# Patient Record
Sex: MALE | Race: WHITE | NOT HISPANIC OR LATINO | Employment: OTHER | URBAN - METROPOLITAN AREA
[De-identification: names, ages, dates, MRNs, and addresses within clinical notes are randomized per-mention and may not be internally consistent; named-entity substitution may affect disease eponyms.]

---

## 2017-01-06 ENCOUNTER — OFFICE VISIT (OUTPATIENT)
Dept: FAMILY MEDICINE | Facility: CLINIC | Age: 82
End: 2017-01-06
Payer: MEDICARE

## 2017-01-06 VITALS
SYSTOLIC BLOOD PRESSURE: 138 MMHG | RESPIRATION RATE: 18 BRPM | HEIGHT: 66 IN | WEIGHT: 204.13 LBS | DIASTOLIC BLOOD PRESSURE: 86 MMHG | HEART RATE: 70 BPM | BODY MASS INDEX: 32.81 KG/M2

## 2017-01-06 DIAGNOSIS — K12.0 APHTHOUS STOMATITIS: Primary | ICD-10-CM

## 2017-01-06 PROCEDURE — 99213 OFFICE O/P EST LOW 20 MIN: CPT | Mod: S$PBB,,, | Performed by: FAMILY MEDICINE

## 2017-01-06 PROCEDURE — 99213 OFFICE O/P EST LOW 20 MIN: CPT | Mod: PBBFAC | Performed by: FAMILY MEDICINE

## 2017-01-06 PROCEDURE — 99999 PR PBB SHADOW E&M-EST. PATIENT-LVL III: CPT | Mod: PBBFAC,,, | Performed by: FAMILY MEDICINE

## 2017-01-06 NOTE — MR AVS SNAPSHOT
[facility]  [address]  [facility] [address]  Phone: [phone]  Fax: [phone]                  Erasmo Dunbar   2017 1:30 PM   Office Visit    Description:  Male : 1935   Provider:  Bernadette Garibay MD   Department:  [facility]           Reason for Visit     Mouth Lesions           Diagnoses this Visit        Comments    Aphthous stomatitis    -  Primary            To Do List           Future Appointments        Provider Department Dept Phone    5/15/2017 8:00 AM Hemal Varma MD [facility] [phone]      Goals (5 Years of Data)     None       These Medications        Disp Refills Start End    (Magic mouthwash) 1:1:1 Benadryl 12.5mg/5ml liq, aluminum & magnesium hydroxide-simehticone (Maalox), lidocaine viscous 2% 90 mL 0 2017     Swish and spit 5 mLs every 4 (four) hours as needed. for mouth sores - Swish & Spit    Pharmacy: [facility]/pharmacy #[id number] - [place], [place] - [address] Ph #: [phone]         [facility] On Call     [facility] On Call Nurse [facility] -  Assistance  Registered nurses in the [facility] provide clinical advisement, health education, appointment booking, and other advisory services.  Call for this free service at [phone].             Medications           Message regarding Medications     Verify the changes and/or additions to your medication regime listed below are the same as discussed with your clinician today.  If any of these changes or additions are incorrect, please notify your healthcare provider.        START taking these NEW medications        Refills    (Magic mouthwash) 1:1:1 Benadryl 12.5mg/5ml liq, aluminum & magnesium hydroxide-simehticone (Maalox), lidocaine viscous 2% 0    Sig: Swish and spit 5 mLs every 4 (four) hours as needed. for mouth sores    Class: Print    Route: Swish & Spit      STOP taking these medications     levoFLOXacin (LEVAQUIN) 500 MG tablet Take  "1 tablet by mouth once a day           Verify that the below list of medications is an accurate representation of the medications you are currently taking.  If none reported, the list may be blank. If incorrect, please contact your healthcare provider. Carry this list with you in case of emergency.           Current Medications     acarbose (PRECOSE) 50 MG Tab Take 1 tablet by mouth 3  times a day with meals    aspirin (ECOTRIN) 325 MG EC tablet Take 325 mg by mouth once.    celecoxib (CELEBREX) 200 MG capsule Take 1 capsule by mouth  once a day    CIALIS 20 mg Tab Take 1 tablet by mouth once a day    citalopram (CELEXA) 20 MG tablet Take 1 tablet by mouth once daily    diclofenac sodium (VOLTAREN) 1 % Gel Apply 2 g topically 4 (four) times daily as needed.    doxycycline (VIBRA-TABS) 100 MG tablet Take 1 tablet by mouth  every day    furosemide (LASIX) 40 MG tablet Take 1 tablet by mouth once daily.    gabapentin (NEURONTIN) 300 MG capsule Take 2 capsules by mouth  two times daily    insulin glargine (LANTUS SOLOSTAR) 100 unit/mL (3 mL) InPn pen Inject 35 Units into the skin every evening.    KAZANO 12.5-1,000 mg Tab Take 1 tablet by mouth two  times daily    metoprolol tartrate (LOPRESSOR) 50 MG tablet Take 50 mg by mouth 2 (two) times daily.    oxycodone-acetaminophen (PERCOCET) 7.5-325 mg per tablet Take 1 tablet by mouth daily as needed.    pen needle, diabetic (LITE TOUCH INSULIN PEN NEEDLES) 31 gauge x 3/16" Ndle 1 each by Misc.(Non-Drug; Combo Route) route once daily.    simvastatin (ZOCOR) 40 MG tablet Take 1 tablet by mouth  every evening    tamsulosin (FLOMAX) 0.4 mg Cp24 Take 1 capsule by mouth  once daily    TANZEUM 50 mg/0.5 mL PnIj Inject 0.5 mLs into the  skin once a week.    (Magic mouthwash) 1:1:1 Benadryl 12.5mg/5ml liq, aluminum & magnesium hydroxide-simehticone (Maalox), lidocaine viscous 2% Swish and spit 5 mLs every 4 (four) hours as needed. for mouth sores           Clinical Reference " "Information           Vital Signs - Last Recorded  Most recent update: 1/6/2017  1:17 PM by Payal Dunbar LPN    BP Pulse Resp Ht Wt BMI    138/86 (BP Location: Right arm, Patient Position: Sitting, BP Method: Manual) 70 18 5' 6" (1.676 m) 92.6 kg (204 lb 2.3 oz) 32.95 kg/m2      Blood Pressure          Most Recent Value    BP  138/86      Allergies as of 1/6/2017     Iodine      Immunizations Administered on Date of Encounter - 1/6/2017     None      "

## 2017-01-06 NOTE — PROGRESS NOTES
Subjective:       Patient ID: Erasmo Dunbar is a 81 y.o. male.    Chief Complaint: Mouth Lesions    HPI  81 year old male comes in with c/o blister to front lip, tongue and buccal mucosa. He says that he has been on levaquin and thought this was the cause of his blisters. They hurt when he eats    PMH, PSH, ALLERGIES, SH, FH reviewed in nurse's notes above  Medications reconciled in the nurse's notes      Review of Systems   Constitutional: Negative for fever.   HENT: Negative for congestion.         Mouth blisters   Skin:        Mouth ulcers       Objective:      Physical Exam   Constitutional: He appears well-developed. No distress.   HENT:   Ulceration to tip of tongue and inner lower lip   Neck: Normal range of motion.   Cardiovascular: Normal rate and regular rhythm.    No murmur heard.  Pulmonary/Chest: Effort normal and breath sounds normal. No respiratory distress.   Musculoskeletal:   Right knee in brace   Lymphadenopathy:     He has no cervical adenopathy.   Vitals reviewed.       Assessment/Plan:       Erasmo PEGUERO was seen today for mouth lesions.    Diagnoses and all orders for this visit:    Aphthous stomatitis    Other orders  -     (Magic mouthwash) 1:1:1 Benadryl 12.5mg/5ml liq, aluminum & magnesium hydroxide-simehticone (Maalox), lidocaine viscous 2%; Swish and spit 5 mLs every 4 (four) hours as needed. for mouth sores    RTC if condition acutely worsens or any other concerns, otherwise RTC as scheduled

## 2017-03-06 ENCOUNTER — OFFICE VISIT (OUTPATIENT)
Dept: FAMILY MEDICINE | Facility: CLINIC | Age: 82
End: 2017-03-06
Payer: MEDICARE

## 2017-03-06 VITALS
HEART RATE: 88 BPM | WEIGHT: 206.56 LBS | SYSTOLIC BLOOD PRESSURE: 136 MMHG | DIASTOLIC BLOOD PRESSURE: 78 MMHG | BODY MASS INDEX: 33.2 KG/M2 | HEIGHT: 66 IN

## 2017-03-06 DIAGNOSIS — D72.829 LEUKOCYTOSIS, UNSPECIFIED TYPE: ICD-10-CM

## 2017-03-06 DIAGNOSIS — Z01.818 PRE-OP EVALUATION: Primary | ICD-10-CM

## 2017-03-06 LAB
ALBUMIN SERPL BCP-MCNC: 3.8 G/DL
ALP SERPL-CCNC: 67 U/L
ALT SERPL W/O P-5'-P-CCNC: 17 U/L
ANION GAP SERPL CALC-SCNC: 11 MMOL/L
AST SERPL-CCNC: 15 U/L
BACTERIA SPEC CULT: NORMAL
BASOPHILS # BLD AUTO: 0.09 K/UL
BASOPHILS NFR BLD: 0.7 %
BILIRUB SERPL-MCNC: 0.3 MG/DL
BILIRUB SERPL-MCNC: NORMAL MG/DL
BLOOD URINE, POC: NORMAL
BUN SERPL-MCNC: 21 MG/DL
CALCIUM SERPL-MCNC: 9.8 MG/DL
CASTS: NORMAL
CHLORIDE SERPL-SCNC: 97 MMOL/L
CO2 SERPL-SCNC: 29 MMOL/L
COLOR, POC UA: NORMAL
CREAT SERPL-MCNC: 1 MG/DL
CRYSTALS: NORMAL
DIFFERENTIAL METHOD: ABNORMAL
EOSINOPHIL # BLD AUTO: 1.3 K/UL
EOSINOPHIL NFR BLD: 9.8 %
ERYTHROCYTE [DISTWIDTH] IN BLOOD BY AUTOMATED COUNT: 13.8 %
EST. GFR  (AFRICAN AMERICAN): >60 ML/MIN/1.73 M^2
EST. GFR  (NON AFRICAN AMERICAN): >60 ML/MIN/1.73 M^2
GLUCOSE SERPL-MCNC: 179 MG/DL
GLUCOSE UR QL STRIP: 50
HCT VFR BLD AUTO: 40.5 %
HGB BLD-MCNC: 12.9 G/DL
KETONES UR QL STRIP: NORMAL
LEUKOCYTE ESTERASE URINE, POC: NORMAL
LYMPHOCYTES # BLD AUTO: 3 K/UL
LYMPHOCYTES NFR BLD: 23.2 %
MCH RBC QN AUTO: 28.7 PG
MCHC RBC AUTO-ENTMCNC: 31.9 %
MCV RBC AUTO: 90 FL
MONOCYTES # BLD AUTO: 1.5 K/UL
MONOCYTES NFR BLD: 11.7 %
NEUTROPHILS # BLD AUTO: 7 K/UL
NEUTROPHILS NFR BLD: 54.6 %
NITRITE, POC UA: NORMAL
PH, POC UA: 5
PLATELET # BLD AUTO: 356 K/UL
PMV BLD AUTO: 10.1 FL
POTASSIUM SERPL-SCNC: 4.3 MMOL/L
PROT SERPL-MCNC: 7.1 G/DL
PROTEIN, POC: NORMAL
RBC # BLD AUTO: 4.5 M/UL
RBC CELLS COUNTED: NORMAL
SODIUM SERPL-SCNC: 137 MMOL/L
SPECIFIC GRAVITY, POC UA: 1.02
UROBILINOGEN, POC UA: NORMAL
WBC # BLD AUTO: 12.84 K/UL
WHITE BLOOD CELLS: NORMAL

## 2017-03-06 PROCEDURE — 80053 COMPREHEN METABOLIC PANEL: CPT

## 2017-03-06 PROCEDURE — 81001 URINALYSIS AUTO W/SCOPE: CPT | Mod: PBBFAC | Performed by: FAMILY MEDICINE

## 2017-03-06 PROCEDURE — 81000 URINALYSIS NONAUTO W/SCOPE: CPT | Mod: PBBFAC | Performed by: FAMILY MEDICINE

## 2017-03-06 PROCEDURE — 36415 COLL VENOUS BLD VENIPUNCTURE: CPT | Mod: PBBFAC

## 2017-03-06 PROCEDURE — 3075F SYST BP GE 130 - 139MM HG: CPT | Performed by: FAMILY MEDICINE

## 2017-03-06 PROCEDURE — 99213 OFFICE O/P EST LOW 20 MIN: CPT | Mod: S$PBB | Performed by: FAMILY MEDICINE

## 2017-03-06 PROCEDURE — 3078F DIAST BP <80 MM HG: CPT | Performed by: FAMILY MEDICINE

## 2017-03-06 PROCEDURE — 1157F ADVNC CARE PLAN IN RCRD: CPT | Performed by: FAMILY MEDICINE

## 2017-03-06 PROCEDURE — 1159F MED LIST DOCD IN RCRD: CPT | Performed by: FAMILY MEDICINE

## 2017-03-06 PROCEDURE — 1160F RVW MEDS BY RX/DR IN RCRD: CPT | Performed by: FAMILY MEDICINE

## 2017-03-06 PROCEDURE — 85025 COMPLETE CBC W/AUTO DIFF WBC: CPT

## 2017-03-06 PROCEDURE — 99999 PR PBB SHADOW E&M-EST. PATIENT-LVL II: CPT | Mod: PBBFAC,,, | Performed by: FAMILY MEDICINE

## 2017-03-06 PROCEDURE — 99212 OFFICE O/P EST SF 10 MIN: CPT | Mod: PBBFAC | Performed by: FAMILY MEDICINE

## 2017-03-06 NOTE — PROGRESS NOTES
Subjective:       Patient ID: Erasmo Dunbar is a 81 y.o. male.    Chief Complaint: Pre-op Exam    Pt is a 81 y.o. male who presents for evaluation and management of   Encounter Diagnosis   Name Primary?    Pre-op evaluation Yes   .for right knee replacement with Dr. Wall   Blood sugar with fairly good control. Last Hgb A1c 7.4     Doing well on current meds. Denies any side effects. Prevention is up to date.    Review of Systems   Constitutional: Negative for fever.   Respiratory: Negative for shortness of breath.    Cardiovascular: Negative for chest pain.   Gastrointestinal: Negative for anal bleeding and blood in stool.   Genitourinary: Negative for dysuria.   Musculoskeletal: Positive for arthralgias and joint swelling.   Neurological: Negative for dizziness and light-headedness.       Objective:      Physical Exam   Constitutional: He is oriented to person, place, and time. He appears well-developed and well-nourished.   HENT:   Head: Normocephalic and atraumatic.   Right Ear: External ear normal.   Left Ear: External ear normal.   Nose: Nose normal.   Mouth/Throat: Oropharynx is clear and moist.   Eyes: Conjunctivae and EOM are normal. Pupils are equal, round, and reactive to light. Right eye exhibits no discharge. Left eye exhibits no discharge. No scleral icterus.   Neck: Normal range of motion. Neck supple. No JVD present. No tracheal deviation present. No thyromegaly present.   Cardiovascular: Normal rate, regular rhythm, normal heart sounds and intact distal pulses.    No murmur heard.  Pulmonary/Chest: Effort normal and breath sounds normal. No respiratory distress. He has no wheezes. He has no rales. He exhibits no tenderness.   Abdominal: Soft. Bowel sounds are normal. He exhibits no distension and no mass. There is no tenderness. There is no rebound and no guarding.   Musculoskeletal: Normal range of motion. He exhibits no edema.   Lymphadenopathy:     He has no cervical adenopathy.    Neurological: He is alert and oriented to person, place, and time. He has normal reflexes. He displays normal reflexes. No cranial nerve deficit. He exhibits normal muscle tone. Coordination normal.   Skin: Skin is warm and dry.   Psychiatric: He has a normal mood and affect. His behavior is normal. Judgment and thought content normal.       Assessment:       1. Pre-op evaluation        Plan:   Erasmo PEGUERO was seen today for pre-op exam.    Diagnoses and all orders for this visit:    Pre-op evaluation  -     Comprehensive metabolic panel; Future  -     CBC auto differential; Future  -     POCT urinalysis, dipstick or tablet reag  -     POCT URINE SEDIMENT EXAM    has cardiology clearance. Here for medical clearance. Will get above and fill out paperwork korina robin     No Follow-up on file.

## 2017-03-07 ENCOUNTER — APPOINTMENT (OUTPATIENT)
Dept: RADIOLOGY | Facility: CLINIC | Age: 82
End: 2017-03-07
Attending: FAMILY MEDICINE
Payer: MEDICARE

## 2017-03-07 DIAGNOSIS — Z01.818 PRE-OP EVALUATION: ICD-10-CM

## 2017-03-07 PROCEDURE — 71010 XR CHEST 1 VIEW: CPT | Mod: 26,,, | Performed by: RADIOLOGY

## 2017-03-07 PROCEDURE — 71010 XR CHEST 1 VIEW: CPT | Mod: TC,PO

## 2017-03-08 DIAGNOSIS — E11.9 TYPE 2 DIABETES MELLITUS WITHOUT COMPLICATION: ICD-10-CM

## 2017-03-10 RX ORDER — ACARBOSE 50 MG/1
TABLET ORAL
Qty: 270 TABLET | Refills: 5 | Status: SHIPPED | OUTPATIENT
Start: 2017-03-10 | End: 2017-07-25

## 2017-03-21 ENCOUNTER — LAB VISIT (OUTPATIENT)
Dept: LAB | Facility: HOSPITAL | Age: 82
End: 2017-03-21
Attending: INTERNAL MEDICINE
Payer: MEDICARE

## 2017-03-21 DIAGNOSIS — D72.10 EOSINOPHILIA: Primary | ICD-10-CM

## 2017-03-21 LAB — TROPONIN I SERPL DL<=0.01 NG/ML-MCNC: <0.006 NG/ML

## 2017-03-21 PROCEDURE — 84484 ASSAY OF TROPONIN QUANT: CPT

## 2017-03-21 PROCEDURE — 86682 HELMINTH ANTIBODY: CPT

## 2017-03-21 PROCEDURE — 82607 VITAMIN B-12: CPT

## 2017-03-21 PROCEDURE — 36415 COLL VENOUS BLD VENIPUNCTURE: CPT

## 2017-03-22 LAB — VIT B12 SERPL-MCNC: 353 PG/ML

## 2017-03-24 LAB — STRONGYLOIDES ANTIBODY IGG: POSITIVE

## 2017-04-09 RX ORDER — TAMSULOSIN HYDROCHLORIDE 0.4 MG/1
CAPSULE ORAL
Qty: 90 CAPSULE | Refills: 1 | Status: SHIPPED | OUTPATIENT
Start: 2017-04-09 | End: 2017-08-02 | Stop reason: SDUPTHER

## 2017-04-09 RX ORDER — CELECOXIB 200 MG/1
CAPSULE ORAL
Qty: 90 CAPSULE | Refills: 1 | Status: ON HOLD | OUTPATIENT
Start: 2017-04-09 | End: 2017-07-26 | Stop reason: HOSPADM

## 2017-05-02 ENCOUNTER — PATIENT OUTREACH (OUTPATIENT)
Dept: ADMINISTRATIVE | Facility: HOSPITAL | Age: 82
End: 2017-05-02

## 2017-05-02 NOTE — LETTER
May 10, 2017    Erasmo Dunbar  [address] [facility] [address]             [facility]  [address]  [facility] [address]  Phone: [phone] Dear Mr. Dunbar:       [facility] is committed to your overall health.  To help you get the most out of each of your visits, we will review your information to make sure you are up to date on all of your recommended tests and/or procedures.  We have Dr. Varma listed as your primary care provider.     If Dr. Varma is no longer your primary care provider, please contact me so that we may update our records accordingly.       He has found that you may be due for a tetanus vaccine, a PSA lab test, an annual diabetic eye exam, an annual diabetic foot exam, and annual diabetic lab tests.     If you have had any of the above done at an outside facility, please let us know so I can update your record.  If you have a copy of these records, please provide a copy for us to scan into your chart.  If not, please provide that provider/facilities contact information so that we may obtain copies from that facility.     Otherwise, please schedule these appointments at your earliest convenience.  You are due for your annual diabetic follow up with Dr. Varma.       Sincerely,       Alis Avila LPN Clinical Care Coordinator   [facility]   [phone]

## 2017-05-15 ENCOUNTER — OFFICE VISIT (OUTPATIENT)
Dept: FAMILY MEDICINE | Facility: CLINIC | Age: 82
End: 2017-05-15
Payer: MEDICARE

## 2017-05-15 VITALS
HEART RATE: 88 BPM | WEIGHT: 206 LBS | DIASTOLIC BLOOD PRESSURE: 70 MMHG | SYSTOLIC BLOOD PRESSURE: 128 MMHG | BODY MASS INDEX: 33.25 KG/M2

## 2017-05-15 DIAGNOSIS — E78.5 HYPERLIPIDEMIA, UNSPECIFIED HYPERLIPIDEMIA TYPE: ICD-10-CM

## 2017-05-15 DIAGNOSIS — I10 ESSENTIAL HYPERTENSION: ICD-10-CM

## 2017-05-15 DIAGNOSIS — N40.0 BENIGN NON-NODULAR PROSTATIC HYPERPLASIA WITHOUT LOWER URINARY TRACT SYMPTOMS: ICD-10-CM

## 2017-05-15 DIAGNOSIS — E11.9 TYPE 2 DIABETES MELLITUS WITHOUT COMPLICATION, WITH LONG-TERM CURRENT USE OF INSULIN: ICD-10-CM

## 2017-05-15 DIAGNOSIS — Z79.4 TYPE 2 DIABETES MELLITUS WITH CHRONIC KIDNEY DISEASE, WITH LONG-TERM CURRENT USE OF INSULIN, UNSPECIFIED CKD STAGE: Primary | ICD-10-CM

## 2017-05-15 DIAGNOSIS — Z79.4 TYPE 2 DIABETES MELLITUS WITHOUT COMPLICATION, WITH LONG-TERM CURRENT USE OF INSULIN: ICD-10-CM

## 2017-05-15 DIAGNOSIS — E78.5 DYSLIPIDEMIA: ICD-10-CM

## 2017-05-15 DIAGNOSIS — E11.22 TYPE 2 DIABETES MELLITUS WITH CHRONIC KIDNEY DISEASE, WITH LONG-TERM CURRENT USE OF INSULIN, UNSPECIFIED CKD STAGE: Primary | ICD-10-CM

## 2017-05-15 LAB
ALBUMIN SERPL BCP-MCNC: 3.7 G/DL
ALP SERPL-CCNC: 56 U/L
ALT SERPL W/O P-5'-P-CCNC: 13 U/L
ANION GAP SERPL CALC-SCNC: 9 MMOL/L
AST SERPL-CCNC: 13 U/L
BILIRUB SERPL-MCNC: 0.3 MG/DL
BUN SERPL-MCNC: 17 MG/DL
CALCIUM SERPL-MCNC: 9.9 MG/DL
CHLORIDE SERPL-SCNC: 100 MMOL/L
CHOLEST/HDLC SERPL: 2.6 {RATIO}
CO2 SERPL-SCNC: 33 MMOL/L
CREAT SERPL-MCNC: 0.9 MG/DL
EST. GFR  (AFRICAN AMERICAN): >60 ML/MIN/1.73 M^2
EST. GFR  (NON AFRICAN AMERICAN): >60 ML/MIN/1.73 M^2
GLUCOSE SERPL-MCNC: 147 MG/DL
HDL/CHOLESTEROL RATIO: 38.9 %
HDLC SERPL-MCNC: 108 MG/DL
HDLC SERPL-MCNC: 42 MG/DL
LDLC SERPL CALC-MCNC: 41 MG/DL
NONHDLC SERPL-MCNC: 66 MG/DL
POTASSIUM SERPL-SCNC: 4.5 MMOL/L
PROT SERPL-MCNC: 6.7 G/DL
SODIUM SERPL-SCNC: 142 MMOL/L
TRIGL SERPL-MCNC: 125 MG/DL

## 2017-05-15 PROCEDURE — 99211 OFF/OP EST MAY X REQ PHY/QHP: CPT | Mod: PBBFAC | Performed by: FAMILY MEDICINE

## 2017-05-15 PROCEDURE — 99999 PR PBB SHADOW E&M-EST. PATIENT-LVL I: CPT | Mod: PBBFAC,,, | Performed by: FAMILY MEDICINE

## 2017-05-15 PROCEDURE — 1157F ADVNC CARE PLAN IN RCRD: CPT | Mod: 8P | Performed by: FAMILY MEDICINE

## 2017-05-15 PROCEDURE — 1160F RVW MEDS BY RX/DR IN RCRD: CPT | Performed by: FAMILY MEDICINE

## 2017-05-15 PROCEDURE — 3074F SYST BP LT 130 MM HG: CPT | Performed by: FAMILY MEDICINE

## 2017-05-15 PROCEDURE — 80053 COMPREHEN METABOLIC PANEL: CPT

## 2017-05-15 PROCEDURE — 99214 OFFICE O/P EST MOD 30 MIN: CPT | Mod: S$PBB | Performed by: FAMILY MEDICINE

## 2017-05-15 PROCEDURE — 80061 LIPID PANEL: CPT

## 2017-05-15 PROCEDURE — 1159F MED LIST DOCD IN RCRD: CPT | Performed by: FAMILY MEDICINE

## 2017-05-15 PROCEDURE — 3078F DIAST BP <80 MM HG: CPT | Performed by: FAMILY MEDICINE

## 2017-05-15 PROCEDURE — 83036 HEMOGLOBIN GLYCOSYLATED A1C: CPT

## 2017-05-15 RX ORDER — INSULIN GLARGINE 100 [IU]/ML
35 INJECTION, SOLUTION SUBCUTANEOUS NIGHTLY
Qty: 45 ML | Refills: 3 | Status: SHIPPED | OUTPATIENT
Start: 2017-05-15 | End: 2017-07-10 | Stop reason: SDUPTHER

## 2017-05-15 NOTE — MR AVS SNAPSHOT
[facility]  [address]  [facility] [address]  Phone: [phone]  Fax: [phone]                  Erasmo Dunbar   5/15/2017 8:00 AM   Office Visit    Description:  Male : 1935   Provider:  Hemal Varma MD   Department:  [facility]           Reason for Visit     Follow-up           Diagnoses this Visit        Comments    Type 2 diabetes mellitus with chronic kidney disease, with long-term current use of insulin, unspecified CKD stage    -  Primary     Essential hypertension         Hyperlipidemia, unspecified hyperlipidemia type         Benign non-nodular prostatic hyperplasia without lower urinary tract symptoms                To Do List           Future Appointments        Provider Department Dept Phone    2017 8:45 AM Zia Nugent MD [facility] [place]. - Neurology [phone]    2017 8:00 AM Hemal Varma MD [facility] [phone]      Goals (5 Years of Data)     None      Follow-Up and Disposition     Return in about 6 months (around 11/15/2017).    Follow-up and Disposition History       These Medications        Disp Refills Start End    insulin glargine (LANTUS SOLOSTAR) 100 unit/mL (3 mL) InPn pen 45 mL 3 5/15/2017 5/15/2018    Inject 35 Units into the skin every evening. - Subcutaneous    Pharmacy: [facility] - [address] #: [phone]         [facility]     [facility] -  Assistance  Unless otherwise directed by your provider, please contact [facility], our nurse care line that is available for  assistance.     Registered nurses in the [facility] provide: appointment scheduling, clinical advisement, health education, and other advisory services.  Call: [phone] (toll free)               Medications           Message regarding Medications     Verify the changes and/or additions to your medication regime  "listed below are the same as discussed with your clinician today.  If any of these changes or additions are incorrect, please notify your healthcare provider.        START taking these NEW medications        Refills    insulin glargine (LANTUS SOLOSTAR) 100 unit/mL (3 mL) InPn pen 3    Sig: Inject 35 Units into the skin every evening.    Class: Normal    Route: Subcutaneous           Verify that the below list of medications is an accurate representation of the medications you are currently taking.  If none reported, the list may be blank. If incorrect, please contact your healthcare provider. Carry this list with you in case of emergency.           Current Medications     (Magic mouthwash) 1:1:1 Benadryl 12.5mg/5ml liq, aluminum & magnesium hydroxide-simehticone (Maalox), lidocaine viscous 2% Swish and spit 5 mLs every 4 (four) hours as needed. for mouth sores    acarbose (PRECOSE) 50 MG Tab Take 1 tablet by mouth 3  times a day with meals    aspirin (ECOTRIN) 325 MG EC tablet Take 325 mg by mouth once.    celecoxib (CELEBREX) 200 MG capsule Take 1 capsule by mouth  once a day    CIALIS 20 mg Tab Take 1 tablet by mouth once a day    citalopram (CELEXA) 20 MG tablet Take 1 tablet by mouth once daily    diclofenac sodium (VOLTAREN) 1 % Gel Apply 2 g topically 4 (four) times daily as needed.    furosemide (LASIX) 40 MG tablet Take 1 tablet by mouth once daily.    gabapentin (NEURONTIN) 300 MG capsule Take 2 capsules by mouth  two times daily    insulin glargine (LANTUS SOLOSTAR) 100 unit/mL (3 mL) InPn pen Inject 35 Units into the skin every evening.    KAZANO 12.5-1,000 mg Tab Take 1 tablet by mouth two  times daily    metoprolol tartrate (LOPRESSOR) 50 MG tablet Take 50 mg by mouth 2 (two) times daily.    oxycodone-acetaminophen (PERCOCET) 7.5-325 mg per tablet Take 1 tablet by mouth daily as needed.    pen needle, diabetic (LITE TOUCH INSULIN PEN NEEDLES) 31 gauge x 3/16" Ndle 1 each by Misc.(Non-Drug; Combo Route) " route once daily.    simvastatin (ZOCOR) 40 MG tablet Take 1 tablet by mouth  every evening    tamsulosin (FLOMAX) 0.4 mg Cp24 Take 1 capsule by mouth  once daily    TANZEUM 50 mg/0.5 mL PnIj Inject 50mg once a week.    doxycycline (VIBRA-TABS) 100 MG tablet Take 1 tablet by mouth  every day    insulin glargine (LANTUS SOLOSTAR) 100 unit/mL (3 mL) InPn pen Inject 35 Units into the skin every evening.           Clinical Reference Information           Your Vitals Were     BP Pulse Weight BMI       128/70 (BP Location: Left arm, Patient Position: Sitting, BP Method: Manual) 88 93.4 kg (206 lb) 33.25 kg/m2       Blood Pressure          Most Recent Value    BP  128/70      Allergies as of 5/15/2017     Iodine      Immunizations Administered on Date of Encounter - 5/15/2017     None      Orders Placed During Today's Visit     Future Labs/Procedures Expected by Expires    Comprehensive metabolic panel  5/16/2017 5/15/2018    Hemoglobin A1c  5/16/2017 5/15/2018    Lipid panel  5/16/2017 5/15/2018      Language Assistance Services     ATTENTION: Language assistance services are available, free of charge. Please call [phone].      ATENCIÓN: Si habla español, tiene a sams disposición servicios gratuitos de asistencia lingüística. Llame al [phone].     DAHIANA Ý: N?u b?n nói Ti?ng Vi?t, có các d?ch v? h? tr? ngôn ng? mi?n phí dành cho b?n. G?i s? [phone].         [facility] complies with applicable Federal civil rights laws and does not discriminate on the basis of race, color, national origin, age, disability, or sex.

## 2017-05-16 LAB
ESTIMATED AVG GLUCOSE: 137 MG/DL
HBA1C MFR BLD HPLC: 6.4 %

## 2017-05-23 ENCOUNTER — TELEPHONE (OUTPATIENT)
Dept: FAMILY MEDICINE | Facility: CLINIC | Age: 82
End: 2017-05-23

## 2017-05-23 DIAGNOSIS — F32.A DEPRESSION: ICD-10-CM

## 2017-05-23 RX ORDER — CITALOPRAM 20 MG/1
TABLET, FILM COATED ORAL
Qty: 90 TABLET | Refills: 5 | Status: SHIPPED | OUTPATIENT
Start: 2017-05-23 | End: 2017-08-02 | Stop reason: SDUPTHER

## 2017-05-23 RX ORDER — DIAZEPAM 5 MG/1
TABLET ORAL
Qty: 180 TABLET | Refills: 1 | Status: SHIPPED | OUTPATIENT
Start: 2017-05-23 | End: 2017-07-10

## 2017-05-23 RX ORDER — GABAPENTIN 300 MG/1
CAPSULE ORAL
Qty: 360 CAPSULE | Refills: 5 | Status: SHIPPED | OUTPATIENT
Start: 2017-05-23 | End: 2017-08-02 | Stop reason: SDUPTHER

## 2017-05-24 NOTE — TELEPHONE ENCOUNTER
----- Message from Shabbir Hawley sent at 2017  9:35 AM CDT -----  Contact: Wife - Carol Dunbar  MRN: [id number]  : 1935  PCP: Hemal Varma  Home Phone      [phone]  Work Phone      Not on file.  [employer]          [phone]      MESSAGE: nausea X 3 days -- would like to come in for injection -- asks that the nurse return call    Call [phone]    PCP: Kojo

## 2017-05-26 RX ORDER — ONDANSETRON HYDROCHLORIDE 8 MG/1
8 TABLET, FILM COATED ORAL EVERY 8 HOURS PRN
Qty: 20 TABLET | Refills: 0 | Status: SHIPPED | OUTPATIENT
Start: 2017-05-26 | End: 2017-06-02

## 2017-05-26 NOTE — TELEPHONE ENCOUNTER
Spoke with Carol about Pt being nausea and by request she states that Rx be called in.I offered appt but she declined.Send Rx to [facility] in [place]

## 2017-05-26 NOTE — TELEPHONE ENCOUNTER
Patient's wife notified that Rx was sent in but it was sent to the wrong pharmacy. I called it in to the correct pharmacy and cancelled the other script.

## 2017-07-10 ENCOUNTER — OFFICE VISIT (OUTPATIENT)
Dept: NEUROLOGY | Facility: CLINIC | Age: 82
End: 2017-07-10
Payer: MEDICARE

## 2017-07-10 VITALS
HEART RATE: 62 BPM | RESPIRATION RATE: 14 BRPM | WEIGHT: 207.69 LBS | SYSTOLIC BLOOD PRESSURE: 142 MMHG | DIASTOLIC BLOOD PRESSURE: 68 MMHG | BODY MASS INDEX: 33.38 KG/M2 | HEIGHT: 66 IN

## 2017-07-10 DIAGNOSIS — G93.0 ARACHNOID CYST: ICD-10-CM

## 2017-07-10 DIAGNOSIS — E11.42 DIABETIC POLYNEUROPATHY ASSOCIATED WITH TYPE 2 DIABETES MELLITUS: Primary | ICD-10-CM

## 2017-07-10 PROCEDURE — 1125F AMNT PAIN NOTED PAIN PRSNT: CPT | Mod: S$GLB,,, | Performed by: PSYCHIATRY & NEUROLOGY

## 2017-07-10 PROCEDURE — 1159F MED LIST DOCD IN RCRD: CPT | Mod: S$GLB,,, | Performed by: PSYCHIATRY & NEUROLOGY

## 2017-07-10 PROCEDURE — 99999 PR PBB SHADOW E&M-EST. PATIENT-LVL IV: CPT | Mod: PBBFAC,,, | Performed by: PSYCHIATRY & NEUROLOGY

## 2017-07-10 PROCEDURE — 99213 OFFICE O/P EST LOW 20 MIN: CPT | Mod: S$GLB,,, | Performed by: PSYCHIATRY & NEUROLOGY

## 2017-07-10 RX ORDER — OXYCODONE AND ACETAMINOPHEN 10; 325 MG/1; MG/1
0.5 TABLET ORAL EVERY 4 HOURS PRN
COMMUNITY
End: 2017-07-25

## 2017-07-10 RX ORDER — ACETAMINOPHEN 500 MG
1 TABLET ORAL DAILY
COMMUNITY
End: 2017-07-25

## 2017-07-10 RX ORDER — TRAMADOL HYDROCHLORIDE 50 MG/1
50 TABLET ORAL EVERY 6 HOURS PRN
COMMUNITY
End: 2018-07-26

## 2017-07-10 RX ORDER — IBUPROFEN 200 MG
200 TABLET ORAL EVERY 6 HOURS PRN
Status: ON HOLD | COMMUNITY
End: 2017-07-26 | Stop reason: HOSPADM

## 2017-07-10 NOTE — PROGRESS NOTES
"HPI:    Erasmo Dunbar is a 81 y.o. 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 Stable    He states his balance is good. He has not fallen or had dizziness.  He continues to take gabapentin with good control of neuropathy symptoms  He is s/p right knee replacement 3 months ago.  He no longer requires seeing pain management- has improved pain back pain.    Review of Systems   Constitutional: Negative for fever.   HENT: Negative for nosebleeds.    Eyes: Negative for double vision.   Respiratory: Negative for hemoptysis.    Cardiovascular: Negative for leg swelling.   Gastrointestinal: Negative for blood in stool.   Genitourinary: Negative for hematuria.   Musculoskeletal: Negative for falls.   Skin: Negative for rash.   Neurological: Negative for dizziness.   Psychiatric/Behavioral: The patient does not have insomnia.      Exam:  Gen Appearance, well developed/nourished in no apparent distress  CV: 2+ distal pulses with no edema or swelling  Neuro:  MS: Awake, alert,  Sustains attention. Recent/remote memory intact, Language is full to spontaneous speech/repetition/naming/comprehension. Fund of Knowledge is full  CN: Optic discs are flat with normal vasculature, PERRL, Extraoccular movements and visual fields are full. Normal facial sensation and strength, Hearing symmetric, Tongue and Palate are midline and strong. Shoulder Shrug symmetric and strong.  Motor: Normal bulk, tone, no abnormal movements.5/5 strength bilateral upper/lower extremities with 1+ reflexes in the arms, absent starting at the knees  Sensory:  Romberg negative and intact to temp and vibration but decreased in the distal legs to vibration  Cerebellar: Finger-nose,Heal-shin, Rapid alternating movements intact  Gait: Normal stance, no ataxia- walking with cane today due to recent knee " "replacement      Imaging: CT head 12/9/14: Age advanced cerebral volume loss with mild chronic microvascular ischemic disease. There is no evidence for acute intracranial hemorrhage and no new parenchymal hypoattenuation is identified to suggest an acute infarction.  Age advanced cerebral volume loss with mild chronic microvascular ischemic disease. There is no evidence for acute intracranial hemorrhage and no new parenchymal hypoattenuation is identified to suggest an acute infarction.    Labs: 5/2017 A1C is less than 7    Assessment/Plan: Erasmo Dunbar is a 81 y.o. 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 Stable  Can't r/o subtle right hemiparesis.     I recommend:     1. 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 No further work up needed unless new symptoms. Can't have MRI due to Pacemaker.   2. Also,  pacemaker has been interrogated/ no orthostatic symptoms.  Physical therapy evaluation did help his balance([facility] balance [place]), and all symptoms are resolved   3. 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 Maintain good DM 2 control.  He uses gabapentin with good relief of neuropathy symptoms    RTC in 2 years.         "

## 2017-07-14 DIAGNOSIS — E11.9 TYPE 2 DIABETES MELLITUS WITHOUT COMPLICATION: ICD-10-CM

## 2017-07-24 DIAGNOSIS — E11.9 TYPE 2 DIABETES MELLITUS WITHOUT COMPLICATION: ICD-10-CM

## 2017-07-25 ENCOUNTER — HOSPITAL ENCOUNTER (EMERGENCY)
Facility: HOSPITAL | Age: 82
Discharge: ANOTHER HOSPITAL | End: 2017-07-25
Attending: EMERGENCY MEDICINE
Payer: MEDICARE

## 2017-07-25 ENCOUNTER — HOSPITAL ENCOUNTER (OUTPATIENT)
Facility: HOSPITAL | Age: 82
Discharge: HOME | End: 2017-07-26
Attending: EMERGENCY MEDICINE | Admitting: PSYCHIATRY & NEUROLOGY
Payer: MEDICARE

## 2017-07-25 VITALS
DIASTOLIC BLOOD PRESSURE: 72 MMHG | HEART RATE: 68 BPM | TEMPERATURE: 98 F | RESPIRATION RATE: 15 BRPM | OXYGEN SATURATION: 96 % | SYSTOLIC BLOOD PRESSURE: 179 MMHG

## 2017-07-25 DIAGNOSIS — G45.9 TRANSIENT CEREBRAL ISCHEMIA, UNSPECIFIED TYPE: Primary | ICD-10-CM

## 2017-07-25 DIAGNOSIS — G45.9 TRANSIENT ISCHEMIC ATTACK (TIA): Primary | ICD-10-CM

## 2017-07-25 LAB
ALBUMIN SERPL BCP-MCNC: 4.2 G/DL
ALP SERPL-CCNC: 79 U/L
ALT SERPL W/O P-5'-P-CCNC: 17 U/L
ANION GAP SERPL CALC-SCNC: 10 MMOL/L
APTT BLDCRRT: 25.3 SEC
AST SERPL-CCNC: 13 U/L
BASOPHILS NFR BLD: 0 %
BILIRUB SERPL-MCNC: 0.4 MG/DL
BILIRUB UR QL STRIP: NEGATIVE
BUN SERPL-MCNC: 15 MG/DL
CALCIUM SERPL-MCNC: 10.1 MG/DL
CHLORIDE SERPL-SCNC: 98 MMOL/L
CHOLEST/HDLC SERPL: 2.6 {RATIO}
CK MB SERPL-MCNC: 1.4 NG/ML
CK MB SERPL-RTO: 2.6 %
CK SERPL-CCNC: 54 U/L
CK SERPL-CCNC: 54 U/L
CLARITY UR REFRACT.AUTO: CLEAR
CO2 SERPL-SCNC: 31 MMOL/L
COLOR UR AUTO: YELLOW
CREAT SERPL-MCNC: 1.1 MG/DL
DIFFERENTIAL METHOD: ABNORMAL
EOSINOPHIL NFR BLD: 12 %
ERYTHROCYTE [DISTWIDTH] IN BLOOD BY AUTOMATED COUNT: 14.2 %
EST. GFR  (AFRICAN AMERICAN): >60 ML/MIN/1.73 M^2
EST. GFR  (NON AFRICAN AMERICAN): >60 ML/MIN/1.73 M^2
ESTIMATED AVG GLUCOSE: 137 MG/DL
GLUCOSE SERPL-MCNC: 131 MG/DL
GLUCOSE UR QL STRIP: NEGATIVE
HBA1C MFR BLD HPLC: 6.4 %
HCT VFR BLD AUTO: 42 %
HDL/CHOLESTEROL RATIO: 37.9 %
HDLC SERPL-MCNC: 116 MG/DL
HDLC SERPL-MCNC: 44 MG/DL
HGB BLD-MCNC: 13.1 G/DL
HGB UR QL STRIP: NEGATIVE
INR PPP: 1
KETONES UR QL STRIP: NEGATIVE
LDLC SERPL CALC-MCNC: 37.6 MG/DL
LEUKOCYTE ESTERASE UR QL STRIP: NEGATIVE
LYMPHOCYTES NFR BLD: 14 %
MCH RBC QN AUTO: 27.7 PG
MCHC RBC AUTO-ENTMCNC: 31.2 G/DL
MCV RBC AUTO: 89 FL
MONOCYTES NFR BLD: 2 %
NEUTROPHILS NFR BLD: 72 %
NITRITE UR QL STRIP: NEGATIVE
NONHDLC SERPL-MCNC: 72 MG/DL
PH UR STRIP: 6 [PH] (ref 5–8)
PLATELET # BLD AUTO: 376 K/UL
PMV BLD AUTO: 9.4 FL
POCT GLUCOSE: 136 MG/DL (ref 70–110)
POTASSIUM SERPL-SCNC: 4.4 MMOL/L
PROT SERPL-MCNC: 7.4 G/DL
PROT UR QL STRIP: NEGATIVE
PROTHROMBIN TIME: 10.5 SEC
RBC # BLD AUTO: 4.73 M/UL
SODIUM SERPL-SCNC: 139 MMOL/L
SP GR UR STRIP: >=1.03 (ref 1–1.03)
TRIGL SERPL-MCNC: 172 MG/DL
TROPONIN I SERPL DL<=0.01 NG/ML-MCNC: <0.006 NG/ML
TSH SERPL DL<=0.005 MIU/L-ACNC: 1.22 UIU/ML
URN SPEC COLLECT METH UR: ABNORMAL
UROBILINOGEN UR STRIP-ACNC: NEGATIVE EU/DL
WBC # BLD AUTO: 12.94 K/UL

## 2017-07-25 PROCEDURE — 85730 THROMBOPLASTIN TIME PARTIAL: CPT

## 2017-07-25 PROCEDURE — 92610 EVALUATE SWALLOWING FUNCTION: CPT

## 2017-07-25 PROCEDURE — 80061 LIPID PANEL: CPT

## 2017-07-25 PROCEDURE — 63600175 PHARM REV CODE 636 W HCPCS: Performed by: NURSE PRACTITIONER

## 2017-07-25 PROCEDURE — G8997 SWALLOW GOAL STATUS: HCPCS | Mod: CH

## 2017-07-25 PROCEDURE — 25000003 PHARM REV CODE 250: Performed by: PHYSICIAN ASSISTANT

## 2017-07-25 PROCEDURE — 81003 URINALYSIS AUTO W/O SCOPE: CPT

## 2017-07-25 PROCEDURE — 99285 EMERGENCY DEPT VISIT HI MDM: CPT

## 2017-07-25 PROCEDURE — G0378 HOSPITAL OBSERVATION PER HR: HCPCS

## 2017-07-25 PROCEDURE — G8996 SWALLOW CURRENT STATUS: HCPCS | Mod: CH

## 2017-07-25 PROCEDURE — 96372 THER/PROPH/DIAG INJ SC/IM: CPT

## 2017-07-25 PROCEDURE — 85610 PROTHROMBIN TIME: CPT

## 2017-07-25 PROCEDURE — 99220 PR INITIAL OBSERVATION CARE,LEVL III: CPT | Mod: GC,,, | Performed by: PSYCHIATRY & NEUROLOGY

## 2017-07-25 PROCEDURE — 93005 ELECTROCARDIOGRAM TRACING: CPT

## 2017-07-25 PROCEDURE — A4216 STERILE WATER/SALINE, 10 ML: HCPCS | Performed by: PHYSICIAN ASSISTANT

## 2017-07-25 PROCEDURE — 99285 EMERGENCY DEPT VISIT HI MDM: CPT | Mod: 25,27

## 2017-07-25 PROCEDURE — 84484 ASSAY OF TROPONIN QUANT: CPT

## 2017-07-25 PROCEDURE — 85027 COMPLETE CBC AUTOMATED: CPT

## 2017-07-25 PROCEDURE — 80053 COMPREHEN METABOLIC PANEL: CPT

## 2017-07-25 PROCEDURE — 93010 ELECTROCARDIOGRAM REPORT: CPT | Mod: ,,, | Performed by: INTERNAL MEDICINE

## 2017-07-25 PROCEDURE — 63600175 PHARM REV CODE 636 W HCPCS: Performed by: PHYSICIAN ASSISTANT

## 2017-07-25 PROCEDURE — 85007 BL SMEAR W/DIFF WBC COUNT: CPT

## 2017-07-25 PROCEDURE — 36415 COLL VENOUS BLD VENIPUNCTURE: CPT

## 2017-07-25 PROCEDURE — 84443 ASSAY THYROID STIM HORMONE: CPT

## 2017-07-25 PROCEDURE — 82553 CREATINE MB FRACTION: CPT

## 2017-07-25 PROCEDURE — 83036 HEMOGLOBIN GLYCOSYLATED A1C: CPT

## 2017-07-25 PROCEDURE — G8998 SWALLOW D/C STATUS: HCPCS | Mod: CH

## 2017-07-25 PROCEDURE — 99283 EMERGENCY DEPT VISIT LOW MDM: CPT | Mod: ,,, | Performed by: EMERGENCY MEDICINE

## 2017-07-25 PROCEDURE — 25500020 PHARM REV CODE 255: Performed by: EMERGENCY MEDICINE

## 2017-07-25 PROCEDURE — 96374 THER/PROPH/DIAG INJ IV PUSH: CPT

## 2017-07-25 PROCEDURE — 92523 SPEECH SOUND LANG COMPREHEN: CPT

## 2017-07-25 RX ORDER — GLUCAGON 1 MG
1 KIT INJECTION
Status: DISCONTINUED | OUTPATIENT
Start: 2017-07-25 | End: 2017-07-26 | Stop reason: HOSPADM

## 2017-07-25 RX ORDER — METOPROLOL TARTRATE 50 MG/1
50 TABLET ORAL 2 TIMES DAILY
Status: DISCONTINUED | OUTPATIENT
Start: 2017-07-25 | End: 2017-07-26 | Stop reason: HOSPADM

## 2017-07-25 RX ORDER — INSULIN ASPART 100 [IU]/ML
1-10 INJECTION, SOLUTION INTRAVENOUS; SUBCUTANEOUS
Status: DISCONTINUED | OUTPATIENT
Start: 2017-07-25 | End: 2017-07-26 | Stop reason: HOSPADM

## 2017-07-25 RX ORDER — ASPIRIN 325 MG
325 TABLET, DELAYED RELEASE (ENTERIC COATED) ORAL DAILY
Status: DISCONTINUED | OUTPATIENT
Start: 2017-07-25 | End: 2017-07-26 | Stop reason: HOSPADM

## 2017-07-25 RX ORDER — ATORVASTATIN CALCIUM 20 MG/1
40 TABLET, FILM COATED ORAL DAILY
Status: DISCONTINUED | OUTPATIENT
Start: 2017-07-25 | End: 2017-07-26

## 2017-07-25 RX ORDER — CHOLECALCIFEROL (VITAMIN D3) 25 MCG
2000 TABLET ORAL DAILY
Status: DISCONTINUED | OUTPATIENT
Start: 2017-07-25 | End: 2017-07-26 | Stop reason: HOSPADM

## 2017-07-25 RX ORDER — DOXYCYCLINE HYCLATE 100 MG
100 TABLET ORAL DAILY
Status: DISCONTINUED | OUTPATIENT
Start: 2017-07-25 | End: 2017-07-26 | Stop reason: HOSPADM

## 2017-07-25 RX ORDER — DIPHENHYDRAMINE HYDROCHLORIDE 50 MG/ML
25 INJECTION INTRAMUSCULAR; INTRAVENOUS EVERY 6 HOURS PRN
Status: DISCONTINUED | OUTPATIENT
Start: 2017-07-25 | End: 2017-07-26 | Stop reason: HOSPADM

## 2017-07-25 RX ORDER — GABAPENTIN 300 MG/1
600 CAPSULE ORAL 2 TIMES DAILY
Status: DISCONTINUED | OUTPATIENT
Start: 2017-07-25 | End: 2017-07-26 | Stop reason: HOSPADM

## 2017-07-25 RX ORDER — IBUPROFEN 200 MG
24 TABLET ORAL
Status: DISCONTINUED | OUTPATIENT
Start: 2017-07-25 | End: 2017-07-26 | Stop reason: HOSPADM

## 2017-07-25 RX ORDER — OXYCODONE AND ACETAMINOPHEN 5; 325 MG/1; MG/1
1 TABLET ORAL EVERY 4 HOURS PRN
Status: DISCONTINUED | OUTPATIENT
Start: 2017-07-25 | End: 2017-07-26 | Stop reason: HOSPADM

## 2017-07-25 RX ORDER — SODIUM CHLORIDE 0.9 % (FLUSH) 0.9 %
3 SYRINGE (ML) INJECTION EVERY 8 HOURS
Status: DISCONTINUED | OUTPATIENT
Start: 2017-07-25 | End: 2017-07-26 | Stop reason: HOSPADM

## 2017-07-25 RX ORDER — OXYCODONE AND ACETAMINOPHEN 10; 325 MG/1; MG/1
0.5 TABLET ORAL EVERY 4 HOURS PRN
Status: DISCONTINUED | OUTPATIENT
Start: 2017-07-25 | End: 2017-07-25

## 2017-07-25 RX ORDER — IBUPROFEN 200 MG
16 TABLET ORAL
Status: DISCONTINUED | OUTPATIENT
Start: 2017-07-25 | End: 2017-07-26 | Stop reason: HOSPADM

## 2017-07-25 RX ORDER — DIPHENHYDRAMINE HYDROCHLORIDE 50 MG/ML
INJECTION INTRAMUSCULAR; INTRAVENOUS
Status: DISPENSED
Start: 2017-07-25 | End: 2017-07-26

## 2017-07-25 RX ORDER — FUROSEMIDE 40 MG/1
40 TABLET ORAL DAILY
Status: DISCONTINUED | OUTPATIENT
Start: 2017-07-25 | End: 2017-07-26 | Stop reason: HOSPADM

## 2017-07-25 RX ORDER — CITALOPRAM 10 MG/1
20 TABLET ORAL DAILY
Status: DISCONTINUED | OUTPATIENT
Start: 2017-07-25 | End: 2017-07-26 | Stop reason: HOSPADM

## 2017-07-25 RX ORDER — TAMSULOSIN HYDROCHLORIDE 0.4 MG/1
1 CAPSULE ORAL DAILY
Status: DISCONTINUED | OUTPATIENT
Start: 2017-07-25 | End: 2017-07-26 | Stop reason: HOSPADM

## 2017-07-25 RX ORDER — ASPIRIN 325 MG
325 TABLET, DELAYED RELEASE (ENTERIC COATED) ORAL
Status: DISCONTINUED | OUTPATIENT
Start: 2017-07-25 | End: 2017-07-25

## 2017-07-25 RX ORDER — LABETALOL HYDROCHLORIDE 5 MG/ML
10 INJECTION, SOLUTION INTRAVENOUS
Status: DISCONTINUED | OUTPATIENT
Start: 2017-07-25 | End: 2017-07-26 | Stop reason: HOSPADM

## 2017-07-25 RX ORDER — HEPARIN SODIUM 5000 [USP'U]/ML
5000 INJECTION, SOLUTION INTRAVENOUS; SUBCUTANEOUS EVERY 8 HOURS
Status: DISCONTINUED | OUTPATIENT
Start: 2017-07-25 | End: 2017-07-26 | Stop reason: HOSPADM

## 2017-07-25 RX ORDER — CLOPIDOGREL BISULFATE 75 MG/1
300 TABLET ORAL ONCE
Status: COMPLETED | OUTPATIENT
Start: 2017-07-25 | End: 2017-07-25

## 2017-07-25 RX ADMIN — DIPHENHYDRAMINE HYDROCHLORIDE 25 MG: 50 INJECTION, SOLUTION INTRAMUSCULAR; INTRAVENOUS at 12:07

## 2017-07-25 RX ADMIN — IOHEXOL 100 ML: 350 INJECTION, SOLUTION INTRAVENOUS at 01:07

## 2017-07-25 RX ADMIN — HEPARIN SODIUM 5000 UNITS: 5000 INJECTION, SOLUTION INTRAVENOUS; SUBCUTANEOUS at 09:07

## 2017-07-25 RX ADMIN — METOPROLOL TARTRATE 50 MG: 50 TABLET, FILM COATED ORAL at 09:07

## 2017-07-25 RX ADMIN — INSULIN DETEMIR 14 UNITS: 100 INJECTION, SOLUTION SUBCUTANEOUS at 11:07

## 2017-07-25 RX ADMIN — ATORVASTATIN CALCIUM 40 MG: 20 TABLET, FILM COATED ORAL at 03:07

## 2017-07-25 RX ADMIN — SODIUM CHLORIDE, PRESERVATIVE FREE 3 ML: 5 INJECTION INTRAVENOUS at 09:07

## 2017-07-25 RX ADMIN — HEPARIN SODIUM 5000 UNITS: 5000 INJECTION, SOLUTION INTRAVENOUS; SUBCUTANEOUS at 02:07

## 2017-07-25 RX ADMIN — GABAPENTIN 600 MG: 300 CAPSULE ORAL at 09:07

## 2017-07-25 RX ADMIN — TAMSULOSIN HYDROCHLORIDE 0.4 MG: 0.4 CAPSULE ORAL at 03:07

## 2017-07-25 RX ADMIN — CLOPIDOGREL 300 MG: 75 TABLET, FILM COATED ORAL at 09:07

## 2017-07-25 NOTE — ASSESSMENT & PLAN NOTE
Erasmo Dunbar is a 81 y.o. male with two recent episodes of aphasia. Patient back to baseline, NIH 0. CTA with some intracranial bilateral ICA calcification, area of R ICA stenosis. Admitting to stroke service for TIA workup. Patient unable to have MRI due to pacemaker. Echo pending    Antiplatelets: DAPT  Statins: atorvastatin 40  SBP <140  DVT ppx: heparin sq  PT/OT and speech to evaluate and treat  Neuro checks q4h

## 2017-07-25 NOTE — ED TRIAGE NOTES
Patient reports had trouble speaking  this am around 7:30 am  Symptoms lasting for 15 minutes    Speaking appropriately at present

## 2017-07-25 NOTE — PT/OT/SLP EVAL
"Speech Language Pathology Evaluation  Discharge    Erasmo Dunbar   MRN: [id number]   Admitting Diagnosis: possible TIA, transient aphasia    Diet recommendations: Solid Diet Level: Regular  Liquid Diet Level: Thin Feed only when awake/alert, HOB to 90 degrees, Eliminate distractions and Avoid talking while eating    SLP Treatment Date: 07/25/17  Speech Start Time: 1335     Speech Stop Time: 1400     Speech Total (min): 25 min       TREATMENT BILLABLE MINUTES:  Eval 15  and Eval Swallow and Oral Function 10    Diagnosis: possible TIA, transient aphasia    Past Medical History:   Diagnosis Date    Arthritis     BPH (benign prostatic hyperplasia)     CHF (congestive heart failure)     Depression     Diabetes mellitus type II     Hyperlipidemia     Hypertension     RLS (restless legs syndrome)      Past Surgical History:   Procedure Laterality Date    BACK SURGERY      cervical fusion    CARDIAC PACEMAKER PLACEMENT      CARDIAC SURGERY Left     pacemaker placement    COLECTOMY N/A     CYSTOSCOPY N/A     Pt stated, "I have had six Cystoscopy."    EYE SURGERY Bilateral     cataracts    Ganglion Cyst Removed  Right     Neck Fusion Bilateral     PROSTATE SURGERY      ROTATOR CUFF REPAIR Right     SKIN BIOPSY      skin cancer    TOTAL KNEE ARTHROPLASTY  03/30/2017    right knee    TRANSURETHRAL RESECTION OF PROSTATE         Has the patient been evaluated by SLP for swallowing? : Yes  Keep patient NPO?: No   General Precautions: Standard, aspiration, fall          Social Hx: Patient lives with spouse, fairly indpt, still drives though does report progressive blurry vision which he attributes to diabetes.    Prior diet: reg/thin.    Subjective:  "Oh you would know if I sounded off.  It was bad."  "He's back to himself now"  Patient goals: to return home.  Pt seen upright on stretcher in ED    Pain/Comfort  Pain Rating 1: 0/10  Pain Rating Post-Intervention 1: 0/10    Objective:   Patient found with: " "telemetry    Oral Musculature Evaluation  Oral Musculature: WFL  Mucosal Quality: adequate  Mandibular Strength and Mobility: WFL  Oral Labial Strength and Mobility: WFL  Lingual Strength and Mobility: WFL  Volitional Cough: WFL  Volitional Swallow: WFL  Voice Prior to PO Intake: clear       Cognitive Status:  Behavioral Observations: alert and appropriate-  Memory and Orientation: Pt ox4 indptly, immediate recall WFL up to 5 digits/wods, 0/3 unassociated words recalled after delay.  Pt reports progressive short term memory issues over last several years.  Wife in agreement.  Pt with good functional recall of recent events.   Attention: WFL.  Problem Solving: Pt able to provide multiple solutions to ADL situations indptly.  100% accy with convergent categorization, compare/contrast and 4 word set sequencing though occasional repeats required.  15 items stated in concrete cat in 1 min which is WFL.   Pragmatics: WFL  Executive Function: mental flexibility    Language: no    Auditory Comprehension: WFL given repeats, pt with bilateral hearing aids \    Verbal Expression: fluent and appropriate, no evidence of anomia    Motor Speech: WFL    Voice: WFL    Reading: WFL for large print sentence    Writing: uses dominant hand, at baseline    Visual-Spatial: WFL, spacing was adequate for clock drawing task though "16" written instead of "6".  Pt recognized error with min cue.     Bedside Swallow Eval:  Consistencies Assessed: thin, puree, solid  Oral Phase: WFL  Pharyngeal Phase: no overt clinical  signs/symptoms of aspiration and no overt clinical signs/symptoms of pharyngeal dysphagia  Pt with timely initiation of swallow.  No change in vocal quality, throat clear or cough.     Additional Treatment:  Education provided on role of SLP and results of evaluation.  Pt and spouse report pt back to baseline, in agreement with d/c from SLP services.  Nursing alerted re: results and recs.     FIM:  Social Interaction: 7 Complete " [place]--The patient interacts appropriately with staff, other patients, and family members (e.g., controls temper, accepts criticism, is aware that words and actions have an impact on others), and does not require medication for   Problem Solvin Modified [place]--In most situations, the patient recognizes a present problem, and with only mild difficulty makes appropriate decisions, initiates and carries out a sequence of steps to solve complex problems, or requires more than a   Comprehension: 6 (Pt with bilateral hearing aids) Modified [place]--In most situations, the patient understands readily or with only mild dificulty complex or abstract directions and conversation.  The patient does not require prompting, though (s)he may require a hearing or visual aid, other x (Pt with bilateral hearing aids)   Expression: 7 Complete [place]--The patient expresses complex or abstract ideas clearly and fluently (not necessarily in English).   Memory: 5 Supervision-The patient requires prompting (e.g., cueing, repetition, reminders) only under stressful or unfamiliar conditions, but no more than 10% of the time.     Assessment:  Erasmo Dunbar is a 81 y.o. male with a SLP diagnosis of speech, language, cognition and swallow at baseline.  No further ST indicated.     Do you have any cultural, spiritual, Jehovah's witness conflicts, given your current situation?: none reported    Discharge recommendations: Discharge Facility/Level Of Care Needs: home     Goals:    SLP Goals     Not on file          Multidisciplinary Problems (Resolved)        Problem: SLP Goal    Goal Priority Disciplines Outcome   SLP Goal   (Resolved)     SLP Outcome(s) achieved                    Plan:   Patient to be seen     Plan of Care expires:    Plan of Care reviewed with: patient, spouse  SLP Follow-up?: No         SLP G-Codes  Functional Assessment Tool Used: noms  Score: 7  Functional Limitations: Swallowing  Swallow Current  Status ():   Swallow Goal Status ():   Swallow Discharge Status (): FRANCIA Velasquez, CCC-SLP  07/25/2017

## 2017-07-25 NOTE — PLAN OF CARE
Problem: SLP Goal  Goal: SLP Goal  Outcome: Outcome(s) achieved Date Met: 07/25/17  Evaluation completed.  Rec regular diet with thin liquids with standard aspiration precautions, okay for whole meds.  Pt at baseline for speech, language, cognition.  No further ST warranted. FRANCIA Mcadams, CCC/SLP  7/25/2017

## 2017-07-25 NOTE — CONSULTS
[facility]  Tele-Consultation Note    Consultation started: 7/25/2017 at 10:08 AM   Consulting Provider:  German  The patient location is [facility]  Spoke hospital nurse at bedside with patient assisting consultant.     Subjective:   Subjective   History of Present Illness:  Erasmo Dunbar is a 81 y.o. male who presents with     Woke up normal, went out to the [facility] at around 0715, tried to tell his wife that there was a blue lindsay, but unable to speak, lasted for about 15 minute. He was not able to get the words out. After return to baseline, at around 10 am while in ED he started having inability to get the words out again, words are jumbled and he is not making any sense. R LE weakness over the past 2 weeks which comes and goes. These symptoms have occurred before, only lasting for a minute, about a few weeks ago.. There are no identified triggers or modifying factors. There have been no recurrent events. There are no other associated symptoms.      Wake up Stroke?: no  Last known normal:   0715, 1000  Recent bleeding noted: no  Does the patient take any Blood Thinners? no           H&P was obtained from patient and spouse/SO.   Past Medical History: pacemaker, DM, HTN, CAD, prostate    Past Surgical History: knee replacement 4 months ago    Family History: none    Social History: former smoker    Medications: No current facility-administered medications for this encounter.     Current Outpatient Prescriptions:     acarbose (PRECOSE) 50 MG Tab, Take 1 tablet by mouth 3  times a day with meals, Disp: 270 tablet, Rfl: 5    aspirin (ECOTRIN) 325 MG EC tablet, Take 325 mg by mouth once., Disp: , Rfl:     celecoxib (CELEBREX) 200 MG capsule, Take 1 capsule by mouth  once a day, Disp: 90 capsule, Rfl: 1    citalopram (CELEXA) 20 MG tablet, Take 1 tablet by mouth once daily, Disp: 90 tablet, Rfl: 5    doxycycline (VIBRA-TABS) 100 MG tablet,  Take 1 tablet by mouth  every day, Disp: 90 tablet, Rfl: 1    furosemide (LASIX) 40 MG tablet, Take 1 tablet by mouth once daily., Disp: , Rfl:     gabapentin (NEURONTIN) 300 MG capsule, Take 2 capsules by mouth  two times daily, Disp: 360 capsule, Rfl: 5    insulin glargine (LANTUS SOLOSTAR) 100 unit/mL (3 mL) InPn pen, Inject 35 Units into the skin every evening. (Patient taking differently: Inject 40 Units into the skin every evening. ), Disp: 45 mL, Rfl: 0    KAZANO 12.5-1,000 mg Tab, Take 1 tablet by mouth two  times daily, Disp: 180 tablet, Rfl: 5    metoprolol tartrate (LOPRESSOR) 50 MG tablet, Take 50 mg by mouth 2 (two) times daily., Disp: , Rfl:     multivitamin capsule, Take 1 capsule by mouth once daily., Disp: , Rfl:     simvastatin (ZOCOR) 40 MG tablet, Take 1 tablet by mouth  every evening, Disp: 90 tablet, Rfl: 1    tamsulosin (FLOMAX) 0.4 mg Cp24, Take 1 capsule by mouth  once daily, Disp: 90 capsule, Rfl: 1    (Magic mouthwash) 1:1:1 Benadryl 12.5mg/5ml liq, aluminum & magnesium hydroxide-simehticone (Maalox), lidocaine viscous 2%, Swish and spit 5 mLs every 4 (four) hours as needed. for mouth sores, Disp: 90 mL, Rfl: 0    cholecalciferol, vitamin D3, (VITAMIN D3) 2,000 unit Cap, Take 1 capsule by mouth once daily., Disp: , Rfl:     CIALIS 20 mg Tab, Take 1 tablet by mouth once a day, Disp: 9 tablet, Rfl: 5    diclofenac sodium (VOLTAREN) 1 % Gel, Apply 2 g topically 4 (four) times daily as needed., Disp: , Rfl:     ibuprofen (ADVIL,MOTRIN) 200 MG tablet, Take 200 mg by mouth every 6 (six) hours as needed for Pain., Disp: , Rfl:     oxycodone-acetaminophen (PERCOCET)  mg per tablet, Take 0.5 tablets by mouth every 4 (four) hours as needed for Pain., Disp: , Rfl:     TANZEUM 50 mg/0.5 mL PnIj, Inject 50mg once a week., Disp: 4 each, Rfl: 5    tramadol (ULTRAM) 50 mg tablet, Take 50 mg by mouth every 6 (six) hours as needed for Pain., Disp: , Rfl:     Allergies: contrast  allergy    Review Of Systems:     Review of Systems   Constitutional: Negative for appetite change, chills and fever.   Eyes: Negative for discharge.   Respiratory: Negative for shortness of breath.    Cardiovascular: Negative for chest pain.   Gastrointestinal: Negative for abdominal pain and anal bleeding.   Genitourinary: Negative for dysuria and hematuria.   Musculoskeletal: Negative for joint swelling and myalgias.   Skin: Negative for color change and rash.   Neurological: Negative for tremors and seizures.   Psychiatric/Behavioral: Negative for hallucinations.       Objective:     Vitals:   Vitals:    17 1005   BP: (!) 168/69   Pulse: 65   Resp: 20   Temp:     BP: 168/69    Objective   Physical Exam:  Physical Exam   Constitutional: He is oriented to person, place, and time. No distress.   HENT:   Head: Atraumatic.   Right Ear: External ear normal.   Left Ear: External ear normal.   Eyes: Conjunctivae are normal. No scleral icterus.   Neck: Normal range of motion.   Cardiovascular: Normal rate and regular rhythm.    Pulmonary/Chest: Effort normal.   Musculoskeletal: Normal range of motion.   Neurological: He is alert and oriented to person, place, and time. No cranial nerve deficit.   Skin: Skin is warm and dry.   Psychiatric: He has a normal mood and affect.          Imaging Notes: Abnormal CT severe global atrophy , frontal and temporal. Impression performed at: 1015    NIH Stroke Scale:  Interval: baseline (upon arrival/admit)  Level of Consciousness: 0 - alert  LOC Questions: 0 - answers both correctly  LOC Commands: 0 - performs both correctly  Best Gaze: 0 - normal  Visual: 0 - no visual loss  Facial Palsy: 0 - normal  Motor Left Arm: 0 - no drift  Motor Right Arm: 0 - no drift  Motor Left Le - no drift  Motor Right Le - no drift  Limb Ataxia: 0 - absent  Sensory: 0 - normal  Best Language: 0 - no aphasia  Dysarthria: 0 - normal articulation  Extinction and Inattention: 0 - no neglect  NIH  "Stroke Scale Total: 0    Some difficulty with "The cat always hid under the chair when dogs were in the room". Has some intermittent evidence of R/L confusion. No other deficits noted.    Assessment - Diagnosis - Goals:     Plan     Diagnosis/Impression: acute cerebrovascular insufficiency w/ focal deficits / TIA, concerning for left carotid artery syndrome; patient is back to normal at this point, but has had two aphasic TIAs today - concern is for proximal large artery disease although distal branch stenoses with stereotyped TIA is also possible; will need CTA to further characterize vasculature    Alteplase Recommendation: Altaplase not recommended due to back to baseline, TIA    Recommendation: NPO until after pass dysphagia screen. Diagnostic studies: CTA Head to assess vasculature , CTA Neck/Arch to assess vasculature, HgbA1C to assess blood glucose levels, Lipid Profile to assess cholesterol levels, MRI head without contrast to assess brain parenchyma, Trans-thoracic cardiac echo to assess cardiac function/status  Consults: Rehab Consult; Occupational Therapy, Rehab Consult; Physical Therapy and Rehab Consult; Speech Therapy  Antithrombotics: Aspirin: 325 mg oral daily  Statins: Atorvastatin- 80 mg oral daily  B/P Parameters; SBP <-220, DBP <-120, MAP  - .    Disposition Recommendation:  transfer to [facility] by  ground  stat       Possible Interventional Revascularization Candidate? Possible proximal carotid artery syndrome, will need CTA to further determine eligibility for revascularization    Recommended the emergency room physician to have a brief discussion with the patient and/or family if available regarding the risks and benefits of treatment, and to briefly document the occurrence of that discussion in his clinical encounter note.     The attending portion of this evaluation, treatment, and documentation was performed per Kyler Arrington via audiovisual.      Billing code:  (time " dependent stroke, complex case, unstable patient, hemorrhages, any intervention, some mimics)    · This patient has a very critical neurological condition/illness, with very high morbidity and mortality.  · There is a very high probability for acute neurological change leading to clinical and possibly life-threatening deterioration requiring highest level of physician preparedness for urgent intervention.  · There is possibility that this condition will require treatment with high risk medications as quickly as possible.  · There is also a possibility that the patient may benefit from further, more advance complex therapies (e.g. endovascular therapy) that will require prompt diagnosis and care.  · Care was coordinated with other physicians involved in the patient's care.  · Radiologic studies and laboratory data were reviewed and interpreted, and plan of care was re-assessed based on the results.  · Diagnosis, treatment options and prognosis may have been discussed with the patient and/or family members or caregiver.  · Further advanced medical management and further evaluation is warranted for his care.        Consultation ended: 7/25/2017 at 1041    Kyler Arrington MD  Vascular Neurology

## 2017-07-25 NOTE — ED NOTES
Bed: 12  Expected date: 7/25/17  Expected time: 11:51 AM  Means of arrival:   Comments:  Transfer

## 2017-07-25 NOTE — ED TRIAGE NOTES
Pt presents from [place] for further evaluation of Aphasia symptoms that began this morning at approximately 0700 which lasted for 15 minutes according to pt's wife and again at 1000 while in the emergency department. Pt is not aphasic at this time.

## 2017-07-25 NOTE — H&P
"[facility]  Vascular Neurology  Comprehensive Stroke Center  History & Physical    Inpatient consult to Vascular (Stroke) Neurology  Consult performed by: REJI DUCKWORTH  Consult ordered by: MARY MENDOZA        Assessment/Plan:     Patient is a 81 y.o. year old male with:    Transient ischemic attack (TIA)    Erasmo Dunbar is a 81 y.o. male with two recent episodes of aphasia. Patient back to baseline, NIH 0. CTA with some intracranial bilateral ICA calcification, area of R ICA stenosis. Admitting to stroke service for TIA workup. Patient unable to have MRI due to pacemaker. Echo pending    Antiplatelets: DAPT  Statins: atorvastatin 40  SBP <140  DVT ppx: heparin sq  PT/OT and speech to evaluate and treat  Neuro checks q4h                                      Diabetes mellitus, type 2    Stroke risk factor   A1C pending  SSI        Hypertension    Stroke risk factor  SBP <140  Continue home meds  Labetalol PRN        Hyperlipidemia    Stroke risk factor  Lipid panel pending  On simvastatin 40 at home, switched to atorvastatin 40              Thrombolysis Candidate? No  1. Contraindications: symptoms resolved, TIA    Interventional Revascularization Candidate?  No; No significant neurological deficit    Research Candidate? No     Subjective:     History of Present Illness:  Erasmo Dunbar is a 81 y.o. male with PMHx of HTN, DM, and HLD who presented to [place] ED with aphasia. Patient stated that he was bird watching and then began having speech difficulties. He was able to understand his wife, but when his words "made no sense." This lasted <20 minutes. Patient admits to having a similar episode 1-2 weeks ago, but this lasted <5 minutes.    Patient was seen via telemedicine and transferred for concern of proximal large artery disease. Will proceed with CTA         Past Medical History:   Diagnosis Date    Arthritis     BPH (benign prostatic hyperplasia)     CHF (congestive heart " "failure)     Depression     Diabetes mellitus type II     Hyperlipidemia     Hypertension     RLS (restless legs syndrome)      Past Surgical History:   Procedure Laterality Date    BACK SURGERY      cervical fusion    CARDIAC PACEMAKER PLACEMENT      CARDIAC SURGERY Left     pacemaker placement    COLECTOMY N/A     CYSTOSCOPY N/A     Pt stated, "I have had six Cystoscopy."    EYE SURGERY Bilateral     cataracts    Ganglion Cyst Removed  Right     Neck Fusion Bilateral     PROSTATE SURGERY      ROTATOR CUFF REPAIR Right     SKIN BIOPSY      skin cancer    TOTAL KNEE ARTHROPLASTY  03/30/2017    right knee    TRANSURETHRAL RESECTION OF PROSTATE       Family History   Problem Relation Age of Onset    Cancer Mother      breast    Heart disease Father     Diabetes Sister     Diabetes Brother     Heart disease Brother     COPD Daughter     Seizures Daughter     Diabetes Daughter      Social History   Substance Use Topics    Smoking status: Former Smoker     Packs/day: 1.50     Years: 35.00     Types: Cigarettes     Quit date: 1/1/1981    Smokeless tobacco: Never Used      Comment: 35 yrs ago quit    Alcohol use 4.8 oz/week     1 Cans of beer, 7 Shots of liquor per week      Comment: highball every night     Review of patient's allergies indicates:   Allergen Reactions    Iodine Hives     Iv iodine only     Medications: I have reviewed the current medication administration record.      (Not in a hospital admission)    Review of Systems   Constitutional: Negative for chills and fever.   HENT: Negative for drooling and facial swelling.    Eyes: Negative for redness and visual disturbance.   Respiratory: Negative for cough and shortness of breath.    Cardiovascular: Negative for chest pain and palpitations.   Gastrointestinal: Negative for nausea and vomiting.   Musculoskeletal: Negative for joint swelling.   Skin: Negative for rash.   Neurological: Positive for speech difficulty (resolved). " Negative for facial asymmetry, weakness and headaches.   Psychiatric/Behavioral: Negative for agitation.     Objective:     Vital Signs (Most Recent):  Temp: 98.1 °F (36.7 °C) (07/25/17 1203)  Pulse: 65 (07/25/17 1203)  Resp: 18 (07/25/17 1203)  BP: (!) 164/76 (07/25/17 1203)  SpO2: 96 % (07/25/17 1203)    Vital Signs Range (Last 24H):  Temp:  [98 °F (36.7 °C)-98.3 °F (36.8 °C)]   Pulse:  [65-68]   Resp:  [15-20]   BP: (164-179)/(69-76)   SpO2:  [96 %]     Physical Exam   Constitutional: He is oriented to person, place, and time. He appears well-developed and well-nourished. No distress.   HENT:   Head: Normocephalic and atraumatic.   Eyes: EOM are normal. Pupils are equal, round, and reactive to light.   Neck: Normal range of motion.   Cardiovascular: Normal rate.    Pulmonary/Chest: Effort normal. No respiratory distress.   Abdominal: Soft. He exhibits no distension.   Musculoskeletal: Normal range of motion.   Neurological: He is alert and oriented to person, place, and time. GCS eye subscore is 4 - spontaneous. GCS verbal subscore is 5 - oriented. GCS motor subscore is 6 - obeys commands.   Skin: Skin is warm and dry.   Vitals reviewed.      Neurological Exam:   LOC: alert and follows requests  Language: No aphasia  Speech: No dysarthria  Orientation: Person, Place, Time  Memory: Recent memory intact, Remote memory intact, Age correct, Month correct  Visual Fields (CN II): Full  EOM (CN III, IV, VI): Full/intact  Pupils (CN III, IV, VI): PERRL  Facial Sensation (CN V): Symmetric  Facial Movement (CN VII): symmetric facial expression  Hearing (CN VIII): intact bilaterally  Motor*: Arm Left:  Normal (5/5), Leg Left:   Normal (5/5), Arm Right:   Normal (5/5), Leg Right:   Normal (5/5)  Cerebellar*: Normal limb, Normal gait  , Normal stance  Sensation: intact to light touch, temperature and vibration  Tone: Arm-Left: normal; Leg-Left: normal; Arm-Right: normal; Leg-Right: normal    Stroke Team Times:   Last Known  Normal Date and Time : 201710:00 (symptoms at 0715 have resolved)  Symptom Onset Date and Time:201707:15 (symptoms resolved)  Stroke Team Called Date and Time: 201712:10 (patient transferred to [facility] and arrived at this time)  Stroke Team Arrived Date and Time: 201712:16    NIH Stroke Scale:  Interval: baseline (upon arrival/admit)  Level of Consciousness: 0 - alert  LOC Questions: 0 - answers both correctly  LOC Commands: 0 - performs both correctly  Best Gaze: 0 - normal  Visual: 0 - no visual loss  Facial Palsy: 0 - normal  Motor Left Arm: 0 - no drift  Motor Right Arm: 0 - no drift  Motor Left Le - no drift  Motor Right Le - no drift  Limb Ataxia: 0 - absent  Sensory: 0 - normal  Best Language: 0 - no aphasia  Dysarthria: 0 - normal articulation  Extinction and Inattention: 0 - no neglect  NIH Stroke Scale Total: 0  Susanne Coma Scale:  Best Eye Response: 4 - spontaneous  Best Motor Response: 6 - obeys commands  Best Verbal Response: 5 - oriented  Edward Coma Scale Total: 15  Modified [place] Scale:   Timeline: Prior to symptoms onset  Modified Haris Score: 0 - no symptoms    ABCD2 Scale for TIA:   Age > or = 60: 1 - yes  B/P or = 140/9 at Initial Evaluation: 1 - yes  Clinical Features of TIA: 1 - speech disturbance without weakness  Duration of Symptoms: 1 - 10-59 minutes  Diabetes Mellitus in History: 1 - yes  ABCD2 Scale Total: 5      Laboratory:  CMP:   Recent Labs  Lab 17  0928   CALCIUM 10.1   ALBUMIN 4.2   PROT 7.4      K 4.4   CO2 31*   CL 98   BUN 15   CREATININE 1.1   ALKPHOS 79   ALT 17   AST 13   BILITOT 0.4     CBC:   Recent Labs  Lab 1728   WBC 12.94*   RBC 4.73   HGB 13.1*   HCT 42.0   *   MCV 89   MCH 27.7   MCHC 31.2*     Lipid Panel: No results for input(s): CHOL, LDLCALC, HDL, TRIG in the last 168 hours.  Coagulation:   Recent Labs  Lab 17  0928   INR 1.0   APTT 25.3     Platelet Aggregation Study: No results for input(s): PLTAGG,  PLTAGINTERP, PLTAGREGLACO, ADPPLTAGGREG in the last 168 hours.  Hgb A1C: No results for input(s): HGBA1C in the last 168 hours.  TSH: No results for input(s): TSH in the last 168 hours.    Diagnostic Results:  Brain Imaging:           Lizette Pizarro PA-C  [facility]  Department of Vascular Neurology   [facility]

## 2017-07-25 NOTE — HPI
"Erasmo Dunbar is a 81 y.o. male with PMHx of HTN, DM, and HLD who presented to [facility] ED with aphasia. Patient stated that he was bird watching and then began having speech difficulties. He was able to understand his wife, but when his words "made no sense." This lasted <20 minutes. Patient admits to having a similar episode 1-2 weeks ago, but this lasted <5 minutes.    Patient was seen via telemedicine and transferred for concern of proximal large artery disease. Will proceed with CTA  "

## 2017-07-25 NOTE — SUBJECTIVE & OBJECTIVE
"     Past Medical History:   Diagnosis Date    Arthritis     BPH (benign prostatic hyperplasia)     CHF (congestive heart failure)     Depression     Diabetes mellitus type II     Hyperlipidemia     Hypertension     RLS (restless legs syndrome)      Past Surgical History:   Procedure Laterality Date    BACK SURGERY      cervical fusion    CARDIAC PACEMAKER PLACEMENT      CARDIAC SURGERY Left     pacemaker placement    COLECTOMY N/A     CYSTOSCOPY N/A     Pt stated, "I have had six Cystoscopy."    EYE SURGERY Bilateral     cataracts    Ganglion Cyst Removed  Right     Neck Fusion Bilateral     PROSTATE SURGERY      ROTATOR CUFF REPAIR Right     SKIN BIOPSY      skin cancer    TOTAL KNEE ARTHROPLASTY  03/30/2017    right knee    TRANSURETHRAL RESECTION OF PROSTATE       Family History   Problem Relation Age of Onset    Cancer Mother      breast    Heart disease Father     Diabetes Sister     Diabetes Brother     Heart disease Brother     COPD Daughter     Seizures Daughter     Diabetes Daughter      Social History   Substance Use Topics    Smoking status: Former Smoker     Packs/day: 1.50     Years: 35.00     Types: Cigarettes     Quit date: 1/1/1981    Smokeless tobacco: Never Used      Comment: 35 yrs ago quit    Alcohol use 4.8 oz/week     1 Cans of beer, 7 Shots of liquor per week      Comment: highball every night     Review of patient's allergies indicates:   Allergen Reactions    Iodine Hives     Iv iodine only     Medications: I have reviewed the current medication administration record.      (Not in a hospital admission)    Review of Systems   Constitutional: Negative for chills and fever.   HENT: Negative for drooling and facial swelling.    Eyes: Negative for redness and visual disturbance.   Respiratory: Negative for cough and shortness of breath.    Cardiovascular: Negative for chest pain and palpitations.   Gastrointestinal: Negative for nausea and vomiting. "   Musculoskeletal: Negative for joint swelling.   Skin: Negative for rash.   Neurological: Positive for speech difficulty (resolved). Negative for facial asymmetry, weakness and headaches.   Psychiatric/Behavioral: Negative for agitation.     Objective:     Vital Signs (Most Recent):  Temp: 98.1 °F (36.7 °C) (07/25/17 1203)  Pulse: 65 (07/25/17 1203)  Resp: 18 (07/25/17 1203)  BP: (!) 164/76 (07/25/17 1203)  SpO2: 96 % (07/25/17 1203)    Vital Signs Range (Last 24H):  Temp:  [98 °F (36.7 °C)-98.3 °F (36.8 °C)]   Pulse:  [65-68]   Resp:  [15-20]   BP: (164-179)/(69-76)   SpO2:  [96 %]     Physical Exam   Constitutional: He is oriented to person, place, and time. He appears well-developed and well-nourished. No distress.   HENT:   Head: Normocephalic and atraumatic.   Eyes: EOM are normal. Pupils are equal, round, and reactive to light.   Neck: Normal range of motion.   Cardiovascular: Normal rate.    Pulmonary/Chest: Effort normal. No respiratory distress.   Abdominal: Soft. He exhibits no distension.   Musculoskeletal: Normal range of motion.   Neurological: He is alert and oriented to person, place, and time. GCS eye subscore is 4 - spontaneous. GCS verbal subscore is 5 - oriented. GCS motor subscore is 6 - obeys commands.   Skin: Skin is warm and dry.   Vitals reviewed.      Neurological Exam:   LOC: alert and follows requests  Language: No aphasia  Speech: No dysarthria  Orientation: Person, Place, Time  Memory: Recent memory intact, Remote memory intact, Age correct, Month correct  Visual Fields (CN II): Full  EOM (CN III, IV, VI): Full/intact  Pupils (CN III, IV, VI): PERRL  Facial Sensation (CN V): Symmetric  Facial Movement (CN VII): symmetric facial expression  Hearing (CN VIII): intact bilaterally  Motor*: Arm Left:  Normal (5/5), Leg Left:   Normal (5/5), Arm Right:   Normal (5/5), Leg Right:   Normal (5/5)  Cerebellar*: Normal limb, Normal gait  , Normal stance  Sensation: intact to light touch,  temperature and vibration  Tone: Arm-Left: normal; Leg-Left: normal; Arm-Right: normal; Leg-Right: normal    Stroke Team Times:   Last Known Normal Date and Time : 201710:00 (symptoms at 0715 have resolved)  Symptom Onset Date and Time:201707:15 (symptoms resolved)  Stroke Team Called Date and Time: 201712:10 ([place] [facility] [place])  Stroke Team Arrived Date and Time: 201712:16    NIH Stroke Scale:  Interval: baseline (upon arrival/admit)  Level of Consciousness: 0 - alert  LOC Questions: 0 - answers both correctly  LOC Commands: 0 - performs both correctly  Best Gaze: 0 - normal  Visual: 0 - no visual loss  Facial Palsy: 0 - normal  Motor Left Arm: 0 - no drift  Motor Right Arm: 0 - no drift  Motor Left Le - no drift  Motor Right Le - no drift  Limb Ataxia: 0 - absent  Sensory: 0 - normal  Best Language: 0 - no aphasia  Dysarthria: 0 - normal articulation  Extinction and Inattention: 0 - no neglect  NIH Stroke Scale Total: 0  Susanne Coma Scale:  Best Eye Response: 4 - spontaneous  Best Motor Response: 6 - obeys commands  Best Verbal Response: 5 - oriented  Susanne Coma Scale Total: 15  Modified [place] Scale:   Timeline: Prior to symptoms onset  Modified [place] Score: 0 - no symptoms    ABCD2 Scale for TIA:   Age > or = 60: 1 - yes  B/P or = 140/9 at Initial Evaluation: 1 - yes  Clinical Features of TIA: 1 - speech disturbance without weakness  Duration of Symptoms: 1 - 10-59 minutes  Diabetes Mellitus in History: 1 - yes  ABCD2 Scale Total: 5      Laboratory:  CMP:   Recent Labs  Lab 17   CALCIUM 10.1   ALBUMIN 4.2   PROT 7.4      K 4.4   CO2 31*   CL 98   BUN 15   CREATININE 1.1   ALKPHOS 79   ALT 17   AST 13   BILITOT 0.4     CBC:   Recent Labs  Lab 17   WBC 12.94*   RBC 4.73   HGB 13.1*   HCT 42.0   *   MCV 89   MCH 27.7   MCHC 31.2*     Lipid Panel: No results for input(s): CHOL, LDLCALC, HDL, TRIG in the last 168  hours.  Coagulation:   Recent Labs  Lab 07/25/17  0928   INR 1.0   APTT 25.3     Platelet Aggregation Study: No results for input(s): PLTAGG, PLTAGINTERP, PLTAGREGLACO, ADPPLTAGGREG in the last 168 hours.  Hgb A1C: No results for input(s): HGBA1C in the last 168 hours.  TSH: No results for input(s): TSH in the last 168 hours.    Diagnostic Results:  Brain Imaging:

## 2017-07-25 NOTE — ED PROVIDER NOTES
"Encounter Date: 7/25/2017    SCRIBE #1 NOTE: I, Ángela Escalante, am scribing for, and in the presence of, Dr. Sanchez.       History     Chief Complaint   Patient presents with    Transfer     TIA transfer from [facility]     Time seen by provider: 12:27 PM    This is a 81 y.o. male with a history of diabetes, hyperlipidemia, hypertension, and CHF who presents via transfer from [facility] with complaint of inability to articulate his words at approximately 6:30 AM this morning.  He indicates that he knew what he wanted to say but that he could not say the words.  He reports approximately 2-3 episodes today each lasting for approximately 15 minutes.  The patient indicates a mild HA, visual disturbances (blurred vision) at baseline, cough, congestion, and slurred speech but denies associated dizziness, fevers, urinary or bowel problems, unilateral weakness, or light-headedness.  The patient previous episodes but says that the episode today was worse.       The history is provided by the patient.     Review of patient's allergies indicates:   Allergen Reactions    Iodine Hives     Iv iodine only     Past Medical History:   Diagnosis Date    Arthritis     BPH (benign prostatic hyperplasia)     CHF (congestive heart failure)     Depression     Diabetes mellitus type II     Hyperlipidemia     Hypertension     RLS (restless legs syndrome)      Past Surgical History:   Procedure Laterality Date    BACK SURGERY      cervical fusion    CARDIAC PACEMAKER PLACEMENT      CARDIAC SURGERY Left     pacemaker placement    COLECTOMY N/A     CYSTOSCOPY N/A     Pt stated, "I have had six Cystoscopy."    EYE SURGERY Bilateral     cataracts    Ganglion Cyst Removed  Right     Neck Fusion Bilateral     PROSTATE SURGERY      ROTATOR CUFF REPAIR Right     SKIN BIOPSY      skin cancer    TOTAL KNEE ARTHROPLASTY  03/30/2017    right knee    TRANSURETHRAL RESECTION OF PROSTATE       Family History   Problem Relation Age " of Onset    Cancer Mother      breast    Heart disease Father     Diabetes Sister     Diabetes Brother     Heart disease Brother     COPD Daughter     Seizures Daughter     Diabetes Daughter      Social History   Substance Use Topics    Smoking status: Former Smoker     Packs/day: 1.50     Years: 35.00     Types: Cigarettes     Quit date: 1/1/1981    Smokeless tobacco: Never Used      Comment: 35 yrs ago quit    Alcohol use 4.8 oz/week     1 Cans of beer, 7 Shots of liquor per week      Comment: highball every night     Review of Systems   Constitutional: Negative for fever.   HENT: Positive for congestion.    Eyes: Positive for visual disturbance (Blurred vision).   Respiratory: Positive for cough.    Cardiovascular: Negative for chest pain.   Gastrointestinal: Negative for abdominal pain.   Genitourinary:        The patient denies urinary or bowel symptoms.   Musculoskeletal: Negative for back pain.   Skin: Negative for pallor.   Neurological: Positive for speech difficulty (Slurred speech) and headaches (Mild). Negative for dizziness and light-headedness.        Notes inability to articulate words.  Denies unilateral weakness.       Physical Exam     Initial Vitals [07/25/17 1203]   BP Pulse Resp Temp SpO2   (!) 164/76 65 18 98.1 °F (36.7 °C) 96 %      MAP       105.33         Physical Exam    Nursing note and vitals reviewed.  HENT:   Head: Normocephalic and atraumatic.   Eyes: EOM are normal. Pupils are equal, round, and reactive to light.   Cardiovascular: Normal rate, regular rhythm and normal heart sounds. Exam reveals no gallop and no friction rub.    No murmur heard.  Pulmonary/Chest: No respiratory distress. He has no wheezes. He has no rhonchi. He has no rales.   Abdominal: Soft. Bowel sounds are normal. He exhibits no distension. There is no tenderness. There is no rebound.   Musculoskeletal: He exhibits no tenderness.   Neurological: He is alert and oriented to person, place, and time. He  has normal strength. No cranial nerve deficit or sensory deficit.   No pronator drift. Strength 5/5 throughout. Sensation intact throughout.  Speech at baseline. Mental status normal.   Skin: Skin is warm and dry.         ED Course   Procedures  Labs Reviewed   LIPID PANEL - Abnormal; Notable for the following:        Result Value    Cholesterol 116 (*)     Triglycerides 172 (*)     LDL Cholesterol 37.6 (*)     All other components within normal limits    Narrative:     Fasting   HEMOGLOBIN A1C - Abnormal; Notable for the following:     Hemoglobin A1C 6.4 (*)     Estimated Avg Glucose 137 (*)     All other components within normal limits    Narrative:     Fasting   URINALYSIS - Abnormal; Notable for the following:     Specific Gravity, UA >=1.030 (*)     All other components within normal limits    Narrative:     If patient is unable to provide a clean catch specimen due to  impairments such as mobility or cognition, obtain straight  cath specimen.   TSH    Narrative:     Fasting             Medical Decision Making:   History:   Old Medical Records: I decided to obtain old medical records.  Old Records Summarized: other records.       <> Summary of Records: 81 y.o. male who presents via transfer from [facility].  He presented there with a 10 second episode of inability to articulate speech that began this morning.  They felt the patient experienced a TIA and transferred him her for evaluation by Vascular Neurology.  Initial Assessment:   81 y.o. male who presents via transfer after 3 episodes of dysphasia concerning for TIA symptoms.  Plan to discuss with Vascular Neurology.  Clinical Tests:   Radiological Study: Ordered and Reviewed  Other:   I have discussed this case with another health care provider.            Scribe Attestation:   Scribe #1: I performed the above scribed service and the documentation accurately describes the services I performed. I attest to the accuracy of the note.    Attending  Attestation:           Physician Attestation for Scribe:  Physician Attestation Statement for Scribe #1: I, Dr. Sanchez, reviewed documentation, as scribed by Ángela Escalante in my presence, and it is both accurate and complete.         Attending ED Notes:   12:32 PM  Vascular Neurology in the ED and has evaluated the patient.   They ordered CTA multiphase and plan to admit to their service          ED Course     Clinical Impression:   The encounter diagnosis was Transient ischemic attack (TIA).                           Luzmaria Lucero MD  07/30/17 0085

## 2017-07-25 NOTE — CONSULTS
PM&R consult received.    Reason for consult:  assess rehabilitation needs    Reviewed patient history and current admission.  Rehab team following.  Full consult to follow.    ALFREDA Martinez, FNP-C  Physical Medicine & Rehabilitation   07/25/2017  Spectralink: [id number]

## 2017-07-25 NOTE — ED NOTES
Patient to be transferred to [facility] ed   DR Arrington to accept patient

## 2017-07-25 NOTE — ED PROVIDER NOTES
"Encounter Date: 7/25/2017       History     Chief Complaint   Patient presents with    Aphasia     The history is provided by the patient.   Neurologic Problem   The primary symptoms include speech change. Primary symptoms do not include headaches, syncope, loss of consciousness, altered mental status, seizures, dizziness, visual change, paresthesias, focal weakness, loss of sensation, memory loss, fever, nausea or vomiting. The symptoms began today. The episode lasted 10 seconds. The symptoms are resolved.   Change in speech began 1 - 3 hours ago. The speech change is improving. Features of the speech change include inability to articulate.   Additional symptoms do not include neck stiffness, weakness, pain, lower back pain, leg pain, loss of balance, photophobia, aura, hallucinations, nystagmus, taste disturbance, hyperacusis, hearing loss, tinnitus, vertigo, anxiety, irritability or dysphoric mood.     Review of patient's allergies indicates:   Allergen Reactions    Iodine Hives     Iv iodine only     Past Medical History:   Diagnosis Date    Arthritis     BPH (benign prostatic hyperplasia)     CHF (congestive heart failure)     Diabetes mellitus type II     Hyperlipidemia     Hypertension     RLS (restless legs syndrome)      Past Surgical History:   Procedure Laterality Date    BACK SURGERY      cervical fusion    CARDIAC SURGERY Left     pacemaker placement    COLECTOMY N/A     CYSTOSCOPY N/A     Pt stated, "I have had six Cystoscopy."    EYE SURGERY Bilateral     cataracts    Ganglion Cyst Removed  Right     Neck Fusion Bilateral     PROSTATE SURGERY      ROTATOR CUFF REPAIR Right     SKIN BIOPSY      skin cancer    TOTAL KNEE ARTHROPLASTY  03/30/2017    right knee    TRANSURETHRAL RESECTION OF PROSTATE       Family History   Problem Relation Age of Onset    Cancer Mother      breast    Heart disease Father     Diabetes Sister     Diabetes Brother     Heart disease Brother     COPD " Daughter     Seizures Daughter     Diabetes Daughter      Social History   Substance Use Topics    Smoking status: Former Smoker     Packs/day: 1.50     Years: 35.00     Types: Cigarettes     Quit date: 1/1/1981    Smokeless tobacco: Never Used      Comment: 35 yrs ago quit    Alcohol use 4.8 oz/week     1 Cans of beer, 7 Shots of liquor per week      Comment: highball every night     Review of Systems   Constitutional: Negative for fever and irritability.   HENT: Negative for hearing loss and tinnitus.    Eyes: Negative for photophobia, pain, discharge and itching.   Respiratory: Negative for cough, shortness of breath, wheezing and stridor.    Cardiovascular: Negative for chest pain, palpitations, leg swelling and syncope.   Gastrointestinal: Negative for abdominal pain, nausea and vomiting.   Musculoskeletal: Negative for back pain, neck pain and neck stiffness.   Skin: Negative for color change, pallor, rash and wound.   Neurological: Positive for speech change and speech difficulty. Negative for dizziness, vertigo, focal weakness, seizures, loss of consciousness, syncope, facial asymmetry, weakness, light-headedness, numbness, headaches, paresthesias and loss of balance.   Psychiatric/Behavioral: Negative for dysphoric mood, hallucinations and memory loss.       Physical Exam     Initial Vitals [07/25/17 0903]   BP Pulse Resp Temp SpO2   -- -- -- 98 °F (36.7 °C) --      MAP       --         Physical Exam    Nursing note and vitals reviewed.  Constitutional: He appears well-developed and well-nourished. He is not diaphoretic. No distress.   HENT:   Head: Normocephalic and atraumatic.   Mouth/Throat: No oropharyngeal exudate.   Eyes: Conjunctivae and EOM are normal. Pupils are equal, round, and reactive to light.   Neck: Normal range of motion. Neck supple. No JVD present.   Pulmonary/Chest: Breath sounds normal. No stridor. No respiratory distress. He has no wheezes. He has no rhonchi. He has no rales. He  exhibits no tenderness.   Abdominal: Soft. Bowel sounds are normal. He exhibits no distension. There is no tenderness. There is no rebound and no guarding.   Musculoskeletal: Normal range of motion. He exhibits no edema or tenderness.   Neurological: He is alert and oriented to person, place, and time. No sensory deficit.   Skin: No rash noted.         ED Course   Procedures  Labs Reviewed   POCT GLUCOSE - Abnormal; Notable for the following:        Result Value    POCT Glucose 136 (*)     All other components within normal limits   COMPREHENSIVE METABOLIC PANEL   CBC W/ AUTO DIFFERENTIAL   TROPONIN I   CK   CK-MB   APTT   PROTIME-INR   POCT GLUCOSE MONITORING CONTINUOUS                               ED Course     Clinical Impression:   The encounter diagnosis was Transient cerebral ischemia, unspecified type.    Disposition:   Disposition: Transferred  Condition: Stable                        Davis Porter MD  07/25/17 104

## 2017-07-26 VITALS
OXYGEN SATURATION: 93 % | RESPIRATION RATE: 20 BRPM | SYSTOLIC BLOOD PRESSURE: 144 MMHG | WEIGHT: 200 LBS | BODY MASS INDEX: 32.14 KG/M2 | DIASTOLIC BLOOD PRESSURE: 83 MMHG | HEIGHT: 66 IN | TEMPERATURE: 98 F | HEART RATE: 63 BPM

## 2017-07-26 LAB
ALBUMIN SERPL BCP-MCNC: 3.5 G/DL
ALP SERPL-CCNC: 69 U/L
ALT SERPL W/O P-5'-P-CCNC: 16 U/L
ANION GAP SERPL CALC-SCNC: 7 MMOL/L
APTT BLDCRRT: 26.2 SEC
AST SERPL-CCNC: 12 U/L
BASOPHILS # BLD AUTO: 0.06 K/UL
BASOPHILS NFR BLD: 0.5 %
BILIRUB SERPL-MCNC: 0.3 MG/DL
BUN SERPL-MCNC: 15 MG/DL
CALCIUM SERPL-MCNC: 9.2 MG/DL
CHLORIDE SERPL-SCNC: 101 MMOL/L
CK MB SERPL-MCNC: 1.6 NG/ML
CK MB SERPL-RTO: 3.5 %
CK SERPL-CCNC: 46 U/L
CO2 SERPL-SCNC: 28 MMOL/L
CREAT SERPL-MCNC: 0.9 MG/DL
DIASTOLIC DYSFUNCTION: YES
DIFFERENTIAL METHOD: ABNORMAL
EOSINOPHIL # BLD AUTO: 1.9 K/UL
EOSINOPHIL NFR BLD: 16.3 %
ERYTHROCYTE [DISTWIDTH] IN BLOOD BY AUTOMATED COUNT: 14.1 %
EST. GFR  (AFRICAN AMERICAN): >60 ML/MIN/1.73 M^2
EST. GFR  (NON AFRICAN AMERICAN): >60 ML/MIN/1.73 M^2
ESTIMATED PA SYSTOLIC PRESSURE: 31.52
GLUCOSE SERPL-MCNC: 112 MG/DL
HCT VFR BLD AUTO: 37.8 %
HGB BLD-MCNC: 12.4 G/DL
INR PPP: 1
LYMPHOCYTES # BLD AUTO: 2.6 K/UL
LYMPHOCYTES NFR BLD: 22.3 %
MAGNESIUM SERPL-MCNC: 1.6 MG/DL
MCH RBC QN AUTO: 28.4 PG
MCHC RBC AUTO-ENTMCNC: 32.8 G/DL
MCV RBC AUTO: 87 FL
MITRAL VALVE REGURGITATION: ABNORMAL
MONOCYTES # BLD AUTO: 1.1 K/UL
MONOCYTES NFR BLD: 9.4 %
NEUTROPHILS # BLD AUTO: 5.8 K/UL
NEUTROPHILS NFR BLD: 51.1 %
PHOSPHATE SERPL-MCNC: 3.8 MG/DL
PLATELET # BLD AUTO: 311 K/UL
PMV BLD AUTO: 9.3 FL
POCT GLUCOSE: 130 MG/DL (ref 70–110)
POCT GLUCOSE: 169 MG/DL (ref 70–110)
POCT GLUCOSE: 186 MG/DL (ref 70–110)
POTASSIUM SERPL-SCNC: 4.1 MMOL/L
PROT SERPL-MCNC: 6.2 G/DL
PROTHROMBIN TIME: 10.9 SEC
RBC # BLD AUTO: 4.36 M/UL
RETIRED EF AND QEF - SEE NOTES: 60 (ref 55–65)
SODIUM SERPL-SCNC: 136 MMOL/L
TRICUSPID VALVE REGURGITATION: ABNORMAL
TROPONIN I SERPL DL<=0.01 NG/ML-MCNC: <0.006 NG/ML
WBC # BLD AUTO: 11.41 K/UL

## 2017-07-26 PROCEDURE — 82553 CREATINE MB FRACTION: CPT

## 2017-07-26 PROCEDURE — G8980 MOBILITY D/C STATUS: HCPCS | Mod: CJ

## 2017-07-26 PROCEDURE — G8988 SELF CARE GOAL STATUS: HCPCS | Mod: CH

## 2017-07-26 PROCEDURE — 36415 COLL VENOUS BLD VENIPUNCTURE: CPT

## 2017-07-26 PROCEDURE — 83735 ASSAY OF MAGNESIUM: CPT

## 2017-07-26 PROCEDURE — 84484 ASSAY OF TROPONIN QUANT: CPT

## 2017-07-26 PROCEDURE — 84100 ASSAY OF PHOSPHORUS: CPT

## 2017-07-26 PROCEDURE — 97161 PT EVAL LOW COMPLEX 20 MIN: CPT

## 2017-07-26 PROCEDURE — 85730 THROMBOPLASTIN TIME PARTIAL: CPT

## 2017-07-26 PROCEDURE — G0378 HOSPITAL OBSERVATION PER HR: HCPCS

## 2017-07-26 PROCEDURE — 97165 OT EVAL LOW COMPLEX 30 MIN: CPT

## 2017-07-26 PROCEDURE — G8978 MOBILITY CURRENT STATUS: HCPCS | Mod: CJ

## 2017-07-26 PROCEDURE — G8989 SELF CARE D/C STATUS: HCPCS | Mod: CH

## 2017-07-26 PROCEDURE — A4216 STERILE WATER/SALINE, 10 ML: HCPCS | Performed by: PHYSICIAN ASSISTANT

## 2017-07-26 PROCEDURE — 25000003 PHARM REV CODE 250: Performed by: STUDENT IN AN ORGANIZED HEALTH CARE EDUCATION/TRAINING PROGRAM

## 2017-07-26 PROCEDURE — 85025 COMPLETE CBC W/AUTO DIFF WBC: CPT

## 2017-07-26 PROCEDURE — 63600175 PHARM REV CODE 636 W HCPCS: Performed by: PHYSICIAN ASSISTANT

## 2017-07-26 PROCEDURE — 93306 TTE W/DOPPLER COMPLETE: CPT

## 2017-07-26 PROCEDURE — 97530 THERAPEUTIC ACTIVITIES: CPT

## 2017-07-26 PROCEDURE — G8987 SELF CARE CURRENT STATUS: HCPCS | Mod: CH

## 2017-07-26 PROCEDURE — 93306 TTE W/DOPPLER COMPLETE: CPT | Mod: 26,,, | Performed by: INTERNAL MEDICINE

## 2017-07-26 PROCEDURE — 80053 COMPREHEN METABOLIC PANEL: CPT

## 2017-07-26 PROCEDURE — 63600175 PHARM REV CODE 636 W HCPCS: Performed by: STUDENT IN AN ORGANIZED HEALTH CARE EDUCATION/TRAINING PROGRAM

## 2017-07-26 PROCEDURE — 85610 PROTHROMBIN TIME: CPT

## 2017-07-26 PROCEDURE — G8979 MOBILITY GOAL STATUS: HCPCS | Mod: CI

## 2017-07-26 PROCEDURE — 25000003 PHARM REV CODE 250: Performed by: PHYSICIAN ASSISTANT

## 2017-07-26 RX ORDER — LISINOPRIL 10 MG/1
10 TABLET ORAL DAILY
Status: DISCONTINUED | OUTPATIENT
Start: 2017-07-27 | End: 2017-07-26 | Stop reason: HOSPADM

## 2017-07-26 RX ORDER — CLOPIDOGREL BISULFATE 75 MG/1
75 TABLET ORAL DAILY
Qty: 30 TABLET | Refills: 2 | Status: SHIPPED | OUTPATIENT
Start: 2017-07-26 | End: 2017-08-02 | Stop reason: SDUPTHER

## 2017-07-26 RX ORDER — LISINOPRIL 10 MG/1
10 TABLET ORAL DAILY
Qty: 30 TABLET | Refills: 2 | Status: SHIPPED | OUTPATIENT
Start: 2017-07-27 | End: 2017-08-02 | Stop reason: SDUPTHER

## 2017-07-26 RX ORDER — LISINOPRIL 5 MG/1
5 TABLET ORAL DAILY
Status: DISCONTINUED | OUTPATIENT
Start: 2017-07-26 | End: 2017-07-26

## 2017-07-26 RX ORDER — SIMVASTATIN 40 MG/1
40 TABLET, FILM COATED ORAL NIGHTLY
Status: DISCONTINUED | OUTPATIENT
Start: 2017-07-26 | End: 2017-07-26 | Stop reason: HOSPADM

## 2017-07-26 RX ORDER — LISINOPRIL 5 MG/1
5 TABLET ORAL ONCE
Status: COMPLETED | OUTPATIENT
Start: 2017-07-26 | End: 2017-07-26

## 2017-07-26 RX ORDER — MAGNESIUM SULFATE HEPTAHYDRATE 40 MG/ML
2 INJECTION, SOLUTION INTRAVENOUS ONCE
Status: COMPLETED | OUTPATIENT
Start: 2017-07-26 | End: 2017-07-26

## 2017-07-26 RX ORDER — CLOPIDOGREL BISULFATE 75 MG/1
75 TABLET ORAL DAILY
Status: DISCONTINUED | OUTPATIENT
Start: 2017-07-26 | End: 2017-07-26 | Stop reason: HOSPADM

## 2017-07-26 RX ADMIN — DOXYCYCLINE HYCLATE 100 MG: 100 TABLET, COATED ORAL at 08:07

## 2017-07-26 RX ADMIN — LISINOPRIL 5 MG: 5 TABLET ORAL at 08:07

## 2017-07-26 RX ADMIN — CLOPIDOGREL 75 MG: 75 TABLET, FILM COATED ORAL at 08:07

## 2017-07-26 RX ADMIN — FUROSEMIDE 40 MG: 40 TABLET ORAL at 08:07

## 2017-07-26 RX ADMIN — GABAPENTIN 600 MG: 300 CAPSULE ORAL at 08:07

## 2017-07-26 RX ADMIN — VITAMIN D, TAB 1000IU (100/BT) 2000 UNITS: 25 TAB at 08:07

## 2017-07-26 RX ADMIN — TAMSULOSIN HYDROCHLORIDE 0.4 MG: 0.4 CAPSULE ORAL at 08:07

## 2017-07-26 RX ADMIN — SODIUM CHLORIDE, PRESERVATIVE FREE 3 ML: 5 INJECTION INTRAVENOUS at 05:07

## 2017-07-26 RX ADMIN — INSULIN ASPART 2 UNITS: 100 INJECTION, SOLUTION INTRAVENOUS; SUBCUTANEOUS at 12:07

## 2017-07-26 RX ADMIN — METOPROLOL TARTRATE 50 MG: 50 TABLET, FILM COATED ORAL at 08:07

## 2017-07-26 RX ADMIN — CITALOPRAM HYDROBROMIDE 20 MG: 10 TABLET ORAL at 08:07

## 2017-07-26 RX ADMIN — HEPARIN SODIUM 5000 UNITS: 5000 INJECTION, SOLUTION INTRAVENOUS; SUBCUTANEOUS at 03:07

## 2017-07-26 RX ADMIN — LISINOPRIL 5 MG: 5 TABLET ORAL at 03:07

## 2017-07-26 RX ADMIN — HEPARIN SODIUM 5000 UNITS: 5000 INJECTION, SOLUTION INTRAVENOUS; SUBCUTANEOUS at 05:07

## 2017-07-26 RX ADMIN — ASPIRIN 325 MG: 325 TABLET, DELAYED RELEASE ORAL at 08:07

## 2017-07-26 RX ADMIN — MAGNESIUM SULFATE IN WATER 2 G: 40 INJECTION, SOLUTION INTRAVENOUS at 08:07

## 2017-07-26 RX ADMIN — INSULIN ASPART 2 UNITS: 100 INJECTION, SOLUTION INTRAVENOUS; SUBCUTANEOUS at 08:07

## 2017-07-26 NOTE — PT/OT/SLP EVAL
"Occupational Therapy                                                Evaluation/Discharge Summary    Erasmo Dunbar   MRN: [id number]   Admitting Diagnosis: TIA  OT Date of Treatment: 07/26/17   OT Start Time: 0621  OT Stop Time: 0644  OT Total Time (min): 23 min    Billable Minutes:  Evaluation 15  Therapeutic Activity 8    Diagnosis: TIA    Past Medical History:   Diagnosis Date    Arthritis     BPH (benign prostatic hyperplasia)     CHF (congestive heart failure)     Depression     Diabetes mellitus type II     Hyperlipidemia     Hypertension     RLS (restless legs syndrome)       Past Surgical History:   Procedure Laterality Date    BACK SURGERY      cervical fusion    CARDIAC PACEMAKER PLACEMENT      CARDIAC SURGERY Left     pacemaker placement    COLECTOMY N/A     CYSTOSCOPY N/A     Pt stated, "I have had six Cystoscopy."    EYE SURGERY Bilateral     cataracts    Ganglion Cyst Removed  Right     Neck Fusion Bilateral     PROSTATE SURGERY      ROTATOR CUFF REPAIR Right     SKIN BIOPSY      skin cancer    TOTAL KNEE ARTHROPLASTY  03/30/2017    right knee    TRANSURETHRAL RESECTION OF PROSTATE         Referring physician: jignesh Hutton NP  Date referred to OT: 7/26  General Precautions: Standard, aspiration, fall  Orthopedic Precautions: N/A  Braces: N/A    Do you have any cultural, spiritual, Congregational conflicts, given your current situation?: Faith     Patient History:  Prior level of function:   Patient resides in [place] with wife in 2 story home with bedroom on the first floor.  3 steps to enter with bilateral rail.  PTA patient independent with ADLs including driving.  Retired:  Law enforcement / .  Hobbies:  carving wooden ducks, walking, caring for animals.  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     Subjective:  Communicated with nurse prior to session.  Patient:  "I couldn't say what I wanted to say.  Everything was " "jumbled up.  I am fine now."  Pain/Comfort  Pain Rating 1: 0/10  Pain Rating Post-Intervention 1: 0/10    Objective:  Patient found with: telemetry, peripheral IV  Wife present.    Cognitive Exam:  Oriented to: Person, Place, Time and Situation  Follows Commands/attention: Follows two-step commands  Communication: clear/fluent  Memory:  No Deficits noted  Safety awareness/insight to disability: intact  Coping skills/emotional control: Appropriate to situation    Visual/perceptual:  Intact    Physical Exam:  Postural examination/scapula alignment: Rounded shoulder  Skin integrity: Visible skin intact  Edema: None noted     Sensation:   Intact    Upper Extremity Range of Motion:  Right Upper Extremity: WNL  Left Upper Extremity: WNL    Upper Extremity Strength:  Right Upper Extremity: WNL  Left Upper Extremity: WNL    Fine motor coordination:   Intact    Gross motor coordination: WFL    Functional Mobility:  Bed Mobility:  Rolling/Turning to Left: Independent  Rolling/Turning Right: Independent  Scooting/Bridging: Independent  Supine to Sit: Independent  Sit to Supine: Independent    Transfers:  Sit <> Stand Assistance: Independent  Sit <> Stand Assistive Device: No Assistive Device  Bed <> Chair Technique: Stand Pivot  Bed <> Chair Transfer Assistance: Independent  Toilet Transfer Technique: Stand Pivot  Toilet Transfer Assistance: Independent    Activities of Daily Living:  Feeding Level of Assistance: Independent  UE Dressing Level of Assistance: Independent  LE Dressing Level of Assistance: Independent  Grooming Position: Standing  Grooming Level of Assistance: Independent  Toileting Where Assessed: Toilet  Toileting Level of Assistance: Independent      Additional Treatment:   Patient/ Family education provided for stroke warning signs, prevention guidelines and personal risk factors (HTN, high cholesterol, DM).  Patient/ Family verbalizing understanding via teach back method.   Patient education provided on role " of OT and need for no further OT upon discharge.  Patient verbalizing understanding via teach back method. Patient alert and oriented x 3; able to follow 4/4 one step commands.  Patient attentive and interactive throughout the session.  Patient able to identify 5/5 body parts.  Able to name 5/5 objects.  Able to sequence 7/7 days of the week and 12/12 months of the year.   Patient's functional status and disposition recommendation discussed with stroke team in daily rounds.  White board updated in patient's room.  OT asked if there were any other questions; patient/ family had no further questions.    Modified [place] Scale:  0/6.  0- No symptoms.    The Barthel Index of Activities of Daily Living Score:  Bowels:  2 = continent  Bladder:  2 = continent (for over 7 days).  Groomin = independent face/hair/teeth/shaving (implements provided)  Toilet Use:  2 = independent (on and off, dressing, wiping)  Feeding : 2 = independent (food provided within reach).  Transfer:3 = independent   Mobility: 3 = independent  Dressin = independent (including buttons, zips, laces, etc.)  Stairs: 2 = independent up and down  Bathin = independent (or in shower)  Score: 20/20 (modified scoring for Barthel index)   lower scores indicating increased disability    Postural Assessment Scale for Stroke: 36/36  1.  Sitting without support--3  2.  Standing with support--3  3.  Standing without support--3  4 and 5: Standing on nonparetic /paretic leg--6  6.  Supine to affected side lateral--3  7.  Supine to nonaffected side lateral--3  8.  Supine to sitting up on the edge of the table--3  9.  Sitting on the edge of the table to supine--3  10.  Sitting to standing up--3  11.  Standing up to sitting down--3  12.  Standing, picking up a pencil from the floor--3    AM-PAC 6 CLICK ADL  How much help from another person does this patient currently need?  1 = Unable, Total/Dependent Assistance  2 = A lot, Maximum/Moderate Assistance  3 = A  "little, Minimum/Contact Guard/Supervision  4 = None, Modified [place]/Independent    Putting on and taking off regular lower body clothing? : 4  Bathing (including washing, rinsing, drying)?: 4  Toileting, which includes using toilet, bedpan, or urinal? : 4  Putting on and taking off regular upper body clothing?: 4  Taking care of personal grooming such as brushing teeth?: 4  Eating meals?: 4  Total Score: 24    AM-PAC Raw Score CMS "G-Code Modifier Level of Impairment Assistance   6 % Total / Unable   7 - 9 CM 80 - 100% Maximal Assist   10-14 CL 60 - 80% Moderate Assist   15 - 19 CK 40 - 60% Moderate Assist   20 - 22 CJ 20 - 40% Minimal Assist   23 CI 1-20% SBA / CGA   24 CH 0% Independent/ Mod I       Patient left supine with all lines intact    Assessment:  Erasmo Dunbar is a 81 y.o. male with a medical diagnosis of TIA and presents without performance deficits of physical skills; cognitive skills or psychosocial skills.  Able to organize occupational performance in a timely and safe manner; demonstrating skills necessary to successfully and appropriately participate in everyday tasks and social situations.  No activity limitations noted. No further OT needed.     Pt evaluation falls under low complexity for evaluation coding due to performance deficits noted in 1-3 areas as stated above and 0 co-morbities affecting current functional status. Data obtained from problem focused assessments. No modifications or assistance was required for completion of evaluation. Only brief occupational profile and history review completed.    Rehab identified problem list/impairments: Rehab identified problem list/impairments:  (none)    Rehab potential is good.    Activity tolerance: Good    Discharge recommendations: Discharge Facility/Level Of Care Needs: home     Barriers to discharge: Barriers to Discharge: None    Equipment recommendations: none     GOALS:    Occupational Therapy Goals     Not on file       "    Multidisciplinary Problems (Resolved)        Problem: Occupational Therapy Goal    Goal Priority Disciplines Outcome Interventions   Occupational Therapy Goal   (Resolved)     OT, PT/OT Outcome(s) achieved    Description:  Goals not set 2* no further OT needed.  JENNIFER Valerio  7/26/2017                       PLAN:  Discharge from OT services on acute.  Plan of Care reviewed with: patient, spouse    OT G-codes  Functional Assessment Tool Used: FIM  Score: 7  Functional Limitation: Self care  Self Care Current Status ():   Self Care Goal Status ():   Self Care Discharge Status ():     JENNIFER Valerio  07/26/2017

## 2017-07-26 NOTE — DISCHARGE SUMMARY
"[facility]  Vascular Neurology  Comprehensive Stroke Center  Discharge Summary     Summary:     Admit Date: 2017 12:05 PM    Discharge Date and Time:  2017     Attending Physician: Dr. José Miguel Key     Discharge Provider: Franklin Goldberg MD    History of Present Illness: Erasmo Dunbar is a 81 y.o. male with PMHx of HTN, DM, and HLD who presented to [facility] ED with aphasia. Patient stated that he was bird watching and then began having speech difficulties. He was able to understand his wife, but when his words "made no sense." This lasted <20 minutes. Patient admits to having a similar episode 1-2 weeks ago, but this lasted <5 minutes.    Patient was seen via telemedicine and transferred for concern of proximal large artery disease. Will proceed with CTA    Hospital Course (synopsis of major diagnoses, care, treatment, and services provided during the course of the hospital stay):  - Admitted to vascular neuro for TIA workup.  Started plavix   - discharge to home on ASA, plavix, simvastatin.    NIH Stroke Scale:  Interval: 7 days or at discharge (whichever comes first)  Level of Consciousness: 0 - alert  LOC Questions: 0 - answers both correctly  LOC Commands: 0 - performs both correctly  Best Gaze: 0 - normal  Visual: 0 - no visual loss  Facial Palsy: 0 - normal  Motor Left Arm: 0 - no drift  Motor Right Arm: 0 - no drift  Motor Left Le - no drift  Motor Right Le - no drift  Limb Ataxia: 0 - absent  Sensory: 0 - normal  Best Language: 0 - no aphasia  Dysarthria: 0 - normal articulation  Extinction and Inattention: 0 - no neglect  NIH Stroke Scale Total: 0  [place]anne Coma Scale:  Best Eye Response: 4 - spontaneous  Best Motor Response: 6 - obeys commands  Best Verbal Response: 5 - oriented  Susanne Coma Scale Total: 15  Modified [place] Scale:   Timeline: At discharge  Modified [place] Score: 0 - no symptoms    ABCD2 Scale for TIA:   Age > or = 60: 1 - yes  B/P or = " 140/9 at Initial Evaluation: 1 - yes  Clinical Features of TIA: 1 - speech disturbance without weakness  Duration of Symptoms: 1 - 10-59 minutes  Diabetes Mellitus in History: 1 - yes  ABCD2 Scale Total: 5          Assessment/Plan:     Interventions: None    Complications: None    Research Candidate?:  No    Neurological deficit at discharge: None     Disposition: Home or Self Care    Final Active Diagnoses:    Diagnosis Date Noted POA    PRINCIPAL PROBLEM:  Transient ischemic attack (TIA) [G45.9] 07/25/2017 Yes    Diabetes mellitus, type 2 [E11.9]  Yes    Hyperlipidemia [E78.5]  Yes    Hypertension [I10]  Yes      Problems Resolved During this Admission:    Diagnosis Date Noted Date Resolved POA     * Transient ischemic attack (TIA)    Erasmo Dunbar is a 81 y.o. male with two recent episodes of aphasia. Patient back to baseline, NIH 0. CTA with some intracranial bilateral ICA calcification, area of R ICA stenosis. Admitting to stroke service for TIA workup.     , plavix Qdaily  Simvastatin 40 Qdaily  CTA head and neck w/o focal stenoses or occlusions  No MRI 2/2 pacemaker  TTE: EF 60%; + DD; LA mild enlargement; no wall motion abnl  LDL 37.6; A1c 6.4; TSH 1.217  PT/OT/ST -> Discharge to home  Pacemaker interrogation w/ EP as outpt    Discharge to home on  and plavix (new), simvastatin 40.  F/u with PCP in 1 week, cardiology in 1 week for pacemaker interrogation, vascular neuro in 4-6 weeks.        Diabetes mellitus, type 2    Stroke risk factor   -A1C 6.4  -continue home regimen        Hypertension    Stroke risk factor  -START lisinopril 10 mg Qdaily  -home lopressor 50 BID        Hyperlipidemia    Stroke risk factor  -LDL 37.6  -Continue home simvastatin 40              Recommendations:     Post-discharge complication risks: None    Stroke Education given to: patient and family    Follow-up in Stroke Clinic in 30 days    Discharge Plan:  Antithrombotics: Aspirin 325 mg, Clopidogrel  75mg  Statin: simvastatin 40  Aggresive risk factor modification:  Hypertension, Diabetes, High Cholesterol, Diet, Exercise and Obesity    Follow Up:  Follow-up Information     Summer Stauffer PA-C On 8/23/2017.    Specialty:  Neurology  Why:  @10:00  Contact information:  [address]  [facility] [address]  [phone]             Hemal Varma MD In 1 week.    Specialty:  Family Medicine  Why:  TIA f/u  Contact information:  [address] [place] [address]  [phone]             [facility] VASCULAR NEUROLOGY In 1 month.    Specialty:  Vascular Neurology  Why:  TIA f/u  Contact information:  [address]  [facility] [address]  [phone]               Patient Instructions:     Ambulatory Referral to Electrophysiology   Referral Priority: Routine Referral Type: Consultation   Referral Reason: Specialty Services Required    Requested Specialty: Electrophysiology    Number of Visits Requested: 1      Ambulatory Referral to Vascular Neurology   Referral Priority: Routine Referral Type: Consultation   Referral Reason: Specialty Services Required    Requested Specialty: Vascular Neurology    Number of Visits Requested: 1      Ambulatory Referral to Physical/Occupational Therapy   Referral Priority: Routine Referral Type: Physical Medicine   Referral Reason: Specialty Services Required    Number of Visits Requested: 1      Ambulatory Referral to Physical/Occupational Therapy   Referral Priority: Routine Referral Type: Physical Medicine   Referral Reason: Specialty Services Required    Number of Visits Requested: 1      Diet Cardiac   Order Comments: See Stroke Patient Education Guide Booklet for details.     Diet Diabetic 1800 Calories     Diet general     Diet Diabetic 2000 Calories     Call 911 for any of the following:   Order Comments: Call 911  right away if any of the following warning signs come on suddenly, even if the symptoms only last for a few minutes. With stroke, timing is  very important.   - Warning Signs of Stroke:  - Weakness: You may feel a sudden weakness, tingling or loss of feeling on one side of your face or body.  - Vision Problems: You may have sudden double vision or trouble seeing in one or both eyes.  - Speech Problems: You may have sudden trouble talking, slured speech, or problems understanding others.  - Headache: You may have sudden, severe headache.  - Movement Problems: You may experience dizziness, a feeling of spinning, a loss of balance, a feeling of falling or blackouts.     Activity as tolerated     Call MD for:  increased confusion or weakness     Call MD for:  persistent dizziness, light-headedness, or visual disturbances     Call MD for:  severe persistent headache     Call MD for:  persistent nausea and vomiting or diarrhea     Call MD for:  redness, tenderness, or signs of infection (pain, swelling, redness, odor or green/yellow discharge around incision site)     Activity as tolerated       Medications:  Reconciled Home Medications:   Discharge Medication List as of 7/26/2017  3:57 PM      START taking these medications    Details   clopidogrel (PLAVIX) 75 mg tablet Take 1 tablet (75 mg total) by mouth once daily., Starting Wed 7/26/2017, Until Thu 7/26/2018, Normal      lisinopril 10 MG tablet Take 1 tablet (10 mg total) by mouth once daily., Starting Thu 7/27/2017, Until Fri 7/27/2018, Normal         CONTINUE these medications which have NOT CHANGED    Details   aspirin (ECOTRIN) 325 MG EC tablet Take 325 mg by mouth once., Historical Med      CIALIS 20 mg Tab Take 1 tablet by mouth once a day, Normal      citalopram (CELEXA) 20 MG tablet Take 1 tablet by mouth once daily, Normal      diclofenac sodium (VOLTAREN) 1 % Gel Apply 2 g topically 4 (four) times daily as needed., Until Discontinued, Historical Med      doxycycline (VIBRA-TABS) 100 MG tablet Take 1 tablet by mouth  every day, Normal      furosemide (LASIX) 40 MG tablet Take 1 tablet by mouth  once daily., Starting 3/22/2016, Until Discontinued, Historical Med      gabapentin (NEURONTIN) 300 MG capsule Take 2 capsules by mouth  two times daily, Normal      insulin glargine (LANTUS SOLOSTAR) 100 unit/mL (3 mL) InPn pen Inject 35 Units into the skin every evening., Starting 9/12/2016, Until Tue 9/12/17, Normal      KAZANO 12.5-1,000 mg Tab Take 1 tablet by mouth two  times daily, Normal      metoprolol tartrate (LOPRESSOR) 50 MG tablet Take 50 mg by mouth 2 (two) times daily., Until Discontinued, Historical Med      simvastatin (ZOCOR) 40 MG tablet Take 1 tablet by mouth  every evening, Normal      tamsulosin (FLOMAX) 0.4 mg Cp24 Take 1 capsule by mouth  once daily, Normal      TANZEUM 50 mg/0.5 mL PnIj Inject 50mg once a week., Normal      tramadol (ULTRAM) 50 mg tablet Take 50 mg by mouth every 6 (six) hours as needed for Pain., Historical Med         STOP taking these medications       celecoxib (CELEBREX) 200 MG capsule Comments:   Reason for Stopping:         cholecalciferol, vitamin D3, (VITAMIN D3) 2,000 unit Cap Comments:   Reason for Stopping:         ibuprofen (ADVIL,MOTRIN) 200 MG tablet Comments:   Reason for Stopping:               Franklin Goldberg MD  Comprehensive Stroke Center  Department of Vascular Neurology   [facility]

## 2017-07-26 NOTE — PLAN OF CARE
Problem: Physical Therapy Goal  Goal: Physical Therapy Goal  Goals to be met by: 2017     Patient will increase functional independence with mobility by performin. Bed to chair transfer with Modified [place] using Rolling Walker  2. Gait  x 200 feet with Modified [place] using Rolling Walker and no LOB.   3. Ascend/descend 1 flight of stairs with right Handrail and SBA to access 2nd floor of home.   4. Pt will ascend/descend 3 steps with bilateral handrails and SBA to access home safely.       Outcome: Ongoing (interventions implemented as appropriate)  Initial eval completed. Results, POC and goals discussed with patient.

## 2017-07-26 NOTE — CONSULTS
PM&R consult follow up.  Please see original consult for detailed note.      Evaluated by therapy.  Patient is independent with mobility, transfers, functional ambulation, and ADLs.  Patient near prior level of function and limited goals for inpatient rehab.  Will sign off.  Please call with questions/concerns or re-consult if situation changes.          ALFREDA Mars, FNP-C  Physical Medicine & Rehabilitation   07/26/2017  Spectralink: [id number]

## 2017-07-26 NOTE — SUBJECTIVE & OBJECTIVE
NIH Stroke Scale:  Interval: 7 days or at discharge (whichever comes first)  Level of Consciousness: 0 - alert  LOC Questions: 0 - answers both correctly  LOC Commands: 0 - performs both correctly  Best Gaze: 0 - normal  Visual: 0 - no visual loss  Facial Palsy: 0 - normal  Motor Left Arm: 0 - no drift  Motor Right Arm: 0 - no drift  Motor Left Le - no drift  Motor Right Le - no drift  Limb Ataxia: 0 - absent  Sensory: 0 - normal  Best Language: 0 - no aphasia  Dysarthria: 0 - normal articulation  Extinction and Inattention: 0 - no neglect  NIH Stroke Scale Total: 0  [place] Coma Scale:  Best Eye Response: 4 - spontaneous  Best Motor Response: 6 - obeys commands  Best Verbal Response: 5 - oriented  [place] Coma Scale Total: 15  Modified [place] Scale:   Timeline: At discharge  Modified [place] Score: 0 - no symptoms    ABCD2 Scale for TIA:   Age > or = 60: 1 - yes  B/P or = 140/9 at Initial Evaluation: 1 - yes  Clinical Features of TIA: 1 - speech disturbance without weakness  Duration of Symptoms: 1 - 10-59 minutes  Diabetes Mellitus in History: 1 - yes  ABCD2 Scale Total: 5

## 2017-07-26 NOTE — PT/OT/SLP EVAL
"Physical Therapy  Evaluation and Treatment     Erasmo Dunbar   MRN: [id number]   Admitting Diagnosis: TIA    PT Received On: 07/26/17  PT Start Time: 1224     PT Stop Time: 1254    PT Total Time (min): 30 min       Billable Minutes:  Evaluation 20 and Therapeutic Activity 10    Diagnosis: TIA    Comorbidities and personal factors that affect the PT plan of care or the patient's ability to participation or progress with therapy:  1. Diabetic neuropathy  2. Cerebellar cyst  3. Hx of vertigo   4. R lumbar radiculopathy   5. Pt reports history of poor balance    Clinical Presentation: stable and/or uncomplicated    Level of Complexity:   Low Complexity  · No personal factors or comorbidities that impact the plan of care  · Examination addressing 1-2 body structures and functions, activity limitations, and/or participation restrictions  · Clinical presentation with stable or uncomplicated characteristics      Past Medical History:   Diagnosis Date    Arthritis     BPH (benign prostatic hyperplasia)     CHF (congestive heart failure)     Depression     Diabetes mellitus type II     Hyperlipidemia     Hypertension     RLS (restless legs syndrome)       Past Surgical History:   Procedure Laterality Date    BACK SURGERY      cervical fusion    CARDIAC PACEMAKER PLACEMENT      CARDIAC SURGERY Left     pacemaker placement    COLECTOMY N/A     CYSTOSCOPY N/A     Pt stated, "I have had six Cystoscopy."    EYE SURGERY Bilateral     cataracts    Ganglion Cyst Removed  Right     Neck Fusion Bilateral     PROSTATE SURGERY      ROTATOR CUFF REPAIR Right     SKIN BIOPSY      skin cancer    TOTAL KNEE ARTHROPLASTY  03/30/2017    right knee    TRANSURETHRAL RESECTION OF PROSTATE         Referring physician:   José Miguel Key MD     Date referred to PT: 7/25/2017    General Precautions: Standard, aspiration, fall    Patient History:  Living Environment Comment: Pt lives with his wife in [facility] in a 2 story home " "with 3 steps and bilateral rails to enter. He was modified independent prior to admit and used a cane since R TKA.  Pt recently finished HH PT.  Pt reports decreased balance prior to admit.   Equipment Currently Used at Home: cane, straight    Subjective:  Communicated with RN prior to session.  "I am always a little unsteady when I walk."  Chief Complaint: none stated  Patient goals: safely ambulate in the home    Pain/Comfort  Pain Rating 1: 0/10  Pain Rating Post-Intervention 1: 0/10      Objective:    Patient found supine in bed receiving nursing care.  He agreed to therapy.         EXAMINATION    Cognitive Function:  Oriented to: Person, Place, Time and Situation  Level of Alertness: awake and alert  Follows Commands/attention: Follows two-step commands, decreased hearing  Communication: clear/fluent  Safety awareness/insight to disability: impaired, patient dismissive of balance deficits    Musculoskeletal System  Lower Extremities:  Range of Motion:  Right Lower Extremity: WFL  Left Lower Extremity: WFL    Strength:  Right Lower Extremity: WFL  Left Lower Extremity: WFL    Integumentary System:  Skin integrity: Visible skin intact    Cardiopulmonary System:  Edema: None noted     Neuromuscular System:  Coordination:   · L finger to nose with slight dysmetria  · Square tracing intact with BLE (moderate assist required for balance during testing)  · R heel to shin slightly impaired  · R LE scissoring during gait    Balance:   Static Sit: NORMAL: No deviations seen in posture held statically;   Dynamic Sit: GOOD+: Maintains balance through MAXIMAL excursions of active trunk motion;   Static Stand: GOOD: Takes MODERATE challenges from all directions; wide DANIELLE  Dynamic stand: POOR: N/A; see gait    Posture and gross symmetry: Rounded shoulder, Head forward and abdominal obesity   Postural Control:   · Stability:requires wide DANIELLE for stability in standing   · Symmetry: good midline orientation in sitting and " "standing     FUNCTIONAL MOBILITY ASSESSMENT:  Bed Mobility:  Rolling/Turning R: independent   Rolling/Turning L: independent   Supine to sit: independent   Sit to supine: independent    Scooting EOB: independent      Transfers:  Sit to stand transfer: stand by assist (wide DANIELLE   Bed to chair transfer: min assist for balance     Gait:   Patient ambulated 150 feet with min assist for balance.  Pt demonstrated R LOB due to decreased RLE coordination with R scissoring.  Pt did not demonstrate any postural compensations to improve balance during gait. Despite wide DANIELLE in standing, patient ambulates with very narrow DANIELLE and instability.  Pt able to increase DANIELLE with improvement in balance, but required cueing from therapist.     Pt ascended/descnded 3 steps with 1 rail and CGA.    THERAPEUTIC ACTIVITIES AND EXERCISES:  Therapist educated patient on the following:  · Role of PT, POC  · S/s stroke using acronym "FAST"  · Decreased balance - use RW for all mobility  · Recommendations for OP PT to address balance and coordination deficits.     Dynamic balance activities:   · Static standing with feet together CGA with increased sway.   · Static standing with eyes closed: patient began to experience R LOB with no compensatory postural control and no attempt to regain balance.  Therapist intervened with min/mod assist to maintain patient safety.   · Tandem walking x10 feet: moderate assist for balance with decreased RLE coordination during foot placement.      AM-PAC 6 CLICK MOBILITY  How much help from another person does this patient currently need?   1 = Unable, Total/Dependent Assistance  2 = A lot, Maximum/Moderate Assistance  3 = A little, Minimum/Contact Guard/Supervision  4 = None, Modified [place]/Independent    Turning over in bed (including adjusting bedclothes, sheets and blankets)?: 4  Sitting down on and standing up from a chair with arms (e.g., wheelchair, bedside commode, etc.): 4  Moving from lying on back " to sitting on the side of the bed?: 4  Moving to and from a bed to a chair (including a wheelchair)?: 3  Need to walk in hospital room?: 3  Climbing 3-5 steps with a railing?: 3  Total Score: 21     AM-PAC Raw Score CMS G-Code Modifier Level of Impairment Assistance   6 % Total / Unable   7 - 9 CM 80 - 100% Maximal Assist   10 - 14 CL 60 - 80% Moderate Assist   15 - 19 CK 40 - 60% Moderate Assist   20 - 22 CJ 20 - 40% Minimal Assist   23 CI 1-20% SBA / CGA   24 CH 0% Independent/ Mod I     Patient left up in chair with all lines intact, call button in reach, Rn  notified and wife present.    Assessment:   Erasmo Dunbar is a 81 y.o. male with a medical diagnosis of TIA.  Patient was independent prior to admit and ambulated with a cane.  He exhibits decreased balance and RLE coordination, causing him to require min to moderate assist with gait activities without an assistive device.  During static standing patient demonstrated decreased postural reactions in response to LOB.  He is at increased risk for falls.  Pt needs to use RW with gait to improve balance and decrease fall risk.  Therapist communicated balance deficits and need for assistance with gait to RN.  RN to order RW for room.      Rehab identified problem list/impairments: Rehab identified problem list/impairments: gait instability, impaired balance, decreased coordination    Rehab potential is good.    Activity tolerance: Good    Discharge recommendations: Discharge Facility/Level Of Care Needs: outpatient PT     Barriers to discharge: Barriers to Discharge: None    Equipment recommendations: Equipment Needed After Discharge: none     GOALS:    Physical Therapy Goals        Problem: Physical Therapy Goal    Goal Priority Disciplines Outcome Goal Variances Interventions   Physical Therapy Goal     PT/OT, PT Ongoing (interventions implemented as appropriate)     Description:  Goals to be met by: 8/9/2017     Patient will increase functional  independence with mobility by performin. Bed to chair transfer with Modified [place] using Rolling Walker  2. Gait  x 200 feet with Modified [place] using Rolling Walker and no LOB.   3. Ascend/descend 1 flight of stairs with right Handrail and SBA to access 2nd floor of home.   4. Pt will ascend/descend 3 steps with bilateral handrails and SBA to access home safely.                         PLAN:    Patient to be seen 6 x/week to address the above listed problems via gait training, therapeutic activities, therapeutic exercises, neuromuscular re-education  Plan of Care expires: 17  Plan of Care reviewed with: patient, spouse          Marianne Ponce, PT  2017

## 2017-07-26 NOTE — PLAN OF CARE
Problem: Patient Care Overview  Goal: Plan of Care Review  Outcome: Ongoing (interventions implemented as appropriate)  AAOx4, VSS, No falls noted, fall precautions remain. Skin intact, Pain assessed, no pain noted. Call light within reach, bed wheels locked, bed in lowest position, side rails ^x2, safety maintained. NADN, Will continue monitor.

## 2017-07-26 NOTE — SUBJECTIVE & OBJECTIVE
Neurologic Chief Complaint: Aphasia    Subjective:     Interval History: Patient is seen for follow-up neurological assessment and treatment recommendations:     No acute events overnight, no further episodes of aphasia.    HPI, Past Medical, Family, and Social History remains the same as documented in the initial encounter.     Review of Systems   Constitutional: Negative for chills and fever.   HENT: Negative for hearing loss.    Eyes: Negative for visual disturbance.   Respiratory: Negative for shortness of breath.    Cardiovascular: Negative for chest pain.   Gastrointestinal: Negative for abdominal pain.   Neurological: Negative for facial asymmetry, speech difficulty, weakness, numbness and headaches.     Scheduled Meds:   aspirin  325 mg Oral Daily    citalopram  20 mg Oral Daily    clopidogrel  75 mg Oral Daily    doxycycline  100 mg Oral Daily    furosemide  40 mg Oral Daily    gabapentin  600 mg Oral BID    heparin (porcine)  5,000 Units Subcutaneous Q8H    insulin detemir  14 Units Subcutaneous QHS    [START ON 7/27/2017] lisinopril  10 mg Oral Daily    metoprolol tartrate  50 mg Oral BID    simvastatin  40 mg Oral QHS    sodium chloride 0.9%  3 mL Intravenous Q8H    tamsulosin  1 capsule Oral Daily    vitamin D  2,000 Units Oral Daily     Continuous Infusions:   sodium chloride 0.9%       PRN Meds:dextrose 50%, dextrose 50%, diphenhydrAMINE, glucagon (human recombinant), glucose, glucose, insulin aspart, labetalol, oxycodone-acetaminophen, sodium chloride 0.9%    Objective:     Vital Signs (Most Recent):  Temp: 97.8 °F (36.6 °C) (07/26/17 1611)  Pulse: 63 (07/26/17 1611)  Resp: 20 (07/26/17 1611)  BP: (!) 144/83 (07/26/17 1611)  SpO2: (!) 93 % (07/26/17 0800)  BP Location: Left arm    Vital Signs Range (Last 24H):  Temp:  [96.7 °F (35.9 °C)-98.3 °F (36.8 °C)]   Pulse:  [63-68]   Resp:  [18-20]   BP: (144-168)/(68-83)   SpO2:  [93 %-95 %]   BP Location: Left arm    Physical Exam    Constitutional: He is oriented to person, place, and time. He appears well-developed and well-nourished. No distress.   Cardiovascular: Normal rate, regular rhythm and normal heart sounds.  Exam reveals no friction rub.    No murmur heard.  Pacemaker in place   Pulmonary/Chest: Effort normal and breath sounds normal. No respiratory distress. He has no wheezes.   Abdominal: Soft. Bowel sounds are normal. He exhibits no distension. There is no tenderness.   Neurological: He is alert and oriented to person, place, and time. GCS eye subscore is 4 - spontaneous. GCS verbal subscore is 5 - oriented. GCS motor subscore is 6 - obeys commands.       Neurological Exam:   LOC: alert and follows requests  Language: No aphasia  Speech: No dysarthria  Orientation: Person, Place, Time  Visual Fields (CN II): Full  EOM (CN III, IV, VI): Full/intact  Pupils (CN III, IV, VI): PERRL  Facial Sensation (CN V): Symmetric  Facial Movement (CN VII): symmetric facial expression  Hearing (CN VIII): intact bilaterally  Tongue (CN XII): to midline  Motor*: Arm Left:  Normal (5/5), Leg Left:   Normal (5/5), Arm Right:   Normal (5/5), Leg Right:   Normal (5/5)  Cerebellar*: finger/nose and heel/shin nml b/l  Sensation: to light touch intact BUE and BLE    NIH Stroke Scale:    Level of Consciousness: 0 - alert  LOC Questions: 0 - answers both correctly  LOC Commands: 0 - performs both correctly  Best Gaze: 0 - normal  Visual: 0 - no visual loss  Facial Palsy: 0 - normal  Motor Left Arm: 0 - no drift  Motor Right Arm: 0 - no drift  Motor Left Le - no drift  Motor Right Le - no drift  Limb Ataxia: 0 - absent  Sensory: 0 - normal  Best Language: 0 - no aphasia  Dysarthria: 0 - normal articulation  Extinction and Inattention: 0 - no neglect  NIH Stroke Scale Total: 0  Susanne Coma Scale:  Best Eye Response: 4 - spontaneous  Best Motor Response: 6 - obeys commands  Best Verbal Response: 5 - oriented  Susanne Coma Scale Total: 15  Modified  [place] Scale:   Timeline:  Modified [place]is Score: 0 - no symptoms    ABCD2 Scale for TIA:   Age > or = 60: 1 - yes  B/P or = 140/9 at Initial Evaluation: 1 - yes  Clinical Features of TIA: 1 - speech disturbance without weakness  Duration of Symptoms: 1 - 10-59 minutes  Diabetes Mellitus in History: 1 - yes  ABCD2 Scale Total: 5      Laboratory:  Recent Results (from the past 24 hour(s))   POCT glucose    Collection Time: 07/25/17 11:51 PM   Result Value Ref Range    POCT Glucose 130 (H) 70 - 110 mg/dL   Comprehensive metabolic panel    Collection Time: 07/26/17  3:49 AM   Result Value Ref Range    Sodium 136 136 - 145 mmol/L    Potassium 4.1 3.5 - 5.1 mmol/L    Chloride 101 95 - 110 mmol/L    CO2 28 23 - 29 mmol/L    Glucose 112 (H) 70 - 110 mg/dL    BUN, Bld 15 8 - 23 mg/dL    Creatinine 0.9 0.5 - 1.4 mg/dL    Calcium 9.2 8.7 - 10.5 mg/dL    Total Protein 6.2 6.0 - 8.4 g/dL    Albumin 3.5 3.5 - 5.2 g/dL    Total Bilirubin 0.3 0.1 - 1.0 mg/dL    Alkaline Phosphatase 69 55 - 135 U/L    AST 12 10 - 40 U/L    ALT 16 10 - 44 U/L    Anion Gap 7 (L) 8 - 16 mmol/L    eGFR if African American >60.0 >60 mL/min/1.73 m^2    eGFR if non African American >60.0 >60 mL/min/1.73 m^2   Magnesium    Collection Time: 07/26/17  3:49 AM   Result Value Ref Range    Magnesium 1.6 1.6 - 2.6 mg/dL   Phosphorus    Collection Time: 07/26/17  3:49 AM   Result Value Ref Range    Phosphorus 3.8 2.7 - 4.5 mg/dL   CBC auto differential    Collection Time: 07/26/17  3:49 AM   Result Value Ref Range    WBC 11.41 3.90 - 12.70 K/uL    RBC 4.36 (L) 4.60 - 6.20 M/uL    Hemoglobin 12.4 (L) 14.0 - 18.0 g/dL    Hematocrit 37.8 (L) 40.0 - 54.0 %    MCV 87 82 - 98 fL    MCH 28.4 27.0 - 31.0 pg    MCHC 32.8 32.0 - 36.0 g/dL    RDW 14.1 11.5 - 14.5 %    Platelets 311 150 - 350 K/uL    MPV 9.3 9.2 - 12.9 fL    Gran # 5.8 1.8 - 7.7 K/uL    Lymph # 2.6 1.0 - 4.8 K/uL    Mono # 1.1 (H) 0.3 - 1.0 K/uL    Eos # 1.9 (H) 0.0 - 0.5 K/uL    Baso # 0.06 0.00 - 0.20 K/uL  "   Gran% 51.1 38.0 - 73.0 %    Lymph% 22.3 18.0 - 48.0 %    Mono% 9.4 4.0 - 15.0 %    Eosinophil% 16.3 (H) 0.0 - 8.0 %    Basophil% 0.5 0.0 - 1.9 %    Differential Method Automated    Troponin I    Collection Time: 07/26/17  3:49 AM   Result Value Ref Range    Troponin I <0.006 0.000 - 0.026 ng/mL   CK-MB    Collection Time: 07/26/17  3:49 AM   Result Value Ref Range    CPK 46 20 - 200 U/L    CPK MB 1.6 0.1 - 6.5 ng/mL    MB% 3.5 0.0 - 5.0 %   APTT    Collection Time: 07/26/17  3:49 AM   Result Value Ref Range    aPTT 26.2 21.0 - 32.0 sec   Protime-INR    Collection Time: 07/26/17  3:49 AM   Result Value Ref Range    Prothrombin Time 10.9 9.0 - 12.5 sec    INR 1.0 0.8 - 1.2   POCT glucose    Collection Time: 07/26/17  7:49 AM   Result Value Ref Range    POCT Glucose 186 (H) 70 - 110 mg/dL   POCT glucose    Collection Time: 07/26/17 12:04 PM   Result Value Ref Range    POCT Glucose 169 (H) 70 - 110 mg/dL   Echo doppler color flow    Collection Time: 07/26/17  1:30 PM   Result Value Ref Range    EF 60 55 - 65    Mitral Valve Regurgitation TRIVIAL     Diastolic Dysfunction Yes (A)     Est. PA Systolic Pressure 31.52     Tricuspid Valve Regurgitation TRIVIAL TO MILD          Diagnostic Results:  I have personally reviewed: CTA Head/Neck. Date: 7/25/17  Findings: No acute hemorrhage, mass effect.  No stenoses nor occlusions visualized.    7/26/17 TTE: Per report,  "1 - Normal left ventricular systolic function (EF 60-65%).     2 - Impaired LV relaxation, normal LAP (grade 1 diastolic dysfunction).     3 - Normal right ventricular systolic function .     4 - The estimated PA systolic pressure is 32 mmHg.     5 - Trivial mitral regurgitation.     6 - Trivial to mild tricuspid regurgitation.     7 - Mild left atrial enlargement.     8 - Concentric remodeling. "  "

## 2017-07-26 NOTE — PROGRESS NOTES
[facility]  Vascular Neurology  Comprehensive Stroke Center  Progress Note    Assessment/Plan:     7/25 - Admitted to vascular neuro for TIA workup.  Started plavix  7/26 - discharge to home on ASA, plavix, simvastatin.    * Transient ischemic attack (TIA)    Erasmo Dunbar is a 81 y.o. male with two recent episodes of aphasia. Patient back to baseline, NIH 0. CTA with some intracranial bilateral ICA calcification, area of R ICA stenosis. Admitting to stroke service for TIA workup.     , plavix Qdaily  Simvastatin 40 Qdaily  CTA head and neck w/o focal stenoses or occlusions  No MRI 2/2 pacemaker  TTE: EF 60%; + DD; LA mild enlargement; no wall motion abnl  LDL 37.6; A1c 6.4; TSH 1.217  PT/OT/ST -> Discharge to home  Pacemaker interrogation w/ EP as outpt    Discharge to home on  and plavix (new), simvastatin 40.  F/u with PCP in 1 week, cardiology in 1 week for pacemaker interrogation, vascular neuro in 4-6 weeks.        Diabetes mellitus, type 2    Stroke risk factor   -A1C 6.4  -SSI        Hypertension    Stroke risk factor  -START lisinopril 10 mg Qdaily  -home lopressor 50 BID        Hyperlipidemia    Stroke risk factor  -LDL 37.6  -Continue home simvastatin 40              Neurologic Chief Complaint: Aphasia    Subjective:     Interval History: Patient is seen for follow-up neurological assessment and treatment recommendations:     No acute events overnight, no further episodes of aphasia.    HPI, Past Medical, Family, and Social History remains the same as documented in the initial encounter.     Review of Systems   Constitutional: Negative for chills and fever.   HENT: Negative for hearing loss.    Eyes: Negative for visual disturbance.   Respiratory: Negative for shortness of breath.    Cardiovascular: Negative for chest pain.   Gastrointestinal: Negative for abdominal pain.   Neurological: Negative for facial asymmetry, speech difficulty, weakness, numbness and  headaches.     Scheduled Meds:   aspirin  325 mg Oral Daily    citalopram  20 mg Oral Daily    clopidogrel  75 mg Oral Daily    doxycycline  100 mg Oral Daily    furosemide  40 mg Oral Daily    gabapentin  600 mg Oral BID    heparin (porcine)  5,000 Units Subcutaneous Q8H    insulin detemir  14 Units Subcutaneous QHS    [START ON 7/27/2017] lisinopril  10 mg Oral Daily    metoprolol tartrate  50 mg Oral BID    simvastatin  40 mg Oral QHS    sodium chloride 0.9%  3 mL Intravenous Q8H    tamsulosin  1 capsule Oral Daily    vitamin D  2,000 Units Oral Daily     Continuous Infusions:   sodium chloride 0.9%       PRN Meds:dextrose 50%, dextrose 50%, diphenhydrAMINE, glucagon (human recombinant), glucose, glucose, insulin aspart, labetalol, oxycodone-acetaminophen, sodium chloride 0.9%    Objective:     Vital Signs (Most Recent):  Temp: 97.8 °F (36.6 °C) (07/26/17 1611)  Pulse: 63 (07/26/17 1611)  Resp: 20 (07/26/17 1611)  BP: (!) 144/83 (07/26/17 1611)  SpO2: (!) 93 % (07/26/17 0800)  BP Location: Left arm    Vital Signs Range (Last 24H):  Temp:  [96.7 °F (35.9 °C)-98.3 °F (36.8 °C)]   Pulse:  [63-68]   Resp:  [18-20]   BP: (144-168)/(68-83)   SpO2:  [93 %-95 %]   BP Location: Left arm    Physical Exam   Constitutional: He is oriented to person, place, and time. He appears well-developed and well-nourished. No distress.   Cardiovascular: Normal rate, regular rhythm and normal heart sounds.  Exam reveals no friction rub.    No murmur heard.  Pacemaker in place   Pulmonary/Chest: Effort normal and breath sounds normal. No respiratory distress. He has no wheezes.   Abdominal: Soft. Bowel sounds are normal. He exhibits no distension. There is no tenderness.   Neurological: He is alert and oriented to person, place, and time. GCS eye subscore is 4 - spontaneous. GCS verbal subscore is 5 - oriented. GCS motor subscore is 6 - obeys commands.       Neurological Exam:   LOC: alert and follows requests  Language: No  aphasia  Speech: No dysarthria  Orientation: Person, Place, Time  Visual Fields (CN II): Full  EOM (CN III, IV, VI): Full/intact  Pupils (CN III, IV, VI): PERRL  Facial Sensation (CN V): Symmetric  Facial Movement (CN VII): symmetric facial expression  Hearing (CN VIII): intact bilaterally  Tongue (CN XII): to midline  Motor*: Arm Left:  Normal (5/5), Leg Left:   Normal (5/5), Arm Right:   Normal (5/5), Leg Right:   Normal (5/5)  Cerebellar*: finger/nose and heel/shin nml b/l  Sensation: to light touch intact BUE and BLE    NIH Stroke Scale:    Level of Consciousness: 0 - alert  LOC Questions: 0 - answers both correctly  LOC Commands: 0 - performs both correctly  Best Gaze: 0 - normal  Visual: 0 - no visual loss  Facial Palsy: 0 - normal  Motor Left Arm: 0 - no drift  Motor Right Arm: 0 - no drift  Motor Left Le - no drift  Motor Right Le - no drift  Limb Ataxia: 0 - absent  Sensory: 0 - normal  Best Language: 0 - no aphasia  Dysarthria: 0 - normal articulation  Extinction and Inattention: 0 - no neglect  NIH Stroke Scale Total: 0  [place] Coma Scale:  Best Eye Response: 4 - spontaneous  Best Motor Response: 6 - obeys commands  Best Verbal Response: 5 - oriented  [place] Coma Scale Total: 15  Modified [place] Scale:   Timeline:  Modified Haris Score: 0 - no symptoms    ABCD2 Scale for TIA:   Age > or = 60: 1 - yes  B/P or = 140/9 at Initial Evaluation: 1 - yes  Clinical Features of TIA: 1 - speech disturbance without weakness  Duration of Symptoms: 1 - 10-59 minutes  Diabetes Mellitus in History: 1 - yes  ABCD2 Scale Total: 5      Laboratory:  Recent Results (from the past 24 hour(s))   POCT glucose    Collection Time: 17 11:51 PM   Result Value Ref Range    POCT Glucose 130 (H) 70 - 110 mg/dL   Comprehensive metabolic panel    Collection Time: 17  3:49 AM   Result Value Ref Range    Sodium 136 136 - 145 mmol/L    Potassium 4.1 3.5 - 5.1 mmol/L    Chloride 101 95 - 110 mmol/L    CO2 28 23 - 29  mmol/L    Glucose 112 (H) 70 - 110 mg/dL    BUN, Bld 15 8 - 23 mg/dL    Creatinine 0.9 0.5 - 1.4 mg/dL    Calcium 9.2 8.7 - 10.5 mg/dL    Total Protein 6.2 6.0 - 8.4 g/dL    Albumin 3.5 3.5 - 5.2 g/dL    Total Bilirubin 0.3 0.1 - 1.0 mg/dL    Alkaline Phosphatase 69 55 - 135 U/L    AST 12 10 - 40 U/L    ALT 16 10 - 44 U/L    Anion Gap 7 (L) 8 - 16 mmol/L    eGFR if African American >60.0 >60 mL/min/1.73 m^2    eGFR if non African American >60.0 >60 mL/min/1.73 m^2   Magnesium    Collection Time: 07/26/17  3:49 AM   Result Value Ref Range    Magnesium 1.6 1.6 - 2.6 mg/dL   Phosphorus    Collection Time: 07/26/17  3:49 AM   Result Value Ref Range    Phosphorus 3.8 2.7 - 4.5 mg/dL   CBC auto differential    Collection Time: 07/26/17  3:49 AM   Result Value Ref Range    WBC 11.41 3.90 - 12.70 K/uL    RBC 4.36 (L) 4.60 - 6.20 M/uL    Hemoglobin 12.4 (L) 14.0 - 18.0 g/dL    Hematocrit 37.8 (L) 40.0 - 54.0 %    MCV 87 82 - 98 fL    MCH 28.4 27.0 - 31.0 pg    MCHC 32.8 32.0 - 36.0 g/dL    RDW 14.1 11.5 - 14.5 %    Platelets 311 150 - 350 K/uL    MPV 9.3 9.2 - 12.9 fL    Gran # 5.8 1.8 - 7.7 K/uL    Lymph # 2.6 1.0 - 4.8 K/uL    Mono # 1.1 (H) 0.3 - 1.0 K/uL    Eos # 1.9 (H) 0.0 - 0.5 K/uL    Baso # 0.06 0.00 - 0.20 K/uL    Gran% 51.1 38.0 - 73.0 %    Lymph% 22.3 18.0 - 48.0 %    Mono% 9.4 4.0 - 15.0 %    Eosinophil% 16.3 (H) 0.0 - 8.0 %    Basophil% 0.5 0.0 - 1.9 %    Differential Method Automated    Troponin I    Collection Time: 07/26/17  3:49 AM   Result Value Ref Range    Troponin I <0.006 0.000 - 0.026 ng/mL   CK-MB    Collection Time: 07/26/17  3:49 AM   Result Value Ref Range    CPK 46 20 - 200 U/L    CPK MB 1.6 0.1 - 6.5 ng/mL    MB% 3.5 0.0 - 5.0 %   APTT    Collection Time: 07/26/17  3:49 AM   Result Value Ref Range    aPTT 26.2 21.0 - 32.0 sec   Protime-INR    Collection Time: 07/26/17  3:49 AM   Result Value Ref Range    Prothrombin Time 10.9 9.0 - 12.5 sec    INR 1.0 0.8 - 1.2   POCT glucose    Collection Time:  "07/26/17  7:49 AM   Result Value Ref Range    POCT Glucose 186 (H) 70 - 110 mg/dL   POCT glucose    Collection Time: 07/26/17 12:04 PM   Result Value Ref Range    POCT Glucose 169 (H) 70 - 110 mg/dL   Echo doppler color flow    Collection Time: 07/26/17  1:30 PM   Result Value Ref Range    EF 60 55 - 65    Mitral Valve Regurgitation TRIVIAL     Diastolic Dysfunction Yes (A)     Est. PA Systolic Pressure 31.52     Tricuspid Valve Regurgitation TRIVIAL TO MILD          Diagnostic Results:  I have personally reviewed: CTA Head/Neck. Date: 7/25/17  Findings: No acute hemorrhage, mass effect.  No stenoses nor occlusions visualized.    7/26/17 TTE: Per report,  "1 - Normal left ventricular systolic function (EF 60-65%).     2 - Impaired LV relaxation, normal LAP (grade 1 diastolic dysfunction).     3 - Normal right ventricular systolic function .     4 - The estimated PA systolic pressure is 32 mmHg.     5 - Trivial mitral regurgitation.     6 - Trivial to mild tricuspid regurgitation.     7 - Mild left atrial enlargement.     8 - Concentric remodeling. "      Franklin Goldberg MD  Comprehensive Stroke Center  Department of Vascular Neurology   [facility]      "

## 2017-07-26 NOTE — HOSPITAL COURSE
7/25 - Admitted to vascular neuro for TIA workup.  Started plavix  7/26 - discharge to home on ASA, plavix, simvastatin.

## 2017-07-26 NOTE — HPI
*** is a ***-year-old *** with PMHx of ***.  Patient presented to [facility] ED with an episode of aphasia lasting less than 20 minutes.  CTH revealed no acute pathology ***.  Transferred to [facility] on *** for further evaluation and management.  Upon admission, ***.     Functional History: Patient lives in *** with *** in a *** story home with*** to enter.  Prior to admission, ***.  DME: ***.

## 2017-07-26 NOTE — PLAN OF CARE
Problem: Occupational Therapy Goal  Goal: Occupational Therapy Goal  Goals not set 2* no further OT needed.  JENNIFER Cormier  7/26/2017     Outcome: Outcome(s) achieved Date Met: 07/26/17  OT evaluation completed.

## 2017-07-26 NOTE — CONSULTS
RD provided pt and wife at bedside with stroke and heart healthy + diabetic diet education providing handouts discussing healthy fats, high fiber, fruits and vegetables and low sodium while continuing consistent CHO intake at meals. Patient and wife verbalized understanding.

## 2017-07-26 NOTE — ASSESSMENT & PLAN NOTE
Erasmo Dunbar is a 81 y.o. male with two recent episodes of aphasia. Patient back to baseline, NIH 0. CTA with some intracranial bilateral ICA calcification, area of R ICA stenosis. Admitting to stroke service for TIA workup.     , plavix Qdaily  Simvastatin 40 Qdaily  CTA head and neck w/o focal stenoses or occlusions  No MRI 2/2 pacemaker  TTE: EF 60%; + DD; LA mild enlargement; no wall motion abnl  LDL 37.6; A1c 6.4; TSH 1.217  PT/OT/ST -> Discharge to home  Pacemaker interrogation w/ EP as outpt    Discharge to home on  and plavix (new), simvastatin 40.  F/u with PCP in 1 week, cardiology in 1 week for pacemaker interrogation, vascular neuro in 4-6 weeks.

## 2017-08-01 ENCOUNTER — TELEPHONE (OUTPATIENT)
Dept: NEUROSURGERY | Facility: HOSPITAL | Age: 82
End: 2017-08-01

## 2017-08-01 NOTE — TELEPHONE ENCOUNTER
"Attempted to contact patient via number on file.  No answer.  The following message was left for patient to return call "Hello.  This is a message for Erasmo Dunbar.  My name is Lorenzo and I am a nurse at [facility].  If you could give me call back at [phone] between the hours of 08:00 AM and 04:00 PM, Monday through Friday.  Thanks so much and have a great day."  Will try again later.   "

## 2017-08-02 ENCOUNTER — TELEPHONE (OUTPATIENT)
Dept: NEUROSURGERY | Facility: HOSPITAL | Age: 82
End: 2017-08-02

## 2017-08-02 ENCOUNTER — OFFICE VISIT (OUTPATIENT)
Dept: FAMILY MEDICINE | Facility: CLINIC | Age: 82
End: 2017-08-02
Payer: MEDICARE

## 2017-08-02 VITALS
RESPIRATION RATE: 18 BRPM | WEIGHT: 206.81 LBS | HEIGHT: 66 IN | SYSTOLIC BLOOD PRESSURE: 102 MMHG | HEART RATE: 68 BPM | DIASTOLIC BLOOD PRESSURE: 56 MMHG | BODY MASS INDEX: 33.24 KG/M2 | OXYGEN SATURATION: 95 %

## 2017-08-02 DIAGNOSIS — E11.9 TYPE 2 DIABETES MELLITUS WITHOUT COMPLICATION, WITH LONG-TERM CURRENT USE OF INSULIN: ICD-10-CM

## 2017-08-02 DIAGNOSIS — E11.22 TYPE 2 DIABETES MELLITUS WITH STAGE 3 CHRONIC KIDNEY DISEASE, WITH LONG-TERM CURRENT USE OF INSULIN: ICD-10-CM

## 2017-08-02 DIAGNOSIS — Z79.4 TYPE 2 DIABETES MELLITUS WITHOUT COMPLICATION, WITH LONG-TERM CURRENT USE OF INSULIN: ICD-10-CM

## 2017-08-02 DIAGNOSIS — Z79.4 TYPE 2 DIABETES MELLITUS WITH STAGE 3 CHRONIC KIDNEY DISEASE, WITH LONG-TERM CURRENT USE OF INSULIN: ICD-10-CM

## 2017-08-02 DIAGNOSIS — F32.A DEPRESSION, UNSPECIFIED DEPRESSION TYPE: ICD-10-CM

## 2017-08-02 DIAGNOSIS — N18.30 TYPE 2 DIABETES MELLITUS WITH STAGE 3 CHRONIC KIDNEY DISEASE, WITH LONG-TERM CURRENT USE OF INSULIN: ICD-10-CM

## 2017-08-02 DIAGNOSIS — I10 ESSENTIAL HYPERTENSION: ICD-10-CM

## 2017-08-02 DIAGNOSIS — Z09 HOSPITAL DISCHARGE FOLLOW-UP: Primary | ICD-10-CM

## 2017-08-02 DIAGNOSIS — E78.5 DYSLIPIDEMIA: ICD-10-CM

## 2017-08-02 DIAGNOSIS — G45.9 TRANSIENT CEREBRAL ISCHEMIA, UNSPECIFIED TYPE: ICD-10-CM

## 2017-08-02 PROCEDURE — 99999 PR PBB SHADOW E&M-EST. PATIENT-LVL III: CPT | Mod: PBBFAC,,, | Performed by: FAMILY MEDICINE

## 2017-08-02 PROCEDURE — 1159F MED LIST DOCD IN RCRD: CPT | Mod: S$GLB,,, | Performed by: FAMILY MEDICINE

## 2017-08-02 PROCEDURE — 99214 OFFICE O/P EST MOD 30 MIN: CPT | Mod: S$GLB,,, | Performed by: FAMILY MEDICINE

## 2017-08-02 PROCEDURE — 1125F AMNT PAIN NOTED PAIN PRSNT: CPT | Mod: S$GLB,,, | Performed by: FAMILY MEDICINE

## 2017-08-02 RX ORDER — DICLOFENAC SODIUM 10 MG/G
4 GEL TOPICAL 4 TIMES DAILY PRN
Qty: 500 G | Refills: 5 | Status: SHIPPED | OUTPATIENT
Start: 2017-08-02 | End: 2019-07-09

## 2017-08-02 RX ORDER — TAMSULOSIN HYDROCHLORIDE 0.4 MG/1
1 CAPSULE ORAL DAILY
Qty: 90 CAPSULE | Refills: 1 | Status: SHIPPED | OUTPATIENT
Start: 2017-08-02 | End: 2018-01-29 | Stop reason: SDUPTHER

## 2017-08-02 RX ORDER — CITALOPRAM 20 MG/1
20 TABLET, FILM COATED ORAL DAILY
Qty: 90 TABLET | Refills: 1 | Status: SHIPPED | OUTPATIENT
Start: 2017-08-02 | End: 2018-01-29 | Stop reason: SDUPTHER

## 2017-08-02 RX ORDER — INSULIN GLARGINE 100 [IU]/ML
42 INJECTION, SOLUTION SUBCUTANEOUS NIGHTLY
Qty: 45 ML | Refills: 3 | Status: SHIPPED | OUTPATIENT
Start: 2017-08-02 | End: 2018-01-29 | Stop reason: SDUPTHER

## 2017-08-02 RX ORDER — FUROSEMIDE 40 MG/1
40 TABLET ORAL DAILY
Qty: 90 TABLET | Refills: 1 | Status: SHIPPED | OUTPATIENT
Start: 2017-08-02 | End: 2018-01-29 | Stop reason: SDUPTHER

## 2017-08-02 RX ORDER — LISINOPRIL 10 MG/1
10 TABLET ORAL DAILY
Qty: 30 TABLET | Refills: 2 | Status: SHIPPED | OUTPATIENT
Start: 2017-08-02 | End: 2017-10-10 | Stop reason: SDUPTHER

## 2017-08-02 RX ORDER — GABAPENTIN 300 MG/1
CAPSULE ORAL
Qty: 360 CAPSULE | Refills: 3 | Status: SHIPPED | OUTPATIENT
Start: 2017-08-02 | End: 2018-01-29 | Stop reason: SDUPTHER

## 2017-08-02 RX ORDER — METOPROLOL TARTRATE 50 MG/1
50 TABLET ORAL 2 TIMES DAILY
Qty: 180 TABLET | Refills: 1 | Status: SHIPPED | OUTPATIENT
Start: 2017-08-02 | End: 2018-01-29 | Stop reason: SDUPTHER

## 2017-08-02 RX ORDER — ALOGLIPTIN AND METFORMIN HYDROCHLORIDE 12.5; 1 MG/1; MG/1
1 TABLET, FILM COATED ORAL 2 TIMES DAILY
Qty: 180 TABLET | Refills: 5 | Status: SHIPPED | OUTPATIENT
Start: 2017-08-02 | End: 2018-01-29 | Stop reason: SDUPTHER

## 2017-08-02 RX ORDER — SIMVASTATIN 40 MG/1
40 TABLET, FILM COATED ORAL NIGHTLY
Qty: 90 TABLET | Refills: 1 | Status: SHIPPED | OUTPATIENT
Start: 2017-08-02 | End: 2018-01-29 | Stop reason: SDUPTHER

## 2017-08-02 RX ORDER — CLOPIDOGREL BISULFATE 75 MG/1
75 TABLET ORAL DAILY
Qty: 30 TABLET | Refills: 2 | Status: SHIPPED | OUTPATIENT
Start: 2017-08-02 | End: 2017-09-12

## 2017-08-02 NOTE — PROGRESS NOTES
Subjective:       Patient ID: Erasmo Dunbar is a 82 y.o. male.    Chief Complaint: Follow-up (ER follow up CVA; 1 wk ago)    Pt is a 82 y.o. male who presents for evaluation and management of   Encounter Diagnoses   Name Primary?    Hospital discharge follow-up Yes    Transient cerebral ischemia, unspecified type     Essential hypertension     Dyslipidemia     Depression, unspecified depression type     Type 2 diabetes mellitus with stage 3 chronic kidney disease, with long-term current use of insulin     Type 2 diabetes mellitus without complication, with long-term current use of insulin    .admitted obs [facility] for aphasia, TIA  Sent home with new rx of plavix and lisinopril. All else stayed same  No residual or repeat sx's   Feels well   Neuro f/u pending     Doing well on current meds. Denies any side effects. Prevention is up to date.  Review of Systems   Respiratory: Negative for shortness of breath.    Cardiovascular: Negative for chest pain.   Musculoskeletal: Positive for arthralgias and gait problem.   Neurological: Negative for dizziness, tremors, syncope, facial asymmetry, speech difficulty, weakness, light-headedness and numbness.       Objective:      Physical Exam   Constitutional: He is oriented to person, place, and time. He appears well-developed and well-nourished.   HENT:   Head: Normocephalic and atraumatic.   Right Ear: External ear normal.   Left Ear: External ear normal.   Nose: Nose normal.   Mouth/Throat: Oropharynx is clear and moist.   Eyes: Conjunctivae and EOM are normal. Pupils are equal, round, and reactive to light. Right eye exhibits no discharge. Left eye exhibits no discharge. No scleral icterus.   Neck: Normal range of motion. Neck supple. No JVD present. No tracheal deviation present. No thyromegaly present.   Cardiovascular: Normal rate, regular rhythm, normal heart sounds and intact distal pulses.    No murmur heard.  Pulmonary/Chest: Effort normal and breath  sounds normal. No respiratory distress. He has no wheezes. He has no rales. He exhibits no tenderness.   Abdominal: Soft. Bowel sounds are normal. He exhibits no distension and no mass. There is no tenderness. There is no rebound and no guarding.   Musculoskeletal: Normal range of motion. He exhibits no edema.   Right knee with midline scar      Lymphadenopathy:     He has no cervical adenopathy.   Neurological: He is alert and oriented to person, place, and time. He has normal reflexes. He displays normal reflexes. No cranial nerve deficit. He exhibits normal muscle tone. Coordination normal.   Skin: Skin is warm and dry.   Psychiatric: He has a normal mood and affect. His behavior is normal. Judgment and thought content normal.       Assessment:       1. Hospital discharge follow-up    2. Transient cerebral ischemia, unspecified type    3. Essential hypertension    4. Dyslipidemia    5. Depression, unspecified depression type    6. Type 2 diabetes mellitus with stage 3 chronic kidney disease, with long-term current use of insulin    7. Type 2 diabetes mellitus without complication, with long-term current use of insulin        Plan:   Erasmo PEGUERO was seen today for follow-up.    Diagnoses and all orders for this visit:    Hospital discharge follow-up    Transient cerebral ischemia, unspecified type  -     clopidogrel (PLAVIX) 75 mg tablet; Take 1 tablet (75 mg total) by mouth once daily.  -     lisinopril 10 MG tablet; Take 1 tablet (10 mg total) by mouth once daily.  -     simvastatin (ZOCOR) 40 MG tablet; Take 1 tablet (40 mg total) by mouth every evening.    Essential hypertension  -     lisinopril 10 MG tablet; Take 1 tablet (10 mg total) by mouth once daily.  -     metoprolol tartrate (LOPRESSOR) 50 MG tablet; Take 1 tablet (50 mg total) by mouth 2 (two) times daily.    Dyslipidemia  -     simvastatin (ZOCOR) 40 MG tablet; Take 1 tablet (40 mg total) by mouth every evening.    Depression, unspecified depression  type  -     citalopram (CELEXA) 20 MG tablet; Take 1 tablet (20 mg total) by mouth once daily.    Type 2 diabetes mellitus with stage 3 chronic kidney disease, with long-term current use of insulin  -     alogliptin-metformin (KAZANO) 12.5-1,000 mg Tab; Take 1 tablet by mouth 2 (two) times daily.    Type 2 diabetes mellitus without complication, with long-term current use of insulin  -     albiglutide (TANZEUM) 50 mg/0.5 mL PnIj; Inject 50 mg into the skin once a week.    Other orders  -     diclofenac sodium (VOLTAREN) 1 % Gel; Apply 4 g topically 4 (four) times daily as needed.  -     furosemide (LASIX) 40 MG tablet; Take 1 tablet (40 mg total) by mouth once daily.  -     gabapentin (NEURONTIN) 300 MG capsule; Take 2 capsules by mouth  two times daily  -     insulin glargine (LANTUS SOLOSTAR) 100 unit/mL (3 mL) InPn pen; Inject 42 Units into the skin every evening.  -     tamsulosin (FLOMAX) 0.4 mg Cp24; Take 1 capsule (0.4 mg total) by mouth once daily.    All chronic morbidity indices are at goal, continue above  Sees cards Friday, I would imagine they will interrogate his pacer, pt told to remind them if not mentioned at appt   TTE negative   Neuro appt pending    RTC 6 months/prn       No Follow-up on file.

## 2017-08-02 NOTE — TELEPHONE ENCOUNTER
"Attempted to contact patient via number on file.  No answer.  The following message was left for patient to return call "Hello.  This is a message for Erasmo Dunbar.  My name is Lorenzo and I am a nurse at [facility].  If you could give me call back at [phone] between the hours of 08:00 AM and 04:00 PM, Monday through Friday.  Thanks so much and have a great day."  Will try again later.   "

## 2017-08-03 ENCOUNTER — TELEPHONE (OUTPATIENT)
Dept: NEUROSURGERY | Facility: HOSPITAL | Age: 82
End: 2017-08-03

## 2017-08-03 NOTE — TELEPHONE ENCOUNTER
Called number on file.  Spoke with patient's wife Carol Dunbar who is primary caregiver.  Risk factors specific to patient for stroke discussed with teach back implemented.  Patient's wife verbalized understanding of discharge instructions and medications.  Patient's wife was asked about discharge appointments and follow up care.  Follow appointment scheduled for 9/12/2017 at 0915. Warning signs discussed with teach back discussion method implemented.  Notified to seek immediate medical help via 911 if new or worsening stroke symptoms occur.  Patient and wife relayed no new questions or comments at this time.  Instructed to call [facility] with any further questions.

## 2017-08-10 NOTE — PT/OT/SLP DISCHARGE
Physical Therapy Discharge Summary    Erasmo Dunbar  MRN: [id number]   Transient ischemic attack (TIA)     Patient Discharged from acute Physical Therapy on 2017.  Please refer to prior PT noted date on 2017 for functional status.     Assessment:   Patient was discharge unexpectedly.  Information required to complete and accurate discharge summary is unknown.  Refer to therapy initial evaluation and last progress note for initial and most recent functional status and goal achievement.  Recommendations made may be found in medical record.  GOALS:    Physical Therapy Goals        Problem: Physical Therapy Goal    Goal Priority Disciplines Outcome Goal Variances Interventions   Physical Therapy Goal     PT/OT, PT Ongoing (interventions implemented as appropriate)     Description:  Goals to be met by: 2017     Patient will increase functional independence with mobility by performin. Bed to chair transfer with Modified [place] using Rolling Walker  2. Gait  x 200 feet with Modified [place] using Rolling Walker and no LOB.   3. Ascend/descend 1 flight of stairs with right Handrail and SBA to access 2nd floor of home.   4. Pt will ascend/descend 3 steps with bilateral handrails and SBA to access home safely.                       Reasons for Discontinuation of Therapy Services  Transfer to alternate level of care.      Plan:  Patient Discharged to: Outpatient Therapy Services.    Marianne Ponce, PT  8/10/2017  [phone] (pager)

## 2017-08-15 ENCOUNTER — TELEPHONE (OUTPATIENT)
Dept: FAMILY MEDICINE | Facility: CLINIC | Age: 82
End: 2017-08-15

## 2017-08-15 NOTE — TELEPHONE ENCOUNTER
----- Message from Irma Day sent at 8/15/2017 10:21 AM CDT -----  Contact: Carol/Wife  Erasmo Dunbar  MRN: [id number]  : 1935  PCP: Hemal Varma  Home Phone      [phone]  Work Phone      Not on file.  Mobile          [phone]    MESSAGE:   Calling to speak to nurse about RX albiglutide (TANZEUM) 50 mg/0.5 mL PnIj.  [employer] insurance is not covering this medication, and patient is requesting that we call [employer] at [phone] for Pre-authorization.      Pharmacy:  [facility]    Phone:  [phone]

## 2017-08-15 NOTE — TELEPHONE ENCOUNTER
Yes no longer making it. I am sending trulicity instead. If not covered I need to know which GLP 1 agonist is covered thanks!

## 2017-08-21 RX ORDER — PEN NEEDLE, DIABETIC 30 GX3/16"
1 NEEDLE, DISPOSABLE MISCELLANEOUS DAILY
Qty: 100 EACH | Refills: 5 | Status: ON HOLD | OUTPATIENT
Start: 2017-08-21 | End: 2018-10-18 | Stop reason: HOSPADM

## 2017-08-21 NOTE — TELEPHONE ENCOUNTER
Pt needs needles and trulicity called in to [facility] looks like the trulicity was sent to the wrong pharmacy

## 2017-08-21 NOTE — TELEPHONE ENCOUNTER
----- Message from Irma Day sent at 2017  2:32 PM CDT -----  Contact: Carol/Wife  Erasmo Dunbar  MRN: [id number]  : 1935  PCP: Hemal Varma  Home Phone      [phone]  Work Phone      Not on file.  [employer]          [phone]    MESSAGE:   Would like to speak to nurse about meds.  Please call    Phone:  [phone]

## 2017-09-12 ENCOUNTER — OFFICE VISIT (OUTPATIENT)
Dept: NEUROLOGY | Facility: CLINIC | Age: 82
End: 2017-09-12
Payer: MEDICARE

## 2017-09-12 VITALS
SYSTOLIC BLOOD PRESSURE: 120 MMHG | DIASTOLIC BLOOD PRESSURE: 70 MMHG | WEIGHT: 208.31 LBS | HEART RATE: 72 BPM | HEIGHT: 66 IN | RESPIRATION RATE: 18 BRPM | BODY MASS INDEX: 33.48 KG/M2

## 2017-09-12 DIAGNOSIS — E11.42 DIABETIC POLYNEUROPATHY ASSOCIATED WITH TYPE 2 DIABETES MELLITUS: ICD-10-CM

## 2017-09-12 DIAGNOSIS — I48.0 PAROXYSMAL ATRIAL FIBRILLATION: ICD-10-CM

## 2017-09-12 DIAGNOSIS — G45.2 MULTIPLE AND BILATERAL PRECEREBRAL ARTERY SYNDROMES: Primary | ICD-10-CM

## 2017-09-12 PROCEDURE — 99214 OFFICE O/P EST MOD 30 MIN: CPT | Mod: S$GLB,,, | Performed by: PSYCHIATRY & NEUROLOGY

## 2017-09-12 PROCEDURE — 1159F MED LIST DOCD IN RCRD: CPT | Mod: S$GLB,,, | Performed by: PSYCHIATRY & NEUROLOGY

## 2017-09-12 PROCEDURE — 99999 PR PBB SHADOW E&M-EST. PATIENT-LVL III: CPT | Mod: PBBFAC,,, | Performed by: PSYCHIATRY & NEUROLOGY

## 2017-09-12 PROCEDURE — 1126F AMNT PAIN NOTED NONE PRSNT: CPT | Mod: S$GLB,,, | Performed by: PSYCHIATRY & NEUROLOGY

## 2017-09-12 PROCEDURE — 3008F BODY MASS INDEX DOCD: CPT | Mod: S$GLB,,, | Performed by: PSYCHIATRY & NEUROLOGY

## 2017-09-12 PROCEDURE — 3078F DIAST BP <80 MM HG: CPT | Mod: S$GLB,,, | Performed by: PSYCHIATRY & NEUROLOGY

## 2017-09-12 PROCEDURE — 3074F SYST BP LT 130 MM HG: CPT | Mod: S$GLB,,, | Performed by: PSYCHIATRY & NEUROLOGY

## 2017-09-12 RX ORDER — ASPIRIN 81 MG/1
81 TABLET ORAL DAILY
COMMUNITY
End: 2019-01-24

## 2017-09-12 NOTE — PROGRESS NOTES
"HPI:  Erasmo Dunbar is a 82 y.o. 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 Stable  Can't r/o subtle right hemiparesis.   Patient had TIA with aphasia in 7/2017 - he had nonsensical speech which lasted 5 minutes times 2 episodes  He was evaluated by vascular neurology [place] [facility] [place]  He is fully back to self after attack- wife said he had some mild abnormal speech several days after the event.   He was removed from Celebrex at that hospital and was changed to Eloquis from Plavix by cardiology as he was found to have afib (pacemaker was interrogated).  He continues to use gabapentin for neuropathy   Review of Systems   Constitutional: Negative for fever.   HENT: Negative for nosebleeds.    Eyes: Negative for double vision.   Respiratory: Negative for hemoptysis.    Cardiovascular: Negative for leg swelling.   Gastrointestinal: Negative for blood in stool.   Genitourinary: Negative for hematuria.   Musculoskeletal: Negative for falls.   Skin: Negative for rash.   Neurological: Negative for dizziness.   Psychiatric/Behavioral: The patient does not have insomnia.      Exam:  Gen Appearance, well developed/nourished in no apparent distress  CV: 2+ distal pulses with no edema or swelling  Neuro:  MS: Awake, alert,  Sustains attention. Recent/remote memory intact, Language is full to spontaneous speech/naming/comprehension. Fund of Knowledge is full  CN: Optic discs are flat with normal vasculature, PERRL, Extraoccular movements and visual fields are full. Normal facial sensation and strength, Hearing symmetric, Tongue and Palate are midline and strong. Shoulder Shrug symmetric and strong.  Motor: Normal bulk, tone, no abnormal movements.5/5 strength bilateral upper/lower extremities with 1+ reflexes in the arms, absent starting at the knees  Sensory:  Romberg negative and intact to " "temp and vibration  Cerebellar: Finger-noseRapid alternating movements intact  Gait: Normal stance, no ataxia      Imaging: CT head 12/9/14: Age advanced cerebral volume loss with mild chronic microvascular ischemic disease. There is no evidence for acute intracranial hemorrhage and no new parenchymal hypoattenuation is identified to suggest an acute infarction.  Age advanced cerebral volume loss with mild chronic microvascular ischemic disease. There is no evidence for acute intracranial hemorrhage and no new parenchymal hypoattenuation is identified to suggest an acute infarction.      CTA head 2017: No acute abnormality and no significant vascular stenoses or occlusions.    Moderate generalized cerebral volume loss with findings of chronic microvascular ischemic disease    Echo 2017: normal ef    Labs: 7/2017 A1C is less than 7 and LDL less than 70    Assessment/Plan: Erasmo Dunbar is a 82 y.o. 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 Stable      I recommend:     1. 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 No further work up needed unless new symptoms. Can't have MRI due to Pacemaker.   2. CTA 2017 unremarkable for TIA (or completed CVA) of fluent aphasia in 7/2017 and now is on NOAC for afib/ stroke prevention found near the time of the event.Also A1C is at goal and LDL is at goal for CVA prevention (continue  Current meds for this) and continue excellent control of HTN  3. Physical therapy evaluation did help his balance prior ([facility] balance center), and all symptoms of imbalanced are resolved   4. 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 Maintain good DM 2 control.  He uses gabapentin with good relief of neuropathy symptoms    RTC in 4 months        "

## 2017-10-10 DIAGNOSIS — I10 ESSENTIAL HYPERTENSION: ICD-10-CM

## 2017-10-10 DIAGNOSIS — G45.9 TRANSIENT CEREBRAL ISCHEMIA, UNSPECIFIED TYPE: ICD-10-CM

## 2017-10-11 RX ORDER — LISINOPRIL 10 MG/1
TABLET ORAL
Qty: 90 TABLET | Refills: 2 | Status: SHIPPED | OUTPATIENT
Start: 2017-10-11 | End: 2018-01-29 | Stop reason: SDUPTHER

## 2017-11-09 ENCOUNTER — OFFICE VISIT (OUTPATIENT)
Dept: URGENT CARE | Facility: CLINIC | Age: 82
End: 2017-11-09
Payer: MEDICARE

## 2017-11-09 VITALS
DIASTOLIC BLOOD PRESSURE: 64 MMHG | HEART RATE: 72 BPM | BODY MASS INDEX: 31.18 KG/M2 | HEIGHT: 66 IN | WEIGHT: 194 LBS | OXYGEN SATURATION: 98 % | TEMPERATURE: 98 F | RESPIRATION RATE: 18 BRPM | SYSTOLIC BLOOD PRESSURE: 121 MMHG

## 2017-11-09 DIAGNOSIS — R05.9 COUGH: ICD-10-CM

## 2017-11-09 DIAGNOSIS — J02.9 ACUTE VIRAL PHARYNGITIS: Primary | ICD-10-CM

## 2017-11-09 LAB
CTP QC/QA: YES
S PYO RRNA THROAT QL PROBE: NEGATIVE

## 2017-11-09 PROCEDURE — 96372 THER/PROPH/DIAG INJ SC/IM: CPT | Mod: S$GLB,,, | Performed by: EMERGENCY MEDICINE

## 2017-11-09 PROCEDURE — 87880 STREP A ASSAY W/OPTIC: CPT | Mod: QW,S$GLB,, | Performed by: EMERGENCY MEDICINE

## 2017-11-09 PROCEDURE — 99214 OFFICE O/P EST MOD 30 MIN: CPT | Mod: 25,S$GLB,, | Performed by: EMERGENCY MEDICINE

## 2017-11-09 RX ORDER — CODEINE PHOSPHATE AND GUAIFENESIN 10; 100 MG/5ML; MG/5ML
5-10 SOLUTION ORAL 3 TIMES DAILY PRN
Qty: 240 ML | Refills: 0 | Status: SHIPPED | OUTPATIENT
Start: 2017-11-09 | End: 2017-11-14

## 2017-11-09 RX ORDER — APIXABAN 5 MG/1
TABLET, FILM COATED ORAL
COMMUNITY
Start: 2017-08-05 | End: 2018-07-26 | Stop reason: SDUPTHER

## 2017-11-09 RX ORDER — BETAMETHASONE SODIUM PHOSPHATE AND BETAMETHASONE ACETATE 3; 3 MG/ML; MG/ML
6 INJECTION, SUSPENSION INTRA-ARTICULAR; INTRALESIONAL; INTRAMUSCULAR; SOFT TISSUE
Status: COMPLETED | OUTPATIENT
Start: 2017-11-09 | End: 2017-11-09

## 2017-11-09 RX ADMIN — BETAMETHASONE SODIUM PHOSPHATE AND BETAMETHASONE ACETATE 6 MG: 3; 3 INJECTION, SUSPENSION INTRA-ARTICULAR; INTRALESIONAL; INTRAMUSCULAR; SOFT TISSUE at 11:11

## 2017-11-09 NOTE — PROGRESS NOTES
"Subjective:       Patient ID: Erasmo Dunbar is a 82 y.o. male.    Vitals:  height is 5' 6" (1.676 m) and weight is 88 kg (194 lb). His oral temperature is 97.8 °F (36.6 °C). His blood pressure is 121/64 and his pulse is 72. His respiration is 18 and oxygen saturation is 98%.     Chief Complaint: Sinus Problem    Sore throat x 2 d, coughing a lot yesterday and has chest soreness from coughing, taking chronic doxycycline and recent levaquin for bladder/prostate issues, OK with steroid Im, NOC.      Sinus Problem   This is a new problem. The current episode started in the past 7 days. The problem is unchanged. There has been no fever. Associated symptoms include congestion, coughing and sinus pressure. Pertinent negatives include no chills, ear pain, headaches, hoarse voice, shortness of breath or sore throat. Past treatments include nothing.     Review of Systems   Constitution: Negative for chills, fever and malaise/fatigue.   HENT: Positive for congestion and sinus pressure. Negative for ear pain, hoarse voice and sore throat.    Eyes: Negative for discharge and redness.   Cardiovascular: Negative for chest pain, dyspnea on exertion and leg swelling.   Respiratory: Positive for cough. Negative for shortness of breath, sputum production and wheezing.    Musculoskeletal: Negative for myalgias.   Gastrointestinal: Negative for abdominal pain and nausea.   Neurological: Negative for headaches.       Objective:      Physical Exam   Constitutional: He is oriented to person, place, and time. He appears well-developed and well-nourished.   HENT:   Head: Normocephalic and atraumatic.   Right Ear: Tympanic membrane, external ear and ear canal normal.   Left Ear: Tympanic membrane, external ear and ear canal normal.   Mouth/Throat: Uvula is midline and mucous membranes are normal. Posterior oropharyngeal erythema present.   Left hearing aid present, removed for exam.   Eyes: EOM are normal. Pupils are equal, round, and " reactive to light.   Neck: Normal range of motion. Neck supple.   Cardiovascular: Normal rate, regular rhythm and normal heart sounds.    Pulmonary/Chest:   Bibasilar coarse BS posterior   Musculoskeletal: Normal range of motion.   Lymphadenopathy:     He has no cervical adenopathy.   Neurological: He is alert and oriented to person, place, and time.   Skin: Skin is warm and dry.   Psychiatric: He has a normal mood and affect. His behavior is normal.       Assessment:       1. Acute viral pharyngitis    2. Cough        Plan:         Acute viral pharyngitis  -     betamethasone acetate-betamethasone sodium phosphate injection 6 mg; Inject 1 mL (6 mg total) into the muscle one time.  -     POCT rapid strep A    Cough  -     betamethasone acetate-betamethasone sodium phosphate injection 6 mg; Inject 1 mL (6 mg total) into the muscle one time.  -     X-Ray Chest PA And Lateral; Future; Expected date: 11/09/2017  -     guaifenesin-codeine 100-10 mg/5 ml (TUSSI-ORGANIDIN NR)  mg/5 mL syrup; Take 5-10 mLs by mouth 3 (three) times daily as needed for Cough.  Dispense: 240 mL; Refill: 0      Polo Monk MD  Go to the Emergency Department for any problems

## 2017-11-09 NOTE — PATIENT INSTRUCTIONS
Cough, Chronic, Uncertain Cause (Adult)    Everyone has had a cough as part of the common cold, flu, or bronchitis. This kind of cough occurs along with an achy feeling, low-grade fever, nasal and sinus congestion, and a scratchy or sore throat. This usually gets better in 2 to 3 weeks. A cough that lasts longer than 3 weeks may be due to other causes.  If your cough does not improve over the next 2 weeks, further testing may be needed. Follow up with your healthcare provider as advised. Cough suppressants may be recommended. Based on your exam today, the exact cause of your cough is not certain. Below are some common causes for persistent cough.  Smokers cough  Smokers cough doesnt go away. If you continue to smoke, it only gets worse. The cough is from irritation in the air passages. Talk to your healthcare provider about quitting. Medicines or nicotine-replacement products, like gum or the patch, may make quitting easier.  Postnasal drip  A cough that is worse at night may be due to postnasal drip. Excess mucus in the nose drains from the back of your nose to your throat. This triggers the cough reflex. Postnasal drip may be due to a sinus infection or allergy. Common allergens include dust, tobacco smoke (both inhaled and secondhand smoke), environmental pollutants, pollen, mold, pets, cleaning agents, room deodorizers, and chemical fumes. Over-the-counter antihistamines or decongestants may be helpful for allergies. A sinus infection may requires antibiotic treatment. See your healthcare provider if symptoms continue.  Medicines  Certain prescribed medicines can cause a chronic cough in some people:  · ACE inhibitors for high blood pressure. These include benazepril, captopril, enalapril, fosinopril, lisinopril, quinapril, ramipril, and others.  · Beta-blockers for high blood pressure and other conditions. These include propranolol, atenolol, metoprolol, nadolol, and others.  Let your healthcare provider  know if you are taking any of these.  Asthma  Cough may be the only sign of mild asthma. You may have tests to find out if asthma is causing your cough. You may also take asthma medicine on a trial basis.  Acid reflux (heartburn, GERD)  The esophagus is the tube that carries food from the mouth to the stomach. A valve at its lower end prevents stomach acids from flowing upward. If this valve does not work properly, acid from the stomach enters the esophagus. This may cause a burning pain in the upper abdomen or lower chest, belching, or cough. Symptoms are often worse when lying flat. Avoid eating or drinking before bedtime. Try using extra pillows to raise your upper body, or place 4-inch blocks under the head of your bed. You may try an over-the-counter antacid or an acid-blocking medicine such as famotidine, cimetidine, ranitidine, esomeprazole, lansoprazole, or omeprazole. Stronger medicines for this condition can be prescribed by your healthcare provider.  Follow-up care  Follow up with your healthcare provider, or as advised, if your cough does not improve. Further testing may be needed.  Note: If an X-ray was taken, a specialist will review it. You will be notified of any new findings that may affect your care.  When to seek medical advice  Call your healthcare provider right away if any of these occur:  · Mild wheezing or difficulty breathing  · Fever of 100.4ºF (38ºC) or higher, or as directed by your healthcare provider  · Unexpected weight loss  · Coughing up large amounts of colored sputum  · Night sweats (sheets and pajamas get soaking wet)  Call 911, or get immediate medical care  Contact emergency services right away if any of these occur:  · Coughing up blood  · Moderate to severe trouble breathing or wheezing  Date Last Reviewed: 9/13/2015  © 1789-0954 [employer]. [address] [place] [address], [place] [address]. All rights reserved. This information is not intended as a substitute for  professional medical care. Always follow your healthcare professional's instructions.        Viral Pharyngitis (Sore Throat)    You (or your child, if your child is the patient) have pharyngitis (sore throat). This infection is caused by a virus. It can cause throat pain that is worse when swallowing, aching all over, headache, and fever. The infection may be spread by coughing, kissing, or touching others after touching your mouth or nose. Antibiotic medications do not work against viruses, so they are not used for treating this condition.  Home care  · If your symptoms are severe, rest at home. Return to work or school when you feel well enough.   · Drink plenty of fluids to avoid dehydration.  · For children: Use acetaminophen for fever, fussiness or discomfort. In infants over six months of age, you may use ibuprofen instead of acetaminophen. (NOTE: If your child has chronic liver or kidney disease or ever had a stomach ulcer or GI bleeding, talk with your doctor before using these medicines.) (NOTE: Aspirin should never be used in anyone under 18 years of age who is ill with a fever. It may cause severe liver damage.)   · For adults: You may use acetaminophen or ibuprofen to control pain or fever, unless another medicine was prescribed for this. (NOTE: If you have chronic liver or kidney disease or ever had a stomach ulcer or GI bleeding, talk with your doctor before using these medicines.)  · Throat lozenges or numbing throat sprays can help reduce pain. Gargling with warm salt water will also help reduce throat pain. For this, dissolve 1/2 teaspoon of salt in 1 glass of warm water. To help soothe a sore throat, children can sip on juice or a popsicle. Children 5 years and older can also suck on a lollipop or hard candy.  · Avoid salty or spicy foods, which can be irritating to the throat.  Follow-up care  Follow up with your healthcare provider or our staff if you are not improving over the next week.  When  to seek medical advice  Call your healthcare provider right away if any of these occur:  · Fever as directed by your doctor.  For children, seek care if:  ¨ Your child is of any age and has repeated fevers above 104°F (40°C).  ¨ Your child is younger than 2 years of age and has a fever of 100.4°F (38°C) that continues for more than 1 day.  ¨ Your child is 2 years old or older and has a fever of 100.4°F (38°C) that continues for more than 3 days.  · New or worsening ear pain, sinus pain, or headache  · Painful lumps in the back of neck  · Stiff neck  · Lymph nodes are getting larger  · Inability to swallow liquids, excessive drooling, or inability to open mouth wide due to throat pain  · Signs of dehydration (very dark urine or no urine, sunken eyes, dizziness)  · Trouble breathing or noisy breathing  · Muffled voice  · New rash  · Child appears to be getting sicker  Date Last Reviewed: 4/13/2015  © 8589-6534 [employer]. [address] [place] [address]. All rights reserved. This information is not intended as a substitute for professional medical care. Always follow your healthcare professional's instructions.        Viral Upper Respiratory Illness (Adult)  You have a viral upper respiratory illness (URI), which is another term for the common cold. This illness is contagious during the first few days. It is spread through the air by coughing and sneezing. It may also be spread by direct contact (touching the sick person and then touching your own eyes, nose, or mouth). Frequent handwashing will decrease risk of spread. Most viral illnesses go away within 7 to 10 days with rest and simple home remedies. Sometimes the illness may last for several weeks. Antibiotics will not kill a virus, and they are generally not prescribed for this condition.    Home care  · If symptoms are severe, rest at home for the first 2 to 3 days. When you resume activity, don't let yourself get too tired.  · Avoid  being exposed to cigarette smoke (yours or others).  · You may use acetaminophen or ibuprofen to control pain and fever, unless another medicine was prescribed. (Note: If you have chronic liver or kidney disease, have ever had a stomach ulcer or gastrointestinal bleeding, or are taking blood-thinning medicines, talk with your healthcare provider before using these medicines.) Aspirin should never be given to anyone under 18 years of age who is ill with a viral infection or fever. It may cause severe liver or brain damage.  · Your appetite may be poor, so a light diet is fine. Avoid dehydration by drinking 6 to 8 glasses of fluids per day (water, soft drinks, juices, tea, or soup). Extra fluids will help loosen secretions in the nose and lungs.  · Over-the-counter cold medicines will not shorten the length of time youre sick, but they may be helpful for the following symptoms: cough, sore throat, and nasal and sinus congestion. (Note: Do not use decongestants if you have high blood pressure.)  Follow-up care  Follow up with your healthcare provider, or as advised.  When to seek medical advice  Call your healthcare provider right away if any of these occur:  · Cough with lots of colored sputum (mucus)  · Severe headache; face, neck, or ear pain  · Difficulty swallowing due to throat pain  · Fever of 100.4°F (38°C)  Call 911, or get immediate medical care  Call emergency services right away if any of these occur:  · Chest pain, shortness of breath, wheezing, or difficulty breathing  · Coughing up blood  · Inability to swallow due to throat pain  Date Last Reviewed: 9/13/2015 © 2000-2017 [employer]. [address] [place] [address], [place] [address]. All rights reserved. This information is not intended as a substitute for professional medical care. Always follow your healthcare professional's instructions.      Call your PCP today for follow up next available date.    Polo Monk MD  Go to the Emergency  Department for any problems

## 2017-11-12 ENCOUNTER — TELEPHONE (OUTPATIENT)
Dept: URGENT CARE | Facility: CLINIC | Age: 82
End: 2017-11-12

## 2017-12-29 ENCOUNTER — TELEPHONE (OUTPATIENT)
Dept: FAMILY MEDICINE | Facility: CLINIC | Age: 82
End: 2017-12-29

## 2017-12-29 NOTE — TELEPHONE ENCOUNTER
----- Message from Krys Cameron sent at 2017  8:12 AM CST -----  Contact: TERESA / WIFE   Erasmo Dunbar  MRN: [id number]  : 1935  PCP: Hemal Varma  Home Phone      [phone]  Work Phone      Not on file.  Mobile          [phone]      MESSAGE: NEEDS TO CHANGE HIS PROSTATE MEDICATION. CURRENT IS NOT WORKING ANYMORE. PLEASE CALL     PHONE: [phone]    PHARMACY: [facility] IN [place]

## 2018-01-01 RX ORDER — ALFUZOSIN HYDROCHLORIDE 10 MG/1
10 TABLET, EXTENDED RELEASE ORAL
Qty: 90 TABLET | Refills: 1 | Status: SHIPPED | OUTPATIENT
Start: 2018-01-01 | End: 2018-01-29

## 2018-01-02 RX ORDER — CIPROFLOXACIN 500 MG/1
500 TABLET ORAL 2 TIMES DAILY
Qty: 60 TABLET | Refills: 0 | Status: SHIPPED | OUTPATIENT
Start: 2018-01-02 | End: 2018-07-26

## 2018-01-02 NOTE — TELEPHONE ENCOUNTER
Spoke to pt and pt wife. Stated that he is taking levaquin, and this medication by itself is not helping for infection, requesting either Flagyl or cipro to be sent in for pt. Says that flomax however is working just fine.    Pharmacy: [facility] in [place].

## 2018-01-29 ENCOUNTER — OFFICE VISIT (OUTPATIENT)
Dept: NEUROLOGY | Facility: CLINIC | Age: 83
End: 2018-01-29
Payer: MEDICARE

## 2018-01-29 ENCOUNTER — OFFICE VISIT (OUTPATIENT)
Dept: FAMILY MEDICINE | Facility: CLINIC | Age: 83
End: 2018-01-29
Payer: MEDICARE

## 2018-01-29 VITALS
BODY MASS INDEX: 32.24 KG/M2 | HEIGHT: 66 IN | SYSTOLIC BLOOD PRESSURE: 110 MMHG | DIASTOLIC BLOOD PRESSURE: 66 MMHG | RESPIRATION RATE: 18 BRPM | WEIGHT: 200.63 LBS | HEART RATE: 78 BPM

## 2018-01-29 VITALS
SYSTOLIC BLOOD PRESSURE: 120 MMHG | HEIGHT: 66 IN | HEART RATE: 72 BPM | DIASTOLIC BLOOD PRESSURE: 70 MMHG | BODY MASS INDEX: 32.06 KG/M2 | WEIGHT: 199.5 LBS | RESPIRATION RATE: 16 BRPM

## 2018-01-29 DIAGNOSIS — E11.22 TYPE 2 DIABETES MELLITUS WITH STAGE 3 CHRONIC KIDNEY DISEASE, WITH LONG-TERM CURRENT USE OF INSULIN: ICD-10-CM

## 2018-01-29 DIAGNOSIS — G45.2 MULTIPLE AND BILATERAL PRECEREBRAL ARTERY SYNDROMES: ICD-10-CM

## 2018-01-29 DIAGNOSIS — Z86.73 HISTORY OF TIA (TRANSIENT ISCHEMIC ATTACK): ICD-10-CM

## 2018-01-29 DIAGNOSIS — E78.2 MIXED HYPERLIPIDEMIA: ICD-10-CM

## 2018-01-29 DIAGNOSIS — R35.0 BENIGN PROSTATIC HYPERPLASIA WITH URINARY FREQUENCY: ICD-10-CM

## 2018-01-29 DIAGNOSIS — I48.0 PAROXYSMAL ATRIAL FIBRILLATION: ICD-10-CM

## 2018-01-29 DIAGNOSIS — Z79.4 TYPE 2 DIABETES MELLITUS WITH STAGE 3 CHRONIC KIDNEY DISEASE, WITH LONG-TERM CURRENT USE OF INSULIN: ICD-10-CM

## 2018-01-29 DIAGNOSIS — Z95.0 PACEMAKER: ICD-10-CM

## 2018-01-29 DIAGNOSIS — G93.0 CEREBELLAR CYST: ICD-10-CM

## 2018-01-29 DIAGNOSIS — N18.30 TYPE 2 DIABETES MELLITUS WITH STAGE 3 CHRONIC KIDNEY DISEASE, WITH LONG-TERM CURRENT USE OF INSULIN: ICD-10-CM

## 2018-01-29 DIAGNOSIS — E11.22 TYPE 2 DIABETES MELLITUS WITH CHRONIC KIDNEY DISEASE, WITH LONG-TERM CURRENT USE OF INSULIN, UNSPECIFIED CKD STAGE: Primary | ICD-10-CM

## 2018-01-29 DIAGNOSIS — E11.42 DIABETIC POLYNEUROPATHY ASSOCIATED WITH TYPE 2 DIABETES MELLITUS: Primary | ICD-10-CM

## 2018-01-29 DIAGNOSIS — Z79.4 TYPE 2 DIABETES MELLITUS WITH CHRONIC KIDNEY DISEASE, WITH LONG-TERM CURRENT USE OF INSULIN, UNSPECIFIED CKD STAGE: Primary | ICD-10-CM

## 2018-01-29 DIAGNOSIS — N40.1 BENIGN PROSTATIC HYPERPLASIA WITH URINARY FREQUENCY: ICD-10-CM

## 2018-01-29 DIAGNOSIS — E78.5 DYSLIPIDEMIA: ICD-10-CM

## 2018-01-29 DIAGNOSIS — F32.A DEPRESSION, UNSPECIFIED DEPRESSION TYPE: ICD-10-CM

## 2018-01-29 DIAGNOSIS — G45.9 TRANSIENT CEREBRAL ISCHEMIA, UNSPECIFIED TYPE: ICD-10-CM

## 2018-01-29 DIAGNOSIS — I10 ESSENTIAL HYPERTENSION: ICD-10-CM

## 2018-01-29 LAB
ALBUMIN SERPL BCP-MCNC: 3.9 G/DL
ALP SERPL-CCNC: 66 U/L
ALT SERPL W/O P-5'-P-CCNC: 16 U/L
ANION GAP SERPL CALC-SCNC: 9 MMOL/L
AST SERPL-CCNC: 17 U/L
BILIRUB SERPL-MCNC: 0.3 MG/DL
BUN SERPL-MCNC: 15 MG/DL
CALCIUM SERPL-MCNC: 10.1 MG/DL
CHLORIDE SERPL-SCNC: 98 MMOL/L
CO2 SERPL-SCNC: 32 MMOL/L
CREAT SERPL-MCNC: 0.9 MG/DL
EST. GFR  (AFRICAN AMERICAN): >60 ML/MIN/1.73 M^2
EST. GFR  (NON AFRICAN AMERICAN): >60 ML/MIN/1.73 M^2
GLUCOSE SERPL-MCNC: 98 MG/DL
POTASSIUM SERPL-SCNC: 4.5 MMOL/L
PROT SERPL-MCNC: 6.9 G/DL
SODIUM SERPL-SCNC: 139 MMOL/L

## 2018-01-29 PROCEDURE — 99214 OFFICE O/P EST MOD 30 MIN: CPT | Mod: S$GLB,,, | Performed by: PSYCHIATRY & NEUROLOGY

## 2018-01-29 PROCEDURE — 83036 HEMOGLOBIN GLYCOSYLATED A1C: CPT

## 2018-01-29 PROCEDURE — 99214 OFFICE O/P EST MOD 30 MIN: CPT | Mod: S$GLB,,, | Performed by: FAMILY MEDICINE

## 2018-01-29 PROCEDURE — 80053 COMPREHEN METABOLIC PANEL: CPT

## 2018-01-29 PROCEDURE — 99999 PR PBB SHADOW E&M-EST. PATIENT-LVL III: CPT | Mod: PBBFAC,,, | Performed by: FAMILY MEDICINE

## 2018-01-29 PROCEDURE — 36415 COLL VENOUS BLD VENIPUNCTURE: CPT | Mod: S$GLB,,, | Performed by: FAMILY MEDICINE

## 2018-01-29 PROCEDURE — 99999 PR PBB SHADOW E&M-EST. PATIENT-LVL III: CPT | Mod: PBBFAC,,, | Performed by: PSYCHIATRY & NEUROLOGY

## 2018-01-29 RX ORDER — METOPROLOL TARTRATE 50 MG/1
50 TABLET ORAL 2 TIMES DAILY
Qty: 180 TABLET | Refills: 1 | Status: SHIPPED | OUTPATIENT
Start: 2018-01-29 | End: 2018-07-10 | Stop reason: SDUPTHER

## 2018-01-29 RX ORDER — LISINOPRIL 10 MG/1
10 TABLET ORAL DAILY
Qty: 90 TABLET | Refills: 2 | Status: SHIPPED | OUTPATIENT
Start: 2018-01-29 | End: 2018-07-26 | Stop reason: SDUPTHER

## 2018-01-29 RX ORDER — GABAPENTIN 300 MG/1
CAPSULE ORAL
Qty: 360 CAPSULE | Refills: 3 | Status: SHIPPED | OUTPATIENT
Start: 2018-01-29 | End: 2018-07-26 | Stop reason: SDUPTHER

## 2018-01-29 RX ORDER — ALOGLIPTIN AND METFORMIN HYDROCHLORIDE 12.5; 1 MG/1; MG/1
1 TABLET, FILM COATED ORAL 2 TIMES DAILY
Qty: 180 TABLET | Refills: 5 | Status: SHIPPED | OUTPATIENT
Start: 2018-01-29 | End: 2018-07-26

## 2018-01-29 RX ORDER — TAMSULOSIN HYDROCHLORIDE 0.4 MG/1
1 CAPSULE ORAL DAILY
Qty: 90 CAPSULE | Refills: 1 | Status: SHIPPED | OUTPATIENT
Start: 2018-01-29 | End: 2018-04-16 | Stop reason: SDUPTHER

## 2018-01-29 RX ORDER — CITALOPRAM 20 MG/1
20 TABLET, FILM COATED ORAL DAILY
Qty: 90 TABLET | Refills: 1 | Status: SHIPPED | OUTPATIENT
Start: 2018-01-29 | End: 2018-07-26 | Stop reason: SDUPTHER

## 2018-01-29 RX ORDER — INSULIN GLARGINE 100 [IU]/ML
42 INJECTION, SOLUTION SUBCUTANEOUS NIGHTLY
Qty: 45 ML | Refills: 3 | Status: SHIPPED | OUTPATIENT
Start: 2018-01-29 | End: 2018-07-26 | Stop reason: SDUPTHER

## 2018-01-29 RX ORDER — LEVOFLOXACIN 500 MG/1
500 TABLET, FILM COATED ORAL
COMMUNITY
End: 2018-07-26 | Stop reason: SDUPTHER

## 2018-01-29 RX ORDER — ASCORBIC ACID 500 MG
500 TABLET ORAL DAILY
COMMUNITY
End: 2019-03-04

## 2018-01-29 RX ORDER — FUROSEMIDE 40 MG/1
40 TABLET ORAL DAILY
Qty: 90 TABLET | Refills: 1 | Status: SHIPPED | OUTPATIENT
Start: 2018-01-29 | End: 2018-07-26 | Stop reason: SDUPTHER

## 2018-01-29 RX ORDER — DOXYCYCLINE HYCLATE 100 MG
100 TABLET ORAL DAILY
Qty: 90 TABLET | Refills: 1 | Status: SHIPPED | OUTPATIENT
Start: 2018-01-29 | End: 2018-05-13 | Stop reason: SDUPTHER

## 2018-01-29 RX ORDER — SIMVASTATIN 40 MG/1
40 TABLET, FILM COATED ORAL NIGHTLY
Qty: 90 TABLET | Refills: 1 | Status: SHIPPED | OUTPATIENT
Start: 2018-01-29 | End: 2018-04-16 | Stop reason: SDUPTHER

## 2018-01-29 NOTE — PROGRESS NOTES
Subjective:       Patient ID: Erasmo Dunbar is a 82 y.o. male.    Chief Complaint: Follow-up (6 mo)    Pt is a 82 y.o. male who presents for evaluation and management of   Encounter Diagnoses   Name Primary?    Type 2 diabetes mellitus with chronic kidney disease, with long-term current use of insulin, unspecified CKD stage Yes    Essential hypertension     Mixed hyperlipidemia     Multiple and bilateral precerebral artery syndromes     Benign prostatic hyperplasia with urinary frequency     Pacemaker    .Pt had a TIA in the interim.  He is on eliquis, this is new. He says Dr. Greene told him he has afib, no [facility] records in chart   No events since 7/17 admission for TIA   Glucose was 95 couple of days ago he says. Checks it a couple times a week   Last A1c and lipid at goal     Doing well on current meds. Denies any side effects. Prevention is up to date.  Review of Systems   Constitutional: Negative for fever.   Respiratory: Negative for shortness of breath.    Cardiovascular: Negative for chest pain.   Gastrointestinal: Negative for anal bleeding and blood in stool.   Genitourinary: Negative for dysuria.   Musculoskeletal: Positive for arthralgias and back pain.   Neurological: Positive for dizziness. Negative for light-headedness.        Episodes of dizziness/lightheadedness if he gets up too quickly        Objective:      Physical Exam   Constitutional: He is oriented to person, place, and time. He appears well-developed and well-nourished.   HENT:   Head: Normocephalic and atraumatic.   Right Ear: External ear normal.   Left Ear: External ear normal.   Nose: Nose normal.   Mouth/Throat: Oropharynx is clear and moist.   Eyes: Conjunctivae and EOM are normal. Pupils are equal, round, and reactive to light. Right eye exhibits no discharge. Left eye exhibits no discharge. No scleral icterus.   Neck: Normal range of motion. Neck supple. No JVD present. No tracheal deviation present. No thyromegaly present.    Cardiovascular: Normal rate, regular rhythm, normal heart sounds and intact distal pulses.    No murmur heard.  Pulmonary/Chest: Effort normal and breath sounds normal. No respiratory distress. He has no wheezes. He has no rales. He exhibits no tenderness.   Abdominal: Soft. Bowel sounds are normal. He exhibits no distension and no mass. There is no tenderness. There is no rebound and no guarding.   Musculoskeletal: Normal range of motion. He exhibits no edema.   Right knee with midline scar      Lymphadenopathy:     He has no cervical adenopathy.   Neurological: He is alert and oriented to person, place, and time. He has normal reflexes. He displays normal reflexes. No cranial nerve deficit. He exhibits normal muscle tone. Coordination normal.   Skin: Skin is warm and dry.   Psychiatric: He has a normal mood and affect. His behavior is normal. Judgment and thought content normal.       Assessment:       1. Type 2 diabetes mellitus with chronic kidney disease, with long-term current use of insulin, unspecified CKD stage    2. Essential hypertension    3. Mixed hyperlipidemia    4. Multiple and bilateral precerebral artery syndromes    5. Benign prostatic hyperplasia with urinary frequency    6. Pacemaker        Plan:   Erasmo PEGUERO was seen today for follow-up.    Diagnoses and all orders for this visit:    Type 2 diabetes mellitus with chronic kidney disease, with long-term current use of insulin, unspecified CKD stage    Essential hypertension  -     metoprolol tartrate (LOPRESSOR) 50 MG tablet; Take 1 tablet (50 mg total) by mouth 2 (two) times daily.  -     lisinopril 10 MG tablet; Take 1 tablet (10 mg total) by mouth once daily.    Mixed hyperlipidemia    Multiple and bilateral precerebral artery syndromes    Benign prostatic hyperplasia with urinary frequency  -     tamsulosin (FLOMAX) 0.4 mg Cp24; Take 1 capsule (0.4 mg total) by mouth once daily.    Pacemaker    Transient cerebral ischemia, unspecified type  -      simvastatin (ZOCOR) 40 MG tablet; Take 1 tablet (40 mg total) by mouth every evening.  -     lisinopril 10 MG tablet; Take 1 tablet (10 mg total) by mouth once daily.    Dyslipidemia  -     simvastatin (ZOCOR) 40 MG tablet; Take 1 tablet (40 mg total) by mouth every evening.  -     Comprehensive metabolic panel; Future    Depression, unspecified depression type  -     citalopram (CELEXA) 20 MG tablet; Take 1 tablet (20 mg total) by mouth once daily.  -     Comprehensive metabolic panel; Future    Type 2 diabetes mellitus with stage 3 chronic kidney disease, with long-term current use of insulin  -     insulin glargine (LANTUS SOLOSTAR) 100 unit/mL (3 mL) InPn pen; Inject 42 Units into the skin every evening.  -     dulaglutide (TRULICITY) 1.5 mg/0.5 mL PnIj; Inject 1.5 mg into the skin once a week.  -     alogliptin-metformin (KAZANO) 12.5-1,000 mg Tab; Take 1 tablet by mouth 2 (two) times daily.  -     Comprehensive metabolic panel; Future  -     Hemoglobin A1c; Future    Other orders  -     doxycycline (VIBRA-TABS) 100 MG tablet; Take 1 tablet (100 mg total) by mouth once daily.  -     furosemide (LASIX) 40 MG tablet; Take 1 tablet (40 mg total) by mouth once daily.  -     gabapentin (NEURONTIN) 300 MG capsule; Take 2 capsules by mouth  two times daily    obtain records from [facility]     No Follow-up on file.

## 2018-01-29 NOTE — PROGRESS NOTES
"HPI:  Erasmo Dunbar is a 82 y.o. male with dizziness reported as early am "imbalance/ falling to the right" which is a chronic complaint (now resolved)  Cerebellar region arachnoid cyst noted by Head CT by prior imaging is stable. Had TIA vs CVA with fluent aphasia episode in 7/2017.   The patient has had no further spells of speech changes, no weakness, memory is good.  Rare imbalance he states for many years is unchanged.   No burning in the feet.    Review of Systems   Constitutional: Negative for fever.   HENT: Negative for nosebleeds.    Eyes: Negative for double vision.   Respiratory: Negative for hemoptysis.    Cardiovascular: Negative for leg swelling.   Gastrointestinal: Negative for blood in stool.   Genitourinary: Negative for hematuria.   Musculoskeletal: Positive for falls.        Has fallen a few times over the past year with tripping.    Skin: Negative for rash.   Neurological: Negative for sensory change, speech change and focal weakness.   Psychiatric/Behavioral: The patient does not have insomnia.      Exam:  Gen Appearance, well developed/nourished in no apparent distress  CV: 2+ distal pulses with no edema or swelling  Neuro:  MS: Awake, alert,  Sustains attention. Recent/remote memory intact, Language is full to spontaneous speech/naming/comprehension. Fund of Knowledge is full  CN: Optic discs are flat with normal vasculature, PERRL, Extraoccular movements and visual fields are full. Normal facial sensation and strength, Hearing symmetric, Tongue and Palate are midline and strong. Shoulder Shrug symmetric and strong.  Motor: Normal bulk, tone, no abnormal movements.5/5 strength bilateral upper/lower extremities with 1+ reflexes in the arms, absent starting at the knees  Sensory:   and intact to temp and vibration but romberg mildly positove  Cerebellar: Finger-noseRapid alternating movements intact  Gait: Normal stance, no ataxia      Imaging: CT head 12/9/14: Age advanced cerebral volume " "loss with mild chronic microvascular ischemic disease. There is no evidence for acute intracranial hemorrhage and no new parenchymal hypoattenuation is identified to suggest an acute infarction.  Age advanced cerebral volume loss with mild chronic microvascular ischemic disease. There is no evidence for acute intracranial hemorrhage and no new parenchymal hypoattenuation is identified to suggest an acute infarction.      CTA head 2017: No acute abnormality and no significant vascular stenoses or occlusions.    Moderate generalized cerebral volume loss with findings of chronic microvascular ischemic disease    Echo 2017: normal ef    Labs: 7/2017 A1C is less than 7 and LDL less than 70    Assessment/Plan: Erasmo Dunbar is a 82 y.o. male with dizziness reported as early am "imbalance/ falling to the right" which is a chronic complaint (now resolved)  Cerebellar region arachnoid cyst noted by Head CT by prior imaging is stable. Had TIA vs CVA with fluent aphasia episode in 7/2017.   I recommend:     1. 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 . Can't have MRI due to Pacemaker.   2. CTA 2017 unremarkable for TIA (or completed CVA) of fluent aphasia in 7/2017 and now is on NOAC for afib/ stroke prevention found near the time of the event. Goal A1C less than 7 and goal LDL less than 7 for stroke prevention. Continue  Current meds for this,  and continue excellent control of HTN  3. Physical therapy evaluation did help his balance prior ([facility] center), and all symptoms of imbalance are improved. Fall precautions reviewed.   4. He has a personal history of DM neuropathy and  uses gabapentin with good relief of neuropathy symptoms. Neuropathy also likely contributing to gait challenges.     RTC in 6 months        "

## 2018-01-30 LAB
ESTIMATED AVG GLUCOSE: 111 MG/DL
HBA1C MFR BLD HPLC: 5.5 %

## 2018-02-09 NOTE — TELEPHONE ENCOUNTER
----- Message from Irma Day sent at 2018 10:20 AM CST -----  Contact: Carol/Wife  Erasmo Dunbar  MRN: [id number]  : 1935  PCP: Hemal Varma  Home Phone      [phone]  Work Phone      Not on file.  Mobile          [phone]    MESSAGE:   Would like to speak to nurse about RX alogliptin-metformin (KAZANO) 12.5-1,000 mg Tab.  Insurance does not cover this and it was cost $800/month.  They can not afford this.  Please call.    Pharmacy:  [facility]    Phone:  [phone]

## 2018-02-11 RX ORDER — METFORMIN HYDROCHLORIDE 1000 MG/1
1000 TABLET ORAL 2 TIMES DAILY WITH MEALS
Qty: 180 TABLET | Refills: 3 | Status: SHIPPED | OUTPATIENT
Start: 2018-02-11 | End: 2018-07-26 | Stop reason: SDUPTHER

## 2018-02-12 RX ORDER — GLIPIZIDE AND METFORMIN HCL 5; 500 MG/1; MG/1
1 TABLET, FILM COATED ORAL
Qty: 180 TABLET | Refills: 3 | Status: SHIPPED | OUTPATIENT
Start: 2018-02-12 | End: 2018-07-26

## 2018-02-12 RX ORDER — METFORMIN HYDROCHLORIDE 1000 MG/1
1000 TABLET ORAL 2 TIMES DAILY WITH MEALS
Qty: 180 TABLET | Refills: 3 | Status: CANCELLED | OUTPATIENT
Start: 2018-02-12 | End: 2019-02-12

## 2018-02-12 NOTE — TELEPHONE ENCOUNTER
Ok. Done. I sent in glip/met 5/500 BID  However he can't skip any meals with this med or his sugar will drop like a rock.   Call me with BS log after 1 week of taking med. Thanks

## 2018-02-12 NOTE — TELEPHONE ENCOUNTER
Wife states the copay is $40. On the formulary there is Glipizide/Metformin 5/500 mg that is covered  And would you consider this medication as an alternative to the Januvia and Metformin. Please advise.  If not the Pharmacy is [facility]

## 2018-02-25 ENCOUNTER — OFFICE VISIT (OUTPATIENT)
Dept: URGENT CARE | Facility: CLINIC | Age: 83
End: 2018-02-25
Payer: MEDICARE

## 2018-02-25 VITALS
DIASTOLIC BLOOD PRESSURE: 62 MMHG | HEART RATE: 81 BPM | TEMPERATURE: 97 F | OXYGEN SATURATION: 97 % | BODY MASS INDEX: 31.98 KG/M2 | RESPIRATION RATE: 18 BRPM | HEIGHT: 66 IN | WEIGHT: 199 LBS | SYSTOLIC BLOOD PRESSURE: 111 MMHG

## 2018-02-25 DIAGNOSIS — M79.641 RIGHT HAND PAIN: Primary | ICD-10-CM

## 2018-02-25 PROCEDURE — 99214 OFFICE O/P EST MOD 30 MIN: CPT | Mod: S$GLB,,, | Performed by: NURSE PRACTITIONER

## 2018-02-25 PROCEDURE — 1159F MED LIST DOCD IN RCRD: CPT | Mod: S$GLB,,, | Performed by: NURSE PRACTITIONER

## 2018-02-25 PROCEDURE — 3008F BODY MASS INDEX DOCD: CPT | Mod: S$GLB,,, | Performed by: NURSE PRACTITIONER

## 2018-02-25 NOTE — PROGRESS NOTES
"Subjective:       Patient ID: Erasmo Dunbar is a 82 y.o. male.    Vitals:  height is 5' 6" (1.676 m) and weight is 90.3 kg (199 lb). His temperature is 97.2 °F (36.2 °C). His blood pressure is 111/62 and his pulse is 81. His respiration is 18 and oxygen saturation is 97%.     Chief Complaint: Hand Pain    Hand Pain    The incident occurred 12 to 24 hours ago. The injury mechanism was a fall. The pain is present in the right hand. The quality of the pain is described as aching. The pain does not radiate. The pain is mild. Pertinent negatives include no chest pain or numbness. The symptoms are aggravated by movement and lifting. He has tried acetaminophen for the symptoms. The treatment provided mild relief.     Review of Systems   Constitution: Negative for weakness and malaise/fatigue.   HENT: Negative for nosebleeds.    Cardiovascular: Negative for chest pain and syncope.   Respiratory: Negative for shortness of breath.    Musculoskeletal: Positive for joint pain and joint swelling. Negative for back pain and neck pain.   Gastrointestinal: Negative for abdominal pain.   Genitourinary: Negative for hematuria.   Neurological: Negative for dizziness and numbness.       Objective:      Physical Exam   Constitutional: He is oriented to person, place, and time. He appears well-developed and well-nourished. He is cooperative.  Non-toxic appearance. He does not appear ill. No distress.   HENT:   Head: Normocephalic and atraumatic.   Right Ear: Hearing, tympanic membrane and ear canal normal.   Left Ear: Hearing, tympanic membrane and ear canal normal.   Nose: No mucosal edema, rhinorrhea or nasal deformity. No epistaxis. Right sinus exhibits no maxillary sinus tenderness and no frontal sinus tenderness. Left sinus exhibits no maxillary sinus tenderness and no frontal sinus tenderness.   Mouth/Throat: Uvula is midline and mucous membranes are normal. No trismus in the jaw. Normal dentition. No uvula swelling. No " posterior oropharyngeal erythema.   Eyes: Conjunctivae and lids are normal. Right eye exhibits no discharge. Left eye exhibits no discharge. No scleral icterus.   Sclera clear bilat   Neck: Trachea normal, normal range of motion, full passive range of motion without pain and phonation normal. Neck supple.   Cardiovascular: Normal rate, regular rhythm and normal pulses.    Pulmonary/Chest: Effort normal and breath sounds normal. No respiratory distress.   Abdominal: Soft. Normal appearance and bowel sounds are normal. He exhibits no distension, no pulsatile midline mass and no mass. There is no tenderness.   Musculoskeletal: He exhibits edema and tenderness. He exhibits no deformity.        Hands:  Neurological: He is alert and oriented to person, place, and time. He exhibits normal muscle tone. Coordination normal.   Skin: Skin is warm, dry and intact. He is not diaphoretic. No pallor.   Psychiatric: He has a normal mood and affect. His speech is normal and behavior is normal. Judgment and thought content normal. Cognition and memory are normal.   Nursing note and vitals reviewed.      Assessment:       1. Right hand pain        Plan:         Right hand pain  -     X-Ray Hand Complete Right; Future; Expected date: 02/25/2018

## 2018-02-26 NOTE — PATIENT INSTRUCTIONS
Hand Contusion  You have a contusion. This is also called a bruise. There is swelling and some bleeding under the skin, but no broken bones. This injury generally takes a few days to a few weeks to heal.  During that time, the bruise will typically change in color from reddish, to purple-blue, to greenish-yellow, then to yellow-brown.  Home care  · Elevate the hand to reduce pain and swelling. As much as possible, sit or lie down with the hand raised about the level of your heart. This is especially important during the first 48 hours.  · Ice the hand to help reduce pain and swelling. Wrap a cold source (ice pack or ice cubes in a plastic bag) in a thin towel. Apply to the bruised area for 20 minutes every 1 to 2 hours the first day. Continue this 3 to 4 times a day until the pain and swelling goes away.  · Unless another medicine was prescribed, you can take acetaminophen, ibuprofen, or naproxen to control pain. (If you have chronic liver or kidney disease or ever had a stomach ulcer or gastrointestinal bleeding, talk with your doctor before using these medicines.)  Follow up  Follow up with your healthcare provider or our staff as advised. Call if you are not improving within 1 to 2 weeks.  When to seek medical advice   Call your healthcare provider right away if you have any of the following:  · Increased pain or swelling  · Arm becomes cold, blue, numb or tingly  · Signs of infection: Warmth, drainage, or increased redness or pain around the bruise  · Inability to move the injured hand   · Frequent bruising for unknown reasons  Date Last Reviewed: 2/1/2017 © 2000-2017 [employer]. [address] [place] [address], [place] [address]. All rights reserved. This information is not intended as a substitute for professional medical care. Always follow your healthcare professional's instructions.

## 2018-02-28 ENCOUNTER — TELEPHONE (OUTPATIENT)
Dept: URGENT CARE | Facility: CLINIC | Age: 83
End: 2018-02-28

## 2018-04-16 DIAGNOSIS — E78.5 DYSLIPIDEMIA: ICD-10-CM

## 2018-04-16 DIAGNOSIS — N40.1 BENIGN PROSTATIC HYPERPLASIA WITH URINARY FREQUENCY: ICD-10-CM

## 2018-04-16 DIAGNOSIS — G45.9 TRANSIENT CEREBRAL ISCHEMIA, UNSPECIFIED TYPE: ICD-10-CM

## 2018-04-16 DIAGNOSIS — R35.0 BENIGN PROSTATIC HYPERPLASIA WITH URINARY FREQUENCY: ICD-10-CM

## 2018-04-16 RX ORDER — SIMVASTATIN 40 MG/1
40 TABLET, FILM COATED ORAL NIGHTLY
Qty: 90 TABLET | Refills: 1 | Status: SHIPPED | OUTPATIENT
Start: 2018-04-16 | End: 2018-05-13 | Stop reason: SDUPTHER

## 2018-04-16 RX ORDER — TAMSULOSIN HYDROCHLORIDE 0.4 MG/1
1 CAPSULE ORAL DAILY
Qty: 90 CAPSULE | Refills: 1 | Status: SHIPPED | OUTPATIENT
Start: 2018-04-16 | End: 2018-05-13 | Stop reason: SDUPTHER

## 2018-05-13 DIAGNOSIS — N40.1 BENIGN PROSTATIC HYPERPLASIA WITH URINARY FREQUENCY: ICD-10-CM

## 2018-05-13 DIAGNOSIS — R35.0 BENIGN PROSTATIC HYPERPLASIA WITH URINARY FREQUENCY: ICD-10-CM

## 2018-05-13 DIAGNOSIS — E78.5 DYSLIPIDEMIA: ICD-10-CM

## 2018-05-13 DIAGNOSIS — G45.9 TRANSIENT CEREBRAL ISCHEMIA, UNSPECIFIED TYPE: ICD-10-CM

## 2018-05-13 RX ORDER — TAMSULOSIN HYDROCHLORIDE 0.4 MG/1
1 CAPSULE ORAL DAILY
Qty: 90 CAPSULE | Refills: 1 | Status: SHIPPED | OUTPATIENT
Start: 2018-05-13 | End: 2018-07-26 | Stop reason: SDUPTHER

## 2018-05-13 RX ORDER — DOXYCYCLINE HYCLATE 100 MG
100 TABLET ORAL DAILY
Qty: 90 TABLET | Refills: 1 | Status: SHIPPED | OUTPATIENT
Start: 2018-05-13 | End: 2018-07-10 | Stop reason: SDUPTHER

## 2018-05-13 RX ORDER — SIMVASTATIN 40 MG/1
40 TABLET, FILM COATED ORAL NIGHTLY
Qty: 90 TABLET | Refills: 1 | Status: SHIPPED | OUTPATIENT
Start: 2018-05-13 | End: 2018-07-26 | Stop reason: SDUPTHER

## 2018-07-10 DIAGNOSIS — I10 ESSENTIAL HYPERTENSION: ICD-10-CM

## 2018-07-11 RX ORDER — METOPROLOL TARTRATE 50 MG/1
50 TABLET ORAL 2 TIMES DAILY
Qty: 180 TABLET | Refills: 1 | Status: SHIPPED | OUTPATIENT
Start: 2018-07-11 | End: 2018-07-26 | Stop reason: SDUPTHER

## 2018-07-11 RX ORDER — DOXYCYCLINE HYCLATE 100 MG
100 TABLET ORAL DAILY
Qty: 90 TABLET | Refills: 1 | Status: SHIPPED | OUTPATIENT
Start: 2018-07-11 | End: 2018-07-26

## 2018-07-26 ENCOUNTER — OFFICE VISIT (OUTPATIENT)
Dept: FAMILY MEDICINE | Facility: CLINIC | Age: 83
End: 2018-07-26
Payer: MEDICARE

## 2018-07-26 VITALS
HEART RATE: 82 BPM | WEIGHT: 203.88 LBS | DIASTOLIC BLOOD PRESSURE: 64 MMHG | BODY MASS INDEX: 32.77 KG/M2 | OXYGEN SATURATION: 97 % | RESPIRATION RATE: 18 BRPM | TEMPERATURE: 96 F | SYSTOLIC BLOOD PRESSURE: 116 MMHG | HEIGHT: 66 IN

## 2018-07-26 DIAGNOSIS — N18.30 TYPE 2 DIABETES MELLITUS WITH STAGE 3 CHRONIC KIDNEY DISEASE, WITH LONG-TERM CURRENT USE OF INSULIN: ICD-10-CM

## 2018-07-26 DIAGNOSIS — N40.1 BENIGN PROSTATIC HYPERPLASIA WITH URINARY FREQUENCY: ICD-10-CM

## 2018-07-26 DIAGNOSIS — E11.22 TYPE 2 DIABETES MELLITUS WITH STAGE 3 CHRONIC KIDNEY DISEASE, WITH LONG-TERM CURRENT USE OF INSULIN: ICD-10-CM

## 2018-07-26 DIAGNOSIS — Z12.5 ENCOUNTER FOR SCREENING FOR MALIGNANT NEOPLASM OF PROSTATE: ICD-10-CM

## 2018-07-26 DIAGNOSIS — I10 ESSENTIAL HYPERTENSION: ICD-10-CM

## 2018-07-26 DIAGNOSIS — E78.2 MIXED HYPERLIPIDEMIA: ICD-10-CM

## 2018-07-26 DIAGNOSIS — Z79.4 TYPE 2 DIABETES MELLITUS WITH STAGE 3 CHRONIC KIDNEY DISEASE, WITH LONG-TERM CURRENT USE OF INSULIN: ICD-10-CM

## 2018-07-26 DIAGNOSIS — R35.0 BENIGN PROSTATIC HYPERPLASIA WITH URINARY FREQUENCY: ICD-10-CM

## 2018-07-26 DIAGNOSIS — E11.22 TYPE 2 DIABETES MELLITUS WITH CHRONIC KIDNEY DISEASE, WITH LONG-TERM CURRENT USE OF INSULIN, UNSPECIFIED CKD STAGE: Primary | ICD-10-CM

## 2018-07-26 DIAGNOSIS — E55.9 VITAMIN D DEFICIENCY DISEASE: ICD-10-CM

## 2018-07-26 DIAGNOSIS — I48.0 PAROXYSMAL ATRIAL FIBRILLATION: ICD-10-CM

## 2018-07-26 DIAGNOSIS — Z79.4 TYPE 2 DIABETES MELLITUS WITH CHRONIC KIDNEY DISEASE, WITH LONG-TERM CURRENT USE OF INSULIN, UNSPECIFIED CKD STAGE: Primary | ICD-10-CM

## 2018-07-26 DIAGNOSIS — E78.5 DYSLIPIDEMIA: ICD-10-CM

## 2018-07-26 DIAGNOSIS — G45.9 TRANSIENT CEREBRAL ISCHEMIA, UNSPECIFIED TYPE: ICD-10-CM

## 2018-07-26 DIAGNOSIS — F32.A DEPRESSION, UNSPECIFIED DEPRESSION TYPE: ICD-10-CM

## 2018-07-26 PROCEDURE — 99214 OFFICE O/P EST MOD 30 MIN: CPT | Mod: S$GLB,,, | Performed by: FAMILY MEDICINE

## 2018-07-26 PROCEDURE — 3078F DIAST BP <80 MM HG: CPT | Mod: CPTII,S$GLB,, | Performed by: FAMILY MEDICINE

## 2018-07-26 PROCEDURE — 99999 PR PBB SHADOW E&M-EST. PATIENT-LVL III: CPT | Mod: PBBFAC,,, | Performed by: FAMILY MEDICINE

## 2018-07-26 PROCEDURE — 3074F SYST BP LT 130 MM HG: CPT | Mod: CPTII,S$GLB,, | Performed by: FAMILY MEDICINE

## 2018-07-26 RX ORDER — INSULIN GLARGINE 100 [IU]/ML
40 INJECTION, SOLUTION SUBCUTANEOUS NIGHTLY
Qty: 15 SYRINGE | Refills: 3 | Status: SHIPPED | OUTPATIENT
Start: 2018-07-26 | End: 2019-01-24 | Stop reason: SDUPTHER

## 2018-07-26 RX ORDER — FUROSEMIDE 40 MG/1
40 TABLET ORAL DAILY
Qty: 90 TABLET | Refills: 1 | Status: SHIPPED | OUTPATIENT
Start: 2018-07-26 | End: 2019-01-24 | Stop reason: SDUPTHER

## 2018-07-26 RX ORDER — METOPROLOL TARTRATE 50 MG/1
50 TABLET ORAL 2 TIMES DAILY
Qty: 180 TABLET | Refills: 1 | Status: SHIPPED | OUTPATIENT
Start: 2018-07-26 | End: 2019-01-24 | Stop reason: SDUPTHER

## 2018-07-26 RX ORDER — GABAPENTIN 300 MG/1
CAPSULE ORAL
Qty: 360 CAPSULE | Refills: 3 | Status: SHIPPED | OUTPATIENT
Start: 2018-07-26 | End: 2019-01-24

## 2018-07-26 RX ORDER — LISINOPRIL 10 MG/1
10 TABLET ORAL DAILY
Qty: 90 TABLET | Refills: 2 | Status: ON HOLD | OUTPATIENT
Start: 2018-07-26 | End: 2018-10-18 | Stop reason: SDUPTHER

## 2018-07-26 RX ORDER — APIXABAN 5 MG/1
5 TABLET, FILM COATED ORAL 2 TIMES DAILY
Qty: 180 TABLET | Refills: 1 | Status: SHIPPED | OUTPATIENT
Start: 2018-07-26 | End: 2019-01-24 | Stop reason: SDUPTHER

## 2018-07-26 RX ORDER — CITALOPRAM 20 MG/1
20 TABLET, FILM COATED ORAL DAILY
Qty: 90 TABLET | Refills: 1 | Status: SHIPPED | OUTPATIENT
Start: 2018-07-26 | End: 2019-01-24 | Stop reason: SDUPTHER

## 2018-07-26 RX ORDER — SIMVASTATIN 40 MG/1
40 TABLET, FILM COATED ORAL NIGHTLY
Qty: 90 TABLET | Refills: 1 | Status: ON HOLD | OUTPATIENT
Start: 2018-07-26 | End: 2018-10-18 | Stop reason: HOSPADM

## 2018-07-26 RX ORDER — LEVOFLOXACIN 500 MG/1
500 TABLET, FILM COATED ORAL
Qty: 90 TABLET | Refills: 0 | Status: ON HOLD | OUTPATIENT
Start: 2018-07-26 | End: 2018-10-18 | Stop reason: HOSPADM

## 2018-07-26 RX ORDER — METFORMIN HYDROCHLORIDE 1000 MG/1
1000 TABLET ORAL 2 TIMES DAILY WITH MEALS
Qty: 180 TABLET | Refills: 3 | Status: SHIPPED | OUTPATIENT
Start: 2018-07-26 | End: 2018-07-30

## 2018-07-26 RX ORDER — TAMSULOSIN HYDROCHLORIDE 0.4 MG/1
1 CAPSULE ORAL DAILY
Qty: 90 CAPSULE | Refills: 1 | Status: SHIPPED | OUTPATIENT
Start: 2018-07-26 | End: 2019-01-24 | Stop reason: SDUPTHER

## 2018-07-26 NOTE — PROGRESS NOTES
Subjective:       Patient ID: Erasmo Dunbar is a 82 y.o. male.    Chief Complaint: Follow-up (6 month)    Pt is a 82 y.o. male who presents for evaluation and management of   Encounter Diagnoses   Name Primary?    Type 2 diabetes mellitus with chronic kidney disease, with long-term current use of insulin, unspecified CKD stage Yes    Mixed hyperlipidemia     Essential hypertension     Benign prostatic hyperplasia with urinary frequency     Transient cerebral ischemia, unspecified type     Dyslipidemia     Type 2 diabetes mellitus with stage 3 chronic kidney disease, with long-term current use of insulin     Depression, unspecified depression type     Vitamin D deficiency disease     Paroxysmal atrial fibrillation     Encounter for screening for malignant neoplasm of prostate     .  Doing well on current meds. Denies any side effects. Prevention is up to date.    Review of Systems   Constitutional: Negative for fever.   Respiratory: Negative for shortness of breath.    Cardiovascular: Negative for chest pain.   Gastrointestinal: Negative for anal bleeding and blood in stool.   Genitourinary: Negative for dysuria.   Neurological: Negative for dizziness and light-headedness.        DPN       Objective:      Physical Exam   Constitutional: He is oriented to person, place, and time. He appears well-developed and well-nourished.   HENT:   Head: Normocephalic and atraumatic.   Right Ear: External ear normal.   Left Ear: External ear normal.   Nose: Nose normal.   Mouth/Throat: Oropharynx is clear and moist.   Eyes: Conjunctivae and EOM are normal. Pupils are equal, round, and reactive to light. Right eye exhibits no discharge. Left eye exhibits no discharge. No scleral icterus.   Neck: Normal range of motion. Neck supple. No JVD present. No tracheal deviation present. No thyromegaly present.   Cardiovascular: Normal rate, regular rhythm, normal heart sounds and intact distal pulses.    No murmur  heard.  Pulmonary/Chest: Effort normal and breath sounds normal. No respiratory distress. He has no wheezes. He has no rales. He exhibits no tenderness.   Abdominal: Soft. Bowel sounds are normal. He exhibits no distension and no mass. There is no tenderness. There is no rebound and no guarding.   Genitourinary:   Genitourinary Comments: Refuses prostate exam    Musculoskeletal: Normal range of motion.   Lymphadenopathy:     He has no cervical adenopathy.   Neurological: He is alert and oriented to person, place, and time. He has normal reflexes. He displays normal reflexes. No cranial nerve deficit. He exhibits normal muscle tone. Coordination normal.   Skin: Skin is warm and dry.   Psychiatric: He has a normal mood and affect. His behavior is normal. Judgment and thought content normal.       Assessment:       1. Type 2 diabetes mellitus with chronic kidney disease, with long-term current use of insulin, unspecified CKD stage    2. Mixed hyperlipidemia    3. Essential hypertension    4. Benign prostatic hyperplasia with urinary frequency    5. Transient cerebral ischemia, unspecified type    6. Dyslipidemia    7. Type 2 diabetes mellitus with stage 3 chronic kidney disease, with long-term current use of insulin    8. Depression, unspecified depression type    9. Vitamin D deficiency disease    10. Paroxysmal atrial fibrillation    11. Encounter for screening for malignant neoplasm of prostate         Plan:   Erasmo PEGUERO was seen today for follow-up.    Diagnoses and all orders for this visit:    Type 2 diabetes mellitus with chronic kidney disease, with long-term current use of insulin, unspecified CKD stage  -     Comprehensive metabolic panel; Future  -     Hemoglobin A1c; Future  -     Lipid panel; Future  -     metFORMIN (GLUCOPHAGE) 1000 MG tablet; Take 1 tablet (1,000 mg total) by mouth 2 (two) times daily with meals.    Mixed hyperlipidemia  -     TSH; Future    Essential hypertension  -     TSH; Future  -      CBC auto differential; Future  -     metoprolol tartrate (LOPRESSOR) 50 MG tablet; Take 1 tablet (50 mg total) by mouth 2 (two) times daily.  -     lisinopril 10 MG tablet; Take 1 tablet (10 mg total) by mouth once daily.    Benign prostatic hyperplasia with urinary frequency  -     PSA, Screening; Future  -     tamsulosin (FLOMAX) 0.4 mg Cap; Take 1 capsule (0.4 mg total) by mouth once daily.    Transient cerebral ischemia, unspecified type  -     simvastatin (ZOCOR) 40 MG tablet; Take 1 tablet (40 mg total) by mouth every evening.  -     lisinopril 10 MG tablet; Take 1 tablet (10 mg total) by mouth once daily.    Dyslipidemia  -     TSH; Future  -     simvastatin (ZOCOR) 40 MG tablet; Take 1 tablet (40 mg total) by mouth every evening.    Type 2 diabetes mellitus with stage 3 chronic kidney disease, with long-term current use of insulin  -     insulin glargine (LANTUS SOLOSTAR U-100 INSULIN) 100 unit/mL (3 mL) InPn pen; Inject 40 Units into the skin every evening.  -     dulaglutide (TRULICITY) 1.5 mg/0.5 mL PnIj; Inject 1.5 mg into the skin once a week.    Depression, unspecified depression type  -     citalopram (CELEXA) 20 MG tablet; Take 1 tablet (20 mg total) by mouth once daily.    Vitamin D deficiency disease  -     Vitamin D; Future    Paroxysmal atrial fibrillation  -     ELIQUIS 5 mg Tab; Take 1 tablet (5 mg total) by mouth 2 (two) times daily.    Encounter for screening for malignant neoplasm of prostate   -     PSA, Screening; Future    Other orders  -     SITagliptin (JANUVIA) 50 MG Tab; Take 2 tablets (100 mg total) by mouth once daily.  -     levoFLOXacin (LEVAQUIN) 500 MG tablet; Take 1 tablet (500 mg total) by mouth as needed.  -     gabapentin (NEURONTIN) 300 MG capsule; Take 2 capsules by mouth  two times daily  -     furosemide (LASIX) 40 MG tablet; Take 1 tablet (40 mg total) by mouth once daily.      Problem List Items Addressed This Visit     BPH (benign prostatic hyperplasia)    Relevant  Medications    tamsulosin (FLOMAX) 0.4 mg Cap    Other Relevant Orders    PSA, Screening    Diabetes mellitus, type 2 - Primary    Relevant Medications    metFORMIN (GLUCOPHAGE) 1000 MG tablet    insulin glargine (LANTUS SOLOSTAR U-100 INSULIN) 100 unit/mL (3 mL) InPn pen    dulaglutide (TRULICITY) 1.5 mg/0.5 mL PnIj    Other Relevant Orders    Comprehensive metabolic panel    Hemoglobin A1c    Lipid panel    Hyperlipidemia    Relevant Orders    TSH    Hypertension    Relevant Medications    metoprolol tartrate (LOPRESSOR) 50 MG tablet    lisinopril 10 MG tablet    Other Relevant Orders    TSH    CBC auto differential    Transient ischemic attack (TIA)    Relevant Medications    simvastatin (ZOCOR) 40 MG tablet    lisinopril 10 MG tablet      Other Visit Diagnoses     Dyslipidemia        Relevant Medications    simvastatin (ZOCOR) 40 MG tablet    Other Relevant Orders    TSH    Depression, unspecified depression type        Relevant Medications    citalopram (CELEXA) 20 MG tablet    Vitamin D deficiency disease        Relevant Orders    Vitamin D    Paroxysmal atrial fibrillation        Relevant Medications    ELIQUIS 5 mg Tab    Encounter for screening for malignant neoplasm of prostate         Relevant Orders    PSA, Screening        He refuses prostate exam     No Follow-up on file.

## 2018-07-29 ENCOUNTER — LAB VISIT (OUTPATIENT)
Dept: LAB | Facility: HOSPITAL | Age: 83
End: 2018-07-29
Attending: FAMILY MEDICINE
Payer: MEDICARE

## 2018-07-29 DIAGNOSIS — N40.1 BENIGN PROSTATIC HYPERPLASIA WITH URINARY FREQUENCY: ICD-10-CM

## 2018-07-29 DIAGNOSIS — Z79.4 TYPE 2 DIABETES MELLITUS WITH CHRONIC KIDNEY DISEASE, WITH LONG-TERM CURRENT USE OF INSULIN, UNSPECIFIED CKD STAGE: ICD-10-CM

## 2018-07-29 DIAGNOSIS — Z12.5 ENCOUNTER FOR SCREENING FOR MALIGNANT NEOPLASM OF PROSTATE: ICD-10-CM

## 2018-07-29 DIAGNOSIS — E11.22 TYPE 2 DIABETES MELLITUS WITH CHRONIC KIDNEY DISEASE, WITH LONG-TERM CURRENT USE OF INSULIN, UNSPECIFIED CKD STAGE: ICD-10-CM

## 2018-07-29 DIAGNOSIS — R35.0 BENIGN PROSTATIC HYPERPLASIA WITH URINARY FREQUENCY: ICD-10-CM

## 2018-07-29 DIAGNOSIS — I10 ESSENTIAL HYPERTENSION: ICD-10-CM

## 2018-07-29 DIAGNOSIS — E55.9 VITAMIN D DEFICIENCY DISEASE: ICD-10-CM

## 2018-07-29 DIAGNOSIS — E78.2 MIXED HYPERLIPIDEMIA: ICD-10-CM

## 2018-07-29 DIAGNOSIS — E11.9 TYPE 2 DIABETES MELLITUS WITHOUT COMPLICATION: ICD-10-CM

## 2018-07-29 DIAGNOSIS — E78.5 DYSLIPIDEMIA: ICD-10-CM

## 2018-07-29 LAB
25(OH)D3+25(OH)D2 SERPL-MCNC: 24 NG/ML
ALBUMIN SERPL BCP-MCNC: 3.6 G/DL
ALBUMIN/CREAT UR: 10 UG/MG
ALP SERPL-CCNC: 69 U/L
ALT SERPL W/O P-5'-P-CCNC: 17 U/L
ANION GAP SERPL CALC-SCNC: 8 MMOL/L
AST SERPL-CCNC: 15 U/L
BASOPHILS # BLD AUTO: 0.06 K/UL
BASOPHILS NFR BLD: 0.5 %
BILIRUB SERPL-MCNC: 0.6 MG/DL
BUN SERPL-MCNC: 23 MG/DL
CALCIUM SERPL-MCNC: 9.4 MG/DL
CHLORIDE SERPL-SCNC: 105 MMOL/L
CHOLEST SERPL-MCNC: 126 MG/DL
CHOLEST/HDLC SERPL: 3 {RATIO}
CO2 SERPL-SCNC: 27 MMOL/L
COMPLEXED PSA SERPL-MCNC: 9.6 NG/ML
CREAT SERPL-MCNC: 0.9 MG/DL
CREAT UR-MCNC: 100.4 MG/DL
DIFFERENTIAL METHOD: ABNORMAL
EOSINOPHIL # BLD AUTO: 1.7 K/UL
EOSINOPHIL NFR BLD: 13.1 %
ERYTHROCYTE [DISTWIDTH] IN BLOOD BY AUTOMATED COUNT: 14.4 %
EST. GFR  (AFRICAN AMERICAN): >60 ML/MIN/1.73 M^2
EST. GFR  (NON AFRICAN AMERICAN): >60 ML/MIN/1.73 M^2
ESTIMATED AVG GLUCOSE: 120 MG/DL
GLUCOSE SERPL-MCNC: 92 MG/DL
HBA1C MFR BLD HPLC: 5.8 %
HCT VFR BLD AUTO: 38.2 %
HDLC SERPL-MCNC: 42 MG/DL
HDLC SERPL: 33.3 %
HGB BLD-MCNC: 12.1 G/DL
LDLC SERPL CALC-MCNC: 67.2 MG/DL
LYMPHOCYTES # BLD AUTO: 3.5 K/UL
LYMPHOCYTES NFR BLD: 27.7 %
MCH RBC QN AUTO: 28.9 PG
MCHC RBC AUTO-ENTMCNC: 31.7 G/DL
MCV RBC AUTO: 91 FL
MICROALBUMIN UR DL<=1MG/L-MCNC: 10 UG/ML
MONOCYTES # BLD AUTO: 1.2 K/UL
MONOCYTES NFR BLD: 9.1 %
NEUTROPHILS # BLD AUTO: 6.3 K/UL
NEUTROPHILS NFR BLD: 49.6 %
NONHDLC SERPL-MCNC: 84 MG/DL
PLATELET # BLD AUTO: 330 K/UL
PMV BLD AUTO: 9.4 FL
POTASSIUM SERPL-SCNC: 4.8 MMOL/L
PROT SERPL-MCNC: 6.5 G/DL
RBC # BLD AUTO: 4.19 M/UL
SODIUM SERPL-SCNC: 140 MMOL/L
TRIGL SERPL-MCNC: 84 MG/DL
TSH SERPL DL<=0.005 MIU/L-ACNC: 0.97 UIU/ML
WBC # BLD AUTO: 12.65 K/UL

## 2018-07-29 PROCEDURE — 84153 ASSAY OF PSA TOTAL: CPT

## 2018-07-29 PROCEDURE — 82043 UR ALBUMIN QUANTITATIVE: CPT

## 2018-07-29 PROCEDURE — 83036 HEMOGLOBIN GLYCOSYLATED A1C: CPT

## 2018-07-29 PROCEDURE — 82306 VITAMIN D 25 HYDROXY: CPT

## 2018-07-29 PROCEDURE — 80053 COMPREHEN METABOLIC PANEL: CPT

## 2018-07-29 PROCEDURE — 84443 ASSAY THYROID STIM HORMONE: CPT

## 2018-07-29 PROCEDURE — 85025 COMPLETE CBC W/AUTO DIFF WBC: CPT

## 2018-07-29 PROCEDURE — 36415 COLL VENOUS BLD VENIPUNCTURE: CPT

## 2018-07-29 PROCEDURE — 80061 LIPID PANEL: CPT

## 2018-07-29 NOTE — PROGRESS NOTES
His PSA is elevated. I know he has chronic prostate infections, which can do this. But when was his last PSA? Did he have one done with his urologist recently??? I need something to compare this latest PSA with. Also he needs a prostate exam. I know he refused it, but with an elevated PSA, he has to have it checked  Otherwise, his Labs look good. No med changes. Continue same

## 2018-07-30 ENCOUNTER — TELEPHONE (OUTPATIENT)
Dept: FAMILY MEDICINE | Facility: CLINIC | Age: 83
End: 2018-07-30

## 2018-07-30 ENCOUNTER — OFFICE VISIT (OUTPATIENT)
Dept: NEUROLOGY | Facility: CLINIC | Age: 83
End: 2018-07-30
Payer: MEDICARE

## 2018-07-30 VITALS
HEIGHT: 66 IN | SYSTOLIC BLOOD PRESSURE: 106 MMHG | BODY MASS INDEX: 33.34 KG/M2 | DIASTOLIC BLOOD PRESSURE: 60 MMHG | WEIGHT: 207.44 LBS | HEART RATE: 64 BPM | RESPIRATION RATE: 16 BRPM

## 2018-07-30 DIAGNOSIS — Z86.73 HISTORY OF TIA (TRANSIENT ISCHEMIC ATTACK): ICD-10-CM

## 2018-07-30 DIAGNOSIS — I48.0 PAROXYSMAL ATRIAL FIBRILLATION: ICD-10-CM

## 2018-07-30 DIAGNOSIS — G93.0 CEREBELLAR CYST: ICD-10-CM

## 2018-07-30 DIAGNOSIS — E11.42 DIABETIC POLYNEUROPATHY ASSOCIATED WITH TYPE 2 DIABETES MELLITUS: Primary | ICD-10-CM

## 2018-07-30 PROCEDURE — 99214 OFFICE O/P EST MOD 30 MIN: CPT | Mod: S$GLB,,, | Performed by: PSYCHIATRY & NEUROLOGY

## 2018-07-30 PROCEDURE — 3074F SYST BP LT 130 MM HG: CPT | Mod: CPTII,S$GLB,, | Performed by: PSYCHIATRY & NEUROLOGY

## 2018-07-30 PROCEDURE — 3078F DIAST BP <80 MM HG: CPT | Mod: CPTII,S$GLB,, | Performed by: PSYCHIATRY & NEUROLOGY

## 2018-07-30 PROCEDURE — 99999 PR PBB SHADOW E&M-EST. PATIENT-LVL III: CPT | Mod: PBBFAC,,, | Performed by: PSYCHIATRY & NEUROLOGY

## 2018-07-30 RX ORDER — GLIPIZIDE AND METFORMIN HCL 5; 500 MG/1; MG/1
1 TABLET, FILM COATED ORAL
COMMUNITY
End: 2018-08-07

## 2018-07-30 NOTE — TELEPHONE ENCOUNTER
Spoke to patient, states he has seen a urologist several years ago. States the last PSA level was with Dr. Varma several years ago.     Noted last PSA 11.2 collected on 5/10/2014.    Patient states he will come to have prostate check but will come when he is ready. States he will call to schedule. He is concerned, states the last time he had checked he ended up in hospital.

## 2018-07-30 NOTE — TELEPHONE ENCOUNTER
----- Message from Hemal Varma MD sent at 7/29/2018  5:22 PM CDT -----  His PSA is elevated. I know he has chronic prostate infections, which can do this. But when was his last PSA? Did he have one done with his urologist recently??? I need something to compare this latest PSA with. Also he needs a prostate exam. I know he refused it, but with an elevated PSA, he has to have it checked  Otherwise, his Labs look good. No med changes. Continue same

## 2018-07-30 NOTE — PROGRESS NOTES
"HPI:   Erasmo Dunbar is a 83 y.o. male with dizziness reported as early am "imbalance/ falling to the right" which is a chronic complaint (now resolved)  Cerebellar region arachnoid cyst noted by Head CT by prior imaging is stable. Had TIA vs CVA with fluent aphasia episode in 7/2017.     In good health currently.  NO balance difficulty.  No speech problems.  No dizziness.  NO weakness or numbness on one side  He does have chronic numbness in the feet.  No pain in the feet    Review of Systems   Constitutional: Negative for fever.   HENT: Negative for nosebleeds.    Eyes: Negative for double vision.   Respiratory: Negative for hemoptysis.    Cardiovascular: Negative for leg swelling.   Gastrointestinal: Negative for blood in stool.   Genitourinary: Negative for hematuria.   Musculoskeletal: Negative for falls.   Skin: Negative for rash.   Neurological: Negative for sensory change, speech change and focal weakness.   Psychiatric/Behavioral: The patient does not have insomnia.      Exam:  Gen Appearance, well developed/nourished in no apparent distress  CV: 2+ distal pulses with no edema or swelling  Neuro:  MS: Awake, alert,  Sustains attention. Recent/remote memory intact, Language is full to spontaneous speech/naming/comprehension. Fund of Knowledge is full  CN: Optic discs are flat with normal vasculature, PERRL, Extraoccular movements and visual fields are full. Normal facial sensation and strength, Hearing symmetric, Tongue and Palate are midline and strong. Shoulder Shrug symmetric and strong.  Motor: Normal bulk, tone, no abnormal movements.5/5 strength bilateral upper/lower extremities with 1+ reflexes in the arms, absent starting at the knees  Sensory:   and intact to temp and vibration but romberg mildly positove  Cerebellar: Finger-noseRapid alternating movements intact  Gait: Normal stance, no ataxia      Imaging: CT head 12/9/14: Age advanced cerebral volume loss with mild chronic microvascular " "ischemic disease. There is no evidence for acute intracranial hemorrhage and no new parenchymal hypoattenuation is identified to suggest an acute infarction.  Age advanced cerebral volume loss with mild chronic microvascular ischemic disease. There is no evidence for acute intracranial hemorrhage and no new parenchymal hypoattenuation is identified to suggest an acute infarction.      CTA head 2017: No acute abnormality and no significant vascular stenoses or occlusions.    Moderate generalized cerebral volume loss with findings of chronic microvascular ischemic disease    Echo 2017: normal ef    Labs: 7/2018 LDL less than 70 and A1C less than 7    Assessment/Plan: Erasmo Dunbar is a 83 y.o. male with dizziness reported as early am "imbalance/ falling to the right" which is a chronic complaint (now resolved)  Cerebellar region arachnoid cyst noted by Head CT by prior imaging is stable. Had TIA vs CVA with fluent aphasia episode in 7/2017.   I recommend:     1. 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  Can't have MRI due to Pacemaker.   2. CTA 2017 unremarkable for TIA (or completed CVA) of fluent aphasia in 7/2017 and now is on NOAC for afib/ stroke prevention found near the time of the event. Goal A1C less than 7 and goal LDL less than 7 for stroke prevention (at goal). Continue  Current meds for this,  and continue excellent control of HTN  3. Physical therapy evaluation did help his balance prior (saw [facility] balance center), and all symptoms of imbalance are improved. Fall precautions reviewed.   4. He has a personal history of DM neuropathy and  uses gabapentin with good relief of neuropathy symptoms. Neuropathy likely  contributed to gait challenges prior. No pain with neuropathy (uses gabapentin per PCP)    RTC in 1 year        "

## 2018-08-02 ENCOUNTER — TELEPHONE (OUTPATIENT)
Dept: FAMILY MEDICINE | Facility: CLINIC | Age: 83
End: 2018-08-02

## 2018-08-02 NOTE — TELEPHONE ENCOUNTER
Received refill request from [facility] for Levaquin, will fax once signed per Dr. Varma. All other medications sent electronically at office visit.

## 2018-08-02 NOTE — TELEPHONE ENCOUNTER
----- Message from Shabbir Hawley sent at 2018 11:34 AM CDT -----  Contact: Wife - Carol Dunbar  MRN: [id number]  : 1935  PCP: Hemal Varma  Home Phone      [phone]  Work Phone      Not on file.  Mobile          [phone]      MESSAGE: got a call from  [facility] stating they will not ship his meds until the fax they sent to Dr Varma is received -- fax requesting refill on all meds -- please check status    Call [phone]    PCP: Kojo

## 2018-08-07 ENCOUNTER — OFFICE VISIT (OUTPATIENT)
Dept: FAMILY MEDICINE | Facility: CLINIC | Age: 83
End: 2018-08-07
Payer: MEDICARE

## 2018-08-07 VITALS
DIASTOLIC BLOOD PRESSURE: 70 MMHG | SYSTOLIC BLOOD PRESSURE: 152 MMHG | RESPIRATION RATE: 16 BRPM | WEIGHT: 209.19 LBS | HEART RATE: 72 BPM | OXYGEN SATURATION: 96 % | BODY MASS INDEX: 33.62 KG/M2 | HEIGHT: 66 IN

## 2018-08-07 DIAGNOSIS — N40.2 PROSTATE NODULE: ICD-10-CM

## 2018-08-07 DIAGNOSIS — R97.20 ELEVATED PSA: Primary | ICD-10-CM

## 2018-08-07 PROCEDURE — 99999 PR PBB SHADOW E&M-EST. PATIENT-LVL III: CPT | Mod: PBBFAC,,, | Performed by: FAMILY MEDICINE

## 2018-08-07 PROCEDURE — 3078F DIAST BP <80 MM HG: CPT | Mod: CPTII,S$GLB,, | Performed by: FAMILY MEDICINE

## 2018-08-07 PROCEDURE — 3077F SYST BP >= 140 MM HG: CPT | Mod: CPTII,S$GLB,, | Performed by: FAMILY MEDICINE

## 2018-08-07 PROCEDURE — 99213 OFFICE O/P EST LOW 20 MIN: CPT | Mod: S$GLB,,, | Performed by: FAMILY MEDICINE

## 2018-08-07 RX ORDER — METFORMIN HYDROCHLORIDE 1000 MG/1
1000 TABLET ORAL 2 TIMES DAILY WITH MEALS
COMMUNITY
End: 2019-01-24 | Stop reason: SDUPTHER

## 2018-08-07 RX ORDER — DOXYCYCLINE HYCLATE 100 MG
TABLET ORAL
Status: ON HOLD | COMMUNITY
Start: 2018-08-02 | End: 2018-10-18 | Stop reason: HOSPADM

## 2018-08-07 NOTE — PROGRESS NOTES
Subjective:       Patient ID: Erasmo Dunbar is a 83 y.o. male.    Chief Complaint: Follow-up (prostate exam )    Pt is a 83 y.o. male who presents for evaluation and management of   Encounter Diagnoses   Name Primary?    Elevated PSA Yes    Prostate nodule    .  Doing well on current meds. Denies any side effects. Prevention is up to date.    Review of Systems   Genitourinary: Negative for difficulty urinating and hematuria.       Objective:      Physical Exam   Constitutional: He is oriented to person, place, and time. He appears well-developed and well-nourished.   HENT:   Head: Normocephalic and atraumatic.   Right Ear: External ear normal.   Left Ear: External ear normal.   Nose: Nose normal.   Mouth/Throat: Oropharynx is clear and moist.   Eyes: Conjunctivae and EOM are normal. Pupils are equal, round, and reactive to light. Right eye exhibits no discharge. Left eye exhibits no discharge. No scleral icterus.   Neck: Normal range of motion. Neck supple. No JVD present. No tracheal deviation present. No thyromegaly present.   Cardiovascular: Normal rate, regular rhythm, normal heart sounds and intact distal pulses.    No murmur heard.  Pulmonary/Chest: Effort normal and breath sounds normal. No respiratory distress. He has no wheezes. He has no rales. He exhibits no tenderness.   Abdominal: Soft. Bowel sounds are normal. He exhibits no distension and no mass. There is no tenderness. There is no rebound and no guarding.   Genitourinary:   Genitourinary Comments: 10g prostate. Palpable nodule right lobe    Musculoskeletal: Normal range of motion.   Lymphadenopathy:     He has no cervical adenopathy.   Neurological: He is alert and oriented to person, place, and time. He has normal reflexes. He displays normal reflexes. No cranial nerve deficit. He exhibits normal muscle tone. Coordination normal.   Skin: Skin is warm and dry.   Psychiatric: He has a normal mood and affect. His behavior is normal. Judgment and  thought content normal.       Assessment:       1. Elevated PSA    2. Prostate nodule        Plan:   Erasmo PEGUERO was seen today for follow-up.    Diagnoses and all orders for this visit:    Elevated PSA  -     Ambulatory referral to Urology  -     Ambulatory referral to Urology    Prostate nodule  -     Ambulatory referral to Urology  -     Ambulatory referral to Urology      Problem List Items Addressed This Visit     None      Visit Diagnoses     Elevated PSA    -  Primary    Relevant Orders    Ambulatory referral to Urology    Ambulatory referral to Urology    Prostate nodule        Relevant Orders    Ambulatory referral to Urology    Ambulatory referral to Urology        No Follow-up on file.

## 2018-08-23 NOTE — TELEPHONE ENCOUNTER
----- Message from Clair Galloway sent at 2018 10:52 AM CDT -----  Contact: self  Erasmo Dunbar  MRN: [id number]  : 1935  PCP: Hemal Varma  Home Phone      [phone]  Work Phone      Not on file.  Mobile          [phone]      MESSAGE:   Pt requesting refill or new Rx.   Is this a refill or new RX:  Refill  RX name and strength: SITagliptin (JANUVIA) 100 MG Tab   Last office visit:   Is this a 30-day or 90-day RX:    Pharmacy name and location:  [facility]  Comments:      Phone:  [phone]

## 2018-10-12 ENCOUNTER — TELEPHONE (OUTPATIENT)
Dept: FAMILY MEDICINE | Facility: CLINIC | Age: 83
End: 2018-10-12

## 2018-10-12 ENCOUNTER — OFFICE VISIT (OUTPATIENT)
Dept: URGENT CARE | Facility: CLINIC | Age: 83
End: 2018-10-12
Payer: MEDICARE

## 2018-10-12 ENCOUNTER — HOSPITAL ENCOUNTER (INPATIENT)
Facility: HOSPITAL | Age: 83
LOS: 6 days | Discharge: HOME | DRG: 871 | End: 2018-10-18
Attending: SURGERY | Admitting: INTERNAL MEDICINE
Payer: MEDICARE

## 2018-10-12 VITALS
TEMPERATURE: 98 F | BODY MASS INDEX: 32.14 KG/M2 | DIASTOLIC BLOOD PRESSURE: 75 MMHG | HEART RATE: 87 BPM | WEIGHT: 200 LBS | RESPIRATION RATE: 18 BRPM | SYSTOLIC BLOOD PRESSURE: 161 MMHG | HEIGHT: 66 IN | OXYGEN SATURATION: 98 %

## 2018-10-12 DIAGNOSIS — R79.89 ELEVATED LFTS: ICD-10-CM

## 2018-10-12 DIAGNOSIS — N50.89 SWELLING OF LEFT TESTICLE: ICD-10-CM

## 2018-10-12 DIAGNOSIS — N45.2 ORCHITIS: ICD-10-CM

## 2018-10-12 DIAGNOSIS — R10.30 INGUINAL PAIN, UNSPECIFIED LATERALITY: ICD-10-CM

## 2018-10-12 DIAGNOSIS — I10 ESSENTIAL HYPERTENSION: ICD-10-CM

## 2018-10-12 DIAGNOSIS — A41.51 SEPSIS DUE TO ESCHERICHIA COLI: ICD-10-CM

## 2018-10-12 DIAGNOSIS — I48.91 A-FIB: ICD-10-CM

## 2018-10-12 DIAGNOSIS — I50.30 DIASTOLIC HEART FAILURE: ICD-10-CM

## 2018-10-12 DIAGNOSIS — M54.9 BACK PAIN, UNSPECIFIED BACK LOCATION, UNSPECIFIED BACK PAIN LATERALITY, UNSPECIFIED CHRONICITY: Primary | ICD-10-CM

## 2018-10-12 DIAGNOSIS — A41.9 SEPSIS: Primary | ICD-10-CM

## 2018-10-12 DIAGNOSIS — G45.9 TRANSIENT CEREBRAL ISCHEMIA, UNSPECIFIED TYPE: ICD-10-CM

## 2018-10-12 LAB
ALBUMIN SERPL BCP-MCNC: 3.6 G/DL
ALP SERPL-CCNC: 59 U/L
ALT SERPL W/O P-5'-P-CCNC: 12 U/L
ANION GAP SERPL CALC-SCNC: 10 MMOL/L
APTT BLDCRRT: 30.2 SEC
AST SERPL-CCNC: 16 U/L
BACTERIA #/AREA URNS HPF: ABNORMAL /HPF
BASOPHILS # BLD AUTO: 0.03 K/UL
BASOPHILS NFR BLD: 0.1 %
BILIRUB SERPL-MCNC: 0.6 MG/DL
BILIRUB UR QL STRIP: NEGATIVE
BILIRUB UR QL STRIP: NEGATIVE
BNP SERPL-MCNC: 202 PG/ML
BUN SERPL-MCNC: 10 MG/DL
CALCIUM SERPL-MCNC: 9.2 MG/DL
CHLORIDE SERPL-SCNC: 92 MMOL/L
CLARITY UR: CLEAR
CO2 SERPL-SCNC: 37 MMOL/L
COLOR UR: YELLOW
CREAT SERPL-MCNC: 0.9 MG/DL
DIFFERENTIAL METHOD: ABNORMAL
EOSINOPHIL # BLD AUTO: 0.1 K/UL
EOSINOPHIL NFR BLD: 0.2 %
ERYTHROCYTE [DISTWIDTH] IN BLOOD BY AUTOMATED COUNT: 14.1 %
EST. GFR  (AFRICAN AMERICAN): >60 ML/MIN/1.73 M^2
EST. GFR  (NON AFRICAN AMERICAN): >60 ML/MIN/1.73 M^2
GLUCOSE SERPL-MCNC: 111 MG/DL
GLUCOSE UR QL STRIP: NEGATIVE
GLUCOSE UR QL STRIP: NEGATIVE
HCT VFR BLD AUTO: 35.7 %
HGB BLD-MCNC: 11.5 G/DL
HGB UR QL STRIP: NEGATIVE
HYALINE CASTS #/AREA URNS LPF: 0 /LPF
INR PPP: 1.2
KETONES UR QL STRIP: ABNORMAL
KETONES UR QL STRIP: NEGATIVE
LACTATE SERPL-SCNC: 1.1 MMOL/L
LEUKOCYTE ESTERASE UR QL STRIP: ABNORMAL
LEUKOCYTE ESTERASE UR QL STRIP: NEGATIVE
LIPASE SERPL-CCNC: 16 U/L
LYMPHOCYTES # BLD AUTO: 2 K/UL
LYMPHOCYTES NFR BLD: 9.1 %
MAGNESIUM SERPL-MCNC: 1.2 MG/DL
MCH RBC QN AUTO: 29 PG
MCHC RBC AUTO-ENTMCNC: 32.2 G/DL
MCV RBC AUTO: 90 FL
MICROSCOPIC COMMENT: ABNORMAL
MONOCYTES # BLD AUTO: 2 K/UL
MONOCYTES NFR BLD: 9 %
NEUTROPHILS # BLD AUTO: 17.7 K/UL
NEUTROPHILS NFR BLD: 81.6 %
NITRITE UR QL STRIP: NEGATIVE
PH UR STRIP: >8 [PH] (ref 5–8)
PH, POC UA: 8.5
PHOSPHATE SERPL-MCNC: 1.7 MG/DL
PLATELET # BLD AUTO: 358 K/UL
PMV BLD AUTO: 9.2 FL
POC BLOOD, URINE: NEGATIVE
POC NITRATES, URINE: NEGATIVE
POTASSIUM SERPL-SCNC: 3.1 MMOL/L
PROT SERPL-MCNC: 6.6 G/DL
PROT UR QL STRIP: ABNORMAL
PROT UR QL STRIP: POSITIVE
PROTHROMBIN TIME: 12.5 SEC
RBC # BLD AUTO: 3.96 M/UL
RBC #/AREA URNS HPF: 2 /HPF (ref 0–4)
SODIUM SERPL-SCNC: 139 MMOL/L
SP GR UR STRIP: 1 (ref 1–1.03)
SP GR UR STRIP: 1.01 (ref 1–1.03)
TROPONIN I SERPL DL<=0.01 NG/ML-MCNC: <0.006 NG/ML
URN SPEC COLLECT METH UR: ABNORMAL
UROBILINOGEN UR STRIP-ACNC: NEGATIVE EU/DL
UROBILINOGEN UR STRIP-ACNC: NORMAL (ref 0.3–2.2)
WBC # BLD AUTO: 21.77 K/UL
WBC #/AREA URNS HPF: 25 /HPF (ref 0–5)
WBC CLUMPS URNS QL MICRO: ABNORMAL

## 2018-10-12 PROCEDURE — 83690 ASSAY OF LIPASE: CPT

## 2018-10-12 PROCEDURE — 84484 ASSAY OF TROPONIN QUANT: CPT

## 2018-10-12 PROCEDURE — 11000001 HC ACUTE MED/SURG PRIVATE ROOM

## 2018-10-12 PROCEDURE — 87088 URINE BACTERIA CULTURE: CPT

## 2018-10-12 PROCEDURE — 3077F SYST BP >= 140 MM HG: CPT | Mod: CPTII,S$GLB,, | Performed by: NURSE PRACTITIONER

## 2018-10-12 PROCEDURE — 84145 PROCALCITONIN (PCT): CPT

## 2018-10-12 PROCEDURE — 1101F PT FALLS ASSESS-DOCD LE1/YR: CPT | Mod: CPTII,S$GLB,, | Performed by: NURSE PRACTITIONER

## 2018-10-12 PROCEDURE — 87086 URINE CULTURE/COLONY COUNT: CPT

## 2018-10-12 PROCEDURE — 83735 ASSAY OF MAGNESIUM: CPT

## 2018-10-12 PROCEDURE — 96367 TX/PROPH/DG ADDL SEQ IV INF: CPT

## 2018-10-12 PROCEDURE — 93010 ELECTROCARDIOGRAM REPORT: CPT | Mod: ,,, | Performed by: INTERNAL MEDICINE

## 2018-10-12 PROCEDURE — 87186 SC STD MICRODIL/AGAR DIL: CPT

## 2018-10-12 PROCEDURE — 3078F DIAST BP <80 MM HG: CPT | Mod: CPTII,S$GLB,, | Performed by: NURSE PRACTITIONER

## 2018-10-12 PROCEDURE — 36415 COLL VENOUS BLD VENIPUNCTURE: CPT

## 2018-10-12 PROCEDURE — 85610 PROTHROMBIN TIME: CPT

## 2018-10-12 PROCEDURE — 93005 ELECTROCARDIOGRAM TRACING: CPT

## 2018-10-12 PROCEDURE — 99291 CRITICAL CARE FIRST HOUR: CPT | Mod: 25

## 2018-10-12 PROCEDURE — 63600175 PHARM REV CODE 636 W HCPCS: Performed by: SURGERY

## 2018-10-12 PROCEDURE — 85025 COMPLETE CBC W/AUTO DIFF WBC: CPT

## 2018-10-12 PROCEDURE — 99214 OFFICE O/P EST MOD 30 MIN: CPT | Mod: 25,S$GLB,, | Performed by: NURSE PRACTITIONER

## 2018-10-12 PROCEDURE — 81000 URINALYSIS NONAUTO W/SCOPE: CPT

## 2018-10-12 PROCEDURE — 85730 THROMBOPLASTIN TIME PARTIAL: CPT

## 2018-10-12 PROCEDURE — 84100 ASSAY OF PHOSPHORUS: CPT

## 2018-10-12 PROCEDURE — 87077 CULTURE AEROBIC IDENTIFY: CPT

## 2018-10-12 PROCEDURE — 25000003 PHARM REV CODE 250: Performed by: SURGERY

## 2018-10-12 PROCEDURE — 87040 BLOOD CULTURE FOR BACTERIA: CPT | Mod: 59

## 2018-10-12 PROCEDURE — 96365 THER/PROPH/DIAG IV INF INIT: CPT

## 2018-10-12 PROCEDURE — 81003 URINALYSIS AUTO W/O SCOPE: CPT | Mod: QW,S$GLB,, | Performed by: NURSE PRACTITIONER

## 2018-10-12 PROCEDURE — 80053 COMPREHEN METABOLIC PANEL: CPT

## 2018-10-12 PROCEDURE — 83605 ASSAY OF LACTIC ACID: CPT

## 2018-10-12 PROCEDURE — 94760 N-INVAS EAR/PLS OXIMETRY 1: CPT

## 2018-10-12 PROCEDURE — 83880 ASSAY OF NATRIURETIC PEPTIDE: CPT

## 2018-10-12 RX ORDER — ACETAMINOPHEN 500 MG
1000 TABLET ORAL EVERY 8 HOURS PRN
Status: DISCONTINUED | OUTPATIENT
Start: 2018-10-12 | End: 2018-10-18 | Stop reason: HOSPADM

## 2018-10-12 RX ORDER — PANTOPRAZOLE SODIUM 40 MG/1
40 TABLET, DELAYED RELEASE ORAL DAILY
Status: DISCONTINUED | OUTPATIENT
Start: 2018-10-13 | End: 2018-10-18 | Stop reason: HOSPADM

## 2018-10-12 RX ORDER — CIPROFLOXACIN 2 MG/ML
400 INJECTION, SOLUTION INTRAVENOUS
Status: DISCONTINUED | OUTPATIENT
Start: 2018-10-12 | End: 2018-10-12

## 2018-10-12 RX ORDER — IBUPROFEN 800 MG/1
800 TABLET ORAL
Status: ACTIVE | OUTPATIENT
Start: 2018-10-12 | End: 2018-10-13

## 2018-10-12 RX ORDER — ACETAMINOPHEN 500 MG
1000 TABLET ORAL
Status: COMPLETED | OUTPATIENT
Start: 2018-10-12 | End: 2018-10-12

## 2018-10-12 RX ORDER — BICALUTAMIDE 50 MG/1
50 TABLET, FILM COATED ORAL DAILY
COMMUNITY
Start: 2018-09-26 | End: 2020-01-20

## 2018-10-12 RX ORDER — SODIUM CHLORIDE 9 MG/ML
INJECTION, SOLUTION INTRAVENOUS CONTINUOUS
Status: DISCONTINUED | OUTPATIENT
Start: 2018-10-12 | End: 2018-10-13

## 2018-10-12 RX ORDER — LEVOFLOXACIN 5 MG/ML
500 INJECTION, SOLUTION INTRAVENOUS
Status: DISCONTINUED | OUTPATIENT
Start: 2018-10-12 | End: 2018-10-18 | Stop reason: HOSPADM

## 2018-10-12 RX ORDER — ONDANSETRON 2 MG/ML
4 INJECTION INTRAMUSCULAR; INTRAVENOUS EVERY 8 HOURS PRN
Status: DISCONTINUED | OUTPATIENT
Start: 2018-10-12 | End: 2018-10-18 | Stop reason: HOSPADM

## 2018-10-12 RX ORDER — HYDROCODONE BITARTRATE AND ACETAMINOPHEN 5; 325 MG/1; MG/1
1 TABLET ORAL EVERY 4 HOURS PRN
Status: DISCONTINUED | OUTPATIENT
Start: 2018-10-12 | End: 2018-10-18 | Stop reason: HOSPADM

## 2018-10-12 RX ORDER — MOMETASONE FUROATE 1 MG/ML
SOLUTION TOPICAL
COMMUNITY
Start: 2018-08-07 | End: 2019-10-17

## 2018-10-12 RX ADMIN — SODIUM CHLORIDE 500 ML: 0.9 INJECTION, SOLUTION INTRAVENOUS at 06:10

## 2018-10-12 RX ADMIN — SODIUM CHLORIDE: 0.9 INJECTION, SOLUTION INTRAVENOUS at 09:10

## 2018-10-12 RX ADMIN — LEVOFLOXACIN 500 MG: 5 INJECTION, SOLUTION INTRAVENOUS at 06:10

## 2018-10-12 RX ADMIN — CEFTRIAXONE 2 G: 2 INJECTION, SOLUTION INTRAVENOUS at 06:10

## 2018-10-12 RX ADMIN — SODIUM CHLORIDE 2000 ML: 0.9 INJECTION, SOLUTION INTRAVENOUS at 07:10

## 2018-10-12 RX ADMIN — ACETAMINOPHEN 1000 MG: 500 TABLET ORAL at 06:10

## 2018-10-12 NOTE — ED NOTES
The patient is awake, alert and cooperative with a calm affect, patient is aware of environment, airway is open and patent, respirations are spontaneous, normal effort and rate noted, skin warm and dry, moves all extremities well, appearance: no apparent distress noted, bed placed in low position, side rails up x 2, call light is within reach of patient or family, explanation of care provided to family and pt.  plan of care: family to bedside, observe and reassure, position of comfort, patient offers no complaints at this time, awaiting results, will continue to monitor.

## 2018-10-12 NOTE — TELEPHONE ENCOUNTER
Spoke with patient stated he was on his way to Urgent Care. Suggested if pain should increase to please go to ER, also instructed patient that to call office in AM for appt. If needed

## 2018-10-12 NOTE — ED PROVIDER NOTES
[facility]                                                 Chief Complaint  83 y.o. male with Groin Swelling    History of Present Illness  Erasmo Dunbar presents to the emergency room with left testicle pain today  Patient states he has had left testicle pain for last 2-3 days, went to urgent care  Urgent care sent the patient to the emergency room with left testicle pain today  Patient on exam has a hard left testicle, no scrotal cellulitis identified on exam  Patient has a temperature 101.7° Fahrenheit [place] [address] now  Ultrasound of the testicle shows probable orchitis, consider sepsis in differential    The history is provided by the patient   device was not used during this ER visit    Past Medical History   -- Arthritis    -- BPH (benign prostatic hyperplasia)    -- CHF (congestive heart failure)    -- Depression    -- Diabetes mellitus type II    -- Hyperlipidemia    -- Hypertension    -- Prostate cancer    -- RLS (restless legs syndrome)      Past Surgical History   -- ARTHROSCOPY-KNEE     -- BACK SURGERY     -- CARDIAC PACEMAKER PLACEMENT     -- CARDIAC SURGERY     -- CHONDROPLASTY-KNEE     -- COLECTOMY     -- CYSTOSCOPY     -- JACK-TRANSFORAMINAL     -- EYE SURGERY     -- Ganglion Cyst Removed      -- INJECTION-STEROID-EPIDURAL-TRANSFORAMINAL     -- MENISCECTOMY     -- Neck Fusion     -- PROSTATE SURGERY     -- ROTATOR CUFF REPAIR     -- SKIN BIOPSY     -- TOTAL KNEE ARTHROPLASTY     -- TRANSURETHRAL RESECTION OF PROSTATE        Allergies: Iodine    Review of Systems and Physical Exam      Review of Systems  -- Constitution - no fever, denies fatigue, no weakness, no chills  -- Eyes - no tearing or redness, no visual disturbance  -- Ear, Nose - no tinnitus or earache, no nasal congestion or discharge  -- Mouth,Throat - no sore throat, no toothache, normal voice, normal swallowing  -- Respiratory - denies cough and congestion, no shortness of breath,  no VIDAL  -- Cardiovascular - denies chest pain, no palpitations, denies claudication  -- Gastrointestinal - denies abdominal pain, nausea, vomiting, or diarrhea  -- Genitourinary - left testicle pain without flank pain or dysuria/hematuria  -- Musculoskeletal - denies back pain, negative for myalgias and arthralgias   -- Neurological - no headache, denies weakness or seizure; no LOC  -- Skin - denies pallor, rash, or changes in skin. no hives or welts noted  -- Psychiatric - Denies SI or HI, no psychosis or fractured thought noted     Vital Signs  His oral temperature is 99.2 °F (37.3 °C).   His blood pressure is 162/74 and his pulse is 84.   His respiration is 20 and oxygen saturation is 98%.     Physical Exam  -- Nursing note and vitals reviewed  -- Constitutional: Appears well-developed and well-nourished  -- Head: Atraumatic. Normocephalic. No obvious abnormality  -- Eyes: Pupils are equal and reactive to light. Normal conjunctiva and lids  -- Cardiac: Normal rate, regular rhythm and normal heart sounds  -- Pulmonary: Normal respiratory effort, breath sounds clear to auscultation  -- Abdominal: Soft, no tenderness. Normal bowel sounds. Normal liver edge  -- Genitourinary:  Tenderness and swelling to the left testicle, no mass  -- Musculoskeletal: Normal range of motion, no effusions. Joints stable   -- Neurological: No focal deficits. Showed good interaction with staff  -- Vascular: Posterior tibial, dorsalis pedis and radial pulses 2+ bilaterally      Sepsis Criteria  -- Temperature > 100.9° or < 96.8° F: Yes  -- HR > 90: No  -- RR > 20: No  -- WBC > 12,000 or <4,000: Yes  -- 2 above criteria and infection source: Yes  -- Severe sepsis: sepsis with end-organ dysfunction & lactic acid >2: No     Emergency Room Course      Lab Results     K 3.1 (L)   CL 92 (L)   CO2 37 (H)   BUN 10   CREATININE 0.9    (H)   ALKPHOS 59   AST 16   ALT 12   BILITOT 0.6   ALBUMIN 3.6   PROT 6.6   WBC 21.77 (H)   HGB 11.5  (L)   HCT 35.7 (L)    (H)   LACTATE 1.1     Urinalysis  -- Urinalysis performed during this ER visit showed no signs of infection      US Scrotum And Testicles   Left epididymo-orchitis with a moderate associated hydrocele.     Additional Work up  -- Blood cultures have also been drawn, results are pending    Medications Given  cefTRIAXone (ROCEPHIN) 2 g in dextrose 5 % 50 mL IVPB (2 g Intravenous New Bag 10/12/18 8084)   ciprofloxacin (CIPRO)400mg/200ml D5W IVPB 400 mg (not administered)   sodium chloride 0.9% bolus 500 mL (500 mLs Intravenous New Bag 10/12/18 1803)     Critical Care ED Physician Time (minutes):  -- Performed by: Lewis Amos M.D.  -- Date/Time: 7:18 PM 10/12/2018   -- Direct Patient Care (Face Time): 10  -- Additional History from Records or Taking Additional History: 10  -- Ordering, Reviewing, and Interpreting Diagnostic Studies: 10  -- Total Time in Documentation: 10  -- Consultation with Other Physicians: 10  -- Consultation with Family Related to Condition: 10  -- Total Critical Care Time: 60     Diagnosis  -- Sepsis  -- Swelling of left testicle   -- Orchitis were also pertinent to this visit.    Disposition and Plan  -- Disposition: admit  -- Condition: stable    This note is dictated on Dragon Natural Speaking word recognition program.  There are word recognition mistakes that are occasionally missed on review.         Lewis Amos MD  10/12/18 7065

## 2018-10-12 NOTE — ED NOTES
Received verbal report from SARITA Pace.  Pt in ED room ED 05/ED 05 lying in stretcher, HOB 30 degrees. Stretcher is in low, locked position, side rails up x2.  The patient is awake, alert and cooperative with a calm affect, patient is aware of environment. Airway is open and patent, respirations are spontaneous, normal respiratory effort and rate noted, skin warm and dry, full ROM in all extremities, appearance: NAD noted, resting comfortably.Call bell within reach of pt, pt instructed on use, pt verbalizes understanding of call bell use. Hourly rounding explained and white board updated.  Plan of care: family to bedside, observe and reassure, position of comfort, respirations even and unlabored, patient offers no complaints at this time, awaiting additional orders, will continue to monitor  Medications as per MAR.

## 2018-10-12 NOTE — PATIENT INSTRUCTIONS
Based on your exam today I fell you need further evaluation immediately.  You the patient are advised to be seen in the Emergency Room for care.    Please go to the Emergency Room that we discussed immediately.

## 2018-10-12 NOTE — TELEPHONE ENCOUNTER
----- Message from Shabbir Hawley sent at 10/12/2018 11:00 AM CDT -----  Contact: Wife - Carol Dunbar  MRN: [id number]  : 1935  PCP: Hemal Varma  Home Phone      [phone]  Work Phone      Not on file.  [employer]          [phone]      MESSAGE: pain across back - pain in one testicle -- requesting appt today with Dr Noonan    Call [phone]    PCP: Kojo

## 2018-10-12 NOTE — PROGRESS NOTES
"Subjective:       Patient ID: Erasmo Dunbar is a 83 y.o. male.    Vitals:  height is 5' 6" (1.676 m) and weight is 90.7 kg (200 lb). His oral temperature is 98.4 °F (36.9 °C). His blood pressure is 161/75 (abnormal) and his pulse is 87. His respiration is 18 and oxygen saturation is 98%.     Chief Complaint: Groin Pain    Patient was diagnosed with prostate cancer earlier this month. He reports he woke up with his scrotum swollen on the left side. Patient says his left testicle is hard and swollen. He presents with back pain, weakness and significant groin pain.Patient also says he has had diarrhea four to five times this morning associated with nausea and vomiting.      Groin Pain   The patient's primary symptoms include testicular pain. This is a new problem. The current episode started today. The problem occurs constantly. The problem has been gradually worsening. The pain is severe. Associated symptoms include chills, nausea and vomiting. Pertinent negatives include no dysuria, fever, rash or urgency. The testicular pain affects the left testicle. There is swelling in the left testicle. The color of the testicles is normal. Exacerbated by: walking  He has tried a cold pack (tylenol) for the symptoms. The treatment provided mild relief. His sexual activity is non-contributory to the current illness. No, his partner does not have an STD. (Prostate cancer)     Review of Systems   Constitution: Positive for chills, decreased appetite and weakness. Negative for fever.   Eyes: Negative for discharge.   Skin: Negative for flushing and rash.   Musculoskeletal: Positive for back pain.   Gastrointestinal: Positive for nausea and vomiting.   Genitourinary: Positive for testicular pain. Negative for dysuria, genital sores, hematuria and urgency.       Objective:      Physical Exam   Constitutional: He is oriented to person, place, and time. He appears well-developed and well-nourished.   HENT:   Head: Normocephalic and " atraumatic.   Right Ear: External ear normal.   Left Ear: External ear normal.   Nose: Nose normal.   Mouth/Throat: Mucous membranes are normal.   Eyes: Conjunctivae and lids are normal.   Neck: Trachea normal and full passive range of motion without pain. Neck supple.   Cardiovascular: Normal rate, regular rhythm and normal heart sounds.   Pulmonary/Chest: Effort normal and breath sounds normal. No respiratory distress.   Abdominal: Soft. Normal appearance and bowel sounds are normal. He exhibits no distension, no abdominal bruit, no pulsatile midline mass and no mass. There is no hepatosplenomegaly, splenomegaly or hepatomegaly. There is tenderness in the periumbilical area. There is no rigidity, no rebound, no guarding, no CVA tenderness, no tenderness at McBurney's point and negative Hemphill's sign.   Genitourinary: Penis normal. Right testis shows no mass, no swelling and no tenderness. Right testis is descended. Cremasteric reflex is not absent on the right side. Left testis shows swelling and tenderness (exquisite TTP, see diagram.  ). Left testis shows no mass. Left testis is descended. Cremasteric reflex is absent on the left side. Circumcised.         Musculoskeletal: Normal range of motion. He exhibits no edema.   Neurological: He is alert and oriented to person, place, and time. He has normal strength.   Skin: Skin is warm, dry and intact. He is not diaphoretic. No pallor.   Psychiatric: He has a normal mood and affect. His speech is normal and behavior is normal. Judgment and thought content normal. Cognition and memory are normal.   Nursing note and vitals reviewed.      Assessment:       1. Back pain, unspecified back location, unspecified back pain laterality, unspecified chronicity    2. Inguinal pain, unspecified laterality        Plan:       Patient specifically asked for [facility] as the intake hospital.   Taken by his wife to the ER.     Back pain, unspecified back location, unspecified back pain  laterality, unspecified chronicity    Inguinal pain, unspecified laterality  -     POCT Urinalysis, Dipstick, Automated, W/O Scope  -     Refer to Emergency Dept.

## 2018-10-13 PROBLEM — A41.9 SEPSIS: Status: ACTIVE | Noted: 2018-10-13

## 2018-10-13 PROBLEM — C61 PROSTATE CANCER: Status: ACTIVE | Noted: 2018-10-13

## 2018-10-13 PROBLEM — A41.51 SEPSIS DUE TO ESCHERICHIA COLI: Status: ACTIVE | Noted: 2018-10-13

## 2018-10-13 PROBLEM — I50.32 CHRONIC DIASTOLIC CHF (CONGESTIVE HEART FAILURE): Status: ACTIVE | Noted: 2018-10-13

## 2018-10-13 PROBLEM — I48.0 PAROXYSMAL ATRIAL FIBRILLATION: Status: ACTIVE | Noted: 2018-10-13

## 2018-10-13 LAB
ALBUMIN SERPL BCP-MCNC: 3 G/DL
ALP SERPL-CCNC: 49 U/L
ALT SERPL W/O P-5'-P-CCNC: 10 U/L
ANION GAP SERPL CALC-SCNC: 8 MMOL/L
AST SERPL-CCNC: 15 U/L
BASOPHILS # BLD AUTO: 0.03 K/UL
BASOPHILS NFR BLD: 0.1 %
BILIRUB SERPL-MCNC: 0.5 MG/DL
BUN SERPL-MCNC: 8 MG/DL
CALCIUM SERPL-MCNC: 8.2 MG/DL
CHLORIDE SERPL-SCNC: 97 MMOL/L
CO2 SERPL-SCNC: 31 MMOL/L
CREAT SERPL-MCNC: 0.8 MG/DL
DIFFERENTIAL METHOD: ABNORMAL
EOSINOPHIL # BLD AUTO: 0 K/UL
EOSINOPHIL NFR BLD: 0 %
ERYTHROCYTE [DISTWIDTH] IN BLOOD BY AUTOMATED COUNT: 14.5 %
EST. GFR  (AFRICAN AMERICAN): >60 ML/MIN/1.73 M^2
EST. GFR  (NON AFRICAN AMERICAN): >60 ML/MIN/1.73 M^2
GLUCOSE SERPL-MCNC: 166 MG/DL
HCT VFR BLD AUTO: 31.6 %
HGB BLD-MCNC: 10.1 G/DL
LACTATE SERPL-SCNC: 0.8 MMOL/L
LYMPHOCYTES # BLD AUTO: 1.9 K/UL
LYMPHOCYTES NFR BLD: 8.1 %
MCH RBC QN AUTO: 29 PG
MCHC RBC AUTO-ENTMCNC: 32 G/DL
MCV RBC AUTO: 91 FL
MONOCYTES # BLD AUTO: 2.1 K/UL
MONOCYTES NFR BLD: 9 %
NEUTROPHILS # BLD AUTO: 19.2 K/UL
NEUTROPHILS NFR BLD: 83.1 %
PLATELET # BLD AUTO: 305 K/UL
PMV BLD AUTO: 9.8 FL
POCT GLUCOSE: 137 MG/DL (ref 70–110)
POCT GLUCOSE: 159 MG/DL (ref 70–110)
POCT GLUCOSE: 163 MG/DL (ref 70–110)
POCT GLUCOSE: 170 MG/DL (ref 70–110)
POTASSIUM SERPL-SCNC: 3 MMOL/L
PROCALCITONIN SERPL IA-MCNC: 0.04 NG/ML
PROT SERPL-MCNC: 5.7 G/DL
RBC # BLD AUTO: 3.48 M/UL
SODIUM SERPL-SCNC: 136 MMOL/L
WBC # BLD AUTO: 23.16 K/UL

## 2018-10-13 PROCEDURE — 99223 1ST HOSP IP/OBS HIGH 75: CPT | Mod: AI,,, | Performed by: INTERNAL MEDICINE

## 2018-10-13 PROCEDURE — 80053 COMPREHEN METABOLIC PANEL: CPT

## 2018-10-13 PROCEDURE — 25000003 PHARM REV CODE 250: Performed by: INTERNAL MEDICINE

## 2018-10-13 PROCEDURE — 25000003 PHARM REV CODE 250: Performed by: SURGERY

## 2018-10-13 PROCEDURE — 93010 ELECTROCARDIOGRAM REPORT: CPT | Mod: ,,, | Performed by: INTERNAL MEDICINE

## 2018-10-13 PROCEDURE — 36415 COLL VENOUS BLD VENIPUNCTURE: CPT

## 2018-10-13 PROCEDURE — 11000001 HC ACUTE MED/SURG PRIVATE ROOM

## 2018-10-13 PROCEDURE — 93005 ELECTROCARDIOGRAM TRACING: CPT

## 2018-10-13 PROCEDURE — 63600175 PHARM REV CODE 636 W HCPCS: Performed by: INTERNAL MEDICINE

## 2018-10-13 PROCEDURE — 94761 N-INVAS EAR/PLS OXIMETRY MLT: CPT

## 2018-10-13 PROCEDURE — 63600175 PHARM REV CODE 636 W HCPCS: Performed by: SURGERY

## 2018-10-13 PROCEDURE — 85025 COMPLETE CBC W/AUTO DIFF WBC: CPT

## 2018-10-13 RX ORDER — SIMVASTATIN 40 MG/1
40 TABLET, FILM COATED ORAL NIGHTLY
Status: DISCONTINUED | OUTPATIENT
Start: 2018-10-13 | End: 2018-10-18 | Stop reason: HOSPADM

## 2018-10-13 RX ORDER — IBUPROFEN 200 MG
24 TABLET ORAL
Status: DISCONTINUED | OUTPATIENT
Start: 2018-10-13 | End: 2018-10-18 | Stop reason: HOSPADM

## 2018-10-13 RX ORDER — LISINOPRIL 10 MG/1
10 TABLET ORAL DAILY
Status: DISCONTINUED | OUTPATIENT
Start: 2018-10-13 | End: 2018-10-16

## 2018-10-13 RX ORDER — ASPIRIN 81 MG/1
81 TABLET ORAL DAILY
Status: DISCONTINUED | OUTPATIENT
Start: 2018-10-13 | End: 2018-10-16

## 2018-10-13 RX ORDER — IBUPROFEN 200 MG
16 TABLET ORAL
Status: DISCONTINUED | OUTPATIENT
Start: 2018-10-13 | End: 2018-10-18 | Stop reason: HOSPADM

## 2018-10-13 RX ORDER — METOPROLOL TARTRATE 50 MG/1
50 TABLET ORAL 2 TIMES DAILY
Status: DISCONTINUED | OUTPATIENT
Start: 2018-10-13 | End: 2018-10-18 | Stop reason: HOSPADM

## 2018-10-13 RX ORDER — TAMSULOSIN HYDROCHLORIDE 0.4 MG/1
0.4 CAPSULE ORAL DAILY
Status: DISCONTINUED | OUTPATIENT
Start: 2018-10-13 | End: 2018-10-18 | Stop reason: HOSPADM

## 2018-10-13 RX ORDER — GLUCAGON 1 MG
1 KIT INJECTION
Status: DISCONTINUED | OUTPATIENT
Start: 2018-10-13 | End: 2018-10-18 | Stop reason: HOSPADM

## 2018-10-13 RX ORDER — BICALUTAMIDE 50 MG/1
50 TABLET, FILM COATED ORAL DAILY
Status: DISCONTINUED | OUTPATIENT
Start: 2018-10-13 | End: 2018-10-18 | Stop reason: HOSPADM

## 2018-10-13 RX ORDER — INSULIN ASPART 100 [IU]/ML
1-10 INJECTION, SOLUTION INTRAVENOUS; SUBCUTANEOUS
Status: DISCONTINUED | OUTPATIENT
Start: 2018-10-13 | End: 2018-10-18 | Stop reason: HOSPADM

## 2018-10-13 RX ORDER — GABAPENTIN 300 MG/1
600 CAPSULE ORAL 2 TIMES DAILY
Status: DISCONTINUED | OUTPATIENT
Start: 2018-10-13 | End: 2018-10-18 | Stop reason: HOSPADM

## 2018-10-13 RX ADMIN — APIXABAN 5 MG: 5 TABLET, FILM COATED ORAL at 09:10

## 2018-10-13 RX ADMIN — LISINOPRIL 10 MG: 10 TABLET ORAL at 09:10

## 2018-10-13 RX ADMIN — PANTOPRAZOLE SODIUM 40 MG: 40 TABLET, DELAYED RELEASE ORAL at 09:10

## 2018-10-13 RX ADMIN — METOPROLOL TARTRATE 50 MG: 50 TABLET ORAL at 09:10

## 2018-10-13 RX ADMIN — TAMSULOSIN HYDROCHLORIDE 0.4 MG: 0.4 CAPSULE ORAL at 09:10

## 2018-10-13 RX ADMIN — ASPIRIN 81 MG: 81 TABLET, COATED ORAL at 09:10

## 2018-10-13 RX ADMIN — BICALUTAMIDE 50 MG: 50 TABLET, FILM COATED ORAL at 09:10

## 2018-10-13 RX ADMIN — SIMVASTATIN 40 MG: 40 TABLET, FILM COATED ORAL at 09:10

## 2018-10-13 RX ADMIN — GABAPENTIN 600 MG: 300 CAPSULE ORAL at 09:10

## 2018-10-13 RX ADMIN — LEVOFLOXACIN 500 MG: 5 INJECTION, SOLUTION INTRAVENOUS at 05:10

## 2018-10-13 RX ADMIN — ACETAMINOPHEN 1000 MG: 500 TABLET ORAL at 07:10

## 2018-10-13 RX ADMIN — INSULIN DETEMIR 40 UNITS: 100 INJECTION, SOLUTION SUBCUTANEOUS at 09:10

## 2018-10-13 NOTE — HPI
Patient presented to ER with left testicular swelling. Sx started 1 day ago. He is having pain and swelling. No fevers. Mild dysuria but reports h/o chronic UTI/prostatis for which he typically take cipro and/or levaquin. No recent antibx. US showed epididymo-orchitis with moderate hydrocele. Admitted for IV Levaquin. Labs showed WBC 21K. Normal lactate. Blood and urine cx sent and pending.

## 2018-10-13 NOTE — ASSESSMENT & PLAN NOTE
Presumed E. Coli. Pseudomonas sometimes a considering in older men as well  Covering with levaquin 500mg daily  Day 1/10    WBC 21K and fever > 101 F on arrival to ER thus meets sepsis criteria. Source orchitis  Monitor fever curve  Monitor WBC  IVF--will stop 10/13 due to h/o CHF. Continue to hold lasix for now

## 2018-10-13 NOTE — NURSING
Pt admitted to room 302 via W/C.Report rec'd from Veronica Vaughn.  Pt ambulate to Bed without difficulty.  Tele monitor on Pt. As ordered. PIV to Rt AC with NS bolus x 2 liters infusing via pump. Pt denies pain at this time. Observe LT scrotum/testicle swelling, and slightly tender to touch. No redness noted or skin breakdown present. Pt oriented to room. Bed low locked. Call bell in reach. POC discussed and reviewed with PT. Time allowed for questions. Will continue to monitor.

## 2018-10-13 NOTE — ASSESSMENT & PLAN NOTE
WBC 21 K; + fever  Admit for IV levaquin  Elevation and ice PRN for swelling  Monitor fever curve  Day 1/10 treatment, Once WBC trending down and fevers resolve could transition to PO but remains with significant leukocytosis so continue IV for now. Jim pending blood cx

## 2018-10-13 NOTE — PLAN OF CARE
Problem: Patient Care Overview  Goal: Plan of Care Review  Outcome: Ongoing (interventions implemented as appropriate)  Patient resting with no complaints of pain. Swelling in left side of groin. VS stable. A&Ox4, Paced on monitor. . No acute changes noted. Plan of care reviewed with patient and agreed upon.

## 2018-10-13 NOTE — PLAN OF CARE
Problem: Patient Care Overview  Goal: Plan of Care Review  Outcome: Ongoing (interventions implemented as appropriate)  Vitals remained stable, afebrile. complained of pain to testicles that was relieved with elevation and ice. abx given. Ambulates well in room. Voiding well. Complained of some burning when voiding. Discussed plan of care with pt and wife, stated understanding

## 2018-10-13 NOTE — SUBJECTIVE & OBJECTIVE
"Past Medical History:   Diagnosis Date    Arthritis     BPH (benign prostatic hyperplasia)     CHF (congestive heart failure)     Depression     Diabetes mellitus type II     Hyperlipidemia     Hypertension     Prostate cancer 10/01/2018    RLS (restless legs syndrome)        Past Surgical History:   Procedure Laterality Date    ARTHROSCOPY-KNEE Right 6/6/2016    [facility]    BACK SURGERY      cervical fusion    CARDIAC PACEMAKER PLACEMENT      CARDIAC SURGERY Left     pacemaker placement    CHONDROPLASTY-KNEE Right 6/6/2016    [facility]    COLECTOMY N/A     CYSTOSCOPY N/A     Pt stated, "I have had six Cystoscopy."    JACK-TRANSFORAMINAL Right 5/20/2016    [facility]    EYE SURGERY Bilateral     cataracts    Ganglion Cyst Removed  Right     INJECTION-STEROID-EPIDURAL-TRANSFORAMINAL Right 10/14/2016    [facility]    MENISCECTOMY Right 6/6/2016    [facility]    Neck Fusion Bilateral     PROSTATE SURGERY      ROTATOR CUFF REPAIR Right     SKIN BIOPSY      skin cancer    TOTAL KNEE ARTHROPLASTY  03/30/2017    right knee    TRANSURETHRAL RESECTION OF PROSTATE         Review of patient's allergies indicates:   Allergen Reactions    Iodine Hives     Iv iodine only       No current facility-administered medications on file prior to encounter.      Current Outpatient Medications on File Prior to Encounter   Medication Sig    ascorbic acid, vitamin C, (VITAMIN C) 500 MG tablet Take 500 mg by mouth once daily.    aspirin (ECOTRIN) 81 MG EC tablet Take 81 mg by mouth once daily.    bicalutamide (CASODEX) 50 MG Tab     CIALIS 20 mg Tab Take 1 tablet by mouth once a day    citalopram (CELEXA) 20 MG tablet Take 1 tablet (20 mg total) by mouth once daily.    diclofenac sodium (VOLTAREN) 1 % Gel Apply 4 g topically 4 " "(four) times daily as needed.    doxycycline (VIBRA-TABS) 100 MG tablet     dulaglutide (TRULICITY) 1.5 mg/0.5 mL PnIj Inject 1.5 mg into the skin once a week.    ELIQUIS 5 mg Tab Take 1 tablet (5 mg total) by mouth 2 (two) times daily.    furosemide (LASIX) 40 MG tablet Take 1 tablet (40 mg total) by mouth once daily.    gabapentin (NEURONTIN) 300 MG capsule Take 2 capsules by mouth  two times daily    insulin glargine (LANTUS SOLOSTAR U-100 INSULIN) 100 unit/mL (3 mL) InPn pen Inject 40 Units into the skin every evening.    levoFLOXacin (LEVAQUIN) 500 MG tablet Take 1 tablet (500 mg total) by mouth as needed.    lisinopril 10 MG tablet Take 1 tablet (10 mg total) by mouth once daily.    metFORMIN (GLUCOPHAGE) 1000 MG tablet Take 1,000 mg by mouth 2 (two) times daily with meals.    metoprolol tartrate (LOPRESSOR) 50 MG tablet Take 1 tablet (50 mg total) by mouth 2 (two) times daily.    mometasone (ELOCON) 0.1 % lotion     pen needle, diabetic (LITE TOUCH INSULIN PEN NEEDLES) 31 gauge x 3/16" Ndle 1 each by Misc.(Non-Drug; Combo Route) route once daily.    simvastatin (ZOCOR) 40 MG tablet Take 1 tablet (40 mg total) by mouth every evening.    SITagliptin (JANUVIA) 100 MG Tab Take 1 tablet (100 mg total) by mouth once daily.    tamsulosin (FLOMAX) 0.4 mg Cap Take 1 capsule (0.4 mg total) by mouth once daily.     Family History     Problem Relation (Age of Onset)    COPD Daughter    Cancer Mother    Diabetes Sister, Brother, Daughter    Heart disease Father, Brother    Seizures Daughter        Tobacco Use    Smoking status: Former Smoker     Packs/day: 1.50     Years: 35.00     Pack years: 52.50     Types: Cigarettes     Last attempt to quit: 1981     Years since quittin.8    Smokeless tobacco: Never Used    Tobacco comment: 35 yrs ago quit   Substance and Sexual Activity    Alcohol use: Yes     Alcohol/week: 4.8 oz     Types: 1 Cans of beer, 7 Shots of liquor per week     Comment: highball " every night    Drug use: No    Sexual activity: Not on file     Review of Systems   Constitutional: Negative for activity change, fatigue, fever and unexpected weight change.   HENT: Negative for congestion, ear pain, hearing loss, rhinorrhea and sore throat.    Eyes: Negative for redness and visual disturbance.   Respiratory: Negative for cough, shortness of breath and wheezing.    Cardiovascular: Negative for chest pain, palpitations and leg swelling.   Gastrointestinal: Negative for abdominal pain, constipation, diarrhea, nausea and vomiting.   Genitourinary: Positive for scrotal swelling and testicular pain. Negative for decreased urine volume, difficulty urinating, discharge, dysuria, frequency, hematuria, penile pain and urgency.   Musculoskeletal: Negative for back pain, joint swelling and neck pain.   Skin: Negative for color change, rash and wound.   Neurological: Negative for dizziness, tremors, weakness, light-headedness and headaches.     Objective:     Vital Signs (Most Recent):  Temp: 96.9 °F (36.1 °C) (10/13/18 0754)  Pulse: 83 (10/13/18 1000)  Resp: 16 (10/13/18 0754)  BP: (!) 155/67 (10/13/18 0754)  SpO2: 97 % (10/13/18 0756) Vital Signs (24h Range):  Temp:  [96.9 °F (36.1 °C)-101.7 °F (38.7 °C)] 96.9 °F (36.1 °C)  Pulse:  [64-89] 83  Resp:  [16-20] 16  SpO2:  [93 %-98 %] 97 %  BP: (121-162)/(58-75) 155/67     Weight: 91.9 kg (202 lb 9.6 oz)  Body mass index is 32.7 kg/m².    Physical Exam   Constitutional: He is oriented to person, place, and time. He appears well-developed and well-nourished. No distress.   HENT:   Head: Normocephalic and atraumatic.   Right Ear: External ear normal.   Left Ear: External ear normal.   Eyes: Conjunctivae and EOM are normal. Pupils are equal, round, and reactive to light.   Neck: Neck supple. No tracheal deviation present.   Cardiovascular: Normal rate and regular rhythm. Exam reveals no friction rub.   Pulmonary/Chest: Effort normal and breath sounds normal. No  respiratory distress. He has no wheezes. He has no rales.   Abdominal: Soft. Bowel sounds are normal. He exhibits no distension. There is no tenderness. There is no rebound and no guarding.   Genitourinary:   Genitourinary Comments: Left testicle swelling and tenderness throughout   Neurological: He is alert and oriented to person, place, and time. No cranial nerve deficit.   Skin: Skin is warm and dry.   Psychiatric: He has a normal mood and affect. His behavior is normal.   Nursing note and vitals reviewed.        CRANIAL NERVES     CN III, IV, VI   Pupils are equal, round, and reactive to light.  Extraocular motions are normal.        Significant Labs:   Blood Culture:   Recent Labs   Lab  10/12/18   1756   LABBLOO  No Growth to date  No Growth to date     CBC:   Recent Labs   Lab  10/12/18   1701  10/13/18   0507   WBC  21.77*  23.16*   HGB  11.5*  10.1*   HCT  35.7*  31.6*   PLT  358*  305     CMP:   Recent Labs   Lab  10/12/18   1701  10/13/18   0507   NA  139  136   K  3.1*  3.0*   CL  92*  97   CO2  37*  31*   GLU  111*  166*   BUN  10  8   CREATININE  0.9  0.8   CALCIUM  9.2  8.2*   PROT  6.6  5.7*   ALBUMIN  3.6  3.0*   BILITOT  0.6  0.5   ALKPHOS  59  49*   AST  16  15   ALT  12  10   ANIONGAP  10  8   EGFRNONAA  >60  >60     Lactic Acid:   Recent Labs   Lab  10/12/18   1756  10/12/18   2355   LACTATE  1.1  0.8     Urine Culture: No results for input(s): LABURIN in the last 48 hours.  Urine Studies:   Recent Labs   Lab  10/12/18   1720   COLORU  Yellow   APPEARANCEUA  Clear   PHUR  >8.0   SPECGRAV  1.015   PROTEINUA  1+*   GLUCUA  Negative   KETONESU  Trace*   BILIRUBINUA  Negative   OCCULTUA  Negative   NITRITE  Negative   UROBILINOGEN  Negative   LEUKOCYTESUR  Trace*   RBCUA  2   WBCUA  25*   BACTERIA  Occasional   HYALINECASTS  0     All pertinent labs within the past 24 hours have been reviewed.    Significant Imaging: I have reviewed all pertinent imaging results/findings within the past 24 hours.

## 2018-10-14 LAB
ALBUMIN SERPL BCP-MCNC: 2.8 G/DL
ALP SERPL-CCNC: 54 U/L
ALT SERPL W/O P-5'-P-CCNC: 11 U/L
ANION GAP SERPL CALC-SCNC: 9 MMOL/L
AST SERPL-CCNC: 16 U/L
BASOPHILS # BLD AUTO: 0.04 K/UL
BASOPHILS NFR BLD: 0.2 %
BILIRUB SERPL-MCNC: 0.6 MG/DL
BUN SERPL-MCNC: 9 MG/DL
CALCIUM SERPL-MCNC: 8.3 MG/DL
CHLORIDE SERPL-SCNC: 100 MMOL/L
CO2 SERPL-SCNC: 29 MMOL/L
CREAT SERPL-MCNC: 0.8 MG/DL
DIFFERENTIAL METHOD: ABNORMAL
EOSINOPHIL # BLD AUTO: 0.1 K/UL
EOSINOPHIL NFR BLD: 0.7 %
ERYTHROCYTE [DISTWIDTH] IN BLOOD BY AUTOMATED COUNT: 14.5 %
EST. GFR  (AFRICAN AMERICAN): >60 ML/MIN/1.73 M^2
EST. GFR  (NON AFRICAN AMERICAN): >60 ML/MIN/1.73 M^2
GLUCOSE SERPL-MCNC: 152 MG/DL
HCT VFR BLD AUTO: 30.8 %
HGB BLD-MCNC: 9.8 G/DL
LYMPHOCYTES # BLD AUTO: 1.6 K/UL
LYMPHOCYTES NFR BLD: 8.6 %
MCH RBC QN AUTO: 29.1 PG
MCHC RBC AUTO-ENTMCNC: 31.8 G/DL
MCV RBC AUTO: 91 FL
MONOCYTES # BLD AUTO: 1.3 K/UL
MONOCYTES NFR BLD: 7.3 %
NEUTROPHILS # BLD AUTO: 15.1 K/UL
NEUTROPHILS NFR BLD: 83.2 %
PLATELET # BLD AUTO: 288 K/UL
PMV BLD AUTO: 9.6 FL
POCT GLUCOSE: 134 MG/DL (ref 70–110)
POCT GLUCOSE: 191 MG/DL (ref 70–110)
POCT GLUCOSE: 220 MG/DL (ref 70–110)
POTASSIUM SERPL-SCNC: 3.2 MMOL/L
PROT SERPL-MCNC: 5.6 G/DL
RBC # BLD AUTO: 3.37 M/UL
SODIUM SERPL-SCNC: 138 MMOL/L
WBC # BLD AUTO: 18.11 K/UL

## 2018-10-14 PROCEDURE — 27000221 HC OXYGEN, UP TO 24 HOURS

## 2018-10-14 PROCEDURE — 80053 COMPREHEN METABOLIC PANEL: CPT

## 2018-10-14 PROCEDURE — 63600175 PHARM REV CODE 636 W HCPCS: Performed by: SURGERY

## 2018-10-14 PROCEDURE — 25000003 PHARM REV CODE 250: Performed by: SURGERY

## 2018-10-14 PROCEDURE — 25000003 PHARM REV CODE 250: Performed by: INTERNAL MEDICINE

## 2018-10-14 PROCEDURE — 94761 N-INVAS EAR/PLS OXIMETRY MLT: CPT

## 2018-10-14 PROCEDURE — 36415 COLL VENOUS BLD VENIPUNCTURE: CPT

## 2018-10-14 PROCEDURE — 82962 GLUCOSE BLOOD TEST: CPT

## 2018-10-14 PROCEDURE — 99232 SBSQ HOSP IP/OBS MODERATE 35: CPT | Mod: ,,, | Performed by: INTERNAL MEDICINE

## 2018-10-14 PROCEDURE — 85025 COMPLETE CBC W/AUTO DIFF WBC: CPT

## 2018-10-14 PROCEDURE — 11000001 HC ACUTE MED/SURG PRIVATE ROOM

## 2018-10-14 RX ORDER — POTASSIUM CHLORIDE 20 MEQ/1
40 TABLET, EXTENDED RELEASE ORAL ONCE
Status: COMPLETED | OUTPATIENT
Start: 2018-10-14 | End: 2018-10-14

## 2018-10-14 RX ORDER — FUROSEMIDE 40 MG/1
40 TABLET ORAL DAILY
Status: DISCONTINUED | OUTPATIENT
Start: 2018-10-14 | End: 2018-10-17

## 2018-10-14 RX ADMIN — INSULIN DETEMIR 40 UNITS: 100 INJECTION, SOLUTION SUBCUTANEOUS at 09:10

## 2018-10-14 RX ADMIN — POTASSIUM CHLORIDE 40 MEQ: 1500 TABLET, EXTENDED RELEASE ORAL at 09:10

## 2018-10-14 RX ADMIN — BICALUTAMIDE 50 MG: 50 TABLET, FILM COATED ORAL at 09:10

## 2018-10-14 RX ADMIN — APIXABAN 5 MG: 5 TABLET, FILM COATED ORAL at 09:10

## 2018-10-14 RX ADMIN — SIMVASTATIN 40 MG: 40 TABLET, FILM COATED ORAL at 09:10

## 2018-10-14 RX ADMIN — GABAPENTIN 600 MG: 300 CAPSULE ORAL at 09:10

## 2018-10-14 RX ADMIN — ACETAMINOPHEN 1000 MG: 500 TABLET ORAL at 03:10

## 2018-10-14 RX ADMIN — LEVOFLOXACIN 500 MG: 5 INJECTION, SOLUTION INTRAVENOUS at 06:10

## 2018-10-14 RX ADMIN — PANTOPRAZOLE SODIUM 40 MG: 40 TABLET, DELAYED RELEASE ORAL at 09:10

## 2018-10-14 RX ADMIN — METOPROLOL TARTRATE 50 MG: 50 TABLET ORAL at 09:10

## 2018-10-14 RX ADMIN — TAMSULOSIN HYDROCHLORIDE 0.4 MG: 0.4 CAPSULE ORAL at 09:10

## 2018-10-14 RX ADMIN — ACETAMINOPHEN 1000 MG: 500 TABLET ORAL at 11:10

## 2018-10-14 RX ADMIN — FUROSEMIDE 40 MG: 40 TABLET ORAL at 11:10

## 2018-10-14 RX ADMIN — ASPIRIN 81 MG: 81 TABLET, COATED ORAL at 09:10

## 2018-10-14 RX ADMIN — LISINOPRIL 10 MG: 10 TABLET ORAL at 09:10

## 2018-10-14 NOTE — SUBJECTIVE & OBJECTIVE
"Past Medical History:   Diagnosis Date    Arthritis     BPH (benign prostatic hyperplasia)     CHF (congestive heart failure)     Depression     Diabetes mellitus type II     Hyperlipidemia     Hypertension     Prostate cancer 10/01/2018    RLS (restless legs syndrome)        Past Surgical History:   Procedure Laterality Date    ARTHROSCOPY-KNEE Right 6/6/2016    [facility] Jr[facility]    BACK SURGERY      cervical fusion    CARDIAC PACEMAKER PLACEMENT      CARDIAC SURGERY Left     pacemaker placement    CHONDROPLASTY-KNEE Right 6/6/2016    [facility]formed by [facility]ie [facility] Jr[facility] at [facility]    COLECTOMY N/A     CYSTOSCOPY N/A     Pt stated, "I have had six Cystoscopy."    JACK-TRANSFORAMINAL Right 5/20/2016    [facility]formed by Jc Jain MD at [facility]    EYE SURGERY Bilateral     cataracts    Ganglion Cyst Removed  Right     INJECTION-STEROID-EPIDURAL-TRANSFORAMINAL Right 10/14/2016    [facility]    MENISCECTOMY Right 6/6/2016    [facility] by [facility]ie [facility] Jr[facility] at [facility]    Neck Fusion Bilateral     PROSTATE SURGERY      ROTATOR CUFF REPAIR Right     SKIN BIOPSY      skin cancer    TOTAL KNEE ARTHROPLASTY  03/30/2017    right knee    TRANSURETHRAL RESECTION OF PROSTATE         Review of patient's allergies indicates:   Allergen Reactions    Iodine Hives     Iv iodine only       No current facility-administered medications on file prior to encounter.      Current Outpatient Medications on File Prior to Encounter   Medication Sig    ascorbic acid, vitamin C, (VITAMIN C) 500 MG tablet Take 500 mg by mouth once daily.    aspirin (ECOTRIN) 81 MG EC tablet Take 81 mg by mouth once daily.    bicalutamide (CASODEX) 50 MG Tab     CIALIS 20 mg Tab Take 1 tablet by mouth once a day    citalopram (CELEXA) 20 MG tablet Take 1 tablet (20 mg total) by mouth once daily.    diclofenac sodium (VOLTAREN) 1 % Gel Apply 4 g topically 4 " "(four) times daily as needed.    doxycycline (VIBRA-TABS) 100 MG tablet     dulaglutide (TRULICITY) 1.5 mg/0.5 mL PnIj Inject 1.5 mg into the skin once a week.    ELIQUIS 5 mg Tab Take 1 tablet (5 mg total) by mouth 2 (two) times daily.    furosemide (LASIX) 40 MG tablet Take 1 tablet (40 mg total) by mouth once daily.    gabapentin (NEURONTIN) 300 MG capsule Take 2 capsules by mouth  two times daily    insulin glargine (LANTUS SOLOSTAR U-100 INSULIN) 100 unit/mL (3 mL) InPn pen Inject 40 Units into the skin every evening.    levoFLOXacin (LEVAQUIN) 500 MG tablet Take 1 tablet (500 mg total) by mouth as needed.    lisinopril 10 MG tablet Take 1 tablet (10 mg total) by mouth once daily.    metFORMIN (GLUCOPHAGE) 1000 MG tablet Take 1,000 mg by mouth 2 (two) times daily with meals.    metoprolol tartrate (LOPRESSOR) 50 MG tablet Take 1 tablet (50 mg total) by mouth 2 (two) times daily.    mometasone (ELOCON) 0.1 % lotion     pen needle, diabetic (LITE TOUCH INSULIN PEN NEEDLES) 31 gauge x 3/16" Ndle 1 each by Misc.(Non-Drug; Combo Route) route once daily.    simvastatin (ZOCOR) 40 MG tablet Take 1 tablet (40 mg total) by mouth every evening.    SITagliptin (JANUVIA) 100 MG Tab Take 1 tablet (100 mg total) by mouth once daily.    tamsulosin (FLOMAX) 0.4 mg Cap Take 1 capsule (0.4 mg total) by mouth once daily.     Family History     Problem Relation (Age of Onset)    COPD Daughter    Cancer Mother    Diabetes Sister, Brother, Daughter    Heart disease Father, Brother    Seizures Daughter        Tobacco Use    Smoking status: Former Smoker     Packs/day: 1.50     Years: 35.00     Pack years: 52.50     Types: Cigarettes     Last attempt to quit: 1981     Years since quittin.8    Smokeless tobacco: Never Used    Tobacco comment: 35 yrs ago quit   Substance and Sexual Activity    Alcohol use: Yes     Alcohol/week: 4.8 oz     Types: 1 Cans of beer, 7 Shots of liquor per week     Comment: highball " every night    Drug use: No    Sexual activity: Not on file     Review of Systems   Constitutional: Negative for activity change, fatigue, fever and unexpected weight change.   HENT: Negative for congestion, ear pain, hearing loss, rhinorrhea and sore throat.    Eyes: Negative for redness and visual disturbance.   Respiratory: Negative for cough, shortness of breath and wheezing.    Cardiovascular: Negative for chest pain, palpitations and leg swelling.   Gastrointestinal: Negative for abdominal pain, constipation, diarrhea, nausea and vomiting.   Genitourinary: Positive for scrotal swelling and testicular pain. Negative for decreased urine volume, difficulty urinating, discharge, dysuria, frequency, hematuria, penile pain and urgency.   Musculoskeletal: Negative for back pain, joint swelling and neck pain.   Skin: Negative for color change, rash and wound.   Neurological: Negative for dizziness, tremors, weakness, light-headedness and headaches.     Objective:     Vital Signs (Most Recent):  Temp: 99.1 °F (37.3 °C) (10/14/18 0712)  Pulse: 63 (10/14/18 0712)  Resp: 20 (10/14/18 0712)  BP: 138/63 (10/14/18 0712)  SpO2: 96 % (10/14/18 0712) Vital Signs (24h Range):  Temp:  [96.9 °F (36.1 °C)-99.1 °F (37.3 °C)] 99.1 °F (37.3 °C)  Pulse:  [60-83] 63  Resp:  [16-20] 20  SpO2:  [91 %-98 %] 96 %  BP: (119-155)/(60-67) 138/63     Weight: 91.9 kg (202 lb 9.6 oz)  Body mass index is 32.7 kg/m².    Physical Exam   Constitutional: He is oriented to person, place, and time. He appears well-developed and well-nourished. No distress.   HENT:   Head: Normocephalic and atraumatic.   Right Ear: External ear normal.   Left Ear: External ear normal.   Eyes: Conjunctivae and EOM are normal. Pupils are equal, round, and reactive to light.   Neck: Neck supple. No tracheal deviation present.   Cardiovascular: Normal rate and regular rhythm. Exam reveals no friction rub.   Pulmonary/Chest: Effort normal and breath sounds normal. No  respiratory distress. He has no wheezes. He has no rales.   Abdominal: Soft. Bowel sounds are normal. He exhibits no distension. There is no tenderness.   Genitourinary:   Genitourinary Comments: Left testicle swelling and tenderness throughout   Neurological: He is alert and oriented to person, place, and time. No cranial nerve deficit.   Skin: Skin is warm and dry.   Psychiatric: He has a normal mood and affect. His behavior is normal.   Nursing note and vitals reviewed.        CRANIAL NERVES     CN III, IV, VI   Pupils are equal, round, and reactive to light.  Extraocular motions are normal.        Significant Labs:   Blood Culture:   Recent Labs   Lab  10/12/18   1756   LABBLOO  No Growth to date  No Growth to date  No Growth to date  No Growth to date     CBC:   Recent Labs   Lab  10/12/18   1701  10/13/18   0507  10/14/18   0545   WBC  21.77*  23.16*  18.11*   HGB  11.5*  10.1*  9.8*   HCT  35.7*  31.6*  30.8*   PLT  358*  305  288     CMP:   Recent Labs   Lab  10/12/18   1701  10/13/18   0507  10/14/18   0545   NA  139  136  138   K  3.1*  3.0*  3.2*   CL  92*  97  100   CO2  37*  31*  29   GLU  111*  166*  152*   BUN  10  8  9   CREATININE  0.9  0.8  0.8   CALCIUM  9.2  8.2*  8.3*   PROT  6.6  5.7*  5.6*   ALBUMIN  3.6  3.0*  2.8*   BILITOT  0.6  0.5  0.6   ALKPHOS  59  49*  54*   AST  16  15  16   ALT  12  10  11   ANIONGAP  10  8  9   EGFRNONAA  >60  >60  >60     Lactic Acid:   Recent Labs   Lab  10/12/18   1756  10/12/18   2355   LACTATE  1.1  0.8     Urine Culture:   Recent Labs   Lab  10/12/18   1720   LABURIN  ENTEROCOCCUS SPECIES  10,000 - 49,999 cfu/ml  Identification and susceptibility pending       Urine Studies:   Recent Labs   Lab  10/12/18   1720   COLORU  Yellow   APPEARANCEUA  Clear   PHUR  >8.0   SPECGRAV  1.015   PROTEINUA  1+*   GLUCUA  Negative   KETONESU  Trace*   BILIRUBINUA  Negative   OCCULTUA  Negative   NITRITE  Negative   UROBILINOGEN  Negative   LEUKOCYTESUR  Trace*   RBCUA  2    WBCUA  25*   BACTERIA  Occasional   HYALINECASTS  0     All pertinent labs within the past 24 hours have been reviewed.    Significant Imaging: I have reviewed all pertinent imaging results/findings within the past 24 hours.

## 2018-10-14 NOTE — PLAN OF CARE
Problem: Patient Care Overview  Goal: Plan of Care Review  Outcome: Ongoing (interventions implemented as appropriate)  Patient resting in bed with some complaints of pain in right ribs and scrotum. Relief stated with PRN tylenol. Swelling in groin. Ice packs and elevation utilized. VS stable. A&Ox4 Paced on tele. No acute changes noted, Plan of care reviewed with patient and wife.

## 2018-10-14 NOTE — ASSESSMENT & PLAN NOTE
Presumed E. Coli. Pseudomonas sometimes a considering in older men as well  Covering with levaquin 500mg daily  Day 12/10    WBC 21K and fever > 101 F on arrival to ER thus meets sepsis criteria. Source orchitis  Monitor fever curve  Monitor WBC  IVF--will stop 10/13 due to h/o CHF. Continue to hold lasix for now

## 2018-10-14 NOTE — PROGRESS NOTES
Pt reported some SOB. Listen to lungs, crackles are present in base of left lung. Dr. Lopez notified. Resuming home diuretics meds. O2 sats WNL

## 2018-10-14 NOTE — HOSPITAL COURSE
10/14: feeling better but still with some pain and swelling of left testicle. No fevers. WBC down to 18K from 21K on admit. Remains on IV levaquin    10/15 day 4 levaquin (s/p one dose rocephin on admission). 99.1 t max. WBC improving [id number] admit >>[id number] today. Pain meds ordered PRN, pt has not needed. Blood cultures remain NGTD x 2, urine with enterococcus sensitive to macrobid.     Does report SOB this am. Winded trying to talk this am. On O2 2L NC pox 93-95%; 87% on RA. CXR  With small pleural effusion yesterday. PO Lasix restarted yesterday but remains SOB.     10/16 day 5 levaquin (s/p 1 dose rocephin on admission).he did c/o SOB yesterday. Lasix was started po Sunday. He was given an extra dose of IV lasix yesterday for small pleural effusion noted on CXR and fluids given on admission. We also tried tyo wean O2 but his sats were 89% on RA. Now on 3L NC satting 93%. BUN/creat remain stable.  Pt spiked a 103.1 temp yesterday blood cultures redrawn.. WBC continue to come down. LFT starting to rise.     He does report that today he feels much better than yesterday. No longer SOB. Not hurting in groin unless moving. Redness better as well as swelling.     10/17/18 day 6 levaquin (s/p 1 dose rocephin on admission). 100.9 t max which is better than 103 on Monday. WBC continues to decrease as well. Levaquin is for orchitis and macrobid started Monday for enterococcus sensitive UTI. Blood cultures from admit and Monday NGTD. Today he is not feeling well again. Yesterday felt better. CXR yesterday shows vascular congestion, Echo with 55% EF and no diastolic dysfunction but elevated PA pressures and  on admit 876 yesterday 943 today. Did have lasix held on admission and given minimal IV hydration. Lasix po restarted per home routine. IV lasix given on Monday.     10/18 day 7 levaquin and day 4 macrobid. Afebrile. He is doing well. No complaints of SOB this am. Off of O2 this am POX 93%. Ambulating well. WBC  continues to decline. Lasix 40mg IV given yesterday and >369.

## 2018-10-14 NOTE — PLAN OF CARE
Problem: Patient Care Overview  Goal: Plan of Care Review  Outcome: Ongoing (interventions implemented as appropriate)  Vitals remained stable, afebrile. Complained of pain in ribs at times. Two episodes on SOB. Crackles to bases this AM, gave lasix. Crackles now gone. Put on 1L NC for comfort for pt. Dr. Lopze aware. Ambulates well. Appetite WNL. discussed plan of care with pt, stated understanding.

## 2018-10-14 NOTE — ASSESSMENT & PLAN NOTE
WBC 21 K; + fever  Admit for IV levaquin  Elevation and ice PRN for swelling  Monitor fever curve  Day 2/10 treatment, Once WBC trending down and fevers resolve could transition to PO but remains with significant leukocytosis so continue IV for now. Jim pending blood cx

## 2018-10-14 NOTE — PROGRESS NOTES
"[facility] Medicine  Progress Note    Patient Name: Erasmo Dunbar  MRN: [id number]  Patient Class: IP- Inpatient   Admission Date: 10/12/2018  Length of Stay: 2 days  Attending Physician: Katarina Lopez MD  Primary Care Provider: Hemal Varma MD        Subjective:     Principal Problem:Orchitis    HPI:  Patient presented to ER with left testicular swelling. Sx started 1 day ago. He is having pain and swelling. No fevers. Mild dysuria but reports h/o chronic UTI/prostatis for which he typically take cipro and/or levaquin. No recent antibx. US showed epididymo-orchitis with moderate hydrocele. Admitted for IV Levaquin. Labs showed WBC 21K. Normal lactate. Blood and urine cx sent and pending.     Hospital Course:  10/14: feeling better but still with some pain and swelling of left testicle. No fevers. WBC down to 18K from 21K on admit. Remains on IV levaquin    Past Medical History:   Diagnosis Date    Arthritis     BPH (benign prostatic hyperplasia)     CHF (congestive heart failure)     Depression     Diabetes mellitus type II     Hyperlipidemia     Hypertension     Prostate cancer 10/01/2018    RLS (restless legs syndrome)        Past Surgical History:   Procedure Laterality Date    ARTHROSCOPY-KNEE Right 6/6/2016    Performed by Markie Chance Jr., [facility] at [facility] OR    BACK SURGERY      cervical fusion    CARDIAC PACEMAKER PLACEMENT      CARDIAC SURGERY Left     pacemaker placement    CHONDROPLASTY-KNEE Right 6/6/2016    Performed by Markie Chance Jr., MD at [facility] OR    COLECTOMY N/A     CYSTOSCOPY N/A     Pt stated, "I have had six Cystoscopy."    JACK-TRANSFORAMINAL Right 5/20/2016    Performed by Jc Jain MD at [facility] OR    EYE SURGERY Bilateral     cataracts    Ganglion Cyst Removed  Right     INJECTION-STEROID-EPIDURAL-TRANSFORAMINAL Right 10/14/2016    Performed by Jc Jain MD at [facility]    MENISCECTOMY Right 6/6/2016    Performed by " "Markie Chance Jr., [facility] at [facility] OR    Neck Fusion Bilateral     PROSTATE SURGERY      ROTATOR CUFF REPAIR Right     SKIN BIOPSY      skin cancer    TOTAL KNEE ARTHROPLASTY  03/30/2017    right knee    TRANSURETHRAL RESECTION OF PROSTATE         Review of patient's allergies indicates:   Allergen Reactions    Iodine Hives     Iv iodine only       No current facility-administered medications on file prior to encounter.      Current Outpatient Medications on File Prior to Encounter   Medication Sig    ascorbic acid, vitamin C, (VITAMIN C) 500 MG tablet Take 500 mg by mouth once daily.    aspirin (ECOTRIN) 81 MG EC tablet Take 81 mg by mouth once daily.    bicalutamide (CASODEX) 50 MG Tab     CIALIS 20 mg Tab Take 1 tablet by mouth once a day    citalopram (CELEXA) 20 MG tablet Take 1 tablet (20 mg total) by mouth once daily.    diclofenac sodium (VOLTAREN) 1 % Gel Apply 4 g topically 4 (four) times daily as needed.    doxycycline (VIBRA-TABS) 100 MG tablet     dulaglutide (TRULICITY) 1.5 mg/0.5 mL PnIj Inject 1.5 mg into the skin once a week.    ELIQUIS 5 mg Tab Take 1 tablet (5 mg total) by mouth 2 (two) times daily.    furosemide (LASIX) 40 MG tablet Take 1 tablet (40 mg total) by mouth once daily.    gabapentin (NEURONTIN) 300 MG capsule Take 2 capsules by mouth  two times daily    insulin glargine (LANTUS SOLOSTAR U-100 INSULIN) 100 unit/mL (3 mL) InPn pen Inject 40 Units into the skin every evening.    levoFLOXacin (LEVAQUIN) 500 MG tablet Take 1 tablet (500 mg total) by mouth as needed.    lisinopril 10 MG tablet Take 1 tablet (10 mg total) by mouth once daily.    metFORMIN (GLUCOPHAGE) 1000 MG tablet Take 1,000 mg by mouth 2 (two) times daily with meals.    metoprolol tartrate (LOPRESSOR) 50 MG tablet Take 1 tablet (50 mg total) by mouth 2 (two) times daily.    mometasone (ELOCON) 0.1 % lotion     pen needle, diabetic (LITE TOUCH INSULIN PEN NEEDLES) 31 gauge x 3/16" Ndle 1 each by " Misc.(Non-Drug; Combo Route) route once daily.    simvastatin (ZOCOR) 40 MG tablet Take 1 tablet (40 mg total) by mouth every evening.    SITagliptin (JANUVIA) 100 MG Tab Take 1 tablet (100 mg total) by mouth once daily.    tamsulosin (FLOMAX) 0.4 mg Cap Take 1 capsule (0.4 mg total) by mouth once daily.     Family History     Problem Relation (Age of Onset)    COPD Daughter    Cancer Mother    Diabetes Sister, Brother, Daughter    Heart disease Father, Brother    Seizures Daughter        Tobacco Use    Smoking status: Former Smoker     Packs/day: 1.50     Years: 35.00     Pack years: 52.50     Types: Cigarettes     Last attempt to quit: 1981     Years since quittin.8    Smokeless tobacco: Never Used    Tobacco comment: 35 yrs ago quit   Substance and Sexual Activity    Alcohol use: Yes     Alcohol/week: 4.8 oz     Types: 1 Cans of beer, 7 Shots of liquor per week     Comment: highball every night    Drug use: No    Sexual activity: Not on file     Review of Systems   Constitutional: Negative for activity change, fatigue, fever and unexpected weight change.   HENT: Negative for congestion, ear pain, hearing loss, rhinorrhea and sore throat.    Eyes: Negative for redness and visual disturbance.   Respiratory: Negative for cough, shortness of breath and wheezing.    Cardiovascular: Negative for chest pain, palpitations and leg swelling.   Gastrointestinal: Negative for abdominal pain, constipation, diarrhea, nausea and vomiting.   Genitourinary: Positive for scrotal swelling and testicular pain. Negative for decreased urine volume, difficulty urinating, discharge, dysuria, frequency, hematuria, penile pain and urgency.   Musculoskeletal: Negative for back pain, joint swelling and neck pain.   Skin: Negative for color change, rash and wound.   Neurological: Negative for dizziness, tremors, weakness, light-headedness and headaches.     Objective:     Vital Signs (Most Recent):  Temp: 99.1 °F (37.3 °C)  (10/14/18 0712)  Pulse: 63 (10/14/18 0712)  Resp: 20 (10/14/18 0712)  BP: 138/63 (10/14/18 0712)  SpO2: 96 % (10/14/18 0712) Vital Signs (24h Range):  Temp:  [96.9 °F (36.1 °C)-99.1 °F (37.3 °C)] 99.1 °F (37.3 °C)  Pulse:  [60-83] 63  Resp:  [16-20] 20  SpO2:  [91 %-98 %] 96 %  BP: (119-155)/(60-67) 138/63     Weight: 91.9 kg (202 lb 9.6 oz)  Body mass index is 32.7 kg/m².    Physical Exam   Constitutional: He is oriented to person, place, and time. He appears well-developed and well-nourished. No distress.   HENT:   Head: Normocephalic and atraumatic.   Right Ear: External ear normal.   Left Ear: External ear normal.   Eyes: Conjunctivae and EOM are normal. Pupils are equal, round, and reactive to light.   Neck: Neck supple. No tracheal deviation present.   Cardiovascular: Normal rate and regular rhythm. Exam reveals no friction rub.   Pulmonary/Chest: Effort normal and breath sounds normal. No respiratory distress. He has no wheezes. He has no rales.   Abdominal: Soft. Bowel sounds are normal. He exhibits no distension. There is no tenderness.   Genitourinary:   Genitourinary Comments: Left testicle swelling and tenderness throughout   Neurological: He is alert and oriented to person, place, and time. No cranial nerve deficit.   Skin: Skin is warm and dry.   Psychiatric: He has a normal mood and affect. His behavior is normal.   Nursing note and vitals reviewed.        CRANIAL NERVES     CN III, IV, VI   Pupils are equal, round, and reactive to light.  Extraocular motions are normal.        Significant Labs:   Blood Culture:   Recent Labs   Lab  10/12/18   1756   LABBLOO  No Growth to date  No Growth to date  No Growth to date  No Growth to date     CBC:   Recent Labs   Lab  10/12/18   1701  10/13/18   0507  10/14/18   0545   WBC  21.77*  23.16*  18.11*   HGB  11.5*  10.1*  9.8*   HCT  35.7*  31.6*  30.8*   PLT  358*  305  288     CMP:   Recent Labs   Lab  10/12/18   1701  10/13/18   0507  10/14/18   0545   NA   139  136  138   K  3.1*  3.0*  3.2*   CL  92*  97  100   CO2  37*  31*  29   GLU  111*  166*  152*   BUN  10  8  9   CREATININE  0.9  0.8  0.8   CALCIUM  9.2  8.2*  8.3*   PROT  6.6  5.7*  5.6*   ALBUMIN  3.6  3.0*  2.8*   BILITOT  0.6  0.5  0.6   ALKPHOS  59  49*  54*   AST  16  15  16   ALT  12  10  11   ANIONGAP  10  8  9   EGFRNONAA  >60  >60  >60     Lactic Acid:   Recent Labs   Lab  10/12/18   1756  10/12/18   2355   LACTATE  1.1  0.8     Urine Culture:   Recent Labs   Lab  10/12/18   1720   LABURIN  ENTEROCOCCUS SPECIES  10,000 - 49,999 cfu/ml  Identification and susceptibility pending       Urine Studies:   Recent Labs   Lab  10/12/18   1720   COLORU  Yellow   APPEARANCEUA  Clear   PHUR  >8.0   SPECGRAV  1.015   PROTEINUA  1+*   GLUCUA  Negative   KETONESU  Trace*   BILIRUBINUA  Negative   OCCULTUA  Negative   NITRITE  Negative   UROBILINOGEN  Negative   LEUKOCYTESUR  Trace*   RBCUA  2   WBCUA  25*   BACTERIA  Occasional   HYALINECASTS  0     All pertinent labs within the past 24 hours have been reviewed.    Significant Imaging: I have reviewed all pertinent imaging results/findings within the past 24 hours.    Assessment/Plan:      * Orchitis    WBC 21 K; + fever  Admit for IV levaquin  Elevation and ice PRN for swelling  Monitor fever curve  Day 2/10 treatment, Once WBC trending down and fevers resolve could transition to PO but remains with significant leukocytosis so continue IV for now. Jim pending blood cx          Paroxysmal atrial fibrillation    Cont BB  Cont eliquis  telemetry          Chronic diastolic CHF (congestive heart failure)    Stop IVF 10/13  Cont to hold lasix for now  No signs of acute exacerbation          Prostate cancer    Cont casodex  Diagnosed about 3 weeks ago by outside urology (Dr. Coon)          Sepsis due to Escherichia coli    Presumed E. Coli. Pseudomonas sometimes a considering in older men as well  Covering with levaquin 500mg daily  Day 12/10    WBC 21K and fever >  101 F on arrival to ER thus meets sepsis criteria. Source orchitis  Monitor fever curve  Monitor WBC  IVF--will stop 10/13 due to h/o CHF. Continue to hold lasix for now          Diabetes mellitus, type 2    Last A1C good control  Hold PO meds while inpatient  Cont SSI and levemir 40 units          BPH (benign prostatic hyperplasia)    Cont home flomax          Hypertension    Cont home lisinopril, metoprolol          Hyperlipidemia    Cont home statin            VTE Risk Mitigation (From admission, onward)        Ordered     apixaban tablet 5 mg  2 times daily      10/13/18 0656              Katarina Lopez MD  Department of Hospital Medicine   [facility]

## 2018-10-15 PROBLEM — I50.33 ACUTE ON CHRONIC DIASTOLIC CHF (CONGESTIVE HEART FAILURE): Status: ACTIVE | Noted: 2018-10-13

## 2018-10-15 LAB
ALBUMIN SERPL BCP-MCNC: 2.9 G/DL
ALP SERPL-CCNC: 73 U/L
ALT SERPL W/O P-5'-P-CCNC: 30 U/L
ANION GAP SERPL CALC-SCNC: 11 MMOL/L
AST SERPL-CCNC: 32 U/L
BACTERIA UR CULT: NORMAL
BASOPHILS # BLD AUTO: 0.04 K/UL
BASOPHILS NFR BLD: 0.2 %
BILIRUB SERPL-MCNC: 0.7 MG/DL
BILIRUB UR QL STRIP: NEGATIVE
BUN SERPL-MCNC: 11 MG/DL
CALCIUM SERPL-MCNC: 8.9 MG/DL
CHLORIDE SERPL-SCNC: 101 MMOL/L
CLARITY UR: CLEAR
CO2 SERPL-SCNC: 28 MMOL/L
COLOR UR: YELLOW
CREAT SERPL-MCNC: 0.9 MG/DL
DIFFERENTIAL METHOD: ABNORMAL
EOSINOPHIL # BLD AUTO: 0.1 K/UL
EOSINOPHIL NFR BLD: 0.6 %
ERYTHROCYTE [DISTWIDTH] IN BLOOD BY AUTOMATED COUNT: 14.5 %
EST. GFR  (AFRICAN AMERICAN): >60 ML/MIN/1.73 M^2
EST. GFR  (NON AFRICAN AMERICAN): >60 ML/MIN/1.73 M^2
GLUCOSE SERPL-MCNC: 116 MG/DL
GLUCOSE UR QL STRIP: NEGATIVE
HCT VFR BLD AUTO: 31.3 %
HGB BLD-MCNC: 9.9 G/DL
HGB UR QL STRIP: ABNORMAL
INFLUENZA A, MOLECULAR: NEGATIVE
INFLUENZA B, MOLECULAR: NEGATIVE
KETONES UR QL STRIP: NEGATIVE
LEUKOCYTE ESTERASE UR QL STRIP: NEGATIVE
LYMPHOCYTES # BLD AUTO: 2.6 K/UL
LYMPHOCYTES NFR BLD: 15.2 %
MCH RBC QN AUTO: 28.7 PG
MCHC RBC AUTO-ENTMCNC: 31.6 G/DL
MCV RBC AUTO: 91 FL
MONOCYTES # BLD AUTO: 1.6 K/UL
MONOCYTES NFR BLD: 9.5 %
NEUTROPHILS # BLD AUTO: 12.7 K/UL
NEUTROPHILS NFR BLD: 74.5 %
NITRITE UR QL STRIP: NEGATIVE
PH UR STRIP: 6 [PH] (ref 5–8)
PLATELET # BLD AUTO: 335 K/UL
PMV BLD AUTO: 10 FL
POCT GLUCOSE: 121 MG/DL (ref 70–110)
POCT GLUCOSE: 141 MG/DL (ref 70–110)
POCT GLUCOSE: 209 MG/DL (ref 70–110)
POCT GLUCOSE: 281 MG/DL (ref 70–110)
POTASSIUM SERPL-SCNC: 3.8 MMOL/L
PROT SERPL-MCNC: 6.1 G/DL
PROT UR QL STRIP: NEGATIVE
RBC # BLD AUTO: 3.45 M/UL
SODIUM SERPL-SCNC: 140 MMOL/L
SP GR UR STRIP: 1.01 (ref 1–1.03)
SPECIMEN SOURCE: NORMAL
URN SPEC COLLECT METH UR: ABNORMAL
UROBILINOGEN UR STRIP-ACNC: NEGATIVE EU/DL
WBC # BLD AUTO: 17.01 K/UL

## 2018-10-15 PROCEDURE — 87040 BLOOD CULTURE FOR BACTERIA: CPT

## 2018-10-15 PROCEDURE — 82962 GLUCOSE BLOOD TEST: CPT

## 2018-10-15 PROCEDURE — 85025 COMPLETE CBC W/AUTO DIFF WBC: CPT

## 2018-10-15 PROCEDURE — 25000003 PHARM REV CODE 250: Performed by: NURSE PRACTITIONER

## 2018-10-15 PROCEDURE — 63600175 PHARM REV CODE 636 W HCPCS: Performed by: NURSE PRACTITIONER

## 2018-10-15 PROCEDURE — 87502 INFLUENZA DNA AMP PROBE: CPT

## 2018-10-15 PROCEDURE — 80053 COMPREHEN METABOLIC PANEL: CPT

## 2018-10-15 PROCEDURE — 11000001 HC ACUTE MED/SURG PRIVATE ROOM

## 2018-10-15 PROCEDURE — 25000003 PHARM REV CODE 250: Performed by: INTERNAL MEDICINE

## 2018-10-15 PROCEDURE — 63600175 PHARM REV CODE 636 W HCPCS: Performed by: INTERNAL MEDICINE

## 2018-10-15 PROCEDURE — 94761 N-INVAS EAR/PLS OXIMETRY MLT: CPT

## 2018-10-15 PROCEDURE — 99233 SBSQ HOSP IP/OBS HIGH 50: CPT | Mod: ,,, | Performed by: INTERNAL MEDICINE

## 2018-10-15 PROCEDURE — 27000221 HC OXYGEN, UP TO 24 HOURS

## 2018-10-15 PROCEDURE — 25000003 PHARM REV CODE 250: Performed by: SURGERY

## 2018-10-15 PROCEDURE — 36415 COLL VENOUS BLD VENIPUNCTURE: CPT

## 2018-10-15 PROCEDURE — 63600175 PHARM REV CODE 636 W HCPCS: Performed by: SURGERY

## 2018-10-15 PROCEDURE — 81003 URINALYSIS AUTO W/O SCOPE: CPT

## 2018-10-15 RX ORDER — DIAZEPAM 2 MG/1
2 TABLET ORAL EVERY 8 HOURS PRN
Status: DISCONTINUED | OUTPATIENT
Start: 2018-10-15 | End: 2018-10-18 | Stop reason: HOSPADM

## 2018-10-15 RX ORDER — FUROSEMIDE 10 MG/ML
40 INJECTION INTRAMUSCULAR; INTRAVENOUS ONCE
Status: COMPLETED | OUTPATIENT
Start: 2018-10-15 | End: 2018-10-15

## 2018-10-15 RX ORDER — NITROFURANTOIN 25; 75 MG/1; MG/1
100 CAPSULE ORAL EVERY 12 HOURS
Status: DISCONTINUED | OUTPATIENT
Start: 2018-10-15 | End: 2018-10-18 | Stop reason: HOSPADM

## 2018-10-15 RX ORDER — CITALOPRAM 10 MG/1
20 TABLET ORAL DAILY
Status: DISCONTINUED | OUTPATIENT
Start: 2018-10-15 | End: 2018-10-18 | Stop reason: HOSPADM

## 2018-10-15 RX ADMIN — INSULIN DETEMIR 40 UNITS: 100 INJECTION, SOLUTION SUBCUTANEOUS at 09:10

## 2018-10-15 RX ADMIN — LISINOPRIL 10 MG: 10 TABLET ORAL at 09:10

## 2018-10-15 RX ADMIN — INSULIN ASPART 2 UNITS: 100 INJECTION, SOLUTION INTRAVENOUS; SUBCUTANEOUS at 09:10

## 2018-10-15 RX ADMIN — APIXABAN 5 MG: 5 TABLET, FILM COATED ORAL at 09:10

## 2018-10-15 RX ADMIN — VANCOMYCIN HYDROCHLORIDE 1500 MG: 500 INJECTION, POWDER, LYOPHILIZED, FOR SOLUTION INTRAVENOUS at 01:10

## 2018-10-15 RX ADMIN — ACETAMINOPHEN 1000 MG: 500 TABLET ORAL at 02:10

## 2018-10-15 RX ADMIN — ASPIRIN 81 MG: 81 TABLET, COATED ORAL at 09:10

## 2018-10-15 RX ADMIN — FUROSEMIDE 40 MG: 40 TABLET ORAL at 09:10

## 2018-10-15 RX ADMIN — LEVOFLOXACIN 500 MG: 5 INJECTION, SOLUTION INTRAVENOUS at 05:10

## 2018-10-15 RX ADMIN — GABAPENTIN 600 MG: 300 CAPSULE ORAL at 09:10

## 2018-10-15 RX ADMIN — ACETAMINOPHEN 1000 MG: 500 TABLET ORAL at 12:10

## 2018-10-15 RX ADMIN — METOPROLOL TARTRATE 50 MG: 50 TABLET ORAL at 09:10

## 2018-10-15 RX ADMIN — BICALUTAMIDE 50 MG: 50 TABLET, FILM COATED ORAL at 09:10

## 2018-10-15 RX ADMIN — FUROSEMIDE 40 MG: 10 INJECTION, SOLUTION INTRAMUSCULAR; INTRAVENOUS at 10:10

## 2018-10-15 RX ADMIN — NITROFURANTOIN MONOHYDRATE AND NITROFURANTOIN MACROCRYSTALLINE 100 MG: 75; 25 CAPSULE ORAL at 09:10

## 2018-10-15 RX ADMIN — PANTOPRAZOLE SODIUM 40 MG: 40 TABLET, DELAYED RELEASE ORAL at 09:10

## 2018-10-15 RX ADMIN — SIMVASTATIN 40 MG: 40 TABLET, FILM COATED ORAL at 09:10

## 2018-10-15 RX ADMIN — CITALOPRAM HYDROBROMIDE 20 MG: 10 TABLET ORAL at 09:10

## 2018-10-15 RX ADMIN — TAMSULOSIN HYDROCHLORIDE 0.4 MG: 0.4 CAPSULE ORAL at 09:10

## 2018-10-15 NOTE — PLAN OF CARE
Problem: Patient Care Overview  Goal: Plan of Care Review  Outcome: Ongoing (interventions implemented as appropriate)  Patient resting in bed with some complaints of pain in right ribs and scrotum. Relief stated with PRN tylenol. Swelling in groin and abdomin. Started back on . Ice packs and elevation utilized.  Some complaints of SOB VS stable. A&Ox4 BS okay Paced on tele. No acute changes noted, Plan of care reviewed with patient and wife.

## 2018-10-15 NOTE — ASSESSMENT & PLAN NOTE
Last A1C good control  Hold PO meds while inpatient  Cont SSI and levemir 40 units  -191- no correction sliding scale insulin needed

## 2018-10-15 NOTE — PROGRESS NOTES
Patient just had an episode where he got short of breath. Not a good time for pharmacy rounding per nurse Liana.

## 2018-10-15 NOTE — NURSING
Notified Dr. Lopez of patient's status: temperature 102.5, B/P 179/75, elevated resp and difficulty breathing. Discussed previous studies done on BC and sensitivity. Orders for repeat UA,, Urine Culture, swab for flu. Order for Vancomycin 1500gm  X1.

## 2018-10-15 NOTE — PROGRESS NOTES
[facility] Medicine  Progress Note    Patient Name: Erasmo Dunbar  MRN: [id number]  Patient Class: IP- Inpatient   Admission Date: 10/12/2018  Length of Stay: 3 days  Attending Physician: Katarina Lopez MD  Primary Care Provider: Hemal Varma MD        Subjective:     Principal Problem:Sepsis due to Escherichia coli    HPI:  Patient presented to ER with left testicular swelling. Sx started 1 day ago. He is having pain and swelling. No fevers. Mild dysuria but reports h/o chronic UTI/prostatis for which he typically take cipro and/or levaquin. No recent antibx. US showed epididymo-orchitis with moderate hydrocele. Admitted for IV Levaquin. Labs showed WBC 21K. Normal lactate. Blood and urine cx sent and pending.     Hospital Course:  10/14: feeling better but still with some pain and swelling of left testicle. No fevers. WBC down to 18K from 21K on admit. Remains on IV levaquin    10/15 day 4 levaquin (s/p one dose rocephin on admission). 99.1 t max. WBC improving [id number] admit >>08001 today. Pain meds ordered PRN, pt has not needed. Blood cultures remain NGTD x 2, urine with enterococcus sensitive to macrobid.     Does report SOB this am. Winded trying to talk this am. On O2 2L NC pox 93-95%; 87% on RA. CXR  With small pleural effusion yesterday. PO Lasix restarted yesterday but remains SOB.     Interval note: improving, c/o SOB this am. On 2L NC pox 93-95%- cxr with small pleural effusion  Review of Systems   Constitutional: Negative for activity change, fatigue, fever and unexpected weight change.   HENT: Negative for congestion, ear pain, hearing loss, rhinorrhea and sore throat.    Eyes: Negative for redness and visual disturbance.   Respiratory: Positive for shortness of breath. Negative for cough and wheezing.    Cardiovascular: Negative for chest pain, palpitations and leg swelling.   Gastrointestinal: Negative for abdominal pain, constipation, diarrhea, nausea and vomiting.    Genitourinary: Positive for scrotal swelling and testicular pain. Negative for decreased urine volume, difficulty urinating, discharge, dysuria, frequency, hematuria, penile pain and urgency.   Musculoskeletal: Negative for back pain, joint swelling and neck pain.   Skin: Negative for color change, rash and wound.   Neurological: Negative for dizziness, tremors, weakness, light-headedness and headaches.     Objective:     Vital Signs (Most Recent):  Temp: 98 °F (36.7 °C) (10/15/18 0733)  Pulse: 82 (10/15/18 0733)  Resp: 17 (10/15/18 0733)  BP: (!) 158/74 (10/15/18 0733)  SpO2: (!) 93 % (10/15/18 0733) Vital Signs (24h Range):  Temp:  [96.5 °F (35.8 °C)-98.9 °F (37.2 °C)] 98 °F (36.7 °C)  Pulse:  [60-82] 82  Resp:  [16-18] 17  SpO2:  [93 %-95 %] 93 %  BP: (125-158)/(59-74) 158/74     Weight: 91.9 kg (202 lb 9.6 oz)  Body mass index is 32.7 kg/m².    Physical Exam   Constitutional: He is oriented to person, place, and time. He appears well-developed and well-nourished. No distress.   HENT:   Head: Normocephalic and atraumatic.   Right Ear: External ear normal.   Left Ear: External ear normal.   Eyes: Conjunctivae and EOM are normal. Pupils are equal, round, and reactive to light.   Neck: Neck supple. No tracheal deviation present.   Cardiovascular: Normal rate and regular rhythm. Exam reveals no friction rub.   Pulmonary/Chest: Effort normal and breath sounds normal. No respiratory distress. He has no wheezes. He has no rales.   Abdominal: Soft. Bowel sounds are normal. He exhibits no distension. There is no tenderness.   Genitourinary:   Genitourinary Comments: Left testicle swelling and tenderness throughout, improving   Musculoskeletal: He exhibits no edema.   Neurological: He is alert and oriented to person, place, and time. No cranial nerve deficit.   Skin: Skin is warm and dry.   Psychiatric: He has a normal mood and affect. His behavior is normal.   Nursing note and vitals reviewed.        CRANIAL NERVES      CN III, IV, VI   Pupils are equal, round, and reactive to light.  Extraocular motions are normal.        Significant Labs:   Blood Culture:   Lab Results   Component Value Date    WBC 17.01 (H) 10/15/2018    HGB 9.9 (L) 10/15/2018    HCT 31.3 (L) 10/15/2018    MCV 91 10/15/2018     10/15/2018     BMP  Lab Results   Component Value Date     10/15/2018    K 3.8 10/15/2018     10/15/2018    CO2 28 10/15/2018    BUN 11 10/15/2018    CREATININE 0.9 10/15/2018    CALCIUM 8.9 10/15/2018    ANIONGAP 11 10/15/2018    ESTGFRAFRICA >60 10/15/2018    EGFRNONAA >60 10/15/2018     Lab Results   Component Value Date    ALT 30 10/15/2018    AST 32 10/15/2018    ALKPHOS 73 10/15/2018    BILITOT 0.7 10/15/2018       Blood cultures x 2 NGTD   Urine Culture, Routine ENTEROCOCCUS FAECALIS   10,000 - 49,999 cfu/ml       Resulting Agency [facility]   Susceptibility      Enterococcus faecalis     CULTURE, URINE     Ampicillin <=2  Sensitive     Nitrofurantoin <=32  Sensitive     Tetracycline >8  Resistant     Vancomycin 2  Sensitive         Lactic acid 1.1>>0.8    Mag 1.2    Significant Imaging    US scrotum Left epididymo-orchitis with a moderate associated hydrocele.    CXR small pleural effusion     Assessment/Plan:      * Sepsis due to Enterococcus     Presumed E. Coli. Pseudomonas sometimes a consideration in older men as well  Covering with levaquin 500mg daily  Day 12/10    WBC 21K and fever > 101 F on arrival to ER thus meets sepsis criteria. Source orchitis  Monitor fever curve  Monitor WBC  IVF--will stop 10/13 due to h/o CHF. Continue to hold lasix for now.    10/15 urine culture with enterococcus- add macrobid and continue levaquin for orchitis as this is best coverage. VSS/afebrile. Lasix restarted after getting IVF on admit          Paroxysmal atrial fibrillation    Cont BB  Cont eliquis  telemetry          Acute on chronic diastolic CHF (congestive heart failure)    Stop IVF 10/13  Cont to hold lasix  for now  No signs of acute exacerbation    10/14 lasix restarted  Cont BB and ACE    10/15--now more acute on chronic s/p IVF on admit for sepsis  C/o SOB today, 87% on RA and CXR with small effusion. Restarted PO Lasix yesterday but will do IV Lasix today to try and improve SOB and hypoxia. On eliquis so low suspicion for PE. No signs of PNA/bronchitis and levaquin should cover anyway. ?anxiety- restart citalopram, he has not been getting here and PRN valium as he was prescribed at home          Prostate cancer    Cont casodex  Diagnosed about 3 weeks ago by outside urology (Dr. Coon)          Orchitis    WBC 21 K; + fever  Admit for IV levaquin  Elevation and ice PRN for swelling  Monitor fever curve  Day 2/10 treatment, Once WBC trending down and fevers resolve could transition to PO but remains with significant leukocytosis so continue IV for now. Jim pending blood cx    10/12 day 4 iv levaquin. Afebrile and leukocytosis improving, from [id number]          Diabetes mellitus, type 2    Last A1C good control  Hold PO meds while inpatient  Cont SSI and levemir 40 units  -191- no correction sliding scale insulin needed          BPH (benign prostatic hyperplasia)    Cont home flomax          Hypertension    Cont home lisinopril & metoprolol          Hyperlipidemia    Cont home statin and asa            VTE Risk Mitigation (From admission, onward)        Ordered     apixaban tablet 5 mg  2 times daily      10/13/18 0678              Katarina Lopez MD  Department of Hospital Medicine   [facility]

## 2018-10-15 NOTE — ASSESSMENT & PLAN NOTE
Presumed E. Coli. Pseudomonas sometimes a consideration in older men as well  Covering with levaquin 500mg daily  Day 12/10    WBC 21K and fever > 101 F on arrival to ER thus meets sepsis criteria. Source orchitis  Monitor fever curve  Monitor WBC  IVF--will stop 10/13 due to h/o CHF. Continue to hold lasix for now.    10/15 urine culture with enterococcus- add macrobid and continue levaquin for orchitis as this is best coverage. VSS/afebrile. Lasix restarted after getting IVF on admit

## 2018-10-15 NOTE — NURSING
(16 F) dolan inserted per MD order.    Hibiclens used for cath insertion due to allergy to iodine. (sterile field maintained). Liana at bedside to assist.    1305, removed as order was for urine sample purpose only per SARITA Gaines. See orders.    Tolerated procedure and d/c well, 10cc of saline removed from balloon port, cath tip intact on D/C, educated patient to call for first void and or any problems.

## 2018-10-15 NOTE — PLAN OF CARE
10/15/18 1127   Discharge Assessment   Assessment Type Discharge Planning Assessment   Confirmed/corrected address and phone number on facesheet? Yes   Assessment information obtained from? Patient   Expected Length of Stay (days) 3   Communicated expected length of stay with patient/caregiver yes   Prior to hospitilization cognitive status: Alert/Oriented   Prior to hospitalization functional status: Independent   Current cognitive status: Alert/Oriented   Current Functional Status: Independent   Lives With spouse   Able to Return to Prior Arrangements yes   Is patient able to care for self after discharge? Yes   Who are your caregiver(s) and their phone number(s)? Carol Dunbar - spouse - [phone]   Patient's perception of discharge disposition home or selfcare   Readmission Within The Last 30 Days no previous admission in last 30 days   Patient currently being followed by outpatient case management? No   Patient currently receives any other outside agency services? No   Equipment Currently Used at Home cane, straight   Do you have any problems affording any of your prescribed medications? No   Is the patient taking medications as prescribed? yes   Does the patient have transportation home? Yes   Transportation Available car;family or friend will provide   Does the patient receive services at the [facility]? No   Discharge Plan A Home   Discharge Plan B Home   Patient/Family In Agreement With Plan yes       Pt is a 83 year old male admitted for orchitis. Pt lives with his spouse and she was in the room. No Social Work needs noted at this time. Will continue to follow and offer support as needed.    Jerald Thornton LMSW

## 2018-10-15 NOTE — NURSING
Notified SARITA Frank house supervisor of patient's status and MD orders. Requested  for assistance.

## 2018-10-15 NOTE — PROGRESS NOTES
Home Oxygen Evaluation    Date Performed: 10/15/2018    1) Patient's O2 Sat on room air, while at rest: 87%        If O2 sats on room air at rest are 88% or below, patient qualifies. No additional testing needed. Document N/A in steps 2 and 3. If 89% or above, complete steps 2.      2) Patient's O2 Sat on room air while exercising: N/A        If O2 sats on room air while exercising remain 89% or above patient does not qualify, no further testing needed Document N/A in step 3. If O2 sats on room air while exercising are 88% or below, continue to step 3.      3) Patient's O2 Sat while exercising on O2: N/A at N/A LPM         (Must show improvement from #2 for patients to qualify)    If O2 sats improve on oxygen, patient qualifies for portable oxygen. If not, the patient does not qualify.

## 2018-10-15 NOTE — PLAN OF CARE
10/15/18 1150   Discharge Reassessment   Assessment Type Discharge Planning Reassessment         Spoke with patient on rounds today. He is SOB today, and wearing oxygen. He does not have oxygen at home. Will monitor this and try to wean off if possible. Possible discharge this afternoon or tomorrow depending on oxygen. Patient and wife states agreement with POC.CM will continue to follow and assist as needed.

## 2018-10-15 NOTE — ASSESSMENT & PLAN NOTE
WBC 21 K; + fever  Admit for IV levaquin  Elevation and ice PRN for swelling  Monitor fever curve  Day 2/10 treatment, Once WBC trending down and fevers resolve could transition to PO but remains with significant leukocytosis so continue IV for now. Jim pending blood cx    10/12 day 4 iv levaquin. Afebrile and leukocytosis improving, from [id number]

## 2018-10-15 NOTE — ASSESSMENT & PLAN NOTE
Stop IVF 10/13  Cont to hold lasix for now  No signs of acute exacerbation    10/14 lasix restarted  Cont BB and ACE    10/15--now more acute on chronic s/p IVF on admit for sepsis  C/o SOB today, 87% on RA and CXR with small effusion. Restarted PO Lasix yesterday but will do IV Lasix today to try and improve SOB and hypoxia. On eliquis so low suspicion for PE. No signs of PNA/bronchitis and levaquin should cover anyway. ?anxiety- restart citalopram, he has not been getting here and PRN valium as he was prescribed at home

## 2018-10-15 NOTE — PLAN OF CARE
Problem: Patient Care Overview  Goal: Plan of Care Review  Outcome: Ongoing (interventions implemented as appropriate)  Patient feeling much better. Episode today of difficulty breathing & shortness of breath. Oxygen @ 3 L per NC; sat 93%. Lasix given. Repeat BC, UA/UC, flu swab (negative) and IV vancomycin one time order. Wife at bedside. No distress noted. Will continue to monitor status. Agrees with plan of care.

## 2018-10-16 LAB
ALBUMIN SERPL BCP-MCNC: 2.7 G/DL
ALP SERPL-CCNC: 110 U/L
ALT SERPL W/O P-5'-P-CCNC: 49 U/L
ANION GAP SERPL CALC-SCNC: 10 MMOL/L
AST SERPL-CCNC: 43 U/L
BASOPHILS # BLD AUTO: 0.04 K/UL
BASOPHILS NFR BLD: 0.3 %
BILIRUB SERPL-MCNC: 1 MG/DL
BNP SERPL-MCNC: 876 PG/ML
BUN SERPL-MCNC: 11 MG/DL
CALCIUM SERPL-MCNC: 9 MG/DL
CHLORIDE SERPL-SCNC: 95 MMOL/L
CO2 SERPL-SCNC: 32 MMOL/L
CREAT SERPL-MCNC: 0.9 MG/DL
DIASTOLIC DYSFUNCTION: NO
DIFFERENTIAL METHOD: ABNORMAL
EOSINOPHIL # BLD AUTO: 0.1 K/UL
EOSINOPHIL NFR BLD: 0.3 %
ERYTHROCYTE [DISTWIDTH] IN BLOOD BY AUTOMATED COUNT: 14.3 %
EST. GFR  (AFRICAN AMERICAN): >60 ML/MIN/1.73 M^2
EST. GFR  (NON AFRICAN AMERICAN): >60 ML/MIN/1.73 M^2
ESTIMATED PA SYSTOLIC PRESSURE: 41.73
GLUCOSE SERPL-MCNC: 140 MG/DL
HCT VFR BLD AUTO: 30.7 %
HGB BLD-MCNC: 9.8 G/DL
LYMPHOCYTES # BLD AUTO: 1.7 K/UL
LYMPHOCYTES NFR BLD: 10.6 %
MCH RBC QN AUTO: 28.7 PG
MCHC RBC AUTO-ENTMCNC: 31.9 G/DL
MCV RBC AUTO: 90 FL
MITRAL VALVE REGURGITATION: ABNORMAL
MONOCYTES # BLD AUTO: 1.8 K/UL
MONOCYTES NFR BLD: 11.4 %
NEUTROPHILS # BLD AUTO: 12.1 K/UL
NEUTROPHILS NFR BLD: 77.4 %
PLATELET # BLD AUTO: 362 K/UL
PMV BLD AUTO: 9.8 FL
POCT GLUCOSE: 137 MG/DL (ref 70–110)
POCT GLUCOSE: 155 MG/DL (ref 70–110)
POCT GLUCOSE: 194 MG/DL (ref 70–110)
POCT GLUCOSE: 201 MG/DL (ref 70–110)
POTASSIUM SERPL-SCNC: 4.1 MMOL/L
PROT SERPL-MCNC: 6.1 G/DL
RBC # BLD AUTO: 3.41 M/UL
RETIRED EF AND QEF - SEE NOTES: 55 (ref 55–65)
SODIUM SERPL-SCNC: 137 MMOL/L
TRICUSPID VALVE REGURGITATION: ABNORMAL
WBC # BLD AUTO: 15.62 K/UL

## 2018-10-16 PROCEDURE — 25000003 PHARM REV CODE 250: Performed by: INTERNAL MEDICINE

## 2018-10-16 PROCEDURE — 25000003 PHARM REV CODE 250: Performed by: SURGERY

## 2018-10-16 PROCEDURE — 27000221 HC OXYGEN, UP TO 24 HOURS

## 2018-10-16 PROCEDURE — 36415 COLL VENOUS BLD VENIPUNCTURE: CPT

## 2018-10-16 PROCEDURE — 83880 ASSAY OF NATRIURETIC PEPTIDE: CPT

## 2018-10-16 PROCEDURE — 63600175 PHARM REV CODE 636 W HCPCS: Performed by: SURGERY

## 2018-10-16 PROCEDURE — 11000001 HC ACUTE MED/SURG PRIVATE ROOM

## 2018-10-16 PROCEDURE — 85025 COMPLETE CBC W/AUTO DIFF WBC: CPT

## 2018-10-16 PROCEDURE — 94761 N-INVAS EAR/PLS OXIMETRY MLT: CPT

## 2018-10-16 PROCEDURE — 80053 COMPREHEN METABOLIC PANEL: CPT

## 2018-10-16 PROCEDURE — 93306 TTE W/DOPPLER COMPLETE: CPT

## 2018-10-16 PROCEDURE — 99233 SBSQ HOSP IP/OBS HIGH 50: CPT | Mod: ,,, | Performed by: INTERNAL MEDICINE

## 2018-10-16 PROCEDURE — 25000003 PHARM REV CODE 250: Performed by: NURSE PRACTITIONER

## 2018-10-16 RX ORDER — ASPIRIN 81 MG/1
81 TABLET ORAL DAILY
Status: DISCONTINUED | OUTPATIENT
Start: 2018-10-16 | End: 2018-10-18 | Stop reason: HOSPADM

## 2018-10-16 RX ORDER — LISINOPRIL 20 MG/1
20 TABLET ORAL DAILY
Status: DISCONTINUED | OUTPATIENT
Start: 2018-10-16 | End: 2018-10-18 | Stop reason: HOSPADM

## 2018-10-16 RX ADMIN — GABAPENTIN 600 MG: 300 CAPSULE ORAL at 09:10

## 2018-10-16 RX ADMIN — LEVOFLOXACIN 500 MG: 5 INJECTION, SOLUTION INTRAVENOUS at 05:10

## 2018-10-16 RX ADMIN — ASPIRIN 81 MG: 81 TABLET, COATED ORAL at 11:10

## 2018-10-16 RX ADMIN — FUROSEMIDE 40 MG: 40 TABLET ORAL at 08:10

## 2018-10-16 RX ADMIN — NITROFURANTOIN MONOHYDRATE AND NITROFURANTOIN MACROCRYSTALLINE 100 MG: 75; 25 CAPSULE ORAL at 09:10

## 2018-10-16 RX ADMIN — NITROFURANTOIN MONOHYDRATE AND NITROFURANTOIN MACROCRYSTALLINE 100 MG: 75; 25 CAPSULE ORAL at 08:10

## 2018-10-16 RX ADMIN — INSULIN ASPART 1 UNITS: 100 INJECTION, SOLUTION INTRAVENOUS; SUBCUTANEOUS at 09:10

## 2018-10-16 RX ADMIN — INSULIN ASPART 4 UNITS: 100 INJECTION, SOLUTION INTRAVENOUS; SUBCUTANEOUS at 11:10

## 2018-10-16 RX ADMIN — CITALOPRAM HYDROBROMIDE 20 MG: 10 TABLET ORAL at 08:10

## 2018-10-16 RX ADMIN — ASPIRIN 81 MG: 81 TABLET, COATED ORAL at 08:10

## 2018-10-16 RX ADMIN — BICALUTAMIDE 50 MG: 50 TABLET, FILM COATED ORAL at 08:10

## 2018-10-16 RX ADMIN — INSULIN DETEMIR 40 UNITS: 100 INJECTION, SOLUTION SUBCUTANEOUS at 09:10

## 2018-10-16 RX ADMIN — LISINOPRIL 20 MG: 20 TABLET ORAL at 08:10

## 2018-10-16 RX ADMIN — SIMVASTATIN 40 MG: 40 TABLET, FILM COATED ORAL at 09:10

## 2018-10-16 RX ADMIN — METOPROLOL TARTRATE 50 MG: 50 TABLET ORAL at 08:10

## 2018-10-16 RX ADMIN — APIXABAN 5 MG: 5 TABLET, FILM COATED ORAL at 08:10

## 2018-10-16 RX ADMIN — APIXABAN 5 MG: 5 TABLET, FILM COATED ORAL at 09:10

## 2018-10-16 RX ADMIN — METOPROLOL TARTRATE 50 MG: 50 TABLET ORAL at 09:10

## 2018-10-16 RX ADMIN — TAMSULOSIN HYDROCHLORIDE 0.4 MG: 0.4 CAPSULE ORAL at 08:10

## 2018-10-16 RX ADMIN — HYDROCODONE BITARTRATE AND ACETAMINOPHEN 1 TABLET: 5; 325 TABLET ORAL at 01:10

## 2018-10-16 RX ADMIN — GABAPENTIN 600 MG: 300 CAPSULE ORAL at 08:10

## 2018-10-16 RX ADMIN — PANTOPRAZOLE SODIUM 40 MG: 40 TABLET, DELAYED RELEASE ORAL at 08:10

## 2018-10-16 RX ADMIN — INSULIN ASPART 2 UNITS: 100 INJECTION, SOLUTION INTRAVENOUS; SUBCUTANEOUS at 05:10

## 2018-10-16 NOTE — PROGRESS NOTES
[facility] Hospital Medicine  Progress Note    Patient Name: Erasmo Dunbar  MRN: [id number]  Patient Class: IP- Inpatient   Admission Date: 10/12/2018  Length of Stay: 4 days  Attending Physician: Katarina Lopez MD  Primary Care Provider: Hemal Varma MD        Subjective:     Principal Problem:Sepsis due to Escherichia coli    HPI:  Patient presented to ER with left testicular swelling. Sx started 1 day ago. He is having pain and swelling. No fevers. Mild dysuria but reports h/o chronic UTI/prostatis for which he typically take cipro and/or levaquin. No recent antibx. US showed epididymo-orchitis with moderate hydrocele. Admitted for IV Levaquin. Labs showed WBC 21K. Normal lactate. Blood and urine cx sent and pending.     Hospital Course:  10/14: feeling better but still with some pain and swelling of left testicle. No fevers. WBC down to 18K from 21K on admit. Remains on IV levaquin    10/15 day 4 levaquin (s/p one dose rocephin on admission). 99.1 t max. WBC improving [id number] admit >>[id number] today. Pain meds ordered PRN, pt has not needed. Blood cultures remain NGTD x 2, urine with enterococcus sensitive to macrobid.     Does report SOB this am. Winded trying to talk this am. On O2 2L NC pox 93-95%; 87% on RA. CXR  With small pleural effusion yesterday. PO Lasix restarted yesterday but remains SOB.     10/16 day 5 levaquin (s/p 1 dose rocephin on admission).he did c/o SOB yesterday. Lasix was started po Sunday. He was given an extra dose of IV lasix yesterday for small pleural effusion noted on CXR and fluids given on admission. We also tried tyo wean O2 but his sats were 89% on RA. Now on 3L NC satting 93%. BUN/creat remain stable.  Pt spiked a 103.1 temp yesterday blood cultures redrawn.. WBC continue to come down. LFT starting to rise.     He does report that today he feels much better than yesterday. No longer SOB. Not hurting in groin unless moving. Redness better as well as swelling.      Interval note: spiked a high temp yesterday 103.1, blood cultures redrawn  Review of Systems   Constitutional: Negative for activity change, fatigue, fever and unexpected weight change.   HENT: Negative for congestion, ear pain, hearing loss, rhinorrhea and sore throat.    Eyes: Negative for redness and visual disturbance.   Respiratory: Positive for shortness of breath. Negative for cough and wheezing.    Cardiovascular: Negative for chest pain, palpitations and leg swelling.   Gastrointestinal: Negative for abdominal pain, constipation, diarrhea, nausea and vomiting.   Genitourinary: Positive for scrotal swelling and testicular pain. Negative for decreased urine volume, difficulty urinating, discharge, dysuria, frequency, hematuria, penile pain and urgency.   Musculoskeletal: Negative for back pain, joint swelling and neck pain.   Skin: Negative for color change, rash and wound.   Neurological: Negative for dizziness, tremors, weakness, light-headedness and headaches.     Objective:     Vital Signs (Most Recent):  Temp: 99.4 °F (37.4 °C) (10/16/18 0418)  Pulse: 67 (10/16/18 0600)  Resp: (!) 23 (10/16/18 0418)  BP: (!) 162/74 (10/16/18 0418)  SpO2: (!) 93 % (10/16/18 0418) Vital Signs (24h Range):  Temp:  [98 °F (36.7 °C)-103.1 °F (39.5 °C)] 99.4 °F (37.4 °C)  Pulse:  [61-92] 67  Resp:  [17-24] 23  SpO2:  [89 %-98 %] 93 %  BP: (157-179)/(67-76) 162/74     Weight: 91.9 kg (202 lb 9.6 oz)  Body mass index is 32.7 kg/m².    Physical Exam   Constitutional: He is oriented to person, place, and time. He appears well-developed and well-nourished. No distress.   HENT:   Head: Normocephalic and atraumatic.   Right Ear: External ear normal.   Left Ear: External ear normal.   Eyes: Conjunctivae and EOM are normal. Pupils are equal, round, and reactive to light.   Neck: Neck supple. No tracheal deviation present.   Cardiovascular: Normal rate and regular rhythm. Exam reveals no friction rub.   Pulmonary/Chest: Effort  normal and breath sounds normal. No respiratory distress. He has no wheezes. He has no rales.   Abdominal: Soft. Bowel sounds are normal. He exhibits no distension. There is no tenderness.   Genitourinary:   Genitourinary Comments: Left testicle swelling and tenderness throughout, improving   Musculoskeletal: He exhibits no edema.   Neurological: He is alert and oriented to person, place, and time. No cranial nerve deficit.   Skin: Skin is warm and dry.   Psychiatric: He has a normal mood and affect. His behavior is normal.   Nursing note and vitals reviewed.        CRANIAL NERVES     CN III, IV, VI   Pupils are equal, round, and reactive to light.  Extraocular motions are normal.        Significant Labs:   Blood Culture:   Lab Results   Component Value Date    WBC 15.62 (H) 10/16/2018    HGB 9.8 (L) 10/16/2018    HCT 30.7 (L) 10/16/2018    MCV 90 10/16/2018     (H) 10/16/2018     BMP  Lab Results   Component Value Date     10/16/2018    K 4.1 10/16/2018    CL 95 10/16/2018    CO2 32 (H) 10/16/2018    BUN 11 10/16/2018    CREATININE 0.9 10/16/2018    CALCIUM 9.0 10/16/2018    ANIONGAP 10 10/16/2018    ESTGFRAFRICA >60 10/16/2018    EGFRNONAA >60 10/16/2018     Lab Results   Component Value Date    ALT 49 (H) 10/16/2018    AST 43 (H) 10/16/2018    ALKPHOS 110 10/16/2018    BILITOT 1.0 10/16/2018       Blood cultures x 2 NGTD from admit, repeated 10/15  Urine Culture, Routine ENTEROCOCCUS FAECALIS   10,000 - 49,999 cfu/ml       Resulting Agency [facility]   Susceptibility      Enterococcus faecalis     CULTURE, URINE     Ampicillin <=2  Sensitive     Nitrofurantoin <=32  Sensitive     Tetracycline >8  Resistant     Vancomycin 2  Sensitive         Lactic acid 1.1>>0.8    Mag 1.2    Significant Imaging    US scrotum Left epididymo-orchitis with a moderate associated hydrocele.    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  The heart is normal in size.  Calcified atheromatous disease affects the aorta.   There is pulmonary vascular congestion with mild interstitial edema.  No consolidation.    Assessment/Plan:      * Sepsis due to Enterococcus     Presumed E. Coli. Pseudomonas sometimes a consideration in older men as well  Covering with levaquin 500mg daily  Day 12/10    WBC 21K and fever > 101 F on arrival to ER thus meets sepsis criteria. Source orchitis  Monitor fever curve  Monitor WBC  IVF--will stop 10/13 due to h/o CHF. Continue to hold lasix for now.    10/15 urine culture with enterococcus- add macrobid and continue levaquin for orchitis as this is best coverage. VSS/afebrile. Lasix restarted after getting IVF on admit    10/16 macrobid for UTI, cont levaquin for orchitis, f/u repeat blood cultures  One time diose vanc given yesterday with huge temp spike. He is feeling better and macrobid covering the UTI. Will consider restarting vanc/ampicillin if fever continues. He has not had any more fever and feel much better today.         Paroxysmal atrial fibrillation    Cont BB  Cont eliquis  telemetry          Acute on chronic diastolic CHF (congestive heart failure)    Stop IVF 10/13  Cont to hold lasix for now  No signs of acute exacerbation    10/14 lasix restarted  Cont BB and ACE    10/15--now more acute on chronic s/p IVF on admit for sepsis  C/o SOB today, 87% on RA and CXR with small effusion. Restarted PO Lasix yesterday but will do IV Lasix today to try and improve SOB and hypoxia. On eliquis so low suspicion for PE. No signs of PNA/bronchitis and levaquin should cover anyway. ?anxiety- restart citalopram, he has not been getting here and PRN valium as he was prescribed at home    10/16 repeat echo today. Lasix 40mg IV given yesterday. Cont 40mg po daily. Check BNP today        Prostate cancer    Cont casodex  Diagnosed about 3 weeks ago by outside urology (Dr. Coon)          Orchitis    WBC 21 K; + fever  Admit for IV levaquin  Elevation and ice PRN for swelling  Monitor fever curve  Day 2/10  treatment, Once WBC trending down and fevers resolve could transition to PO but remains with significant leukocytosis so continue IV for now. Jim pending blood cx    10/15 day 4 iv levaquin. Afebrile and leukocytosis improving, from [id number]    10/16 day 5 levaquin. Now with fever, repeated blood cultures.priginal blood cultures negative.WBC 33659        Diabetes mellitus, type 2    Last A1C good control  Hold PO meds while inpatient  Cont SSI and levemir 40 units  -281- no correction sliding scale insulin needed          BPH (benign prostatic hyperplasia)    Cont home flomax          Hypertension    Cont home lisinopril & metoprolol- increase as bp elevated  lisinopril today, consider adding amlodipine in am if still high          Hyperlipidemia    Cont home statin and asa            VTE Risk Mitigation (From admission, onward)        Ordered     apixaban tablet 5 mg  2 times daily      10/13/18 0656              Devon Peres MD  Department of Hospital Medicine   [facility]

## 2018-10-16 NOTE — ASSESSMENT & PLAN NOTE
Last A1C good control  Hold PO meds while inpatient  Cont SSI and levemir 40 units  -281- no correction sliding scale insulin needed

## 2018-10-16 NOTE — NURSING
Bedside report received from Liana.  VS stable.  Family at bedside.  Call bell in reach.  Will continue to monitor.  No complaints of pain.

## 2018-10-16 NOTE — PLAN OF CARE
10/12/18 1930   Medicare Message   Important Message from Medicare regarding Discharge Appeal Rights Given to patient/caregiver;Explained to patient/caregiver;Signed/date by patient/caregiver   Date IMM was signed 10/12/18   Time IMM was signed 1930

## 2018-10-16 NOTE — ASSESSMENT & PLAN NOTE
WBC 21 K; + fever  Admit for IV levaquin  Elevation and ice PRN for swelling  Monitor fever curve  Day 2/10 treatment, Once WBC trending down and fevers resolve could transition to PO but remains with significant leukocytosis so continue IV for now. Jim pending blood cx    10/15 day 4 iv levaquin. Afebrile and leukocytosis improving, from [id number]>07656    10/16 day 5 levaquin. Now with fever, repeated blood cultures.priginal blood cultures negative.WBC 11455

## 2018-10-16 NOTE — PLAN OF CARE
Problem: Patient Care Overview  Goal: Plan of Care Review  Outcome: Ongoing (interventions implemented as appropriate)  Pt agrees with plan of care. VS stable.  No complaints of pain .  Pt remains on 3l nasal cannula with pulse ox 94%.  Call bell in reach.  Will continue to monitor.  No complaints of shortness of breath noted.

## 2018-10-16 NOTE — ASSESSMENT & PLAN NOTE
Stop IVF 10/13  Cont to hold lasix for now  No signs of acute exacerbation    10/14 lasix restarted  Cont BB and ACE    10/15--now more acute on chronic s/p IVF on admit for sepsis  C/o SOB today, 87% on RA and CXR with small effusion. Restarted PO Lasix yesterday but will do IV Lasix today to try and improve SOB and hypoxia. On eliquis so low suspicion for PE. No signs of PNA/bronchitis and levaquin should cover anyway. ?anxiety- restart citalopram, he has not been getting here and PRN valium as he was prescribed at home    10/16 repeat echo today. Lasix 40mg IV given yesterday. Cont 40mg po daily. Check BNP today

## 2018-10-16 NOTE — ASSESSMENT & PLAN NOTE
Cont home lisinopril & metoprolol- increase as bp elevated  lisinopril today, consider adding amlodipine in am if still high

## 2018-10-16 NOTE — SUBJECTIVE & OBJECTIVE
Interval note: spiked a high temp yesterday 103.1, blood cultures redrawn  Review of Systems   Constitutional: Negative for activity change, fatigue, fever and unexpected weight change.   HENT: Negative for congestion, ear pain, hearing loss, rhinorrhea and sore throat.    Eyes: Negative for redness and visual disturbance.   Respiratory: Positive for shortness of breath. Negative for cough and wheezing.    Cardiovascular: Negative for chest pain, palpitations and leg swelling.   Gastrointestinal: Negative for abdominal pain, constipation, diarrhea, nausea and vomiting.   Genitourinary: Positive for scrotal swelling and testicular pain. Negative for decreased urine volume, difficulty urinating, discharge, dysuria, frequency, hematuria, penile pain and urgency.   Musculoskeletal: Negative for back pain, joint swelling and neck pain.   Skin: Negative for color change, rash and wound.   Neurological: Negative for dizziness, tremors, weakness, light-headedness and headaches.     Objective:     Vital Signs (Most Recent):  Temp: 99.4 °F (37.4 °C) (10/16/18 0418)  Pulse: 67 (10/16/18 0600)  Resp: (!) 23 (10/16/18 0418)  BP: (!) 162/74 (10/16/18 0418)  SpO2: (!) 93 % (10/16/18 0418) Vital Signs (24h Range):  Temp:  [98 °F (36.7 °C)-103.1 °F (39.5 °C)] 99.4 °F (37.4 °C)  Pulse:  [61-92] 67  Resp:  [17-24] 23  SpO2:  [89 %-98 %] 93 %  BP: (157-179)/(67-76) 162/74     Weight: 91.9 kg (202 lb 9.6 oz)  Body mass index is 32.7 kg/m².    Physical Exam   Constitutional: He is oriented to person, place, and time. He appears well-developed and well-nourished. No distress.   HENT:   Head: Normocephalic and atraumatic.   Right Ear: External ear normal.   Left Ear: External ear normal.   Eyes: Conjunctivae and EOM are normal. Pupils are equal, round, and reactive to light.   Neck: Neck supple. No tracheal deviation present.   Cardiovascular: Normal rate and regular rhythm. Exam reveals no friction rub.   Pulmonary/Chest: Effort normal  and breath sounds normal. No respiratory distress. He has no wheezes. He has no rales.   Abdominal: Soft. Bowel sounds are normal. He exhibits no distension. There is no tenderness.   Genitourinary:   Genitourinary Comments: Left testicle swelling and tenderness throughout, improving   Musculoskeletal: He exhibits no edema.   Neurological: He is alert and oriented to person, place, and time. No cranial nerve deficit.   Skin: Skin is warm and dry.   Psychiatric: He has a normal mood and affect. His behavior is normal.   Nursing note and vitals reviewed.        CRANIAL NERVES     CN III, IV, VI   Pupils are equal, round, and reactive to light.  Extraocular motions are normal.        Significant Labs:   Blood Culture:   Lab Results   Component Value Date    WBC 15.62 (H) 10/16/2018    HGB 9.8 (L) 10/16/2018    HCT 30.7 (L) 10/16/2018    MCV 90 10/16/2018     (H) 10/16/2018     BMP  Lab Results   Component Value Date     10/16/2018    K 4.1 10/16/2018    CL 95 10/16/2018    CO2 32 (H) 10/16/2018    BUN 11 10/16/2018    CREATININE 0.9 10/16/2018    CALCIUM 9.0 10/16/2018    ANIONGAP 10 10/16/2018    ESTGFRAFRICA >60 10/16/2018    EGFRNONAA >60 10/16/2018     Lab Results   Component Value Date    ALT 49 (H) 10/16/2018    AST 43 (H) 10/16/2018    ALKPHOS 110 10/16/2018    BILITOT 1.0 10/16/2018       Blood cultures x 2 NGTD from admit, repeated 10/15  Urine Culture, Routine ENTEROCOCCUS FAECALIS   10,000 - 49,999 cfu/ml       Resulting Agency [facility]   Susceptibility      Enterococcus faecalis     CULTURE, URINE     Ampicillin <=2  Sensitive     Nitrofurantoin <=32  Sensitive     Tetracycline >8  Resistant     Vancomycin 2  Sensitive         Lactic acid 1.1>>0.8    Mag 1.2    Significant Imaging    US scrotum Left epididymo-orchitis with a moderate associated hydrocele.    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  The heart is normal in size.  Calcified atheromatous disease affects the aorta.  There is  pulmonary vascular congestion with mild interstitial edema.  No consolidation.

## 2018-10-16 NOTE — PLAN OF CARE
Problem: Patient Care Overview  Goal: Plan of Care Review  Outcome: Ongoing (interventions implemented as appropriate)  Patient stable. T-max today 100.9. Patient states that he is feeling much better today. Receiving IV antibiotics. 3L per nasal cannula. SpO2 92-95%. Remains free from falls. Plan of care reviewed with patient & family. They voiced understanding. Will continue to monitor.

## 2018-10-16 NOTE — PHYSICIAN QUERY
PT Name: Erasmo Dunbar  MR #: [id number]     Physician Query Form - Documentation Clarification      CDS: Kimberli Raphael RN     Contact information:  [email or web address]    Phone: [phone]    This form is a permanent document in the medical record.     Query Date: October 16, 2018    By submitting this query, we are merely seeking further clarification of documentation. Please utilize your independent clinical judgment when addressing the question(s) below.    The Medical record reflects the following:    Supporting Clinical Findings Location in Medical Record      K+= 3.1 --> 3.0 --> 3.2     potassium chloride SA CR tablet 40 mEq   Freq: Once Route: Oral      Lab 10/12/18 --> 10/13/18 -> 10/14/18     MAR: 10/14/18 0911                                                                                Doctor, Please specify diagnosis or diagnoses associated with above clinical findings.    Provider Use Only          [ X ] Hypokalemia      [  ] Other ___________                                                                                                               [  ] Clinically undetermined

## 2018-10-17 LAB
ALBUMIN SERPL BCP-MCNC: 2.7 G/DL
ALP SERPL-CCNC: 129 U/L
ALT SERPL W/O P-5'-P-CCNC: 51 U/L
ANION GAP SERPL CALC-SCNC: 11 MMOL/L
AST SERPL-CCNC: 34 U/L
BASOPHILS # BLD AUTO: 0.05 K/UL
BASOPHILS NFR BLD: 0.3 %
BILIRUB SERPL-MCNC: 0.8 MG/DL
BNP SERPL-MCNC: 943 PG/ML
BUN SERPL-MCNC: 13 MG/DL
CALCIUM SERPL-MCNC: 9.2 MG/DL
CHLORIDE SERPL-SCNC: 95 MMOL/L
CO2 SERPL-SCNC: 31 MMOL/L
CREAT SERPL-MCNC: 0.9 MG/DL
DIFFERENTIAL METHOD: ABNORMAL
EOSINOPHIL # BLD AUTO: 1 K/UL
EOSINOPHIL NFR BLD: 6.5 %
ERYTHROCYTE [DISTWIDTH] IN BLOOD BY AUTOMATED COUNT: 14.4 %
EST. GFR  (AFRICAN AMERICAN): >60 ML/MIN/1.73 M^2
EST. GFR  (NON AFRICAN AMERICAN): >60 ML/MIN/1.73 M^2
GLUCOSE SERPL-MCNC: 116 MG/DL
HCT VFR BLD AUTO: 31.5 %
HGB BLD-MCNC: 10.1 G/DL
LYMPHOCYTES # BLD AUTO: 1.2 K/UL
LYMPHOCYTES NFR BLD: 8.2 %
MCH RBC QN AUTO: 28.9 PG
MCHC RBC AUTO-ENTMCNC: 32.1 G/DL
MCV RBC AUTO: 90 FL
MONOCYTES # BLD AUTO: 1.7 K/UL
MONOCYTES NFR BLD: 11.6 %
NEUTROPHILS # BLD AUTO: 10.7 K/UL
NEUTROPHILS NFR BLD: 73.4 %
PLATELET # BLD AUTO: 401 K/UL
PMV BLD AUTO: 9.5 FL
POCT GLUCOSE: 123 MG/DL (ref 70–110)
POCT GLUCOSE: 187 MG/DL (ref 70–110)
POCT GLUCOSE: 222 MG/DL (ref 70–110)
POTASSIUM SERPL-SCNC: 3.8 MMOL/L
PROT SERPL-MCNC: 6.2 G/DL
RBC # BLD AUTO: 3.49 M/UL
SODIUM SERPL-SCNC: 137 MMOL/L
WBC # BLD AUTO: 14.57 K/UL

## 2018-10-17 PROCEDURE — 27000221 HC OXYGEN, UP TO 24 HOURS

## 2018-10-17 PROCEDURE — 63600175 PHARM REV CODE 636 W HCPCS: Performed by: SURGERY

## 2018-10-17 PROCEDURE — 63600175 PHARM REV CODE 636 W HCPCS: Performed by: NURSE PRACTITIONER

## 2018-10-17 PROCEDURE — 99232 SBSQ HOSP IP/OBS MODERATE 35: CPT | Mod: ,,, | Performed by: FAMILY MEDICINE

## 2018-10-17 PROCEDURE — 25000003 PHARM REV CODE 250: Performed by: INTERNAL MEDICINE

## 2018-10-17 PROCEDURE — 80053 COMPREHEN METABOLIC PANEL: CPT

## 2018-10-17 PROCEDURE — 85025 COMPLETE CBC W/AUTO DIFF WBC: CPT

## 2018-10-17 PROCEDURE — 25000003 PHARM REV CODE 250: Performed by: SURGERY

## 2018-10-17 PROCEDURE — 83880 ASSAY OF NATRIURETIC PEPTIDE: CPT

## 2018-10-17 PROCEDURE — 11000001 HC ACUTE MED/SURG PRIVATE ROOM

## 2018-10-17 PROCEDURE — 94761 N-INVAS EAR/PLS OXIMETRY MLT: CPT

## 2018-10-17 PROCEDURE — 36415 COLL VENOUS BLD VENIPUNCTURE: CPT

## 2018-10-17 PROCEDURE — 25000003 PHARM REV CODE 250: Performed by: NURSE PRACTITIONER

## 2018-10-17 RX ORDER — FUROSEMIDE 10 MG/ML
40 INJECTION INTRAMUSCULAR; INTRAVENOUS ONCE
Status: COMPLETED | OUTPATIENT
Start: 2018-10-17 | End: 2018-10-17

## 2018-10-17 RX ORDER — FUROSEMIDE 40 MG/1
40 TABLET ORAL DAILY
Status: DISCONTINUED | OUTPATIENT
Start: 2018-10-18 | End: 2018-10-18 | Stop reason: HOSPADM

## 2018-10-17 RX ADMIN — INSULIN DETEMIR 40 UNITS: 100 INJECTION, SOLUTION SUBCUTANEOUS at 08:10

## 2018-10-17 RX ADMIN — METOPROLOL TARTRATE 50 MG: 50 TABLET ORAL at 11:10

## 2018-10-17 RX ADMIN — TAMSULOSIN HYDROCHLORIDE 0.4 MG: 0.4 CAPSULE ORAL at 11:10

## 2018-10-17 RX ADMIN — CITALOPRAM HYDROBROMIDE 20 MG: 10 TABLET ORAL at 11:10

## 2018-10-17 RX ADMIN — NITROFURANTOIN MONOHYDRATE AND NITROFURANTOIN MACROCRYSTALLINE 100 MG: 75; 25 CAPSULE ORAL at 08:10

## 2018-10-17 RX ADMIN — FUROSEMIDE 40 MG: 10 INJECTION, SOLUTION INTRAMUSCULAR; INTRAVENOUS at 07:10

## 2018-10-17 RX ADMIN — APIXABAN 5 MG: 5 TABLET, FILM COATED ORAL at 11:10

## 2018-10-17 RX ADMIN — GABAPENTIN 600 MG: 300 CAPSULE ORAL at 11:10

## 2018-10-17 RX ADMIN — ASPIRIN 81 MG: 81 TABLET, COATED ORAL at 11:10

## 2018-10-17 RX ADMIN — BICALUTAMIDE 50 MG: 50 TABLET, FILM COATED ORAL at 11:10

## 2018-10-17 RX ADMIN — LEVOFLOXACIN 500 MG: 5 INJECTION, SOLUTION INTRAVENOUS at 05:10

## 2018-10-17 RX ADMIN — HYDROCODONE BITARTRATE AND ACETAMINOPHEN 1 TABLET: 5; 325 TABLET ORAL at 01:10

## 2018-10-17 RX ADMIN — INSULIN ASPART 1 UNITS: 100 INJECTION, SOLUTION INTRAVENOUS; SUBCUTANEOUS at 08:10

## 2018-10-17 RX ADMIN — LISINOPRIL 20 MG: 20 TABLET ORAL at 11:10

## 2018-10-17 RX ADMIN — APIXABAN 5 MG: 5 TABLET, FILM COATED ORAL at 08:10

## 2018-10-17 RX ADMIN — GABAPENTIN 600 MG: 300 CAPSULE ORAL at 08:10

## 2018-10-17 RX ADMIN — PANTOPRAZOLE SODIUM 40 MG: 40 TABLET, DELAYED RELEASE ORAL at 11:10

## 2018-10-17 RX ADMIN — NITROFURANTOIN MONOHYDRATE AND NITROFURANTOIN MACROCRYSTALLINE 100 MG: 75; 25 CAPSULE ORAL at 11:10

## 2018-10-17 RX ADMIN — ACETAMINOPHEN 1000 MG: 500 TABLET ORAL at 07:10

## 2018-10-17 RX ADMIN — METOPROLOL TARTRATE 50 MG: 50 TABLET ORAL at 08:10

## 2018-10-17 RX ADMIN — SIMVASTATIN 40 MG: 40 TABLET, FILM COATED ORAL at 08:10

## 2018-10-17 NOTE — PROGRESS NOTES
Spoke with Mr. Zimmerman's family. Answered questions about his abx and his blood pressure medications. Family had concerns about changes in dose. Discussed antiemetics and fall precautions. Family verbalized understanding.

## 2018-10-17 NOTE — ASSESSMENT & PLAN NOTE
Cont home lisinopril & metoprolol- increase as bp elevated  lisinopril today, consider adding amlodipine in am if still high  Still 140//81 in evenings- start low dose amlodipine at night

## 2018-10-17 NOTE — PROGRESS NOTES
[facility] Medicine  Progress Note    Patient Name: Erasmo Dunbar  MRN: [id number]  Patient Class: IP- Inpatient   Admission Date: 10/12/2018  Length of Stay: 5 days  Attending Physician: Katarina Lopez MD  Primary Care Provider: Hemal Varma MD        Subjective:     Principal Problem:Sepsis due to Escherichia coli    HPI:  Patient presented to ER with left testicular swelling. Sx started 1 day ago. He is having pain and swelling. No fevers. Mild dysuria but reports h/o chronic UTI/prostatis for which he typically take cipro and/or levaquin. No recent antibx. US showed epididymo-orchitis with moderate hydrocele. Admitted for IV Levaquin. Labs showed WBC 21K. Normal lactate. Blood and urine cx sent and pending.     Hospital Course:  10/14: feeling better but still with some pain and swelling of left testicle. No fevers. WBC down to 18K from 21K on admit. Remains on IV levaquin    10/15 day 4 levaquin (s/p one dose rocephin on admission). 99.1 t max. WBC improving 50918 admit >>03005 today. Pain meds ordered PRN, pt has not needed. Blood cultures remain NGTD x 2, urine with enterococcus sensitive to macrobid.     Does report SOB this am. Winded trying to talk this am. On O2 2L NC pox 93-95%; 87% on RA. CXR  With small pleural effusion yesterday. PO Lasix restarted yesterday but remains SOB.     10/16 day 5 levaquin (s/p 1 dose rocephin on admission).he did c/o SOB yesterday. Lasix was started po Sunday. He was given an extra dose of IV lasix yesterday for small pleural effusion noted on CXR and fluids given on admission. We also tried tyo wean O2 but his sats were 89% on RA. Now on 3L NC satting 93%. BUN/creat remain stable.  Pt spiked a 103.1 temp yesterday blood cultures redrawn.. WBC continue to come down. LFT starting to rise.     He does report that today he feels much better than yesterday. No longer SOB. Not hurting in groin unless moving. Redness better as well as swelling.      10/17/18 day 6 levaquin (s/p 1 dose rocephin on admission). 100.9 t max which is better than 103 on Monday. WBC continues to decrease as well. Levaquin is for orchitis and macrobid started Monday for enterococcus sensitive UTI. Blood cultures from admit and Monday NGTD. Today he is not feeling well again. Yesterday felt better. CXR yesterday shows vascular congestion, Echo with 55% EF and no diastolic dysfunction but elevated PA pressures and  on admit 876 yesterday 943 today. Did have lasix held on admission and given minimal IV hydration. Lasix po restarted per home routine. IV lasix given on Monday.     Interval note: spiked a high temp monday 103.1, blood cultures redrawn and negative as original admit cultures remain. Continues on macrobid and levaquin. t max yesterday 100.9.   Review of Systems   Constitutional: Negative for activity change, fatigue, fever and unexpected weight change.   HENT: Negative for congestion, ear pain, hearing loss, rhinorrhea and sore throat.    Eyes: Negative for redness and visual disturbance.   Respiratory: Positive for shortness of breath. Negative for cough and wheezing.    Cardiovascular: Negative for chest pain, palpitations and leg swelling.   Gastrointestinal: Negative for abdominal pain, constipation, diarrhea, nausea and vomiting.   Genitourinary: Negative for decreased urine volume, difficulty urinating, discharge, dysuria, frequency, hematuria, penile pain, scrotal swelling, testicular pain and urgency.   Musculoskeletal: Negative for back pain, joint swelling and neck pain.   Skin: Negative for color change, rash and wound.   Neurological: Negative for dizziness, tremors, weakness, light-headedness and headaches.     Objective:     Vital Signs (Most Recent):  Temp: 98.9 °F (37.2 °C) (10/17/18 0709)  Pulse: 65 (10/17/18 0709)  Resp: 20 (10/17/18 0709)  BP: (!) 140/65 (10/17/18 0709)  SpO2: 96 % (10/17/18 0709) Vital Signs (24h Range):  Temp:  [97.3 °F (36.3  °C)-100.9 °F (38.3 °C)] 98.9 °F (37.2 °C)  Pulse:  [62-86] 65  Resp:  [18-20] 20  SpO2:  [92 %-97 %] 96 %  BP: (130-178)/() 140/65     Weight: 91.9 kg (202 lb 9.6 oz)  Body mass index is 32.7 kg/m².    Physical Exam   Constitutional: He is oriented to person, place, and time. He appears well-developed and well-nourished. No distress.   HENT:   Head: Normocephalic and atraumatic.   Right Ear: External ear normal.   Left Ear: External ear normal.   Eyes: Conjunctivae and EOM are normal. Pupils are equal, round, and reactive to light.   Neck: Neck supple. No tracheal deviation present.   Cardiovascular: Normal rate and regular rhythm. Exam reveals no friction rub.   Pulmonary/Chest: Effort normal and breath sounds normal. No respiratory distress. He has no wheezes. He has no rales.   Crackles bases   Abdominal: Soft. Bowel sounds are normal. He exhibits no distension. There is no tenderness.   Genitourinary:   Genitourinary Comments: Left testicle swelling and tenderness throughout, improving   Musculoskeletal: He exhibits no edema.   Neurological: He is alert and oriented to person, place, and time. No cranial nerve deficit.   Skin: Skin is warm and dry.   Psychiatric: He has a normal mood and affect. His behavior is normal.   Nursing note and vitals reviewed.        CRANIAL NERVES     CN III, IV, VI   Pupils are equal, round, and reactive to light.  Extraocular motions are normal.        Significant Labs:   Blood Culture:   Lab Results   Component Value Date    WBC 14.57 (H) 10/17/2018    HGB 10.1 (L) 10/17/2018    HCT 31.5 (L) 10/17/2018    MCV 90 10/17/2018     (H) 10/17/2018     BMP  Lab Results   Component Value Date     10/17/2018    K 3.8 10/17/2018    CL 95 10/17/2018    CO2 31 (H) 10/17/2018    BUN 13 10/17/2018    CREATININE 0.9 10/17/2018    CALCIUM 9.2 10/17/2018    ANIONGAP 11 10/17/2018    ESTGFRAFRICA >60 10/17/2018    EGFRNONAA >60 10/17/2018     Lab Results   Component Value Date     ALT 51 (H) 10/17/2018    AST 34 10/17/2018    ALKPHOS 129 10/17/2018    BILITOT 0.8 10/17/2018     BNP  Recent Labs   Lab  10/17/18   0421   BNP  943*   202 on admit    Blood cultures x 2 NGTD from admit, repeated 10/15 NGTD  Urine Culture, Routine ENTEROCOCCUS FAECALIS   10,000 - 49,999 cfu/ml       Resulting Agency [facility]   Susceptibility      Enterococcus faecalis     CULTURE, URINE     Ampicillin <=2  Sensitive     Nitrofurantoin <=32  Sensitive     Tetracycline >8  Resistant     Vancomycin 2  Sensitive         Lactic acid 1.1>>0.8    Mag 1.2    Significant Imaging    US scrotum Left epididymo-orchitis with a moderate associated hydrocele.    CXR 10/16  The heart is enlarged.  Calcified atheromatous disease affects the aorta.  Left-sided pacer device is in place.  There is pulmonary vascular congestion with interstitial edema.  Small bilateral pleural effusions are present.  The skeletal structures are intact.    CXR 10/14  Left-sided pacemaker device is in place.  The heart is normal in size.  Calcified atheromatous disease affects the aorta.  There is pulmonary vascular congestion with mild interstitial edema.  No consolidation.    10/16 Echo   EF 55 - 65 55  60    Mitral Valve Regurgitation  TRIVIAL TO MILD  TRIVIAL    Diastolic Dysfunction  No  Yes Abnormal     Est. PA Systolic Pressure  41.73 Abnormal   31.52    Tricuspid Valve Regurgitation  TRIVIAL TO MILD  TRIVIAL TO MILD         Assessment/Plan:      * Sepsis due to Enterococcus     Presumed E. Coli. Pseudomonas sometimes a consideration in older men as well  Covering with levaquin 500mg daily  Day 12/10    WBC 21K and fever > 101 F on arrival to ER thus meets sepsis criteria. Source orchitis  Monitor fever curve  Monitor WBC  IVF--will stop 10/13 due to h/o CHF. Continue to hold lasix for now.    10/15 urine culture with enterococcus- add macrobid and continue levaquin for orchitis as this is best coverage. VSS/afebrile. Lasix restarted after  getting IVF on admit    10/16 macrobid for UTI, cont levaquin for orchitis, f/u repeat blood cultures  One time diose vanc given yesterday with huge temp spike. He is feeling better and macrobid covering the UTI. Will consider restarting vanc/ampicillin if fever continues. He has not had any more fever and feel much better today.     10/17 macrobid day 3 for UTI and levaquin day 6 for orchitis. All blood cultures NGTD        Paroxysmal atrial fibrillation    Cont BB  Cont eliquis  telemetry          Acute on chronic diastolic CHF (congestive heart failure)    Stop IVF 10/13  Cont to hold lasix for now  No signs of acute exacerbation    10/14 lasix restarted  Cont BB and ACE    10/15--now more acute on chronic s/p IVF on admit for sepsis  C/o SOB today, 87% on RA and CXR with small effusion. Restarted PO Lasix yesterday but will do IV Lasix today to try and improve SOB and hypoxia. On eliquis so low suspicion for PE. No signs of PNA/bronchitis and levaquin should cover anyway. ?anxiety- restart citalopram, he has not been getting here and PRN valium as he was prescribed at home    10/16 repeat echo today. Lasix 40mg IV given yesterday. Cont 40mg po daily. Check BNP today    10/17 recent echo shows no heart failure with EF 55%, but BNP elevated and CXR with vascular congestion. PT also has crackles and is abd breathing this am. Will give another dose IV lasix  today and hold po lasix        Prostate cancer    Cont casodex  Diagnosed about 3 weeks ago by outside urology (Dr. Coon)          Orchitis    WBC 21 K; + fever  Admit for IV levaquin  Elevation and ice PRN for swelling  Monitor fever curve  Day 2/10 treatment, Once WBC trending down and fevers resolve could transition to PO but remains with significant leukocytosis so continue IV for now. Jim pending blood cx    10/15 day 4 iv levaquin. Afebrile and leukocytosis improving, from [id number]    10/16 day 5 levaquin. Now with fever, repeated blood  cultures.priginal blood cultures negative.WBC 97295    10/17 day 6 levaquin. Now with 100.9 t max over last 24hr. Repeated blood cultures from Monday 103 spike without growth. Original blood cultures no growth as well. WBC coming down        Diabetes mellitus, type 2    Last A1C good control  Hold PO meds while inpatient  Cont SSI and levemir 40 units  -201- 7units of correction sliding scale insulin needed          BPH (benign prostatic hyperplasia)    Cont home flomax          Hypertension    Cont home lisinopril & metoprolol- increase as bp elevated  lisinopril today, consider adding amlodipine in am if still high  Still 140//81 in evenings- start low dose amlodipine at night          Hyperlipidemia    Cont home statin and asa            VTE Risk Mitigation (From admission, onward)        Ordered     apixaban tablet 5 mg  2 times daily      10/13/18 0656              Hemal Varma MD  Department of Hospital Medicine   [facility]

## 2018-10-17 NOTE — ASSESSMENT & PLAN NOTE
Stop IVF 10/13  Cont to hold lasix for now  No signs of acute exacerbation    10/14 lasix restarted  Cont BB and ACE    10/15--now more acute on chronic s/p IVF on admit for sepsis  C/o SOB today, 87% on RA and CXR with small effusion. Restarted PO Lasix yesterday but will do IV Lasix today to try and improve SOB and hypoxia. On eliquis so low suspicion for PE. No signs of PNA/bronchitis and levaquin should cover anyway. ?anxiety- restart citalopram, he has not been getting here and PRN valium as he was prescribed at home    10/16 repeat echo today. Lasix 40mg IV given yesterday. Cont 40mg po daily. Check BNP today    10/17 recent echo shows no heart failure with EF 55%, but BNP elevated and CXR with vascular congestion. PT also has crackles and is abd breathing this am. Will give another dose IV lasix  today and hold po lasix

## 2018-10-17 NOTE — ASSESSMENT & PLAN NOTE
Last A1C good control  Hold PO meds while inpatient  Cont SSI and levemir 40 units  -201- 7units of correction sliding scale insulin needed

## 2018-10-17 NOTE — PLAN OF CARE
Problem: Patient Care Overview  Goal: Plan of Care Review  Outcome: Ongoing (interventions implemented as appropriate)  Pt and family agrees with plan of care.  VS stable.  Oxygen at 3l.  sats 92-94%.  No complaints of pain noted.  Call bell in reach.  Will continue to monitor.

## 2018-10-17 NOTE — SUBJECTIVE & OBJECTIVE
Interval note: spiked a high temp monday 103.1, blood cultures redrawn and negative as original admit cultures remain. Continues on macrobid and levaquin. t max yesterday 100.9.   Review of Systems   Constitutional: Negative for activity change, fatigue, fever and unexpected weight change.   HENT: Negative for congestion, ear pain, hearing loss, rhinorrhea and sore throat.    Eyes: Negative for redness and visual disturbance.   Respiratory: Positive for shortness of breath. Negative for cough and wheezing.    Cardiovascular: Negative for chest pain, palpitations and leg swelling.   Gastrointestinal: Negative for abdominal pain, constipation, diarrhea, nausea and vomiting.   Genitourinary: Negative for decreased urine volume, difficulty urinating, discharge, dysuria, frequency, hematuria, penile pain, scrotal swelling, testicular pain and urgency.   Musculoskeletal: Negative for back pain, joint swelling and neck pain.   Skin: Negative for color change, rash and wound.   Neurological: Negative for dizziness, tremors, weakness, light-headedness and headaches.     Objective:     Vital Signs (Most Recent):  Temp: 98.9 °F (37.2 °C) (10/17/18 0709)  Pulse: 65 (10/17/18 0709)  Resp: 20 (10/17/18 0709)  BP: (!) 140/65 (10/17/18 0709)  SpO2: 96 % (10/17/18 0709) Vital Signs (24h Range):  Temp:  [97.3 °F (36.3 °C)-100.9 °F (38.3 °C)] 98.9 °F (37.2 °C)  Pulse:  [62-86] 65  Resp:  [18-20] 20  SpO2:  [92 %-97 %] 96 %  BP: (130-178)/() 140/65     Weight: 91.9 kg (202 lb 9.6 oz)  Body mass index is 32.7 kg/m².    Physical Exam   Constitutional: He is oriented to person, place, and time. He appears well-developed and well-nourished. No distress.   HENT:   Head: Normocephalic and atraumatic.   Right Ear: External ear normal.   Left Ear: External ear normal.   Eyes: Conjunctivae and EOM are normal. Pupils are equal, round, and reactive to light.   Neck: Neck supple. No tracheal deviation present.   Cardiovascular: Normal rate  and regular rhythm. Exam reveals no friction rub.   Pulmonary/Chest: Effort normal and breath sounds normal. No respiratory distress. He has no wheezes. He has no rales.   Crackles bases   Abdominal: Soft. Bowel sounds are normal. He exhibits no distension. There is no tenderness.   Genitourinary:   Genitourinary Comments: Left testicle swelling and tenderness throughout, improving   Musculoskeletal: He exhibits no edema.   Neurological: He is alert and oriented to person, place, and time. No cranial nerve deficit.   Skin: Skin is warm and dry.   Psychiatric: He has a normal mood and affect. His behavior is normal.   Nursing note and vitals reviewed.        CRANIAL NERVES     CN III, IV, VI   Pupils are equal, round, and reactive to light.  Extraocular motions are normal.        Significant Labs:   Blood Culture:   Lab Results   Component Value Date    WBC 14.57 (H) 10/17/2018    HGB 10.1 (L) 10/17/2018    HCT 31.5 (L) 10/17/2018    MCV 90 10/17/2018     (H) 10/17/2018     BMP  Lab Results   Component Value Date     10/17/2018    K 3.8 10/17/2018    CL 95 10/17/2018    CO2 31 (H) 10/17/2018    BUN 13 10/17/2018    CREATININE 0.9 10/17/2018    CALCIUM 9.2 10/17/2018    ANIONGAP 11 10/17/2018    ESTGFRAFRICA >60 10/17/2018    EGFRNONAA >60 10/17/2018     Lab Results   Component Value Date    ALT 51 (H) 10/17/2018    AST 34 10/17/2018    ALKPHOS 129 10/17/2018    BILITOT 0.8 10/17/2018     BNP  Recent Labs   Lab  10/17/18   0421   BNP  943*   202 on admit    Blood cultures x 2 NGTD from admit, repeated 10/15 NGTD  Urine Culture, Routine ENTEROCOCCUS FAECALIS   10,000 - 49,999 cfu/ml       Resulting Agency [facility]   Susceptibility      Enterococcus faecalis     CULTURE, URINE     Ampicillin <=2  Sensitive     Nitrofurantoin <=32  Sensitive     Tetracycline >8  Resistant     Vancomycin 2  Sensitive         Lactic acid 1.1>>0.8    Mag 1.2    Significant Imaging    US scrotum Left epididymo-orchitis with a  moderate associated hydrocele.    CXR 10/16  The heart is enlarged.  Calcified atheromatous disease affects the aorta.  Left-sided pacer device is in place.  There is pulmonary vascular congestion with interstitial edema.  Small bilateral pleural effusions are present.  The skeletal structures are intact.    CXR 10/14  Left-sided pacemaker device is in place.  The heart is normal in size.  Calcified atheromatous disease affects the aorta.  There is pulmonary vascular congestion with mild interstitial edema.  No consolidation.    10/16 Echo   EF 55 - 65 55  60    Mitral Valve Regurgitation  TRIVIAL TO MILD  TRIVIAL    Diastolic Dysfunction  No  Yes Abnormal     Est. PA Systolic Pressure  41.73 Abnormal   31.52    Tricuspid Valve Regurgitation  TRIVIAL TO MILD  TRIVIAL TO MILD

## 2018-10-17 NOTE — ASSESSMENT & PLAN NOTE
WBC 21 K; + fever  Admit for IV levaquin  Elevation and ice PRN for swelling  Monitor fever curve  Day 2/10 treatment, Once WBC trending down and fevers resolve could transition to PO but remains with significant leukocytosis so continue IV for now. Jim pending blood cx    10/15 day 4 iv levaquin. Afebrile and leukocytosis improving, from [id number]>92022    10/16 day 5 levaquin. Now with fever, repeated blood cultures.priginal blood cultures negative.WBC 68170    10/17 day 6 levaquin. Now with 100.9 t max over last 24hr. Repeated blood cultures from Monday 103 spike without growth. Original blood cultures no growth as well. WBC coming down

## 2018-10-17 NOTE — PLAN OF CARE
Problem: Patient Care Overview  Goal: Plan of Care Review  Outcome: Ongoing (interventions implemented as appropriate)  Vitals remained stable, afebrile. Complained of back pain that was relieved with postioning and PO meds. Ambulating in room some but getting VIDAL at times. Crackles to bases. One dose of IV lasix given this AM. accurrate intact and output. Discussed plan of care with pt, stated understanding.

## 2018-10-17 NOTE — ASSESSMENT & PLAN NOTE
Presumed E. Coli. Pseudomonas sometimes a consideration in older men as well  Covering with levaquin 500mg daily  Day 12/10    WBC 21K and fever > 101 F on arrival to ER thus meets sepsis criteria. Source orchitis  Monitor fever curve  Monitor WBC  IVF--will stop 10/13 due to h/o CHF. Continue to hold lasix for now.    10/15 urine culture with enterococcus- add macrobid and continue levaquin for orchitis as this is best coverage. VSS/afebrile. Lasix restarted after getting IVF on admit    10/16 macrobid for UTI, cont levaquin for orchitis, f/u repeat blood cultures  One time diose vanc given yesterday with huge temp spike. He is feeling better and macrobid covering the UTI. Will consider restarting vanc/ampicillin if fever continues. He has not had any more fever and feel much better today.     10/17 macrobid day 3 for UTI and levaquin day 6 for orchitis. All blood cultures NGTD

## 2018-10-17 NOTE — PLAN OF CARE
10/17/18 1310   Discharge Reassessment   Assessment Type Discharge Planning Reassessment         Seen patient on rounds today. He is still on oxygen. Nurse will attempt to wean. He states he is feeling better today, but MD would like to diurese a little more before discharge. Possible discharge tomorrow. Patient states understanding and agreement. CM will continue to follow and assist as needed.

## 2018-10-17 NOTE — NURSING
Bedside report received from Shanna.  VS stable.  No complaints of pain noted.  Family at bedside.  Call bell in reach.  Will continue to monitor.

## 2018-10-18 VITALS
HEIGHT: 66 IN | SYSTOLIC BLOOD PRESSURE: 156 MMHG | RESPIRATION RATE: 18 BRPM | WEIGHT: 200.63 LBS | OXYGEN SATURATION: 93 % | DIASTOLIC BLOOD PRESSURE: 72 MMHG | HEART RATE: 67 BPM | BODY MASS INDEX: 32.24 KG/M2 | TEMPERATURE: 99 F

## 2018-10-18 PROBLEM — E87.6 HYPOKALEMIA DUE TO LOSS OF POTASSIUM: Status: ACTIVE | Noted: 2018-10-18

## 2018-10-18 PROBLEM — J02.9 SORE THROAT: Status: RESOLVED | Noted: 2017-11-09 | Resolved: 2018-10-18

## 2018-10-18 PROBLEM — R05.9 COUGH: Status: RESOLVED | Noted: 2017-11-09 | Resolved: 2018-10-18

## 2018-10-18 PROBLEM — J02.9 ACUTE VIRAL PHARYNGITIS: Status: RESOLVED | Noted: 2017-11-09 | Resolved: 2018-10-18

## 2018-10-18 LAB
ALBUMIN SERPL BCP-MCNC: 2.5 G/DL
ALP SERPL-CCNC: 170 U/L
ALT SERPL W/O P-5'-P-CCNC: 95 U/L
ANION GAP SERPL CALC-SCNC: 9 MMOL/L
AST SERPL-CCNC: 91 U/L
BACTERIA BLD CULT: NORMAL
BACTERIA BLD CULT: NORMAL
BASOPHILS # BLD AUTO: 0.03 K/UL
BASOPHILS NFR BLD: 0.2 %
BILIRUB SERPL-MCNC: 0.6 MG/DL
BNP SERPL-MCNC: 369 PG/ML
BUN SERPL-MCNC: 13 MG/DL
CALCIUM SERPL-MCNC: 8.6 MG/DL
CHLORIDE SERPL-SCNC: 93 MMOL/L
CO2 SERPL-SCNC: 32 MMOL/L
CREAT SERPL-MCNC: 0.9 MG/DL
DIFFERENTIAL METHOD: ABNORMAL
EOSINOPHIL # BLD AUTO: 1.8 K/UL
EOSINOPHIL NFR BLD: 12.9 %
ERYTHROCYTE [DISTWIDTH] IN BLOOD BY AUTOMATED COUNT: 14.1 %
EST. GFR  (AFRICAN AMERICAN): >60 ML/MIN/1.73 M^2
EST. GFR  (NON AFRICAN AMERICAN): >60 ML/MIN/1.73 M^2
GLUCOSE SERPL-MCNC: 116 MG/DL
HCT VFR BLD AUTO: 31.1 %
HGB BLD-MCNC: 9.9 G/DL
LYMPHOCYTES # BLD AUTO: 0.9 K/UL
LYMPHOCYTES NFR BLD: 6.5 %
MCH RBC QN AUTO: 28.7 PG
MCHC RBC AUTO-ENTMCNC: 31.8 G/DL
MCV RBC AUTO: 90 FL
MONOCYTES # BLD AUTO: 1.5 K/UL
MONOCYTES NFR BLD: 10.7 %
NEUTROPHILS # BLD AUTO: 9.8 K/UL
NEUTROPHILS NFR BLD: 69.7 %
PLATELET # BLD AUTO: 427 K/UL
PMV BLD AUTO: 9.7 FL
POCT GLUCOSE: 124 MG/DL (ref 70–110)
POCT GLUCOSE: 159 MG/DL (ref 70–110)
POCT GLUCOSE: 213 MG/DL (ref 70–110)
POTASSIUM SERPL-SCNC: 3.3 MMOL/L
PROT SERPL-MCNC: 5.8 G/DL
RBC # BLD AUTO: 3.45 M/UL
SODIUM SERPL-SCNC: 134 MMOL/L
WBC # BLD AUTO: 14.07 K/UL

## 2018-10-18 PROCEDURE — 99238 HOSP IP/OBS DSCHRG MGMT 30/<: CPT | Mod: ,,, | Performed by: FAMILY MEDICINE

## 2018-10-18 PROCEDURE — 94760 N-INVAS EAR/PLS OXIMETRY 1: CPT

## 2018-10-18 PROCEDURE — 25000003 PHARM REV CODE 250: Performed by: INTERNAL MEDICINE

## 2018-10-18 PROCEDURE — 27000221 HC OXYGEN, UP TO 24 HOURS

## 2018-10-18 PROCEDURE — 25000003 PHARM REV CODE 250: Performed by: NURSE PRACTITIONER

## 2018-10-18 PROCEDURE — 85025 COMPLETE CBC W/AUTO DIFF WBC: CPT

## 2018-10-18 PROCEDURE — 36415 COLL VENOUS BLD VENIPUNCTURE: CPT

## 2018-10-18 PROCEDURE — 80053 COMPREHEN METABOLIC PANEL: CPT

## 2018-10-18 PROCEDURE — 82962 GLUCOSE BLOOD TEST: CPT

## 2018-10-18 PROCEDURE — 25000003 PHARM REV CODE 250: Performed by: SURGERY

## 2018-10-18 PROCEDURE — 83880 ASSAY OF NATRIURETIC PEPTIDE: CPT

## 2018-10-18 RX ORDER — FUROSEMIDE 10 MG/ML
40 INJECTION INTRAMUSCULAR; INTRAVENOUS ONCE
Status: DISCONTINUED | OUTPATIENT
Start: 2018-10-18 | End: 2018-10-18 | Stop reason: HOSPADM

## 2018-10-18 RX ORDER — POTASSIUM CHLORIDE 20 MEQ/1
40 TABLET, EXTENDED RELEASE ORAL ONCE
Status: DISCONTINUED | OUTPATIENT
Start: 2018-10-18 | End: 2018-10-18 | Stop reason: HOSPADM

## 2018-10-18 RX ORDER — LISINOPRIL 20 MG/1
20 TABLET ORAL DAILY
Qty: 30 TABLET | Refills: 0 | Status: SHIPPED | OUTPATIENT
Start: 2018-10-18 | End: 2018-11-14

## 2018-10-18 RX ORDER — NITROFURANTOIN 25; 75 MG/1; MG/1
100 CAPSULE ORAL EVERY 12 HOURS
Qty: 6 CAPSULE | Refills: 0 | Status: SHIPPED | OUTPATIENT
Start: 2018-10-18 | End: 2018-10-23

## 2018-10-18 RX ORDER — POTASSIUM CHLORIDE 20 MEQ/1
40 TABLET, EXTENDED RELEASE ORAL ONCE
Status: COMPLETED | OUTPATIENT
Start: 2018-10-18 | End: 2018-10-18

## 2018-10-18 RX ADMIN — APIXABAN 5 MG: 5 TABLET, FILM COATED ORAL at 08:10

## 2018-10-18 RX ADMIN — PANTOPRAZOLE SODIUM 40 MG: 40 TABLET, DELAYED RELEASE ORAL at 08:10

## 2018-10-18 RX ADMIN — NITROFURANTOIN MONOHYDRATE AND NITROFURANTOIN MACROCRYSTALLINE 100 MG: 75; 25 CAPSULE ORAL at 08:10

## 2018-10-18 RX ADMIN — BICALUTAMIDE 50 MG: 50 TABLET, FILM COATED ORAL at 08:10

## 2018-10-18 RX ADMIN — METOPROLOL TARTRATE 50 MG: 50 TABLET ORAL at 08:10

## 2018-10-18 RX ADMIN — LISINOPRIL 20 MG: 20 TABLET ORAL at 08:10

## 2018-10-18 RX ADMIN — CITALOPRAM HYDROBROMIDE 20 MG: 10 TABLET ORAL at 08:10

## 2018-10-18 RX ADMIN — GABAPENTIN 600 MG: 300 CAPSULE ORAL at 08:10

## 2018-10-18 RX ADMIN — ASPIRIN 81 MG: 81 TABLET, COATED ORAL at 08:10

## 2018-10-18 RX ADMIN — TAMSULOSIN HYDROCHLORIDE 0.4 MG: 0.4 CAPSULE ORAL at 08:10

## 2018-10-18 RX ADMIN — POTASSIUM CHLORIDE 40 MEQ: 1500 TABLET, EXTENDED RELEASE ORAL at 10:10

## 2018-10-18 RX ADMIN — ACETAMINOPHEN 1000 MG: 500 TABLET ORAL at 10:10

## 2018-10-18 RX ADMIN — FUROSEMIDE 40 MG: 40 TABLET ORAL at 08:10

## 2018-10-18 NOTE — DISCHARGE SUMMARY
[facility] Medicine  Discharge Summary      Patient Name: Erasmo Dunbar  MRN: [id number]  Admission Date: 10/12/2018  Hospital Length of Stay: 6 days  Discharge Date and Time:  10/18/2018 12:21 PM  Attending Physician: Katarina Lopez MD   Discharging Provider: Karissa Pizarro NP  Primary Care Provider: Hemal Varma MD      HPI:   Patient presented to ER with left testicular swelling. Sx started 1 day ago. He is having pain and swelling. No fevers. Mild dysuria but reports h/o chronic UTI/prostatis for which he typically take cipro and/or levaquin. No recent antibx. US showed epididymo-orchitis with moderate hydrocele. Admitted for IV Levaquin. Labs showed WBC 21K. Normal lactate. Blood and urine cx sent and pending.     * No surgery found *      Hospital Course:   10/14: feeling better but still with some pain and swelling of left testicle. No fevers. WBC down to 18K from 21K on admit. Remains on IV levaquin    10/15 day 4 levaquin (s/p one dose rocephin on admission). 99.1 t max. WBC improving [id number] admit >>[id number] today. Pain meds ordered PRN, pt has not needed. Blood cultures remain NGTD x 2, urine with enterococcus sensitive to macrobid.     Does report SOB this am. Winded trying to talk this am. On O2 2L NC pox 93-95%; 87% on RA. CXR  With small pleural effusion yesterday. PO Lasix restarted yesterday but remains SOB.     10/16 day 5 levaquin (s/p 1 dose rocephin on admission).he did c/o SOB yesterday. Lasix was started po Sunday. He was given an extra dose of IV lasix yesterday for small pleural effusion noted on CXR and fluids given on admission. We also tried tyo wean O2 but his sats were 89% on RA. Now on 3L NC satting 93%. BUN/creat remain stable.  Pt spiked a 103.1 temp yesterday blood cultures redrawn.. WBC continue to come down. LFT starting to rise.     He does report that today he feels much better than yesterday. No longer SOB. Not hurting in groin unless moving.  Redness better as well as swelling.     10/17/18 day 6 levaquin (s/p 1 dose rocephin on admission). 100.9 t max which is better than 103 on Monday. WBC continues to decrease as well. Levaquin is for orchitis and macrobid started Monday for enterococcus sensitive UTI. Blood cultures from admit and Monday NGTD. Today he is not feeling well again. Yesterday felt better. CXR yesterday shows vascular congestion, Echo with 55% EF and no diastolic dysfunction but elevated PA pressures and  on admit 876 yesterday 943 today. Did have lasix held on admission and given minimal IV hydration. Lasix po restarted per home routine. IV lasix given on Monday.     10/18 day 7 levaquin and day 4 macrobid. Afebrile. He is doing well. No complaints of SOB this am. Off of O2 this am POX 93%. Ambulating well. WBC continues to decline. Lasix 40mg IV given yesterday and >369.      Consults:     * Sepsis due to Enterococcus     Presumed E. Coli. Pseudomonas sometimes a consideration in older men as well  Covering with levaquin 500mg daily  Day 12/10    WBC 21K and fever > 101 F on arrival to ER thus meets sepsis criteria. Source orchitis  Monitor fever curve  Monitor WBC  IVF--will stop 10/13 due to h/o CHF. Continue to hold lasix for now.    10/15 urine culture with enterococcus- add macrobid and continue levaquin for orchitis as this is best coverage. VSS/afebrile. Lasix restarted after getting IVF on admit    10/16 macrobid for UTI, cont levaquin for orchitis, f/u repeat blood cultures  One time diose vanc given yesterday with huge temp spike. He is feeling better and macrobid covering the UTI. Will consider restarting vanc/ampicillin if fever continues. He has not had any more fever and feel much better today.     10/17 macrobid day 3 for UTI and levaquin day 6 for orchitis. All blood cultures NGTD    10/18 macrobid day 4/7 for UTI, levaquin day 7 for orchitis all blood cultures NGTD      Hypokalemia due to loss of potassium     Replaced.  Recheck BMP at f/u       Paroxysmal atrial fibrillation    Cont BB  Cont eliquis  telemetry       Acute on chronic diastolic CHF (congestive heart failure)    Stop IVF 10/13  Cont to hold lasix for now  No signs of acute exacerbation    10/14 lasix restarted  Cont BB and ACE    10/15--now more acute on chronic s/p IVF on admit for sepsis  C/o SOB today, 87% on RA and CXR with small effusion. Restarted PO Lasix yesterday but will do IV Lasix today to try and improve SOB and hypoxia. On eliquis so low suspicion for PE. No signs of PNA/bronchitis and levaquin should cover anyway. ?anxiety- restart citalopram, he has not been getting here and PRN valium as he was prescribed at home    10/16 repeat echo today. Lasix 40mg IV given yesterday. Cont 40mg po daily. Check BNP today    10/17 recent echo shows no heart failure with EF 55%, but BNP elevated and CXR with vascular congestion. PT also has crackles and is abd breathing this am. Will give another dose IV lasix  today and hold po lasix    10/18 ecent echo shows no heart failure with EF 55%, but BNP elevated and CXR with vascular congestion.  Today breathing better, no need for O2. Home on po lasix after single IV dose today     Prostate cancer    Cont casodex  Diagnosed about 3 weeks ago by outside urology (Dr. Coon)       Orchitis    WBC 21 K; + fever  Admit for IV levaquin  Elevation and ice PRN for swelling  Monitor fever curve  Day 2/10 treatment, Once WBC trending down and fevers resolve could transition to PO but remains with significant leukocytosis so continue IV for now. Jim pending blood cx    10/15 day 4 iv levaquin. Afebrile and leukocytosis improving, from [id number]>33506    10/16 day 5 levaquin. Now with fever, repeated blood cultures.priginal blood cultures negative.WBC 47120    10/17 day 6 levaquin. Now with 100.9 t max over last 24hr. Repeated blood cultures from Monday 103 spike without growth. Original blood cultures no growth as well.  WBC coming down    10/18 Day 7 levaquin now afebrile. Blood cultures as well as repeat blood cultures all negative     Diabetes mellitus, type 2    Last A1C good control  Hold PO meds while inpatient  Cont SSI and levemir 40 units  -201- 1units of correction sliding scale insulin needed  Resume home meds     BPH (benign prostatic hyperplasia)    Cont home flomax       Hypertension    Cont home lisinopril & metoprolol- increase as bp elevated  lisinopril today, consider adding amlodipine in am if still high  Still 140//81 in evenings- start low dose amlodipine at night  /70- consider adding norvasc at f/u     Hyperlipidemia    Cont home statin and asa  Lab Results   Component Value Date    ALT 95 (H) 10/18/2018    AST 91 (H) 10/18/2018    ALKPHOS 170 (H) 10/18/2018    BILITOT 0.6 10/18/2018     Hold statin till f/u with PCP secondary to elevated LFTS (thought to be because of viral etiology with single temp spike and cold/URI like picture subsequently)         Final Active Diagnoses:    Diagnosis Date Noted POA    PRINCIPAL PROBLEM:  Sepsis due to Enterococcus  [A41.51] 10/13/2018 Yes    Hypokalemia due to loss of potassium [E87.6] 10/18/2018 No    Prostate cancer [C61] 10/13/2018 Yes    Acute on chronic diastolic CHF (congestive heart failure) [I50.33] 10/13/2018 Yes    Paroxysmal atrial fibrillation [I48.0] 10/13/2018 Yes    Orchitis [N45.2] 10/12/2018 Yes    Diabetes mellitus, type 2 [E11.9]  Yes    Hyperlipidemia [E78.5]  Yes    Hypertension [I10]  Yes    BPH (benign prostatic hyperplasia) [N40.0]  Yes      Problems Resolved During this Admission:       Discharged Condition: good    Disposition: Home or Self Care    Follow Up:  Follow-up Information     Hemal Varma MD In 1 week.    Specialty:  Family Medicine  Why:  Outpatient Services  Contact information:  [address]  [facility] [id number] [phone]                 Patient  Instructions:      Comprehensive metabolic panel   Standing Status: Future Standing Exp. Date: 10/18/19       Significant Diagnostic Studies:     Blood Culture:         Lab Results   Component Value Date     WBC 14.07 (H) 10/18/2018     HGB 9.9 (L) 10/18/2018     HCT 31.1 (L) 10/18/2018     MCV 90 10/18/2018      (H) 10/18/2018      BMP        Lab Results   Component Value Date      (L) 10/18/2018     K 3.3 (L) 10/18/2018     CL 93 (L) 10/18/2018     CO2 32 (H) 10/18/2018     BUN 13 10/18/2018     CREATININE 0.9 10/18/2018     CALCIUM 8.6 (L) 10/18/2018     ANIONGAP 9 10/18/2018     ESTGFRAFRICA >60 10/18/2018     EGFRNONAA >60 10/18/2018            Lab Results   Component Value Date     ALT 95 (H) 10/18/2018     AST 91 (H) 10/18/2018     ALKPHOS 170 (H) 10/18/2018     BILITOT 0.6 10/18/2018      BNP      Recent Labs   Lab 10/18/18  0523   *   202 on admit     Blood cultures x 2 NGTD from admit, repeated 10/15 NGTD  Urine Culture, Routine ENTEROCOCCUS FAECALIS   10,000 - 49,999 cfu/ml       Resulting Agency [facility]   Susceptibility               Enterococcus faecalis       CULTURE, URINE       Ampicillin <=2  Sensitive       Nitrofurantoin <=32  Sensitive       Tetracycline >8  Resistant       Vancomycin 2  Sensitive           Lactic acid 1.1>>0.8    Mag 1.2     Significant Imaging     US scrotum Left epididymo-orchitis with a moderate associated hydrocele.     CXR 10/16  The heart is enlarged.  Calcified atheromatous disease affects the aorta.  Left-sided pacer device is in place.  There is pulmonary vascular congestion with interstitial edema.  Small bilateral pleural effusions are present.  The skeletal structures are intact.     CXR 10/14  Left-sided pacemaker device is in place.  The heart is normal in size.  Calcified atheromatous disease affects the aorta.  There is pulmonary vascular congestion with mild interstitial edema.  No consolidation.     10/16 Echo   EF 55 - 65 55  60     Mitral Valve Regurgitation   TRIVIAL TO MILD  TRIVIAL    Diastolic Dysfunction   No  Yes Abnormal     Est. PA Systolic Pressure   41.73 Abnormal   31.52    Tricuspid Valve Regurgitation   TRIVIAL TO MILD  TRIVIAL TO MILD              Pending Diagnostic Studies:     None         Medications:  Reconciled Home Medications:      Medication List      START taking these medications    nitrofurantoin (macrocrystal-monohydrate) 100 MG capsule  Commonly known as:  MACROBID  Take 1 capsule (100 mg total) by mouth every 12 (twelve) hours.        CHANGE how you take these medications    lisinopril 20 MG tablet  Commonly known as:  PRINIVIL,ZESTRIL  Take 1 tablet (20 mg total) by mouth once daily.  What changed:    · medication strength  · how much to take        CONTINUE taking these medications    aspirin 81 MG EC tablet  Commonly known as:  ECOTRIN  Take 81 mg by mouth once daily.     bicalutamide 50 MG Tab  Commonly known as:  CASODEX     CIALIS 20 MG Tab  Generic drug:  tadalafil  Take 1 tablet by mouth once a day     citalopram 20 MG tablet  Commonly known as:  CELEXA  Take 1 tablet (20 mg total) by mouth once daily.     diclofenac sodium 1 % Gel  Commonly known as:  VOLTAREN  Apply 4 g topically 4 (four) times daily as needed.     dulaglutide 1.5 mg/0.5 mL Pnij  Commonly known as:  TRULICITY  Inject 1.5 mg into the skin once a week.     ELIQUIS 5 mg Tab  Generic drug:  apixaban  Take 1 tablet (5 mg total) by mouth 2 (two) times daily.     furosemide 40 MG tablet  Commonly known as:  LASIX  Take 1 tablet (40 mg total) by mouth once daily.     gabapentin 300 MG capsule  Commonly known as:  NEURONTIN  Take 2 capsules by mouth  two times daily     insulin glargine 100 unit/mL (3 mL) Inpn pen  Commonly known as:  LANTUS SOLOSTAR U-100 INSULIN  Inject 40 Units into the skin every evening.     metFORMIN 1000 MG tablet  Commonly known as:  GLUCOPHAGE  Take 1,000 mg by mouth 2 (two) times daily with meals.     metoprolol  "tartrate 50 MG tablet  Commonly known as:  LOPRESSOR  Take 1 tablet (50 mg total) by mouth 2 (two) times daily.     mometasone 0.1 % lotion  Commonly known as:  ELOCON     SITagliptin 100 MG Tab  Commonly known as:  JANUVIA  Take 1 tablet (100 mg total) by mouth once daily.     tamsulosin 0.4 mg Cap  Commonly known as:  FLOMAX  Take 1 capsule (0.4 mg total) by mouth once daily.     VITAMIN C 500 MG tablet  Generic drug:  ascorbic acid (vitamin C)  Take 500 mg by mouth once daily.        STOP taking these medications    doxycycline 100 MG tablet  Commonly known as:  VIBRA-TABS     levoFLOXacin 500 MG tablet  Commonly known as:  LEVAQUIN     pen needle, diabetic 31 gauge x 3/16" Ndle  Commonly known as:  LITE TOUCH INSULIN PEN NEEDLES     simvastatin 40 MG tablet  Commonly known as:  ZOCOR            Indwelling Lines/Drains at time of discharge:   Lines/Drains/Airways          None          Time spent on the discharge of patient: 20 minutes  Patient was seen and examined on the date of discharge and determined to be suitable for discharge.         Karissa Pizarro NP  Department of Hospital Medicine  [facility]  "

## 2018-10-18 NOTE — NURSING
Handoff received from RocheMarta. Pt resting comfortably, lying in bed, resp even and unlabored. O2 @3L. Tele in place. Instructed to call for needs or assist ambulating. Will cont to monitor.

## 2018-10-18 NOTE — PLAN OF CARE
10/18/18 1150   Final Note   Assessment Type Final Discharge Note   Discharge Disposition Home   What phone number can be called within the next 1-3 days to see how you are doing after discharge? [phone]   Hospital Follow Up  Appt(s) scheduled? Yes   Discharge plans and expectations educations in teach back method with documentation complete? Yes   Right Care Referral Info   Post Acute Recommendation No Care     Patient will discharge home with his wife and has no concerns for post acute care.

## 2018-10-18 NOTE — PROGRESS NOTES
Vitals stable, afebrile. Complaining of some back pain that is relieved with PO tylenol. Pt able to ambulate without VIDAL. on room air holding sats >90%. Still has crackles to bases but takes lasix and is diuresing well. Gave 80meq of potassium PO total and an extra dose of lasix before d/c. Discussed discharge instructions with pt and wife, stated understanding. IV removed and site wrapped. F/u appointment made with Dr. Varma

## 2018-10-18 NOTE — ASSESSMENT & PLAN NOTE
WBC 21 K; + fever  Admit for IV levaquin  Elevation and ice PRN for swelling  Monitor fever curve  Day 2/10 treatment, Once WBC trending down and fevers resolve could transition to PO but remains with significant leukocytosis so continue IV for now. Jim pending blood cx    10/15 day 4 iv levaquin. Afebrile and leukocytosis improving, from 97456>16341    10/16 day 5 levaquin. Now with fever, repeated blood cultures.priginal blood cultures negative.WBC 00011    10/17 day 6 levaquin. Now with 100.9 t max over last 24hr. Repeated blood cultures from Monday 103 spike without growth. Original blood cultures no growth as well. WBC coming down    10/18 Day 7 levaquin now afebrile. Blood cultures as well as repeat blood cultures all negative

## 2018-10-18 NOTE — ASSESSMENT & PLAN NOTE
WBC 21 K; + fever  Admit for IV levaquin  Elevation and ice PRN for swelling  Monitor fever curve  Day 2/10 treatment, Once WBC trending down and fevers resolve could transition to PO but remains with significant leukocytosis so continue IV for now. Jim pending blood cx    10/15 day 4 iv levaquin. Afebrile and leukocytosis improving, from 63398>09747    10/16 day 5 levaquin. Now with fever, repeated blood cultures.priginal blood cultures negative.WBC 75659    10/17 day 6 levaquin. Now with 100.9 t max over last 24hr. Repeated blood cultures from Monday 103 spike without growth. Original blood cultures no growth as well. WBC coming down    10/18 Day 7 levaquin now afebrile. Blood cultures as well as repeat blood cultures all negative   Upper/Partial

## 2018-10-18 NOTE — ASSESSMENT & PLAN NOTE
Stop IVF 10/13  Cont to hold lasix for now  No signs of acute exacerbation    10/14 lasix restarted  Cont BB and ACE    10/15--now more acute on chronic s/p IVF on admit for sepsis  C/o SOB today, 87% on RA and CXR with small effusion. Restarted PO Lasix yesterday but will do IV Lasix today to try and improve SOB and hypoxia. On eliquis so low suspicion for PE. No signs of PNA/bronchitis and levaquin should cover anyway. ?anxiety- restart citalopram, he has not been getting here and PRN valium as he was prescribed at home    10/16 repeat echo today. Lasix 40mg IV given yesterday. Cont 40mg po daily. Check BNP today    10/17 recent echo shows no heart failure with EF 55%, but BNP elevated and CXR with vascular congestion. PT also has crackles and is abd breathing this am. Will give another dose IV lasix  today and hold po lasix    10/18 ecent echo shows no heart failure with EF 55%, but BNP elevated and CXR with vascular congestion.  Today breathing better, no need for O2. Home on po lasix after single IV dose today

## 2018-10-18 NOTE — PLAN OF CARE
Problem: Patient Care Overview  Goal: Plan of Care Review  Outcome: Ongoing (interventions implemented as appropriate)  Reviewed care plan with Patient and wife. Verbalized understanding of teachings. Tolerating O2 well, resting comfortably will cont to monitor. Will cont to monitor.

## 2018-10-18 NOTE — ASSESSMENT & PLAN NOTE
Cont home lisinopril & metoprolol- increase as bp elevated  lisinopril today, consider adding amlodipine in am if still high  Still 140//81 in evenings- start low dose amlodipine at night  /70- consider adding norvasc at f/u

## 2018-10-18 NOTE — ASSESSMENT & PLAN NOTE
Cont home statin and asa  Lab Results   Component Value Date    ALT 95 (H) 10/18/2018    AST 91 (H) 10/18/2018    ALKPHOS 170 (H) 10/18/2018    BILITOT 0.6 10/18/2018     Hold statin till f/u with PCP secondary to elevated LFTS (thought to be because of viral etiology with single temp spike and cold/URI like picture subsequently)

## 2018-10-18 NOTE — ASSESSMENT & PLAN NOTE
Last A1C good control  Hold PO meds while inpatient  Cont SSI and levemir 40 units  -201- 1units of correction sliding scale insulin needed  Resume home meds

## 2018-10-18 NOTE — PROGRESS NOTES
[facility] Medicine  Progress Note    Patient Name: Erasmo Dunbar  MRN: [id number]  Patient Class: IP- Inpatient   Admission Date: 10/12/2018  Length of Stay: 6 days  Attending Physician: Katairna Lopez MD  Primary Care Provider: Hemal Varma MD        Subjective:     Principal Problem:Sepsis due to Escherichia coli    HPI:  Patient presented to ER with left testicular swelling. Sx started 1 day ago. He is having pain and swelling. No fevers. Mild dysuria but reports h/o chronic UTI/prostatis for which he typically take cipro and/or levaquin. No recent antibx. US showed epididymo-orchitis with moderate hydrocele. Admitted for IV Levaquin. Labs showed WBC 21K. Normal lactate. Blood and urine cx sent and pending.     Hospital Course:  10/14: feeling better but still with some pain and swelling of left testicle. No fevers. WBC down to 18K from 21K on admit. Remains on IV levaquin    10/15 day 4 levaquin (s/p one dose rocephin on admission). 99.1 t max. WBC improving [id number] admit >>[id number] today. Pain meds ordered PRN, pt has not needed. Blood cultures remain NGTD x 2, urine with enterococcus sensitive to macrobid.     Does report SOB this am. Winded trying to talk this am. On O2 2L NC pox 93-95%; 87% on RA. CXR  With small pleural effusion yesterday. PO Lasix restarted yesterday but remains SOB.     10/16 day 5 levaquin (s/p 1 dose rocephin on admission).he did c/o SOB yesterday. Lasix was started po Sunday. He was given an extra dose of IV lasix yesterday for small pleural effusion noted on CXR and fluids given on admission. We also tried tyo wean O2 but his sats were 89% on RA. Now on 3L NC satting 93%. BUN/creat remain stable.  Pt spiked a 103.1 temp yesterday blood cultures redrawn.. WBC continue to come down. LFT starting to rise.     He does report that today he feels much better than yesterday. No longer SOB. Not hurting in groin unless moving. Redness better as well as swelling.      10/17/18 day 6 levaquin (s/p 1 dose rocephin on admission). 100.9 t max which is better than 103 on Monday. WBC continues to decrease as well. Levaquin is for orchitis and macrobid started Monday for enterococcus sensitive UTI. Blood cultures from admit and Monday NGTD. Today he is not feeling well again. Yesterday felt better. CXR yesterday shows vascular congestion, Echo with 55% EF and no diastolic dysfunction but elevated PA pressures and  on admit 876 yesterday 943 today. Did have lasix held on admission and given minimal IV hydration. Lasix po restarted per home routine. IV lasix given on Monday.     10/18 day 7 levaquin and day 4 macrobid. Afebrile. He is doing well. No complaints of SOB this am. Off of O2 this am POX 93%. Ambulating well. WBC continues to decline. Lasix 40mg IV given yesterday and >369.     Interval note: doing well, afebrile, no SOB, maintaining sats without O2. 93% RA  Review of Systems   Constitutional: Negative for chills and fever.   HENT: Negative for congestion, ear pain, postnasal drip, rhinorrhea, sore throat and trouble swallowing.    Eyes: Negative for redness and itching.   Respiratory: Positive for shortness of breath (with too much activity). Negative for cough and wheezing.    Cardiovascular: Negative for chest pain and palpitations.   Gastrointestinal: Negative for abdominal pain, diarrhea, nausea and vomiting.   Genitourinary: Negative for dysuria and frequency.   Skin: Negative for rash.   Neurological: Negative for weakness and headaches.     Objective:     Vital Signs (Most Recent):  Temp: 97.6 °F (36.4 °C) (10/18/18 0724)  Pulse: 65 (10/18/18 0800)  Resp: 20 (10/18/18 0724)  BP: (!) 161/70 (10/18/18 0724)  SpO2: 97 % (10/18/18 0724) Vital Signs (24h Range):  Temp:  [96.8 °F (36 °C)-98.2 °F (36.8 °C)] 97.6 °F (36.4 °C)  Pulse:  [64-85] 65  Resp:  [20] 20  SpO2:  [93 %-100 %] 97 %  BP: (127-161)/(64-79) 161/70     Weight: 91 kg (200 lb 9.9 oz)  Body mass  index is 32.38 kg/m².    Physical Exam   Constitutional: He is oriented to person, place, and time. He appears well-developed and well-nourished. No distress.   HENT:   Head: Normocephalic and atraumatic.   Right Ear: External ear normal.   Left Ear: External ear normal.   Eyes: Conjunctivae and EOM are normal. Pupils are equal, round, and reactive to light.   Neck: Neck supple. No tracheal deviation present.   Cardiovascular: Normal rate and regular rhythm. Exam reveals no friction rub.   Pulmonary/Chest: Effort normal. No respiratory distress. He has no wheezes. He has rales.   Fine crackles bilateral bases   Abdominal: Soft. Bowel sounds are normal. He exhibits no distension. There is no tenderness.   Genitourinary:   Genitourinary Comments: Left testicle swelling and tenderness throughout, improving   Musculoskeletal: He exhibits no edema.   Neurological: He is alert and oriented to person, place, and time. No cranial nerve deficit.   Skin: Skin is warm and dry.   Psychiatric: He has a normal mood and affect. His behavior is normal.   Nursing note and vitals reviewed.        CRANIAL NERVES     CN III, IV, VI   Pupils are equal, round, and reactive to light.  Extraocular motions are normal.        Significant Labs:   Blood Culture:   Lab Results   Component Value Date    WBC 14.07 (H) 10/18/2018    HGB 9.9 (L) 10/18/2018    HCT 31.1 (L) 10/18/2018    MCV 90 10/18/2018     (H) 10/18/2018     BMP  Lab Results   Component Value Date     (L) 10/18/2018    K 3.3 (L) 10/18/2018    CL 93 (L) 10/18/2018    CO2 32 (H) 10/18/2018    BUN 13 10/18/2018    CREATININE 0.9 10/18/2018    CALCIUM 8.6 (L) 10/18/2018    ANIONGAP 9 10/18/2018    ESTGFRAFRICA >60 10/18/2018    EGFRNONAA >60 10/18/2018     Lab Results   Component Value Date    ALT 95 (H) 10/18/2018    AST 91 (H) 10/18/2018    ALKPHOS 170 (H) 10/18/2018    BILITOT 0.6 10/18/2018     BNP  Recent Labs   Lab 10/18/18  0523   *   202 on admit    Blood  cultures x 2 NGTD from admit, repeated 10/15 NGTD  Urine Culture, Routine ENTEROCOCCUS FAECALIS   10,000 - 49,999 cfu/ml       Resulting Agency [facility]   Susceptibility      Enterococcus faecalis     CULTURE, URINE     Ampicillin <=2  Sensitive     Nitrofurantoin <=32  Sensitive     Tetracycline >8  Resistant     Vancomycin 2  Sensitive         Lactic acid 1.1>>0.8    Mag 1.2    Significant Imaging    US scrotum Left epididymo-orchitis with a moderate associated hydrocele.    CXR 10/16  The heart is enlarged.  Calcified atheromatous disease affects the aorta.  Left-sided pacer device is in place.  There is pulmonary vascular congestion with interstitial edema.  Small bilateral pleural effusions are present.  The skeletal structures are intact.    CXR 10/14  Left-sided pacemaker device is in place.  The heart is normal in size.  Calcified atheromatous disease affects the aorta.  There is pulmonary vascular congestion with mild interstitial edema.  No consolidation.    10/16 Echo   EF 55 - 65 55  60    Mitral Valve Regurgitation  TRIVIAL TO MILD  TRIVIAL    Diastolic Dysfunction  No  Yes Abnormal     Est. PA Systolic Pressure  41.73 Abnormal   31.52    Tricuspid Valve Regurgitation  TRIVIAL TO MILD  TRIVIAL TO MILD         Assessment/Plan:      * Sepsis due to Enterococcus     Presumed E. Coli. Pseudomonas sometimes a consideration in older men as well  Covering with levaquin 500mg daily  Day 12/10    WBC 21K and fever > 101 F on arrival to ER thus meets sepsis criteria. Source orchitis  Monitor fever curve  Monitor WBC  IVF--will stop 10/13 due to h/o CHF. Continue to hold lasix for now.    10/15 urine culture with enterococcus- add macrobid and continue levaquin for orchitis as this is best coverage. VSS/afebrile. Lasix restarted after getting IVF on admit    10/16 macrobid for UTI, cont levaquin for orchitis, f/u repeat blood cultures  One time diose vanc given yesterday with huge temp spike. He is feeling  better and macrobid covering the UTI. Will consider restarting vanc/ampicillin if fever continues. He has not had any more fever and feel much better today.     10/17 macrobid day 3 for UTI and levaquin day 6 for orchitis. All blood cultures NGTD    10/18 macrobid day 4/7 for UTI, levaquin day 7 for orchitis all blood cultures NGTD      Hypokalemia due to loss of potassium    Replaced.  Recheck BMP at f/u       Paroxysmal atrial fibrillation    Cont BB  Cont eliquis  telemetry       Acute on chronic diastolic CHF (congestive heart failure)    Stop IVF 10/13  Cont to hold lasix for now  No signs of acute exacerbation    10/14 lasix restarted  Cont BB and ACE    10/15--now more acute on chronic s/p IVF on admit for sepsis  C/o SOB today, 87% on RA and CXR with small effusion. Restarted PO Lasix yesterday but will do IV Lasix today to try and improve SOB and hypoxia. On eliquis so low suspicion for PE. No signs of PNA/bronchitis and levaquin should cover anyway. ?anxiety- restart citalopram, he has not been getting here and PRN valium as he was prescribed at home    10/16 repeat echo today. Lasix 40mg IV given yesterday. Cont 40mg po daily. Check BNP today    10/17 recent echo shows no heart failure with EF 55%, but BNP elevated and CXR with vascular congestion. PT also has crackles and is abd breathing this am. Will give another dose IV lasix  today and hold po lasix    10/18 ecent echo shows no heart failure with EF 55%, but BNP elevated and CXR with vascular congestion.  Today breathing better, no need for O2. Home on po lasix after single IV dose today     Prostate cancer    Cont casodex  Diagnosed about 3 weeks ago by outside urology (Dr. Coon)       Orchitis    WBC 21 K; + fever  Admit for IV levaquin  Elevation and ice PRN for swelling  Monitor fever curve  Day 2/10 treatment, Once WBC trending down and fevers resolve could transition to PO but remains with significant leukocytosis so continue IV for now.  Jim pending blood cx    10/15 day 4 iv levaquin. Afebrile and leukocytosis improving, from 60703>07104    10/16 day 5 levaquin. Now with fever, repeated blood cultures.priginal blood cultures negative.WBC 35276    10/17 day 6 levaquin. Now with 100.9 t max over last 24hr. Repeated blood cultures from Monday 103 spike without growth. Original blood cultures no growth as well. WBC coming down    10/18 Day 7 levaquin now afebrile. Blood cultures as well as repeat blood cultures all negative     Diabetes mellitus, type 2    Last A1C good control  Hold PO meds while inpatient  Cont SSI and levemir 40 units  -201- 1units of correction sliding scale insulin needed       BPH (benign prostatic hyperplasia)    Cont home flomax       Hypertension    Cont home lisinopril & metoprolol- increase as bp elevated  lisinopril today, consider adding amlodipine in am if still high  Still 140//81 in evenings- start low dose amlodipine at night  /70- home on increase norvasc 2.5>5       Hyperlipidemia    Cont home statin and asa  Lab Results   Component Value Date    ALT 95 (H) 10/18/2018    AST 91 (H) 10/18/2018    ALKPHOS 170 (H) 10/18/2018    BILITOT 0.6 10/18/2018     Hold statin till f/u with PCP secondary to elevated LFTS (thought to be because of viral etiology with single temp spike and cold/URI like picture subsequently)         VTE Risk Mitigation (From admission, onward)        Ordered     apixaban tablet 5 mg  2 times daily      10/13/18 0656              Bernadette Garibay MD  Department of Hospital Medicine   [facility]

## 2018-10-18 NOTE — ASSESSMENT & PLAN NOTE
Cont home lisinopril & metoprolol- increase as bp elevated  lisinopril today, consider adding amlodipine in am if still high  Still 140//81 in evenings- start low dose amlodipine at night  /70- home on increase norvasc 2.5>5

## 2018-10-18 NOTE — SUBJECTIVE & OBJECTIVE
Interval note: doing well, afebrile, no SOB, maintaining sats without O2. 93% RA  Review of Systems   Constitutional: Negative for chills and fever.   HENT: Negative for congestion, ear pain, postnasal drip, rhinorrhea, sore throat and trouble swallowing.    Eyes: Negative for redness and itching.   Respiratory: Positive for shortness of breath (with too much activity). Negative for cough and wheezing.    Cardiovascular: Negative for chest pain and palpitations.   Gastrointestinal: Negative for abdominal pain, diarrhea, nausea and vomiting.   Genitourinary: Negative for dysuria and frequency.   Skin: Negative for rash.   Neurological: Negative for weakness and headaches.     Objective:     Vital Signs (Most Recent):  Temp: 97.6 °F (36.4 °C) (10/18/18 0724)  Pulse: 65 (10/18/18 0800)  Resp: 20 (10/18/18 0724)  BP: (!) 161/70 (10/18/18 0724)  SpO2: 97 % (10/18/18 0724) Vital Signs (24h Range):  Temp:  [96.8 °F (36 °C)-98.2 °F (36.8 °C)] 97.6 °F (36.4 °C)  Pulse:  [64-85] 65  Resp:  [20] 20  SpO2:  [93 %-100 %] 97 %  BP: (127-161)/(64-79) 161/70     Weight: 91 kg (200 lb 9.9 oz)  Body mass index is 32.38 kg/m².    Physical Exam   Constitutional: He is oriented to person, place, and time. He appears well-developed and well-nourished. No distress.   HENT:   Head: Normocephalic and atraumatic.   Right Ear: External ear normal.   Left Ear: External ear normal.   Eyes: Conjunctivae and EOM are normal. Pupils are equal, round, and reactive to light.   Neck: Neck supple. No tracheal deviation present.   Cardiovascular: Normal rate and regular rhythm. Exam reveals no friction rub.   Pulmonary/Chest: Effort normal. No respiratory distress. He has no wheezes. He has rales.   Fine crackles bilateral bases   Abdominal: Soft. Bowel sounds are normal. He exhibits no distension. There is no tenderness.   Genitourinary:   Genitourinary Comments: Left testicle swelling and tenderness throughout, improving   Musculoskeletal: He exhibits  no edema.   Neurological: He is alert and oriented to person, place, and time. No cranial nerve deficit.   Skin: Skin is warm and dry.   Psychiatric: He has a normal mood and affect. His behavior is normal.   Nursing note and vitals reviewed.        CRANIAL NERVES     CN III, IV, VI   Pupils are equal, round, and reactive to light.  Extraocular motions are normal.        Significant Labs:   Blood Culture:   Lab Results   Component Value Date    WBC 14.07 (H) 10/18/2018    HGB 9.9 (L) 10/18/2018    HCT 31.1 (L) 10/18/2018    MCV 90 10/18/2018     (H) 10/18/2018     BMP  Lab Results   Component Value Date     (L) 10/18/2018    K 3.3 (L) 10/18/2018    CL 93 (L) 10/18/2018    CO2 32 (H) 10/18/2018    BUN 13 10/18/2018    CREATININE 0.9 10/18/2018    CALCIUM 8.6 (L) 10/18/2018    ANIONGAP 9 10/18/2018    ESTGFRAFRICA >60 10/18/2018    EGFRNONAA >60 10/18/2018     Lab Results   Component Value Date    ALT 95 (H) 10/18/2018    AST 91 (H) 10/18/2018    ALKPHOS 170 (H) 10/18/2018    BILITOT 0.6 10/18/2018     BNP  Recent Labs   Lab 10/18/18  0523   *   202 on admit    Blood cultures x 2 NGTD from admit, repeated 10/15 NGTD  Urine Culture, Routine ENTEROCOCCUS FAECALIS   10,000 - 49,999 cfu/ml       Resulting Agency [facility]   Susceptibility      Enterococcus faecalis     CULTURE, URINE     Ampicillin <=2  Sensitive     Nitrofurantoin <=32  Sensitive     Tetracycline >8  Resistant     Vancomycin 2  Sensitive         Lactic acid 1.1>>0.8    Mag 1.2    Significant Imaging    US scrotum Left epididymo-orchitis with a moderate associated hydrocele.    CXR 10/16  The heart is enlarged.  Calcified atheromatous disease affects the aorta.  Left-sided pacer device is in place.  There is pulmonary vascular congestion with interstitial edema.  Small bilateral pleural effusions are present.  The skeletal structures are intact.    CXR 10/14  Left-sided pacemaker device is in place.  The heart is normal in size.   Calcified atheromatous disease affects the aorta.  There is pulmonary vascular congestion with mild interstitial edema.  No consolidation.    10/16 Echo   EF 55 - 65 55  60    Mitral Valve Regurgitation  TRIVIAL TO MILD  TRIVIAL    Diastolic Dysfunction  No  Yes Abnormal     Est. PA Systolic Pressure  41.73 Abnormal   31.52    Tricuspid Valve Regurgitation  TRIVIAL TO MILD  TRIVIAL TO MILD

## 2018-10-18 NOTE — ASSESSMENT & PLAN NOTE
Presumed E. Coli. Pseudomonas sometimes a consideration in older men as well  Covering with levaquin 500mg daily  Day 12/10    WBC 21K and fever > 101 F on arrival to ER thus meets sepsis criteria. Source orchitis  Monitor fever curve  Monitor WBC  IVF--will stop 10/13 due to h/o CHF. Continue to hold lasix for now.    10/15 urine culture with enterococcus- add macrobid and continue levaquin for orchitis as this is best coverage. VSS/afebrile. Lasix restarted after getting IVF on admit    10/16 macrobid for UTI, cont levaquin for orchitis, f/u repeat blood cultures  One time diose vanc given yesterday with huge temp spike. He is feeling better and macrobid covering the UTI. Will consider restarting vanc/ampicillin if fever continues. He has not had any more fever and feel much better today.     10/17 macrobid day 3 for UTI and levaquin day 6 for orchitis. All blood cultures NGTD    10/18 macrobid day 4/7 for UTI, levaquin day 7 for orchitis all blood cultures NGTD

## 2018-10-18 NOTE — DISCHARGE INSTRUCTIONS
Orchitis  Orchitis is an infection in one or both testicles. It can be viral or bacterial. Orchitis is caused by one of the following:  · Epididymitis. The epididymis is the duct that carries semen out of the testicle to the urethra (tract that passes urine). If the epididymis becomes infected, bacteria can spread to the testicle. A common cause of epididymitis and orchitis is an STD (sexually transmitted disease), such as gonorrhea or chlamydia. Other infections can also cause epididymitis, especially in men over age 40.  · Prostate infection. The prostate gland surrounds a portion of the urethra. An infection in the prostate gland can spread to the testicle.  · Mumps. This is the most common viral illness that can cause orchitis. One-third of males over age 10 with mumps will get mumps orchitis. One-half of orchitis infections lead to shrinking (atrophy) of the involved testicle. Infertility is a rare complication. It occurs only if both testicles are affected.  Orchitis causes pain or heaviness in one or both testicles. This pain may spread to the lower abdomen. The testicle is tender and swollen. The skin of the scrotum may become red or purplish. You may notice blood in your semen. Other symptoms include high fever, nausea, and vomiting. You may have pain with urination or sex.  Treatment of bacterial orchitis is antibiotics for 10 days. Treatment for viral (mumps) orchitis is aimed at symptom relief only. This is because antibiotics don't work for viruses. It usually takes 1 to 3 weeks for mumps orchitis to go away.  Home care  These guidelines will help you care for yourself at home:  · If you were given antibiotics, take them all. It is important to finish them, even if you are feeling better.  · You may use over-the-counter medicines to control pain, unless you were given another medicine. If you have chronic liver or kidney disease, or have ever had a stomach ulcer or GI bleeding, talk with your  healthcare provider before using these medicines.  · Elevate your scrotum. Use snug-fitting briefs or an athletic supporter.  · To help with pain and swelling, place an ice pack (ice cubes in a plastic zip lock bag, wrapped in a thin towel) over the scrotum for no more than 20 minutes every 3 to 6 hours during the first 24 to 48 hours. Then, continue to use ice packs as needed for pain and swelling.  Follow-up care  Follow up with your healthcare provider after you finish all antibiotics, or as advised. If a culture was taken, you may call as directed for the results. Use condoms or do not have sex until you get the results. If you have an STD, continue this protection until both you and your sexual partner finish treatment.  Call 911  Call 911 if you have any of these:  · Weakness, dizziness, or fainting  When to seek medical advice  Call your healthcare provider right away if any of these occur:  · Continued nausea or vomiting  · Difficulty passing urine  · Fever of 100.4ºF (38ºC) or higher, or as directed by your healthcare provider  · Increasing pain and swelling in one or both testicles  Date Last Reviewed: 10/1/2016  © 1254-4881 [employer]. [address] [place] [address], [place] [address]. All rights reserved. This information is not intended as a substitute for professional medical care. Always follow your healthcare professional's instructions.

## 2018-10-18 NOTE — PLAN OF CARE
10/18/18 1147   Medicare Message   Important Message from Medicare regarding Discharge Appeal Rights Given to patient/caregiver;Signed/date by patient/caregiver;Explained to patient/caregiver   Date IMM was signed 10/18/18   Time IMM was signed 1000     IMM signed for discharge.

## 2018-10-18 NOTE — ASSESSMENT & PLAN NOTE
Last A1C good control  Hold PO meds while inpatient  Cont SSI and levemir 40 units  -201- 1units of correction sliding scale insulin needed

## 2018-10-21 LAB
BACTERIA BLD CULT: NORMAL
BACTERIA BLD CULT: NORMAL

## 2018-10-22 ENCOUNTER — TELEPHONE (OUTPATIENT)
Dept: FAMILY MEDICINE | Facility: CLINIC | Age: 83
End: 2018-10-22

## 2018-10-22 ENCOUNTER — PATIENT OUTREACH (OUTPATIENT)
Dept: ADMINISTRATIVE | Facility: CLINIC | Age: 83
End: 2018-10-22

## 2018-10-22 NOTE — PATIENT INSTRUCTIONS
Bacteremia, Suspected (Adult)  Your fever today is probably due to a viral illness. However, sometimes fever can be an early sign of a more serious bacterial infection. Bacteremia is a bacterial infection that has spread to the bloodstream. This is serious because it can spread to other organs, including the kidneys, brain, and lungs.  Your healthcare provider will perform tests (cultures) to check for bacteremia. Until the test results are known you should watch for the signs listed below.  Causes  Bacteremia usually starts with a typical infection, but it then spreads to the blood. Almost any type of infection can cause bacteremia, including a urinary tract infection, skin infection, gastrointestinal problem, surgical complication, or pneumonia.  Symptoms  At first symptoms may seem like any typical infection or illness, but then they worsen. Symptoms of bacteremia can include:  · Fever and chills  · Loss of appetite  · Nausea or vomiting  · Trouble breathing or fast breathing  · Fast heart rate  · Feeling lightheaded or faint  · Skin rashes or blotches  Home care  Follow these guidelines when caring for yourself at home.  · Rest at home for the first 2 to 3 days. When resuming activity, don't let yourself become overly tired.  · You can take acetaminophen or ibuprofen for pain, unless you were given a different pain medicine to use. (Note: If you have chronic liver or kidney disease or have ever had a stomach ulcer or gastrointestinal bleeding, talk with your healthcare provider before using these medicines. Also talk to your provider if you are taking medicine to prevent blood clots.) Aspirin should never be given to anyone younger than 18 years of age who is ill with a viral infection or fever. It may cause severe liver or brain damage.  · If you were given antibiotics, take them until they are used up, or your healthcare provider tells you to stop. It is important to finish the antibiotics even though you feel  better. This is to make sure the infection has cleared.  · Your appetite may be poor, so a light diet is fine. Avoid dehydration by drinking 6 to 8 glasses of fluid per day (such as water, soft drinks, sports drinks, juices, tea, or soup).  Follow-up care  Follow up with your healthcare provider, or as advised.  · If a culture was done, you will be notified if your treatment needs to be changed. You can call as directed for the results.  · If X-rays, a CT, or an ultrasound were done, a specialist will review them. You will be notified of any findings that may affect your care.  Call 911  Contact emergency services right away if any of these occur:  · Trouble breathing or swallowing, or wheezing  · Chest pain  · Confusion or sudden change in behavior  · Extreme drowsiness or trouble awakening  · Fainting or loss of consciousness  · Rapid heart rate  · Low blood pressure  · Vomiting blood, or large amounts of blood in stool  · Seizure  When to seek medical advice  Call your healthcare provider right away if any of these occur:  · Cough with lots of colored sputum (mucus), or blood in your sputum  · Severe headache  · Severe face, neck, throat, or ear pain  · Pain in the right lower abdomen  · Weakness, dizziness, repeated vomiting, or diarrhea  · Joint pain or a new rash  · Burning when urinating  · Fever of 100.4°F (38°C) or higher  Date Last Reviewed: 7/30/2015  © 2122-5280 [employer]. [address] [place] [address], [place] [address]. All rights reserved. This information is not intended as a substitute for professional medical care. Always follow your healthcare professional's instructions.

## 2018-10-22 NOTE — TELEPHONE ENCOUNTER
----- Message from Viky Caceres RN sent at 10/22/2018  5:12 PM CDT -----  Patient's wife reports patient remains SOB at night with orthopnea. Unable to sleep. Please contact and advise.    Respectfully,  Viky Caceres,ELOISEN RN,,CCRN  [facility]  Post-Discharge  [phone]

## 2018-10-22 NOTE — PROGRESS NOTES
[facility] Script completed with patient's wife Wife reports that unable to sleep and SOB at night having orthopnea. Message sent to Dr Varma

## 2018-10-23 ENCOUNTER — APPOINTMENT (OUTPATIENT)
Dept: RADIOLOGY | Facility: CLINIC | Age: 83
End: 2018-10-23
Attending: FAMILY MEDICINE
Payer: MEDICARE

## 2018-10-23 ENCOUNTER — OFFICE VISIT (OUTPATIENT)
Dept: FAMILY MEDICINE | Facility: CLINIC | Age: 83
End: 2018-10-23
Payer: MEDICARE

## 2018-10-23 VITALS
RESPIRATION RATE: 20 BRPM | SYSTOLIC BLOOD PRESSURE: 110 MMHG | BODY MASS INDEX: 31.4 KG/M2 | DIASTOLIC BLOOD PRESSURE: 50 MMHG | OXYGEN SATURATION: 95 % | HEART RATE: 68 BPM | HEIGHT: 66 IN | WEIGHT: 195.38 LBS

## 2018-10-23 DIAGNOSIS — R60.9 EDEMA, UNSPECIFIED TYPE: ICD-10-CM

## 2018-10-23 DIAGNOSIS — Z09 HOSPITAL DISCHARGE FOLLOW-UP: Primary | ICD-10-CM

## 2018-10-23 DIAGNOSIS — R06.01 ORTHOPNEA: ICD-10-CM

## 2018-10-23 DIAGNOSIS — R06.02 SOB (SHORTNESS OF BREATH): ICD-10-CM

## 2018-10-23 LAB — BNP SERPL-MCNC: 122 PG/ML

## 2018-10-23 PROCEDURE — 99999 PR PBB SHADOW E&M-EST. PATIENT-LVL III: CPT | Mod: PBBFAC,,, | Performed by: FAMILY MEDICINE

## 2018-10-23 PROCEDURE — 99214 OFFICE O/P EST MOD 30 MIN: CPT | Mod: S$PBB,,, | Performed by: FAMILY MEDICINE

## 2018-10-23 PROCEDURE — 71046 X-RAY EXAM CHEST 2 VIEWS: CPT | Mod: TC,PO

## 2018-10-23 PROCEDURE — 71046 X-RAY EXAM CHEST 2 VIEWS: CPT | Mod: 26,,, | Performed by: RADIOLOGY

## 2018-10-23 PROCEDURE — 3078F DIAST BP <80 MM HG: CPT | Mod: CPTII,,, | Performed by: FAMILY MEDICINE

## 2018-10-23 PROCEDURE — 83880 ASSAY OF NATRIURETIC PEPTIDE: CPT

## 2018-10-23 PROCEDURE — 99213 OFFICE O/P EST LOW 20 MIN: CPT | Mod: PBBFAC,25 | Performed by: FAMILY MEDICINE

## 2018-10-23 PROCEDURE — 1101F PT FALLS ASSESS-DOCD LE1/YR: CPT | Mod: CPTII,,, | Performed by: FAMILY MEDICINE

## 2018-10-23 PROCEDURE — 3074F SYST BP LT 130 MM HG: CPT | Mod: CPTII,,, | Performed by: FAMILY MEDICINE

## 2018-10-23 PROCEDURE — 36415 COLL VENOUS BLD VENIPUNCTURE: CPT | Mod: PBBFAC

## 2018-10-23 RX ORDER — ALBUTEROL SULFATE 90 UG/1
2 AEROSOL, METERED RESPIRATORY (INHALATION) EVERY 6 HOURS PRN
Qty: 1 EACH | Refills: 0 | Status: SHIPPED | OUTPATIENT
Start: 2018-10-23 | End: 2019-07-09

## 2018-10-23 NOTE — PROGRESS NOTES
Subjective:       Patient ID: Erasmo Dunbar is a 83 y.o. male.    Chief Complaint: Hospital Follow Up and Shortness of Breath    Pt is a 83 y.o. male who presents for evaluation and management of   Encounter Diagnoses   Name Primary?    Hospital discharge follow-up Yes    Orthopnea     Edema, unspecified type     SOB (shortness of breath)    .  Doing well on current meds. Denies any side effects. Prevention is up to date.  Review of Systems   Respiratory: Positive for shortness of breath.    Cardiovascular: Positive for leg swelling. Negative for chest pain.        Orthopnea        Objective:      Physical Exam   Constitutional: He is oriented to person, place, and time. He appears well-developed and well-nourished.   HENT:   Head: Normocephalic and atraumatic.   Right Ear: External ear normal.   Left Ear: External ear normal.   Nose: Nose normal.   Mouth/Throat: Oropharynx is clear and moist.   Eyes: Conjunctivae and EOM are normal. Pupils are equal, round, and reactive to light. Right eye exhibits no discharge. Left eye exhibits no discharge. No scleral icterus.   Neck: Normal range of motion. Neck supple. No JVD present. No tracheal deviation present. No thyromegaly present.   Cardiovascular: Normal rate, regular rhythm, normal heart sounds and intact distal pulses.   No murmur heard.  Pulmonary/Chest: Effort normal and breath sounds normal. No respiratory distress. He has no wheezes. He has no rales. He exhibits no tenderness.   Abdominal: Soft. Bowel sounds are normal. He exhibits no distension and no mass. There is no tenderness. There is no rebound and no guarding.   Musculoskeletal: Normal range of motion. He exhibits edema.   Trace edema    Lymphadenopathy:     He has no cervical adenopathy.   Neurological: He is alert and oriented to person, place, and time. He has normal reflexes. He displays normal reflexes. No cranial nerve deficit. He exhibits normal muscle tone. Coordination normal.   Skin:  Skin is warm and dry.   Psychiatric: He has a normal mood and affect. His behavior is normal. Judgment and thought content normal.       Assessment:       1. Hospital discharge follow-up    2. Orthopnea    3. Edema, unspecified type    4. SOB (shortness of breath)        Plan:   Erasmo PEGUERO was seen today for hospital follow up and shortness of breath.    Diagnoses and all orders for this visit:    Hospital discharge follow-up    Orthopnea  -     X-Ray Chest PA And Lateral; Future  -     Brain natriuretic peptide; Future  -     Brain natriuretic peptide  -     albuterol (PROVENTIL/VENTOLIN HFA) 90 mcg/actuation inhaler; Inhale 2 puffs into the lungs every 6 (six) hours as needed for Wheezing.  -     Complete PFT with bronchodilator; Future    Edema, unspecified type  -     Brain natriuretic peptide; Future  -     Brain natriuretic peptide    SOB (shortness of breath)  -     albuterol (PROVENTIL/VENTOLIN HFA) 90 mcg/actuation inhaler; Inhale 2 puffs into the lungs every 6 (six) hours as needed for Wheezing.  -     Complete PFT with bronchodilator; Future      Problem List Items Addressed This Visit     None      Visit Diagnoses     Hospital discharge follow-up    -  Primary    Orthopnea        Relevant Medications    albuterol (PROVENTIL/VENTOLIN HFA) 90 mcg/actuation inhaler    Other Relevant Orders    X-Ray Chest PA And Lateral    Brain natriuretic peptide    Complete PFT with bronchodilator    Edema, unspecified type        Relevant Orders    Brain natriuretic peptide    SOB (shortness of breath)        Relevant Medications    albuterol (PROVENTIL/VENTOLIN HFA) 90 mcg/actuation inhaler    Other Relevant Orders    Complete PFT with bronchodilator        Will decide f/u after PFT   Continue lasix daily     No Follow-up on file.

## 2018-10-24 NOTE — PROGRESS NOTES
Labs look good. No evidence of significant heart failure and no evidence of volume overload.  We'll see what his lung function tests show

## 2018-10-30 ENCOUNTER — PATIENT MESSAGE (OUTPATIENT)
Dept: FAMILY MEDICINE | Facility: CLINIC | Age: 83
End: 2018-10-30

## 2018-10-30 RX ORDER — PEN NEEDLE, DIABETIC 30 GX3/16"
1 NEEDLE, DISPOSABLE MISCELLANEOUS 3 TIMES DAILY
Qty: 100 EACH | Refills: 11 | Status: SHIPPED | OUTPATIENT
Start: 2018-10-30 | End: 2020-12-21 | Stop reason: SDUPTHER

## 2018-11-02 ENCOUNTER — HOSPITAL ENCOUNTER (OUTPATIENT)
Dept: PULMONOLOGY | Facility: HOSPITAL | Age: 83
Discharge: HOME | End: 2018-11-02
Attending: FAMILY MEDICINE
Payer: MEDICARE

## 2018-11-02 DIAGNOSIS — R06.02 SOB (SHORTNESS OF BREATH): ICD-10-CM

## 2018-11-02 DIAGNOSIS — R06.01 ORTHOPNEA: ICD-10-CM

## 2018-11-02 PROCEDURE — 99900031 HC PATIENT EDUCATION (STAT)

## 2018-11-02 PROCEDURE — 94727 GAS DIL/WSHOT DETER LNG VOL: CPT

## 2018-11-02 PROCEDURE — 94729 DIFFUSING CAPACITY: CPT

## 2018-11-02 PROCEDURE — 94060 EVALUATION OF WHEEZING: CPT

## 2018-11-07 ENCOUNTER — PATIENT MESSAGE (OUTPATIENT)
Dept: FAMILY MEDICINE | Facility: CLINIC | Age: 83
End: 2018-11-07

## 2018-11-07 RX ORDER — LEVOFLOXACIN 500 MG/1
500 TABLET, FILM COATED ORAL DAILY
Qty: 30 TABLET | Refills: 1 | Status: SHIPPED | OUTPATIENT
Start: 2018-11-07 | End: 2019-03-04

## 2018-11-08 RX ORDER — BUDESONIDE AND FORMOTEROL FUMARATE DIHYDRATE 160; 4.5 UG/1; UG/1
2 AEROSOL RESPIRATORY (INHALATION) 2 TIMES DAILY
Qty: 3 INHALER | Refills: 5 | Status: SHIPPED | OUTPATIENT
Start: 2018-11-08 | End: 2019-01-24

## 2018-11-14 DIAGNOSIS — I10 ESSENTIAL HYPERTENSION: ICD-10-CM

## 2018-11-14 DIAGNOSIS — G45.9 TRANSIENT CEREBRAL ISCHEMIA, UNSPECIFIED TYPE: ICD-10-CM

## 2018-11-14 RX ORDER — LISINOPRIL 20 MG/1
20 TABLET ORAL DAILY
Qty: 30 TABLET | Refills: 0 | Status: SHIPPED | OUTPATIENT
Start: 2018-11-14 | End: 2019-01-24 | Stop reason: SDUPTHER

## 2019-01-17 ENCOUNTER — TELEPHONE (OUTPATIENT)
Dept: FAMILY MEDICINE | Facility: CLINIC | Age: 84
End: 2019-01-17

## 2019-01-17 NOTE — TELEPHONE ENCOUNTER
Spoke with patient's wife and she states that he is having some dizzy spells and is visibly shaking.  I asked if his blood sugar was normal and she states that it is and his BP is normal too.  I recommended for her to take him to the ER and she states that it is not happening all the time.  States that she will wait until his appt next week but if it gets worse then she will bring him.

## 2019-01-17 NOTE — TELEPHONE ENCOUNTER
----- Message from Shabbir Hawley sent at 2019  9:21 AM CST -----  Contact: Wife - Carol Dunbar  MRN: [id number]  : 1935  PCP: Hemal Varma  Home Phone      [phone]  Work Phone      Not on file.  [place]          [phone]      MESSAGE: dizziness - shaky - trouble standing - has happened several times, started Tues -- requesting to speak with Dr Varma' nurse    Call Carol @ [phone]    PCP: Saran

## 2019-01-24 ENCOUNTER — OFFICE VISIT (OUTPATIENT)
Dept: FAMILY MEDICINE | Facility: CLINIC | Age: 84
End: 2019-01-24
Payer: MEDICARE

## 2019-01-24 VITALS
HEART RATE: 66 BPM | RESPIRATION RATE: 16 BRPM | HEIGHT: 66 IN | DIASTOLIC BLOOD PRESSURE: 60 MMHG | SYSTOLIC BLOOD PRESSURE: 138 MMHG | BODY MASS INDEX: 30.76 KG/M2 | WEIGHT: 191.38 LBS

## 2019-01-24 DIAGNOSIS — Z79.4 TYPE 2 DIABETES MELLITUS WITH STAGE 3 CHRONIC KIDNEY DISEASE, WITH LONG-TERM CURRENT USE OF INSULIN: ICD-10-CM

## 2019-01-24 DIAGNOSIS — F32.A DEPRESSION, UNSPECIFIED DEPRESSION TYPE: ICD-10-CM

## 2019-01-24 DIAGNOSIS — R35.0 BENIGN PROSTATIC HYPERPLASIA WITH URINARY FREQUENCY: ICD-10-CM

## 2019-01-24 DIAGNOSIS — I48.0 PAROXYSMAL ATRIAL FIBRILLATION: ICD-10-CM

## 2019-01-24 DIAGNOSIS — E11.42 DIABETIC POLYNEUROPATHY ASSOCIATED WITH TYPE 2 DIABETES MELLITUS: ICD-10-CM

## 2019-01-24 DIAGNOSIS — N40.1 BENIGN PROSTATIC HYPERPLASIA WITH URINARY FREQUENCY: ICD-10-CM

## 2019-01-24 DIAGNOSIS — E78.2 MIXED HYPERLIPIDEMIA: Primary | ICD-10-CM

## 2019-01-24 DIAGNOSIS — N18.30 TYPE 2 DIABETES MELLITUS WITH STAGE 3 CHRONIC KIDNEY DISEASE, WITH LONG-TERM CURRENT USE OF INSULIN: ICD-10-CM

## 2019-01-24 DIAGNOSIS — E11.22 TYPE 2 DIABETES MELLITUS WITH STAGE 3 CHRONIC KIDNEY DISEASE, WITH LONG-TERM CURRENT USE OF INSULIN: ICD-10-CM

## 2019-01-24 DIAGNOSIS — G45.9 TRANSIENT CEREBRAL ISCHEMIA, UNSPECIFIED TYPE: ICD-10-CM

## 2019-01-24 DIAGNOSIS — I10 ESSENTIAL HYPERTENSION: ICD-10-CM

## 2019-01-24 LAB
ALBUMIN SERPL BCP-MCNC: 3.9 G/DL
ALP SERPL-CCNC: 56 U/L
ALT SERPL W/O P-5'-P-CCNC: 9 U/L
ANION GAP SERPL CALC-SCNC: 10 MMOL/L
AST SERPL-CCNC: 13 U/L
BILIRUB SERPL-MCNC: 0.4 MG/DL
BUN SERPL-MCNC: 13 MG/DL
CALCIUM SERPL-MCNC: 9.8 MG/DL
CHLORIDE SERPL-SCNC: 95 MMOL/L
CO2 SERPL-SCNC: 31 MMOL/L
CREAT SERPL-MCNC: 0.9 MG/DL
EST. GFR  (AFRICAN AMERICAN): >60 ML/MIN/1.73 M^2
EST. GFR  (NON AFRICAN AMERICAN): >60 ML/MIN/1.73 M^2
ESTIMATED AVG GLUCOSE: 123 MG/DL
GLUCOSE SERPL-MCNC: 72 MG/DL
HBA1C MFR BLD HPLC: 5.9 %
POTASSIUM SERPL-SCNC: 4 MMOL/L
PROT SERPL-MCNC: 6.9 G/DL
SODIUM SERPL-SCNC: 136 MMOL/L

## 2019-01-24 PROCEDURE — 3078F DIAST BP <80 MM HG: CPT | Mod: CPTII,S$GLB,, | Performed by: FAMILY MEDICINE

## 2019-01-24 PROCEDURE — 80053 COMPREHEN METABOLIC PANEL: CPT

## 2019-01-24 PROCEDURE — 83036 HEMOGLOBIN GLYCOSYLATED A1C: CPT

## 2019-01-24 PROCEDURE — 3075F SYST BP GE 130 - 139MM HG: CPT | Mod: CPTII,S$GLB,, | Performed by: FAMILY MEDICINE

## 2019-01-24 PROCEDURE — 99999 PR PBB SHADOW E&M-EST. PATIENT-LVL III: CPT | Mod: PBBFAC,,, | Performed by: FAMILY MEDICINE

## 2019-01-24 PROCEDURE — 1101F PR PT FALLS ASSESS DOC 0-1 FALLS W/OUT INJ PAST YR: ICD-10-PCS | Mod: CPTII,S$GLB,, | Performed by: FAMILY MEDICINE

## 2019-01-24 PROCEDURE — 99214 OFFICE O/P EST MOD 30 MIN: CPT | Mod: S$GLB,,, | Performed by: FAMILY MEDICINE

## 2019-01-24 PROCEDURE — 3078F PR MOST RECENT DIASTOLIC BLOOD PRESSURE < 80 MM HG: ICD-10-PCS | Mod: CPTII,S$GLB,, | Performed by: FAMILY MEDICINE

## 2019-01-24 PROCEDURE — 99214 PR OFFICE/OUTPT VISIT, EST, LEVL IV, 30-39 MIN: ICD-10-PCS | Mod: S$GLB,,, | Performed by: FAMILY MEDICINE

## 2019-01-24 PROCEDURE — 1101F PT FALLS ASSESS-DOCD LE1/YR: CPT | Mod: CPTII,S$GLB,, | Performed by: FAMILY MEDICINE

## 2019-01-24 PROCEDURE — 99999 PR PBB SHADOW E&M-EST. PATIENT-LVL III: ICD-10-PCS | Mod: PBBFAC,,, | Performed by: FAMILY MEDICINE

## 2019-01-24 PROCEDURE — 36415 COLL VENOUS BLD VENIPUNCTURE: CPT | Mod: S$GLB,,, | Performed by: FAMILY MEDICINE

## 2019-01-24 PROCEDURE — 36415 PR COLLECTION VENOUS BLOOD,VENIPUNCTURE: ICD-10-PCS | Mod: S$GLB,,, | Performed by: FAMILY MEDICINE

## 2019-01-24 PROCEDURE — 3075F PR MOST RECENT SYSTOLIC BLOOD PRESS GE 130-139MM HG: ICD-10-PCS | Mod: CPTII,S$GLB,, | Performed by: FAMILY MEDICINE

## 2019-01-24 RX ORDER — APIXABAN 5 MG/1
5 TABLET, FILM COATED ORAL 2 TIMES DAILY
Qty: 180 TABLET | Refills: 1 | Status: SHIPPED | OUTPATIENT
Start: 2019-01-24 | End: 2019-12-28

## 2019-01-24 RX ORDER — DOXYCYCLINE HYCLATE 100 MG
TABLET ORAL
COMMUNITY
Start: 2018-11-01 | End: 2019-03-04

## 2019-01-24 RX ORDER — PRAVASTATIN SODIUM 40 MG/1
TABLET ORAL
COMMUNITY
Start: 2018-12-19 | End: 2019-01-24 | Stop reason: SDUPTHER

## 2019-01-24 RX ORDER — FUROSEMIDE 40 MG/1
40 TABLET ORAL DAILY
Qty: 90 TABLET | Refills: 1 | Status: SHIPPED | OUTPATIENT
Start: 2019-01-24 | End: 2019-07-19 | Stop reason: SDUPTHER

## 2019-01-24 RX ORDER — LEVOFLOXACIN 500 MG/1
500 TABLET, FILM COATED ORAL DAILY
Qty: 30 TABLET | Refills: 1 | Status: CANCELLED | OUTPATIENT
Start: 2019-01-24

## 2019-01-24 RX ORDER — METOPROLOL TARTRATE 50 MG/1
50 TABLET ORAL 2 TIMES DAILY
Qty: 180 TABLET | Refills: 1 | Status: SHIPPED | OUTPATIENT
Start: 2019-01-24 | End: 2020-03-26 | Stop reason: SDUPTHER

## 2019-01-24 RX ORDER — METFORMIN HYDROCHLORIDE 1000 MG/1
1000 TABLET ORAL 2 TIMES DAILY WITH MEALS
Qty: 180 TABLET | Refills: 1 | Status: SHIPPED | OUTPATIENT
Start: 2019-01-24 | End: 2019-07-19 | Stop reason: SDUPTHER

## 2019-01-24 RX ORDER — LEVOFLOXACIN 500 MG/1
500 TABLET, FILM COATED ORAL DAILY
Qty: 30 TABLET | Refills: 0 | Status: SHIPPED | OUTPATIENT
Start: 2019-01-24 | End: 2019-02-03

## 2019-01-24 RX ORDER — INSULIN GLARGINE 100 [IU]/ML
40 INJECTION, SOLUTION SUBCUTANEOUS NIGHTLY
Qty: 15 SYRINGE | Refills: 3 | Status: SHIPPED | OUTPATIENT
Start: 2019-01-24 | End: 2020-03-16

## 2019-01-24 RX ORDER — CITALOPRAM 10 MG/1
20 TABLET ORAL DAILY
Qty: 90 TABLET | Refills: 1 | Status: SHIPPED | OUTPATIENT
Start: 2019-01-24 | End: 2019-03-04 | Stop reason: SDUPTHER

## 2019-01-24 RX ORDER — TAMSULOSIN HYDROCHLORIDE 0.4 MG/1
1 CAPSULE ORAL DAILY
Qty: 90 CAPSULE | Refills: 1 | Status: SHIPPED | OUTPATIENT
Start: 2019-01-24 | End: 2020-03-16

## 2019-01-24 RX ORDER — PRAVASTATIN SODIUM 40 MG/1
40 TABLET ORAL DAILY
Qty: 90 TABLET | Refills: 1 | Status: SHIPPED | OUTPATIENT
Start: 2019-01-24 | End: 2019-07-09

## 2019-01-24 RX ORDER — DOXYCYCLINE HYCLATE 100 MG
TABLET ORAL
Status: CANCELLED | OUTPATIENT
Start: 2019-01-24

## 2019-01-24 RX ORDER — MECLIZINE HYDROCHLORIDE 25 MG/1
TABLET ORAL
COMMUNITY
Start: 2018-12-19 | End: 2019-01-24

## 2019-01-24 RX ORDER — LISINOPRIL 20 MG/1
20 TABLET ORAL DAILY
Qty: 90 TABLET | Refills: 1 | Status: SHIPPED | OUTPATIENT
Start: 2019-01-24 | End: 2019-07-19 | Stop reason: SDUPTHER

## 2019-01-24 NOTE — PROGRESS NOTES
Subjective:       Patient ID: Erasmo Dunbar is a 83 y.o. male.    Chief Complaint: Follow-up ( 6month )    Pt is a 83 y.o. male who presents for evaluation and management of   Encounter Diagnoses   Name Primary?    Depression, unspecified depression type     Type 2 diabetes mellitus with stage 3 chronic kidney disease, with long-term current use of insulin     Paroxysmal atrial fibrillation     Transient cerebral ischemia, unspecified type     Essential hypertension     Benign prostatic hyperplasia with urinary frequency     Mixed hyperlipidemia Yes    Diabetic polyneuropathy associated with type 2 diabetes mellitus    .  Doing well on current meds. Denies any side effects. Prevention is up to date.    Review of Systems   Constitutional: Negative for fever.   Respiratory: Negative for shortness of breath.    Cardiovascular: Negative for chest pain.   Gastrointestinal: Negative for anal bleeding and blood in stool.   Genitourinary: Negative for dysuria.   Neurological: Positive for tremors and light-headedness. Negative for dizziness.       Objective:      Physical Exam   Constitutional: He is oriented to person, place, and time. He appears well-developed and well-nourished.   HENT:   Head: Normocephalic and atraumatic.   Right Ear: External ear normal.   Left Ear: External ear normal.   Nose: Nose normal.   Mouth/Throat: Oropharynx is clear and moist.   Eyes: Conjunctivae and EOM are normal. Pupils are equal, round, and reactive to light. Right eye exhibits no discharge. Left eye exhibits no discharge. No scleral icterus.   Neck: Normal range of motion. Neck supple. No JVD present. No tracheal deviation present. No thyromegaly present.   Cardiovascular: Normal rate, regular rhythm, normal heart sounds and intact distal pulses.   No murmur heard.  Pulmonary/Chest: Effort normal and breath sounds normal. No respiratory distress. He has no wheezes. He has no rales. He exhibits no tenderness.   Abdominal:  Soft. Bowel sounds are normal. He exhibits no distension and no mass. There is no tenderness. There is no rebound and no guarding.   Musculoskeletal: Normal range of motion.   Protective Sensation (w/ 10 gram monofilament):  Right: Absent  Left: Absent    Visual Inspection:  Normal -  Bilateral    Pedal Pulses:   Right: Present  Left: Present    Posterior tibialis:   Right:Present  Left: Present         Lymphadenopathy:     He has no cervical adenopathy.   Neurological: He is alert and oriented to person, place, and time. He has normal reflexes. He displays normal reflexes. No cranial nerve deficit. He exhibits normal muscle tone. Coordination normal.   Skin: Skin is warm and dry.   Psychiatric: He has a normal mood and affect. His behavior is normal. Judgment and thought content normal.       Assessment:       1. Mixed hyperlipidemia    2. Depression, unspecified depression type    3. Type 2 diabetes mellitus with stage 3 chronic kidney disease, with long-term current use of insulin    4. Paroxysmal atrial fibrillation    5. Transient cerebral ischemia, unspecified type    6. Essential hypertension    7. Benign prostatic hyperplasia with urinary frequency    8. Diabetic polyneuropathy associated with type 2 diabetes mellitus        Plan:   Erasmo PEGUERO was seen today for follow-up.    Diagnoses and all orders for this visit:    Mixed hyperlipidemia  -     pravastatin (PRAVACHOL) 40 MG tablet; Take 1 tablet (40 mg total) by mouth once daily.    Depression, unspecified depression type  -     citalopram (CELEXA) 10 MG tablet; Take 2 tablets (20 mg total) by mouth once daily.    Type 2 diabetes mellitus with stage 3 chronic kidney disease, with long-term current use of insulin  -     dulaglutide (TRULICITY) 1.5 mg/0.5 mL PnIj; Inject 1.5 mg into the skin once a week.  -     insulin (LANTUS SOLOSTAR U-100 INSULIN) glargine 100 units/mL (3mL) SubQ pen; Inject 40 Units into the skin every evening.  -     metFORMIN (GLUCOPHAGE)  1000 MG tablet; Take 1 tablet (1,000 mg total) by mouth 2 (two) times daily with meals.  -     SITagliptin (JANUVIA) 100 MG Tab; Take 1 tablet (100 mg total) by mouth once daily.  -     Comprehensive metabolic panel; Future  -     Hemoglobin A1c; Future    Paroxysmal atrial fibrillation  -     ELIQUIS 5 mg Tab; Take 1 tablet (5 mg total) by mouth 2 (two) times daily.    Transient cerebral ischemia, unspecified type  -     lisinopril (PRINIVIL,ZESTRIL) 20 MG tablet; Take 1 tablet (20 mg total) by mouth once daily.    Essential hypertension  -     lisinopril (PRINIVIL,ZESTRIL) 20 MG tablet; Take 1 tablet (20 mg total) by mouth once daily.  -     metoprolol tartrate (LOPRESSOR) 50 MG tablet; Take 1 tablet (50 mg total) by mouth 2 (two) times daily.    Benign prostatic hyperplasia with urinary frequency  -     tamsulosin (FLOMAX) 0.4 mg Cap; Take 1 capsule (0.4 mg total) by mouth once daily.    Diabetic polyneuropathy associated with type 2 diabetes mellitus    Other orders  -     Cancel: levoFLOXacin (LEVAQUIN) 500 MG tablet; Take 1 tablet (500 mg total) by mouth once daily.  -     Cancel: doxycycline (VIBRA-TABS) 100 MG tablet  -     furosemide (LASIX) 40 MG tablet; Take 1 tablet (40 mg total) by mouth once daily.  -     levoFLOXacin (LEVAQUIN) 500 MG tablet; Take 1 tablet (500 mg total) by mouth once daily. for 10 days      Problem List Items Addressed This Visit     BPH (benign prostatic hyperplasia)    Diabetes mellitus, type 2    Hyperlipidemia - Primary    Hypertension    Neuropathy, diabetic    Paroxysmal atrial fibrillation      Other Visit Diagnoses     Depression, unspecified depression type        Transient cerebral ischemia, unspecified type            No Follow-up on file.

## 2019-01-25 ENCOUNTER — PATIENT MESSAGE (OUTPATIENT)
Dept: FAMILY MEDICINE | Facility: CLINIC | Age: 84
End: 2019-01-25

## 2019-01-30 ENCOUNTER — TELEPHONE (OUTPATIENT)
Dept: FAMILY MEDICINE | Facility: CLINIC | Age: 84
End: 2019-01-30

## 2019-01-30 NOTE — TELEPHONE ENCOUNTER
PA for Citalopram  submitted to insurance company via [employer] web site. Key: [id number]  Awaiting insurance company response/ decision.

## 2019-01-30 NOTE — TELEPHONE ENCOUNTER
PA for Januvia  submitted to insurance company via [employer] web site. Key: [id number]  Awaiting insurance company response/ decision.

## 2019-01-30 NOTE — TELEPHONE ENCOUNTER
Received determination of approval for Januvia 100 mg thru 01/30/2020 .Pharmacy notified and will notify patient.      Received determination of approval for Citalopram 10 mg thru 01/30/2020 .Pharmacy notified and will notify patient.

## 2019-02-26 ENCOUNTER — TELEPHONE (OUTPATIENT)
Dept: FAMILY MEDICINE | Facility: CLINIC | Age: 84
End: 2019-02-26

## 2019-02-26 NOTE — TELEPHONE ENCOUNTER
----- Message from Shabbir Hawley sent at 2019 12:35 PM CST -----  Contact: Wife - Carol Dunbar  MRN: [id number]  : 1935  PCP: Hemal Varma  Home Phone      [phone]  Work Phone      Not on file.  Mobile          [phone]      MESSAGE: stomach issues -- patient does not want to come for appt --- requesting to speak with nurse    Call [phone]    PCP: Kojo

## 2019-03-04 ENCOUNTER — OFFICE VISIT (OUTPATIENT)
Dept: FAMILY MEDICINE | Facility: CLINIC | Age: 84
End: 2019-03-04
Payer: MEDICARE

## 2019-03-04 VITALS
HEIGHT: 66 IN | BODY MASS INDEX: 31.04 KG/M2 | DIASTOLIC BLOOD PRESSURE: 50 MMHG | RESPIRATION RATE: 18 BRPM | SYSTOLIC BLOOD PRESSURE: 110 MMHG | WEIGHT: 193.13 LBS | HEART RATE: 70 BPM

## 2019-03-04 DIAGNOSIS — R13.10 DYSPHAGIA, UNSPECIFIED TYPE: Primary | ICD-10-CM

## 2019-03-04 DIAGNOSIS — F32.A DEPRESSION, UNSPECIFIED DEPRESSION TYPE: ICD-10-CM

## 2019-03-04 PROCEDURE — 3074F PR MOST RECENT SYSTOLIC BLOOD PRESSURE < 130 MM HG: ICD-10-PCS | Mod: CPTII,S$GLB,, | Performed by: FAMILY MEDICINE

## 2019-03-04 PROCEDURE — 99999 PR PBB SHADOW E&M-EST. PATIENT-LVL III: CPT | Mod: PBBFAC,,, | Performed by: FAMILY MEDICINE

## 2019-03-04 PROCEDURE — 99999 PR PBB SHADOW E&M-EST. PATIENT-LVL III: ICD-10-PCS | Mod: PBBFAC,,, | Performed by: FAMILY MEDICINE

## 2019-03-04 PROCEDURE — 3078F DIAST BP <80 MM HG: CPT | Mod: CPTII,S$GLB,, | Performed by: FAMILY MEDICINE

## 2019-03-04 PROCEDURE — 3074F SYST BP LT 130 MM HG: CPT | Mod: CPTII,S$GLB,, | Performed by: FAMILY MEDICINE

## 2019-03-04 PROCEDURE — 3078F PR MOST RECENT DIASTOLIC BLOOD PRESSURE < 80 MM HG: ICD-10-PCS | Mod: CPTII,S$GLB,, | Performed by: FAMILY MEDICINE

## 2019-03-04 PROCEDURE — 99213 OFFICE O/P EST LOW 20 MIN: CPT | Mod: S$GLB,,, | Performed by: FAMILY MEDICINE

## 2019-03-04 PROCEDURE — 99213 PR OFFICE/OUTPT VISIT, EST, LEVL III, 20-29 MIN: ICD-10-PCS | Mod: S$GLB,,, | Performed by: FAMILY MEDICINE

## 2019-03-04 PROCEDURE — 1101F PT FALLS ASSESS-DOCD LE1/YR: CPT | Mod: CPTII,S$GLB,, | Performed by: FAMILY MEDICINE

## 2019-03-04 PROCEDURE — 1101F PR PT FALLS ASSESS DOC 0-1 FALLS W/OUT INJ PAST YR: ICD-10-PCS | Mod: CPTII,S$GLB,, | Performed by: FAMILY MEDICINE

## 2019-03-04 RX ORDER — CITALOPRAM 20 MG/1
20 TABLET, FILM COATED ORAL DAILY
Qty: 90 TABLET | Refills: 1 | Status: SHIPPED | OUTPATIENT
Start: 2019-03-04 | End: 2020-01-02

## 2019-03-04 NOTE — PROGRESS NOTES
Subjective:       Patient ID: Erasmo Dunbar is a 83 y.o. male.    Chief Complaint: stomach issues and Depression    Pt is a 83 y.o. male who presents for evaluation and management of   Encounter Diagnoses   Name Primary?    Depression, unspecified depression type     Dysphagia, unspecified type Yes   .Celexa 10mg not working. He increased to 20mg daily   C/o dysphagia of solids. He has to drink extra water to get food down sometimes. Occasionally, he has had to vomit to resolve the issue   Denies aspiration      Doing well on current meds. Denies any side effects. Prevention is up to date.  Review of Systems   Gastrointestinal:        Per hpi    Psychiatric/Behavioral: Positive for dysphoric mood. Negative for suicidal ideas.       Objective:      Physical Exam   Constitutional: He is oriented to person, place, and time. He appears well-developed and well-nourished.   HENT:   Head: Normocephalic and atraumatic.   Right Ear: External ear normal.   Left Ear: External ear normal.   Nose: Nose normal.   Mouth/Throat: Oropharynx is clear and moist.   Eyes: Conjunctivae and EOM are normal. Pupils are equal, round, and reactive to light. Right eye exhibits no discharge. Left eye exhibits no discharge. No scleral icterus.   Neck: Normal range of motion. Neck supple. No JVD present. No tracheal deviation present. No thyromegaly present.   Cardiovascular: Normal rate, regular rhythm, normal heart sounds and intact distal pulses.   No murmur heard.  Pulmonary/Chest: Effort normal and breath sounds normal. No respiratory distress. He has no wheezes. He has no rales. He exhibits no tenderness.   Abdominal: Soft. Bowel sounds are normal. He exhibits no distension and no mass. There is no tenderness. There is no rebound and no guarding.   Musculoskeletal: Normal range of motion.   Lymphadenopathy:     He has no cervical adenopathy.   Neurological: He is alert and oriented to person, place, and time. He has normal reflexes. He  displays normal reflexes. No cranial nerve deficit. He exhibits normal muscle tone. Coordination normal.   Skin: Skin is warm and dry.   Psychiatric: He has a normal mood and affect. His behavior is normal. Judgment and thought content normal.       Assessment:       1. Dysphagia, unspecified type    2. Depression, unspecified depression type        Plan:   Erasmo PEGUERO was seen today for stomach issues and depression.    Diagnoses and all orders for this visit:    Dysphagia, unspecified type  -     Ambulatory referral to Gastroenterology    Depression, unspecified depression type  -     citalopram (CELEXA) 20 MG tablet; Take 1 tablet (20 mg total) by mouth once daily.      Problem List Items Addressed This Visit     None      Visit Diagnoses     Dysphagia, unspecified type    -  Primary    Relevant Orders    Ambulatory referral to Gastroenterology    Depression, unspecified depression type        Relevant Medications    citalopram (CELEXA) 20 MG tablet        Increasing celexa back to 20mg     Refer to GI for EGD and dilation       No Follow-up on file.

## 2019-03-08 ENCOUNTER — PATIENT MESSAGE (OUTPATIENT)
Dept: FAMILY MEDICINE | Facility: CLINIC | Age: 84
End: 2019-03-08

## 2019-03-08 DIAGNOSIS — M54.50 LOW BACK PAIN, NON-SPECIFIC: ICD-10-CM

## 2019-03-08 NOTE — TELEPHONE ENCOUNTER
Pt requesting neurosurgery referral to Dr. Renee Hill for back pain and trouble walking.    Pt would need a CT before they will schedule appt. Pt cannot have an MRI due to pacemaker

## 2019-03-13 ENCOUNTER — TELEPHONE (OUTPATIENT)
Dept: FAMILY MEDICINE | Facility: CLINIC | Age: 84
End: 2019-03-13

## 2019-03-13 NOTE — TELEPHONE ENCOUNTER
CT scheduled for 3/19/2019 at 9:00am at [facility] . Patients spouse notified. Will send referral and all clinical information to Dr. Hill once results of CT scan reviewed.

## 2019-03-13 NOTE — TELEPHONE ENCOUNTER
"----- Message from Marianne Fitch sent at 3/13/2019  9:24 AM CDT -----  Contact: Wife- jody Dunbar  MRN: [id number]  : 1935  PCP: Hemal Varma  Home Phone      [phone]  Work Phone      Not on file.  Mobile          [phone]      MESSAGE:   Wife is calling for the status of message that was sent last week through [employer].    " Dr. Varma,   Erasmo is having more and more problems walking with his back pain, and he would like to consult with Dr. Hill in [place].   He would need a Cat scan before he (Dr. Hill) will see him and referral from you as well, after you study the scan result.   Would you be willing to take care of this for him?"      Phone:  [phone] or [phone]  "

## 2019-03-19 ENCOUNTER — HOSPITAL ENCOUNTER (OUTPATIENT)
Dept: RADIOLOGY | Facility: HOSPITAL | Age: 84
Discharge: HOME | End: 2019-03-19
Attending: FAMILY MEDICINE
Payer: MEDICARE

## 2019-03-19 DIAGNOSIS — M54.50 LOW BACK PAIN, NON-SPECIFIC: ICD-10-CM

## 2019-03-19 PROCEDURE — 72131 CT LUMBAR SPINE W/O DYE: CPT | Mod: 26,,, | Performed by: RADIOLOGY

## 2019-03-19 PROCEDURE — 72131 CT LUMBAR SPINE W/O DYE: CPT | Mod: TC

## 2019-03-19 PROCEDURE — 72131 CT LUMBAR SPINE WITHOUT CONTRAST: ICD-10-PCS | Mod: 26,,, | Performed by: RADIOLOGY

## 2019-03-21 NOTE — PROGRESS NOTES
Multiple degenerative changes. Im not sure there is much Dr. Hill will be able to do, but keep appt.

## 2019-03-22 ENCOUNTER — TELEPHONE (OUTPATIENT)
Dept: FAMILY MEDICINE | Facility: CLINIC | Age: 84
End: 2019-03-22

## 2019-03-22 NOTE — TELEPHONE ENCOUNTER
----- Message from Bernadette Smith sent at 3/22/2019  9:30 AM CDT -----  Contact: Carol - wife  Erasmo Dunbar  MRN: [id number]  : 1935  PCP: Hemal Varma  Home Phone      [phone]  Work Phone      Not on file.  Mobile          [phone]      MESSAGE:     Pt would like to speak to the nurse about having his info sent to  in [place] to have a back surgery done.     [phone]

## 2019-03-25 ENCOUNTER — TELEPHONE (OUTPATIENT)
Dept: FAMILY MEDICINE | Facility: CLINIC | Age: 84
End: 2019-03-25

## 2019-03-25 NOTE — TELEPHONE ENCOUNTER
----- Message from Clair Galloway sent at 3/25/2019  8:50 AM CDT -----  Contact: Dr Sergio Alarcon's Office  Erasmo Dunbar  MRN: [id number]  : 1935  PCP: Hemal Varma  Home Phone      [phone]  Work Phone      Not on file.  Mobile          [phone]      MESSAGE:   Received CT results. She needs the last office visit note sent. If patient has a pacemaker, please make sure that is noted. Please fax.    Fax:  [phone]

## 2019-04-05 NOTE — TELEPHONE ENCOUNTER
Spoke to Agnes with Dr. Hill's office, informed that no clinical notes regarding issue noted. Will send visit from 1/2018 where it states patient has pacemaker. That is the reason patient had CT scan and not MRI.     What Type Of Note Output Would You Prefer (Optional)?: Standard Output How Severe Is Your Skin Lesion?: moderate Has Your Skin Lesion Been Treated?: not been treated Is This A New Presentation, Or A Follow-Up?: Skin Lesions

## 2019-05-29 ENCOUNTER — PATIENT MESSAGE (OUTPATIENT)
Dept: FAMILY MEDICINE | Facility: CLINIC | Age: 84
End: 2019-05-29

## 2019-05-29 ENCOUNTER — TELEPHONE (OUTPATIENT)
Dept: FAMILY MEDICINE | Facility: CLINIC | Age: 84
End: 2019-05-29

## 2019-05-29 DIAGNOSIS — Z79.4 TYPE 2 DIABETES MELLITUS WITH STAGE 3 CHRONIC KIDNEY DISEASE, WITH LONG-TERM CURRENT USE OF INSULIN: ICD-10-CM

## 2019-05-29 DIAGNOSIS — N18.30 TYPE 2 DIABETES MELLITUS WITH STAGE 3 CHRONIC KIDNEY DISEASE, WITH LONG-TERM CURRENT USE OF INSULIN: ICD-10-CM

## 2019-05-29 DIAGNOSIS — E11.22 TYPE 2 DIABETES MELLITUS WITH STAGE 3 CHRONIC KIDNEY DISEASE, WITH LONG-TERM CURRENT USE OF INSULIN: ICD-10-CM

## 2019-05-29 RX ORDER — SITAGLIPTIN 100 MG/1
TABLET, FILM COATED ORAL
Qty: 90 TABLET | Refills: 5 | Status: SHIPPED | OUTPATIENT
Start: 2019-05-29 | End: 2020-01-02

## 2019-05-29 RX ORDER — BUSPIRONE HYDROCHLORIDE 7.5 MG/1
7.5 TABLET ORAL 3 TIMES DAILY PRN
Qty: 90 TABLET | Refills: 2 | Status: SHIPPED | OUTPATIENT
Start: 2019-05-29 | End: 2019-09-15

## 2019-05-29 NOTE — TELEPHONE ENCOUNTER
Ok I sent buspar for anxiety instead b/c valium is not a good choice for his age group---lots of sedation and falls. dont need that. gotta keep him safe

## 2019-05-29 NOTE — TELEPHONE ENCOUNTER
LOV: 3/4/2019    Pt is requesting a refill of Diazepam 5mg, which was last prescribed from us in 2017. It is not listed in his current medication list. Please advise.

## 2019-05-30 ENCOUNTER — TELEPHONE (OUTPATIENT)
Dept: FAMILY MEDICINE | Facility: CLINIC | Age: 84
End: 2019-05-30

## 2019-05-30 NOTE — TELEPHONE ENCOUNTER
----- Message from Zakia Dunbar sent at 2019 10:53 AM CDT -----  Contact: jody/ wife  Erasmo Dunbar  MRN: [id number]  : 1935  PCP: Hemal Varma  Home Phone      [phone]  Work Phone      Not on file.  Mobile          [phone]      MESSAGE:   Patient is returning a phone call.  Who left a message for the patient:  Katarina Goins  Does patient know what this is regarding:  prescription  Comments:   Pt's wife states pt is hard of hearing so she would like to talk to nurse when she calls back. Please advise, thanks.  Phone:  [phone]

## 2019-06-19 ENCOUNTER — PATIENT MESSAGE (OUTPATIENT)
Dept: FAMILY MEDICINE | Facility: CLINIC | Age: 84
End: 2019-06-19

## 2019-06-20 RX ORDER — LEVOFLOXACIN 500 MG/1
500 TABLET, FILM COATED ORAL DAILY
Qty: 15 TABLET | Refills: 0 | Status: SHIPPED | OUTPATIENT
Start: 2019-06-20 | End: 2019-07-05

## 2019-06-21 LAB
LEFT EYE DM RETINOPATHY: NEGATIVE
RIGHT EYE DM RETINOPATHY: NEGATIVE

## 2019-07-09 ENCOUNTER — OFFICE VISIT (OUTPATIENT)
Dept: NEUROLOGY | Facility: CLINIC | Age: 84
End: 2019-07-09
Payer: MEDICARE

## 2019-07-09 VITALS
RESPIRATION RATE: 16 BRPM | BODY MASS INDEX: 31.85 KG/M2 | SYSTOLIC BLOOD PRESSURE: 122 MMHG | DIASTOLIC BLOOD PRESSURE: 66 MMHG | HEART RATE: 72 BPM | WEIGHT: 198.19 LBS | HEIGHT: 66 IN

## 2019-07-09 DIAGNOSIS — Z86.73 HISTORY OF TIA (TRANSIENT ISCHEMIC ATTACK): ICD-10-CM

## 2019-07-09 DIAGNOSIS — E11.42 DIABETIC POLYNEUROPATHY ASSOCIATED WITH TYPE 2 DIABETES MELLITUS: ICD-10-CM

## 2019-07-09 DIAGNOSIS — G93.0 CEREBELLAR CYST: Primary | ICD-10-CM

## 2019-07-09 DIAGNOSIS — I48.0 PAROXYSMAL ATRIAL FIBRILLATION: ICD-10-CM

## 2019-07-09 DIAGNOSIS — M54.50 LUMBAR PAIN: ICD-10-CM

## 2019-07-09 PROCEDURE — 1101F PT FALLS ASSESS-DOCD LE1/YR: CPT | Mod: CPTII,S$GLB,, | Performed by: PSYCHIATRY & NEUROLOGY

## 2019-07-09 PROCEDURE — 99999 PR PBB SHADOW E&M-EST. PATIENT-LVL III: ICD-10-PCS | Mod: PBBFAC,,, | Performed by: PSYCHIATRY & NEUROLOGY

## 2019-07-09 PROCEDURE — 3074F SYST BP LT 130 MM HG: CPT | Mod: CPTII,S$GLB,, | Performed by: PSYCHIATRY & NEUROLOGY

## 2019-07-09 PROCEDURE — 99214 OFFICE O/P EST MOD 30 MIN: CPT | Mod: S$GLB,,, | Performed by: PSYCHIATRY & NEUROLOGY

## 2019-07-09 PROCEDURE — 3078F DIAST BP <80 MM HG: CPT | Mod: CPTII,S$GLB,, | Performed by: PSYCHIATRY & NEUROLOGY

## 2019-07-09 PROCEDURE — 3078F PR MOST RECENT DIASTOLIC BLOOD PRESSURE < 80 MM HG: ICD-10-PCS | Mod: CPTII,S$GLB,, | Performed by: PSYCHIATRY & NEUROLOGY

## 2019-07-09 PROCEDURE — 3074F PR MOST RECENT SYSTOLIC BLOOD PRESSURE < 130 MM HG: ICD-10-PCS | Mod: CPTII,S$GLB,, | Performed by: PSYCHIATRY & NEUROLOGY

## 2019-07-09 PROCEDURE — 99999 PR PBB SHADOW E&M-EST. PATIENT-LVL III: CPT | Mod: PBBFAC,,, | Performed by: PSYCHIATRY & NEUROLOGY

## 2019-07-09 PROCEDURE — 99214 PR OFFICE/OUTPT VISIT, EST, LEVL IV, 30-39 MIN: ICD-10-PCS | Mod: S$GLB,,, | Performed by: PSYCHIATRY & NEUROLOGY

## 2019-07-09 PROCEDURE — 1101F PR PT FALLS ASSESS DOC 0-1 FALLS W/OUT INJ PAST YR: ICD-10-PCS | Mod: CPTII,S$GLB,, | Performed by: PSYCHIATRY & NEUROLOGY

## 2019-07-09 NOTE — PROGRESS NOTES
"HPI:   Erasmo Dunbar is a 83 y.o. male with dizziness reported as early am "imbalance/ falling to the right" which is a chronic complaint (now resolved)  Cerebellar region arachnoid cyst noted by Head CT by prior imaging is stable. Had TIA vs CVA with fluent aphasia episode in 7/2017.     Patient is here for his yearly follow up.  Had sepsis admit in 2018.  Recovered        Patient saw Dr Hill about Lumbar pain this year (neurosurgery)- now seeing pain management. He could not tolerate some muscle relaxers given prior and now resolved off of this (had sedation and slurred speech). Injections per pain management is helpful  No aphasia  Dizziness is resolved.  No headaches  Balance is good  Numbness in the feet is same.  No longer needing gabapentin    Review of Systems   Constitutional: Negative for fever.   HENT: Negative for nosebleeds.    Eyes: Negative for double vision.   Respiratory: Negative for hemoptysis.    Cardiovascular: Negative for leg swelling.   Gastrointestinal: Negative for blood in stool.   Genitourinary: Negative for hematuria.   Musculoskeletal: Positive for back pain.   Skin: Negative for rash.   Neurological: Negative for sensory change, speech change and focal weakness.   Psychiatric/Behavioral: The patient does not have insomnia.      Exam:  Gen Appearance, well developed/nourished in no apparent distress  CV: 2+ distal pulses with no edema or swelling  Neuro:  MS: Awake, alert,  Sustains attention. Recent/remote memory intact, Language is full to spontaneous speech/comprehension. Fund of Knowledge is full  CN: Optic discs are flat with normal vasculature, PERRL, Extraoccular movements and visual fields are full. Normal facial sensation and strength, Hearing symmetric, Tongue and Palate are midline and strong. Shoulder Shrug symmetric and strong.  Motor: Normal bulk, tone, no abnormal movements.5/5 strength bilateral upper/lower extremities with 1+ reflexes in the arms, absent starting " "at the knees  Sensory:   and intact to temp and vibration but romberg mildly positive  Cerebellar: Finger-noseRapid alternating movements intact  Gait: Normal stance, no ataxia      Imaging: CT head 12/9/14: Age advanced cerebral volume loss with mild chronic microvascular ischemic disease. There is no evidence for acute intracranial hemorrhage and no new parenchymal hypoattenuation is identified to suggest an acute infarction.  Age advanced cerebral volume loss with mild chronic microvascular ischemic disease. There is no evidence for acute intracranial hemorrhage and no new parenchymal hypoattenuation is identified to suggest an acute infarction.      CTA head 2017: No acute abnormality and no significant vascular stenoses or occlusions.    Moderate generalized cerebral volume loss with findings of chronic microvascular ischemic disease    Echo 2017: normal ef    Labs: 7/2018 LDL less than 70 and 2019 A1C less than 5.9    Assessment/Plan: Erasmo Dunbar is a 83 y.o. male with dizziness reported as early am "imbalance/ falling to the right" which is a chronic complaint (now resolved)  Cerebellar region arachnoid cyst noted by Head CT by prior imaging is stable. Had TIA vs CVA with fluent aphasia episode in 7/2017.   I recommend:     1. 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   2. CTA 2017 unremarkable for TIA (or completed CVA) of fluent aphasia in 7/2017 and he is nowon NOAC for afib/ stroke prevention found near the time of the event. Goal A1C less than 7 and goal LDL less than 7 for stroke prevention. Continue  Current meds for this,  and continue excellent control of HTN  3. Physical therapy evaluation did help his balance prior ([facility]), and all symptoms of imbalance are improved. Fall precautions reviewed.   4. He has a personal history of DM neuropathy and  uses gabapentin with good relief of neuropathy symptoms. Neuropathy likely  " contributed to gait challenges prior. No pain with neuropathy (no longer needing gabapentin)  -now seeing pain management for chronic lower back pain  RTC in 1 year

## 2019-07-12 ENCOUNTER — PATIENT MESSAGE (OUTPATIENT)
Dept: FAMILY MEDICINE | Facility: CLINIC | Age: 84
End: 2019-07-12

## 2019-07-15 ENCOUNTER — OFFICE VISIT (OUTPATIENT)
Dept: FAMILY MEDICINE | Facility: CLINIC | Age: 84
End: 2019-07-15
Payer: MEDICARE

## 2019-07-15 ENCOUNTER — LAB VISIT (OUTPATIENT)
Dept: LAB | Facility: HOSPITAL | Age: 84
End: 2019-07-15
Attending: FAMILY MEDICINE
Payer: MEDICARE

## 2019-07-15 VITALS
HEIGHT: 66 IN | SYSTOLIC BLOOD PRESSURE: 120 MMHG | RESPIRATION RATE: 18 BRPM | TEMPERATURE: 99 F | DIASTOLIC BLOOD PRESSURE: 52 MMHG | WEIGHT: 199 LBS | HEART RATE: 72 BPM | BODY MASS INDEX: 31.98 KG/M2

## 2019-07-15 DIAGNOSIS — E78.2 MIXED HYPERLIPIDEMIA: ICD-10-CM

## 2019-07-15 DIAGNOSIS — R13.19 ESOPHAGEAL DYSPHAGIA: ICD-10-CM

## 2019-07-15 DIAGNOSIS — I10 ESSENTIAL HYPERTENSION: ICD-10-CM

## 2019-07-15 DIAGNOSIS — E11.22 TYPE 2 DIABETES MELLITUS WITH CHRONIC KIDNEY DISEASE, WITH LONG-TERM CURRENT USE OF INSULIN, UNSPECIFIED CKD STAGE: ICD-10-CM

## 2019-07-15 DIAGNOSIS — I48.0 PAROXYSMAL ATRIAL FIBRILLATION: ICD-10-CM

## 2019-07-15 DIAGNOSIS — R19.7 DIARRHEA, UNSPECIFIED TYPE: ICD-10-CM

## 2019-07-15 DIAGNOSIS — R11.2 NAUSEA AND VOMITING, INTRACTABILITY OF VOMITING NOT SPECIFIED, UNSPECIFIED VOMITING TYPE: Primary | ICD-10-CM

## 2019-07-15 DIAGNOSIS — C61 PROSTATE CANCER: ICD-10-CM

## 2019-07-15 DIAGNOSIS — Z79.4 TYPE 2 DIABETES MELLITUS WITH CHRONIC KIDNEY DISEASE, WITH LONG-TERM CURRENT USE OF INSULIN, UNSPECIFIED CKD STAGE: ICD-10-CM

## 2019-07-15 LAB
ALBUMIN SERPL BCP-MCNC: 3.6 G/DL (ref 3.5–5.2)
ALP SERPL-CCNC: 73 U/L (ref 55–135)
ALT SERPL W/O P-5'-P-CCNC: 11 U/L (ref 10–44)
ANION GAP SERPL CALC-SCNC: 11 MMOL/L (ref 8–16)
AST SERPL-CCNC: 11 U/L (ref 10–40)
BASOPHILS # BLD AUTO: 0.06 K/UL (ref 0–0.2)
BASOPHILS NFR BLD: 0.5 % (ref 0–1.9)
BILIRUB SERPL-MCNC: 0.2 MG/DL (ref 0.1–1)
BUN SERPL-MCNC: 20 MG/DL (ref 8–23)
CALCIUM SERPL-MCNC: 9.9 MG/DL (ref 8.7–10.5)
CHLORIDE SERPL-SCNC: 100 MMOL/L (ref 95–110)
CHOLEST SERPL-MCNC: 213 MG/DL (ref 120–199)
CHOLEST/HDLC SERPL: 5.2 {RATIO} (ref 2–5)
CO2 SERPL-SCNC: 29 MMOL/L (ref 23–29)
CREAT SERPL-MCNC: 1.1 MG/DL (ref 0.5–1.4)
DIFFERENTIAL METHOD: ABNORMAL
EOSINOPHIL # BLD AUTO: 1 K/UL (ref 0–0.5)
EOSINOPHIL NFR BLD: 7.8 % (ref 0–8)
ERYTHROCYTE [DISTWIDTH] IN BLOOD BY AUTOMATED COUNT: 13.7 % (ref 11.5–14.5)
EST. GFR  (AFRICAN AMERICAN): >60 ML/MIN/1.73 M^2
EST. GFR  (NON AFRICAN AMERICAN): >60 ML/MIN/1.73 M^2
GLUCOSE SERPL-MCNC: 106 MG/DL (ref 70–110)
HCT VFR BLD AUTO: 32.9 % (ref 40–54)
HDLC SERPL-MCNC: 41 MG/DL (ref 40–75)
HDLC SERPL: 19.2 % (ref 20–50)
HGB BLD-MCNC: 10.5 G/DL (ref 14–18)
IMM GRANULOCYTES # BLD AUTO: 0.06 K/UL (ref 0–0.04)
IMM GRANULOCYTES NFR BLD AUTO: 0.5 % (ref 0–0.5)
LDLC SERPL CALC-MCNC: 130 MG/DL (ref 63–159)
LYMPHOCYTES # BLD AUTO: 3.2 K/UL (ref 1–4.8)
LYMPHOCYTES NFR BLD: 25.3 % (ref 18–48)
MCH RBC QN AUTO: 28.2 PG (ref 27–31)
MCHC RBC AUTO-ENTMCNC: 31.9 G/DL (ref 32–36)
MCV RBC AUTO: 88 FL (ref 82–98)
MONOCYTES # BLD AUTO: 1.2 K/UL (ref 0.3–1)
MONOCYTES NFR BLD: 9.8 % (ref 4–15)
NEUTROPHILS # BLD AUTO: 7 K/UL (ref 1.8–7.7)
NEUTROPHILS NFR BLD: 56.1 % (ref 38–73)
NONHDLC SERPL-MCNC: 172 MG/DL
NRBC BLD-RTO: 0 /100 WBC
PLATELET # BLD AUTO: 401 K/UL (ref 150–350)
PMV BLD AUTO: 8.6 FL (ref 9.2–12.9)
POTASSIUM SERPL-SCNC: 4.6 MMOL/L (ref 3.5–5.1)
PROT SERPL-MCNC: 6.9 G/DL (ref 6–8.4)
RBC # BLD AUTO: 3.72 M/UL (ref 4.6–6.2)
SODIUM SERPL-SCNC: 140 MMOL/L (ref 136–145)
TRIGL SERPL-MCNC: 210 MG/DL (ref 30–150)
TSH SERPL DL<=0.005 MIU/L-ACNC: 1.34 UIU/ML (ref 0.4–4)
WBC # BLD AUTO: 12.51 K/UL (ref 3.9–12.7)

## 2019-07-15 PROCEDURE — 3074F SYST BP LT 130 MM HG: CPT | Mod: CPTII,S$GLB,, | Performed by: FAMILY MEDICINE

## 2019-07-15 PROCEDURE — 80061 LIPID PANEL: CPT

## 2019-07-15 PROCEDURE — 99214 PR OFFICE/OUTPT VISIT, EST, LEVL IV, 30-39 MIN: ICD-10-PCS | Mod: S$GLB,,, | Performed by: FAMILY MEDICINE

## 2019-07-15 PROCEDURE — 80053 COMPREHEN METABOLIC PANEL: CPT

## 2019-07-15 PROCEDURE — 3074F PR MOST RECENT SYSTOLIC BLOOD PRESSURE < 130 MM HG: ICD-10-PCS | Mod: CPTII,S$GLB,, | Performed by: FAMILY MEDICINE

## 2019-07-15 PROCEDURE — 1101F PR PT FALLS ASSESS DOC 0-1 FALLS W/OUT INJ PAST YR: ICD-10-PCS | Mod: CPTII,S$GLB,, | Performed by: FAMILY MEDICINE

## 2019-07-15 PROCEDURE — 1101F PT FALLS ASSESS-DOCD LE1/YR: CPT | Mod: CPTII,S$GLB,, | Performed by: FAMILY MEDICINE

## 2019-07-15 PROCEDURE — 85025 COMPLETE CBC W/AUTO DIFF WBC: CPT

## 2019-07-15 PROCEDURE — 3078F DIAST BP <80 MM HG: CPT | Mod: CPTII,S$GLB,, | Performed by: FAMILY MEDICINE

## 2019-07-15 PROCEDURE — 83036 HEMOGLOBIN GLYCOSYLATED A1C: CPT

## 2019-07-15 PROCEDURE — 84443 ASSAY THYROID STIM HORMONE: CPT

## 2019-07-15 PROCEDURE — 99214 OFFICE O/P EST MOD 30 MIN: CPT | Mod: S$GLB,,, | Performed by: FAMILY MEDICINE

## 2019-07-15 PROCEDURE — 3078F PR MOST RECENT DIASTOLIC BLOOD PRESSURE < 80 MM HG: ICD-10-PCS | Mod: CPTII,S$GLB,, | Performed by: FAMILY MEDICINE

## 2019-07-15 PROCEDURE — 99999 PR PBB SHADOW E&M-EST. PATIENT-LVL III: CPT | Mod: PBBFAC,,, | Performed by: FAMILY MEDICINE

## 2019-07-15 PROCEDURE — 36415 COLL VENOUS BLD VENIPUNCTURE: CPT

## 2019-07-15 PROCEDURE — 99999 PR PBB SHADOW E&M-EST. PATIENT-LVL III: ICD-10-PCS | Mod: PBBFAC,,, | Performed by: FAMILY MEDICINE

## 2019-07-15 RX ORDER — DIPHENOXYLATE HYDROCHLORIDE AND ATROPINE SULFATE 2.5; .025 MG/1; MG/1
1 TABLET ORAL 4 TIMES DAILY PRN
Qty: 15 TABLET | Refills: 0 | Status: SHIPPED | OUTPATIENT
Start: 2019-07-15 | End: 2019-07-25

## 2019-07-15 RX ORDER — ONDANSETRON 8 MG/1
8 TABLET, ORALLY DISINTEGRATING ORAL ONCE
Qty: 15 TABLET | Refills: 0 | Status: SHIPPED | OUTPATIENT
Start: 2019-07-15 | End: 2019-07-15

## 2019-07-15 NOTE — PROGRESS NOTES
Subjective:       Patient ID: Erasmo Dunbar is a 83 y.o. male.    Chief Complaint: Nausea and Diarrhea    Pt is a 83 y.o. male who presents for evaluation and management of   Encounter Diagnoses   Name Primary?    Nausea and vomiting, intractability of vomiting not specified, unspecified vomiting type Yes    Esophageal dysphagia     Diarrhea, unspecified type    .GE sx's for couple of weeks   Was on abx for a prostate infection recently   No blood  No fever   Improving. Has been on probiotics last few days     Doing well on current meds. Denies any side effects. Prevention is up to date.  Review of Systems   Constitutional: Negative for activity change and unexpected weight change.   HENT: Negative for hearing loss, rhinorrhea and trouble swallowing.    Eyes: Negative for discharge and visual disturbance.   Respiratory: Negative for chest tightness and wheezing.    Cardiovascular: Negative for chest pain and palpitations.   Gastrointestinal: Positive for diarrhea and vomiting. Negative for blood in stool and constipation.   Endocrine: Negative for polydipsia and polyuria.   Genitourinary: Negative for difficulty urinating, hematuria and urgency.   Musculoskeletal: Negative for arthralgias, joint swelling and neck pain.   Neurological: Negative for weakness and headaches.   Psychiatric/Behavioral: Negative for confusion and dysphoric mood.       Objective:      Physical Exam   Constitutional: He is oriented to person, place, and time. He appears well-developed and well-nourished.   HENT:   Head: Normocephalic and atraumatic.   Right Ear: External ear normal.   Left Ear: External ear normal.   Nose: Nose normal.   Mouth/Throat: Oropharynx is clear and moist.   Eyes: Pupils are equal, round, and reactive to light. Conjunctivae and EOM are normal. Right eye exhibits no discharge. Left eye exhibits no discharge. No scleral icterus.   Neck: Normal range of motion. Neck supple. No JVD present. No tracheal deviation  present. No thyromegaly present.   Cardiovascular: Normal rate, regular rhythm, normal heart sounds and intact distal pulses.   No murmur heard.  Pulmonary/Chest: Effort normal and breath sounds normal. No respiratory distress. He has no wheezes. He has no rales. He exhibits no tenderness.   Abdominal: Soft. Bowel sounds are normal. He exhibits no distension and no mass. There is no tenderness. There is no rebound and no guarding.   Musculoskeletal: Normal range of motion.   Lymphadenopathy:     He has no cervical adenopathy.   Neurological: He is alert and oriented to person, place, and time. He has normal reflexes. He displays normal reflexes. No cranial nerve deficit. He exhibits normal muscle tone. Coordination normal.   Skin: Skin is warm and dry.   Psychiatric: He has a normal mood and affect. His behavior is normal. Judgment and thought content normal.        Assessment:       1. Nausea and vomiting, intractability of vomiting not specified, unspecified vomiting type    2. Esophageal dysphagia    3. Diarrhea, unspecified type    4. Prostate cancer    5. Essential hypertension    6. Mixed hyperlipidemia    7. Type 2 diabetes mellitus with chronic kidney disease, with long-term current use of insulin, unspecified CKD stage    8. Paroxysmal atrial fibrillation        Plan:   Erasmo PEGUERO was seen today for nausea and diarrhea.    Diagnoses and all orders for this visit:    Nausea and vomiting, intractability of vomiting not specified, unspecified vomiting type  -     ondansetron (ZOFRAN-ODT) 8 MG TbDL; Take 1 tablet (8 mg total) by mouth once. for 1 dose  -     diphenoxylate-atropine 2.5-0.025 mg (LOMOTIL) 2.5-0.025 mg per tablet; Take 1 tablet by mouth 4 (four) times daily as needed for Diarrhea.    Esophageal dysphagia  -     Ambulatory referral to Gastroenterology    Diarrhea, unspecified type    Prostate cancer    Essential hypertension  -     Comprehensive metabolic panel; Future  -     Lipid panel; Future  -      TSH; Future    Mixed hyperlipidemia  -     Comprehensive metabolic panel; Future  -     Lipid panel; Future  -     TSH; Future    Type 2 diabetes mellitus with chronic kidney disease, with long-term current use of insulin, unspecified CKD stage  -     Comprehensive metabolic panel; Future  -     Lipid panel; Future  -     TSH; Future  -     Hemoglobin A1c; Future    Paroxysmal atrial fibrillation  -     CBC auto differential; Future      Problem List Items Addressed This Visit     None      Visit Diagnoses     Nausea and vomiting, intractability of vomiting not specified, unspecified vomiting type    -  Primary    Esophageal dysphagia        Diarrhea, unspecified type              The 'BRAT' diet is suggested, then progress to diet as tolerated as symptoms vik. Call if bloody stools, persistent diarrhea, vomiting, fever or abdominal pain.       Fax labs to Dr. Greene       No follow-ups on file.

## 2019-07-16 LAB
ESTIMATED AVG GLUCOSE: 128 MG/DL (ref 68–131)
HBA1C MFR BLD HPLC: 6.1 % (ref 4–5.6)

## 2019-07-16 NOTE — PROGRESS NOTES
Noted to have elevated cholesterol  He will get back on pravastatin(Ifeanyi may have stopped due to muscle pain) and call me with report in couple of weeks. If no pain will refill

## 2019-07-17 ENCOUNTER — TELEPHONE (OUTPATIENT)
Dept: ADMINISTRATIVE | Facility: HOSPITAL | Age: 84
End: 2019-07-17

## 2019-07-19 DIAGNOSIS — N18.30 TYPE 2 DIABETES MELLITUS WITH STAGE 3 CHRONIC KIDNEY DISEASE, WITH LONG-TERM CURRENT USE OF INSULIN: ICD-10-CM

## 2019-07-19 DIAGNOSIS — Z79.4 TYPE 2 DIABETES MELLITUS WITH STAGE 3 CHRONIC KIDNEY DISEASE, WITH LONG-TERM CURRENT USE OF INSULIN: ICD-10-CM

## 2019-07-19 DIAGNOSIS — I10 ESSENTIAL HYPERTENSION: ICD-10-CM

## 2019-07-19 DIAGNOSIS — G45.9 TRANSIENT CEREBRAL ISCHEMIA, UNSPECIFIED TYPE: ICD-10-CM

## 2019-07-19 DIAGNOSIS — E11.22 TYPE 2 DIABETES MELLITUS WITH STAGE 3 CHRONIC KIDNEY DISEASE, WITH LONG-TERM CURRENT USE OF INSULIN: ICD-10-CM

## 2019-07-21 RX ORDER — METFORMIN HYDROCHLORIDE 1000 MG/1
1000 TABLET ORAL 2 TIMES DAILY WITH MEALS
Qty: 180 TABLET | Refills: 1 | Status: SHIPPED | OUTPATIENT
Start: 2019-07-21 | End: 2020-01-02

## 2019-07-21 RX ORDER — FUROSEMIDE 40 MG/1
TABLET ORAL
Qty: 90 TABLET | Refills: 1 | Status: SHIPPED | OUTPATIENT
Start: 2019-07-21 | End: 2020-01-02

## 2019-07-21 RX ORDER — LISINOPRIL 20 MG/1
20 TABLET ORAL DAILY
Qty: 90 TABLET | Refills: 1 | Status: SHIPPED | OUTPATIENT
Start: 2019-07-21 | End: 2020-01-10

## 2019-07-24 ENCOUNTER — TELEPHONE (OUTPATIENT)
Dept: FAMILY MEDICINE | Facility: CLINIC | Age: 84
End: 2019-07-24

## 2019-07-24 ENCOUNTER — OFFICE VISIT (OUTPATIENT)
Dept: FAMILY MEDICINE | Facility: CLINIC | Age: 84
End: 2019-07-24
Payer: MEDICARE

## 2019-07-24 VITALS
DIASTOLIC BLOOD PRESSURE: 64 MMHG | HEIGHT: 66 IN | HEART RATE: 80 BPM | RESPIRATION RATE: 18 BRPM | WEIGHT: 196 LBS | BODY MASS INDEX: 31.5 KG/M2 | SYSTOLIC BLOOD PRESSURE: 118 MMHG

## 2019-07-24 DIAGNOSIS — E11.22 TYPE 2 DIABETES MELLITUS WITH CHRONIC KIDNEY DISEASE, WITH LONG-TERM CURRENT USE OF INSULIN, UNSPECIFIED CKD STAGE: ICD-10-CM

## 2019-07-24 DIAGNOSIS — Z79.4 TYPE 2 DIABETES MELLITUS WITH CHRONIC KIDNEY DISEASE, WITH LONG-TERM CURRENT USE OF INSULIN, UNSPECIFIED CKD STAGE: ICD-10-CM

## 2019-07-24 DIAGNOSIS — R11.0 NAUSEA IN ADULT: Primary | ICD-10-CM

## 2019-07-24 DIAGNOSIS — R69 TAKING MULTIPLE MEDICATIONS FOR CHRONIC DISEASE: ICD-10-CM

## 2019-07-24 PROCEDURE — 99999 PR PBB SHADOW E&M-EST. PATIENT-LVL III: ICD-10-PCS | Mod: PBBFAC,,, | Performed by: FAMILY MEDICINE

## 2019-07-24 PROCEDURE — 3078F PR MOST RECENT DIASTOLIC BLOOD PRESSURE < 80 MM HG: ICD-10-PCS | Mod: CPTII,S$GLB,, | Performed by: FAMILY MEDICINE

## 2019-07-24 PROCEDURE — 1101F PT FALLS ASSESS-DOCD LE1/YR: CPT | Mod: CPTII,S$GLB,, | Performed by: FAMILY MEDICINE

## 2019-07-24 PROCEDURE — 1101F PR PT FALLS ASSESS DOC 0-1 FALLS W/OUT INJ PAST YR: ICD-10-PCS | Mod: CPTII,S$GLB,, | Performed by: FAMILY MEDICINE

## 2019-07-24 PROCEDURE — 99214 OFFICE O/P EST MOD 30 MIN: CPT | Mod: S$GLB,,, | Performed by: FAMILY MEDICINE

## 2019-07-24 PROCEDURE — 3074F SYST BP LT 130 MM HG: CPT | Mod: CPTII,S$GLB,, | Performed by: FAMILY MEDICINE

## 2019-07-24 PROCEDURE — 99214 PR OFFICE/OUTPT VISIT, EST, LEVL IV, 30-39 MIN: ICD-10-PCS | Mod: S$GLB,,, | Performed by: FAMILY MEDICINE

## 2019-07-24 PROCEDURE — 3078F DIAST BP <80 MM HG: CPT | Mod: CPTII,S$GLB,, | Performed by: FAMILY MEDICINE

## 2019-07-24 PROCEDURE — 99999 PR PBB SHADOW E&M-EST. PATIENT-LVL III: CPT | Mod: PBBFAC,,, | Performed by: FAMILY MEDICINE

## 2019-07-24 PROCEDURE — 3074F PR MOST RECENT SYSTOLIC BLOOD PRESSURE < 130 MM HG: ICD-10-PCS | Mod: CPTII,S$GLB,, | Performed by: FAMILY MEDICINE

## 2019-07-24 RX ORDER — ONDANSETRON 8 MG/1
8 TABLET, ORALLY DISINTEGRATING ORAL ONCE
Qty: 30 TABLET | Refills: 0 | Status: SHIPPED | OUTPATIENT
Start: 2019-07-24 | End: 2019-07-24

## 2019-07-24 RX ORDER — ONDANSETRON 8 MG/1
8 TABLET, ORALLY DISINTEGRATING ORAL ONCE
Refills: 0 | COMMUNITY
Start: 2019-07-15 | End: 2019-07-24 | Stop reason: SDUPTHER

## 2019-07-24 NOTE — PROGRESS NOTES
Subjective:       Patient ID: Erasmo Dunbar is a 83 y.o. male.    Chief Complaint: Follow-up (Check up (nauseated) not getting better)    Pt is a 83 y.o. male who presents for evaluation and management of   Encounter Diagnoses   Name Primary?    Nausea in adult Yes    Type 2 diabetes mellitus with chronic kidney disease, with long-term current use of insulin, unspecified CKD stage     Taking multiple medications for chronic disease    .  Doing well on current meds. Denies any side effects. Prevention is up to date.  Review of Systems   Constitutional: Negative for fever.   Gastrointestinal: Positive for nausea. Negative for abdominal pain, blood in stool and vomiting.       Objective:      Physical Exam   Constitutional: He is oriented to person, place, and time. He appears well-developed and well-nourished.   HENT:   Head: Normocephalic and atraumatic.   Right Ear: External ear normal.   Left Ear: External ear normal.   Nose: Nose normal.   Mouth/Throat: Oropharynx is clear and moist.   Eyes: Pupils are equal, round, and reactive to light. Conjunctivae and EOM are normal. Right eye exhibits no discharge. Left eye exhibits no discharge. No scleral icterus.   Neck: Normal range of motion. Neck supple. No JVD present. No tracheal deviation present. No thyromegaly present.   Cardiovascular: Normal rate, regular rhythm, normal heart sounds and intact distal pulses.   No murmur heard.  Pulmonary/Chest: Effort normal and breath sounds normal. No respiratory distress. He has no wheezes. He has no rales. He exhibits no tenderness.   Abdominal: Soft. Bowel sounds are normal. He exhibits no distension and no mass. There is no tenderness. There is no rebound and no guarding.   Some vague RUQ/RLQ TTP with deep palpation  Neg Hemphill   Musculoskeletal: Normal range of motion.   Lymphadenopathy:     He has no cervical adenopathy.   Neurological: He is alert and oriented to person, place, and time. He has normal reflexes. He  displays normal reflexes. No cranial nerve deficit. He exhibits normal muscle tone. Coordination normal.   Skin: Skin is warm and dry.   Psychiatric: He has a normal mood and affect. His behavior is normal. Judgment and thought content normal.       Assessment:       1. Nausea in adult    2. Type 2 diabetes mellitus with chronic kidney disease, with long-term current use of insulin, unspecified CKD stage    3. Taking multiple medications for chronic disease        Plan:   Erasmo PEGUERO was seen today for follow-up.    Diagnoses and all orders for this visit:    Nausea in adult    Type 2 diabetes mellitus with chronic kidney disease, with long-term current use of insulin, unspecified CKD stage    Taking multiple medications for chronic disease      Problem List Items Addressed This Visit     Diabetes mellitus, type 2      Other Visit Diagnoses     Nausea in adult    -  Primary    Taking multiple medications for chronic disease            Nausea for 3 weeks now. DDx is long for him: medication side effects (trulicity, celexa, or buspar), gastritis, GB disease, gastroparesis   Will first try a lower dose of trulicity---samples of .75mg given. Call me in 2 weeks with a report. If no improvement will move on down the DDX list.     RTC if condition acutely worsens, fever or any other concerns, otherwise RTC as scheduled    No follow-ups on file.

## 2019-07-24 NOTE — TELEPHONE ENCOUNTER
----- Message from Jessica Arias sent at 2019 11:17 AM CDT -----  Contact: wife-jody Dunbar  MRN: [id number]  : 1935  PCP: Hemal Varma  Home Phone      [phone]  Work Phone      Not on file.  Mobile          [phone]      MESSAGE:   Patient would like to get a call from nurse, came in last week and it still not feeling better at all. Nauseous and diarrhea.       [phone]

## 2019-08-05 ENCOUNTER — TELEPHONE (OUTPATIENT)
Dept: FAMILY MEDICINE | Facility: CLINIC | Age: 84
End: 2019-08-05

## 2019-08-05 NOTE — TELEPHONE ENCOUNTER
Left message for patient to call to verify that he did want Ondansetron (Zofran) sent to [facility] pharmacy prior to returning [facility] pharmacy call.

## 2019-08-05 NOTE — TELEPHONE ENCOUNTER
----- Message from Jessica Arias sent at 2019  9:39 AM CDT -----  Contact: sean jones  Erasmo Dunbar  MRN: [id number]  : 1935  PCP: Hemal Varma  Home Phone      [phone]  Work Phone      Not on file.  Mobile          [phone]      MESSAGE:   Payal has some questions about direction for the medication ondansetron (ZOFRAN) 8 MG tablet.    [phone]  [facility]

## 2019-08-05 NOTE — TELEPHONE ENCOUNTER
Spoke to patient, states he had Zofran filled locally and is not in need of having it filled through [facility] mail order pharmacy at this time.     Notified Marilynn with [facility] to cancel Ondansetron (Zofran) prescription.

## 2019-08-12 ENCOUNTER — PATIENT MESSAGE (OUTPATIENT)
Dept: FAMILY MEDICINE | Facility: CLINIC | Age: 84
End: 2019-08-12

## 2019-08-12 DIAGNOSIS — E11.22 TYPE 2 DIABETES MELLITUS WITH STAGE 3 CHRONIC KIDNEY DISEASE, WITH LONG-TERM CURRENT USE OF INSULIN: ICD-10-CM

## 2019-08-12 DIAGNOSIS — Z79.4 TYPE 2 DIABETES MELLITUS WITH STAGE 3 CHRONIC KIDNEY DISEASE, WITH LONG-TERM CURRENT USE OF INSULIN: ICD-10-CM

## 2019-08-12 DIAGNOSIS — N18.30 TYPE 2 DIABETES MELLITUS WITH STAGE 3 CHRONIC KIDNEY DISEASE, WITH LONG-TERM CURRENT USE OF INSULIN: ICD-10-CM

## 2019-08-12 NOTE — TELEPHONE ENCOUNTER
Dr. Varma,   If you want me to continue on the Trulicity dose 0.75mg, I need you to order it from [facility] please.   Thanks,   Erasmo

## 2019-08-20 ENCOUNTER — PATIENT MESSAGE (OUTPATIENT)
Dept: FAMILY MEDICINE | Facility: CLINIC | Age: 84
End: 2019-08-20

## 2019-09-01 NOTE — SUBJECTIVE & OBJECTIVE
Interval note: improving, c/o SOB this am. On 2L NC pox 93-95%- cxr with small pleural effusion  Review of Systems   Constitutional: Negative for activity change, fatigue, fever and unexpected weight change.   HENT: Negative for congestion, ear pain, hearing loss, rhinorrhea and sore throat.    Eyes: Negative for redness and visual disturbance.   Respiratory: Positive for shortness of breath. Negative for cough and wheezing.    Cardiovascular: Negative for chest pain, palpitations and leg swelling.   Gastrointestinal: Negative for abdominal pain, constipation, diarrhea, nausea and vomiting.   Genitourinary: Positive for scrotal swelling and testicular pain. Negative for decreased urine volume, difficulty urinating, discharge, dysuria, frequency, hematuria, penile pain and urgency.   Musculoskeletal: Negative for back pain, joint swelling and neck pain.   Skin: Negative for color change, rash and wound.   Neurological: Negative for dizziness, tremors, weakness, light-headedness and headaches.     Objective:     Vital Signs (Most Recent):  Temp: 98 °F (36.7 °C) (10/15/18 0733)  Pulse: 82 (10/15/18 0733)  Resp: 17 (10/15/18 0733)  BP: (!) 158/74 (10/15/18 0733)  SpO2: (!) 93 % (10/15/18 0733) Vital Signs (24h Range):  Temp:  [96.5 °F (35.8 °C)-98.9 °F (37.2 °C)] 98 °F (36.7 °C)  Pulse:  [60-82] 82  Resp:  [16-18] 17  SpO2:  [93 %-95 %] 93 %  BP: (125-158)/(59-74) 158/74     Weight: 91.9 kg (202 lb 9.6 oz)  Body mass index is 32.7 kg/m².    Physical Exam   Constitutional: He is oriented to person, place, and time. He appears well-developed and well-nourished. No distress.   HENT:   Head: Normocephalic and atraumatic.   Right Ear: External ear normal.   Left Ear: External ear normal.   Eyes: Conjunctivae and EOM are normal. Pupils are equal, round, and reactive to light.   Neck: Neck supple. No tracheal deviation present.   Cardiovascular: Normal rate and regular rhythm. Exam reveals no friction rub.   Pulmonary/Chest:  Effort normal and breath sounds normal. No respiratory distress. He has no wheezes. He has no rales.   Abdominal: Soft. Bowel sounds are normal. He exhibits no distension. There is no tenderness.   Genitourinary:   Genitourinary Comments: Left testicle swelling and tenderness throughout, improving   Musculoskeletal: He exhibits no edema.   Neurological: He is alert and oriented to person, place, and time. No cranial nerve deficit.   Skin: Skin is warm and dry.   Psychiatric: He has a normal mood and affect. His behavior is normal.   Nursing note and vitals reviewed.        CRANIAL NERVES     CN III, IV, VI   Pupils are equal, round, and reactive to light.  Extraocular motions are normal.        Significant Labs:   Blood Culture:   Lab Results   Component Value Date    WBC 17.01 (H) 10/15/2018    HGB 9.9 (L) 10/15/2018    HCT 31.3 (L) 10/15/2018    MCV 91 10/15/2018     10/15/2018     BMP  Lab Results   Component Value Date     10/15/2018    K 3.8 10/15/2018     10/15/2018    CO2 28 10/15/2018    BUN 11 10/15/2018    CREATININE 0.9 10/15/2018    CALCIUM 8.9 10/15/2018    ANIONGAP 11 10/15/2018    ESTGFRAFRICA >60 10/15/2018    EGFRNONAA >60 10/15/2018     Lab Results   Component Value Date    ALT 30 10/15/2018    AST 32 10/15/2018    ALKPHOS 73 10/15/2018    BILITOT 0.7 10/15/2018       Blood cultures x 2 NGTD   Urine Culture, Routine ENTEROCOCCUS FAECALIS   10,000 - 49,999 cfu/ml       Resulting Agency [facility]   Susceptibility      Enterococcus faecalis     CULTURE, URINE     Ampicillin <=2  Sensitive     Nitrofurantoin <=32  Sensitive     Tetracycline >8  Resistant     Vancomycin 2  Sensitive         Lactic acid 1.1>>0.8    Mag 1.2    Significant Imaging    US scrotum Left epididymo-orchitis with a moderate associated hydrocele.    CXR small pleural effusion    assisted to bathroom

## 2019-09-13 ENCOUNTER — PATIENT MESSAGE (OUTPATIENT)
Dept: FAMILY MEDICINE | Facility: CLINIC | Age: 84
End: 2019-09-13

## 2019-09-15 RX ORDER — BUSPIRONE HYDROCHLORIDE 10 MG/1
10 TABLET ORAL 3 TIMES DAILY PRN
Qty: 270 TABLET | Refills: 1 | Status: SHIPPED | OUTPATIENT
Start: 2019-09-15 | End: 2019-11-07

## 2019-09-18 ENCOUNTER — PATIENT MESSAGE (OUTPATIENT)
Dept: FAMILY MEDICINE | Facility: CLINIC | Age: 84
End: 2019-09-18

## 2019-09-19 NOTE — TELEPHONE ENCOUNTER
Patient needing Accu-Chek Felicia Plus Meter, Accu-Chek Plus test strips, and Accu-Chek Softclix lancets sent to [facility] mail order pharmacy.     Patient states he test BID-TID.

## 2019-09-22 RX ORDER — INSULIN PUMP SYRINGE, 3 ML
EACH MISCELLANEOUS
Qty: 1 EACH | Refills: 0 | Status: SHIPPED | OUTPATIENT
Start: 2019-09-22 | End: 2024-03-07 | Stop reason: SDUPTHER

## 2019-09-22 RX ORDER — LANCETS
EACH MISCELLANEOUS
Qty: 600 EACH | Refills: 3 | Status: SHIPPED | OUTPATIENT
Start: 2019-09-22 | End: 2024-03-07 | Stop reason: SDUPTHER

## 2019-10-17 PROBLEM — N32.89 BLADDER MASS: Status: ACTIVE | Noted: 2019-10-17

## 2019-10-17 PROBLEM — R31.29 MICROSCOPIC HEMATURIA: Status: ACTIVE | Noted: 2019-10-17

## 2019-10-18 ENCOUNTER — TELEPHONE (OUTPATIENT)
Dept: FAMILY MEDICINE | Facility: CLINIC | Age: 84
End: 2019-10-18

## 2019-10-18 NOTE — TELEPHONE ENCOUNTER
----- Message from Jessica Arias sent at 10/18/2019 10:11 AM CDT -----  Contact: wife-jody Dunbar  MRN: [id number]  : 1935  PCP: Hemal Varma  Home Phone      [phone]  Work Phone      Not on file.  [employer]          [phone]      MESSAGE:   Patient wife is calling to get a call from nurse to discuss getting patient some pain medication.       [phone]

## 2019-10-31 ENCOUNTER — HOSPITAL ENCOUNTER (EMERGENCY)
Facility: HOSPITAL | Age: 84
Discharge: HOME | End: 2019-10-31
Attending: SURGERY
Payer: MEDICARE

## 2019-10-31 ENCOUNTER — PATIENT MESSAGE (OUTPATIENT)
Dept: FAMILY MEDICINE | Facility: CLINIC | Age: 84
End: 2019-10-31

## 2019-10-31 VITALS
RESPIRATION RATE: 21 BRPM | OXYGEN SATURATION: 96 % | DIASTOLIC BLOOD PRESSURE: 72 MMHG | HEART RATE: 77 BPM | HEIGHT: 66 IN | SYSTOLIC BLOOD PRESSURE: 170 MMHG | BODY MASS INDEX: 31.18 KG/M2 | TEMPERATURE: 98 F | WEIGHT: 194 LBS

## 2019-10-31 DIAGNOSIS — R11.2 NAUSEA AND VOMITING, INTRACTABILITY OF VOMITING NOT SPECIFIED, UNSPECIFIED VOMITING TYPE: Primary | ICD-10-CM

## 2019-10-31 DIAGNOSIS — R11.10 EMESIS: ICD-10-CM

## 2019-10-31 LAB
ALBUMIN SERPL BCP-MCNC: NORMAL G/DL (ref 3.5–5.2)
ALP SERPL-CCNC: NORMAL U/L (ref 55–135)
ALT SERPL W/O P-5'-P-CCNC: NORMAL U/L (ref 10–44)
ANION GAP SERPL CALC-SCNC: NORMAL MMOL/L (ref 8–16)
AST SERPL-CCNC: NORMAL U/L (ref 10–40)
BASOPHILS # BLD AUTO: 0.06 K/UL (ref 0–0.2)
BASOPHILS NFR BLD: 0.4 % (ref 0–1.9)
BILIRUB SERPL-MCNC: NORMAL MG/DL (ref 0.1–1)
BILIRUB UR QL STRIP: NEGATIVE
BNP SERPL-MCNC: NORMAL PG/ML
BUN SERPL-MCNC: NORMAL MG/DL (ref 8–23)
CALCIUM SERPL-MCNC: NORMAL MG/DL (ref 8.7–10.5)
CHLORIDE SERPL-SCNC: NORMAL MMOL/L (ref 95–110)
CK MB SERPL-MCNC: 2.6 NG/ML (ref 0.1–6.5)
CK MB SERPL-MCNC: NORMAL NG/ML (ref 0.1–6.5)
CK MB SERPL-RTO: 4 % (ref 0–5)
CK MB SERPL-RTO: NORMAL % (ref 0–5)
CK SERPL-CCNC: 65 U/L (ref 20–200)
CK SERPL-CCNC: 65 U/L (ref 20–200)
CK SERPL-CCNC: NORMAL U/L (ref 20–200)
CK SERPL-CCNC: NORMAL U/L (ref 20–200)
CLARITY UR: CLEAR
CO2 SERPL-SCNC: NORMAL MMOL/L (ref 23–29)
COLOR UR: YELLOW
CREAT SERPL-MCNC: NORMAL MG/DL (ref 0.5–1.4)
DIFFERENTIAL METHOD: ABNORMAL
EOSINOPHIL # BLD AUTO: 0.5 K/UL (ref 0–0.5)
EOSINOPHIL NFR BLD: 3.7 % (ref 0–8)
ERYTHROCYTE [DISTWIDTH] IN BLOOD BY AUTOMATED COUNT: 13.6 % (ref 11.5–14.5)
EST. GFR  (AFRICAN AMERICAN): NORMAL ML/MIN/1.73 M^2
EST. GFR  (NON AFRICAN AMERICAN): NORMAL ML/MIN/1.73 M^2
GLUCOSE SERPL-MCNC: NORMAL MG/DL (ref 70–110)
GLUCOSE UR QL STRIP: NEGATIVE
HCT VFR BLD AUTO: 34 % (ref 40–54)
HGB BLD-MCNC: 11 G/DL (ref 14–18)
HGB UR QL STRIP: ABNORMAL
IMM GRANULOCYTES # BLD AUTO: 0.09 K/UL (ref 0–0.04)
IMM GRANULOCYTES NFR BLD AUTO: 0.7 % (ref 0–0.5)
KETONES UR QL STRIP: NEGATIVE
LEUKOCYTE ESTERASE UR QL STRIP: NEGATIVE
LIPASE SERPL-CCNC: NORMAL U/L
LYMPHOCYTES # BLD AUTO: 2.2 K/UL (ref 1–4.8)
LYMPHOCYTES NFR BLD: 16.3 % (ref 18–48)
MCH RBC QN AUTO: 29.2 PG (ref 27–31)
MCHC RBC AUTO-ENTMCNC: 32.4 G/DL (ref 32–36)
MCV RBC AUTO: 90 FL (ref 82–98)
MONOCYTES # BLD AUTO: 1.1 K/UL (ref 0.3–1)
MONOCYTES NFR BLD: 7.6 % (ref 4–15)
NEUTROPHILS # BLD AUTO: 9.8 K/UL (ref 1.8–7.7)
NEUTROPHILS NFR BLD: 71.3 % (ref 38–73)
NITRITE UR QL STRIP: NEGATIVE
NRBC BLD-RTO: 0 /100 WBC
PH UR STRIP: 8 [PH] (ref 5–8)
PLATELET # BLD AUTO: 373 K/UL (ref 150–350)
PMV BLD AUTO: 9.2 FL (ref 9.2–12.9)
POTASSIUM SERPL-SCNC: NORMAL MMOL/L (ref 3.5–5.1)
PROT SERPL-MCNC: NORMAL G/DL (ref 6–8.4)
PROT UR QL STRIP: NEGATIVE
RBC # BLD AUTO: 3.77 M/UL (ref 4.6–6.2)
SODIUM SERPL-SCNC: NORMAL MMOL/L (ref 136–145)
SP GR UR STRIP: 1.01 (ref 1–1.03)
TROPONIN I SERPL DL<=0.01 NG/ML-MCNC: <0.006 NG/ML (ref 0–0.03)
TROPONIN I SERPL DL<=0.01 NG/ML-MCNC: NORMAL NG/ML (ref 0–0.03)
URN SPEC COLLECT METH UR: ABNORMAL
UROBILINOGEN UR STRIP-ACNC: NEGATIVE EU/DL
WBC # BLD AUTO: 13.75 K/UL (ref 3.9–12.7)

## 2019-10-31 PROCEDURE — 81003 URINALYSIS AUTO W/O SCOPE: CPT

## 2019-10-31 PROCEDURE — 93005 ELECTROCARDIOGRAM TRACING: CPT

## 2019-10-31 PROCEDURE — 63600175 PHARM REV CODE 636 W HCPCS: Performed by: SURGERY

## 2019-10-31 PROCEDURE — 85025 COMPLETE CBC W/AUTO DIFF WBC: CPT

## 2019-10-31 PROCEDURE — 93010 EKG 12-LEAD: ICD-10-PCS | Mod: ,,, | Performed by: INTERNAL MEDICINE

## 2019-10-31 PROCEDURE — 93010 ELECTROCARDIOGRAM REPORT: CPT | Mod: ,,, | Performed by: INTERNAL MEDICINE

## 2019-10-31 PROCEDURE — 83880 ASSAY OF NATRIURETIC PEPTIDE: CPT

## 2019-10-31 PROCEDURE — 82550 ASSAY OF CK (CPK): CPT

## 2019-10-31 PROCEDURE — 82553 CREATINE MB FRACTION: CPT

## 2019-10-31 PROCEDURE — 84484 ASSAY OF TROPONIN QUANT: CPT | Mod: 91

## 2019-10-31 PROCEDURE — 99285 EMERGENCY DEPT VISIT HI MDM: CPT | Mod: 25

## 2019-10-31 PROCEDURE — 80053 COMPREHEN METABOLIC PANEL: CPT

## 2019-10-31 PROCEDURE — 96361 HYDRATE IV INFUSION ADD-ON: CPT

## 2019-10-31 PROCEDURE — 36415 COLL VENOUS BLD VENIPUNCTURE: CPT

## 2019-10-31 PROCEDURE — 96374 THER/PROPH/DIAG INJ IV PUSH: CPT

## 2019-10-31 PROCEDURE — 83690 ASSAY OF LIPASE: CPT

## 2019-10-31 RX ORDER — SODIUM CHLORIDE 9 MG/ML
1000 INJECTION, SOLUTION INTRAVENOUS
Status: COMPLETED | OUTPATIENT
Start: 2019-10-31 | End: 2019-10-31

## 2019-10-31 RX ORDER — DICYCLOMINE HYDROCHLORIDE 20 MG/1
20 TABLET ORAL 4 TIMES DAILY PRN
Qty: 15 TABLET | Refills: 0 | Status: SHIPPED | OUTPATIENT
Start: 2019-10-31 | End: 2019-11-18

## 2019-10-31 RX ORDER — ONDANSETRON 2 MG/ML
4 INJECTION INTRAMUSCULAR; INTRAVENOUS
Status: COMPLETED | OUTPATIENT
Start: 2019-10-31 | End: 2019-10-31

## 2019-10-31 RX ORDER — ONDANSETRON 4 MG/1
4 TABLET, ORALLY DISINTEGRATING ORAL EVERY 8 HOURS PRN
Qty: 20 TABLET | Refills: 0 | Status: SHIPPED | OUTPATIENT
Start: 2019-10-31 | End: 2020-07-22

## 2019-10-31 RX ORDER — PANTOPRAZOLE SODIUM 40 MG/1
40 TABLET, DELAYED RELEASE ORAL DAILY
Qty: 30 TABLET | Refills: 0 | Status: SHIPPED | OUTPATIENT
Start: 2019-10-31 | End: 2020-01-24 | Stop reason: SDUPTHER

## 2019-10-31 RX ADMIN — SODIUM CHLORIDE 500 ML: 0.9 INJECTION, SOLUTION INTRAVENOUS at 09:10

## 2019-10-31 RX ADMIN — SODIUM CHLORIDE 1000 ML: 0.9 INJECTION, SOLUTION INTRAVENOUS at 12:10

## 2019-10-31 RX ADMIN — ONDANSETRON 4 MG: 2 INJECTION INTRAMUSCULAR; INTRAVENOUS at 09:10

## 2019-10-31 NOTE — ED PROVIDER NOTES
[facility]                                                 Chief Complaint  84 y.o. male with Weakness and Vomiting ( )    History of Present Illness  Erasmo Dunbar presents to the emergency room with nausea vomiting today  Patient with nausea vomiting, no fever, no weight loss, denies any diarrhea  Patient has a soft abdominal exam with a chronic umbilical hernia noted now  Patient with normal bowel sounds in all 4 quadrants with no signs of peritonitis  Patient states that he ate last night without issue, normal bowel movement today    The history is provided by the patient   device was not used during this ER visit    Past Medical History   -- Arthritis     -- BPH (benign prostatic hyperplasia)     -- CHF (congestive heart failure)     -- Depression     -- Diabetes mellitus type II     -- Hyperlipidemia     -- Hypertension     -- Prostate cancer     -- RLS (restless legs syndrome)        Past Surgical History   -- ARTHROSCOPY-KNEE       -- BACK SURGERY       -- CARDIAC PACEMAKER PLACEMENT       -- CARDIAC SURGERY       -- CHONDROPLASTY-KNEE       -- COLECTOMY       -- CYSTOSCOPY       -- JACK-TRANSFORAMINAL       -- EYE SURGERY       -- Ganglion Cyst Removed        -- INJECTION-STEROID-EPIDURAL-TRANSFORAMINAL       -- MENISCECTOMY       -- Neck Fusion       -- PROSTATE SURGERY       -- ROTATOR CUFF REPAIR       -- SKIN BIOPSY       -- TOTAL KNEE ARTHROPLASTY       -- TRANSURETHRAL RESECTION OF PROSTATE          Allergies: Iodine    I have reviewed all of this patient's past medical, surgical, family, and social   histories as well as active allergies and medications documented in the  electronic medical record    Review of Systems and Physical Exam      Review of Systems  -- Constitution - no fever, denies fatigue, no weakness, no chills  -- Eyes - no tearing or redness, no visual disturbance  -- Ear, Nose - no tinnitus or earache, no nasal congestion or discharge  --  Mouth,Throat - no sore throat, no toothache, normal voice, normal swallowing  -- Respiratory - denies cough and congestion, no shortness of breath, no VIDAL  -- Cardiovascular - denies chest pain, no palpitations, denies claudication  -- Gastrointestinal - abdominal cramps with nausea, vomiting, no diarrhea   -- Genitourinary - no dysuria, denies flank pain, no hematuria, no STD risk  -- Musculoskeletal - denies back pain, negative for trauma or injury  -- Neurological - no headache, denies weakness or seizure; no LOC  -- Skin - denies pallor, rash, or changes in skin. no hives or welts noted  -- Psychiatric - Denies SI or HI, no psychosis or fractured thought noted     Vital Signs  His tympanic temperature is 97.9 °F (36.6 °C).   His blood pressure is 183/88 and his pulse is 84.   His respiration is 16 and oxygen saturation is 95%.     Physical Exam  -- Nursing note and vitals reviewed  -- Constitutional: Appears well-developed and well-nourished  -- Head: Atraumatic. Normocephalic. No obvious abnormality  -- Eyes: Pupils are equal and reactive to light. Normal conjunctiva and lids  -- Cardiac: Normal rate, regular rhythm and normal heart sounds  -- Pulmonary: Normal respiratory effort, breath sounds clear to auscultation  -- Abdominal: Soft, no tenderness. Normal bowel sounds. Normal liver edge  -- Musculoskeletal: Normal range of motion, no effusions. Joints stable   -- Neurological: No focal deficits. Showed good interaction with staff  -- Vascular: Posterior tibial, dorsalis pedis and radial pulses 2+ bilaterally      Emergency Room Course        Lab Results  WBC 13.75 (H)   HGB 11.0 (L)   HCT 34.0 (L)    (H)   CPK 65   CPK 65   CPKMB 2.6   TROPONINI <0.006     Urinalysis  -- Urinalysis performed during this ER visit showed no signs of infection      EKG  -- The EKG findings today were without concerning findings from baseline     CT Abdomen and Pelvis  1. Small periumbilical hernia with evidence of small  knuckle of bowel projecting towards the right of midline demonstrate no evidence of significant proximal obstruction.   2. Nonaneurysmal aorta demonstrate evidence of fairly prominent atheromatous calcification with continued calcific densities outlining the bilateral common iliac vessels.     Medications Given  barium sulfate (READI-CAT) suspension 900 mL (has no administration in time range)   sodium chloride 0.9% bolus 500 mL (0 mLs Intravenous Stopped 10/31/19 1205)   ondansetron injection 4 mg (4 mg Intravenous Given 10/31/19 0942)   0.9%  NaCl infusion (1,000 mLs Intravenous New Bag 10/31/19 1253)     ED Physician Management  -- Diagnosis management comments: 84 y.o. male with nausea vomiting today  -- patient with nausea vomiting today, patient with normal bowel movement  -- patient has a nonacute abdomen on evaluation today, no peritonitis noted  -- CT scan showed no acute finding, denies fever or weight loss or diarrhea  -- patient will placed on Bentyl and antiemetics as well as Protonix on discharge  -- return to the ER with any concerning signs or symptoms after discharge    Diagnosis  -- Nausea and vomiting, intractability of vomiting not specified, unspecified vomiting type.   -- A diagnosis of Emesis was also pertinent to this visit.    Disposition and Plan  -- Disposition: home  -- Condition: stable  -- Follow-up: Patient to follow up with Hemal Varma MD in 1-2 days.  -- I advised the patient that we have found no life threatening condition today  -- At this time, I believe the patient is clinically stable for discharge.   -- The patient acknowledges that close follow up with a MD is required   -- Patient agrees to comply with all instruction and direction given in the ER    This note is dictated on M*Modal word recognition program.  There are word recognition mistakes that are occasionally missed on review.         Lewis Amos MD  10/31/19 6724

## 2019-11-01 ENCOUNTER — OFFICE VISIT (OUTPATIENT)
Dept: FAMILY MEDICINE | Facility: CLINIC | Age: 84
End: 2019-11-01
Payer: MEDICARE

## 2019-11-01 ENCOUNTER — APPOINTMENT (OUTPATIENT)
Dept: RADIOLOGY | Facility: CLINIC | Age: 84
End: 2019-11-01
Attending: FAMILY MEDICINE
Payer: MEDICARE

## 2019-11-01 ENCOUNTER — TELEPHONE (OUTPATIENT)
Dept: FAMILY MEDICINE | Facility: CLINIC | Age: 84
End: 2019-11-01

## 2019-11-01 VITALS
DIASTOLIC BLOOD PRESSURE: 62 MMHG | BODY MASS INDEX: 33.03 KG/M2 | WEIGHT: 205.5 LBS | SYSTOLIC BLOOD PRESSURE: 134 MMHG | HEART RATE: 68 BPM | HEIGHT: 66 IN | RESPIRATION RATE: 16 BRPM

## 2019-11-01 DIAGNOSIS — G47.00 INSOMNIA, UNSPECIFIED TYPE: ICD-10-CM

## 2019-11-01 DIAGNOSIS — R11.0 NAUSEA: ICD-10-CM

## 2019-11-01 DIAGNOSIS — R07.1 CHEST PAIN ON BREATHING: Primary | ICD-10-CM

## 2019-11-01 DIAGNOSIS — R07.1 CHEST PAIN ON BREATHING: ICD-10-CM

## 2019-11-01 DIAGNOSIS — I50.9 CONGESTIVE HEART FAILURE, UNSPECIFIED HF CHRONICITY, UNSPECIFIED HEART FAILURE TYPE: ICD-10-CM

## 2019-11-01 PROCEDURE — 96372 THER/PROPH/DIAG INJ SC/IM: CPT | Mod: S$GLB,,, | Performed by: FAMILY MEDICINE

## 2019-11-01 PROCEDURE — 3075F PR MOST RECENT SYSTOLIC BLOOD PRESS GE 130-139MM HG: ICD-10-PCS | Mod: CPTII,S$GLB,, | Performed by: FAMILY MEDICINE

## 2019-11-01 PROCEDURE — 99214 PR OFFICE/OUTPT VISIT, EST, LEVL IV, 30-39 MIN: ICD-10-PCS | Mod: 25,S$GLB,, | Performed by: FAMILY MEDICINE

## 2019-11-01 PROCEDURE — 71046 X-RAY EXAM CHEST 2 VIEWS: CPT | Mod: TC,PO

## 2019-11-01 PROCEDURE — 3078F DIAST BP <80 MM HG: CPT | Mod: CPTII,S$GLB,, | Performed by: FAMILY MEDICINE

## 2019-11-01 PROCEDURE — 99214 OFFICE O/P EST MOD 30 MIN: CPT | Mod: 25,S$GLB,, | Performed by: FAMILY MEDICINE

## 2019-11-01 PROCEDURE — 99999 PR PBB SHADOW E&M-EST. PATIENT-LVL IV: CPT | Mod: PBBFAC,,, | Performed by: FAMILY MEDICINE

## 2019-11-01 PROCEDURE — 1101F PR PT FALLS ASSESS DOC 0-1 FALLS W/OUT INJ PAST YR: ICD-10-PCS | Mod: CPTII,S$GLB,, | Performed by: FAMILY MEDICINE

## 2019-11-01 PROCEDURE — 99999 PR PBB SHADOW E&M-EST. PATIENT-LVL IV: ICD-10-PCS | Mod: PBBFAC,,, | Performed by: FAMILY MEDICINE

## 2019-11-01 PROCEDURE — 1101F PT FALLS ASSESS-DOCD LE1/YR: CPT | Mod: CPTII,S$GLB,, | Performed by: FAMILY MEDICINE

## 2019-11-01 PROCEDURE — 3075F SYST BP GE 130 - 139MM HG: CPT | Mod: CPTII,S$GLB,, | Performed by: FAMILY MEDICINE

## 2019-11-01 PROCEDURE — 96372 PR INJECTION,THERAP/PROPH/DIAG2ST, IM OR SUBCUT: ICD-10-PCS | Mod: S$GLB,,, | Performed by: FAMILY MEDICINE

## 2019-11-01 PROCEDURE — 3078F PR MOST RECENT DIASTOLIC BLOOD PRESSURE < 80 MM HG: ICD-10-PCS | Mod: CPTII,S$GLB,, | Performed by: FAMILY MEDICINE

## 2019-11-01 RX ORDER — ONDANSETRON 2 MG/ML
4 INJECTION INTRAMUSCULAR; INTRAVENOUS
Status: COMPLETED | OUTPATIENT
Start: 2019-11-01 | End: 2019-11-01

## 2019-11-01 RX ORDER — TRAZODONE HYDROCHLORIDE 50 MG/1
50 TABLET ORAL NIGHTLY
Qty: 30 TABLET | Refills: 2 | Status: SHIPPED | OUTPATIENT
Start: 2019-11-01 | End: 2019-11-07 | Stop reason: SDUPTHER

## 2019-11-01 RX ORDER — FUROSEMIDE 10 MG/ML
20 INJECTION INTRAMUSCULAR; INTRAVENOUS
Status: COMPLETED | OUTPATIENT
Start: 2019-11-01 | End: 2019-11-01

## 2019-11-01 RX ORDER — TRAZODONE HYDROCHLORIDE 50 MG/1
50 TABLET ORAL NIGHTLY
Qty: 30 TABLET | Refills: 2 | Status: SHIPPED | OUTPATIENT
Start: 2019-11-01 | End: 2019-11-01 | Stop reason: SDUPTHER

## 2019-11-01 RX ORDER — FUROSEMIDE 10 MG/ML
20 INJECTION INTRAMUSCULAR; INTRAVENOUS
Status: DISCONTINUED | OUTPATIENT
Start: 2019-11-01 | End: 2019-11-01

## 2019-11-01 RX ORDER — FUROSEMIDE 10 MG/ML
20 INJECTION INTRAMUSCULAR; INTRAVENOUS ONCE
Qty: 2 ML | Refills: 0 | Status: SHIPPED | OUTPATIENT
Start: 2019-11-01 | End: 2019-11-01

## 2019-11-01 RX ADMIN — ONDANSETRON 4 MG: 2 INJECTION INTRAMUSCULAR; INTRAVENOUS at 05:11

## 2019-11-01 RX ADMIN — FUROSEMIDE 20 MG: 10 INJECTION INTRAMUSCULAR; INTRAVENOUS at 05:11

## 2019-11-01 NOTE — TELEPHONE ENCOUNTER
----- Message from Jessica Arias sent at 2019  9:44 AM CDT -----  Contact: wife-jody Dunbar  MRN: [id number]  : 1935  PCP: Hemal Glover  Home Phone      [phone]  Work Phone      Not on file.  Mobile          [phone]      MESSAGE:   Pt requests a sooner appointment than the  can schedule.  Does patient feel like they need to be seen today:  Yse  What is the nature of the appointment:  Er f/u  What visit type:  est  Did you check other providers/department schedules for availability:   Only denise glover  Comments:     Phone:  [phone]

## 2019-11-01 NOTE — PROGRESS NOTES
Subjective:       Patient ID: Erasmo Dunbar is a 84 y.o. male.    Chief Complaint: Follow-up (ER follow up)    Pt is a 84 y.o. male who presents for evaluation and management of   Encounter Diagnoses   Name Primary?    Chest pain on breathing Yes    Nausea     Congestive heart failure, unspecified HF chronicity, unspecified heart failure type     Insomnia, unspecified type      Work up in ED yesterday was negative   Improved with zofran     Doing well on current meds. Denies any side effects. Prevention is up to date.    Review of Systems   Constitutional: Positive for diaphoresis. Negative for chills and fever.   Respiratory: Positive for shortness of breath. Negative for cough.         SOB worsening over the last week      Cardiovascular: Negative for chest pain.   Gastrointestinal: Positive for nausea. Negative for vomiting.        Vomiting resolved after ED visit yesterday    Genitourinary: Negative for dysuria.   Musculoskeletal: Positive for back pain.        Upper thoracic back pain worse with breathing          Objective:      Physical Exam   Constitutional: He is oriented to person, place, and time. He appears well-developed and well-nourished.   HENT:   Head: Normocephalic and atraumatic.   Right Ear: External ear normal.   Left Ear: External ear normal.   Nose: Nose normal.   Mouth/Throat: Oropharynx is clear and moist.   Eyes: Pupils are equal, round, and reactive to light. Conjunctivae and EOM are normal. Right eye exhibits no discharge. Left eye exhibits no discharge. No scleral icterus.   Neck: Normal range of motion. Neck supple. No JVD present. No tracheal deviation present. No thyromegaly present.   Cardiovascular: Normal rate, regular rhythm, normal heart sounds and intact distal pulses.   No murmur heard.  Pulmonary/Chest: Effort normal and breath sounds normal. No respiratory distress. He has no wheezes. He has no rales. He exhibits no tenderness.   Abdominal: Soft. Bowel sounds are  normal. He exhibits no distension and no mass. There is no tenderness. There is no rebound and no guarding.   Musculoskeletal: Normal range of motion.   Lymphadenopathy:     He has no cervical adenopathy.   Neurological: He is alert and oriented to person, place, and time. He has normal reflexes. He displays normal reflexes. No cranial nerve deficit. He exhibits normal muscle tone. Coordination normal.   Skin: Skin is warm and dry.   Psychiatric: He has a normal mood and affect. His behavior is normal. Judgment and thought content normal.       Assessment:       1. Chest pain on breathing    2. Nausea    3. Congestive heart failure, unspecified HF chronicity, unspecified heart failure type    4. Insomnia, unspecified type        Plan:   Erasmo PEGUERO was seen today for follow-up.    Diagnoses and all orders for this visit:    Chest pain on breathing  -     X-Ray Chest PA And Lateral; Future    Nausea  -     ondansetron injection 4 mg    Congestive heart failure, unspecified HF chronicity, unspecified heart failure type  -     furosemide injection 20 mg    Insomnia, unspecified type  -     Discontinue: traZODone (DESYREL) 50 MG tablet; Take 1 tablet (50 mg total) by mouth every evening. for 10 days  -     traZODone (DESYREL) 50 MG tablet; Take 1 tablet (50 mg total) by mouth every evening. for 10 days      Problem List Items Addressed This Visit     None      Visit Diagnoses     Chest pain on breathing    -  Primary    Relevant Orders    X-Ray Chest PA And Lateral (Completed)    Nausea        Relevant Medications    ondansetron injection 4 mg (Start on 11/1/2019  5:15 PM)    Congestive heart failure, unspecified HF chronicity, unspecified heart failure type        Relevant Medications    furosemide injection 20 mg    Insomnia, unspecified type        Relevant Medications    traZODone (DESYREL) 50 MG tablet        He sees cardiology Wednesday. Told him to mention his SOB as this may be an anginal equivalent.    I disagree  with radiological interpretation of pneumonia. PT has no clinical signs of pneumonia. Will not treat     Started on PPI yesterday. Continue     Adding trazodone for sleep. Stop buspar     RTC 1 week     RTC if condition acutely worsens or any other concerns, otherwise RTC as scheduled    No follow-ups on file.

## 2019-11-07 ENCOUNTER — OFFICE VISIT (OUTPATIENT)
Dept: FAMILY MEDICINE | Facility: CLINIC | Age: 84
End: 2019-11-07
Payer: MEDICARE

## 2019-11-07 ENCOUNTER — HOSPITAL ENCOUNTER (OUTPATIENT)
Dept: RADIOLOGY | Facility: HOSPITAL | Age: 84
Discharge: HOME | End: 2019-11-07
Attending: FAMILY MEDICINE
Payer: MEDICARE

## 2019-11-07 VITALS
HEIGHT: 66 IN | DIASTOLIC BLOOD PRESSURE: 58 MMHG | WEIGHT: 205.94 LBS | BODY MASS INDEX: 33.1 KG/M2 | HEART RATE: 78 BPM | SYSTOLIC BLOOD PRESSURE: 124 MMHG | RESPIRATION RATE: 16 BRPM

## 2019-11-07 DIAGNOSIS — R10.13 DYSPEPSIA: ICD-10-CM

## 2019-11-07 DIAGNOSIS — G47.00 INSOMNIA, UNSPECIFIED TYPE: ICD-10-CM

## 2019-11-07 DIAGNOSIS — M25.551 PAIN OF BOTH HIP JOINTS: Primary | ICD-10-CM
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
## 2019-11-07 DIAGNOSIS — M25.552 PAIN OF BOTH HIP JOINTS: ICD-10-CM

## 2019-11-07 DIAGNOSIS — M25.552 PAIN OF BOTH HIP JOINTS: Primary | ICD-10-CM

## 2019-11-07 DIAGNOSIS — M25.551 PAIN OF BOTH HIP JOINTS: ICD-10-CM

## 2019-11-07 PROCEDURE — 3074F SYST BP LT 130 MM HG: CPT | Mod: CPTII,S$GLB,, | Performed by: FAMILY MEDICINE

## 2019-11-07 PROCEDURE — 3078F DIAST BP <80 MM HG: CPT | Mod: CPTII,S$GLB,, | Performed by: FAMILY MEDICINE

## 2019-11-07 PROCEDURE — 72192 CT PELVIS W/O DYE: CPT | Mod: 26,,, | Performed by: RADIOLOGY

## 2019-11-07 PROCEDURE — 72192 CT PELVIS WITHOUT CONTRAST: ICD-10-PCS | Mod: 26,,, | Performed by: RADIOLOGY

## 2019-11-07 PROCEDURE — 1101F PR PT FALLS ASSESS DOC 0-1 FALLS W/OUT INJ PAST YR: ICD-10-PCS | Mod: CPTII,S$GLB,, | Performed by: FAMILY MEDICINE

## 2019-11-07 PROCEDURE — 3074F PR MOST RECENT SYSTOLIC BLOOD PRESSURE < 130 MM HG: ICD-10-PCS | Mod: CPTII,S$GLB,, | Performed by: FAMILY MEDICINE

## 2019-11-07 PROCEDURE — 99999 PR PBB SHADOW E&M-EST. PATIENT-LVL III: ICD-10-PCS | Mod: PBBFAC,,, | Performed by: FAMILY MEDICINE

## 2019-11-07 PROCEDURE — 72192 CT PELVIS W/O DYE: CPT | Mod: TC

## 2019-11-07 PROCEDURE — 99214 PR OFFICE/OUTPT VISIT, EST, LEVL IV, 30-39 MIN: ICD-10-PCS | Mod: S$GLB,,, | Performed by: FAMILY MEDICINE

## 2019-11-07 PROCEDURE — 1101F PT FALLS ASSESS-DOCD LE1/YR: CPT | Mod: CPTII,S$GLB,, | Performed by: FAMILY MEDICINE

## 2019-11-07 PROCEDURE — 99214 OFFICE O/P EST MOD 30 MIN: CPT | Mod: S$GLB,,, | Performed by: FAMILY MEDICINE

## 2019-11-07 PROCEDURE — 3078F PR MOST RECENT DIASTOLIC BLOOD PRESSURE < 80 MM HG: ICD-10-PCS | Mod: CPTII,S$GLB,, | Performed by: FAMILY MEDICINE

## 2019-11-07 PROCEDURE — 99999 PR PBB SHADOW E&M-EST. PATIENT-LVL III: CPT | Mod: PBBFAC,,, | Performed by: FAMILY MEDICINE

## 2019-11-07 RX ORDER — TRAZODONE HYDROCHLORIDE 50 MG/1
50 TABLET ORAL NIGHTLY
Qty: 90 TABLET | Refills: 1 | Status: SHIPPED | OUTPATIENT
Start: 2019-11-07 | End: 2020-05-31

## 2019-11-07 NOTE — PROGRESS NOTES
Subjective:       Patient ID: Erasmo Dunbar is a 84 y.o. male.    Chief Complaint: Follow-up ( 1week )    Pt is a 84 y.o. male who presents for evaluation and management of   Encounter Diagnoses   Name Primary?    Pain of both hip joints Yes    Fall, subsequent encounter     Insomnia, unspecified type     Dyspepsia      Was told he may have a fracture on plain films with chiropractor.   Sleeping better with the trazodone   Prostate is ok per Dr. Coon who he just saw.   Nausea and Vomting resolved     Doing well on current meds. Denies any side effects. Prevention is up to date.    Review of Systems   Respiratory: Positive for shortness of breath.    Cardiovascular:        Has nuclear perfusion and ECHO scheduled with Dr. Suggs    Musculoskeletal: Positive for gait problem.        Trouble walking due to hip pain. 100 feet max   He says he has fallen several times recently. May have injured his pelvis, hip   Psychiatric/Behavioral: Negative for sleep disturbance.       Objective:      Physical Exam   Constitutional: He is oriented to person, place, and time. He appears well-developed and well-nourished.   HENT:   Head: Normocephalic and atraumatic.   Right Ear: External ear normal.   Left Ear: External ear normal.   Nose: Nose normal.   Mouth/Throat: Oropharynx is clear and moist.   Eyes: Pupils are equal, round, and reactive to light. Conjunctivae and EOM are normal. Right eye exhibits no discharge. Left eye exhibits no discharge. No scleral icterus.   Neck: Normal range of motion. Neck supple. No JVD present. No tracheal deviation present. No thyromegaly present.   Cardiovascular: Normal rate, regular rhythm, normal heart sounds and intact distal pulses.   No murmur heard.  Pulmonary/Chest: Effort normal and breath sounds normal. No respiratory distress. He has no wheezes. He has no rales. He exhibits no tenderness.   Abdominal: Soft. Bowel sounds are normal. He exhibits no distension and no mass. There  is no tenderness. There is no rebound and no guarding.   Musculoskeletal: Normal range of motion. He exhibits tenderness.   TTP over right SI    Lymphadenopathy:     He has no cervical adenopathy.   Neurological: He is alert and oriented to person, place, and time. He has normal reflexes. He displays normal reflexes. No cranial nerve deficit. He exhibits normal muscle tone. Coordination normal.   Skin: Skin is warm and dry.   Psychiatric: He has a normal mood and affect. His behavior is normal. Judgment and thought content normal.       Assessment:       1. Pain of both hip joints    2. Fall, subsequent encounter    3. Insomnia, unspecified type    4. Dyspepsia        Plan:   Erasmo PEGUERO was seen today for follow-up.    Diagnoses and all orders for this visit:    Pain of both hip joints  -     Cancel: CT Pelvis Without Contrast; Future  -     CT Pelvis Without Contrast; Future  -     CT Pelvis Without Contrast; Future    Fall, subsequent encounter  -     Cancel: CT Pelvis Without Contrast; Future  -     CT Pelvis Without Contrast; Future  -     CT Pelvis Without Contrast; Future    Insomnia, unspecified type  -     traZODone (DESYREL) 50 MG tablet; Take 1 tablet (50 mg total) by mouth every evening. for 10 days    Dyspepsia      Problem List Items Addressed This Visit     None      Visit Diagnoses     Pain of both hip joints    -  Primary    Relevant Orders    CT Pelvis Without Contrast    CT Pelvis Without Contrast    Fall, subsequent encounter        Relevant Orders    CT Pelvis Without Contrast    CT Pelvis Without Contrast    Insomnia, unspecified type        Relevant Medications    traZODone (DESYREL) 50 MG tablet    Dyspepsia            Nausea improved. continue PPI     No follow-ups on file.

## 2019-11-18 ENCOUNTER — TELEPHONE (OUTPATIENT)
Dept: FAMILY MEDICINE | Facility: CLINIC | Age: 84
End: 2019-11-18

## 2019-11-18 ENCOUNTER — OFFICE VISIT (OUTPATIENT)
Dept: FAMILY MEDICINE | Facility: CLINIC | Age: 84
End: 2019-11-18
Payer: MEDICARE

## 2019-11-18 ENCOUNTER — LAB VISIT (OUTPATIENT)
Dept: LAB | Facility: HOSPITAL | Age: 84
End: 2019-11-18
Attending: FAMILY MEDICINE
Payer: MEDICARE

## 2019-11-18 VITALS — WEIGHT: 203.25 LBS | HEIGHT: 66 IN | RESPIRATION RATE: 16 BRPM | BODY MASS INDEX: 32.66 KG/M2

## 2019-11-18 DIAGNOSIS — E11.9 TYPE 2 DIABETES MELLITUS WITHOUT COMPLICATION, WITH LONG-TERM CURRENT USE OF INSULIN: ICD-10-CM

## 2019-11-18 DIAGNOSIS — R11.2 INTRACTABLE NAUSEA AND VOMITING: ICD-10-CM

## 2019-11-18 DIAGNOSIS — Z79.4 TYPE 2 DIABETES MELLITUS WITHOUT COMPLICATION, WITH LONG-TERM CURRENT USE OF INSULIN: ICD-10-CM

## 2019-11-18 DIAGNOSIS — R11.2 INTRACTABLE NAUSEA AND VOMITING: Primary | ICD-10-CM

## 2019-11-18 DIAGNOSIS — G47.00 INSOMNIA, UNSPECIFIED TYPE: ICD-10-CM

## 2019-11-18 LAB
ALBUMIN SERPL BCP-MCNC: 3.6 G/DL (ref 3.5–5.2)
ALP SERPL-CCNC: 63 U/L (ref 55–135)
ALT SERPL W/O P-5'-P-CCNC: 10 U/L (ref 10–44)
ANION GAP SERPL CALC-SCNC: 7 MMOL/L (ref 8–16)
AST SERPL-CCNC: 14 U/L (ref 10–40)
BASOPHILS # BLD AUTO: 0.07 K/UL (ref 0–0.2)
BASOPHILS NFR BLD: 0.5 % (ref 0–1.9)
BILIRUB SERPL-MCNC: 0.3 MG/DL (ref 0.1–1)
BUN SERPL-MCNC: 13 MG/DL (ref 8–23)
CALCIUM SERPL-MCNC: 9.3 MG/DL (ref 8.7–10.5)
CHLORIDE SERPL-SCNC: 93 MMOL/L (ref 95–110)
CO2 SERPL-SCNC: 36 MMOL/L (ref 23–29)
CREAT SERPL-MCNC: 1 MG/DL (ref 0.5–1.4)
DIFFERENTIAL METHOD: ABNORMAL
EOSINOPHIL # BLD AUTO: 0.7 K/UL (ref 0–0.5)
EOSINOPHIL NFR BLD: 4.9 % (ref 0–8)
ERYTHROCYTE [DISTWIDTH] IN BLOOD BY AUTOMATED COUNT: 12.9 % (ref 11.5–14.5)
EST. GFR  (AFRICAN AMERICAN): >60 ML/MIN/1.73 M^2
EST. GFR  (NON AFRICAN AMERICAN): >60 ML/MIN/1.73 M^2
GLUCOSE SERPL-MCNC: 137 MG/DL (ref 70–110)
HCT VFR BLD AUTO: 34.1 % (ref 40–54)
HGB BLD-MCNC: 10.8 G/DL (ref 14–18)
IMM GRANULOCYTES # BLD AUTO: 0.08 K/UL (ref 0–0.04)
IMM GRANULOCYTES NFR BLD AUTO: 0.5 % (ref 0–0.5)
LYMPHOCYTES # BLD AUTO: 2.7 K/UL (ref 1–4.8)
LYMPHOCYTES NFR BLD: 18.5 % (ref 18–48)
MAGNESIUM SERPL-MCNC: 1.7 MG/DL (ref 1.6–2.6)
MCH RBC QN AUTO: 29.1 PG (ref 27–31)
MCHC RBC AUTO-ENTMCNC: 31.7 G/DL (ref 32–36)
MCV RBC AUTO: 92 FL (ref 82–98)
MONOCYTES # BLD AUTO: 1.1 K/UL (ref 0.3–1)
MONOCYTES NFR BLD: 7.2 % (ref 4–15)
NEUTROPHILS # BLD AUTO: 10 K/UL (ref 1.8–7.7)
NEUTROPHILS NFR BLD: 68.4 % (ref 38–73)
NRBC BLD-RTO: 0 /100 WBC
PLATELET # BLD AUTO: 387 K/UL (ref 150–350)
PMV BLD AUTO: 8.8 FL (ref 9.2–12.9)
POTASSIUM SERPL-SCNC: 3.7 MMOL/L (ref 3.5–5.1)
PROT SERPL-MCNC: 6.6 G/DL (ref 6–8.4)
RBC # BLD AUTO: 3.71 M/UL (ref 4.6–6.2)
SODIUM SERPL-SCNC: 136 MMOL/L (ref 136–145)
WBC # BLD AUTO: 14.56 K/UL (ref 3.9–12.7)

## 2019-11-18 PROCEDURE — 3074F SYST BP LT 130 MM HG: CPT | Mod: CPTII,S$GLB,, | Performed by: FAMILY MEDICINE

## 2019-11-18 PROCEDURE — 1101F PR PT FALLS ASSESS DOC 0-1 FALLS W/OUT INJ PAST YR: ICD-10-PCS | Mod: CPTII,S$GLB,, | Performed by: FAMILY MEDICINE

## 2019-11-18 PROCEDURE — 85025 COMPLETE CBC W/AUTO DIFF WBC: CPT

## 2019-11-18 PROCEDURE — 83735 ASSAY OF MAGNESIUM: CPT

## 2019-11-18 PROCEDURE — 99999 PR PBB SHADOW E&M-EST. PATIENT-LVL III: ICD-10-PCS | Mod: PBBFAC,,, | Performed by: FAMILY MEDICINE

## 2019-11-18 PROCEDURE — 3078F PR MOST RECENT DIASTOLIC BLOOD PRESSURE < 80 MM HG: ICD-10-PCS | Mod: CPTII,S$GLB,, | Performed by: FAMILY MEDICINE

## 2019-11-18 PROCEDURE — 3078F DIAST BP <80 MM HG: CPT | Mod: CPTII,S$GLB,, | Performed by: FAMILY MEDICINE

## 2019-11-18 PROCEDURE — 99214 OFFICE O/P EST MOD 30 MIN: CPT | Mod: 25,S$GLB,, | Performed by: FAMILY MEDICINE

## 2019-11-18 PROCEDURE — 81000 URINALYSIS NONAUTO W/SCOPE: CPT | Mod: S$GLB,,, | Performed by: FAMILY MEDICINE

## 2019-11-18 PROCEDURE — 3074F PR MOST RECENT SYSTOLIC BLOOD PRESSURE < 130 MM HG: ICD-10-PCS | Mod: CPTII,S$GLB,, | Performed by: FAMILY MEDICINE

## 2019-11-18 PROCEDURE — 80053 COMPREHEN METABOLIC PANEL: CPT

## 2019-11-18 PROCEDURE — 99214 PR OFFICE/OUTPT VISIT, EST, LEVL IV, 30-39 MIN: ICD-10-PCS | Mod: 25,S$GLB,, | Performed by: FAMILY MEDICINE

## 2019-11-18 PROCEDURE — 1101F PT FALLS ASSESS-DOCD LE1/YR: CPT | Mod: CPTII,S$GLB,, | Performed by: FAMILY MEDICINE

## 2019-11-18 PROCEDURE — 81000 POCT URINE SEDIMENT EXAM: ICD-10-PCS | Mod: S$GLB,,, | Performed by: FAMILY MEDICINE

## 2019-11-18 PROCEDURE — 99999 PR PBB SHADOW E&M-EST. PATIENT-LVL III: CPT | Mod: PBBFAC,,, | Performed by: FAMILY MEDICINE

## 2019-11-18 PROCEDURE — 36415 COLL VENOUS BLD VENIPUNCTURE: CPT

## 2019-11-18 RX ORDER — PIOGLITAZONEHYDROCHLORIDE 15 MG/1
15 TABLET ORAL DAILY
Qty: 30 TABLET | Refills: 5 | Status: SHIPPED | OUTPATIENT
Start: 2019-11-18 | End: 2020-01-24 | Stop reason: SDUPTHER

## 2019-11-18 RX ORDER — PROMETHAZINE HYDROCHLORIDE 12.5 MG/1
12.5 TABLET ORAL EVERY 6 HOURS PRN
Qty: 30 TABLET | Refills: 1 | Status: SHIPPED | OUTPATIENT
Start: 2019-11-18 | End: 2019-12-13

## 2019-11-18 RX ORDER — ONDANSETRON 8 MG/1
8 TABLET, ORALLY DISINTEGRATING ORAL EVERY 8 HOURS PRN
Qty: 30 TABLET | Refills: 1 | Status: SHIPPED | OUTPATIENT
Start: 2019-11-18 | End: 2020-01-20 | Stop reason: SDUPTHER

## 2019-11-18 NOTE — TELEPHONE ENCOUNTER
Spoke to Saul with [facility] pharmacy, verified that Trazodone 50 mg to take one tablet every evening as needed. To disregard the 10 days on prescription.

## 2019-11-18 NOTE — TELEPHONE ENCOUNTER
----- Message from Jessica Arias sent at 2019  8:44 AM CST -----  Contact: giuliaTucker Dunbar  MRN: [id number]  : 1935  PCP: Hemal Varma  Home Phone      [phone]  Work Phone      Not on file.  Mobile          [phone]      MESSAGE:   Giulia is calling to get clarification for the medication traZODone (DESYREL) 50 MG tablet.It said only tale for 10 days but 90 tablets got sent in.     [phone]  Ref: [id number]

## 2019-11-18 NOTE — PROGRESS NOTES
Subjective:       Patient ID: Erasmo Dunbar is a 84 y.o. male.    Chief Complaint: Nausea (x 1 week)    Pt is a 84 y.o. male who presents for evaluation and management of   Encounter Diagnoses   Name Primary?    Intractable nausea and vomiting Yes    Type 2 diabetes mellitus without complication, with long-term current use of insulin    .  Doing well on current meds. Denies any side effects. Prevention is up to date.    Review of Systems   Constitutional: Negative for fever.   Cardiovascular: Negative for chest pain.   Gastrointestinal: Positive for nausea and vomiting. Negative for abdominal pain and diarrhea.   Genitourinary: Positive for dysuria.       Objective:      Physical Exam   Constitutional: He is oriented to person, place, and time. He appears well-developed and well-nourished.   HENT:   Head: Normocephalic and atraumatic.   Right Ear: External ear normal.   Left Ear: External ear normal.   Nose: Nose normal.   Mouth/Throat: Oropharynx is clear and moist.   Eyes: Pupils are equal, round, and reactive to light. Conjunctivae and EOM are normal. Right eye exhibits no discharge. Left eye exhibits no discharge. No scleral icterus.   Neck: Normal range of motion. Neck supple. No JVD present. No tracheal deviation present. No thyromegaly present.   Cardiovascular: Normal rate, regular rhythm, normal heart sounds and intact distal pulses.   No murmur heard.  Pulmonary/Chest: Effort normal and breath sounds normal. No respiratory distress. He has no wheezes. He has no rales. He exhibits no tenderness.   Abdominal: Soft. Bowel sounds are normal. He exhibits no distension and no mass. There is no tenderness. There is no rebound and no guarding.   Small(2cm) reducible umbilical hernia. Soft. Nontender.    Musculoskeletal: Normal range of motion.   Lymphadenopathy:     He has no cervical adenopathy.   Neurological: He is alert and oriented to person, place, and time. He has normal reflexes. He displays normal  reflexes. No cranial nerve deficit. He exhibits normal muscle tone. Coordination normal.   Skin: Skin is warm and dry.   Psychiatric: He has a normal mood and affect. His behavior is normal. Judgment and thought content normal.       Assessment:       1. Intractable nausea and vomiting    2. Type 2 diabetes mellitus without complication, with long-term current use of insulin        Plan:   Erasmo PEGUERO was seen today for nausea.    Diagnoses and all orders for this visit:    Intractable nausea and vomiting  -     Cancel: CBC auto differential; Future  -     Cancel: Comprehensive metabolic panel; Future  -     Cancel: Magnesium; Future  -     X-Ray Abdomen Flat And Erect; Future  -     CBC auto differential; Future  -     Comprehensive metabolic panel; Future  -     Magnesium; Future  -     POCT urinalysis, dipstick or tablet reag  -     POCT URINE SEDIMENT EXAM  -     promethazine (PHENERGAN) 12.5 MG Tab; Take 1 tablet (12.5 mg total) by mouth every 6 (six) hours as needed (nausea and vomiting).  -     ondansetron (ZOFRAN-ODT) 8 MG TbDL; Take 1 tablet (8 mg total) by mouth every 8 (eight) hours as needed.  -     Ambulatory referral to Gastroenterology    Type 2 diabetes mellitus without complication, with long-term current use of insulin  -     pioglitazone (ACTOS) 15 MG tablet; Take 1 tablet (15 mg total) by mouth once daily.      Problem List Items Addressed This Visit     Diabetes mellitus, type 2    Relevant Medications    pioglitazone (ACTOS) 15 MG tablet      Other Visit Diagnoses     Intractable nausea and vomiting    -  Primary    Relevant Medications    promethazine (PHENERGAN) 12.5 MG Tab    ondansetron (ZOFRAN-ODT) 8 MG TbDL    Other Relevant Orders    X-Ray Abdomen Flat And Erect (Completed)    CBC auto differential (Completed)    Comprehensive metabolic panel (Completed)    Magnesium (Completed)    POCT urinalysis, dipstick or tablet reag    POCT URINE SEDIMENT EXAM    Ambulatory referral to Gastroenterology         Decrease trulicity to 0.75mg(nausea can be a side effect, although his nausea started well after being on this medication for several months).   Add actos for expected rise in glucose due to reduction in trulicity dose  Refer to GI for work up of his persistent nausea.   Rotate zofran and promethazine prn   He does not meet any criteria for admission today based on normal labs and imaging     RTC if condition acutely worsens or any other concerns, otherwise RTC as scheduled    No follow-ups on file.

## 2019-11-19 LAB
BACTERIA SPEC CULT: NORMAL
BILIRUB SERPL-MCNC: NORMAL MG/DL
BLOOD URINE, POC: NORMAL
CASTS: NORMAL
COLOR, POC UA: NORMAL
CRYSTALS: NORMAL
GLUCOSE UR QL STRIP: NORMAL
KETONES UR QL STRIP: NORMAL
LEUKOCYTE ESTERASE URINE, POC: NORMAL
NITRITE, POC UA: NORMAL
PH, POC UA: 9
PROTEIN, POC: NORMAL
RBC CELLS COUNTED: NORMAL
SPECIFIC GRAVITY, POC UA: 1
UROBILINOGEN, POC UA: NORMAL
WHITE BLOOD CELLS: NORMAL

## 2019-12-13 ENCOUNTER — PATIENT MESSAGE (OUTPATIENT)
Dept: FAMILY MEDICINE | Facility: CLINIC | Age: 84
End: 2019-12-13

## 2019-12-13 DIAGNOSIS — R11.2 INTRACTABLE NAUSEA AND VOMITING: ICD-10-CM

## 2019-12-13 RX ORDER — PROMETHAZINE HYDROCHLORIDE 25 MG/1
25 TABLET ORAL EVERY 6 HOURS PRN
Qty: 60 TABLET | Refills: 5 | Status: SHIPPED | OUTPATIENT
Start: 2019-12-13 | End: 2020-07-22

## 2019-12-13 NOTE — TELEPHONE ENCOUNTER
Dr. BECK please increase the  dose of Promethazine from 12.5 to 25. I have been nauseated and vomiting still.   This dose seems to help but not stop it.     Please increase the dose of OndansetronODT 8mg as well as I am alternating as you instructed.     I don't  know how much more I can handle this

## 2019-12-26 DIAGNOSIS — I48.0 PAROXYSMAL ATRIAL FIBRILLATION: ICD-10-CM

## 2019-12-28 RX ORDER — APIXABAN 5 MG/1
5 TABLET, FILM COATED ORAL 2 TIMES DAILY
Qty: 180 TABLET | Refills: 1 | Status: SHIPPED | OUTPATIENT
Start: 2019-12-28 | End: 2020-01-24 | Stop reason: SDUPTHER

## 2020-01-02 DIAGNOSIS — Z79.4 TYPE 2 DIABETES MELLITUS WITH STAGE 3 CHRONIC KIDNEY DISEASE, WITH LONG-TERM CURRENT USE OF INSULIN: ICD-10-CM

## 2020-01-02 DIAGNOSIS — E11.22 TYPE 2 DIABETES MELLITUS WITH STAGE 3 CHRONIC KIDNEY DISEASE, WITH LONG-TERM CURRENT USE OF INSULIN: ICD-10-CM

## 2020-01-02 DIAGNOSIS — N18.30 TYPE 2 DIABETES MELLITUS WITH STAGE 3 CHRONIC KIDNEY DISEASE, WITH LONG-TERM CURRENT USE OF INSULIN: ICD-10-CM

## 2020-01-02 DIAGNOSIS — F32.A DEPRESSION, UNSPECIFIED DEPRESSION TYPE: ICD-10-CM

## 2020-01-02 RX ORDER — CITALOPRAM 20 MG/1
TABLET, FILM COATED ORAL
Qty: 90 TABLET | Refills: 1 | Status: SHIPPED | OUTPATIENT
Start: 2020-01-02 | End: 2020-09-01 | Stop reason: SDUPTHER

## 2020-01-02 RX ORDER — FUROSEMIDE 40 MG/1
TABLET ORAL
Qty: 90 TABLET | Refills: 1 | Status: SHIPPED | OUTPATIENT
Start: 2020-01-02 | End: 2020-03-26 | Stop reason: SDUPTHER

## 2020-01-02 RX ORDER — SITAGLIPTIN 100 MG/1
TABLET, FILM COATED ORAL
Qty: 90 TABLET | Refills: 5 | Status: SHIPPED | OUTPATIENT
Start: 2020-01-02 | End: 2020-12-09 | Stop reason: SDUPTHER

## 2020-01-02 RX ORDER — METFORMIN HYDROCHLORIDE 1000 MG/1
1000 TABLET ORAL 2 TIMES DAILY WITH MEALS
Qty: 180 TABLET | Refills: 1 | Status: SHIPPED | OUTPATIENT
Start: 2020-01-02 | End: 2020-09-01 | Stop reason: SDUPTHER

## 2020-01-10 ENCOUNTER — OFFICE VISIT (OUTPATIENT)
Dept: FAMILY MEDICINE | Facility: CLINIC | Age: 85
End: 2020-01-10
Payer: MEDICARE

## 2020-01-10 VITALS
SYSTOLIC BLOOD PRESSURE: 130 MMHG | HEIGHT: 66 IN | BODY MASS INDEX: 32.89 KG/M2 | HEART RATE: 88 BPM | WEIGHT: 204.63 LBS | RESPIRATION RATE: 18 BRPM | DIASTOLIC BLOOD PRESSURE: 60 MMHG | TEMPERATURE: 99 F

## 2020-01-10 DIAGNOSIS — Z79.4 TYPE 2 DIABETES MELLITUS WITHOUT COMPLICATION, WITH LONG-TERM CURRENT USE OF INSULIN: Primary | ICD-10-CM

## 2020-01-10 DIAGNOSIS — E11.9 TYPE 2 DIABETES MELLITUS WITHOUT COMPLICATION, WITH LONG-TERM CURRENT USE OF INSULIN: Primary | ICD-10-CM

## 2020-01-10 DIAGNOSIS — R11.2 INTRACTABLE NAUSEA AND VOMITING: ICD-10-CM

## 2020-01-10 DIAGNOSIS — R11.0 NAUSEA: ICD-10-CM

## 2020-01-10 PROCEDURE — 1126F PR PAIN SEVERITY QUANTIFIED, NO PAIN PRESENT: ICD-10-PCS | Mod: S$GLB,,, | Performed by: FAMILY MEDICINE

## 2020-01-10 PROCEDURE — 1159F MED LIST DOCD IN RCRD: CPT | Mod: S$GLB,,, | Performed by: FAMILY MEDICINE

## 2020-01-10 PROCEDURE — 3075F PR MOST RECENT SYSTOLIC BLOOD PRESS GE 130-139MM HG: ICD-10-PCS | Mod: CPTII,S$GLB,, | Performed by: FAMILY MEDICINE

## 2020-01-10 PROCEDURE — 99213 PR OFFICE/OUTPT VISIT, EST, LEVL III, 20-29 MIN: ICD-10-PCS | Mod: S$GLB,,, | Performed by: FAMILY MEDICINE

## 2020-01-10 PROCEDURE — 1126F AMNT PAIN NOTED NONE PRSNT: CPT | Mod: S$GLB,,, | Performed by: FAMILY MEDICINE

## 2020-01-10 PROCEDURE — 3078F PR MOST RECENT DIASTOLIC BLOOD PRESSURE < 80 MM HG: ICD-10-PCS | Mod: CPTII,S$GLB,, | Performed by: FAMILY MEDICINE

## 2020-01-10 PROCEDURE — 1101F PR PT FALLS ASSESS DOC 0-1 FALLS W/OUT INJ PAST YR: ICD-10-PCS | Mod: CPTII,S$GLB,, | Performed by: FAMILY MEDICINE

## 2020-01-10 PROCEDURE — 1159F PR MEDICATION LIST DOCUMENTED IN MEDICAL RECORD: ICD-10-PCS | Mod: S$GLB,,, | Performed by: FAMILY MEDICINE

## 2020-01-10 PROCEDURE — 99999 PR PBB SHADOW E&M-EST. PATIENT-LVL IV: CPT | Mod: PBBFAC,,, | Performed by: FAMILY MEDICINE

## 2020-01-10 PROCEDURE — 1101F PT FALLS ASSESS-DOCD LE1/YR: CPT | Mod: CPTII,S$GLB,, | Performed by: FAMILY MEDICINE

## 2020-01-10 PROCEDURE — 3075F SYST BP GE 130 - 139MM HG: CPT | Mod: CPTII,S$GLB,, | Performed by: FAMILY MEDICINE

## 2020-01-10 PROCEDURE — 99213 OFFICE O/P EST LOW 20 MIN: CPT | Mod: S$GLB,,, | Performed by: FAMILY MEDICINE

## 2020-01-10 PROCEDURE — 99999 PR PBB SHADOW E&M-EST. PATIENT-LVL IV: ICD-10-PCS | Mod: PBBFAC,,, | Performed by: FAMILY MEDICINE

## 2020-01-10 PROCEDURE — 3078F DIAST BP <80 MM HG: CPT | Mod: CPTII,S$GLB,, | Performed by: FAMILY MEDICINE

## 2020-01-10 RX ORDER — PROMETHAZINE HYDROCHLORIDE 25 MG/1
25 SUPPOSITORY RECTAL EVERY 6 HOURS PRN
Qty: 10 SUPPOSITORY | Refills: 1 | Status: SHIPPED | OUTPATIENT
Start: 2020-01-10 | End: 2020-07-22

## 2020-01-10 RX ORDER — LISINOPRIL 10 MG/1
TABLET ORAL
COMMUNITY
Start: 2019-11-20 | End: 2020-09-01 | Stop reason: SDUPTHER

## 2020-01-10 RX ORDER — SUCRALFATE 1 G/1
1 TABLET ORAL
Qty: 120 TABLET | Refills: 5 | Status: SHIPPED | OUTPATIENT
Start: 2020-01-10 | End: 2020-06-01 | Stop reason: SDUPTHER

## 2020-01-10 NOTE — PROGRESS NOTES
Subjective:       Patient ID: Erasmo Dunbar is a 84 y.o. male.    Chief Complaint: Emesis    Pt is a 84 y.o. male who presents for check up for GERD and N&V(bouts4-5). Doing well on current meds. Denies any side effects. Prevention is up to date.    Review of Systems   Constitutional: Negative for appetite change.   HENT: Negative for congestion, ear pain, sneezing and sore throat.    Eyes: Negative for redness and visual disturbance.   Respiratory: Negative for cough, chest tightness and stridor.    Cardiovascular: Negative for chest pain.   Gastrointestinal: Negative for abdominal pain, blood in stool, diarrhea, nausea and vomiting.   Genitourinary: Negative for difficulty urinating, dysuria and hematuria.   Musculoskeletal: Negative for arthralgias, back pain, joint swelling, myalgias and neck pain.   Skin: Negative for rash.   Neurological: Negative for dizziness.   Psychiatric/Behavioral: Negative for agitation. The patient is not nervous/anxious.        Objective:      Physical Exam   Constitutional: He is oriented to person, place, and time. He appears well-developed and well-nourished.   HENT:   Head: Normocephalic.   Good moist mucous membranes   Eyes: Pupils are equal, round, and reactive to light.   Neck: Normal range of motion. Neck supple. No thyromegaly present.   Cardiovascular: Normal rate and regular rhythm. Exam reveals no friction rub.   No murmur heard.  Pulmonary/Chest: Effort normal. No respiratory distress. He has no wheezes.   Abdominal: There is no tenderness. There is no rebound and no guarding.   Musculoskeletal: Normal range of motion. He exhibits no edema or tenderness.   Lymphadenopathy:     He has no cervical adenopathy.   Neurological: He is alert and oriented to person, place, and time. He has normal reflexes. No cranial nerve deficit.   Skin: Skin is warm and dry.   Psychiatric: He has a normal mood and affect. Judgment and thought content normal.       Assessment:       1. Type  2 diabetes mellitus without complication, with long-term current use of insulin    2. Intractable nausea and vomiting    3. Nausea        Plan:   D'C Trulicity Rxs sent in

## 2020-01-20 ENCOUNTER — OFFICE VISIT (OUTPATIENT)
Dept: FAMILY MEDICINE | Facility: CLINIC | Age: 85
End: 2020-01-20
Payer: MEDICARE

## 2020-01-20 VITALS
DIASTOLIC BLOOD PRESSURE: 70 MMHG | SYSTOLIC BLOOD PRESSURE: 118 MMHG | HEIGHT: 66 IN | HEART RATE: 60 BPM | WEIGHT: 204.56 LBS | RESPIRATION RATE: 16 BRPM | BODY MASS INDEX: 32.88 KG/M2

## 2020-01-20 DIAGNOSIS — R11.2 INTRACTABLE NAUSEA AND VOMITING: ICD-10-CM

## 2020-01-20 DIAGNOSIS — I10 ESSENTIAL HYPERTENSION: ICD-10-CM

## 2020-01-20 DIAGNOSIS — E78.2 MIXED HYPERLIPIDEMIA: ICD-10-CM

## 2020-01-20 DIAGNOSIS — I48.0 PAROXYSMAL ATRIAL FIBRILLATION: ICD-10-CM

## 2020-01-20 DIAGNOSIS — E11.69 TYPE 2 DIABETES MELLITUS WITH OTHER SPECIFIED COMPLICATION, WITHOUT LONG-TERM CURRENT USE OF INSULIN: Primary | ICD-10-CM

## 2020-01-20 DIAGNOSIS — F51.04 PSYCHOPHYSIOLOGICAL INSOMNIA: ICD-10-CM

## 2020-01-20 LAB
ALBUMIN SERPL BCP-MCNC: 3.9 G/DL (ref 3.5–5.2)
ALP SERPL-CCNC: 77 U/L (ref 55–135)
ALT SERPL W/O P-5'-P-CCNC: 16 U/L (ref 10–44)
ANION GAP SERPL CALC-SCNC: 10 MMOL/L (ref 8–16)
AST SERPL-CCNC: 15 U/L (ref 10–40)
BILIRUB SERPL-MCNC: 0.3 MG/DL (ref 0.1–1)
BUN SERPL-MCNC: 11 MG/DL (ref 8–23)
CALCIUM SERPL-MCNC: 9.5 MG/DL (ref 8.7–10.5)
CHLORIDE SERPL-SCNC: 98 MMOL/L (ref 95–110)
CO2 SERPL-SCNC: 28 MMOL/L (ref 23–29)
CREAT SERPL-MCNC: 1.2 MG/DL (ref 0.5–1.4)
EST. GFR  (AFRICAN AMERICAN): >60 ML/MIN/1.73 M^2
EST. GFR  (NON AFRICAN AMERICAN): 55 ML/MIN/1.73 M^2
GLUCOSE SERPL-MCNC: 126 MG/DL (ref 70–110)
POTASSIUM SERPL-SCNC: 4.7 MMOL/L (ref 3.5–5.1)
PROT SERPL-MCNC: 6.8 G/DL (ref 6–8.4)
SODIUM SERPL-SCNC: 136 MMOL/L (ref 136–145)

## 2020-01-20 PROCEDURE — 3078F PR MOST RECENT DIASTOLIC BLOOD PRESSURE < 80 MM HG: ICD-10-PCS | Mod: CPTII,S$GLB,, | Performed by: FAMILY MEDICINE

## 2020-01-20 PROCEDURE — 80053 COMPREHEN METABOLIC PANEL: CPT

## 2020-01-20 PROCEDURE — 99214 PR OFFICE/OUTPT VISIT, EST, LEVL IV, 30-39 MIN: ICD-10-PCS | Mod: S$GLB,,, | Performed by: FAMILY MEDICINE

## 2020-01-20 PROCEDURE — 3074F SYST BP LT 130 MM HG: CPT | Mod: CPTII,S$GLB,, | Performed by: FAMILY MEDICINE

## 2020-01-20 PROCEDURE — 1159F PR MEDICATION LIST DOCUMENTED IN MEDICAL RECORD: ICD-10-PCS | Mod: S$GLB,,, | Performed by: FAMILY MEDICINE

## 2020-01-20 PROCEDURE — 83036 HEMOGLOBIN GLYCOSYLATED A1C: CPT

## 2020-01-20 PROCEDURE — 1126F PR PAIN SEVERITY QUANTIFIED, NO PAIN PRESENT: ICD-10-PCS | Mod: S$GLB,,, | Performed by: FAMILY MEDICINE

## 2020-01-20 PROCEDURE — 1126F AMNT PAIN NOTED NONE PRSNT: CPT | Mod: S$GLB,,, | Performed by: FAMILY MEDICINE

## 2020-01-20 PROCEDURE — 99999 PR PBB SHADOW E&M-EST. PATIENT-LVL IV: ICD-10-PCS | Mod: PBBFAC,,, | Performed by: FAMILY MEDICINE

## 2020-01-20 PROCEDURE — 3074F PR MOST RECENT SYSTOLIC BLOOD PRESSURE < 130 MM HG: ICD-10-PCS | Mod: CPTII,S$GLB,, | Performed by: FAMILY MEDICINE

## 2020-01-20 PROCEDURE — 1159F MED LIST DOCD IN RCRD: CPT | Mod: S$GLB,,, | Performed by: FAMILY MEDICINE

## 2020-01-20 PROCEDURE — 3078F DIAST BP <80 MM HG: CPT | Mod: CPTII,S$GLB,, | Performed by: FAMILY MEDICINE

## 2020-01-20 PROCEDURE — 99999 PR PBB SHADOW E&M-EST. PATIENT-LVL IV: CPT | Mod: PBBFAC,,, | Performed by: FAMILY MEDICINE

## 2020-01-20 PROCEDURE — 36415 COLL VENOUS BLD VENIPUNCTURE: CPT | Mod: S$GLB,,, | Performed by: FAMILY MEDICINE

## 2020-01-20 PROCEDURE — 1101F PT FALLS ASSESS-DOCD LE1/YR: CPT | Mod: CPTII,S$GLB,, | Performed by: FAMILY MEDICINE

## 2020-01-20 PROCEDURE — 99214 OFFICE O/P EST MOD 30 MIN: CPT | Mod: S$GLB,,, | Performed by: FAMILY MEDICINE

## 2020-01-20 PROCEDURE — 1101F PR PT FALLS ASSESS DOC 0-1 FALLS W/OUT INJ PAST YR: ICD-10-PCS | Mod: CPTII,S$GLB,, | Performed by: FAMILY MEDICINE

## 2020-01-20 PROCEDURE — 36415 PR COLLECTION VENOUS BLOOD,VENIPUNCTURE: ICD-10-PCS | Mod: S$GLB,,, | Performed by: FAMILY MEDICINE

## 2020-01-20 RX ORDER — ONDANSETRON 8 MG/1
8 TABLET, ORALLY DISINTEGRATING ORAL EVERY 8 HOURS PRN
Qty: 90 TABLET | Refills: 1 | Status: SHIPPED | OUTPATIENT
Start: 2020-01-20 | End: 2020-07-22

## 2020-01-20 RX ORDER — GLIMEPIRIDE 2 MG/1
2 TABLET ORAL
Qty: 30 TABLET | Refills: 5 | Status: SHIPPED | OUTPATIENT
Start: 2020-01-20 | End: 2020-06-01 | Stop reason: SDUPTHER

## 2020-01-20 NOTE — PROGRESS NOTES
Subjective:       Patient ID: Erasmo Dunbar is a 84 y.o. male.    Chief Complaint: Follow-up ( 6month )    Pt is a 84 y.o. male who presents for evaluation and management of   Encounter Diagnoses   Name Primary?    Type 2 diabetes mellitus with other specified complication, without long-term current use of insulin Yes    Mixed hyperlipidemia     Essential hypertension     Intractable nausea and vomiting     Paroxysmal atrial fibrillation     Psychophysiological insomnia    .  Doing well on current meds. Denies any side effects. Prevention is up to date.    Review of Systems   Constitutional: Negative for fever.   Respiratory: Negative for shortness of breath.    Cardiovascular: Negative for chest pain.   Gastrointestinal: Positive for nausea and vomiting. Negative for abdominal pain and diarrhea.        Vomiting improved recently        Objective:      Physical Exam   Constitutional: He is oriented to person, place, and time. He appears well-developed and well-nourished.   HENT:   Head: Normocephalic and atraumatic.   Right Ear: External ear normal.   Left Ear: External ear normal.   Nose: Nose normal.   Mouth/Throat: Oropharynx is clear and moist.   Eyes: Pupils are equal, round, and reactive to light. Conjunctivae and EOM are normal. Right eye exhibits no discharge. Left eye exhibits no discharge. No scleral icterus.   Neck: Normal range of motion. Neck supple. No JVD present. No tracheal deviation present. No thyromegaly present.   Cardiovascular: Normal rate, regular rhythm, normal heart sounds and intact distal pulses.   No murmur heard.  Pulmonary/Chest: Effort normal and breath sounds normal. No respiratory distress. He has no wheezes. He has no rales. He exhibits no tenderness.   Abdominal: Soft. Bowel sounds are normal. He exhibits no distension and no mass. There is no tenderness. There is no rebound and no guarding.   Musculoskeletal: Normal range of motion.   Lymphadenopathy:     He has no  cervical adenopathy.   Neurological: He is alert and oriented to person, place, and time. He has normal reflexes. He displays normal reflexes. No cranial nerve deficit. He exhibits normal muscle tone. Coordination normal.   Skin: Skin is warm and dry.   Psychiatric: He has a normal mood and affect. His behavior is normal. Judgment and thought content normal.       Assessment:       1. Type 2 diabetes mellitus with other specified complication, without long-term current use of insulin    2. Mixed hyperlipidemia    3. Essential hypertension    4. Intractable nausea and vomiting    5. Paroxysmal atrial fibrillation        Plan:   Erasmo PEGUERO was seen today for follow-up.    Diagnoses and all orders for this visit:    Type 2 diabetes mellitus with other specified complication, without long-term current use of insulin  -     Comprehensive metabolic panel; Future  -     Hemoglobin A1c; Future  -     glimepiride (AMARYL) 2 MG tablet; Take 1 tablet (2 mg total) by mouth before breakfast.    Mixed hyperlipidemia    Essential hypertension    Intractable nausea and vomiting  -     ondansetron (ZOFRAN-ODT) 8 MG TbDL; Take 1 tablet (8 mg total) by mouth every 8 (eight) hours as needed.    Paroxysmal atrial fibrillation    Psychophysiological insomnia      Problem List Items Addressed This Visit     Hyperlipidemia    Hypertension    Intractable nausea and vomiting    Overview     Stopped Trulicity 1/12/20          Paroxysmal atrial fibrillation    Type 2 diabetes mellitus, without long-term current use of insulin - Primary        Increase trazodone to 2 tablets qhs. If this works he will call and I can send in a 100mg rx. If not, will try something else     Will take some time for trulicity to wash out of his system before we can see if nausea and vomiting resolves for good. REsuming ALATORRE for now.     Continue same meds otherwise, see orders           No follow-ups on file.

## 2020-01-20 NOTE — LETTER
January 20, 2020      Elliott Coon MD  [address] [place] [id number]           [address] [address]  Phone: [phone]  Fax: [phone]          Patient: Erasmo Dunbar   MR Number: [id number]   YOB: 1935   Date of Visit: 1/20/2020       Dear Dr. Elliott Coon:    Thank you for referring Erasmo Dunbar to me for evaluation. Attached you will find relevant portions of my assessment and plan of care.    If you have questions, please do not hesitate to call me. I look forward to following Erasmo Dunbar along with you.    Sincerely,    Hemal Varma MD    Enclosure  CC:  No Recipients    If you would like to receive this communication electronically, please contact [email or web address] or [phone] to request more information on [employer] access.    For providers and/or their staff who would like to refer a patient to [facility], please contact us through our one-stop-shop provider referral line, [facility], at [phone].    If you feel you have received this communication in error or would no longer like to receive these types of communications, please e-mail [email or web address]

## 2020-01-21 ENCOUNTER — PATIENT MESSAGE (OUTPATIENT)
Dept: FAMILY MEDICINE | Facility: CLINIC | Age: 85
End: 2020-01-21

## 2020-01-21 LAB
ESTIMATED AVG GLUCOSE: 134 MG/DL (ref 68–131)
HBA1C MFR BLD HPLC: 6.3 % (ref 4–5.6)

## 2020-01-22 ENCOUNTER — PATIENT MESSAGE (OUTPATIENT)
Dept: FAMILY MEDICINE | Facility: CLINIC | Age: 85
End: 2020-01-22

## 2020-01-24 DIAGNOSIS — E11.9 TYPE 2 DIABETES MELLITUS WITHOUT COMPLICATION, WITH LONG-TERM CURRENT USE OF INSULIN: ICD-10-CM

## 2020-01-24 DIAGNOSIS — Z79.4 TYPE 2 DIABETES MELLITUS WITHOUT COMPLICATION, WITH LONG-TERM CURRENT USE OF INSULIN: ICD-10-CM

## 2020-01-24 DIAGNOSIS — I48.0 PAROXYSMAL ATRIAL FIBRILLATION: ICD-10-CM

## 2020-01-25 RX ORDER — PANTOPRAZOLE SODIUM 40 MG/1
40 TABLET, DELAYED RELEASE ORAL DAILY
Qty: 90 TABLET | Refills: 0 | Status: SHIPPED | OUTPATIENT
Start: 2020-01-25 | End: 2020-07-22

## 2020-01-25 RX ORDER — PIOGLITAZONEHYDROCHLORIDE 15 MG/1
15 TABLET ORAL DAILY
Qty: 90 TABLET | Refills: 1 | Status: SHIPPED | OUTPATIENT
Start: 2020-01-25 | End: 2020-09-01 | Stop reason: SDUPTHER

## 2020-01-25 RX ORDER — APIXABAN 5 MG/1
5 TABLET, FILM COATED ORAL 2 TIMES DAILY
Qty: 180 TABLET | Refills: 1 | Status: SHIPPED | OUTPATIENT
Start: 2020-01-25 | End: 2021-01-04 | Stop reason: SDUPTHER

## 2020-01-27 ENCOUNTER — PATIENT MESSAGE (OUTPATIENT)
Dept: FAMILY MEDICINE | Facility: CLINIC | Age: 85
End: 2020-01-27

## 2020-02-19 ENCOUNTER — PATIENT MESSAGE (OUTPATIENT)
Dept: FAMILY MEDICINE | Facility: CLINIC | Age: 85
End: 2020-02-19

## 2020-02-20 RX ORDER — CIPROFLOXACIN 250 MG/1
250 TABLET, FILM COATED ORAL 2 TIMES DAILY
Qty: 60 TABLET | Refills: 0 | Status: SHIPPED | OUTPATIENT
Start: 2020-02-20 | End: 2020-07-22 | Stop reason: ALTCHOICE

## 2020-02-25 ENCOUNTER — PATIENT MESSAGE (OUTPATIENT)
Dept: FAMILY MEDICINE | Facility: CLINIC | Age: 85
End: 2020-02-25

## 2020-03-16 DIAGNOSIS — R35.0 BENIGN PROSTATIC HYPERPLASIA WITH URINARY FREQUENCY: ICD-10-CM

## 2020-03-16 DIAGNOSIS — N40.1 BENIGN PROSTATIC HYPERPLASIA WITH URINARY FREQUENCY: ICD-10-CM

## 2020-03-16 DIAGNOSIS — E11.22 TYPE 2 DIABETES MELLITUS WITH STAGE 3 CHRONIC KIDNEY DISEASE, WITH LONG-TERM CURRENT USE OF INSULIN: ICD-10-CM

## 2020-03-16 DIAGNOSIS — Z79.4 TYPE 2 DIABETES MELLITUS WITH STAGE 3 CHRONIC KIDNEY DISEASE, WITH LONG-TERM CURRENT USE OF INSULIN: ICD-10-CM

## 2020-03-16 DIAGNOSIS — N18.30 TYPE 2 DIABETES MELLITUS WITH STAGE 3 CHRONIC KIDNEY DISEASE, WITH LONG-TERM CURRENT USE OF INSULIN: ICD-10-CM

## 2020-03-16 RX ORDER — TAMSULOSIN HYDROCHLORIDE 0.4 MG/1
1 CAPSULE ORAL DAILY
Qty: 90 CAPSULE | Refills: 1 | Status: SHIPPED | OUTPATIENT
Start: 2020-03-16 | End: 2020-08-20

## 2020-03-16 RX ORDER — INSULIN GLARGINE 100 [IU]/ML
42 INJECTION, SOLUTION SUBCUTANEOUS NIGHTLY
Qty: 12.6 ML | Refills: 5 | Status: SHIPPED | OUTPATIENT
Start: 2020-03-16 | End: 2020-08-20

## 2020-03-26 ENCOUNTER — PATIENT MESSAGE (OUTPATIENT)
Dept: FAMILY MEDICINE | Facility: CLINIC | Age: 85
End: 2020-03-26

## 2020-03-26 DIAGNOSIS — I10 ESSENTIAL HYPERTENSION: ICD-10-CM

## 2020-03-26 RX ORDER — METOPROLOL TARTRATE 50 MG/1
50 TABLET ORAL 2 TIMES DAILY
Qty: 180 TABLET | Refills: 1 | Status: SHIPPED | OUTPATIENT
Start: 2020-03-26 | End: 2020-09-01 | Stop reason: SDUPTHER

## 2020-03-26 RX ORDER — FUROSEMIDE 40 MG/1
40 TABLET ORAL DAILY
Qty: 90 TABLET | Refills: 1 | Status: SHIPPED | OUTPATIENT
Start: 2020-03-26 | End: 2020-06-01

## 2020-04-08 ENCOUNTER — PATIENT MESSAGE (OUTPATIENT)
Dept: FAMILY MEDICINE | Facility: CLINIC | Age: 85
End: 2020-04-08

## 2020-04-08 RX ORDER — LEVOFLOXACIN 500 MG/1
500 TABLET, FILM COATED ORAL DAILY
Qty: 30 TABLET | Refills: 0 | Status: SHIPPED | OUTPATIENT
Start: 2020-04-08 | End: 2020-04-18

## 2020-05-13 ENCOUNTER — TELEPHONE (OUTPATIENT)
Dept: FAMILY MEDICINE | Facility: CLINIC | Age: 85
End: 2020-05-13

## 2020-05-13 ENCOUNTER — OFFICE VISIT (OUTPATIENT)
Dept: FAMILY MEDICINE | Facility: CLINIC | Age: 85
End: 2020-05-13
Payer: MEDICARE

## 2020-05-13 ENCOUNTER — APPOINTMENT (OUTPATIENT)
Dept: RADIOLOGY | Facility: CLINIC | Age: 85
End: 2020-05-13
Attending: FAMILY MEDICINE
Payer: MEDICARE

## 2020-05-13 VITALS
RESPIRATION RATE: 16 BRPM | HEIGHT: 66 IN | HEART RATE: 96 BPM | WEIGHT: 211.38 LBS | SYSTOLIC BLOOD PRESSURE: 122 MMHG | TEMPERATURE: 99 F | BODY MASS INDEX: 33.97 KG/M2 | DIASTOLIC BLOOD PRESSURE: 50 MMHG
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
## 2020-05-13 DIAGNOSIS — R07.9 CHEST PAIN, UNSPECIFIED TYPE: Primary | ICD-10-CM

## 2020-05-13 DIAGNOSIS — R07.9 CHEST PAIN, UNSPECIFIED TYPE: ICD-10-CM

## 2020-05-13 DIAGNOSIS — S20.211A CONTUSION OF RIB ON RIGHT SIDE, INITIAL ENCOUNTER: ICD-10-CM

## 2020-05-13 PROCEDURE — 99999 PR PBB SHADOW E&M-EST. PATIENT-LVL III: CPT | Mod: PBBFAC,,, | Performed by: FAMILY MEDICINE

## 2020-05-13 PROCEDURE — 99999 PR PBB SHADOW E&M-EST. PATIENT-LVL III: ICD-10-PCS | Mod: PBBFAC,,, | Performed by: FAMILY MEDICINE

## 2020-05-13 PROCEDURE — 71046 X-RAY EXAM CHEST 2 VIEWS: CPT | Mod: TC,PO

## 2020-05-13 PROCEDURE — 3078F PR MOST RECENT DIASTOLIC BLOOD PRESSURE < 80 MM HG: ICD-10-PCS | Mod: CPTII,S$GLB,, | Performed by: FAMILY MEDICINE

## 2020-05-13 PROCEDURE — 1159F PR MEDICATION LIST DOCUMENTED IN MEDICAL RECORD: ICD-10-PCS | Mod: S$GLB,,, | Performed by: FAMILY MEDICINE

## 2020-05-13 PROCEDURE — 3074F SYST BP LT 130 MM HG: CPT | Mod: CPTII,S$GLB,, | Performed by: FAMILY MEDICINE

## 2020-05-13 PROCEDURE — 1125F PR PAIN SEVERITY QUANTIFIED, PAIN PRESENT: ICD-10-PCS | Mod: S$GLB,,, | Performed by: FAMILY MEDICINE

## 2020-05-13 PROCEDURE — 1159F MED LIST DOCD IN RCRD: CPT | Mod: S$GLB,,, | Performed by: FAMILY MEDICINE

## 2020-05-13 PROCEDURE — 3078F DIAST BP <80 MM HG: CPT | Mod: CPTII,S$GLB,, | Performed by: FAMILY MEDICINE

## 2020-05-13 PROCEDURE — 99214 PR OFFICE/OUTPT VISIT, EST, LEVL IV, 30-39 MIN: ICD-10-PCS | Mod: S$GLB,,, | Performed by: FAMILY MEDICINE

## 2020-05-13 PROCEDURE — 1125F AMNT PAIN NOTED PAIN PRSNT: CPT | Mod: S$GLB,,, | Performed by: FAMILY MEDICINE

## 2020-05-13 PROCEDURE — 1101F PT FALLS ASSESS-DOCD LE1/YR: CPT | Mod: CPTII,S$GLB,, | Performed by: FAMILY MEDICINE

## 2020-05-13 PROCEDURE — 1101F PR PT FALLS ASSESS DOC 0-1 FALLS W/OUT INJ PAST YR: ICD-10-PCS | Mod: CPTII,S$GLB,, | Performed by: FAMILY MEDICINE

## 2020-05-13 PROCEDURE — 99214 OFFICE O/P EST MOD 30 MIN: CPT | Mod: S$GLB,,, | Performed by: FAMILY MEDICINE

## 2020-05-13 PROCEDURE — 3074F PR MOST RECENT SYSTOLIC BLOOD PRESSURE < 130 MM HG: ICD-10-PCS | Mod: CPTII,S$GLB,, | Performed by: FAMILY MEDICINE

## 2020-05-13 RX ORDER — HYDROCODONE BITARTRATE AND ACETAMINOPHEN 5; 325 MG/1; MG/1
1 TABLET ORAL EVERY 8 HOURS PRN
Qty: 20 TABLET | Refills: 0 | Status: SHIPPED | OUTPATIENT
Start: 2020-05-13 | End: 2020-05-13 | Stop reason: SDUPTHER

## 2020-05-13 RX ORDER — HYDROCODONE BITARTRATE AND ACETAMINOPHEN 5; 325 MG/1; MG/1
1 TABLET ORAL EVERY 8 HOURS PRN
Qty: 20 TABLET | Refills: 0 | Status: SHIPPED | OUTPATIENT
Start: 2020-05-13 | End: 2020-05-26 | Stop reason: SDUPTHER

## 2020-05-13 NOTE — PROGRESS NOTES
Subjective:       Patient ID: Erasmo Dunbar is a 84 y.o. male.    Chief Complaint: Fall and Rt side pain    Pt is a 84 y.o. male who presents for evaluation and management of   Encounter Diagnoses   Name Primary?    Chest pain, unspecified type Yes    Fall, initial encounter     Contusion of rib on right side, initial encounter    .trip at home   Fell and landed on right side   1 week ago. Concerned b/c still hurts with deep breath     Doing well on current meds. Denies any side effects. Prevention is up to date.  Review of Systems   Respiratory: Negative for shortness of breath.    Cardiovascular: Positive for chest pain.   Musculoskeletal: Positive for back pain.       Objective:      Physical Exam   Constitutional: He is oriented to person, place, and time. He appears well-developed and well-nourished.   HENT:   Head: Normocephalic and atraumatic.   Right Ear: External ear normal.   Left Ear: External ear normal.   Nose: Nose normal.   Mouth/Throat: Oropharynx is clear and moist.   Eyes: Pupils are equal, round, and reactive to light. Conjunctivae and EOM are normal. Right eye exhibits no discharge. Left eye exhibits no discharge. No scleral icterus.   Neck: Normal range of motion. Neck supple. No JVD present. No tracheal deviation present. No thyromegaly present.   Cardiovascular: Normal rate, regular rhythm, normal heart sounds and intact distal pulses.   No murmur heard.  Pulmonary/Chest: Effort normal and breath sounds normal. No respiratory distress. He has no wheezes. He has no rales. He exhibits tenderness.   Abdominal: Soft. Bowel sounds are normal. He exhibits no distension and no mass. There is no tenderness. There is no rebound and no guarding.   Musculoskeletal: Normal range of motion.   Lymphadenopathy:     He has no cervical adenopathy.   Neurological: He is alert and oriented to person, place, and time. He has normal reflexes. He displays normal reflexes. No cranial nerve deficit. He exhibits  normal muscle tone. Coordination normal.   Skin: Skin is warm and dry.   Psychiatric: He has a normal mood and affect. His behavior is normal. Judgment and thought content normal.       Assessment:       1. Chest pain, unspecified type    2. Fall, initial encounter    3. Contusion of rib on right side, initial encounter        Plan:   Erasmo PEGUERO was seen today for fall and rt side pain.    Diagnoses and all orders for this visit:    Chest pain, unspecified type  -     X-Ray Chest PA And Lateral; Future    Fall, initial encounter  -     X-Ray Chest PA And Lateral; Future  -     X-Ray Ribs 2 View Right; Future  -     X-Ray Lumbar Spine Ap And Lateral; Future    Contusion of rib on right side, initial encounter  -     Discontinue: HYDROcodone-acetaminophen (NORCO) 5-325 mg per tablet; Take 1 tablet by mouth every 8 (eight) hours as needed for Pain.  -     HYDROcodone-acetaminophen (NORCO) 5-325 mg per tablet; Take 1 tablet by mouth every 8 (eight) hours as needed for Pain.      Problem List Items Addressed This Visit     None      Visit Diagnoses     Chest pain, unspecified type    -  Primary    Relevant Orders    X-Ray Chest PA And Lateral (Completed)    Fall, initial encounter        Relevant Orders    X-Ray Chest PA And Lateral (Completed)    X-Ray Ribs 2 View Right (Completed)    X-Ray Lumbar Spine Ap And Lateral (Completed)    Contusion of rib on right side, initial encounter        Relevant Medications    HYDROcodone-acetaminophen (NORCO) 5-325 mg per tablet        No follow-ups on file.

## 2020-05-13 NOTE — TELEPHONE ENCOUNTER
----- Message from Jessica Arias sent at 2020 12:21 PM CDT -----  Contact: wife- jody Dunbar  MRN: [id number]  : 1935  PCP: Hemal Varma  Home Phone      [phone]  Work Phone      Not on file.  Mobile          [phone]      MESSAGE:   Patient would like to get medical advice.  Symptoms (please be specific):  Fell a couple days ago and starting to have some rib pain, wants x-rays  How long has patient had these symptoms:  Fell 3 days ago  Comments:     Phone: [phone]

## 2020-05-26 ENCOUNTER — PATIENT MESSAGE (OUTPATIENT)
Dept: FAMILY MEDICINE | Facility: CLINIC | Age: 85
End: 2020-05-26

## 2020-05-26 DIAGNOSIS — S20.211A CONTUSION OF RIB ON RIGHT SIDE, INITIAL ENCOUNTER: ICD-10-CM

## 2020-05-26 RX ORDER — HYDROCODONE BITARTRATE AND ACETAMINOPHEN 5; 325 MG/1; MG/1
1 TABLET ORAL EVERY 8 HOURS PRN
Qty: 30 TABLET | Refills: 0 | Status: SHIPPED | OUTPATIENT
Start: 2020-05-26 | End: 2020-07-20 | Stop reason: SDUPTHER

## 2020-05-29 DIAGNOSIS — E11.69 TYPE 2 DIABETES MELLITUS WITH OTHER SPECIFIED COMPLICATION, WITHOUT LONG-TERM CURRENT USE OF INSULIN: ICD-10-CM

## 2020-05-29 DIAGNOSIS — G47.00 INSOMNIA, UNSPECIFIED TYPE: ICD-10-CM

## 2020-05-31 RX ORDER — TRAZODONE HYDROCHLORIDE 50 MG/1
TABLET ORAL
Qty: 90 TABLET | Refills: 1 | Status: SHIPPED | OUTPATIENT
Start: 2020-05-31 | End: 2020-09-01 | Stop reason: SDUPTHER

## 2020-06-01 ENCOUNTER — TELEPHONE (OUTPATIENT)
Dept: FAMILY MEDICINE | Facility: CLINIC | Age: 85
End: 2020-06-01

## 2020-06-01 DIAGNOSIS — R60.9 EDEMA, UNSPECIFIED TYPE: Primary | ICD-10-CM

## 2020-06-01 RX ORDER — BUMETANIDE 1 MG/1
1 TABLET ORAL DAILY
Qty: 30 TABLET | Refills: 5 | Status: SHIPPED | OUTPATIENT
Start: 2020-06-01 | End: 2021-01-04 | Stop reason: SDUPTHER

## 2020-06-01 RX ORDER — GLIMEPIRIDE 2 MG/1
2 TABLET ORAL
Qty: 30 TABLET | Refills: 5 | Status: SHIPPED | OUTPATIENT
Start: 2020-06-01 | End: 2020-06-09 | Stop reason: SDUPTHER

## 2020-06-01 RX ORDER — SUCRALFATE 1 G/1
1 TABLET ORAL
Qty: 120 TABLET | Refills: 5 | Status: SHIPPED | OUTPATIENT
Start: 2020-06-01 | End: 2020-06-09 | Stop reason: SDUPTHER

## 2020-06-01 NOTE — TELEPHONE ENCOUNTER
Spoke with wife/Carol--pt having trouble with legs swelling. On Lasix daily, she states that it may no longer be working. She wants to know what you suggest--to change med or increase Lasix. You have no open appts this week. Please advise, thanks.

## 2020-06-01 NOTE — TELEPHONE ENCOUNTER
----- Message from Marianne Fitch sent at 2020  8:01 AM CDT -----  Contact: FAX  Erasmo Dunbar  MRN: [id number]  : 1935  PCP: Hemal Varma  Home Phone      [phone]  Work Phone      Not on file.  Mobile          [phone]      MESSAGE:   Pt requesting refill or new Rx.   Is this a refill or new RX:  refill  RX name and strength:  sucralfate (CARAFATE) 1 gram tablet  glimepiride (AMARYL) 2 MG tablet  Last office visit: 2020  Is this a 30-day or 90-day RX:  90-Day  Pharmacy name and location:  [facility]  Comments:

## 2020-06-01 NOTE — TELEPHONE ENCOUNTER
----- Message from Jessica Arias sent at 2020  9:48 AM CDT -----  Contact: wife- jody Dunbar  MRN: [id number]  : 1935  PCP: Hemal Varma  Home Phone      [phone]  Work Phone      Not on file.  Mobile          [phone]      MESSAGE:   Patient would like to get medical advice.  Symptoms (please be specific):  Swelling in legs  How long has patient had these symptoms:  About a week  Pharm[facility] [place]  Comments:     Phone: [phone]

## 2020-06-09 DIAGNOSIS — E11.69 TYPE 2 DIABETES MELLITUS WITH OTHER SPECIFIED COMPLICATION, WITHOUT LONG-TERM CURRENT USE OF INSULIN: ICD-10-CM

## 2020-06-09 RX ORDER — GLIMEPIRIDE 2 MG/1
2 TABLET ORAL
Qty: 30 TABLET | Refills: 5 | Status: SHIPPED | OUTPATIENT
Start: 2020-06-09 | End: 2020-07-22

## 2020-06-09 RX ORDER — SUCRALFATE 1 G/1
1 TABLET ORAL
Qty: 120 TABLET | Refills: 5 | Status: SHIPPED | OUTPATIENT
Start: 2020-06-09 | End: 2022-03-28

## 2020-06-09 NOTE — TELEPHONE ENCOUNTER
----- Message from Zakia Dunbar sent at 2020  8:01 AM CDT -----  Contact: fax  Erasmo Dunbar  MRN: [id number]  : 1935  PCP: Hemal Varma  Home Phone      [phone]  Work Phone      Not on file.  Mobile          [phone]      MESSAGE:   Pt requesting refill or new Rx.   Is this a refill or new RX:  new  RX name and strength: sucralfate (CARAFATE) 1 gram tablet  glimepiride (AMARYL) 2 MG tablet  Last office visit: 2020  Is this a 30-day or 90-day RX:  n/a  Pharmacy name and location:  [facility]  Comments:      Phone:  [phone]

## 2020-06-11 ENCOUNTER — CLINICAL SUPPORT (OUTPATIENT)
Dept: FAMILY MEDICINE | Facility: CLINIC | Age: 85
End: 2020-06-11
Payer: MEDICARE

## 2020-06-11 DIAGNOSIS — R60.9 EDEMA, UNSPECIFIED TYPE: ICD-10-CM

## 2020-06-11 LAB
ANION GAP SERPL CALC-SCNC: 11 MMOL/L (ref 8–16)
BUN SERPL-MCNC: 16 MG/DL (ref 8–23)
CALCIUM SERPL-MCNC: 9.7 MG/DL (ref 8.7–10.5)
CHLORIDE SERPL-SCNC: 99 MMOL/L (ref 95–110)
CO2 SERPL-SCNC: 27 MMOL/L (ref 23–29)
CREAT SERPL-MCNC: 1.2 MG/DL (ref 0.5–1.4)
EST. GFR  (AFRICAN AMERICAN): >60 ML/MIN/1.73 M^2
EST. GFR  (NON AFRICAN AMERICAN): 55 ML/MIN/1.73 M^2
GLUCOSE SERPL-MCNC: 111 MG/DL (ref 70–110)
POTASSIUM SERPL-SCNC: 4.7 MMOL/L (ref 3.5–5.1)
SODIUM SERPL-SCNC: 137 MMOL/L (ref 136–145)

## 2020-06-11 PROCEDURE — 80048 BASIC METABOLIC PNL TOTAL CA: CPT

## 2020-06-11 PROCEDURE — 36415 PR COLLECTION VENOUS BLOOD,VENIPUNCTURE: ICD-10-PCS | Mod: S$GLB,,, | Performed by: FAMILY MEDICINE

## 2020-06-11 PROCEDURE — 36415 COLL VENOUS BLD VENIPUNCTURE: CPT | Mod: S$GLB,,, | Performed by: FAMILY MEDICINE

## 2020-07-21 ENCOUNTER — TELEPHONE (OUTPATIENT)
Dept: FAMILY MEDICINE | Facility: CLINIC | Age: 85
End: 2020-07-21

## 2020-07-21 LAB — COMPLEXED PSA SERPL-MCNC: 0.06 NG/ML

## 2020-07-21 NOTE — TELEPHONE ENCOUNTER
----- Message from Tessie Rios LPN sent at 2020  8:15 AM CDT -----  Contact: [facility] [place]    ----- Message -----  From: Jessica Arias  Sent: 2020   8:10 AM CDT  To: [facility] Staff    Erasmo Dunbar  MRN: [id number]  : 1935  PCP: Hemal Varma  Home Phone      [phone]  Work Phone      Not on file.  Mobile          [phone]      MESSAGE:   Pharmacy is calling to clarify an RX.  RX name:   HYDROcodone-acetaminophen (NORCO) 5-325 mg per tablet  What do they need to clarify:  if patient is suppose to still be taking this medication  Pharmacy name and location:  [facility] [address] [place] [address]  Comments:       Phone:  [phone]

## 2020-07-22 ENCOUNTER — OFFICE VISIT (OUTPATIENT)
Dept: FAMILY MEDICINE | Facility: CLINIC | Age: 85
End: 2020-07-22
Payer: MEDICARE

## 2020-07-22 VITALS
RESPIRATION RATE: 18 BRPM | BODY MASS INDEX: 32.64 KG/M2 | DIASTOLIC BLOOD PRESSURE: 64 MMHG | HEART RATE: 68 BPM | TEMPERATURE: 97 F | WEIGHT: 203.06 LBS | SYSTOLIC BLOOD PRESSURE: 112 MMHG | HEIGHT: 66 IN

## 2020-07-22 DIAGNOSIS — E55.9 VITAMIN D DEFICIENCY DISEASE: ICD-10-CM

## 2020-07-22 DIAGNOSIS — C61 PROSTATE CANCER: ICD-10-CM

## 2020-07-22 DIAGNOSIS — E78.2 MIXED HYPERLIPIDEMIA: Primary | ICD-10-CM

## 2020-07-22 DIAGNOSIS — I10 ESSENTIAL HYPERTENSION: ICD-10-CM

## 2020-07-22 DIAGNOSIS — E11.22 TYPE 2 DIABETES MELLITUS WITH CHRONIC KIDNEY DISEASE, WITHOUT LONG-TERM CURRENT USE OF INSULIN, UNSPECIFIED CKD STAGE: ICD-10-CM

## 2020-07-22 PROCEDURE — 3074F SYST BP LT 130 MM HG: CPT | Mod: CPTII,S$GLB,, | Performed by: FAMILY MEDICINE

## 2020-07-22 PROCEDURE — 99214 OFFICE O/P EST MOD 30 MIN: CPT | Mod: S$GLB,,, | Performed by: FAMILY MEDICINE

## 2020-07-22 PROCEDURE — 1126F AMNT PAIN NOTED NONE PRSNT: CPT | Mod: S$GLB,,, | Performed by: FAMILY MEDICINE

## 2020-07-22 PROCEDURE — 1101F PT FALLS ASSESS-DOCD LE1/YR: CPT | Mod: CPTII,S$GLB,, | Performed by: FAMILY MEDICINE

## 2020-07-22 PROCEDURE — 99999 PR PBB SHADOW E&M-EST. PATIENT-LVL V: ICD-10-PCS | Mod: PBBFAC,,, | Performed by: FAMILY MEDICINE

## 2020-07-22 PROCEDURE — 3078F PR MOST RECENT DIASTOLIC BLOOD PRESSURE < 80 MM HG: ICD-10-PCS | Mod: CPTII,S$GLB,, | Performed by: FAMILY MEDICINE

## 2020-07-22 PROCEDURE — 1126F PR PAIN SEVERITY QUANTIFIED, NO PAIN PRESENT: ICD-10-PCS | Mod: S$GLB,,, | Performed by: FAMILY MEDICINE

## 2020-07-22 PROCEDURE — 1101F PR PT FALLS ASSESS DOC 0-1 FALLS W/OUT INJ PAST YR: ICD-10-PCS | Mod: CPTII,S$GLB,, | Performed by: FAMILY MEDICINE

## 2020-07-22 PROCEDURE — 99999 PR PBB SHADOW E&M-EST. PATIENT-LVL V: CPT | Mod: PBBFAC,,, | Performed by: FAMILY MEDICINE

## 2020-07-22 PROCEDURE — 3078F DIAST BP <80 MM HG: CPT | Mod: CPTII,S$GLB,, | Performed by: FAMILY MEDICINE

## 2020-07-22 PROCEDURE — 99214 PR OFFICE/OUTPT VISIT, EST, LEVL IV, 30-39 MIN: ICD-10-PCS | Mod: S$GLB,,, | Performed by: FAMILY MEDICINE

## 2020-07-22 PROCEDURE — 1159F MED LIST DOCD IN RCRD: CPT | Mod: S$GLB,,, | Performed by: FAMILY MEDICINE

## 2020-07-22 PROCEDURE — 1159F PR MEDICATION LIST DOCUMENTED IN MEDICAL RECORD: ICD-10-PCS | Mod: S$GLB,,, | Performed by: FAMILY MEDICINE

## 2020-07-22 PROCEDURE — 3074F PR MOST RECENT SYSTOLIC BLOOD PRESSURE < 130 MM HG: ICD-10-PCS | Mod: CPTII,S$GLB,, | Performed by: FAMILY MEDICINE

## 2020-07-22 RX ORDER — ROSUVASTATIN CALCIUM 40 MG/1
40 TABLET, COATED ORAL
COMMUNITY
Start: 2020-06-18 | End: 2021-02-02 | Stop reason: SDUPTHER

## 2020-07-22 NOTE — PROGRESS NOTES
Subjective:       Patient ID: Erasmo Dunbar is a 84 y.o. male.    Chief Complaint: Follow-up (6m)    Pt is a 84 y.o. male who presents for evaluation and management of   Encounter Diagnoses   Name Primary?    Prostate cancer     Mixed hyperlipidemia Yes    Essential hypertension     Type 2 diabetes mellitus with chronic kidney disease, without long-term current use of insulin, unspecified CKD stage     Vitamin D deficiency disease    .  Doing well on current meds. Denies any side effects. Prevention is up to date.  Review of Systems   Constitutional: Negative for activity change and unexpected weight change.   HENT: Negative for hearing loss, rhinorrhea and trouble swallowing.    Eyes: Negative for discharge and visual disturbance.   Respiratory: Negative for chest tightness and wheezing.    Cardiovascular: Negative for chest pain and palpitations.   Gastrointestinal: Negative for blood in stool, constipation, diarrhea and vomiting.   Endocrine: Negative for polydipsia and polyuria.   Genitourinary: Negative for difficulty urinating, hematuria and urgency.   Musculoskeletal: Negative for arthralgias, joint swelling and neck pain.   Neurological: Negative for weakness and headaches.   Psychiatric/Behavioral: Negative for confusion and dysphoric mood.       Objective:      Physical Exam  Constitutional:       Appearance: He is well-developed.   HENT:      Head: Normocephalic and atraumatic.      Right Ear: External ear normal.      Left Ear: External ear normal.      Nose: Nose normal.   Eyes:      General: No scleral icterus.        Right eye: No discharge.         Left eye: No discharge.      Conjunctiva/sclera: Conjunctivae normal.      Pupils: Pupils are equal, round, and reactive to light.   Neck:      Musculoskeletal: Normal range of motion and neck supple.      Thyroid: No thyromegaly.      Vascular: No JVD.      Trachea: No tracheal deviation.   Cardiovascular:      Rate and Rhythm: Normal rate and  regular rhythm.      Heart sounds: Normal heart sounds. No murmur.   Pulmonary:      Effort: Pulmonary effort is normal. No respiratory distress.      Breath sounds: Normal breath sounds. No wheezing or rales.   Chest:      Chest wall: No tenderness.   Abdominal:      General: Bowel sounds are normal. There is no distension.      Palpations: Abdomen is soft. There is no mass.      Tenderness: There is no abdominal tenderness. There is no guarding or rebound.   Musculoskeletal: Normal range of motion.      Comments: Protective Sensation (w/ 10 gram monofilament):  Right: Absent  Left: Absent    Visual Inspection:  Onychomycosis -  Bilateral    Pedal Pulses:   Right: Diminshed  Left: Diminshed    Posterior tibialis:   Right:Diminshed  Left: Diminshed     Lymphadenopathy:      Cervical: No cervical adenopathy.   Skin:     General: Skin is warm and dry.   Neurological:      Mental Status: He is alert and oriented to person, place, and time.      Cranial Nerves: No cranial nerve deficit.      Motor: No abnormal muscle tone.      Coordination: Coordination normal.      Deep Tendon Reflexes: Reflexes are normal and symmetric. Reflexes normal.   Psychiatric:         Behavior: Behavior normal.         Thought Content: Thought content normal.         Judgment: Judgment normal.         Assessment:       1. Mixed hyperlipidemia    2. Prostate cancer    3. Essential hypertension    4. Type 2 diabetes mellitus with chronic kidney disease, without long-term current use of insulin, unspecified CKD stage    5. Vitamin D deficiency disease        Plan:   Erasmo PEGUERO was seen today for follow-up.    Diagnoses and all orders for this visit:    Mixed hyperlipidemia    Prostate cancer    Essential hypertension  -     CBC auto differential; Future  -     Comprehensive metabolic panel; Future  -     TSH; Future    Type 2 diabetes mellitus with chronic kidney disease, without long-term current use of insulin, unspecified CKD stage  -      Comprehensive metabolic panel; Future  -     Hemoglobin A1C; Future  -     Lipid Panel; Future    Vitamin D deficiency disease  -     Vitamin D; Future      Problem List Items Addressed This Visit     Hyperlipidemia - Primary    Hypertension    Relevant Orders    CBC auto differential    Comprehensive metabolic panel    TSH    Prostate cancer    Overview     Prostate excised  Seeing doctor yoshi   PSA is zero 7/ 2020           Type 2 diabetes mellitus, without long-term current use of insulin    Relevant Orders    Comprehensive metabolic panel    Hemoglobin A1C    Lipid Panel      Other Visit Diagnoses     Vitamin D deficiency disease        Relevant Orders    Vitamin D        RTC 6 months       No follow-ups on file.

## 2020-08-03 ENCOUNTER — TELEPHONE (OUTPATIENT)
Dept: FAMILY MEDICINE | Facility: CLINIC | Age: 85
End: 2020-08-03

## 2020-08-03 NOTE — TELEPHONE ENCOUNTER
[facility] called to questing the hydrocodone RX sent in on 7/20/2020.    States that pt picked up a 5mg hydrocodone #60 RX on the 30th sent in by Dr. Harry Varma.    Pharmacy would like to know if you want to cancel the hydrocodone RX you sent in.     Callback: [phone]  Spoke with: Syd

## 2020-08-06 ENCOUNTER — PATIENT OUTREACH (OUTPATIENT)
Dept: ADMINISTRATIVE | Facility: HOSPITAL | Age: 85
End: 2020-08-06

## 2020-08-06 NOTE — PROGRESS NOTES
Non-compliant report chart audits. Chart review completed for  test overdue (mammograms, Colonoscopies, pap smears, DM labs, and/or EYE EXAMs)  06/18/2020    .       updated with external PSA report.

## 2020-09-01 ENCOUNTER — PATIENT MESSAGE (OUTPATIENT)
Dept: FAMILY MEDICINE | Facility: CLINIC | Age: 85
End: 2020-09-01

## 2020-09-01 DIAGNOSIS — N18.30 TYPE 2 DIABETES MELLITUS WITH STAGE 3 CHRONIC KIDNEY DISEASE, WITH LONG-TERM CURRENT USE OF INSULIN: ICD-10-CM

## 2020-09-01 DIAGNOSIS — Z79.4 TYPE 2 DIABETES MELLITUS WITH STAGE 3 CHRONIC KIDNEY DISEASE, WITH LONG-TERM CURRENT USE OF INSULIN: ICD-10-CM

## 2020-09-01 DIAGNOSIS — Z79.4 TYPE 2 DIABETES MELLITUS WITHOUT COMPLICATION, WITH LONG-TERM CURRENT USE OF INSULIN: ICD-10-CM

## 2020-09-01 DIAGNOSIS — F32.A DEPRESSION, UNSPECIFIED DEPRESSION TYPE: ICD-10-CM

## 2020-09-01 DIAGNOSIS — E11.9 TYPE 2 DIABETES MELLITUS WITHOUT COMPLICATION, WITH LONG-TERM CURRENT USE OF INSULIN: ICD-10-CM

## 2020-09-01 DIAGNOSIS — G47.00 INSOMNIA, UNSPECIFIED TYPE: ICD-10-CM

## 2020-09-01 DIAGNOSIS — E11.22 TYPE 2 DIABETES MELLITUS WITH STAGE 3 CHRONIC KIDNEY DISEASE, WITH LONG-TERM CURRENT USE OF INSULIN: ICD-10-CM

## 2020-09-01 DIAGNOSIS — I10 ESSENTIAL HYPERTENSION: ICD-10-CM

## 2020-09-01 RX ORDER — LISINOPRIL 10 MG/1
10 TABLET ORAL DAILY
Qty: 90 TABLET | Refills: 1 | Status: SHIPPED | OUTPATIENT
Start: 2020-09-01 | End: 2021-01-28

## 2020-09-01 RX ORDER — CITALOPRAM 20 MG/1
20 TABLET, FILM COATED ORAL DAILY
Qty: 90 TABLET | Refills: 1 | Status: SHIPPED | OUTPATIENT
Start: 2020-09-01 | End: 2021-02-02

## 2020-09-01 RX ORDER — METOPROLOL TARTRATE 50 MG/1
50 TABLET ORAL 2 TIMES DAILY
Qty: 180 TABLET | Refills: 1 | Status: SHIPPED | OUTPATIENT
Start: 2020-09-01 | End: 2020-11-20 | Stop reason: DRUGHIGH

## 2020-09-01 RX ORDER — METFORMIN HYDROCHLORIDE 1000 MG/1
1000 TABLET ORAL 2 TIMES DAILY WITH MEALS
Qty: 180 TABLET | Refills: 1 | Status: SHIPPED | OUTPATIENT
Start: 2020-09-01 | End: 2021-01-28

## 2020-09-01 RX ORDER — PIOGLITAZONEHYDROCHLORIDE 15 MG/1
15 TABLET ORAL DAILY
Qty: 90 TABLET | Refills: 1 | Status: SHIPPED | OUTPATIENT
Start: 2020-09-01 | End: 2021-02-02 | Stop reason: SDUPTHER

## 2020-09-01 RX ORDER — TRAZODONE HYDROCHLORIDE 50 MG/1
50 TABLET ORAL NIGHTLY
Qty: 90 TABLET | Refills: 1 | Status: SHIPPED | OUTPATIENT
Start: 2020-09-01 | End: 2020-10-29

## 2020-09-01 NOTE — TELEPHONE ENCOUNTER
LOV: 7/22/2020    Patient requesting refills for:  citalopram 20mg  metformin 1000mg  pioglitazone 15mg  trazodone 50mg  metoprolol tart 25mg  lisinopril 10mg      Pharmacy: [facility] the [facility]

## 2020-09-04 ENCOUNTER — TELEPHONE (OUTPATIENT)
Dept: NEUROLOGY | Facility: CLINIC | Age: 85
End: 2020-09-04

## 2020-09-04 ENCOUNTER — PATIENT MESSAGE (OUTPATIENT)
Dept: FAMILY MEDICINE | Facility: CLINIC | Age: 85
End: 2020-09-04

## 2020-09-04 DIAGNOSIS — G89.29 CHRONIC BACK PAIN, UNSPECIFIED BACK LOCATION, UNSPECIFIED BACK PAIN LATERALITY: Primary | ICD-10-CM

## 2020-09-04 DIAGNOSIS — M54.9 CHRONIC BACK PAIN, UNSPECIFIED BACK LOCATION, UNSPECIFIED BACK PAIN LATERALITY: Primary | ICD-10-CM

## 2020-09-04 NOTE — TELEPHONE ENCOUNTER
----- Message from Hayley Conn sent at 2020 10:50 AM CDT -----  Regarding: Carol-Wife  Erasmo Dunbar  MRN: [id number]  : 1935  PCP: Hemal Varma  Home Phone      [phone]  Work Phone      Not on file.  Mobile          [phone]      MESSAGE:   Pt would like to get a referral to pain management for back pain. Please return call @ [phone]. Thanks.

## 2020-09-04 NOTE — TELEPHONE ENCOUNTER
Advised patient he needs appointment with Neurology. Appointment was scheduled.     Advised patient he should contact PCP in the mean time for referral for pain.      Understanding was voiced.

## 2020-09-08 ENCOUNTER — TELEPHONE (OUTPATIENT)
Dept: PAIN MEDICINE | Facility: CLINIC | Age: 85
End: 2020-09-08

## 2020-09-08 ENCOUNTER — TELEPHONE (OUTPATIENT)
Dept: NEUROLOGY | Facility: CLINIC | Age: 85
End: 2020-09-08

## 2020-09-10 ENCOUNTER — PATIENT OUTREACH (OUTPATIENT)
Dept: ADMINISTRATIVE | Facility: OTHER | Age: 85
End: 2020-09-10

## 2020-09-10 NOTE — PROGRESS NOTES
Health Maintenance Due   Topic Date Due    TETANUS VACCINE  07/28/1953    Shingles Vaccine (2 of 3) 02/12/2015    Eye Exam  06/21/2020    Lipid Panel  07/15/2020     Updates were requested from care everywhere.  Chart was reviewed for overdue Proactive [facility] Encounters (TAMMI) topics (CRS, Breast Cancer Screening, Eye exam)  Health Maintenance has been updated.  LINKS immunization registry triggered.  Immunizations were reconciled.

## 2020-10-09 NOTE — H&P (VIEW-ONLY)
[facility]  New Patient H&P    Referring Provider: Hemal Varma Md  [address]  [facility] [place] [address]    Chief Complaint:   Chief Complaint   Patient presents with    Back Pain       History of Present Illness: Erasmo Dunbar is a 85 y.o. male referred by Dr. Hemal Varma for chronic LBP.      Onset: years of back pain, but worsened in the past few months  Location: right sided lower back  Radiation: across lower back on the left side, and sometimes into the posterior thighs, but not below the knees.   Timing: Intermittent, only when he stands and walks and completely improves with sitting down  Quality: Throbbing, Deep and Sharp  Exacerbating Factors: standing and walking  Alleviating Factors: sitting, heat, ice, medications and rest  Associated Symptoms: He has numbness in his feet and legs from neuropathy. denies night fever/night sweats, urinary incontinence, bowel incontinence, significant weight loss and significant motor weakness     Severity: Currently: 8/10   Typical Range: 5-10/10     Exacerbation: 10/10     Hydrocodone 5/325 mg brings pain from a 10/10 to a 9/10 for maybe 3-4 hours. He denies any side effects.      He also notes bilateral knee pain. He has a history of right knee replacement in 2017. Knee pain worse with standing and walking. Braces help. Medications help, but not completely. He does feel swelling in the left knee at times. Knees will go out on him at times.   Pain Disability Index  Family/Home Responsibilities:: 9  Recreation:: 9  Social Activity:: 9  Occupation:: 0  Sexual Behavior:: 0  Self Care:: 9  Life-Support Activities:: 0  Pain Disability Index (PDI): 36    Previous Interventions:  - Dr. Varma, Dr. Jain both provided injections in the past, with limited benefit.     Previous Therapies:  PT/OT: yes a few years ago  Relevant Surgery: yes    - Right knee replacement in 2017   - Cervical fusion 30 years  "ago  Previous Medications:   - NSAIDS: Tylenol. Avoiding other nsaids due to blood thinners.  - Muscle Relaxants:    - TCAs:   - SNRIs:   - Topicals: Many different topicals.   - Anticonvulsants:    - Opioids: Hydrocodone    Current Pain Medications:  1. Norco 5-325 mg BID prn (last Rx-ed by Harry Varma, filled on 9/2/20)      Blood Thinners: Eliquis    Full Medication List:    Current Outpatient Medications:     blood sugar diagnostic (BLOOD GLUCOSE TEST) Strp, Use one Accu-Chek Felicia Plus Test Strip per glucometer to test blood glucose three times a day. Dx: E11.22, Disp: 600 strip, Rfl: 3    blood-glucose meter kit, Accu-Chek Felicia Plus Meter to test blood glucose three times a day. Dx: E11.22, Disp: 1 each, Rfl: 0    bumetanide (BUMEX) 1 MG tablet, Take 1 tablet (1 mg total) by mouth once daily., Disp: 30 tablet, Rfl: 5    citalopram (CELEXA) 20 MG tablet, Take 1 tablet (20 mg total) by mouth once daily., Disp: 90 tablet, Rfl: 1    ELIQUIS 5 mg Tab, Take 1 tablet (5 mg total) by mouth 2 (two) times daily., Disp: 180 tablet, Rfl: 1    JANUVIA 100 mg Tab, TAKE 1 TABLET (100 MG TOTAL) BY MOUTH ONCE DAILY., Disp: 90 tablet, Rfl: 5    lancets Misc, Use one Accu-Chek Softclix Lancet per lancing device to test blood glucose three times a day. Dx: E11.22, Disp: 600 each, Rfl: 3    LANTUS SOLOSTAR U-100 INSULIN glargine 100 units/mL (3mL) SubQ pen, INJECT 42 UNITS SUBCUTANEOUSLY EVERY EVENING, Disp: 45 mL, Rfl: 5    lisinopriL 10 MG tablet, Take 1 tablet (10 mg total) by mouth once daily. (Patient taking differently: Take 10 mg by mouth 2 (two) times daily. ), Disp: 90 tablet, Rfl: 1    metFORMIN (GLUCOPHAGE) 1000 MG tablet, Take 1 tablet (1,000 mg total) by mouth 2 (two) times daily with meals., Disp: 180 tablet, Rfl: 1    metoprolol succinate (TOPROL-XL) 25 MG 24 hr tablet, Take 25 mg by mouth 2 (two) times daily., Disp: , Rfl:     pen needle, diabetic 31 gauge x 3/16" Ndle, 1 Device by Misc.(Non-Drug; " Combo Route) route 3 (three) times daily., Disp: 100 each, Rfl: 11    pioglitazone (ACTOS) 15 MG tablet, Take 1 tablet (15 mg total) by mouth once daily., Disp: 90 tablet, Rfl: 1    rosuvastatin (CRESTOR) 40 MG Tab, 40 mg. , Disp: , Rfl:     sucralfate (CARAFATE) 1 gram tablet, Take 1 tablet (1 g total) by mouth 4 (four) times daily before meals and nightly. (Patient taking differently: Take 1 g by mouth once daily. ), Disp: 120 tablet, Rfl: 5    tamsulosin (FLOMAX) 0.4 mg Cap, TAKE 1 CAPSULE EVERY DAY, Disp: 90 capsule, Rfl: 1    traZODone (DESYREL) 50 MG tablet, Take 1 tablet (50 mg total) by mouth every evening., Disp: 90 tablet, Rfl: 1    UNABLE TO FIND, 1,500 mg. medication name: CBD Oil , Disp: , Rfl:     UNABLE TO FIND, 5 mg. medication name: Hemp Caps , Disp: , Rfl:     UNABLE TO FIND, 22.5 mg. Elegard every 3 month injection, Disp: , Rfl:     HYDROcodone-acetaminophen (NORCO) 7.5-325 mg per tablet, Take 1 tablet by mouth every 12 (twelve) hours as needed for Pain., Disp: 60 tablet, Rfl: 0    metoprolol tartrate (LOPRESSOR) 50 MG tablet, Take 1 tablet (50 mg total) by mouth 2 (two) times daily., Disp: 180 tablet, Rfl: 1     Review of Systems:  Review of Systems   Constitutional: Negative for fever and weight loss.   HENT: Negative for ear pain and tinnitus.    Eyes: Negative for pain and redness.   Respiratory: Negative for cough and shortness of breath.    Cardiovascular: Negative for chest pain and palpitations.   Gastrointestinal: Positive for diarrhea. Negative for constipation and heartburn.   Genitourinary: Negative.         Denies urinary incontinence. Denies urine retention.    Musculoskeletal: Positive for back pain. Negative for neck pain.   Skin: Negative for itching and rash.   Neurological: Positive for tingling. Negative for focal weakness and seizures.   Endo/Heme/Allergies: Negative for environmental allergies. Bruises/bleeds easily.   Psychiatric/Behavioral: Negative for  "depression. The patient has insomnia. The patient is not nervous/anxious.        Allergies:  Iodinated contrast media and Iodine     Medical History:   has a past medical history of Arthritis, Atrial fibrillation, Bladder mass, BPH (benign prostatic hyperplasia), CHF (congestive heart failure), Depression, Diabetes mellitus type II, Hyperlipidemia, Hypertension, Microscopic hematuria, Prostate cancer (10/01/2018), RLS (restless legs syndrome), Stroke, and Wears glasses.    Surgical History:   has a past surgical history that includes Transurethral resection of prostate; Prostate surgery; Rotator cuff repair (Right); Ganglion Cyst Removed  (Right); Neck Fusion (Bilateral); Colectomy (N/A); Cystoscopy (N/A); Back surgery; Cardiac surgery (Left); Skin biopsy; Total knee arthroplasty (03/30/2017); Cardiac pacemaker placement; back injections; Cystoscopy w/ retrogrades (Bilateral, 10/21/2019); Digital rectal examination under anesthesia (N/A, 10/21/2019); and Cataract extraction w/  intraocular lens implant (Bilateral).    Family History:  family history includes COPD in his daughter; Cancer in his mother; Diabetes in his brother, daughter, and sister; Heart disease in his brother and father; Seizures in his daughter.    Social History:   reports that he quit smoking about 39 years ago. His smoking use included cigarettes. He has a 52.50 pack-year smoking history. He has never used smokeless tobacco. He reports current alcohol use of about 8.0 standard drinks of alcohol per week. He reports that he does not use drugs.    Physical Exam:  /82 (BP Location: Right arm, Patient Position: Sitting, BP Method: Medium (Manual))   Pulse 61   Temp 97.3 °F (36.3 °C)   Resp 16   Ht 5' 6" (1.676 m)   Wt 97.5 kg (214 lb 15.2 oz)   BMI 34.69 kg/m²   GEN: No acute distress. Calm, comfortable  HENT: Normocephalic, atraumatic, moist mucous membranes  EYE: Anicteric sclera, non-injected.   CV: Non-diaphoretic. Regular Rate. " Radial Pulses 2+.  RESP: Breathing comfortably. Chest expansion symmetric.  EXT: No clubbing, cyanosis.   SKIN: Warm, & dry to palpation. No visible rashes or lesions of exposed skin.   PSYCH: Pleasant mood and appropriate affect. Recent and remote memory intact.   GAIT: Mod Independent with walker, antalgic ambulation  Lumbar Spine Exam:       Inspection: No erythema, bruising.       Palpation: (-) TTP of lumbar paraspinals bilaterally. (+) TTP along right iliac crest.       ROM: Slightly Limited in flexion, extension, lateral bending.       (+) Facet loading on right      (-) Straight Leg Raise bilaterally      (-) KRYSTAL bilaterally  Hip Exam:      Inspection: No gross deformity or apparent leg length discrepancy      Palpation:  No TTP to right greater trochanteric bursa.       ROM:  No limitation or pain in internal rotation, external rotation b/l  Knee Exam:     Inspection: Anterior healed longitudinal incision on the right.  No swelling, erythema, ecchymoses, or gross deformity.     Palpation: (+) TTP at medial joint line, lateral joint line bilaterally.     ROM: No Limitation in extension or flexion b/l.     Ligamentous Laxity: None apparent on the right  Neurologic Exam:     Alert. Speech is fluent and appropriate.     Strength:  5/5 throughout bilateral lower extremities     Sensation: Abnormal to LT up to knees bilaterally, otherwise grossly intact to light touch in bilateral lower extremities     Reflexes: Absent in b/l patella, achilles     Tone: No abnormality appreciated in bilateral lower extremities     No Clonus     Downgoing toes on plantar stimulation         Imaging:  - X-ray Lumbar Spine 5/13/20:  Moderate facet arthropathy throughout the lower lumbar spine greatest at L5/S1.  Diffuse osteopenia.  Mild spondylosis L3/4. Overall alignment is well maintained.  No evidence of spondylolysis or spondylolisthesis. Disk and vertebral body heights are normal.  Severe atherosclerotic disease.  Mild  constipation.    Labs:  BMP  Lab Results   Component Value Date     06/11/2020    K 4.7 06/11/2020    CL 99 06/11/2020    CO2 27 06/11/2020    BUN 16 06/11/2020    CREATININE 1.2 06/11/2020    CALCIUM 9.7 06/11/2020    ANIONGAP 11 06/11/2020    ESTGFRAFRICA >60 06/11/2020    EGFRNONAA 55 (A) 06/11/2020     Lab Results   Component Value Date    ALT 16 01/20/2020    AST 15 01/20/2020    ALKPHOS 77 01/20/2020    BILITOT 0.3 01/20/2020     Lab Results   Component Value Date     (H) 11/18/2019       Assessment:  Erasmo Dunbar is a 85 y.o. male with the following diagnoses based on history, exam, and imaging:    Problem List Items Addressed This Visit     None      Visit Diagnoses     Lumbar spondylosis    -  Primary    Relevant Medications    HYDROcodone-acetaminophen (NORCO) 7.5-325 mg per tablet    Other Relevant Orders    Drug screen panel, emergency    Opiates Conf, Urine Random    Neuralgia        Relevant Medications    HYDROcodone-acetaminophen (NORCO) 7.5-325 mg per tablet    Other Relevant Orders    Drug screen panel, emergency    Opiates Conf, Urine Random    DDD (degenerative disc disease), lumbar        Relevant Medications    HYDROcodone-acetaminophen (NORCO) 7.5-325 mg per tablet    Other Relevant Orders    Drug screen panel, emergency    Opiates Conf, Urine Random    Spinal stenosis of lumbar region without neurogenic claudication        Relevant Medications    HYDROcodone-acetaminophen (NORCO) 7.5-325 mg per tablet    Other Relevant Orders    Drug screen panel, emergency    Opiates Conf, Urine Random    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   - Chronic right knee pain after total knee replacement  - Chronic opioid use: I do think this is appropriate considering his age and limited medication options due to comorbidities.   - Comorbidities: Chronic anticoagulation on eliquis for paroxysmal A-fib, pacemaker    Treatment Plan:   - PT/OT/HEP:  Has tried in the past. Discussed benefits of exercise for pain.   - Procedures:  Performed right cluneal nerve block today in clinic.  - Schedule right genicular nerve block  - Consider bilateral L4/5, L5/S1 MBBs in the future  - Medications: Agree with hydrocodone-APAP for pain considering his age and limited options. I will increase to 7.5-325 mg BID prn for back pain.  He endorses inadequate benefit (< 30%) with lower dose. No signs of side effects or aberrancy.   - Counseled patient regarding the risks and benefits of chronic opioid therapy for chronic painful conditions and patient expresses understanding.  I discussed with patient that he is able to limit the amount filled of the prescription if he decides to.  -  reviewed  - Opioid contract signed today 10/12/2020    - Imaging: Reviewed. Will obtain outside records to see what Imaging he already has.  - Labs: Reviewed.  Medications are appropriately dosed for current hepatorenal function.  - UDS today  - Request records from Dr. Varma    Follow Up: RTC after right genicular nerve block    Maria Guadalupe Ortiz M.D.  Interventional Pain Medicine / Physical Medicine & Rehabilitation    Disclaimer: This note was partly generated using dictation software which may occasionally result in transcription errors.

## 2020-10-09 NOTE — PROGRESS NOTES
[facility]  New Patient H&P    Referring Provider: Hemal Varma Md  [address]  [facility] [place] [address]    Chief Complaint:   Chief Complaint   Patient presents with    Back Pain       History of Present Illness: Erasmo Dunbar is a 85 y.o. male referred by Dr. Hemal Varma for chronic LBP.      Onset: years of back pain, but worsened in the past few months  Location: right sided lower back  Radiation: across lower back on the left side, and sometimes into the posterior thighs, but not below the knees.   Timing: Intermittent, only when he stands and walks and completely improves with sitting down  Quality: Throbbing, Deep and Sharp  Exacerbating Factors: standing and walking  Alleviating Factors: sitting, heat, ice, medications and rest  Associated Symptoms: He has numbness in his feet and legs from neuropathy. denies night fever/night sweats, urinary incontinence, bowel incontinence, significant weight loss and significant motor weakness     Severity: Currently: 8/10   Typical Range: 5-10/10     Exacerbation: 10/10     Hydrocodone 5/325 mg brings pain from a 10/10 to a 9/10 for maybe 3-4 hours. He denies any side effects.      He also notes bilateral knee pain. He has a history of right knee replacement in 2017. Knee pain worse with standing and walking. Braces help. Medications help, but not completely. He does feel swelling in the left knee at times. Knees will go out on him at times.   Pain Disability Index  Family/Home Responsibilities:: 9  Recreation:: 9  Social Activity:: 9  Occupation:: 0  Sexual Behavior:: 0  Self Care:: 9  Life-Support Activities:: 0  Pain Disability Index (PDI): 36    Previous Interventions:  - Dr. Varma, Dr. Jain both provided injections in the past, with limited benefit.     Previous Therapies:  PT/OT: yes a few years ago  Relevant Surgery: yes    - Right knee replacement in 2017   - Cervical fusion 30 years  "ago  Previous Medications:   - NSAIDS: Tylenol. Avoiding other nsaids due to blood thinners.  - Muscle Relaxants:    - TCAs:   - SNRIs:   - Topicals: Many different topicals.   - Anticonvulsants:    - Opioids: Hydrocodone    Current Pain Medications:  1. Norco 5-325 mg BID prn (last Rx-ed by Harry Varma, filled on 9/2/20)      Blood Thinners: Eliquis    Full Medication List:    Current Outpatient Medications:     blood sugar diagnostic (BLOOD GLUCOSE TEST) Strp, Use one Accu-Chek Felicia Plus Test Strip per glucometer to test blood glucose three times a day. Dx: E11.22, Disp: 600 strip, Rfl: 3    blood-glucose meter kit, Accu-Chek Felicia Plus Meter to test blood glucose three times a day. Dx: E11.22, Disp: 1 each, Rfl: 0    bumetanide (BUMEX) 1 MG tablet, Take 1 tablet (1 mg total) by mouth once daily., Disp: 30 tablet, Rfl: 5    citalopram (CELEXA) 20 MG tablet, Take 1 tablet (20 mg total) by mouth once daily., Disp: 90 tablet, Rfl: 1    ELIQUIS 5 mg Tab, Take 1 tablet (5 mg total) by mouth 2 (two) times daily., Disp: 180 tablet, Rfl: 1    JANUVIA 100 mg Tab, TAKE 1 TABLET (100 MG TOTAL) BY MOUTH ONCE DAILY., Disp: 90 tablet, Rfl: 5    lancets Misc, Use one Accu-Chek Softclix Lancet per lancing device to test blood glucose three times a day. Dx: E11.22, Disp: 600 each, Rfl: 3    LANTUS SOLOSTAR U-100 INSULIN glargine 100 units/mL (3mL) SubQ pen, INJECT 42 UNITS SUBCUTANEOUSLY EVERY EVENING, Disp: 45 mL, Rfl: 5    lisinopriL 10 MG tablet, Take 1 tablet (10 mg total) by mouth once daily. (Patient taking differently: Take 10 mg by mouth 2 (two) times daily. ), Disp: 90 tablet, Rfl: 1    metFORMIN (GLUCOPHAGE) 1000 MG tablet, Take 1 tablet (1,000 mg total) by mouth 2 (two) times daily with meals., Disp: 180 tablet, Rfl: 1    metoprolol succinate (TOPROL-XL) 25 MG 24 hr tablet, Take 25 mg by mouth 2 (two) times daily., Disp: , Rfl:     pen needle, diabetic 31 gauge x 3/16" Ndle, 1 Device by Misc.(Non-Drug; " Combo Route) route 3 (three) times daily., Disp: 100 each, Rfl: 11    pioglitazone (ACTOS) 15 MG tablet, Take 1 tablet (15 mg total) by mouth once daily., Disp: 90 tablet, Rfl: 1    rosuvastatin (CRESTOR) 40 MG Tab, 40 mg. , Disp: , Rfl:     sucralfate (CARAFATE) 1 gram tablet, Take 1 tablet (1 g total) by mouth 4 (four) times daily before meals and nightly. (Patient taking differently: Take 1 g by mouth once daily. ), Disp: 120 tablet, Rfl: 5    tamsulosin (FLOMAX) 0.4 mg Cap, TAKE 1 CAPSULE EVERY DAY, Disp: 90 capsule, Rfl: 1    traZODone (DESYREL) 50 MG tablet, Take 1 tablet (50 mg total) by mouth every evening., Disp: 90 tablet, Rfl: 1    UNABLE TO FIND, 1,500 mg. medication name: CBD Oil , Disp: , Rfl:     UNABLE TO FIND, 5 mg. medication name: Hemp Caps , Disp: , Rfl:     UNABLE TO FIND, 22.5 mg. Elegard every 3 month injection, Disp: , Rfl:     HYDROcodone-acetaminophen (NORCO) 7.5-325 mg per tablet, Take 1 tablet by mouth every 12 (twelve) hours as needed for Pain., Disp: 60 tablet, Rfl: 0    metoprolol tartrate (LOPRESSOR) 50 MG tablet, Take 1 tablet (50 mg total) by mouth 2 (two) times daily., Disp: 180 tablet, Rfl: 1     Review of Systems:  Review of Systems   Constitutional: Negative for fever and weight loss.   HENT: Negative for ear pain and tinnitus.    Eyes: Negative for pain and redness.   Respiratory: Negative for cough and shortness of breath.    Cardiovascular: Negative for chest pain and palpitations.   Gastrointestinal: Positive for diarrhea. Negative for constipation and heartburn.   Genitourinary: Negative.         Denies urinary incontinence. Denies urine retention.    Musculoskeletal: Positive for back pain. Negative for neck pain.   Skin: Negative for itching and rash.   Neurological: Positive for tingling. Negative for focal weakness and seizures.   Endo/Heme/Allergies: Negative for environmental allergies. Bruises/bleeds easily.   Psychiatric/Behavioral: Negative for  "depression. The patient has insomnia. The patient is not nervous/anxious.        Allergies:  Iodinated contrast media and Iodine     Medical History:   has a past medical history of Arthritis, Atrial fibrillation, Bladder mass, BPH (benign prostatic hyperplasia), CHF (congestive heart failure), Depression, Diabetes mellitus type II, Hyperlipidemia, Hypertension, Microscopic hematuria, Prostate cancer (10/01/2018), RLS (restless legs syndrome), Stroke, and Wears glasses.    Surgical History:   has a past surgical history that includes Transurethral resection of prostate; Prostate surgery; Rotator cuff repair (Right); Ganglion Cyst Removed  (Right); Neck Fusion (Bilateral); Colectomy (N/A); Cystoscopy (N/A); Back surgery; Cardiac surgery (Left); Skin biopsy; Total knee arthroplasty (03/30/2017); Cardiac pacemaker placement; back injections; Cystoscopy w/ retrogrades (Bilateral, 10/21/2019); Digital rectal examination under anesthesia (N/A, 10/21/2019); and Cataract extraction w/  intraocular lens implant (Bilateral).    Family History:  family history includes COPD in his daughter; Cancer in his mother; Diabetes in his brother, daughter, and sister; Heart disease in his brother and father; Seizures in his daughter.    Social History:   reports that he quit smoking about 39 years ago. His smoking use included cigarettes. He has a 52.50 pack-year smoking history. He has never used smokeless tobacco. He reports current alcohol use of about 8.0 standard drinks of alcohol per week. He reports that he does not use drugs.    Physical Exam:  /82 (BP Location: Right arm, Patient Position: Sitting, BP Method: Medium (Manual))   Pulse 61   Temp 97.3 °F (36.3 °C)   Resp 16   Ht 5' 6" (1.676 m)   Wt 97.5 kg (214 lb 15.2 oz)   BMI 34.69 kg/m²   GEN: No acute distress. Calm, comfortable  HENT: Normocephalic, atraumatic, moist mucous membranes  EYE: Anicteric sclera, non-injected.   CV: Non-diaphoretic. Regular Rate. " Radial Pulses 2+.  RESP: Breathing comfortably. Chest expansion symmetric.  EXT: No clubbing, cyanosis.   SKIN: Warm, & dry to palpation. No visible rashes or lesions of exposed skin.   PSYCH: Pleasant mood and appropriate affect. Recent and remote memory intact.   GAIT: Mod Independent with walker, antalgic ambulation  Lumbar Spine Exam:       Inspection: No erythema, bruising.       Palpation: (-) TTP of lumbar paraspinals bilaterally. (+) TTP along right iliac crest.       ROM: Slightly Limited in flexion, extension, lateral bending.       (+) Facet loading on right      (-) Straight Leg Raise bilaterally      (-) KRYSTAL bilaterally  Hip Exam:      Inspection: No gross deformity or apparent leg length discrepancy      Palpation:  No TTP to right greater trochanteric bursa.       ROM:  No limitation or pain in internal rotation, external rotation b/l  Knee Exam:     Inspection: Anterior healed longitudinal incision on the right.  No swelling, erythema, ecchymoses, or gross deformity.     Palpation: (+) TTP at medial joint line, lateral joint line bilaterally.     ROM: No Limitation in extension or flexion b/l.     Ligamentous Laxity: None apparent on the right  Neurologic Exam:     Alert. Speech is fluent and appropriate.     Strength:  5/5 throughout bilateral lower extremities     Sensation: Abnormal to LT up to knees bilaterally, otherwise grossly intact to light touch in bilateral lower extremities     Reflexes: Absent in b/l patella, achilles     Tone: No abnormality appreciated in bilateral lower extremities     No Clonus     Downgoing toes on plantar stimulation         Imaging:  - X-ray Lumbar Spine 5/13/20:  Moderate facet arthropathy throughout the lower lumbar spine greatest at L5/S1.  Diffuse osteopenia.  Mild spondylosis L3/4. Overall alignment is well maintained.  No evidence of spondylolysis or spondylolisthesis. Disk and vertebral body heights are normal.  Severe atherosclerotic disease.  Mild  constipation.    Labs:  BMP  Lab Results   Component Value Date     06/11/2020    K 4.7 06/11/2020    CL 99 06/11/2020    CO2 27 06/11/2020    BUN 16 06/11/2020    CREATININE 1.2 06/11/2020    CALCIUM 9.7 06/11/2020    ANIONGAP 11 06/11/2020    ESTGFRAFRICA >60 06/11/2020    EGFRNONAA 55 (A) 06/11/2020     Lab Results   Component Value Date    ALT 16 01/20/2020    AST 15 01/20/2020    ALKPHOS 77 01/20/2020    BILITOT 0.3 01/20/2020     Lab Results   Component Value Date     (H) 11/18/2019       Assessment:  Erasmo Dunbar is a 85 y.o. male with the following diagnoses based on history, exam, and imaging:    Problem List Items Addressed This Visit     None      Visit Diagnoses     Lumbar spondylosis    -  Primary    Relevant Medications    HYDROcodone-acetaminophen (NORCO) 7.5-325 mg per tablet    Other Relevant Orders    Drug screen panel, emergency    Opiates Conf, Urine Random    Neuralgia        Relevant Medications    HYDROcodone-acetaminophen (NORCO) 7.5-325 mg per tablet    Other Relevant Orders    Drug screen panel, emergency    Opiates Conf, Urine Random    DDD (degenerative disc disease), lumbar        Relevant Medications    HYDROcodone-acetaminophen (NORCO) 7.5-325 mg per tablet    Other Relevant Orders    Drug screen panel, emergency    Opiates Conf, Urine Random    Spinal stenosis of lumbar region without neurogenic claudication        Relevant Medications    HYDROcodone-acetaminophen (NORCO) 7.5-325 mg per tablet    Other Relevant Orders    Drug screen panel, emergency    Opiates Conf, Urine Random    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   - Chronic right knee pain after total knee replacement  - Chronic opioid use: I do think this is appropriate considering his age and limited medication options due to comorbidities.   - Comorbidities: Chronic anticoagulation on eliquis for paroxysmal A-fib, pacemaker    Treatment Plan:   - PT/OT/HEP:  Has tried in the past. Discussed benefits of exercise for pain.   - Procedures:  Performed right cluneal nerve block today in clinic.  - Schedule right genicular nerve block  - Consider bilateral L4/5, L5/S1 MBBs in the future  - Medications: Agree with hydrocodone-APAP for pain considering his age and limited options. I will increase to 7.5-325 mg BID prn for back pain.  He endorses inadequate benefit (< 30%) with lower dose. No signs of side effects or aberrancy.   - Counseled patient regarding the risks and benefits of chronic opioid therapy for chronic painful conditions and patient expresses understanding.  I discussed with patient that he is able to limit the amount filled of the prescription if he decides to.  -  reviewed  - Opioid contract signed today 10/12/2020    - Imaging: Reviewed. Will obtain outside records to see what Imaging he already has.  - Labs: Reviewed.  Medications are appropriately dosed for current hepatorenal function.  - UDS today  - Request records from Dr. Varma    Follow Up: RTC after right genicular nerve block    Maria Guadalupe Ortiz M.D.  Interventional Pain Medicine / Physical Medicine & Rehabilitation    Disclaimer: This note was partly generated using dictation software which may occasionally result in transcription errors.

## 2020-10-12 ENCOUNTER — OFFICE VISIT (OUTPATIENT)
Dept: PAIN MEDICINE | Facility: CLINIC | Age: 85
End: 2020-10-12
Payer: MEDICARE

## 2020-10-12 ENCOUNTER — LAB VISIT (OUTPATIENT)
Dept: LAB | Facility: HOSPITAL | Age: 85
End: 2020-10-12
Attending: PHYSICAL MEDICINE & REHABILITATION
Payer: MEDICARE

## 2020-10-12 VITALS
HEART RATE: 61 BPM | RESPIRATION RATE: 16 BRPM | BODY MASS INDEX: 34.55 KG/M2 | DIASTOLIC BLOOD PRESSURE: 82 MMHG | SYSTOLIC BLOOD PRESSURE: 126 MMHG | HEIGHT: 66 IN | TEMPERATURE: 97 F | WEIGHT: 214.94 LBS

## 2020-10-12 DIAGNOSIS — M48.061 SPINAL STENOSIS OF LUMBAR REGION WITHOUT NEUROGENIC CLAUDICATION: ICD-10-CM

## 2020-10-12 DIAGNOSIS — Z96.651 CHRONIC KNEE PAIN AFTER TOTAL REPLACEMENT OF RIGHT KNEE JOINT: ICD-10-CM

## 2020-10-12 DIAGNOSIS — G89.29 CHRONIC KNEE PAIN AFTER TOTAL REPLACEMENT OF RIGHT KNEE JOINT: ICD-10-CM

## 2020-10-12 DIAGNOSIS — M47.816 LUMBAR SPONDYLOSIS: ICD-10-CM

## 2020-10-12 DIAGNOSIS — M25.561 CHRONIC KNEE PAIN AFTER TOTAL REPLACEMENT OF RIGHT KNEE JOINT: ICD-10-CM

## 2020-10-12 DIAGNOSIS — M79.2 NEURALGIA: ICD-10-CM

## 2020-10-12 DIAGNOSIS — M25.569 KNEE PAIN, UNSPECIFIED CHRONICITY, UNSPECIFIED LATERALITY: Primary | ICD-10-CM

## 2020-10-12 DIAGNOSIS — M51.36 DDD (DEGENERATIVE DISC DISEASE), LUMBAR: ICD-10-CM

## 2020-10-12 LAB
AMPHET+METHAMPHET UR QL: NEGATIVE
BARBITURATES UR QL SCN>200 NG/ML: NEGATIVE
BENZODIAZ UR QL SCN>200 NG/ML: NEGATIVE
BZE UR QL SCN: NEGATIVE
CANNABINOIDS UR QL SCN: NEGATIVE
CREAT UR-MCNC: 43.3 MG/DL (ref 23–375)
METHADONE UR QL SCN>300 NG/ML: NEGATIVE
OPIATES UR QL SCN: NORMAL
PCP UR QL SCN>25 NG/ML: NEGATIVE
TOXICOLOGY INFORMATION: NORMAL

## 2020-10-12 PROCEDURE — 1101F PT FALLS ASSESS-DOCD LE1/YR: CPT | Mod: CPTII,S$GLB,, | Performed by: PHYSICAL MEDICINE & REHABILITATION

## 2020-10-12 PROCEDURE — 3074F SYST BP LT 130 MM HG: CPT | Mod: CPTII,S$GLB,, | Performed by: PHYSICAL MEDICINE & REHABILITATION

## 2020-10-12 PROCEDURE — 80361 OPIATES 1 OR MORE: CPT

## 2020-10-12 PROCEDURE — 1159F MED LIST DOCD IN RCRD: CPT | Mod: S$GLB,,, | Performed by: PHYSICAL MEDICINE & REHABILITATION

## 2020-10-12 PROCEDURE — 3079F DIAST BP 80-89 MM HG: CPT | Mod: CPTII,S$GLB,, | Performed by: PHYSICAL MEDICINE & REHABILITATION

## 2020-10-12 PROCEDURE — 64450 NJX AA&/STRD OTHER PN/BRANCH: CPT | Mod: RT,S$GLB,, | Performed by: PHYSICAL MEDICINE & REHABILITATION

## 2020-10-12 PROCEDURE — 1125F PR PAIN SEVERITY QUANTIFIED, PAIN PRESENT: ICD-10-PCS | Mod: S$GLB,,, | Performed by: PHYSICAL MEDICINE & REHABILITATION

## 2020-10-12 PROCEDURE — 1159F PR MEDICATION LIST DOCUMENTED IN MEDICAL RECORD: ICD-10-PCS | Mod: S$GLB,,, | Performed by: PHYSICAL MEDICINE & REHABILITATION

## 2020-10-12 PROCEDURE — 64450 PR NERVE BLOCK INJ, ANES/STEROID, OTHER PERIPHERAL: ICD-10-PCS | Mod: RT,S$GLB,, | Performed by: PHYSICAL MEDICINE & REHABILITATION

## 2020-10-12 PROCEDURE — 99999 PR PBB SHADOW E&M-EST. PATIENT-LVL V: CPT | Mod: PBBFAC,,, | Performed by: PHYSICAL MEDICINE & REHABILITATION

## 2020-10-12 PROCEDURE — 3079F PR MOST RECENT DIASTOLIC BLOOD PRESSURE 80-89 MM HG: ICD-10-PCS | Mod: CPTII,S$GLB,, | Performed by: PHYSICAL MEDICINE & REHABILITATION

## 2020-10-12 PROCEDURE — 99204 OFFICE O/P NEW MOD 45 MIN: CPT | Mod: S$GLB,,, | Performed by: PHYSICAL MEDICINE & REHABILITATION

## 2020-10-12 PROCEDURE — 3074F PR MOST RECENT SYSTOLIC BLOOD PRESSURE < 130 MM HG: ICD-10-PCS | Mod: CPTII,S$GLB,, | Performed by: PHYSICAL MEDICINE & REHABILITATION

## 2020-10-12 PROCEDURE — 99999 PR PBB SHADOW E&M-EST. PATIENT-LVL V: ICD-10-PCS | Mod: PBBFAC,,, | Performed by: PHYSICAL MEDICINE & REHABILITATION

## 2020-10-12 PROCEDURE — 1125F AMNT PAIN NOTED PAIN PRSNT: CPT | Mod: S$GLB,,, | Performed by: PHYSICAL MEDICINE & REHABILITATION

## 2020-10-12 PROCEDURE — 99204 PR OFFICE/OUTPT VISIT, NEW, LEVL IV, 45-59 MIN: ICD-10-PCS | Mod: S$GLB,,, | Performed by: PHYSICAL MEDICINE & REHABILITATION

## 2020-10-12 PROCEDURE — 80307 DRUG TEST PRSMV CHEM ANLYZR: CPT

## 2020-10-12 PROCEDURE — 1101F PR PT FALLS ASSESS DOC 0-1 FALLS W/OUT INJ PAST YR: ICD-10-PCS | Mod: CPTII,S$GLB,, | Performed by: PHYSICAL MEDICINE & REHABILITATION

## 2020-10-12 RX ORDER — METOPROLOL SUCCINATE 25 MG/1
25 TABLET, EXTENDED RELEASE ORAL 2 TIMES DAILY
COMMUNITY
End: 2021-10-25

## 2020-10-12 RX ORDER — GLIMEPIRIDE 2 MG/1
TABLET ORAL
COMMUNITY
Start: 2020-08-09 | End: 2020-10-12

## 2020-10-12 RX ORDER — HYDROCODONE BITARTRATE AND ACETAMINOPHEN 7.5; 325 MG/1; MG/1
1 TABLET ORAL EVERY 12 HOURS PRN
Qty: 60 TABLET | Refills: 0 | Status: SHIPPED | OUTPATIENT
Start: 2020-10-12 | End: 2020-11-20

## 2020-10-12 NOTE — LETTER
October 12, 2020      Hemal Varma MD  [address]  [facility] [id number] [place] [address] - Pain Management  [address] [facility] [address]  Phone: [phone]  Fax: [phone]          Patient: Erasmo Dunbar   MR Number: [id number]   YOB: 1935   Date of Visit: 10/12/2020       Dear Dr. Hemal Varma:    Thank you for referring Erasmo Dunbar to me for evaluation. Attached you will find relevant portions of my assessment and plan of care.    If you have questions, please do not hesitate to call me. I look forward to following Erasmo Dunbar along with you.    Sincerely,    Maria Guadalupe Ortiz MD    Enclosure  CC:  No Recipients    If you would like to receive this communication electronically, please contact [email or web address] or [phone] to request more information on [facility] Link access.    For providers and/or their staff who would like to refer a patient to [facility], please contact us through our one-stop-shop provider referral line, [facility], at [phone].    If you feel you have received this communication in error or would no longer like to receive these types of communications, please e-mail [email or web address]

## 2020-10-12 NOTE — PROGRESS NOTES
Onset: new back pain about 2 motnhs ago  Location: lower back   Radiation: across lower back   Timing: constant  Quality: Throbbing, Deep and Sharp  Exacerbating Factors: nothing in particular  Alleviating Factors: heat, ice, medications and rest  Associated Symptoms: denies {RED FLAGS:22932}    Severity: Currently: 8/10   Typical Range: 5-10/10     Exacerbation: 10/10

## 2020-10-13 ENCOUNTER — TELEPHONE (OUTPATIENT)
Dept: PAIN MEDICINE | Facility: CLINIC | Age: 85
End: 2020-10-13

## 2020-10-13 NOTE — TELEPHONE ENCOUNTER
Notified pharmacy Mr. Zimmerman was a new NP as of yesterday and Dr. Ortiz would be taking over medication, HYDROcodone-acetaminophen (NORCO) 7.5-325 mg. Notified he takes this for chronic back pain.

## 2020-10-13 NOTE — PROCEDURES
Procedures     Procedure: Cluneal nerve block  Under clean technique & after discussing the risks, benefits and alternatives of the procedure with patient the Right cluneal nerve was injected with 5 mL solution of medications, per side, from a mixture of 9 mL of bupivacaine 0.25% and 40mg of Depo-Medrol.    Patient tolerated procedure well.

## 2020-10-13 NOTE — TELEPHONE ENCOUNTER
----- Message from Hayley Conn sent at 10/12/2020  2:42 PM CDT -----  Regarding: [facility]  Erasmo Dunbar  MRN: [id number]  : 1935  PCP: Hemal Varma  Home Phone      [phone]  Work Phone      Not on file.  Mobile          [phone]      MESSAGE:   [facility] with [facility] called stating they need clarification his prescription. Please return call @ [phone]

## 2020-10-17 LAB
CODEINE UR-MCNC: 102 NG/ML
CODEINE UR-MCNC: NEGATIVE NG/ML
HYDROCODONE UR-MCNC: 704 NG/ML
HYDROMORPHONE UR-MCNC: 53 NG/ML
MORPHINE UR-MCNC: NEGATIVE NG/ML
NALOXONE BY LS MS/MS: NEGATIVE NG/ML
NORHYDROCODONE BY LC MS/MS: 385 NG/ML
NOROXYCODONE BY LC-MS/MS: NEGATIVE NG/ML
NOROXYMORPHONE BY LC-MS/MS: NEGATIVE NG/ML
OXYCODONE UR-MCNC: NEGATIVE NG/ML
OXYCODONE UR-MCNC: NEGATIVE NG/ML
TOXICOLOGIST REVIEW: NORMAL

## 2020-10-26 DIAGNOSIS — Z01.818 PRE-OP EVALUATION: ICD-10-CM

## 2020-10-27 ENCOUNTER — HOSPITAL ENCOUNTER (OUTPATIENT)
Dept: PREADMISSION TESTING | Facility: HOSPITAL | Age: 85
Discharge: HOME | End: 2020-10-27
Attending: PHYSICAL MEDICINE & REHABILITATION
Payer: MEDICARE

## 2020-10-27 DIAGNOSIS — Z01.818 PRE-OP EVALUATION: ICD-10-CM

## 2020-10-27 PROCEDURE — U0003 INFECTIOUS AGENT DETECTION BY NUCLEIC ACID (DNA OR RNA); SEVERE ACUTE RESPIRATORY SYNDROME CORONAVIRUS 2 (SARS-COV-2) (CORONAVIRUS DISEASE [COVID-19]), AMPLIFIED PROBE TECHNIQUE, MAKING USE OF HIGH THROUGHPUT TECHNOLOGIES AS DESCRIBED BY CMS-2020-01-R: HCPCS

## 2020-10-28 LAB — SARS-COV-2 RNA RESP QL NAA+PROBE: NOT DETECTED

## 2020-10-30 ENCOUNTER — HOSPITAL ENCOUNTER (OUTPATIENT)
Facility: HOSPITAL | Age: 85
Discharge: HOME | End: 2020-10-30
Attending: PHYSICAL MEDICINE & REHABILITATION | Admitting: PHYSICAL MEDICINE & REHABILITATION
Payer: MEDICARE

## 2020-10-30 ENCOUNTER — HOSPITAL ENCOUNTER (OUTPATIENT)
Dept: RADIOLOGY | Facility: HOSPITAL | Age: 85
Discharge: HOME | End: 2020-10-30
Attending: PHYSICAL MEDICINE & REHABILITATION | Admitting: PHYSICAL MEDICINE & REHABILITATION
Payer: MEDICARE

## 2020-10-30 VITALS
SYSTOLIC BLOOD PRESSURE: 163 MMHG | TEMPERATURE: 97 F | OXYGEN SATURATION: 98 % | RESPIRATION RATE: 16 BRPM | HEART RATE: 71 BPM | DIASTOLIC BLOOD PRESSURE: 75 MMHG

## 2020-10-30 DIAGNOSIS — G89.29 CHRONIC PAIN OF RIGHT KNEE: Primary | ICD-10-CM

## 2020-10-30 DIAGNOSIS — M25.561 CHRONIC PAIN OF RIGHT KNEE: Primary | ICD-10-CM

## 2020-10-30 DIAGNOSIS — G89.29 CHRONIC KNEE PAIN AFTER TOTAL REPLACEMENT OF RIGHT KNEE JOINT: ICD-10-CM

## 2020-10-30 DIAGNOSIS — M25.569 KNEE PAIN, UNSPECIFIED CHRONICITY, UNSPECIFIED LATERALITY: ICD-10-CM

## 2020-10-30 DIAGNOSIS — Z96.651 CHRONIC KNEE PAIN AFTER TOTAL REPLACEMENT OF RIGHT KNEE JOINT: ICD-10-CM

## 2020-10-30 DIAGNOSIS — M25.561 CHRONIC KNEE PAIN AFTER TOTAL REPLACEMENT OF RIGHT KNEE JOINT: ICD-10-CM

## 2020-10-30 DIAGNOSIS — G89.29 CHRONIC PAIN: ICD-10-CM

## 2020-10-30 PROCEDURE — 64624 PR DESTRUCT, NEUROLYTIC AGENT, GENICULAR NERVE, W/IMG: ICD-10-PCS | Mod: RT,,, | Performed by: PHYSICAL MEDICINE & REHABILITATION

## 2020-10-30 PROCEDURE — 64624 DSTRJ NULYT AGT GNCLR NRV: CPT | Mod: RT,,, | Performed by: PHYSICAL MEDICINE & REHABILITATION

## 2020-10-30 PROCEDURE — 64454 NJX AA&/STRD GNCLR NRV BRNCH: CPT | Mod: RT | Performed by: PHYSICAL MEDICINE & REHABILITATION

## 2020-10-30 PROCEDURE — 25000003 PHARM REV CODE 250: Performed by: PHYSICAL MEDICINE & REHABILITATION

## 2020-10-30 PROCEDURE — 64450 NJX AA&/STRD OTHER PN/BRANCH: CPT | Mod: RT | Performed by: PHYSICAL MEDICINE & REHABILITATION

## 2020-10-30 RX ORDER — BUPIVACAINE HYDROCHLORIDE 2.5 MG/ML
INJECTION, SOLUTION EPIDURAL; INFILTRATION; INTRACAUDAL
Status: DISCONTINUED | OUTPATIENT
Start: 2020-10-30 | End: 2020-10-30 | Stop reason: HOSPADM

## 2020-10-30 RX ORDER — LIDOCAINE HYDROCHLORIDE 10 MG/ML
INJECTION INFILTRATION; PERINEURAL
Status: DISCONTINUED | OUTPATIENT
Start: 2020-10-30 | End: 2020-10-30 | Stop reason: HOSPADM

## 2020-10-30 NOTE — INTERVAL H&P NOTE
The patient has been examined and the H&P has been reviewed:     I concur with the findings and no changes have occurred since H&P was written.    The risks, benefits and alternative options to the procedure were discussed and understood by the patient/family.

## 2020-10-30 NOTE — OP NOTE
Knee Genicular n. Block Under Fluoroscopic Guidance:  I have reviewed the patient's medications, allergies and relevant histories prior to the procedure and no contraindications have been identified. The risks, benefits and alternatives to the procedure were discussed with the patient, and all questions regarding the procedure were answered to the patient's satisfaction. I personally obtained Erasmo's consent prior to the start of the procedure and the signed consent can be found in the patient's chart.                                                         Time-out was taken to identify patient, procedure, laterality, and allergies prior to starting the procedure.       Date of Service: 10/30/2020  Procedure: Right superior lateral and medial genicular nerve and inferior medial genicular nerve block under fluoroscopy  Pre-Operative Diagnosis: Chronic Right Knee Pain  Post-Operative Diagnosis: Chronic Right Knee Pain    Physician: Maria Guadalupe Ortiz M.D.  Assistants: None    Medications Injected: Bupivacaine 0.25%, 1.5 mL injected per target area.   Local Anesthetic: Xylocaine 1% 10 mL   Sedation Medications: None    Procedural Technique: Laying in a supine position with knee slightly flexed, the patient was prepped in the usual sterile fashion using ChloraPrep and draped.  Hemodynamic monitoring was initiated, including blood pressure, pulse oximetry and EKG. Three target sites including the superior lateral genicular nerve where the lateral femoral shaft meets the condyle, the superior medial genicular nerve where the medial femoral shaft meets the condyle of the Right knee(s), and the inferior medial genicular nerve where the medial tibial shaft meets the condyle, were determined under fluoroscopic guidance via true AP view.  Local anesthetic was given by going down to the hub of the 25-gauge 1.5 incj needle and raising a wheel.  The 25-gauge, 3.5 cm needle was introduced to the anatomic location of the above target  sites utilizing live fluoroscopy.  At each target, the needle was advanced using the tunnel technique until bony contact is made.    At each target, fine depth adjustments were utilized to confirm needle tip is 50% of the diaphysis on the lateral fluoroscopic view.  After negative aspiration, medication was injected slowly.  The needles were removed and bandages applied.     Estimated Blood Loss:  None.  Complications:  None.     Disposition: The patient tolerated the procedure well, and there were no apparent complications. Vital signs remained stable throughout the procedure and the patient was monitored after the procedure. The patient was taken to the recovery area where written discharge instructions for the procedure were given. The patient was discharged in a stable condition.      Follow-Up: We will see the patient back in one-two weeks or the patient may call to inform of status. If helpful, we can repeat as needed. If helpful, but only short-term relief attained, we may schedule for radiofrequency ablation.

## 2020-10-30 NOTE — BRIEF OP NOTE
[facility]  Discharge Note  Short Stay     Admit Date: 10/30/2020    Discharge Date: 10/30/2020     Attending Physician: Maria Guadalupe Ortiz M.D.    Diagnoses:  Active Hospital Problems    Diagnosis  POA    Knee pain [M25.569]  Yes      Resolved Hospital Problems   No resolved problems to display.     Discharged Condition: Good     Hospital Course: Patient was admitted for an outpatient interventional pain management procedure and tolerated the procedure well with no complications.     Final Diagnoses: Same as principal problem.     Disposition: Home or Self Care     Follow up/Patient Instructions:   Follow-up in 1-2 weeks unless otherwise instructed. May return sooner as needed.       Reconciled Medications:     Medication List      CHANGE how you take these medications    lisinopriL 10 MG tablet  Take 1 tablet (10 mg total) by mouth once daily.  What changed: when to take this     sucralfate 1 gram tablet  Commonly known as: CARAFATE  Take 1 tablet (1 g total) by mouth 4 (four) times daily before meals and nightly.  What changed: when to take this        CONTINUE taking these medications    blood sugar diagnostic Strp  Commonly known as: BLOOD GLUCOSE TEST  Use one Accu-Chek Felicia Plus Test Strip per glucometer to test blood glucose three times a day. Dx: E11.22     blood-glucose meter kit  Accu-Chek Felicia Plus Meter to test blood glucose three times a day. Dx: E11.22     bumetanide 1 MG tablet  Commonly known as: BUMEX  Take 1 tablet (1 mg total) by mouth once daily.     citalopram 20 MG tablet  Commonly known as: CELEXA  Take 1 tablet (20 mg total) by mouth once daily.     ELIQUIS 5 mg Tab  Generic drug: apixaban  Take 1 tablet (5 mg total) by mouth 2 (two) times daily.     HYDROcodone-acetaminophen 7.5-325 mg per tablet  Commonly known as: NORCO  Take 1 tablet by mouth every 12 (twelve) hours as needed for Pain.     JANUVIA 100 MG Tab  Generic drug: SITagliptin  TAKE 1 TABLET (100 MG TOTAL) BY MOUTH ONCE  "DAILY.     lancets Misc  Use one Accu-Chek Softclix Lancet per lancing device to test blood glucose three times a day. Dx: E11.22     LANTUS SOLOSTAR U-100 INSULIN glargine 100 units/mL (3mL) SubQ pen  Generic drug: insulin  INJECT 42 UNITS SUBCUTANEOUSLY EVERY EVENING     metFORMIN 1000 MG tablet  Commonly known as: GLUCOPHAGE  Take 1 tablet (1,000 mg total) by mouth 2 (two) times daily with meals.     metoprolol succinate 25 MG 24 hr tablet  Commonly known as: TOPROL-XL  Take 25 mg by mouth 2 (two) times daily.     metoprolol tartrate 50 MG tablet  Commonly known as: LOPRESSOR  Take 1 tablet (50 mg total) by mouth 2 (two) times daily.     pen needle, diabetic 31 gauge x 3/16" Ndle  1 Device by Misc.(Non-Drug; Combo Route) route 3 (three) times daily.     pioglitazone 15 MG tablet  Commonly known as: ACTOS  Take 1 tablet (15 mg total) by mouth once daily.     rosuvastatin 40 MG Tab  Commonly known as: CRESTOR  40 mg.     tamsulosin 0.4 mg Cap  Commonly known as: FLOMAX  TAKE 1 CAPSULE EVERY DAY     traZODone 50 MG tablet  Commonly known as: DESYREL  TAKE 1 TABLET EVERY EVENING AS NEEDED  FOR  SLEEP     * UNABLE TO FIND  1,500 mg. medication name: CBD Oil     * UNABLE TO FIND  5 mg. medication name: Hemp Caps     * UNABLE TO FIND  22.5 mg. Elegard every 3 month injection         * This list has 3 medication(s) that are the same as other medications prescribed for you. Read the directions carefully, and ask your doctor or other care provider to review them with you.               Discharge Procedure Orders (must include Diet, Follow-up, Activity)   No driving until:   Order Comments: Until following day     Notify your health care provider if you experience any of the following:  temperature >100.4     Notify your health care provider if you experience any of the following:  persistent nausea and vomiting or diarrhea     Notify your health care provider if you experience any of the following:  severe uncontrolled " pain     Notify your health care provider if you experience any of the following:  redness, tenderness, or signs of infection (pain, swelling, redness, odor or green/yellow discharge around incision site)     Notify your health care provider if you experience any of the following:  difficulty breathing or increased cough     Notify your health care provider if you experience any of the following:  severe persistent headache     Notify your health care provider if you experience any of the following:  worsening rash     Notify your health care provider if you experience any of the following:  persistent dizziness, light-headedness, or visual disturbances     Notify your health care provider if you experience any of the following:  increased confusion or weakness     No dressing needed       Maria Guadalupe Ortiz M.D.  Interventional Pain Medicine / Physical Medicine & Rehabilitation

## 2020-11-02 ENCOUNTER — PATIENT MESSAGE (OUTPATIENT)
Dept: PAIN MEDICINE | Facility: CLINIC | Age: 85
End: 2020-11-02

## 2020-11-16 ENCOUNTER — PATIENT OUTREACH (OUTPATIENT)
Dept: ADMINISTRATIVE | Facility: OTHER | Age: 85
End: 2020-11-16

## 2020-11-20 ENCOUNTER — OFFICE VISIT (OUTPATIENT)
Dept: PAIN MEDICINE | Facility: CLINIC | Age: 85
End: 2020-11-20
Payer: MEDICARE

## 2020-11-20 VITALS
BODY MASS INDEX: 33.66 KG/M2 | HEART RATE: 62 BPM | SYSTOLIC BLOOD PRESSURE: 110 MMHG | TEMPERATURE: 97 F | HEIGHT: 66 IN | RESPIRATION RATE: 16 BRPM | WEIGHT: 209.44 LBS | DIASTOLIC BLOOD PRESSURE: 62 MMHG

## 2020-11-20 DIAGNOSIS — G89.29 CHRONIC KNEE PAIN AFTER TOTAL REPLACEMENT OF RIGHT KNEE JOINT: ICD-10-CM

## 2020-11-20 DIAGNOSIS — Z96.651 CHRONIC KNEE PAIN AFTER TOTAL REPLACEMENT OF RIGHT KNEE JOINT: ICD-10-CM

## 2020-11-20 DIAGNOSIS — M70.61 TROCHANTERIC BURSITIS OF BOTH HIPS: ICD-10-CM

## 2020-11-20 DIAGNOSIS — M47.816 LUMBAR SPONDYLOSIS: Primary | ICD-10-CM

## 2020-11-20 DIAGNOSIS — M51.36 DDD (DEGENERATIVE DISC DISEASE), LUMBAR: ICD-10-CM

## 2020-11-20 DIAGNOSIS — M79.2 NEURALGIA: ICD-10-CM

## 2020-11-20 DIAGNOSIS — M70.62 TROCHANTERIC BURSITIS OF BOTH HIPS: ICD-10-CM

## 2020-11-20 DIAGNOSIS — M25.561 CHRONIC KNEE PAIN AFTER TOTAL REPLACEMENT OF RIGHT KNEE JOINT: ICD-10-CM

## 2020-11-20 DIAGNOSIS — M48.061 SPINAL STENOSIS OF LUMBAR REGION WITHOUT NEUROGENIC CLAUDICATION: ICD-10-CM

## 2020-11-20 PROCEDURE — 3074F PR MOST RECENT SYSTOLIC BLOOD PRESSURE < 130 MM HG: ICD-10-PCS | Mod: CPTII,S$GLB,, | Performed by: PHYSICAL MEDICINE & REHABILITATION

## 2020-11-20 PROCEDURE — 3074F SYST BP LT 130 MM HG: CPT | Mod: CPTII,S$GLB,, | Performed by: PHYSICAL MEDICINE & REHABILITATION

## 2020-11-20 PROCEDURE — 20610 DRAIN/INJ JOINT/BURSA W/O US: CPT | Mod: 50,S$GLB,, | Performed by: PHYSICAL MEDICINE & REHABILITATION

## 2020-11-20 PROCEDURE — 1159F PR MEDICATION LIST DOCUMENTED IN MEDICAL RECORD: ICD-10-PCS | Mod: S$GLB,,, | Performed by: PHYSICAL MEDICINE & REHABILITATION

## 2020-11-20 PROCEDURE — 99214 PR OFFICE/OUTPT VISIT, EST, LEVL IV, 30-39 MIN: ICD-10-PCS | Mod: 25,S$GLB,, | Performed by: PHYSICAL MEDICINE & REHABILITATION

## 2020-11-20 PROCEDURE — 99214 OFFICE O/P EST MOD 30 MIN: CPT | Mod: 25,S$GLB,, | Performed by: PHYSICAL MEDICINE & REHABILITATION

## 2020-11-20 PROCEDURE — 1159F MED LIST DOCD IN RCRD: CPT | Mod: S$GLB,,, | Performed by: PHYSICAL MEDICINE & REHABILITATION

## 2020-11-20 PROCEDURE — 3288F PR FALLS RISK ASSESSMENT DOCUMENTED: ICD-10-PCS | Mod: CPTII,S$GLB,, | Performed by: PHYSICAL MEDICINE & REHABILITATION

## 2020-11-20 PROCEDURE — 99999 PR PBB SHADOW E&M-EST. PATIENT-LVL V: ICD-10-PCS | Mod: PBBFAC,,, | Performed by: PHYSICAL MEDICINE & REHABILITATION

## 2020-11-20 PROCEDURE — 1101F PT FALLS ASSESS-DOCD LE1/YR: CPT | Mod: CPTII,S$GLB,, | Performed by: PHYSICAL MEDICINE & REHABILITATION

## 2020-11-20 PROCEDURE — 99999 PR PBB SHADOW E&M-EST. PATIENT-LVL V: CPT | Mod: PBBFAC,,, | Performed by: PHYSICAL MEDICINE & REHABILITATION

## 2020-11-20 PROCEDURE — 3078F PR MOST RECENT DIASTOLIC BLOOD PRESSURE < 80 MM HG: ICD-10-PCS | Mod: CPTII,S$GLB,, | Performed by: PHYSICAL MEDICINE & REHABILITATION

## 2020-11-20 PROCEDURE — 1125F AMNT PAIN NOTED PAIN PRSNT: CPT | Mod: S$GLB,,, | Performed by: PHYSICAL MEDICINE & REHABILITATION

## 2020-11-20 PROCEDURE — 1101F PR PT FALLS ASSESS DOC 0-1 FALLS W/OUT INJ PAST YR: ICD-10-PCS | Mod: CPTII,S$GLB,, | Performed by: PHYSICAL MEDICINE & REHABILITATION

## 2020-11-20 PROCEDURE — 1125F PR PAIN SEVERITY QUANTIFIED, PAIN PRESENT: ICD-10-PCS | Mod: S$GLB,,, | Performed by: PHYSICAL MEDICINE & REHABILITATION

## 2020-11-20 PROCEDURE — 20610 LARGE JOINT ASPIRATION/INJECTION: BILATERAL GREATER TROCHANTERIC BURSA: ICD-10-PCS | Mod: 50,S$GLB,, | Performed by: PHYSICAL MEDICINE & REHABILITATION

## 2020-11-20 PROCEDURE — 3078F DIAST BP <80 MM HG: CPT | Mod: CPTII,S$GLB,, | Performed by: PHYSICAL MEDICINE & REHABILITATION

## 2020-11-20 PROCEDURE — 3288F FALL RISK ASSESSMENT DOCD: CPT | Mod: CPTII,S$GLB,, | Performed by: PHYSICAL MEDICINE & REHABILITATION

## 2020-11-20 RX ORDER — HYDROCODONE BITARTRATE AND ACETAMINOPHEN 10; 325 MG/1; MG/1
1 TABLET ORAL EVERY 6 HOURS PRN
Qty: 60 TABLET | Refills: 0 | Status: SHIPPED | OUTPATIENT
Start: 2020-11-20 | End: 2021-01-29 | Stop reason: SDUPTHER

## 2020-11-20 RX ORDER — LEVOFLOXACIN 500 MG/1
500 TABLET, FILM COATED ORAL DAILY
COMMUNITY
Start: 2020-10-22 | End: 2023-01-20

## 2020-11-20 RX ORDER — METHYLPREDNISOLONE ACETATE 40 MG/ML
40 INJECTION, SUSPENSION INTRA-ARTICULAR; INTRALESIONAL; INTRAMUSCULAR; SOFT TISSUE
Status: DISCONTINUED | OUTPATIENT
Start: 2020-11-20 | End: 2020-11-20 | Stop reason: HOSPADM

## 2020-11-20 RX ADMIN — METHYLPREDNISOLONE ACETATE 40 MG: 40 INJECTION, SUSPENSION INTRA-ARTICULAR; INTRALESIONAL; INTRAMUSCULAR; SOFT TISSUE at 02:11

## 2020-11-20 NOTE — H&P (VIEW-ONLY)
[facility]    Chief Complaint:   Chief Complaint   Patient presents with    Back Pain    Knee Pain     right        History of Present Illness: Erasmo Dunbar is a 85 y.o. male referred by [facility] for chronic LBP.      Onset: years of back pain, but worsened in the past few months  Location: right sided lower back  Radiation: across lower back on the left side, and sometimes into the posterior thighs, but not below the knees.   Timing: Intermittent, only when he stands and walks and completely improves with sitting down  Quality: Throbbing, Deep and Sharp  Exacerbating Factors: standing and walking  Alleviating Factors: sitting, heat, ice, medications and rest  Associated Symptoms: He has numbness in his feet and legs from neuropathy. denies night fever/night sweats, urinary incontinence, bowel incontinence, significant weight loss and significant motor weakness     Severity: Currently: 8/10   Typical Range: 5-10/10     Exacerbation: 10/10     Hydrocodone 7.5/325 mg brings pain from a 10/10 to a 9/10 for maybe 3-4 hours. He denies any side effects.      He also notes bilateral knee pain. He has a history of right knee replacement in 2017. Knee pain worse with standing and walking. Braces help. Medications help, but not completely. He does feel swelling in the left knee at times. Knees will go out on him at times.     Interval History (11/20/2020):  Erasmo Dunbar returns today for follow up.  At the last clinic visit, performed right cluneal nerve block. Patient was scheduled for Right Genicular nerve block.     Right Genicular nerve block provided 95% relief. Cluneal nerve block provided relief for a few days.     Currently, the knee pain is improved.  He is noting mostly bilateral low back pain and bilateral lateral hip pain. Pain is not radiating down into the legs. Pain is worse with standing and walking. He feels weakness around the hips. He has numbness in both feet and legs.     No  significant interval events or traumas. No change in bowel or bladder function, & no new weakness or numbness is reported. No new musculoskeletal pain complaints.    Current Pain Scales:  Current: 1/10              Typical Range: 7-9/10    Pain Disability Index  Family/Home Responsibilities:: 8  Recreation:: 4  Social Activity:: 4  Occupation:: 0  Sexual Behavior:: 0  Self Care:: 6  Life-Support Activities:: 0  Pain Disability Index (PDI): 22    Previous Interventions:  - 10/30/20: Right genicular nerve block w/ 95% relief  - Dr. Varma, Dr. Jain both provided injections in the past, with limited benefit.     Previous Therapies:  PT/OT: yes a few years ago  Relevant Surgery: yes    - Right knee replacement in 2017   - Cervical fusion 30 years ago  Previous Medications:   - NSAIDS: Tylenol. Avoiding other nsaids due to blood thinners.  - Muscle Relaxants:    - TCAs:   - SNRIs:   - Topicals: Many different topicals.   - Anticonvulsants:    - Opioids: Hydrocodone    Current Pain Medications:  1. Norco 5-325 mg BID prn (last Rx-ed by Harry Varma, filled on 9/2/20)      Blood Thinners: Eliquis    Full Medication List:    Current Outpatient Medications:     blood sugar diagnostic (BLOOD GLUCOSE TEST) Strp, Use one Accu-Chek Felicia Plus Test Strip per glucometer to test blood glucose three times a day. Dx: E11.22, Disp: 600 strip, Rfl: 3    blood-glucose meter kit, Accu-Chek Felicia Plus Meter to test blood glucose three times a day. Dx: E11.22, Disp: 1 each, Rfl: 0    bumetanide (BUMEX) 1 MG tablet, Take 1 tablet (1 mg total) by mouth once daily., Disp: 30 tablet, Rfl: 5    citalopram (CELEXA) 20 MG tablet, Take 1 tablet (20 mg total) by mouth once daily., Disp: 90 tablet, Rfl: 1    ELIQUIS 5 mg Tab, Take 1 tablet (5 mg total) by mouth 2 (two) times daily., Disp: 180 tablet, Rfl: 1    HYDROcodone-acetaminophen (NORCO) 7.5-325 mg per tablet, Take 1 tablet by mouth every 12 (twelve) hours as needed for Pain.,  "Disp: 60 tablet, Rfl: 0    JANUVIA 100 mg Tab, TAKE 1 TABLET (100 MG TOTAL) BY MOUTH ONCE DAILY., Disp: 90 tablet, Rfl: 5    lancets Misc, Use one Accu-Chek Softclix Lancet per lancing device to test blood glucose three times a day. Dx: E11.22, Disp: 600 each, Rfl: 3    LANTUS SOLOSTAR U-100 INSULIN glargine 100 units/mL (3mL) SubQ pen, INJECT 42 UNITS SUBCUTANEOUSLY EVERY EVENING, Disp: 45 mL, Rfl: 5    levoFLOXacin (LEVAQUIN) 500 MG tablet, Take 500 mg by mouth once daily., Disp: , Rfl:     lisinopriL 10 MG tablet, Take 1 tablet (10 mg total) by mouth once daily. (Patient taking differently: Take 10 mg by mouth 2 (two) times daily. ), Disp: 90 tablet, Rfl: 1    metFORMIN (GLUCOPHAGE) 1000 MG tablet, Take 1 tablet (1,000 mg total) by mouth 2 (two) times daily with meals., Disp: 180 tablet, Rfl: 1    metoprolol succinate (TOPROL-XL) 25 MG 24 hr tablet, Take 25 mg by mouth 2 (two) times daily., Disp: , Rfl:     pen needle, diabetic 31 gauge x 3/16" Ndle, 1 Device by Misc.(Non-Drug; Combo Route) route 3 (three) times daily., Disp: 100 each, Rfl: 11    pioglitazone (ACTOS) 15 MG tablet, Take 1 tablet (15 mg total) by mouth once daily., Disp: 90 tablet, Rfl: 1    rosuvastatin (CRESTOR) 40 MG Tab, 40 mg. , Disp: , Rfl:     sucralfate (CARAFATE) 1 gram tablet, Take 1 tablet (1 g total) by mouth 4 (four) times daily before meals and nightly. (Patient taking differently: Take 1 g by mouth once daily. ), Disp: 120 tablet, Rfl: 5    tamsulosin (FLOMAX) 0.4 mg Cap, TAKE 1 CAPSULE EVERY DAY, Disp: 90 capsule, Rfl: 1    traZODone (DESYREL) 50 MG tablet, TAKE 1 TABLET EVERY EVENING AS NEEDED  FOR  SLEEP, Disp: 90 tablet, Rfl: 1    UNABLE TO FIND, 1,500 mg. medication name: CBD Oil , Disp: , Rfl:     UNABLE TO FIND, 5 mg. medication name: Hemp Caps , Disp: , Rfl:     UNABLE TO FIND, 22.5 mg. Elegard every 3 month injection, Disp: , Rfl:     metoprolol tartrate (LOPRESSOR) 50 MG tablet, Take 1 tablet (50 mg total) " by mouth 2 (two) times daily. (Patient not taking: Reported on 11/20/2020), Disp: 180 tablet, Rfl: 1     Review of Systems:  Review of Systems   Constitutional: Negative for fever and weight loss.   HENT: Negative for ear pain and tinnitus.    Eyes: Negative for pain and redness.   Respiratory: Negative for cough and shortness of breath.    Cardiovascular: Negative for chest pain and palpitations.   Gastrointestinal: Positive for diarrhea. Negative for constipation and heartburn.   Genitourinary: Negative.         Denies urinary incontinence. Denies urine retention.    Musculoskeletal: Positive for back pain. Negative for neck pain.   Skin: Negative for itching and rash.   Neurological: Positive for tingling. Negative for focal weakness and seizures.   Endo/Heme/Allergies: Negative for environmental allergies. Bruises/bleeds easily.   Psychiatric/Behavioral: Negative for depression. The patient has insomnia. The patient is not nervous/anxious.        Allergies:  Iodinated contrast media and Iodine     Medical History:   has a past medical history of Arthritis, Atrial fibrillation, Bladder mass, BPH (benign prostatic hyperplasia), CHF (congestive heart failure), Depression, Diabetes mellitus type II, Hyperlipidemia, Hypertension, Microscopic hematuria, Prostate cancer (10/01/2018), RLS (restless legs syndrome), Stroke, and Wears glasses.    Surgical History:   has a past surgical history that includes Transurethral resection of prostate; Prostate surgery; Rotator cuff repair (Right); Ganglion Cyst Removed  (Right); Neck Fusion (Bilateral); Colectomy (N/A); Cystoscopy (N/A); Back surgery; Cardiac surgery (Left); Skin biopsy; Total knee arthroplasty (03/30/2017); Cardiac pacemaker placement; back injections; Cystoscopy w/ retrogrades (Bilateral, 10/21/2019); Digital rectal examination under anesthesia (N/A, 10/21/2019); Cataract extraction w/  intraocular lens implant (Bilateral); and Injection of anesthetic agent  "around nerve (Right, 10/30/2020).    Family History:  family history includes COPD in his daughter; Cancer in his mother; Diabetes in his brother, daughter, and sister; Heart disease in his brother and father; Seizures in his daughter.    Social History:   reports that he quit smoking about 39 years ago. His smoking use included cigarettes. He has a 52.50 pack-year smoking history. He has never used smokeless tobacco. He reports current alcohol use of about 8.0 standard drinks of alcohol per week. He reports that he does not use drugs.    Physical Exam:  /62 (BP Location: Left arm, Patient Position: Sitting, BP Method: Medium (Manual))   Pulse 62   Temp 97.1 °F (36.2 °C)   Resp 16   Ht 5' 6" (1.676 m)   Wt 95 kg (209 lb 7 oz)   BMI 33.80 kg/m²   GEN: No acute distress. Calm, comfortable  HENT: Normocephalic, atraumatic, moist mucous membranes  EYE: Anicteric sclera, non-injected.   CV: Non-diaphoretic. Regular Rate. Radial Pulses 2+.  RESP: Breathing comfortably. Chest expansion symmetric.  EXT: No clubbing, cyanosis.   SKIN: Warm, & dry to palpation. No visible rashes or lesions of exposed skin.   PSYCH: Pleasant mood and appropriate affect. Recent and remote memory intact.   GAIT: Mod Independent with walker, antalgic ambulation  Lumbar Spine Exam:       Inspection: No erythema, bruising.       Palpation: (+) TTP of lumbar paraspinals bilaterally. (+) TTP along right iliac crest.       ROM: Slightly Limited in flexion, extension, lateral bending.       (+) Facet loading bilaterally      (-) Straight Leg Raise bilaterally      (-) KRYSTAL bilaterally, pain on lateral hips  Hip Exam:      Inspection: No gross deformity or apparent leg length discrepancy      Palpation:  (+) TTP to bilateral greater trochanteric bursas.       ROM:  No limitation or pain in internal rotation, external rotation b/l  Neurologic Exam:     Alert. Speech is fluent and appropriate.     Strength:  5/5 throughout bilateral lower " extremities     Sensation: Abnormal to LT up to knees bilaterally, otherwise grossly intact to light touch in bilateral lower extremities     Reflexes: Absent in b/l patella, achilles     Tone: No abnormality appreciated in bilateral lower extremities     No Clonus     Downgoing toes on plantar stimulation         Imaging:  - X-ray Lumbar Spine 5/13/20:  Moderate facet arthropathy throughout the lower lumbar spine greatest at L5/S1.  Diffuse osteopenia.  Mild spondylosis L3/4. Overall alignment is well maintained.  No evidence of spondylolysis or spondylolisthesis. Disk and vertebral body heights are normal.  Severe atherosclerotic disease.  Mild constipation.    - CT Lumbar spine 3/19/19:  The incidentally visualized soft tissue structures of the abdomen show calcifications of the aorta and its major branch vessels.  Vertebral body alignment and heights are maintained.  There is disc space narrowing at the L3-4 level.  No fracture or subluxation is identified.  At T12-L1, no disc herniation, central canal stenosis or neural foraminal narrowing.  At L1-2, no disc herniation, central canal stenosis or neural foraminal narrowing.  At L2-3, there is a broad-based posterior disc bulge contributing to mild central canal stenosis with mild bilateral neural foraminal narrowing.  At L3-4, there is a broad-based posterior disc bulge with ligamentum flavum hypertrophy contributing to mild central canal stenosis with moderate right and moderate severe left-sided neural foraminal narrowing.  At L4-5, there is a broad-based posterior disc bulge with facet arthropathy contributing to mild central canal stenosis with moderate bilateral neural foraminal narrowing.  At L5-S1, there is a broad-based posterior disc bulge and facet arthropathy contributing to mild central canal stenosis with moderate left and moderate severe right-sided neural foraminal narrowing.  IMPRESSION:   Multilevel degenerative changes of the lumbar spine  contributing to central canal stenosis and neural foraminal narrowing.  Please see above report for level by level description.      Labs:  BMP  Lab Results   Component Value Date     06/11/2020    K 4.7 06/11/2020    CL 99 06/11/2020    CO2 27 06/11/2020    BUN 16 06/11/2020    CREATININE 1.2 06/11/2020    CALCIUM 9.7 06/11/2020    ANIONGAP 11 06/11/2020    ESTGFRAFRICA >60 06/11/2020    EGFRNONAA 55 (A) 06/11/2020     Lab Results   Component Value Date    ALT 16 01/20/2020    AST 15 01/20/2020    ALKPHOS 77 01/20/2020    BILITOT 0.3 01/20/2020     Lab Results   Component Value Date     (H) 11/18/2019       Assessment:  Erasmo Dunbar is a 85 y.o. male with the following diagnoses based on history, exam, and imaging:    Problem List Items Addressed This Visit     None      Visit Diagnoses     Lumbar spondylosis    -  Primary    Neuralgia        DDD (degenerative disc disease), lumbar        Spinal stenosis of lumbar region without neurogenic claudication        Chronic knee pain after total replacement of right knee joint        Trochanteric bursitis of both hips              This is a pleasant 85 y.o. gentleman presenting with:     - Chronic low back pain: His pain appears predominantly facetogenic in nature.   - Bilateral trochanteric bursitis  - Chronic right knee pain after total knee replacement  - Chronic opioid use: I do think this is appropriate considering his age and limited medication options due to comorbidities.   - Comorbidities: Chronic anticoagulation on eliquis for paroxysmal A-fib, pacemaker    Treatment Plan:   - PT/OT/HEP:  Has tried in the past. Discussed benefits of exercise for pain.   - Procedures:  Performed bilateral trochanteric bursa injection today.   - Can plan for right genicular nerve RFA when pain worsens, but back pain worse currently and he would like to address  - Schedule bilateral L4/5, L5/S1 MBBs. I have explained the risks, benefits, and alternatives of the  procedure in detail. The patient voices understanding and all questions have been answered. The patient agrees to proceed as planned.  - Medications: Agree with hydrocodone-APAP for pain considering his age and limited options. Increase to  mg BID prn for back pain.  He endorses inadequate benefit (< 30%) with lower dose. No signs of side effects or aberrancy.   - Counseled patient regarding the risks and benefits of chronic opioid therapy for chronic painful conditions and patient expresses understanding.  I discussed with patient that he is able to limit the amount filled of the prescription if he decides to.  -  reviewed  - Opioid contract signed 10/12/2020    - Imaging: Reviewed. Will obtain outside records to see what Imaging he already has.  - Labs: Reviewed.  Medications are appropriately dosed for current hepatorenal function.  - Awaiting records from Dr. Harry Varma    Follow Up: RTC after Susan Ortiz M.D.  Interventional Pain Medicine / Physical Medicine & Rehabilitation    Disclaimer: This note was partly generated using dictation software which may occasionally result in transcription errors.

## 2020-11-20 NOTE — PROCEDURES
Large Joint Aspiration/Injection: bilateral greater trochanteric bursa    Date/Time: 11/20/2020 2:00 PM  Performed by: Maria Guadalupe Ortiz MD  Authorized by: Maria Guadalupe Ortiz MD     Consent Done?:  Yes (Written)  Indications:  Pain  Site marked: the procedure site was marked    Timeout: prior to procedure the correct patient, procedure, and site was verified    Prep: patient was prepped and draped in usual sterile fashion      Local anesthesia used?: Yes    Anesthesia:  Local infiltration  Local anesthetic:  Bupivacaine 0.25% without epinephrine  Anesthetic total (ml):  6      Details:  Needle Size:  25 G  Ultrasonic Guidance for needle placement?: No    Approach:  Lateral  Location:  Hip  Laterality:  Bilateral  Site:  Bilateral greater trochanteric bursa  Medications (Right):  40 mg methylPREDNISolone acetate 40 mg/mL  Medications (Left):  40 mg methylPREDNISolone acetate 40 mg/mL  Patient tolerance:  Patient tolerated the procedure well with no immediate complications

## 2020-11-20 NOTE — PROGRESS NOTES
[facility]    Chief Complaint:   Chief Complaint   Patient presents with    Back Pain    Knee Pain     right        History of Present Illness: Erasmo Dunbar is a 85 y.o. male referred by [facility] for chronic LBP.      Onset: years of back pain, but worsened in the past few months  Location: right sided lower back  Radiation: across lower back on the left side, and sometimes into the posterior thighs, but not below the knees.   Timing: Intermittent, only when he stands and walks and completely improves with sitting down  Quality: Throbbing, Deep and Sharp  Exacerbating Factors: standing and walking  Alleviating Factors: sitting, heat, ice, medications and rest  Associated Symptoms: He has numbness in his feet and legs from neuropathy. denies night fever/night sweats, urinary incontinence, bowel incontinence, significant weight loss and significant motor weakness     Severity: Currently: 8/10   Typical Range: 5-10/10     Exacerbation: 10/10     Hydrocodone 7.5/325 mg brings pain from a 10/10 to a 9/10 for maybe 3-4 hours. He denies any side effects.      He also notes bilateral knee pain. He has a history of right knee replacement in 2017. Knee pain worse with standing and walking. Braces help. Medications help, but not completely. He does feel swelling in the left knee at times. Knees will go out on him at times.     Interval History (11/20/2020):  Erasmo Dunbar returns today for follow up.  At the last clinic visit, performed right cluneal nerve block. Patient was scheduled for Right Genicular nerve block.     Right Genicular nerve block provided 95% relief. Cluneal nerve block provided relief for a few days.     Currently, the knee pain is improved.  He is noting mostly bilateral low back pain and bilateral lateral hip pain. Pain is not radiating down into the legs. Pain is worse with standing and walking. He feels weakness around the hips. He has numbness in both feet and legs.     No  significant interval events or traumas. No change in bowel or bladder function, & no new weakness or numbness is reported. No new musculoskeletal pain complaints.    Current Pain Scales:  Current: 1/10              Typical Range: 7-9/10    Pain Disability Index  Family/Home Responsibilities:: 8  Recreation:: 4  Social Activity:: 4  Occupation:: 0  Sexual Behavior:: 0  Self Care:: 6  Life-Support Activities:: 0  Pain Disability Index (PDI): 22    Previous Interventions:  - 10/30/20: Right genicular nerve block w/ 95% relief  - Dr. Varma, Dr. Jain both provided injections in the past, with limited benefit.     Previous Therapies:  PT/OT: yes a few years ago  Relevant Surgery: yes    - Right knee replacement in 2017   - Cervical fusion 30 years ago  Previous Medications:   - NSAIDS: Tylenol. Avoiding other nsaids due to blood thinners.  - Muscle Relaxants:    - TCAs:   - SNRIs:   - Topicals: Many different topicals.   - Anticonvulsants:    - Opioids: Hydrocodone    Current Pain Medications:  1. Norco 5-325 mg BID prn (last Rx-ed by Harry Varma, filled on 9/2/20)      Blood Thinners: Eliquis    Full Medication List:    Current Outpatient Medications:     blood sugar diagnostic (BLOOD GLUCOSE TEST) Strp, Use one Accu-Chek Felicia Plus Test Strip per glucometer to test blood glucose three times a day. Dx: E11.22, Disp: 600 strip, Rfl: 3    blood-glucose meter kit, Accu-Chek Felicia Plus Meter to test blood glucose three times a day. Dx: E11.22, Disp: 1 each, Rfl: 0    bumetanide (BUMEX) 1 MG tablet, Take 1 tablet (1 mg total) by mouth once daily., Disp: 30 tablet, Rfl: 5    citalopram (CELEXA) 20 MG tablet, Take 1 tablet (20 mg total) by mouth once daily., Disp: 90 tablet, Rfl: 1    ELIQUIS 5 mg Tab, Take 1 tablet (5 mg total) by mouth 2 (two) times daily., Disp: 180 tablet, Rfl: 1    HYDROcodone-acetaminophen (NORCO) 7.5-325 mg per tablet, Take 1 tablet by mouth every 12 (twelve) hours as needed for Pain.,  "Disp: 60 tablet, Rfl: 0    JANUVIA 100 mg Tab, TAKE 1 TABLET (100 MG TOTAL) BY MOUTH ONCE DAILY., Disp: 90 tablet, Rfl: 5    lancets Misc, Use one Accu-Chek Softclix Lancet per lancing device to test blood glucose three times a day. Dx: E11.22, Disp: 600 each, Rfl: 3    LANTUS SOLOSTAR U-100 INSULIN glargine 100 units/mL (3mL) SubQ pen, INJECT 42 UNITS SUBCUTANEOUSLY EVERY EVENING, Disp: 45 mL, Rfl: 5    levoFLOXacin (LEVAQUIN) 500 MG tablet, Take 500 mg by mouth once daily., Disp: , Rfl:     lisinopriL 10 MG tablet, Take 1 tablet (10 mg total) by mouth once daily. (Patient taking differently: Take 10 mg by mouth 2 (two) times daily. ), Disp: 90 tablet, Rfl: 1    metFORMIN (GLUCOPHAGE) 1000 MG tablet, Take 1 tablet (1,000 mg total) by mouth 2 (two) times daily with meals., Disp: 180 tablet, Rfl: 1    metoprolol succinate (TOPROL-XL) 25 MG 24 hr tablet, Take 25 mg by mouth 2 (two) times daily., Disp: , Rfl:     pen needle, diabetic 31 gauge x 3/16" Ndle, 1 Device by Misc.(Non-Drug; Combo Route) route 3 (three) times daily., Disp: 100 each, Rfl: 11    pioglitazone (ACTOS) 15 MG tablet, Take 1 tablet (15 mg total) by mouth once daily., Disp: 90 tablet, Rfl: 1    rosuvastatin (CRESTOR) 40 MG Tab, 40 mg. , Disp: , Rfl:     sucralfate (CARAFATE) 1 gram tablet, Take 1 tablet (1 g total) by mouth 4 (four) times daily before meals and nightly. (Patient taking differently: Take 1 g by mouth once daily. ), Disp: 120 tablet, Rfl: 5    tamsulosin (FLOMAX) 0.4 mg Cap, TAKE 1 CAPSULE EVERY DAY, Disp: 90 capsule, Rfl: 1    traZODone (DESYREL) 50 MG tablet, TAKE 1 TABLET EVERY EVENING AS NEEDED  FOR  SLEEP, Disp: 90 tablet, Rfl: 1    UNABLE TO FIND, 1,500 mg. medication name: CBD Oil , Disp: , Rfl:     UNABLE TO FIND, 5 mg. medication name: Hemp Caps , Disp: , Rfl:     UNABLE TO FIND, 22.5 mg. Elegard every 3 month injection, Disp: , Rfl:     metoprolol tartrate (LOPRESSOR) 50 MG tablet, Take 1 tablet (50 mg total) " by mouth 2 (two) times daily. (Patient not taking: Reported on 11/20/2020), Disp: 180 tablet, Rfl: 1     Review of Systems:  Review of Systems   Constitutional: Negative for fever and weight loss.   HENT: Negative for ear pain and tinnitus.    Eyes: Negative for pain and redness.   Respiratory: Negative for cough and shortness of breath.    Cardiovascular: Negative for chest pain and palpitations.   Gastrointestinal: Positive for diarrhea. Negative for constipation and heartburn.   Genitourinary: Negative.         Denies urinary incontinence. Denies urine retention.    Musculoskeletal: Positive for back pain. Negative for neck pain.   Skin: Negative for itching and rash.   Neurological: Positive for tingling. Negative for focal weakness and seizures.   Endo/Heme/Allergies: Negative for environmental allergies. Bruises/bleeds easily.   Psychiatric/Behavioral: Negative for depression. The patient has insomnia. The patient is not nervous/anxious.        Allergies:  Iodinated contrast media and Iodine     Medical History:   has a past medical history of Arthritis, Atrial fibrillation, Bladder mass, BPH (benign prostatic hyperplasia), CHF (congestive heart failure), Depression, Diabetes mellitus type II, Hyperlipidemia, Hypertension, Microscopic hematuria, Prostate cancer (10/01/2018), RLS (restless legs syndrome), Stroke, and Wears glasses.    Surgical History:   has a past surgical history that includes Transurethral resection of prostate; Prostate surgery; Rotator cuff repair (Right); Ganglion Cyst Removed  (Right); Neck Fusion (Bilateral); Colectomy (N/A); Cystoscopy (N/A); Back surgery; Cardiac surgery (Left); Skin biopsy; Total knee arthroplasty (03/30/2017); Cardiac pacemaker placement; back injections; Cystoscopy w/ retrogrades (Bilateral, 10/21/2019); Digital rectal examination under anesthesia (N/A, 10/21/2019); Cataract extraction w/  intraocular lens implant (Bilateral); and Injection of anesthetic agent  "around nerve (Right, 10/30/2020).    Family History:  family history includes COPD in his daughter; Cancer in his mother; Diabetes in his brother, daughter, and sister; Heart disease in his brother and father; Seizures in his daughter.    Social History:   reports that he quit smoking about 39 years ago. His smoking use included cigarettes. He has a 52.50 pack-year smoking history. He has never used smokeless tobacco. He reports current alcohol use of about 8.0 standard drinks of alcohol per week. He reports that he does not use drugs.    Physical Exam:  /62 (BP Location: Left arm, Patient Position: Sitting, BP Method: Medium (Manual))   Pulse 62   Temp 97.1 °F (36.2 °C)   Resp 16   Ht 5' 6" (1.676 m)   Wt 95 kg (209 lb 7 oz)   BMI 33.80 kg/m²   GEN: No acute distress. Calm, comfortable  HENT: Normocephalic, atraumatic, moist mucous membranes  EYE: Anicteric sclera, non-injected.   CV: Non-diaphoretic. Regular Rate. Radial Pulses 2+.  RESP: Breathing comfortably. Chest expansion symmetric.  EXT: No clubbing, cyanosis.   SKIN: Warm, & dry to palpation. No visible rashes or lesions of exposed skin.   PSYCH: Pleasant mood and appropriate affect. Recent and remote memory intact.   GAIT: Mod Independent with walker, antalgic ambulation  Lumbar Spine Exam:       Inspection: No erythema, bruising.       Palpation: (+) TTP of lumbar paraspinals bilaterally. (+) TTP along right iliac crest.       ROM: Slightly Limited in flexion, extension, lateral bending.       (+) Facet loading bilaterally      (-) Straight Leg Raise bilaterally      (-) KRYSTAL bilaterally, pain on lateral hips  Hip Exam:      Inspection: No gross deformity or apparent leg length discrepancy      Palpation:  (+) TTP to bilateral greater trochanteric bursas.       ROM:  No limitation or pain in internal rotation, external rotation b/l  Neurologic Exam:     Alert. Speech is fluent and appropriate.     Strength:  5/5 throughout bilateral lower " extremities     Sensation: Abnormal to LT up to knees bilaterally, otherwise grossly intact to light touch in bilateral lower extremities     Reflexes: Absent in b/l patella, achilles     Tone: No abnormality appreciated in bilateral lower extremities     No Clonus     Downgoing toes on plantar stimulation         Imaging:  - X-ray Lumbar Spine 5/13/20:  Moderate facet arthropathy throughout the lower lumbar spine greatest at L5/S1.  Diffuse osteopenia.  Mild spondylosis L3/4. Overall alignment is well maintained.  No evidence of spondylolysis or spondylolisthesis. Disk and vertebral body heights are normal.  Severe atherosclerotic disease.  Mild constipation.    - CT Lumbar spine 3/19/19:  The incidentally visualized soft tissue structures of the abdomen show calcifications of the aorta and its major branch vessels.  Vertebral body alignment and heights are maintained.  There is disc space narrowing at the L3-4 level.  No fracture or subluxation is identified.  At T12-L1, no disc herniation, central canal stenosis or neural foraminal narrowing.  At L1-2, no disc herniation, central canal stenosis or neural foraminal narrowing.  At L2-3, there is a broad-based posterior disc bulge contributing to mild central canal stenosis with mild bilateral neural foraminal narrowing.  At L3-4, there is a broad-based posterior disc bulge with ligamentum flavum hypertrophy contributing to mild central canal stenosis with moderate right and moderate severe left-sided neural foraminal narrowing.  At L4-5, there is a broad-based posterior disc bulge with facet arthropathy contributing to mild central canal stenosis with moderate bilateral neural foraminal narrowing.  At L5-S1, there is a broad-based posterior disc bulge and facet arthropathy contributing to mild central canal stenosis with moderate left and moderate severe right-sided neural foraminal narrowing.  IMPRESSION:   Multilevel degenerative changes of the lumbar spine  contributing to central canal stenosis and neural foraminal narrowing.  Please see above report for level by level description.      Labs:  BMP  Lab Results   Component Value Date     06/11/2020    K 4.7 06/11/2020    CL 99 06/11/2020    CO2 27 06/11/2020    BUN 16 06/11/2020    CREATININE 1.2 06/11/2020    CALCIUM 9.7 06/11/2020    ANIONGAP 11 06/11/2020    ESTGFRAFRICA >60 06/11/2020    EGFRNONAA 55 (A) 06/11/2020     Lab Results   Component Value Date    ALT 16 01/20/2020    AST 15 01/20/2020    ALKPHOS 77 01/20/2020    BILITOT 0.3 01/20/2020     Lab Results   Component Value Date     (H) 11/18/2019       Assessment:  Erasmo Dunbar is a 85 y.o. male with the following diagnoses based on history, exam, and imaging:    Problem List Items Addressed This Visit     None      Visit Diagnoses     Lumbar spondylosis    -  Primary    Neuralgia        DDD (degenerative disc disease), lumbar        Spinal stenosis of lumbar region without neurogenic claudication        Chronic knee pain after total replacement of right knee joint        Trochanteric bursitis of both hips              This is a pleasant 85 y.o. gentleman presenting with:     - Chronic low back pain: His pain appears predominantly facetogenic in nature.   - Bilateral trochanteric bursitis  - Chronic right knee pain after total knee replacement  - Chronic opioid use: I do think this is appropriate considering his age and limited medication options due to comorbidities.   - Comorbidities: Chronic anticoagulation on eliquis for paroxysmal A-fib, pacemaker    Treatment Plan:   - PT/OT/HEP:  Has tried in the past. Discussed benefits of exercise for pain.   - Procedures:  Performed bilateral trochanteric bursa injection today.   - Can plan for right genicular nerve RFA when pain worsens, but back pain worse currently and he would like to address  - Schedule bilateral L4/5, L5/S1 MBBs. I have explained the risks, benefits, and alternatives of the  procedure in detail. The patient voices understanding and all questions have been answered. The patient agrees to proceed as planned.  - Medications: Agree with hydrocodone-APAP for pain considering his age and limited options. Increase to  mg BID prn for back pain.  He endorses inadequate benefit (< 30%) with lower dose. No signs of side effects or aberrancy.   - Counseled patient regarding the risks and benefits of chronic opioid therapy for chronic painful conditions and patient expresses understanding.  I discussed with patient that he is able to limit the amount filled of the prescription if he decides to.  -  reviewed  - Opioid contract signed 10/12/2020    - Imaging: Reviewed. Will obtain outside records to see what Imaging he already has.  - Labs: Reviewed.  Medications are appropriately dosed for current hepatorenal function.  - Awaiting records from Dr. Harry Varma    Follow Up: RTC after Susan Ortiz M.D.  Interventional Pain Medicine / Physical Medicine & Rehabilitation    Disclaimer: This note was partly generated using dictation software which may occasionally result in transcription errors.

## 2020-12-09 ENCOUNTER — PATIENT MESSAGE (OUTPATIENT)
Dept: FAMILY MEDICINE | Facility: CLINIC | Age: 85
End: 2020-12-09

## 2020-12-09 DIAGNOSIS — E11.22 TYPE 2 DIABETES MELLITUS WITH STAGE 3 CHRONIC KIDNEY DISEASE, WITH LONG-TERM CURRENT USE OF INSULIN: ICD-10-CM

## 2020-12-09 DIAGNOSIS — Z79.4 TYPE 2 DIABETES MELLITUS WITH STAGE 3 CHRONIC KIDNEY DISEASE, WITH LONG-TERM CURRENT USE OF INSULIN: ICD-10-CM

## 2020-12-09 DIAGNOSIS — N18.30 TYPE 2 DIABETES MELLITUS WITH STAGE 3 CHRONIC KIDNEY DISEASE, WITH LONG-TERM CURRENT USE OF INSULIN: ICD-10-CM

## 2020-12-15 ENCOUNTER — HOSPITAL ENCOUNTER (OUTPATIENT)
Dept: PREADMISSION TESTING | Facility: HOSPITAL | Age: 85
Discharge: HOME | End: 2020-12-15
Attending: PHYSICAL MEDICINE & REHABILITATION
Payer: MEDICARE

## 2020-12-15 DIAGNOSIS — Z01.818 PRE-OP TESTING: ICD-10-CM

## 2020-12-15 LAB — SARS-COV-2 RNA RESP QL NAA+PROBE: NOT DETECTED

## 2020-12-15 PROCEDURE — U0003 INFECTIOUS AGENT DETECTION BY NUCLEIC ACID (DNA OR RNA); SEVERE ACUTE RESPIRATORY SYNDROME CORONAVIRUS 2 (SARS-COV-2) (CORONAVIRUS DISEASE [COVID-19]), AMPLIFIED PROBE TECHNIQUE, MAKING USE OF HIGH THROUGHPUT TECHNOLOGIES AS DESCRIBED BY CMS-2020-01-R: HCPCS

## 2020-12-18 ENCOUNTER — HOSPITAL ENCOUNTER (OUTPATIENT)
Dept: RADIOLOGY | Facility: HOSPITAL | Age: 85
Discharge: HOME | End: 2020-12-18
Attending: PHYSICAL MEDICINE & REHABILITATION
Payer: MEDICARE

## 2020-12-18 ENCOUNTER — PATIENT MESSAGE (OUTPATIENT)
Dept: SURGERY | Facility: HOSPITAL | Age: 85
End: 2020-12-18

## 2020-12-18 ENCOUNTER — HOSPITAL ENCOUNTER (OUTPATIENT)
Facility: HOSPITAL | Age: 85
Discharge: HOME | End: 2020-12-18
Attending: PHYSICAL MEDICINE & REHABILITATION | Admitting: PHYSICAL MEDICINE & REHABILITATION
Payer: MEDICARE

## 2020-12-18 VITALS
SYSTOLIC BLOOD PRESSURE: 141 MMHG | DIASTOLIC BLOOD PRESSURE: 68 MMHG | OXYGEN SATURATION: 97 % | HEART RATE: 78 BPM | TEMPERATURE: 97 F | RESPIRATION RATE: 17 BRPM

## 2020-12-18 DIAGNOSIS — R52 PAIN: ICD-10-CM

## 2020-12-18 DIAGNOSIS — M47.816 LUMBAR SPONDYLOSIS: ICD-10-CM

## 2020-12-18 DIAGNOSIS — M47.816 LUMBAR SPONDYLOSIS: Primary | ICD-10-CM

## 2020-12-18 PROCEDURE — 25000003 PHARM REV CODE 250: Performed by: PHYSICAL MEDICINE & REHABILITATION

## 2020-12-18 PROCEDURE — 64494 INJ PARAVERT F JNT L/S 2 LEV: CPT | Mod: 50 | Performed by: PHYSICAL MEDICINE & REHABILITATION

## 2020-12-18 PROCEDURE — 64493 INJ PARAVERT F JNT L/S 1 LEV: CPT | Mod: 50 | Performed by: PHYSICAL MEDICINE & REHABILITATION

## 2020-12-18 PROCEDURE — 64494 INJ PARAVERT F JNT L/S 2 LEV: CPT | Mod: 50,,, | Performed by: PHYSICAL MEDICINE & REHABILITATION

## 2020-12-18 PROCEDURE — 63600175 PHARM REV CODE 636 W HCPCS: Performed by: PHYSICAL MEDICINE & REHABILITATION

## 2020-12-18 PROCEDURE — 64494 PR INJ DX/THER AGNT PARAVERT FACET JOINT,IMG GUIDE,LUMBAR/SAC, 2ND LEVEL: ICD-10-PCS | Mod: 50,,, | Performed by: PHYSICAL MEDICINE & REHABILITATION

## 2020-12-18 PROCEDURE — 64493 INJ PARAVERT F JNT L/S 1 LEV: CPT | Mod: 50,,, | Performed by: PHYSICAL MEDICINE & REHABILITATION

## 2020-12-18 PROCEDURE — 64493 PR INJ DX/THER AGNT PARAVERT FACET JOINT,IMG GUIDE,LUMBAR/SAC,1ST LVL: ICD-10-PCS | Mod: 50,,, | Performed by: PHYSICAL MEDICINE & REHABILITATION

## 2020-12-18 RX ORDER — LIDOCAINE HYDROCHLORIDE 10 MG/ML
INJECTION INFILTRATION; PERINEURAL
Status: DISCONTINUED | OUTPATIENT
Start: 2020-12-18 | End: 2020-12-18 | Stop reason: HOSPADM

## 2020-12-18 RX ORDER — BUPIVACAINE HYDROCHLORIDE 2.5 MG/ML
INJECTION, SOLUTION EPIDURAL; INFILTRATION; INTRACAUDAL
Status: DISCONTINUED
Start: 2020-12-18 | End: 2020-12-18 | Stop reason: HOSPADM

## 2020-12-18 RX ORDER — FENTANYL CITRATE 50 UG/ML
INJECTION, SOLUTION INTRAMUSCULAR; INTRAVENOUS
Status: DISCONTINUED
Start: 2020-12-18 | End: 2020-12-18 | Stop reason: HOSPADM

## 2020-12-18 RX ORDER — MIDAZOLAM HYDROCHLORIDE 1 MG/ML
INJECTION INTRAMUSCULAR; INTRAVENOUS
Status: DISCONTINUED
Start: 2020-12-18 | End: 2020-12-18 | Stop reason: HOSPADM

## 2020-12-18 RX ORDER — MIDAZOLAM HYDROCHLORIDE 1 MG/ML
INJECTION INTRAMUSCULAR; INTRAVENOUS
Status: DISCONTINUED | OUTPATIENT
Start: 2020-12-18 | End: 2020-12-18 | Stop reason: HOSPADM

## 2020-12-18 RX ORDER — LIDOCAINE HYDROCHLORIDE 10 MG/ML
INJECTION INFILTRATION; PERINEURAL
Status: DISCONTINUED
Start: 2020-12-18 | End: 2020-12-18 | Stop reason: HOSPADM

## 2020-12-18 RX ORDER — FENTANYL CITRATE 50 UG/ML
INJECTION, SOLUTION INTRAMUSCULAR; INTRAVENOUS
Status: DISCONTINUED | OUTPATIENT
Start: 2020-12-18 | End: 2020-12-18 | Stop reason: HOSPADM

## 2020-12-18 RX ORDER — SODIUM CHLORIDE 9 MG/ML
INJECTION, SOLUTION INTRAVENOUS CONTINUOUS
Status: DISCONTINUED | OUTPATIENT
Start: 2020-12-18 | End: 2020-12-18 | Stop reason: HOSPADM

## 2020-12-18 RX ORDER — BUPIVACAINE HYDROCHLORIDE 2.5 MG/ML
INJECTION, SOLUTION EPIDURAL; INFILTRATION; INTRACAUDAL
Status: DISCONTINUED | OUTPATIENT
Start: 2020-12-18 | End: 2020-12-18 | Stop reason: HOSPADM

## 2020-12-18 NOTE — DISCHARGE SUMMARY
[facility]  Discharge Note  Short Stay     Admit Date: 12/18/2020    Discharge Date: 12/18/2020     Attending Physician: Maria Guadalupe Ortiz M.D.    Diagnoses:  Active Hospital Problems    Diagnosis  POA    Lumbar spondylosis [M47.816]  Yes      Resolved Hospital Problems   No resolved problems to display.     Discharged Condition: Good     Hospital Course: Patient was admitted for an outpatient interventional pain management procedure and tolerated the procedure well with no complications.     Final Diagnoses: Same as principal problem.     Disposition: Home or Self Care     Follow up/Patient Instructions:   Follow-up in 1-2 weeks unless otherwise instructed. May return sooner as needed.       Reconciled Medications:     Medication List      CHANGE how you take these medications    lisinopriL 10 MG tablet  Take 1 tablet (10 mg total) by mouth once daily.  What changed: when to take this     sucralfate 1 gram tablet  Commonly known as: CARAFATE  Take 1 tablet (1 g total) by mouth 4 (four) times daily before meals and nightly.  What changed: when to take this        CONTINUE taking these medications    blood sugar diagnostic Strp  Commonly known as: BLOOD GLUCOSE TEST  Use one Accu-Chek Felicia Plus Test Strip per glucometer to test blood glucose three times a day. Dx: E11.22     blood-glucose meter kit  Accu-Chek Felicia Plus Meter to test blood glucose three times a day. Dx: E11.22     bumetanide 1 MG tablet  Commonly known as: BUMEX  Take 1 tablet (1 mg total) by mouth once daily.     citalopram 20 MG tablet  Commonly known as: CELEXA  Take 1 tablet (20 mg total) by mouth once daily.     ELIQUIS 5 mg Tab  Generic drug: apixaban  Take 1 tablet (5 mg total) by mouth 2 (two) times daily.     HYDROcodone-acetaminophen  mg per tablet  Commonly known as: NORCO  Take 1 tablet by mouth every 6 (six) hours as needed for Pain.     lancets Misc  Use one Accu-Chek Softclix Lancet per lancing device to test blood  "glucose three times a day. Dx: E11.22     LANTUS SOLOSTAR U-100 INSULIN glargine 100 units/mL (3mL) SubQ pen  Generic drug: insulin  INJECT 42 UNITS SUBCUTANEOUSLY EVERY EVENING     levoFLOXacin 500 MG tablet  Commonly known as: LEVAQUIN  Take 500 mg by mouth once daily.     metFORMIN 1000 MG tablet  Commonly known as: GLUCOPHAGE  Take 1 tablet (1,000 mg total) by mouth 2 (two) times daily with meals.     metoprolol succinate 25 MG 24 hr tablet  Commonly known as: TOPROL-XL  Take 25 mg by mouth 2 (two) times daily.     pen needle, diabetic 31 gauge x 3/16" Ndle  1 Device by Misc.(Non-Drug; Combo Route) route 3 (three) times daily.     pioglitazone 15 MG tablet  Commonly known as: ACTOS  Take 1 tablet (15 mg total) by mouth once daily.     rosuvastatin 40 MG Tab  Commonly known as: CRESTOR  40 mg.     SITagliptin 100 MG Tab  Commonly known as: JANUVIA  Take 1 tablet (100 mg total) by mouth once daily.     tamsulosin 0.4 mg Cap  Commonly known as: FLOMAX  TAKE 1 CAPSULE EVERY DAY     traZODone 50 MG tablet  Commonly known as: DESYREL  TAKE 1 TABLET EVERY EVENING AS NEEDED  FOR  SLEEP     * UNABLE TO FIND  1,500 mg. medication name: CBD Oil     * UNABLE TO FIND  5 mg. medication name: Hemp Caps     * UNABLE TO FIND  22.5 mg. Elegard every 3 month injection         * This list has 3 medication(s) that are the same as other medications prescribed for you. Read the directions carefully, and ask your doctor or other care provider to review them with you.               Discharge Procedure Orders (must include Diet, Follow-up, Activity)   Ice to affected area   Order Comments: 20 minutes of ice or until area numb to the touch if area is sore 2-3 times per day as needed     No driving until:   Order Comments: Until following day     Notify your health care provider if you experience any of the following:  temperature >100.4     Notify your health care provider if you experience any of the following:  persistent nausea and " vomiting or diarrhea     Notify your health care provider if you experience any of the following:  severe uncontrolled pain     Notify your health care provider if you experience any of the following:  redness, tenderness, or signs of infection (pain, swelling, redness, odor or green/yellow discharge around incision site)     Notify your health care provider if you experience any of the following:  difficulty breathing or increased cough     Notify your health care provider if you experience any of the following:  severe persistent headache     Notify your health care provider if you experience any of the following:  worsening rash     Notify your health care provider if you experience any of the following:  persistent dizziness, light-headedness, or visual disturbances     Notify your health care provider if you experience any of the following:  increased confusion or weakness     No dressing needed     Shower on day dressing removed (No bath)       Maria Guadalupe Ortiz M.D.  Interventional Pain Medicine / Physical Medicine & Rehabilitation

## 2020-12-18 NOTE — OP NOTE
Lumbar Medial Branch Block Under Fluoroscopic Guidance:  I have reviewed the patient's medications, allergies and relevant histories prior to the procedure and no contraindications have been identified. The risks, benefits and alternatives to the procedure were discussed with the patient, including bleeding, infection, discomfort, nerve injury, weakness, numbness or bowel and bladder dysfunction. All questions regarding the procedure were answered to the patient's satisfaction. I personally obtained Erasmo's consent prior to the start of the procedure and the signed consent can be found in the patient's chart.                                                         Time-out was taken to identify patient, procedure, laterality, and allergies prior to starting the procedure.       Date of Service: 12/18/2020  Procedure: Bilateral L4-5, L5-S1 zygapophyseal facet joint block  Pre-Operative Diagnosis: Lumbar Spondylosis  Post-Operative Diagnosis: Lumbar Spondylosis    Physician: Maria Guadalupe Ortiz M.D.  Assistants: None    Medications Injected: Preservative-free Bupivicaine 0.25% (1 mL at each level)  Local Anesthetic: Xylocaine 1% 10 mL.   Sedation Medications: Versed 1 mg, Fentanyl 25 mcg. Under my direct observation, intravenous moderate sedation was administered during the course of this procedure, with continuous hemodynamic monitoring including blood pressure, EKG, and pulse oximetry. Please see RN documentation of total intraservice time for moderate sedation.    Procedural Technique:   Laying in a prone position, the patient was prepped and draped in the usual sterile fashion using ChloraPrep and fenestrated drape.  Continuous hemodynamic monitoring was initiated including blood pressure, EKG, and pulse oximetry. The vertebral level was determined under fluoroscopic guidance.  Local anesthetic was given by going down to the hub of the 25-gauge 1.5 inch needle and raising a wheel.  A 25-gauge 3.5 inch needle was  introduced to the anatomic location of the Bilateral medial branch of L3, L4, and L5 at the junction of the superior articular process and transverse process of L4, L5 & sacral ala respectively utilizing live fluoroscopy to block the above stated lumbar facet joints. After negative aspiration for blood, 1 ml of Omnipaque 300 contrast agent was slowly injected to confirm no vascular runoff.  Medication was then injected slowly. The needle was removed and bandage applied to the area.    Estimated Blood Loss:  None.  Complications:  None.     Disposition: The patient tolerated the procedure well. Vital signs remained stable throughout the procedure. The patient was taken to the recovery area and monitored. The patient was supplied with written discharge instructions for the procedure. If helpful, we can repeat as needed. If only short term relief, may proceed with radiofrequency ablation. The patient was discharged in a stable condition.    Follow-Up: We will see the patient back in 1-2 weeks or the patient may call to inform of status.

## 2020-12-20 ENCOUNTER — PATIENT MESSAGE (OUTPATIENT)
Dept: FAMILY MEDICINE | Facility: CLINIC | Age: 85
End: 2020-12-20

## 2020-12-21 ENCOUNTER — PATIENT OUTREACH (OUTPATIENT)
Dept: ADMINISTRATIVE | Facility: OTHER | Age: 85
End: 2020-12-21

## 2020-12-21 ENCOUNTER — PATIENT MESSAGE (OUTPATIENT)
Dept: SURGERY | Facility: HOSPITAL | Age: 85
End: 2020-12-21

## 2020-12-21 RX ORDER — PEN NEEDLE, DIABETIC 30 GX3/16"
1 NEEDLE, DISPOSABLE MISCELLANEOUS 3 TIMES DAILY
Qty: 100 EACH | Refills: 11 | Status: SHIPPED | OUTPATIENT
Start: 2020-12-21 | End: 2022-06-20 | Stop reason: SDUPTHER

## 2020-12-22 ENCOUNTER — OFFICE VISIT (OUTPATIENT)
Dept: PAIN MEDICINE | Facility: CLINIC | Age: 85
End: 2020-12-22
Payer: MEDICARE

## 2020-12-22 DIAGNOSIS — M25.561 CHRONIC KNEE PAIN AFTER TOTAL REPLACEMENT OF RIGHT KNEE JOINT: ICD-10-CM

## 2020-12-22 DIAGNOSIS — M47.816 LUMBAR SPONDYLOSIS: Primary | ICD-10-CM

## 2020-12-22 DIAGNOSIS — Z96.651 CHRONIC KNEE PAIN AFTER TOTAL REPLACEMENT OF RIGHT KNEE JOINT: ICD-10-CM

## 2020-12-22 DIAGNOSIS — M48.061 SPINAL STENOSIS OF LUMBAR REGION WITHOUT NEUROGENIC CLAUDICATION: ICD-10-CM

## 2020-12-22 DIAGNOSIS — M79.2 NEURALGIA: ICD-10-CM

## 2020-12-22 DIAGNOSIS — G89.29 CHRONIC KNEE PAIN AFTER TOTAL REPLACEMENT OF RIGHT KNEE JOINT: ICD-10-CM

## 2020-12-22 DIAGNOSIS — M51.36 DDD (DEGENERATIVE DISC DISEASE), LUMBAR: ICD-10-CM

## 2020-12-22 PROCEDURE — 1159F MED LIST DOCD IN RCRD: CPT | Mod: 95,,, | Performed by: NURSE PRACTITIONER

## 2020-12-22 PROCEDURE — 99213 OFFICE O/P EST LOW 20 MIN: CPT | Mod: 95,,, | Performed by: NURSE PRACTITIONER

## 2020-12-22 PROCEDURE — 99213 PR OFFICE/OUTPT VISIT, EST, LEVL III, 20-29 MIN: ICD-10-PCS | Mod: 95,,, | Performed by: NURSE PRACTITIONER

## 2020-12-22 PROCEDURE — 1159F PR MEDICATION LIST DOCUMENTED IN MEDICAL RECORD: ICD-10-PCS | Mod: 95,,, | Performed by: NURSE PRACTITIONER

## 2020-12-22 NOTE — PROGRESS NOTES
[facility] Established  Clinic Visit  telemedicine Encounter    Telemedicine Bundle:  This visit was completed during the Coronavirus Crisis to enhance patient safety.  The patient location is: patient's home  The chief complaint leading to consultation is: Low-back Pain  Visit type: Virtual visit with synchronous audio and video  Total time spent with patient: 15 minutes   Each patient to whom he or she provides medical services by telemedicine is:    (1) informed of the relationship between the physician and patient and the respective role of any other health care provider with respect to management of the patient  (2) notified that he or she may decline to receive medical services by telemedicine and may withdraw from such care at any time.      Chief Complaint:   Chief Complaint   Patient presents with    Low-back Pain       History of Present Illness: Erasmo Dunbar is a 85 y.o. male referred by No ref. provider found for chronic LBP.      Onset: years of back pain, but worsened in the past few months  Location: right sided lower back  Radiation: across lower back on the left side, and sometimes into the posterior thighs, but not below the knees.   Timing: Intermittent, only when he stands and walks and completely improves with sitting down  Quality: Throbbing, Deep and Sharp  Exacerbating Factors: standing and walking  Alleviating Factors: sitting, heat, ice, medications and rest  Associated Symptoms: He has numbness in his feet and legs from neuropathy. denies night fever/night sweats, urinary incontinence, bowel incontinence, significant weight loss and significant motor weakness     Severity: Currently: 8/10   Typical Range: 5-10/10     Exacerbation: 10/10     Hydrocodone 7.5/325 mg brings pain from a 10/10 to a 9/10 for maybe 3-4 hours. He denies any side effects.      He also notes bilateral knee pain. He has a history of right knee replacement in 2017. Knee pain worse with standing and  walking. Braces help. Medications help, but not completely. He does feel swelling in the left knee at times. Knees will go out on him at times.     Interval History (11/20/2020)::  Erasmo Dunbar returns today for follow up.  At the last clinic visit, performed right cluneal nerve block. Patient was scheduled for Right Genicular nerve block.     Right Genicular nerve block provided 95% relief. Cluneal nerve block provided relief for a few days.     Currently, the knee pain is improved.  He is noting mostly bilateral low back pain and bilateral lateral hip pain. Pain is not radiating down into the legs. Pain is worse with standing and walking. He feels weakness around the hips. He has numbness in both feet and legs.     No significant interval events or traumas. No change in bowel or bladder function, & no new weakness or numbness is reported. No new musculoskeletal pain complaints.    Current Pain Scales:  Current: 1/10              Typical Range: 7-9/10      Interval updates:   12/22/[id number]-cinl-old male presents status post bilateral L4-5, L5-S1 lumbar MBB on 12/18/2020 patient reports % relief for 2 hr and then pain slowly returned.  Patient states his bilateral low back and bilateral hip pain continues he continues to deny  pain radiating into his legs.    Previous Interventions:  -12/18/2020 Bilateral L4-5, L5-S1 zygapophyseal facet joint block   - 10/30/20: Right genicular nerve block w/ 95% relief  - Dr. Varma, Dr. Jain both provided injections in the past, with limited benefit.     Previous Therapies:  PT/OT: yes a few years ago  Relevant Surgery: yes    - Right knee replacement in 2017   - Cervical fusion 30 years ago  Previous Medications:   - NSAIDS: Tylenol. Avoiding other nsaids due to blood thinners.  - Muscle Relaxants:    - TCAs:   - SNRIs:   - Topicals: Many different topicals.   - Anticonvulsants:    - Opioids: Hydrocodone    Current Pain Medications:  1. Norco 5-325 mg BID prn  "(last Rx-ed by Harry Varma, filled on 9/2/20)      Blood Thinners: Eliquis    Full Medication List:    Current Outpatient Medications:     blood sugar diagnostic (BLOOD GLUCOSE TEST) Strp, Use one Accu-Chek Felicia Plus Test Strip per glucometer to test blood glucose three times a day. Dx: E11.22, Disp: 600 strip, Rfl: 3    blood-glucose meter kit, Accu-Chek Felicia Plus Meter to test blood glucose three times a day. Dx: E11.22, Disp: 1 each, Rfl: 0    bumetanide (BUMEX) 1 MG tablet, Take 1 tablet (1 mg total) by mouth once daily., Disp: 30 tablet, Rfl: 5    citalopram (CELEXA) 20 MG tablet, Take 1 tablet (20 mg total) by mouth once daily., Disp: 90 tablet, Rfl: 1    ELIQUIS 5 mg Tab, Take 1 tablet (5 mg total) by mouth 2 (two) times daily., Disp: 180 tablet, Rfl: 1    HYDROcodone-acetaminophen (NORCO)  mg per tablet, Take 1 tablet by mouth every 6 (six) hours as needed for Pain., Disp: 60 tablet, Rfl: 0    lancets Misc, Use one Accu-Chek Softclix Lancet per lancing device to test blood glucose three times a day. Dx: E11.22, Disp: 600 each, Rfl: 3    LANTUS SOLOSTAR U-100 INSULIN glargine 100 units/mL (3mL) SubQ pen, INJECT 42 UNITS SUBCUTANEOUSLY EVERY EVENING, Disp: 45 mL, Rfl: 5    levoFLOXacin (LEVAQUIN) 500 MG tablet, Take 500 mg by mouth once daily., Disp: , Rfl:     lisinopriL 10 MG tablet, Take 1 tablet (10 mg total) by mouth once daily. (Patient taking differently: Take 10 mg by mouth 2 (two) times daily. ), Disp: 90 tablet, Rfl: 1    metFORMIN (GLUCOPHAGE) 1000 MG tablet, Take 1 tablet (1,000 mg total) by mouth 2 (two) times daily with meals., Disp: 180 tablet, Rfl: 1    metoprolol succinate (TOPROL-XL) 25 MG 24 hr tablet, Take 25 mg by mouth 2 (two) times daily., Disp: , Rfl:     pen needle, diabetic 31 gauge x 3/16" Ndle, 1 Device by Misc.(Non-Drug; Combo Route) route 3 (three) times daily., Disp: 100 each, Rfl: 11    pioglitazone (ACTOS) 15 MG tablet, Take 1 tablet (15 mg total) by " mouth once daily., Disp: 90 tablet, Rfl: 1    rosuvastatin (CRESTOR) 40 MG Tab, 40 mg. , Disp: , Rfl:     SITagliptin (JANUVIA) 100 MG Tab, Take 1 tablet (100 mg total) by mouth once daily., Disp: 90 tablet, Rfl: 5    sucralfate (CARAFATE) 1 gram tablet, Take 1 tablet (1 g total) by mouth 4 (four) times daily before meals and nightly. (Patient taking differently: Take 1 g by mouth once daily. ), Disp: 120 tablet, Rfl: 5    tamsulosin (FLOMAX) 0.4 mg Cap, TAKE 1 CAPSULE EVERY DAY, Disp: 90 capsule, Rfl: 1    traZODone (DESYREL) 50 MG tablet, TAKE 1 TABLET EVERY EVENING AS NEEDED  FOR  SLEEP, Disp: 90 tablet, Rfl: 1    UNABLE TO FIND, 1,500 mg. medication name: CBD Oil , Disp: , Rfl:     UNABLE TO FIND, 5 mg. medication name: Hemp Caps , Disp: , Rfl:     UNABLE TO FIND, 22.5 mg. Elegard every 3 month injection, Disp: , Rfl:      Review of Systems:  Review of Systems   Constitutional: Negative for fever and weight loss.   HENT: Negative for ear pain and tinnitus.    Eyes: Negative for pain and redness.   Respiratory: Negative for cough and shortness of breath.    Cardiovascular: Negative for chest pain and palpitations.   Gastrointestinal: Positive for diarrhea. Negative for constipation and heartburn.   Genitourinary: Negative.         Denies urinary incontinence. Denies urine retention.    Musculoskeletal: Positive for back pain. Negative for neck pain.   Skin: Negative for itching and rash.   Neurological: Positive for tingling. Negative for focal weakness and seizures.   Endo/Heme/Allergies: Negative for environmental allergies. Bruises/bleeds easily.   Psychiatric/Behavioral: Negative for depression. The patient has insomnia. The patient is not nervous/anxious.        Allergies:  Iodinated contrast media and Iodine     Medical History:   has a past medical history of Arthritis, Atrial fibrillation, Bladder mass, BPH (benign prostatic hyperplasia), CHF (congestive heart failure), Depression, Diabetes  mellitus type II, Hyperlipidemia, Hypertension, Microscopic hematuria, Prostate cancer (10/01/2018), RLS (restless legs syndrome), Stroke, and Wears glasses.    Surgical History:   has a past surgical history that includes Transurethral resection of prostate; Prostate surgery; Rotator cuff repair (Right); Ganglion Cyst Removed  (Right); Neck Fusion (Bilateral); Colectomy (N/A); Cystoscopy (N/A); Back surgery; Cardiac surgery (Left); Skin biopsy; Total knee arthroplasty (03/30/2017); Cardiac pacemaker placement; back injections; Cystoscopy w/ retrogrades (Bilateral, 10/21/2019); Digital rectal examination under anesthesia (N/A, 10/21/2019); Cataract extraction w/  intraocular lens implant (Bilateral); Injection of anesthetic agent around nerve (Right, 10/30/2020); and Injection of anesthetic agent around medial branch nerves innervating lumbar facet joint (Bilateral, 12/18/2020).    Family History:  family history includes COPD in his daughter; Cancer in his mother; Diabetes in his brother, daughter, and sister; Heart disease in his brother and father; Seizures in his daughter.    Social History:   reports that he quit smoking about 40 years ago. His smoking use included cigarettes. He has a 52.50 pack-year smoking history. He has never used smokeless tobacco. He reports current alcohol use of about 8.0 standard drinks of alcohol per week. He reports that he does not use drugs.    Physical Exam:  There were no vitals taken for this visit.  There was no physical exam done for this virtual visit       Imaging:  - X-ray Lumbar Spine 5/13/20:  Moderate facet arthropathy throughout the lower lumbar spine greatest at L5/S1.  Diffuse osteopenia.  Mild spondylosis L3/4. Overall alignment is well maintained.  No evidence of spondylolysis or spondylolisthesis. Disk and vertebral body heights are normal.  Severe atherosclerotic disease.  Mild constipation.    - CT Lumbar spine 3/19/19:  The incidentally visualized soft tissue  structures of the abdomen show calcifications of the aorta and its major branch vessels.  Vertebral body alignment and heights are maintained.  There is disc space narrowing at the L3-4 level.  No fracture or subluxation is identified.  At T12-L1, no disc herniation, central canal stenosis or neural foraminal narrowing.  At L1-2, no disc herniation, central canal stenosis or neural foraminal narrowing.  At L2-3, there is a broad-based posterior disc bulge contributing to mild central canal stenosis with mild bilateral neural foraminal narrowing.  At L3-4, there is a broad-based posterior disc bulge with ligamentum flavum hypertrophy contributing to mild central canal stenosis with moderate right and moderate severe left-sided neural foraminal narrowing.  At L4-5, there is a broad-based posterior disc bulge with facet arthropathy contributing to mild central canal stenosis with moderate bilateral neural foraminal narrowing.  At L5-S1, there is a broad-based posterior disc bulge and facet arthropathy contributing to mild central canal stenosis with moderate left and moderate severe right-sided neural foraminal narrowing.  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  Please see above report for level by level description.      Labs:  BMP  Lab Results   Component Value Date     06/11/2020    K 4.7 06/11/2020    CL 99 06/11/2020    CO2 27 06/11/2020    BUN 16 06/11/2020    CREATININE 1.2 06/11/2020    CALCIUM 9.7 06/11/2020    ANIONGAP 11 06/11/2020    ESTGFRAFRICA >60 06/11/2020    EGFRNONAA 55 (A) 06/11/2020     Lab Results   Component Value Date    ALT 16 01/20/2020    AST 15 01/20/2020    ALKPHOS 77 01/20/2020    BILITOT 0.3 01/20/2020     Lab Results   Component Value Date     (H) 11/18/2019       Assessment:  Erasmo Dunbar is a 85 y.o. 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 gentleman presenting with:     - Chronic low back pain: His pain appears predominantly facetogenic in nature.   - Bilateral trochanteric bursitis  - Chronic right knee pain after total knee replacement  - Chronic opioid use: I do think this is appropriate considering his age and limited medication options due to comorbidities.   - Comorbidities: Chronic anticoagulation on eliquis for paroxysmal A-fib, pacemaker    12/22/[id number]-ysrq-old male presents status post bilateral lumbar MBB targeting L4-5, L5-S1 on 12/18/2020, patient reporting % relief for 2 hr and then pain slowly returned.  Patient reports that his primary source of pain is low back and bilateral hips discussed with patient that we will repeat his bilateral L4-5, L5-S1 lumbar MBB and schedule an RFA if appropriate.    Treatment Plan:   - PT/OT/HEP:  Has tried in the past. Discussed benefits of exercise for pain.   - Procedures:  Repeat bilateral lumbar MBB targeting L4-5, and L5-S1  - Can plan for right genicular nerve RFA when pain worsens, but back pain worse currently and he would like to address  - Medications: Agree with hydrocodone-APAP for pain considering his age and limited options. Increase to  mg BID prn for back pain.  He endorses inadequate benefit (< 30%) with lower dose. No signs of side effects or aberrancy.   - Counseled patient regarding the risks and benefits of chronic opioid therapy for chronic painful conditions and patient expresses understanding.  I discussed with patient that he is able to limit the amount filled of the prescription if he decides to.  -   reviewed  - Opioid contract signed 10/12/2020    - Imaging: Reviewed.   - Labs: Reviewed.  Medications are appropriately dosed for current hepatorenal function.  - Awaiting records from Dr. Harry Varma    Follow Up: RTC after 2nd lumbar MBBs    Amos Cueva NP-[facility]  [facility]      Disclaimer: This note was partly generated using dictation software which may occasionally result in transcription errors.

## 2020-12-23 ENCOUNTER — TELEPHONE (OUTPATIENT)
Dept: PAIN MEDICINE | Facility: CLINIC | Age: 85
End: 2020-12-23

## 2020-12-23 DIAGNOSIS — M47.816 LUMBAR SPONDYLOSIS: Primary | ICD-10-CM

## 2020-12-23 NOTE — TELEPHONE ENCOUNTER
----- Message from CAROLYN Worley sent at 12/22/2020  2:42 PM CST -----  Regarding: FW: plan  Can you please reschedule this patient for a 2nd Lumbar MBB.     Kaila Trinidad   ----- Message -----  From: Maria Guadalupe Ortiz MD  Sent: 12/22/2020  11:46 AM CST  To: CAROLYN Worley  Subject: RE: plan                                         Hmm, shouldve been longer. lets repeat  ----- Message -----  From: CAROLYN Worley  Sent: 12/22/2020  11:40 AM CST  To: Maria Guadalupe Ortiz MD  Subject: plan                                             %  relief reported for 2 hr following lumbar MBB L4/5, L5/S1. You want to repeat?    CL

## 2021-01-02 ENCOUNTER — PATIENT MESSAGE (OUTPATIENT)
Dept: FAMILY MEDICINE | Facility: CLINIC | Age: 86
End: 2021-01-02

## 2021-01-02 DIAGNOSIS — I48.0 PAROXYSMAL ATRIAL FIBRILLATION: ICD-10-CM

## 2021-01-02 DIAGNOSIS — R60.9 EDEMA, UNSPECIFIED TYPE: ICD-10-CM

## 2021-01-04 RX ORDER — APIXABAN 5 MG/1
5 TABLET, FILM COATED ORAL 2 TIMES DAILY
Qty: 180 TABLET | Refills: 0 | Status: SHIPPED | OUTPATIENT
Start: 2021-01-04 | End: 2021-02-02 | Stop reason: SDUPTHER

## 2021-01-04 RX ORDER — BUMETANIDE 1 MG/1
1 TABLET ORAL DAILY
Qty: 90 TABLET | Refills: 0 | Status: SHIPPED | OUTPATIENT
Start: 2021-01-04 | End: 2021-02-02 | Stop reason: SDUPTHER

## 2021-01-06 ENCOUNTER — TELEPHONE (OUTPATIENT)
Dept: FAMILY MEDICINE | Facility: CLINIC | Age: 86
End: 2021-01-06

## 2021-01-10 ENCOUNTER — IMMUNIZATION (OUTPATIENT)
Dept: FAMILY MEDICINE | Facility: CLINIC | Age: 86
End: 2021-01-10
Payer: MEDICARE

## 2021-01-10 DIAGNOSIS — Z23 NEED FOR VACCINATION: ICD-10-CM

## 2021-01-10 PROCEDURE — 91300 COVID-19, MRNA, LNP-S, PF, 30 MCG/0.3 ML DOSE VACCINE: ICD-10-PCS | Mod: ,,, | Performed by: FAMILY MEDICINE

## 2021-01-10 PROCEDURE — 0001A COVID-19, MRNA, LNP-S, PF, 30 MCG/0.3 ML DOSE VACCINE: CPT | Mod: CV19,,, | Performed by: FAMILY MEDICINE

## 2021-01-10 PROCEDURE — 0001A COVID-19, MRNA, LNP-S, PF, 30 MCG/0.3 ML DOSE VACCINE: ICD-10-PCS | Mod: CV19,,, | Performed by: FAMILY MEDICINE

## 2021-01-10 PROCEDURE — 91300 COVID-19, MRNA, LNP-S, PF, 30 MCG/0.3 ML DOSE VACCINE: CPT | Mod: ,,, | Performed by: FAMILY MEDICINE

## 2021-01-12 ENCOUNTER — HOSPITAL ENCOUNTER (OUTPATIENT)
Dept: PREADMISSION TESTING | Facility: HOSPITAL | Age: 86
Discharge: HOME | End: 2021-01-12
Attending: PHYSICAL MEDICINE & REHABILITATION
Payer: COMMERCIAL

## 2021-01-12 DIAGNOSIS — Z01.818 PRE-OP TESTING: ICD-10-CM

## 2021-01-12 PROCEDURE — U0003 INFECTIOUS AGENT DETECTION BY NUCLEIC ACID (DNA OR RNA); SEVERE ACUTE RESPIRATORY SYNDROME CORONAVIRUS 2 (SARS-COV-2) (CORONAVIRUS DISEASE [COVID-19]), AMPLIFIED PROBE TECHNIQUE, MAKING USE OF HIGH THROUGHPUT TECHNOLOGIES AS DESCRIBED BY CMS-2020-01-R: HCPCS

## 2021-01-13 LAB — SARS-COV-2 RNA RESP QL NAA+PROBE: NOT DETECTED

## 2021-01-15 ENCOUNTER — HOSPITAL ENCOUNTER (OUTPATIENT)
Facility: HOSPITAL | Age: 86
Discharge: HOME | End: 2021-01-15
Attending: PHYSICAL MEDICINE & REHABILITATION | Admitting: PHYSICAL MEDICINE & REHABILITATION
Payer: MEDICARE

## 2021-01-15 ENCOUNTER — HOSPITAL ENCOUNTER (OUTPATIENT)
Dept: RADIOLOGY | Facility: HOSPITAL | Age: 86
Discharge: HOME | End: 2021-01-15
Attending: PHYSICAL MEDICINE & REHABILITATION
Payer: COMMERCIAL

## 2021-01-15 VITALS
OXYGEN SATURATION: 94 % | HEART RATE: 69 BPM | RESPIRATION RATE: 16 BRPM | SYSTOLIC BLOOD PRESSURE: 158 MMHG | DIASTOLIC BLOOD PRESSURE: 72 MMHG | TEMPERATURE: 97 F

## 2021-01-15 DIAGNOSIS — M47.816 LUMBAR SPONDYLOSIS: Primary | ICD-10-CM

## 2021-01-15 DIAGNOSIS — G89.29 CHRONIC PAIN: ICD-10-CM

## 2021-01-15 DIAGNOSIS — M47.816 LUMBAR SPONDYLOSIS: ICD-10-CM

## 2021-01-15 PROCEDURE — 64493 INJ PARAVERT F JNT L/S 1 LEV: CPT | Mod: 50,,, | Performed by: PHYSICAL MEDICINE & REHABILITATION

## 2021-01-15 PROCEDURE — 64494 PR INJ DX/THER AGNT PARAVERT FACET JOINT,IMG GUIDE,LUMBAR/SAC, 2ND LEVEL: ICD-10-PCS | Mod: 50,,, | Performed by: PHYSICAL MEDICINE & REHABILITATION

## 2021-01-15 PROCEDURE — 64494 INJ PARAVERT F JNT L/S 2 LEV: CPT | Mod: 50,,, | Performed by: PHYSICAL MEDICINE & REHABILITATION

## 2021-01-15 PROCEDURE — 64494 INJ PARAVERT F JNT L/S 2 LEV: CPT | Mod: 50 | Performed by: PHYSICAL MEDICINE & REHABILITATION

## 2021-01-15 PROCEDURE — 64493 PR INJ DX/THER AGNT PARAVERT FACET JOINT,IMG GUIDE,LUMBAR/SAC,1ST LVL: ICD-10-PCS | Mod: 50,,, | Performed by: PHYSICAL MEDICINE & REHABILITATION

## 2021-01-15 PROCEDURE — A9585 GADOBUTROL INJECTION: HCPCS | Performed by: PHYSICAL MEDICINE & REHABILITATION

## 2021-01-15 PROCEDURE — 25500020 PHARM REV CODE 255: Performed by: PHYSICAL MEDICINE & REHABILITATION

## 2021-01-15 PROCEDURE — 64493 INJ PARAVERT F JNT L/S 1 LEV: CPT | Mod: 50 | Performed by: PHYSICAL MEDICINE & REHABILITATION

## 2021-01-15 PROCEDURE — 25000003 PHARM REV CODE 250: Performed by: PHYSICAL MEDICINE & REHABILITATION

## 2021-01-15 RX ORDER — LIDOCAINE HYDROCHLORIDE 10 MG/ML
INJECTION INFILTRATION; PERINEURAL
Status: DISCONTINUED
Start: 2021-01-15 | End: 2021-01-15 | Stop reason: HOSPADM

## 2021-01-15 RX ORDER — BUPIVACAINE HYDROCHLORIDE 2.5 MG/ML
INJECTION, SOLUTION EPIDURAL; INFILTRATION; INTRACAUDAL
Status: DISCONTINUED | OUTPATIENT
Start: 2021-01-15 | End: 2021-01-15 | Stop reason: HOSPADM

## 2021-01-15 RX ORDER — MIDAZOLAM HYDROCHLORIDE 1 MG/ML
INJECTION INTRAMUSCULAR; INTRAVENOUS
Status: DISCONTINUED
Start: 2021-01-15 | End: 2021-01-15 | Stop reason: WASHOUT

## 2021-01-15 RX ORDER — FENTANYL CITRATE 50 UG/ML
INJECTION, SOLUTION INTRAMUSCULAR; INTRAVENOUS
Status: DISCONTINUED
Start: 2021-01-15 | End: 2021-01-15 | Stop reason: WASHOUT

## 2021-01-15 RX ORDER — GADOBUTROL 604.72 MG/ML
INJECTION INTRAVENOUS
Status: DISCONTINUED | OUTPATIENT
Start: 2021-01-15 | End: 2021-01-15 | Stop reason: HOSPADM

## 2021-01-15 RX ORDER — LIDOCAINE HYDROCHLORIDE 10 MG/ML
INJECTION INFILTRATION; PERINEURAL
Status: DISCONTINUED | OUTPATIENT
Start: 2021-01-15 | End: 2021-01-15 | Stop reason: HOSPADM

## 2021-01-15 RX ORDER — BUPIVACAINE HYDROCHLORIDE 2.5 MG/ML
INJECTION, SOLUTION EPIDURAL; INFILTRATION; INTRACAUDAL
Status: DISCONTINUED
Start: 2021-01-15 | End: 2021-01-15 | Stop reason: HOSPADM

## 2021-01-18 LAB
LEFT EYE DM RETINOPATHY: NEGATIVE
RIGHT EYE DM RETINOPATHY: NEGATIVE

## 2021-01-28 ENCOUNTER — CLINICAL SUPPORT (OUTPATIENT)
Dept: FAMILY MEDICINE | Facility: CLINIC | Age: 86
End: 2021-01-28
Payer: MEDICARE

## 2021-01-28 ENCOUNTER — PATIENT OUTREACH (OUTPATIENT)
Dept: ADMINISTRATIVE | Facility: OTHER | Age: 86
End: 2021-01-28

## 2021-01-28 DIAGNOSIS — N41.9 PROSTATITIS, UNSPECIFIED PROSTATITIS TYPE: ICD-10-CM

## 2021-01-28 DIAGNOSIS — E11.22 TYPE 2 DIABETES MELLITUS WITH CHRONIC KIDNEY DISEASE, WITHOUT LONG-TERM CURRENT USE OF INSULIN, UNSPECIFIED CKD STAGE: ICD-10-CM

## 2021-01-28 DIAGNOSIS — E55.9 VITAMIN D DEFICIENCY DISEASE: ICD-10-CM

## 2021-01-28 DIAGNOSIS — I10 ESSENTIAL HYPERTENSION: ICD-10-CM

## 2021-01-28 LAB
ALBUMIN SERPL BCP-MCNC: 3.8 G/DL (ref 3.5–5.2)
ALP SERPL-CCNC: 58 U/L (ref 55–135)
ALT SERPL W/O P-5'-P-CCNC: 11 U/L (ref 10–44)
ANION GAP SERPL CALC-SCNC: 10 MMOL/L (ref 8–16)
AST SERPL-CCNC: 13 U/L (ref 10–40)
BASOPHILS # BLD AUTO: 0.07 K/UL (ref 0–0.2)
BASOPHILS NFR BLD: 0.7 % (ref 0–1.9)
BILIRUB SERPL-MCNC: 0.3 MG/DL (ref 0.1–1)
BUN SERPL-MCNC: 16 MG/DL (ref 8–23)
CALCIUM SERPL-MCNC: 9.6 MG/DL (ref 8.7–10.5)
CHLORIDE SERPL-SCNC: 100 MMOL/L (ref 95–110)
CHOLEST SERPL-MCNC: 125 MG/DL (ref 120–199)
CHOLEST/HDLC SERPL: 2.9 {RATIO} (ref 2–5)
CO2 SERPL-SCNC: 28 MMOL/L (ref 23–29)
CREAT SERPL-MCNC: 1 MG/DL (ref 0.5–1.4)
DIFFERENTIAL METHOD: ABNORMAL
EOSINOPHIL # BLD AUTO: 0.5 K/UL (ref 0–0.5)
EOSINOPHIL NFR BLD: 4.4 % (ref 0–8)
ERYTHROCYTE [DISTWIDTH] IN BLOOD BY AUTOMATED COUNT: 14.1 % (ref 11.5–14.5)
EST. GFR  (AFRICAN AMERICAN): >60 ML/MIN/1.73 M^2
EST. GFR  (NON AFRICAN AMERICAN): >60 ML/MIN/1.73 M^2
GLUCOSE SERPL-MCNC: 123 MG/DL (ref 70–110)
HCT VFR BLD AUTO: 33.9 % (ref 40–54)
HDLC SERPL-MCNC: 43 MG/DL (ref 40–75)
HDLC SERPL: 34.4 % (ref 20–50)
HGB BLD-MCNC: 10.4 G/DL (ref 14–18)
IMM GRANULOCYTES # BLD AUTO: 0.02 K/UL (ref 0–0.04)
IMM GRANULOCYTES NFR BLD AUTO: 0.2 % (ref 0–0.5)
LDLC SERPL CALC-MCNC: 44.6 MG/DL (ref 63–159)
LYMPHOCYTES # BLD AUTO: 2.4 K/UL (ref 1–4.8)
LYMPHOCYTES NFR BLD: 24 % (ref 18–48)
MCH RBC QN AUTO: 28.1 PG (ref 27–31)
MCHC RBC AUTO-ENTMCNC: 30.7 G/DL (ref 32–36)
MCV RBC AUTO: 92 FL (ref 82–98)
MONOCYTES # BLD AUTO: 0.8 K/UL (ref 0.3–1)
MONOCYTES NFR BLD: 8.3 % (ref 4–15)
NEUTROPHILS # BLD AUTO: 6.3 K/UL (ref 1.8–7.7)
NEUTROPHILS NFR BLD: 62.4 % (ref 38–73)
NONHDLC SERPL-MCNC: 82 MG/DL
NRBC BLD-RTO: 0 /100 WBC
PLATELET # BLD AUTO: 371 K/UL (ref 150–350)
PMV BLD AUTO: 9.7 FL (ref 9.2–12.9)
POTASSIUM SERPL-SCNC: 5 MMOL/L (ref 3.5–5.1)
PROT SERPL-MCNC: 6.6 G/DL (ref 6–8.4)
RBC # BLD AUTO: 3.7 M/UL (ref 4.6–6.2)
SODIUM SERPL-SCNC: 138 MMOL/L (ref 136–145)
TRIGL SERPL-MCNC: 187 MG/DL (ref 30–150)
TSH SERPL DL<=0.005 MIU/L-ACNC: 0.92 UIU/ML (ref 0.4–4)
WBC # BLD AUTO: 10.13 K/UL (ref 3.9–12.7)

## 2021-01-28 PROCEDURE — 36415 COLL VENOUS BLD VENIPUNCTURE: CPT | Mod: S$GLB,,, | Performed by: FAMILY MEDICINE

## 2021-01-28 PROCEDURE — 85025 COMPLETE CBC W/AUTO DIFF WBC: CPT

## 2021-01-28 PROCEDURE — 80053 COMPREHEN METABOLIC PANEL: CPT

## 2021-01-28 PROCEDURE — 82306 VITAMIN D 25 HYDROXY: CPT

## 2021-01-28 PROCEDURE — 80061 LIPID PANEL: CPT

## 2021-01-28 PROCEDURE — 84443 ASSAY THYROID STIM HORMONE: CPT

## 2021-01-28 PROCEDURE — 36415 PR COLLECTION VENOUS BLOOD,VENIPUNCTURE: ICD-10-PCS | Mod: S$GLB,,, | Performed by: FAMILY MEDICINE

## 2021-01-28 PROCEDURE — 84153 ASSAY OF PSA TOTAL: CPT

## 2021-01-28 PROCEDURE — 83036 HEMOGLOBIN GLYCOSYLATED A1C: CPT

## 2021-01-28 PROCEDURE — 84154 ASSAY OF PSA FREE: CPT

## 2021-01-29 ENCOUNTER — OFFICE VISIT (OUTPATIENT)
Dept: PAIN MEDICINE | Facility: CLINIC | Age: 86
End: 2021-01-29
Payer: MEDICARE

## 2021-01-29 ENCOUNTER — HOSPITAL ENCOUNTER (OUTPATIENT)
Dept: RADIOLOGY | Facility: HOSPITAL | Age: 86
Discharge: HOME | End: 2021-01-29
Attending: PHYSICAL MEDICINE & REHABILITATION
Payer: MEDICARE

## 2021-01-29 VITALS
TEMPERATURE: 97 F | BODY MASS INDEX: 33.07 KG/M2 | HEIGHT: 66 IN | RESPIRATION RATE: 16 BRPM | DIASTOLIC BLOOD PRESSURE: 62 MMHG | WEIGHT: 205.81 LBS | HEART RATE: 64 BPM | SYSTOLIC BLOOD PRESSURE: 120 MMHG

## 2021-01-29 DIAGNOSIS — M47.816 LUMBAR SPONDYLOSIS: ICD-10-CM

## 2021-01-29 DIAGNOSIS — E11.42 DIABETIC POLYNEUROPATHY ASSOCIATED WITH TYPE 2 DIABETES MELLITUS: Primary | ICD-10-CM

## 2021-01-29 DIAGNOSIS — M25.562 CHRONIC PAIN OF LEFT KNEE: ICD-10-CM

## 2021-01-29 DIAGNOSIS — Z96.651 CHRONIC KNEE PAIN AFTER TOTAL REPLACEMENT OF RIGHT KNEE JOINT: ICD-10-CM

## 2021-01-29 DIAGNOSIS — G89.29 CHRONIC PAIN OF LEFT KNEE: ICD-10-CM

## 2021-01-29 DIAGNOSIS — G89.29 CHRONIC KNEE PAIN AFTER TOTAL REPLACEMENT OF RIGHT KNEE JOINT: ICD-10-CM

## 2021-01-29 DIAGNOSIS — M25.561 CHRONIC KNEE PAIN AFTER TOTAL REPLACEMENT OF RIGHT KNEE JOINT: ICD-10-CM

## 2021-01-29 DIAGNOSIS — M17.11 OSTEOARTHRITIS OF RIGHT KNEE, UNSPECIFIED OSTEOARTHRITIS TYPE: ICD-10-CM

## 2021-01-29 DIAGNOSIS — M48.061 SPINAL STENOSIS OF LUMBAR REGION WITHOUT NEUROGENIC CLAUDICATION: ICD-10-CM

## 2021-01-29 LAB
25(OH)D3+25(OH)D2 SERPL-MCNC: 16 NG/ML (ref 30–96)
ESTIMATED AVG GLUCOSE: 146 MG/DL (ref 68–131)
HBA1C MFR BLD: 6.7 % (ref 4–5.6)
PROSTATE SPECIFIC ANTIGEN, TOTAL: <0.01 NG/ML (ref 0–4)
PSA FREE MFR SERPL: NORMAL %
PSA FREE SERPL-MCNC: <0.01 NG/ML (ref 0.01–1.5)

## 2021-01-29 PROCEDURE — 1100F PR PT FALLS ASSESS DOC 2+ FALLS/FALL W/INJURY/YR: ICD-10-PCS | Mod: CPTII,S$GLB,, | Performed by: PHYSICAL MEDICINE & REHABILITATION

## 2021-01-29 PROCEDURE — 73562 X-RAY EXAM OF KNEE 3: CPT | Mod: 26,50,, | Performed by: RADIOLOGY

## 2021-01-29 PROCEDURE — 3078F PR MOST RECENT DIASTOLIC BLOOD PRESSURE < 80 MM HG: ICD-10-PCS | Mod: CPTII,S$GLB,, | Performed by: PHYSICAL MEDICINE & REHABILITATION

## 2021-01-29 PROCEDURE — 99214 OFFICE O/P EST MOD 30 MIN: CPT | Mod: S$GLB,,, | Performed by: PHYSICAL MEDICINE & REHABILITATION

## 2021-01-29 PROCEDURE — 3074F SYST BP LT 130 MM HG: CPT | Mod: CPTII,S$GLB,, | Performed by: PHYSICAL MEDICINE & REHABILITATION

## 2021-01-29 PROCEDURE — 73562 XR KNEE 3 VIEW BILATERAL: ICD-10-PCS | Mod: 26,50,, | Performed by: RADIOLOGY

## 2021-01-29 PROCEDURE — 99999 PR PBB SHADOW E&M-EST. PATIENT-LVL V: CPT | Mod: PBBFAC,,, | Performed by: PHYSICAL MEDICINE & REHABILITATION

## 2021-01-29 PROCEDURE — 3288F FALL RISK ASSESSMENT DOCD: CPT | Mod: CPTII,S$GLB,, | Performed by: PHYSICAL MEDICINE & REHABILITATION

## 2021-01-29 PROCEDURE — 3078F DIAST BP <80 MM HG: CPT | Mod: CPTII,S$GLB,, | Performed by: PHYSICAL MEDICINE & REHABILITATION

## 2021-01-29 PROCEDURE — 3288F PR FALLS RISK ASSESSMENT DOCUMENTED: ICD-10-PCS | Mod: CPTII,S$GLB,, | Performed by: PHYSICAL MEDICINE & REHABILITATION

## 2021-01-29 PROCEDURE — 99214 PR OFFICE/OUTPT VISIT, EST, LEVL IV, 30-39 MIN: ICD-10-PCS | Mod: S$GLB,,, | Performed by: PHYSICAL MEDICINE & REHABILITATION

## 2021-01-29 PROCEDURE — 99999 PR PBB SHADOW E&M-EST. PATIENT-LVL V: ICD-10-PCS | Mod: PBBFAC,,, | Performed by: PHYSICAL MEDICINE & REHABILITATION

## 2021-01-29 PROCEDURE — 3074F PR MOST RECENT SYSTOLIC BLOOD PRESSURE < 130 MM HG: ICD-10-PCS | Mod: CPTII,S$GLB,, | Performed by: PHYSICAL MEDICINE & REHABILITATION

## 2021-01-29 PROCEDURE — 1100F PTFALLS ASSESS-DOCD GE2>/YR: CPT | Mod: CPTII,S$GLB,, | Performed by: PHYSICAL MEDICINE & REHABILITATION

## 2021-01-29 PROCEDURE — 1125F AMNT PAIN NOTED PAIN PRSNT: CPT | Mod: S$GLB,,, | Performed by: PHYSICAL MEDICINE & REHABILITATION

## 2021-01-29 PROCEDURE — 1125F PR PAIN SEVERITY QUANTIFIED, PAIN PRESENT: ICD-10-PCS | Mod: S$GLB,,, | Performed by: PHYSICAL MEDICINE & REHABILITATION

## 2021-01-29 PROCEDURE — 73562 X-RAY EXAM OF KNEE 3: CPT | Mod: TC,50

## 2021-01-29 PROCEDURE — 1159F PR MEDICATION LIST DOCUMENTED IN MEDICAL RECORD: ICD-10-PCS | Mod: S$GLB,,, | Performed by: PHYSICAL MEDICINE & REHABILITATION

## 2021-01-29 PROCEDURE — 1159F MED LIST DOCD IN RCRD: CPT | Mod: S$GLB,,, | Performed by: PHYSICAL MEDICINE & REHABILITATION

## 2021-01-29 RX ORDER — PREGABALIN 75 MG/1
75 CAPSULE ORAL NIGHTLY
Qty: 30 CAPSULE | Refills: 5 | Status: SHIPPED | OUTPATIENT
Start: 2021-01-29 | End: 2021-05-24

## 2021-01-29 RX ORDER — HYDROCODONE BITARTRATE AND ACETAMINOPHEN 10; 325 MG/1; MG/1
1 TABLET ORAL EVERY 12 HOURS PRN
Qty: 60 TABLET | Refills: 0 | Status: SHIPPED | OUTPATIENT
Start: 2021-01-29 | End: 2021-02-26 | Stop reason: SDUPTHER

## 2021-01-31 ENCOUNTER — IMMUNIZATION (OUTPATIENT)
Dept: FAMILY MEDICINE | Facility: CLINIC | Age: 86
End: 2021-01-31
Payer: MEDICARE

## 2021-01-31 DIAGNOSIS — Z23 NEED FOR VACCINATION: Primary | ICD-10-CM

## 2021-01-31 PROCEDURE — 0002A COVID-19, MRNA, LNP-S, PF, 30 MCG/0.3 ML DOSE VACCINE: CPT | Mod: PBBFAC | Performed by: FAMILY MEDICINE

## 2021-01-31 PROCEDURE — 91300 COVID-19, MRNA, LNP-S, PF, 30 MCG/0.3 ML DOSE VACCINE: CPT | Mod: PBBFAC | Performed by: FAMILY MEDICINE

## 2021-02-01 ENCOUNTER — TELEPHONE (OUTPATIENT)
Dept: PAIN MEDICINE | Facility: CLINIC | Age: 86
End: 2021-02-01

## 2021-02-01 DIAGNOSIS — M25.552 BILATERAL HIP PAIN: Primary | ICD-10-CM

## 2021-02-01 DIAGNOSIS — M11.20 CHONDROCALCINOSIS: Primary | ICD-10-CM

## 2021-02-01 DIAGNOSIS — M25.551 BILATERAL HIP PAIN: Primary | ICD-10-CM

## 2021-02-02 ENCOUNTER — TELEPHONE (OUTPATIENT)
Dept: FAMILY MEDICINE | Facility: CLINIC | Age: 86
End: 2021-02-02

## 2021-02-02 ENCOUNTER — OFFICE VISIT (OUTPATIENT)
Dept: FAMILY MEDICINE | Facility: CLINIC | Age: 86
End: 2021-02-02
Payer: MEDICARE

## 2021-02-02 ENCOUNTER — HOSPITAL ENCOUNTER (OUTPATIENT)
Dept: RADIOLOGY | Facility: HOSPITAL | Age: 86
Discharge: HOME | End: 2021-02-02
Attending: PHYSICAL MEDICINE & REHABILITATION
Payer: MEDICARE

## 2021-02-02 VITALS
WEIGHT: 204.56 LBS | RESPIRATION RATE: 16 BRPM | SYSTOLIC BLOOD PRESSURE: 120 MMHG | HEART RATE: 72 BPM | BODY MASS INDEX: 32.88 KG/M2 | HEIGHT: 66 IN | TEMPERATURE: 97 F | DIASTOLIC BLOOD PRESSURE: 62 MMHG

## 2021-02-02 DIAGNOSIS — I10 ESSENTIAL HYPERTENSION: ICD-10-CM

## 2021-02-02 DIAGNOSIS — Z79.4 TYPE 2 DIABETES MELLITUS WITH STAGE 3 CHRONIC KIDNEY DISEASE, WITH LONG-TERM CURRENT USE OF INSULIN: ICD-10-CM

## 2021-02-02 DIAGNOSIS — E87.6 HYPOKALEMIA DUE TO LOSS OF POTASSIUM: ICD-10-CM

## 2021-02-02 DIAGNOSIS — C61 PROSTATE CANCER: ICD-10-CM

## 2021-02-02 DIAGNOSIS — E11.9 TYPE 2 DIABETES MELLITUS WITHOUT COMPLICATION, WITHOUT LONG-TERM CURRENT USE OF INSULIN: Primary | ICD-10-CM

## 2021-02-02 DIAGNOSIS — M25.551 BILATERAL HIP PAIN: ICD-10-CM

## 2021-02-02 DIAGNOSIS — E11.9 TYPE 2 DIABETES MELLITUS WITHOUT COMPLICATION, WITH LONG-TERM CURRENT USE OF INSULIN: ICD-10-CM

## 2021-02-02 DIAGNOSIS — E11.22 TYPE 2 DIABETES MELLITUS WITH CHRONIC KIDNEY DISEASE, WITHOUT LONG-TERM CURRENT USE OF INSULIN, UNSPECIFIED CKD STAGE: ICD-10-CM

## 2021-02-02 DIAGNOSIS — E78.2 MIXED HYPERLIPIDEMIA: Primary | ICD-10-CM

## 2021-02-02 DIAGNOSIS — M25.552 BILATERAL HIP PAIN: ICD-10-CM

## 2021-02-02 DIAGNOSIS — E11.22 TYPE 2 DIABETES MELLITUS WITH STAGE 3 CHRONIC KIDNEY DISEASE, WITH LONG-TERM CURRENT USE OF INSULIN: ICD-10-CM

## 2021-02-02 DIAGNOSIS — R60.9 EDEMA, UNSPECIFIED TYPE: ICD-10-CM

## 2021-02-02 DIAGNOSIS — G47.00 INSOMNIA, UNSPECIFIED TYPE: ICD-10-CM

## 2021-02-02 DIAGNOSIS — Z79.4 TYPE 2 DIABETES MELLITUS WITHOUT COMPLICATION, WITH LONG-TERM CURRENT USE OF INSULIN: ICD-10-CM

## 2021-02-02 DIAGNOSIS — F32.A DEPRESSION, UNSPECIFIED DEPRESSION TYPE: ICD-10-CM

## 2021-02-02 DIAGNOSIS — N18.30 TYPE 2 DIABETES MELLITUS WITH STAGE 3 CHRONIC KIDNEY DISEASE, WITH LONG-TERM CURRENT USE OF INSULIN: ICD-10-CM

## 2021-02-02 DIAGNOSIS — I48.0 PAROXYSMAL ATRIAL FIBRILLATION: ICD-10-CM

## 2021-02-02 PROCEDURE — 1126F AMNT PAIN NOTED NONE PRSNT: CPT | Mod: S$GLB,,, | Performed by: FAMILY MEDICINE

## 2021-02-02 PROCEDURE — 3078F PR MOST RECENT DIASTOLIC BLOOD PRESSURE < 80 MM HG: ICD-10-PCS | Mod: CPTII,S$GLB,, | Performed by: FAMILY MEDICINE

## 2021-02-02 PROCEDURE — 3074F SYST BP LT 130 MM HG: CPT | Mod: CPTII,S$GLB,, | Performed by: FAMILY MEDICINE

## 2021-02-02 PROCEDURE — 3074F PR MOST RECENT SYSTOLIC BLOOD PRESSURE < 130 MM HG: ICD-10-PCS | Mod: CPTII,S$GLB,, | Performed by: FAMILY MEDICINE

## 2021-02-02 PROCEDURE — 73521 X-RAY EXAM HIPS BI 2 VIEWS: CPT | Mod: 26,,, | Performed by: RADIOLOGY

## 2021-02-02 PROCEDURE — 99999 PR PBB SHADOW E&M-EST. PATIENT-LVL IV: ICD-10-PCS | Mod: PBBFAC,,, | Performed by: FAMILY MEDICINE

## 2021-02-02 PROCEDURE — 1159F PR MEDICATION LIST DOCUMENTED IN MEDICAL RECORD: ICD-10-PCS | Mod: S$GLB,,, | Performed by: FAMILY MEDICINE

## 2021-02-02 PROCEDURE — 3288F PR FALLS RISK ASSESSMENT DOCUMENTED: ICD-10-PCS | Mod: CPTII,S$GLB,, | Performed by: FAMILY MEDICINE

## 2021-02-02 PROCEDURE — 3078F DIAST BP <80 MM HG: CPT | Mod: CPTII,S$GLB,, | Performed by: FAMILY MEDICINE

## 2021-02-02 PROCEDURE — 99214 PR OFFICE/OUTPT VISIT, EST, LEVL IV, 30-39 MIN: ICD-10-PCS | Mod: S$GLB,,, | Performed by: FAMILY MEDICINE

## 2021-02-02 PROCEDURE — 73521 XR HIPS BILATERAL 2 VIEW INCL AP PELVIS: ICD-10-PCS | Mod: 26,,, | Performed by: RADIOLOGY

## 2021-02-02 PROCEDURE — 1159F MED LIST DOCD IN RCRD: CPT | Mod: S$GLB,,, | Performed by: FAMILY MEDICINE

## 2021-02-02 PROCEDURE — 1101F PT FALLS ASSESS-DOCD LE1/YR: CPT | Mod: CPTII,S$GLB,, | Performed by: FAMILY MEDICINE

## 2021-02-02 PROCEDURE — 99214 OFFICE O/P EST MOD 30 MIN: CPT | Mod: S$GLB,,, | Performed by: FAMILY MEDICINE

## 2021-02-02 PROCEDURE — 1126F PR PAIN SEVERITY QUANTIFIED, NO PAIN PRESENT: ICD-10-PCS | Mod: S$GLB,,, | Performed by: FAMILY MEDICINE

## 2021-02-02 PROCEDURE — 3288F FALL RISK ASSESSMENT DOCD: CPT | Mod: CPTII,S$GLB,, | Performed by: FAMILY MEDICINE

## 2021-02-02 PROCEDURE — 99999 PR PBB SHADOW E&M-EST. PATIENT-LVL IV: CPT | Mod: PBBFAC,,, | Performed by: FAMILY MEDICINE

## 2021-02-02 PROCEDURE — 73521 X-RAY EXAM HIPS BI 2 VIEWS: CPT | Mod: TC

## 2021-02-02 PROCEDURE — 1101F PR PT FALLS ASSESS DOC 0-1 FALLS W/OUT INJ PAST YR: ICD-10-PCS | Mod: CPTII,S$GLB,, | Performed by: FAMILY MEDICINE

## 2021-02-02 RX ORDER — ROSUVASTATIN CALCIUM 40 MG/1
40 TABLET, COATED ORAL NIGHTLY
Qty: 90 TABLET | Refills: 1 | Status: SHIPPED | OUTPATIENT
Start: 2021-02-02 | End: 2021-06-30

## 2021-02-02 RX ORDER — ESCITALOPRAM OXALATE 10 MG/1
10 TABLET ORAL DAILY
Qty: 90 TABLET | Refills: 1 | Status: SHIPPED | OUTPATIENT
Start: 2021-02-02 | End: 2021-06-09

## 2021-02-02 RX ORDER — INSULIN GLARGINE 100 [IU]/ML
INJECTION, SOLUTION SUBCUTANEOUS
Qty: 45 ML | Refills: 5 | Status: SHIPPED | OUTPATIENT
Start: 2021-02-02 | End: 2023-01-04 | Stop reason: SDUPTHER

## 2021-02-02 RX ORDER — APIXABAN 5 MG/1
5 TABLET, FILM COATED ORAL 2 TIMES DAILY
Qty: 180 TABLET | Refills: 1 | Status: SHIPPED | OUTPATIENT
Start: 2021-02-02 | End: 2021-08-09

## 2021-02-02 RX ORDER — TRAZODONE HYDROCHLORIDE 50 MG/1
TABLET ORAL
Qty: 90 TABLET | Refills: 1 | Status: SHIPPED | OUTPATIENT
Start: 2021-02-02 | End: 2021-05-23

## 2021-02-02 RX ORDER — METFORMIN HYDROCHLORIDE 1000 MG/1
1000 TABLET ORAL 2 TIMES DAILY WITH MEALS
Qty: 180 TABLET | Refills: 1 | Status: SHIPPED | OUTPATIENT
Start: 2021-02-02 | End: 2021-08-04 | Stop reason: SDUPTHER

## 2021-02-02 RX ORDER — PIOGLITAZONEHYDROCHLORIDE 15 MG/1
15 TABLET ORAL DAILY
Qty: 90 TABLET | Refills: 1 | Status: SHIPPED | OUTPATIENT
Start: 2021-02-02 | End: 2021-08-04 | Stop reason: SDUPTHER

## 2021-02-02 RX ORDER — BUMETANIDE 1 MG/1
1 TABLET ORAL DAILY
Qty: 90 TABLET | Refills: 1 | Status: SHIPPED | OUTPATIENT
Start: 2021-02-02 | End: 2021-10-25

## 2021-02-02 RX ORDER — LISINOPRIL 10 MG/1
10 TABLET ORAL 2 TIMES DAILY
Qty: 180 TABLET | Refills: 1 | Status: SHIPPED | OUTPATIENT
Start: 2021-02-02 | End: 2021-08-04 | Stop reason: SDUPTHER

## 2021-02-04 ENCOUNTER — PATIENT OUTREACH (OUTPATIENT)
Dept: ADMINISTRATIVE | Facility: HOSPITAL | Age: 86
End: 2021-02-04

## 2021-02-11 DIAGNOSIS — Z01.818 PRE-OP TESTING: ICD-10-CM

## 2021-02-17 ENCOUNTER — HOSPITAL ENCOUNTER (OUTPATIENT)
Dept: PREADMISSION TESTING | Facility: HOSPITAL | Age: 86
Discharge: HOME | End: 2021-02-17
Attending: PHYSICAL MEDICINE & REHABILITATION
Payer: MEDICARE

## 2021-02-17 DIAGNOSIS — Z01.818 PRE-OP TESTING: ICD-10-CM

## 2021-02-17 PROCEDURE — U0003 INFECTIOUS AGENT DETECTION BY NUCLEIC ACID (DNA OR RNA); SEVERE ACUTE RESPIRATORY SYNDROME CORONAVIRUS 2 (SARS-COV-2) (CORONAVIRUS DISEASE [COVID-19]), AMPLIFIED PROBE TECHNIQUE, MAKING USE OF HIGH THROUGHPUT TECHNOLOGIES AS DESCRIBED BY CMS-2020-01-R: HCPCS

## 2021-02-17 PROCEDURE — U0005 INFEC AGEN DETEC AMPLI PROBE: HCPCS

## 2021-02-18 LAB — SARS-COV-2 RNA RESP QL NAA+PROBE: NOT DETECTED

## 2021-02-23 ENCOUNTER — TELEPHONE (OUTPATIENT)
Dept: FAMILY MEDICINE | Facility: CLINIC | Age: 86
End: 2021-02-23

## 2021-02-26 ENCOUNTER — TELEPHONE (OUTPATIENT)
Dept: PAIN MEDICINE | Facility: CLINIC | Age: 86
End: 2021-02-26

## 2021-02-26 ENCOUNTER — OFFICE VISIT (OUTPATIENT)
Dept: PAIN MEDICINE | Facility: CLINIC | Age: 86
End: 2021-02-26
Payer: MEDICARE

## 2021-02-26 VITALS
BODY MASS INDEX: 33.2 KG/M2 | HEART RATE: 66 BPM | WEIGHT: 206.56 LBS | RESPIRATION RATE: 16 BRPM | OXYGEN SATURATION: 98 % | TEMPERATURE: 97 F | SYSTOLIC BLOOD PRESSURE: 110 MMHG | HEIGHT: 66 IN | DIASTOLIC BLOOD PRESSURE: 64 MMHG

## 2021-02-26 DIAGNOSIS — M25.561 BILATERAL CHRONIC KNEE PAIN: ICD-10-CM

## 2021-02-26 DIAGNOSIS — G89.29 BILATERAL CHRONIC KNEE PAIN: ICD-10-CM

## 2021-02-26 DIAGNOSIS — Z96.651 CHRONIC KNEE PAIN AFTER TOTAL REPLACEMENT OF RIGHT KNEE JOINT: ICD-10-CM

## 2021-02-26 DIAGNOSIS — M11.262 PSEUDOGOUT OF LEFT KNEE: Primary | ICD-10-CM

## 2021-02-26 DIAGNOSIS — M70.61 TROCHANTERIC BURSITIS OF BOTH HIPS: ICD-10-CM

## 2021-02-26 DIAGNOSIS — G89.29 CHRONIC BILATERAL LOW BACK PAIN WITH BILATERAL SCIATICA: ICD-10-CM

## 2021-02-26 DIAGNOSIS — E11.42 DIABETIC POLYNEUROPATHY ASSOCIATED WITH TYPE 2 DIABETES MELLITUS: ICD-10-CM

## 2021-02-26 DIAGNOSIS — M47.816 LUMBAR SPONDYLOSIS: Primary | ICD-10-CM

## 2021-02-26 DIAGNOSIS — G89.29 CHRONIC KNEE PAIN AFTER TOTAL REPLACEMENT OF RIGHT KNEE JOINT: ICD-10-CM

## 2021-02-26 DIAGNOSIS — M47.816 LUMBAR SPONDYLOSIS: ICD-10-CM

## 2021-02-26 DIAGNOSIS — M25.561 CHRONIC KNEE PAIN AFTER TOTAL REPLACEMENT OF RIGHT KNEE JOINT: ICD-10-CM

## 2021-02-26 DIAGNOSIS — M25.562 BILATERAL CHRONIC KNEE PAIN: ICD-10-CM

## 2021-02-26 DIAGNOSIS — M48.062 SPINAL STENOSIS OF LUMBAR REGION WITH NEUROGENIC CLAUDICATION: ICD-10-CM

## 2021-02-26 DIAGNOSIS — M70.62 TROCHANTERIC BURSITIS OF BOTH HIPS: ICD-10-CM

## 2021-02-26 DIAGNOSIS — M54.42 CHRONIC BILATERAL LOW BACK PAIN WITH BILATERAL SCIATICA: ICD-10-CM

## 2021-02-26 DIAGNOSIS — M54.41 CHRONIC BILATERAL LOW BACK PAIN WITH BILATERAL SCIATICA: ICD-10-CM

## 2021-02-26 PROCEDURE — 1101F PR PT FALLS ASSESS DOC 0-1 FALLS W/OUT INJ PAST YR: ICD-10-PCS | Mod: CPTII,S$GLB,, | Performed by: PHYSICAL MEDICINE & REHABILITATION

## 2021-02-26 PROCEDURE — 99999 PR PBB SHADOW E&M-EST. PATIENT-LVL V: CPT | Mod: PBBFAC,,, | Performed by: PHYSICAL MEDICINE & REHABILITATION

## 2021-02-26 PROCEDURE — 3074F PR MOST RECENT SYSTOLIC BLOOD PRESSURE < 130 MM HG: ICD-10-PCS | Mod: CPTII,S$GLB,, | Performed by: PHYSICAL MEDICINE & REHABILITATION

## 2021-02-26 PROCEDURE — 3078F DIAST BP <80 MM HG: CPT | Mod: CPTII,S$GLB,, | Performed by: PHYSICAL MEDICINE & REHABILITATION

## 2021-02-26 PROCEDURE — 1101F PT FALLS ASSESS-DOCD LE1/YR: CPT | Mod: CPTII,S$GLB,, | Performed by: PHYSICAL MEDICINE & REHABILITATION

## 2021-02-26 PROCEDURE — 1125F AMNT PAIN NOTED PAIN PRSNT: CPT | Mod: S$GLB,,, | Performed by: PHYSICAL MEDICINE & REHABILITATION

## 2021-02-26 PROCEDURE — 3288F FALL RISK ASSESSMENT DOCD: CPT | Mod: CPTII,S$GLB,, | Performed by: PHYSICAL MEDICINE & REHABILITATION

## 2021-02-26 PROCEDURE — 3074F SYST BP LT 130 MM HG: CPT | Mod: CPTII,S$GLB,, | Performed by: PHYSICAL MEDICINE & REHABILITATION

## 2021-02-26 PROCEDURE — 1125F PR PAIN SEVERITY QUANTIFIED, PAIN PRESENT: ICD-10-PCS | Mod: S$GLB,,, | Performed by: PHYSICAL MEDICINE & REHABILITATION

## 2021-02-26 PROCEDURE — 99214 OFFICE O/P EST MOD 30 MIN: CPT | Mod: S$GLB,,, | Performed by: PHYSICAL MEDICINE & REHABILITATION

## 2021-02-26 PROCEDURE — 3288F PR FALLS RISK ASSESSMENT DOCUMENTED: ICD-10-PCS | Mod: CPTII,S$GLB,, | Performed by: PHYSICAL MEDICINE & REHABILITATION

## 2021-02-26 PROCEDURE — 3078F PR MOST RECENT DIASTOLIC BLOOD PRESSURE < 80 MM HG: ICD-10-PCS | Mod: CPTII,S$GLB,, | Performed by: PHYSICAL MEDICINE & REHABILITATION

## 2021-02-26 PROCEDURE — 99999 PR PBB SHADOW E&M-EST. PATIENT-LVL V: ICD-10-PCS | Mod: PBBFAC,,, | Performed by: PHYSICAL MEDICINE & REHABILITATION

## 2021-02-26 PROCEDURE — 1159F MED LIST DOCD IN RCRD: CPT | Mod: S$GLB,,, | Performed by: PHYSICAL MEDICINE & REHABILITATION

## 2021-02-26 PROCEDURE — 1159F PR MEDICATION LIST DOCUMENTED IN MEDICAL RECORD: ICD-10-PCS | Mod: S$GLB,,, | Performed by: PHYSICAL MEDICINE & REHABILITATION

## 2021-02-26 PROCEDURE — 99214 PR OFFICE/OUTPT VISIT, EST, LEVL IV, 30-39 MIN: ICD-10-PCS | Mod: S$GLB,,, | Performed by: PHYSICAL MEDICINE & REHABILITATION

## 2021-02-26 RX ORDER — HYDROCODONE BITARTRATE AND ACETAMINOPHEN 10; 325 MG/1; MG/1
1 TABLET ORAL EVERY 12 HOURS PRN
Qty: 60 TABLET | Refills: 0 | Status: SHIPPED | OUTPATIENT
Start: 2021-02-26 | End: 2021-03-30 | Stop reason: SDUPTHER

## 2021-03-03 ENCOUNTER — PATIENT MESSAGE (OUTPATIENT)
Dept: PAIN MEDICINE | Facility: CLINIC | Age: 86
End: 2021-03-03

## 2021-03-09 ENCOUNTER — HOSPITAL ENCOUNTER (OUTPATIENT)
Dept: PREADMISSION TESTING | Facility: HOSPITAL | Age: 86
Discharge: HOME | End: 2021-03-09
Attending: PHYSICAL MEDICINE & REHABILITATION
Payer: MEDICARE

## 2021-03-09 DIAGNOSIS — Z01.818 PRE-OP TESTING: ICD-10-CM

## 2021-03-09 LAB — SARS-COV-2 RNA RESP QL NAA+PROBE: NOT DETECTED

## 2021-03-09 PROCEDURE — U0005 INFEC AGEN DETEC AMPLI PROBE: HCPCS | Performed by: PHYSICAL MEDICINE & REHABILITATION

## 2021-03-09 PROCEDURE — U0003 INFECTIOUS AGENT DETECTION BY NUCLEIC ACID (DNA OR RNA); SEVERE ACUTE RESPIRATORY SYNDROME CORONAVIRUS 2 (SARS-COV-2) (CORONAVIRUS DISEASE [COVID-19]), AMPLIFIED PROBE TECHNIQUE, MAKING USE OF HIGH THROUGHPUT TECHNOLOGIES AS DESCRIBED BY CMS-2020-01-R: HCPCS | Performed by: PHYSICAL MEDICINE & REHABILITATION

## 2021-03-12 ENCOUNTER — HOSPITAL ENCOUNTER (OUTPATIENT)
Facility: HOSPITAL | Age: 86
Discharge: HOME | End: 2021-03-12
Attending: PHYSICAL MEDICINE & REHABILITATION | Admitting: PHYSICAL MEDICINE & REHABILITATION
Payer: MEDICARE

## 2021-03-12 ENCOUNTER — HOSPITAL ENCOUNTER (OUTPATIENT)
Dept: RADIOLOGY | Facility: HOSPITAL | Age: 86
Discharge: HOME | End: 2021-03-12
Attending: PHYSICAL MEDICINE & REHABILITATION
Payer: MEDICARE

## 2021-03-12 VITALS
DIASTOLIC BLOOD PRESSURE: 77 MMHG | RESPIRATION RATE: 17 BRPM | HEART RATE: 66 BPM | SYSTOLIC BLOOD PRESSURE: 161 MMHG | TEMPERATURE: 97 F | OXYGEN SATURATION: 96 %

## 2021-03-12 DIAGNOSIS — M47.816 LUMBAR SPONDYLOSIS: ICD-10-CM

## 2021-03-12 DIAGNOSIS — M54.16 LUMBAR RADICULOPATHY: ICD-10-CM

## 2021-03-12 DIAGNOSIS — R52 PAIN: ICD-10-CM

## 2021-03-12 DIAGNOSIS — M48.062 SPINAL STENOSIS OF LUMBAR REGION WITH NEUROGENIC CLAUDICATION: Primary | ICD-10-CM

## 2021-03-12 PROCEDURE — 63600175 PHARM REV CODE 636 W HCPCS: Performed by: PHYSICAL MEDICINE & REHABILITATION

## 2021-03-12 PROCEDURE — 25500020 PHARM REV CODE 255: Performed by: PHYSICAL MEDICINE & REHABILITATION

## 2021-03-12 PROCEDURE — 62323 NJX INTERLAMINAR LMBR/SAC: CPT | Performed by: PHYSICAL MEDICINE & REHABILITATION

## 2021-03-12 PROCEDURE — 62323 PR INJ LUMBAR/SACRAL, W/IMAGING GUIDANCE: ICD-10-PCS | Mod: ,,, | Performed by: PHYSICAL MEDICINE & REHABILITATION

## 2021-03-12 PROCEDURE — 25000003 PHARM REV CODE 250: Performed by: PHYSICAL MEDICINE & REHABILITATION

## 2021-03-12 PROCEDURE — A9585 GADOBUTROL INJECTION: HCPCS | Performed by: PHYSICAL MEDICINE & REHABILITATION

## 2021-03-12 PROCEDURE — 62323 NJX INTERLAMINAR LMBR/SAC: CPT | Mod: ,,, | Performed by: PHYSICAL MEDICINE & REHABILITATION

## 2021-03-12 RX ORDER — LIDOCAINE HYDROCHLORIDE 10 MG/ML
INJECTION, SOLUTION EPIDURAL; INFILTRATION; INTRACAUDAL; PERINEURAL
Status: DISCONTINUED
Start: 2021-03-12 | End: 2021-03-12 | Stop reason: HOSPADM

## 2021-03-12 RX ORDER — DEXAMETHASONE SODIUM PHOSPHATE 10 MG/ML
INJECTION INTRAMUSCULAR; INTRAVENOUS
Status: DISCONTINUED
Start: 2021-03-12 | End: 2021-03-12 | Stop reason: HOSPADM

## 2021-03-12 RX ORDER — FENTANYL CITRATE 50 UG/ML
INJECTION, SOLUTION INTRAMUSCULAR; INTRAVENOUS
Status: DISCONTINUED
Start: 2021-03-12 | End: 2021-03-12 | Stop reason: WASHOUT

## 2021-03-12 RX ORDER — LIDOCAINE HYDROCHLORIDE 10 MG/ML
INJECTION, SOLUTION EPIDURAL; INFILTRATION; INTRACAUDAL; PERINEURAL
Status: DISCONTINUED | OUTPATIENT
Start: 2021-03-12 | End: 2021-03-12 | Stop reason: HOSPADM

## 2021-03-12 RX ORDER — LIDOCAINE HYDROCHLORIDE 10 MG/ML
INJECTION INFILTRATION; PERINEURAL
Status: DISCONTINUED | OUTPATIENT
Start: 2021-03-12 | End: 2021-03-12 | Stop reason: HOSPADM

## 2021-03-12 RX ORDER — DEXAMETHASONE SODIUM PHOSPHATE 10 MG/ML
INJECTION INTRAMUSCULAR; INTRAVENOUS
Status: DISCONTINUED | OUTPATIENT
Start: 2021-03-12 | End: 2021-03-12 | Stop reason: HOSPADM

## 2021-03-12 RX ORDER — MIDAZOLAM HYDROCHLORIDE 1 MG/ML
INJECTION INTRAMUSCULAR; INTRAVENOUS
Status: DISCONTINUED
Start: 2021-03-12 | End: 2021-03-12 | Stop reason: WASHOUT

## 2021-03-12 RX ORDER — LIDOCAINE HYDROCHLORIDE 10 MG/ML
INJECTION INFILTRATION; PERINEURAL
Status: DISCONTINUED
Start: 2021-03-12 | End: 2021-03-12 | Stop reason: HOSPADM

## 2021-03-12 RX ORDER — GADOBUTROL 604.72 MG/ML
INJECTION INTRAVENOUS
Status: DISCONTINUED | OUTPATIENT
Start: 2021-03-12 | End: 2021-03-12 | Stop reason: HOSPADM

## 2021-03-12 RX ORDER — SODIUM CHLORIDE 9 MG/ML
INJECTION, SOLUTION INTRAVENOUS CONTINUOUS
Status: DISCONTINUED | OUTPATIENT
Start: 2021-03-12 | End: 2021-03-12 | Stop reason: HOSPADM

## 2021-03-26 ENCOUNTER — PATIENT OUTREACH (OUTPATIENT)
Dept: ADMINISTRATIVE | Facility: OTHER | Age: 86
End: 2021-03-26

## 2021-03-30 ENCOUNTER — OFFICE VISIT (OUTPATIENT)
Dept: PAIN MEDICINE | Facility: CLINIC | Age: 86
End: 2021-03-30
Payer: MEDICARE

## 2021-03-30 DIAGNOSIS — M47.816 LUMBAR SPONDYLOSIS: ICD-10-CM

## 2021-03-30 DIAGNOSIS — M48.062 SPINAL STENOSIS OF LUMBAR REGION WITH NEUROGENIC CLAUDICATION: Primary | ICD-10-CM

## 2021-03-30 DIAGNOSIS — M70.61 TROCHANTERIC BURSITIS OF BOTH HIPS: ICD-10-CM

## 2021-03-30 DIAGNOSIS — M54.41 CHRONIC BILATERAL LOW BACK PAIN WITH BILATERAL SCIATICA: ICD-10-CM

## 2021-03-30 DIAGNOSIS — M25.561 CHRONIC KNEE PAIN AFTER TOTAL REPLACEMENT OF RIGHT KNEE JOINT: ICD-10-CM

## 2021-03-30 DIAGNOSIS — M25.561 BILATERAL CHRONIC KNEE PAIN: ICD-10-CM

## 2021-03-30 DIAGNOSIS — G89.29 CHRONIC KNEE PAIN AFTER TOTAL REPLACEMENT OF RIGHT KNEE JOINT: ICD-10-CM

## 2021-03-30 DIAGNOSIS — M11.262 PSEUDOGOUT OF LEFT KNEE: ICD-10-CM

## 2021-03-30 DIAGNOSIS — M25.562 BILATERAL CHRONIC KNEE PAIN: ICD-10-CM

## 2021-03-30 DIAGNOSIS — G89.29 BILATERAL CHRONIC KNEE PAIN: ICD-10-CM

## 2021-03-30 DIAGNOSIS — G89.29 CHRONIC BILATERAL LOW BACK PAIN WITH BILATERAL SCIATICA: ICD-10-CM

## 2021-03-30 DIAGNOSIS — M54.42 CHRONIC BILATERAL LOW BACK PAIN WITH BILATERAL SCIATICA: ICD-10-CM

## 2021-03-30 DIAGNOSIS — M70.62 TROCHANTERIC BURSITIS OF BOTH HIPS: ICD-10-CM

## 2021-03-30 DIAGNOSIS — Z96.651 CHRONIC KNEE PAIN AFTER TOTAL REPLACEMENT OF RIGHT KNEE JOINT: ICD-10-CM

## 2021-03-30 DIAGNOSIS — M51.36 DDD (DEGENERATIVE DISC DISEASE), LUMBAR: ICD-10-CM

## 2021-03-30 PROCEDURE — 1159F PR MEDICATION LIST DOCUMENTED IN MEDICAL RECORD: ICD-10-PCS | Mod: 95,,, | Performed by: PHYSICAL MEDICINE & REHABILITATION

## 2021-03-30 PROCEDURE — 99214 OFFICE O/P EST MOD 30 MIN: CPT | Mod: 95,,, | Performed by: PHYSICAL MEDICINE & REHABILITATION

## 2021-03-30 PROCEDURE — 99214 PR OFFICE/OUTPT VISIT, EST, LEVL IV, 30-39 MIN: ICD-10-PCS | Mod: 95,,, | Performed by: PHYSICAL MEDICINE & REHABILITATION

## 2021-03-30 PROCEDURE — 1159F MED LIST DOCD IN RCRD: CPT | Mod: 95,,, | Performed by: PHYSICAL MEDICINE & REHABILITATION

## 2021-03-30 RX ORDER — HYDROCODONE BITARTRATE AND ACETAMINOPHEN 10; 325 MG/1; MG/1
1 TABLET ORAL EVERY 12 HOURS PRN
Qty: 60 TABLET | Refills: 0 | Status: SHIPPED | OUTPATIENT
Start: 2021-03-30 | End: 2021-05-24 | Stop reason: SDUPTHER

## 2021-04-19 NOTE — ASSESSMENT & PLAN NOTE
Isotretinoin 40 mg one tab orally two times per day  Recheck in 4 weeks     Last A1C good control  Hold PO meds while inpatient  Cont SSI and levemir 40 units

## 2021-05-05 ENCOUNTER — OFFICE VISIT (OUTPATIENT)
Dept: INTERNAL MEDICINE | Facility: CLINIC | Age: 86
End: 2021-05-05
Payer: MEDICARE

## 2021-05-05 VITALS
RESPIRATION RATE: 16 BRPM | WEIGHT: 204.56 LBS | DIASTOLIC BLOOD PRESSURE: 48 MMHG | HEIGHT: 66 IN | HEART RATE: 60 BPM | SYSTOLIC BLOOD PRESSURE: 126 MMHG | OXYGEN SATURATION: 98 % | BODY MASS INDEX: 32.88 KG/M2

## 2021-05-05 DIAGNOSIS — E11.22 TYPE 2 DIABETES MELLITUS WITH CHRONIC KIDNEY DISEASE, WITHOUT LONG-TERM CURRENT USE OF INSULIN, UNSPECIFIED CKD STAGE: ICD-10-CM

## 2021-05-05 DIAGNOSIS — L02.519 CELLULITIS AND ABSCESS OF HAND: Primary | ICD-10-CM

## 2021-05-05 DIAGNOSIS — L03.119 CELLULITIS AND ABSCESS OF HAND: Primary | ICD-10-CM

## 2021-05-05 DIAGNOSIS — I48.0 PAROXYSMAL ATRIAL FIBRILLATION: ICD-10-CM

## 2021-05-05 PROCEDURE — 1100F PTFALLS ASSESS-DOCD GE2>/YR: CPT | Mod: CPTII,S$GLB,, | Performed by: NURSE PRACTITIONER

## 2021-05-05 PROCEDURE — 1159F PR MEDICATION LIST DOCUMENTED IN MEDICAL RECORD: ICD-10-PCS | Mod: S$GLB,,, | Performed by: NURSE PRACTITIONER

## 2021-05-05 PROCEDURE — 99999 PR PBB SHADOW E&M-EST. PATIENT-LVL V: CPT | Mod: PBBFAC,,, | Performed by: NURSE PRACTITIONER

## 2021-05-05 PROCEDURE — 3288F FALL RISK ASSESSMENT DOCD: CPT | Mod: CPTII,S$GLB,, | Performed by: NURSE PRACTITIONER

## 2021-05-05 PROCEDURE — 1159F MED LIST DOCD IN RCRD: CPT | Mod: S$GLB,,, | Performed by: NURSE PRACTITIONER

## 2021-05-05 PROCEDURE — 99213 PR OFFICE/OUTPT VISIT, EST, LEVL III, 20-29 MIN: ICD-10-PCS | Mod: S$GLB,,, | Performed by: NURSE PRACTITIONER

## 2021-05-05 PROCEDURE — 3288F PR FALLS RISK ASSESSMENT DOCUMENTED: ICD-10-PCS | Mod: CPTII,S$GLB,, | Performed by: NURSE PRACTITIONER

## 2021-05-05 PROCEDURE — 99213 OFFICE O/P EST LOW 20 MIN: CPT | Mod: S$GLB,,, | Performed by: NURSE PRACTITIONER

## 2021-05-05 PROCEDURE — 1125F AMNT PAIN NOTED PAIN PRSNT: CPT | Mod: S$GLB,,, | Performed by: NURSE PRACTITIONER

## 2021-05-05 PROCEDURE — 99999 PR PBB SHADOW E&M-EST. PATIENT-LVL V: ICD-10-PCS | Mod: PBBFAC,,, | Performed by: NURSE PRACTITIONER

## 2021-05-05 PROCEDURE — 1100F PR PT FALLS ASSESS DOC 2+ FALLS/FALL W/INJURY/YR: ICD-10-PCS | Mod: CPTII,S$GLB,, | Performed by: NURSE PRACTITIONER

## 2021-05-05 PROCEDURE — 1125F PR PAIN SEVERITY QUANTIFIED, PAIN PRESENT: ICD-10-PCS | Mod: S$GLB,,, | Performed by: NURSE PRACTITIONER

## 2021-05-05 RX ORDER — DOXYCYCLINE HYCLATE 100 MG
100 TABLET ORAL 2 TIMES DAILY
Qty: 20 TABLET | Refills: 0 | Status: SHIPPED | OUTPATIENT
Start: 2021-05-05 | End: 2021-06-09 | Stop reason: ALTCHOICE

## 2021-05-05 RX ORDER — CITALOPRAM 20 MG/1
TABLET, FILM COATED ORAL
COMMUNITY
Start: 2021-03-10 | End: 2021-05-07

## 2021-05-05 RX ORDER — SELENIUM 50 MCG
200 TABLET ORAL DAILY
COMMUNITY
End: 2021-06-21

## 2021-05-05 RX ORDER — TURMERIC 400 MG
400 CAPSULE ORAL DAILY
COMMUNITY
End: 2023-10-17

## 2021-05-05 RX ORDER — BUMETANIDE 1 MG/1
TABLET ORAL
COMMUNITY
Start: 2020-06-10 | End: 2021-05-07

## 2021-05-07 ENCOUNTER — OFFICE VISIT (OUTPATIENT)
Dept: INTERNAL MEDICINE | Facility: CLINIC | Age: 86
End: 2021-05-07
Payer: MEDICARE

## 2021-05-07 VITALS
HEIGHT: 66 IN | BODY MASS INDEX: 32.88 KG/M2 | HEART RATE: 75 BPM | WEIGHT: 204.56 LBS | RESPIRATION RATE: 18 BRPM | DIASTOLIC BLOOD PRESSURE: 60 MMHG | SYSTOLIC BLOOD PRESSURE: 126 MMHG | OXYGEN SATURATION: 99 %

## 2021-05-07 DIAGNOSIS — L03.119 CELLULITIS OF HAND: Primary | ICD-10-CM

## 2021-05-07 PROCEDURE — 1101F PT FALLS ASSESS-DOCD LE1/YR: CPT | Mod: CPTII,S$GLB,, | Performed by: INTERNAL MEDICINE

## 2021-05-07 PROCEDURE — 1159F MED LIST DOCD IN RCRD: CPT | Mod: S$GLB,,, | Performed by: INTERNAL MEDICINE

## 2021-05-07 PROCEDURE — 1125F PR PAIN SEVERITY QUANTIFIED, PAIN PRESENT: ICD-10-PCS | Mod: S$GLB,,, | Performed by: INTERNAL MEDICINE

## 2021-05-07 PROCEDURE — 3288F PR FALLS RISK ASSESSMENT DOCUMENTED: ICD-10-PCS | Mod: CPTII,S$GLB,, | Performed by: INTERNAL MEDICINE

## 2021-05-07 PROCEDURE — 99999 PR PBB SHADOW E&M-EST. PATIENT-LVL III: CPT | Mod: PBBFAC,,, | Performed by: INTERNAL MEDICINE

## 2021-05-07 PROCEDURE — 1101F PR PT FALLS ASSESS DOC 0-1 FALLS W/OUT INJ PAST YR: ICD-10-PCS | Mod: CPTII,S$GLB,, | Performed by: INTERNAL MEDICINE

## 2021-05-07 PROCEDURE — 3288F FALL RISK ASSESSMENT DOCD: CPT | Mod: CPTII,S$GLB,, | Performed by: INTERNAL MEDICINE

## 2021-05-07 PROCEDURE — 99213 OFFICE O/P EST LOW 20 MIN: CPT | Mod: S$GLB,,, | Performed by: INTERNAL MEDICINE

## 2021-05-07 PROCEDURE — 1125F AMNT PAIN NOTED PAIN PRSNT: CPT | Mod: S$GLB,,, | Performed by: INTERNAL MEDICINE

## 2021-05-07 PROCEDURE — 1159F PR MEDICATION LIST DOCUMENTED IN MEDICAL RECORD: ICD-10-PCS | Mod: S$GLB,,, | Performed by: INTERNAL MEDICINE

## 2021-05-07 PROCEDURE — 99999 PR PBB SHADOW E&M-EST. PATIENT-LVL III: ICD-10-PCS | Mod: PBBFAC,,, | Performed by: INTERNAL MEDICINE

## 2021-05-07 PROCEDURE — 99213 PR OFFICE/OUTPT VISIT, EST, LEVL III, 20-29 MIN: ICD-10-PCS | Mod: S$GLB,,, | Performed by: INTERNAL MEDICINE

## 2021-05-11 ENCOUNTER — OFFICE VISIT (OUTPATIENT)
Dept: INTERNAL MEDICINE | Facility: CLINIC | Age: 86
End: 2021-05-11
Payer: MEDICARE

## 2021-05-11 VITALS
DIASTOLIC BLOOD PRESSURE: 60 MMHG | BODY MASS INDEX: 32.78 KG/M2 | RESPIRATION RATE: 16 BRPM | OXYGEN SATURATION: 98 % | HEART RATE: 80 BPM | HEIGHT: 66 IN | SYSTOLIC BLOOD PRESSURE: 136 MMHG | WEIGHT: 204 LBS

## 2021-05-11 DIAGNOSIS — L03.113 CELLULITIS OF RIGHT HAND: Primary | ICD-10-CM

## 2021-05-11 PROCEDURE — 1125F PR PAIN SEVERITY QUANTIFIED, PAIN PRESENT: ICD-10-PCS | Mod: S$GLB,,, | Performed by: INTERNAL MEDICINE

## 2021-05-11 PROCEDURE — 1159F MED LIST DOCD IN RCRD: CPT | Mod: S$GLB,,, | Performed by: INTERNAL MEDICINE

## 2021-05-11 PROCEDURE — 99213 OFFICE O/P EST LOW 20 MIN: CPT | Mod: S$GLB,,, | Performed by: INTERNAL MEDICINE

## 2021-05-11 PROCEDURE — 99213 PR OFFICE/OUTPT VISIT, EST, LEVL III, 20-29 MIN: ICD-10-PCS | Mod: S$GLB,,, | Performed by: INTERNAL MEDICINE

## 2021-05-11 PROCEDURE — 1125F AMNT PAIN NOTED PAIN PRSNT: CPT | Mod: S$GLB,,, | Performed by: INTERNAL MEDICINE

## 2021-05-11 PROCEDURE — 1159F PR MEDICATION LIST DOCUMENTED IN MEDICAL RECORD: ICD-10-PCS | Mod: S$GLB,,, | Performed by: INTERNAL MEDICINE

## 2021-05-11 PROCEDURE — 1100F PR PT FALLS ASSESS DOC 2+ FALLS/FALL W/INJURY/YR: ICD-10-PCS | Mod: CPTII,S$GLB,, | Performed by: INTERNAL MEDICINE

## 2021-05-11 PROCEDURE — 99999 PR PBB SHADOW E&M-EST. PATIENT-LVL III: ICD-10-PCS | Mod: PBBFAC,,, | Performed by: INTERNAL MEDICINE

## 2021-05-11 PROCEDURE — 1100F PTFALLS ASSESS-DOCD GE2>/YR: CPT | Mod: CPTII,S$GLB,, | Performed by: INTERNAL MEDICINE

## 2021-05-11 PROCEDURE — 99999 PR PBB SHADOW E&M-EST. PATIENT-LVL III: CPT | Mod: PBBFAC,,, | Performed by: INTERNAL MEDICINE

## 2021-05-11 PROCEDURE — 3288F PR FALLS RISK ASSESSMENT DOCUMENTED: ICD-10-PCS | Mod: CPTII,S$GLB,, | Performed by: INTERNAL MEDICINE

## 2021-05-11 PROCEDURE — 3288F FALL RISK ASSESSMENT DOCD: CPT | Mod: CPTII,S$GLB,, | Performed by: INTERNAL MEDICINE

## 2021-05-17 ENCOUNTER — APPOINTMENT (OUTPATIENT)
Dept: RADIOLOGY | Facility: CLINIC | Age: 86
End: 2021-05-17
Attending: FAMILY MEDICINE
Payer: MEDICARE

## 2021-05-17 ENCOUNTER — OFFICE VISIT (OUTPATIENT)
Dept: FAMILY MEDICINE | Facility: CLINIC | Age: 86
End: 2021-05-17
Payer: MEDICARE

## 2021-05-17 VITALS
RESPIRATION RATE: 18 BRPM | HEART RATE: 86 BPM | SYSTOLIC BLOOD PRESSURE: 124 MMHG | BODY MASS INDEX: 33.1 KG/M2 | HEIGHT: 66 IN | DIASTOLIC BLOOD PRESSURE: 68 MMHG | WEIGHT: 205.94 LBS

## 2021-05-17 DIAGNOSIS — T14.8XXA PUNCTURE WOUND: Primary | ICD-10-CM

## 2021-05-17 DIAGNOSIS — L03.90 CELLULITIS, UNSPECIFIED CELLULITIS SITE: ICD-10-CM

## 2021-05-17 DIAGNOSIS — T14.8XXA PUNCTURE WOUND: ICD-10-CM

## 2021-05-17 PROCEDURE — 3288F PR FALLS RISK ASSESSMENT DOCUMENTED: ICD-10-PCS | Mod: CPTII,S$GLB,, | Performed by: FAMILY MEDICINE

## 2021-05-17 PROCEDURE — 73130 X-RAY EXAM OF HAND: CPT | Mod: TC,PO,RT

## 2021-05-17 PROCEDURE — 1159F PR MEDICATION LIST DOCUMENTED IN MEDICAL RECORD: ICD-10-PCS | Mod: S$GLB,,, | Performed by: FAMILY MEDICINE

## 2021-05-17 PROCEDURE — 73130 XR HAND COMPLETE 3 VIEW RIGHT: ICD-10-PCS | Mod: 26,RT,, | Performed by: RADIOLOGY

## 2021-05-17 PROCEDURE — 99999 PR PBB SHADOW E&M-EST. PATIENT-LVL III: ICD-10-PCS | Mod: PBBFAC,,, | Performed by: FAMILY MEDICINE

## 2021-05-17 PROCEDURE — 3288F FALL RISK ASSESSMENT DOCD: CPT | Mod: CPTII,S$GLB,, | Performed by: FAMILY MEDICINE

## 2021-05-17 PROCEDURE — 1159F MED LIST DOCD IN RCRD: CPT | Mod: S$GLB,,, | Performed by: FAMILY MEDICINE

## 2021-05-17 PROCEDURE — 1100F PTFALLS ASSESS-DOCD GE2>/YR: CPT | Mod: CPTII,S$GLB,, | Performed by: FAMILY MEDICINE

## 2021-05-17 PROCEDURE — 1125F AMNT PAIN NOTED PAIN PRSNT: CPT | Mod: S$GLB,,, | Performed by: FAMILY MEDICINE

## 2021-05-17 PROCEDURE — 1125F PR PAIN SEVERITY QUANTIFIED, PAIN PRESENT: ICD-10-PCS | Mod: S$GLB,,, | Performed by: FAMILY MEDICINE

## 2021-05-17 PROCEDURE — 1100F PR PT FALLS ASSESS DOC 2+ FALLS/FALL W/INJURY/YR: ICD-10-PCS | Mod: CPTII,S$GLB,, | Performed by: FAMILY MEDICINE

## 2021-05-17 PROCEDURE — 99999 PR PBB SHADOW E&M-EST. PATIENT-LVL III: CPT | Mod: PBBFAC,,, | Performed by: FAMILY MEDICINE

## 2021-05-17 PROCEDURE — 99213 PR OFFICE/OUTPT VISIT, EST, LEVL III, 20-29 MIN: ICD-10-PCS | Mod: S$GLB,,, | Performed by: FAMILY MEDICINE

## 2021-05-17 PROCEDURE — 73130 X-RAY EXAM OF HAND: CPT | Mod: 26,RT,, | Performed by: RADIOLOGY

## 2021-05-17 PROCEDURE — 99213 OFFICE O/P EST LOW 20 MIN: CPT | Mod: S$GLB,,, | Performed by: FAMILY MEDICINE

## 2021-05-18 ENCOUNTER — PATIENT OUTREACH (OUTPATIENT)
Dept: ADMINISTRATIVE | Facility: OTHER | Age: 86
End: 2021-05-18

## 2021-05-24 ENCOUNTER — OFFICE VISIT (OUTPATIENT)
Dept: FAMILY MEDICINE | Facility: CLINIC | Age: 86
End: 2021-05-24
Payer: MEDICARE

## 2021-05-24 ENCOUNTER — OFFICE VISIT (OUTPATIENT)
Dept: PAIN MEDICINE | Facility: CLINIC | Age: 86
End: 2021-05-24
Payer: MEDICARE

## 2021-05-24 VITALS
BODY MASS INDEX: 33.62 KG/M2 | RESPIRATION RATE: 18 BRPM | WEIGHT: 209.19 LBS | DIASTOLIC BLOOD PRESSURE: 86 MMHG | HEART RATE: 66 BPM | SYSTOLIC BLOOD PRESSURE: 134 MMHG | HEIGHT: 66 IN

## 2021-05-24 VITALS
HEART RATE: 76 BPM | RESPIRATION RATE: 16 BRPM | WEIGHT: 209.19 LBS | OXYGEN SATURATION: 99 % | SYSTOLIC BLOOD PRESSURE: 128 MMHG | HEIGHT: 66 IN | BODY MASS INDEX: 33.62 KG/M2 | DIASTOLIC BLOOD PRESSURE: 88 MMHG

## 2021-05-24 DIAGNOSIS — G89.29 CHRONIC KNEE PAIN AFTER TOTAL REPLACEMENT OF RIGHT KNEE JOINT: ICD-10-CM

## 2021-05-24 DIAGNOSIS — M17.12 PRIMARY OSTEOARTHRITIS OF LEFT KNEE: ICD-10-CM

## 2021-05-24 DIAGNOSIS — M48.061 SPINAL STENOSIS OF LUMBAR REGION WITHOUT NEUROGENIC CLAUDICATION: ICD-10-CM

## 2021-05-24 DIAGNOSIS — L03.113 CELLULITIS OF RIGHT HAND: ICD-10-CM

## 2021-05-24 DIAGNOSIS — M48.062 SPINAL STENOSIS OF LUMBAR REGION WITH NEUROGENIC CLAUDICATION: ICD-10-CM

## 2021-05-24 DIAGNOSIS — Z96.651 CHRONIC KNEE PAIN AFTER TOTAL REPLACEMENT OF RIGHT KNEE JOINT: ICD-10-CM

## 2021-05-24 DIAGNOSIS — E11.42 DIABETIC POLYNEUROPATHY ASSOCIATED WITH TYPE 2 DIABETES MELLITUS: ICD-10-CM

## 2021-05-24 DIAGNOSIS — M25.561 CHRONIC KNEE PAIN AFTER TOTAL REPLACEMENT OF RIGHT KNEE JOINT: ICD-10-CM

## 2021-05-24 DIAGNOSIS — M47.816 LUMBAR SPONDYLOSIS: ICD-10-CM

## 2021-05-24 DIAGNOSIS — S69.91XS HAND INJURY, RIGHT, SEQUELA: Primary | ICD-10-CM

## 2021-05-24 PROCEDURE — 1126F AMNT PAIN NOTED NONE PRSNT: CPT | Mod: S$GLB,,, | Performed by: PHYSICAL MEDICINE & REHABILITATION

## 2021-05-24 PROCEDURE — 3288F FALL RISK ASSESSMENT DOCD: CPT | Mod: CPTII,S$GLB,, | Performed by: FAMILY MEDICINE

## 2021-05-24 PROCEDURE — 1125F AMNT PAIN NOTED PAIN PRSNT: CPT | Mod: S$GLB,,, | Performed by: FAMILY MEDICINE

## 2021-05-24 PROCEDURE — 1126F PR PAIN SEVERITY QUANTIFIED, NO PAIN PRESENT: ICD-10-PCS | Mod: S$GLB,,, | Performed by: PHYSICAL MEDICINE & REHABILITATION

## 2021-05-24 PROCEDURE — 3288F PR FALLS RISK ASSESSMENT DOCUMENTED: ICD-10-PCS | Mod: CPTII,S$GLB,, | Performed by: FAMILY MEDICINE

## 2021-05-24 PROCEDURE — 99999 PR PBB SHADOW E&M-EST. PATIENT-LVL IV: ICD-10-PCS | Mod: PBBFAC,,, | Performed by: FAMILY MEDICINE

## 2021-05-24 PROCEDURE — 99214 OFFICE O/P EST MOD 30 MIN: CPT | Mod: S$GLB,,, | Performed by: PHYSICAL MEDICINE & REHABILITATION

## 2021-05-24 PROCEDURE — 99214 PR OFFICE/OUTPT VISIT, EST, LEVL IV, 30-39 MIN: ICD-10-PCS | Mod: S$GLB,,, | Performed by: PHYSICAL MEDICINE & REHABILITATION

## 2021-05-24 PROCEDURE — 1159F MED LIST DOCD IN RCRD: CPT | Mod: S$GLB,,, | Performed by: PHYSICAL MEDICINE & REHABILITATION

## 2021-05-24 PROCEDURE — 99999 PR PBB SHADOW E&M-EST. PATIENT-LVL V: CPT | Mod: PBBFAC,,, | Performed by: PHYSICAL MEDICINE & REHABILITATION

## 2021-05-24 PROCEDURE — 1159F PR MEDICATION LIST DOCUMENTED IN MEDICAL RECORD: ICD-10-PCS | Mod: S$GLB,,, | Performed by: PHYSICAL MEDICINE & REHABILITATION

## 2021-05-24 PROCEDURE — 99214 OFFICE O/P EST MOD 30 MIN: CPT | Mod: 25,S$GLB,, | Performed by: FAMILY MEDICINE

## 2021-05-24 PROCEDURE — 3288F PR FALLS RISK ASSESSMENT DOCUMENTED: ICD-10-PCS | Mod: CPTII,S$GLB,, | Performed by: PHYSICAL MEDICINE & REHABILITATION

## 2021-05-24 PROCEDURE — 1159F PR MEDICATION LIST DOCUMENTED IN MEDICAL RECORD: ICD-10-PCS | Mod: S$GLB,,, | Performed by: FAMILY MEDICINE

## 2021-05-24 PROCEDURE — 99999 PR PBB SHADOW E&M-EST. PATIENT-LVL IV: CPT | Mod: PBBFAC,,, | Performed by: FAMILY MEDICINE

## 2021-05-24 PROCEDURE — 1125F PR PAIN SEVERITY QUANTIFIED, PAIN PRESENT: ICD-10-PCS | Mod: S$GLB,,, | Performed by: FAMILY MEDICINE

## 2021-05-24 PROCEDURE — 1101F PT FALLS ASSESS-DOCD LE1/YR: CPT | Mod: CPTII,S$GLB,, | Performed by: PHYSICAL MEDICINE & REHABILITATION

## 2021-05-24 PROCEDURE — 1101F PR PT FALLS ASSESS DOC 0-1 FALLS W/OUT INJ PAST YR: ICD-10-PCS | Mod: CPTII,S$GLB,, | Performed by: FAMILY MEDICINE

## 2021-05-24 PROCEDURE — 1101F PR PT FALLS ASSESS DOC 0-1 FALLS W/OUT INJ PAST YR: ICD-10-PCS | Mod: CPTII,S$GLB,, | Performed by: PHYSICAL MEDICINE & REHABILITATION

## 2021-05-24 PROCEDURE — 99214 PR OFFICE/OUTPT VISIT, EST, LEVL IV, 30-39 MIN: ICD-10-PCS | Mod: 25,S$GLB,, | Performed by: FAMILY MEDICINE

## 2021-05-24 PROCEDURE — 1101F PT FALLS ASSESS-DOCD LE1/YR: CPT | Mod: CPTII,S$GLB,, | Performed by: FAMILY MEDICINE

## 2021-05-24 PROCEDURE — 3288F FALL RISK ASSESSMENT DOCD: CPT | Mod: CPTII,S$GLB,, | Performed by: PHYSICAL MEDICINE & REHABILITATION

## 2021-05-24 PROCEDURE — 20610 PR DRAIN/INJECT LARGE JOINT/BURSA: ICD-10-PCS | Mod: LT,S$GLB,, | Performed by: FAMILY MEDICINE

## 2021-05-24 PROCEDURE — 1159F MED LIST DOCD IN RCRD: CPT | Mod: S$GLB,,, | Performed by: FAMILY MEDICINE

## 2021-05-24 PROCEDURE — 99999 PR PBB SHADOW E&M-EST. PATIENT-LVL V: ICD-10-PCS | Mod: PBBFAC,,, | Performed by: PHYSICAL MEDICINE & REHABILITATION

## 2021-05-24 PROCEDURE — 20610 DRAIN/INJ JOINT/BURSA W/O US: CPT | Mod: LT,S$GLB,, | Performed by: FAMILY MEDICINE

## 2021-05-24 RX ORDER — HYDROCODONE BITARTRATE AND ACETAMINOPHEN 10; 325 MG/1; MG/1
1 TABLET ORAL EVERY 12 HOURS PRN
Qty: 60 TABLET | Refills: 0 | Status: SHIPPED | OUTPATIENT
Start: 2021-05-24 | End: 2021-06-21 | Stop reason: SDUPTHER

## 2021-05-24 RX ORDER — TRIAMCINOLONE ACETONIDE 40 MG/ML
60 INJECTION, SUSPENSION INTRA-ARTICULAR; INTRAMUSCULAR
Status: COMPLETED | OUTPATIENT
Start: 2021-05-24 | End: 2021-05-24

## 2021-05-24 RX ORDER — DOXYCYCLINE 100 MG/1
100 CAPSULE ORAL EVERY 12 HOURS
Qty: 28 CAPSULE | Refills: 0 | Status: SHIPPED | OUTPATIENT
Start: 2021-05-24 | End: 2021-06-09 | Stop reason: SDUPTHER

## 2021-05-24 RX ORDER — PREGABALIN 75 MG/1
75 CAPSULE ORAL 2 TIMES DAILY
Qty: 60 CAPSULE | Refills: 5 | Status: SHIPPED | OUTPATIENT
Start: 2021-05-24 | End: 2021-09-27 | Stop reason: SDUPTHER

## 2021-05-24 RX ORDER — DOXYCYCLINE 100 MG/1
100 CAPSULE ORAL EVERY 12 HOURS
Qty: 28 CAPSULE | Refills: 0 | Status: SHIPPED | OUTPATIENT
Start: 2021-05-24 | End: 2021-05-24 | Stop reason: SDUPTHER

## 2021-05-24 RX ORDER — LIDOCAINE HYDROCHLORIDE 10 MG/ML
1.5 INJECTION INFILTRATION; PERINEURAL
Status: COMPLETED | OUTPATIENT
Start: 2021-05-24 | End: 2021-05-24

## 2021-05-24 RX ADMIN — LIDOCAINE HYDROCHLORIDE 1.5 ML: 10 INJECTION INFILTRATION; PERINEURAL at 02:05

## 2021-05-24 RX ADMIN — TRIAMCINOLONE ACETONIDE 60 MG: 40 INJECTION, SUSPENSION INTRA-ARTICULAR; INTRAMUSCULAR at 02:05

## 2021-06-09 ENCOUNTER — OFFICE VISIT (OUTPATIENT)
Dept: FAMILY MEDICINE | Facility: CLINIC | Age: 86
End: 2021-06-09
Payer: COMMERCIAL

## 2021-06-09 VITALS
WEIGHT: 201.25 LBS | RESPIRATION RATE: 18 BRPM | SYSTOLIC BLOOD PRESSURE: 126 MMHG | DIASTOLIC BLOOD PRESSURE: 60 MMHG | BODY MASS INDEX: 32.34 KG/M2 | HEART RATE: 72 BPM | HEIGHT: 66 IN

## 2021-06-09 DIAGNOSIS — F32.A DEPRESSION, UNSPECIFIED DEPRESSION TYPE: ICD-10-CM

## 2021-06-09 DIAGNOSIS — L03.113 CELLULITIS OF RIGHT HAND: ICD-10-CM

## 2021-06-09 DIAGNOSIS — E11.42 DIABETIC POLYNEUROPATHY ASSOCIATED WITH TYPE 2 DIABETES MELLITUS: ICD-10-CM

## 2021-06-09 DIAGNOSIS — G89.29 OTHER CHRONIC PAIN: Primary | ICD-10-CM

## 2021-06-09 PROCEDURE — 3288F FALL RISK ASSESSMENT DOCD: CPT | Mod: CPTII,S$GLB,, | Performed by: FAMILY MEDICINE

## 2021-06-09 PROCEDURE — 99999 PR PBB SHADOW E&M-EST. PATIENT-LVL IV: ICD-10-PCS | Mod: PBBFAC,,, | Performed by: FAMILY MEDICINE

## 2021-06-09 PROCEDURE — 1101F PT FALLS ASSESS-DOCD LE1/YR: CPT | Mod: CPTII,S$GLB,, | Performed by: FAMILY MEDICINE

## 2021-06-09 PROCEDURE — 3288F PR FALLS RISK ASSESSMENT DOCUMENTED: ICD-10-PCS | Mod: CPTII,S$GLB,, | Performed by: FAMILY MEDICINE

## 2021-06-09 PROCEDURE — 1101F PR PT FALLS ASSESS DOC 0-1 FALLS W/OUT INJ PAST YR: ICD-10-PCS | Mod: CPTII,S$GLB,, | Performed by: FAMILY MEDICINE

## 2021-06-09 PROCEDURE — 1126F AMNT PAIN NOTED NONE PRSNT: CPT | Mod: S$GLB,,, | Performed by: FAMILY MEDICINE

## 2021-06-09 PROCEDURE — 1159F PR MEDICATION LIST DOCUMENTED IN MEDICAL RECORD: ICD-10-PCS | Mod: S$GLB,,, | Performed by: FAMILY MEDICINE

## 2021-06-09 PROCEDURE — 99214 PR OFFICE/OUTPT VISIT, EST, LEVL IV, 30-39 MIN: ICD-10-PCS | Mod: S$GLB,,, | Performed by: FAMILY MEDICINE

## 2021-06-09 PROCEDURE — 1126F PR PAIN SEVERITY QUANTIFIED, NO PAIN PRESENT: ICD-10-PCS | Mod: S$GLB,,, | Performed by: FAMILY MEDICINE

## 2021-06-09 PROCEDURE — 99214 OFFICE O/P EST MOD 30 MIN: CPT | Mod: S$GLB,,, | Performed by: FAMILY MEDICINE

## 2021-06-09 PROCEDURE — 99999 PR PBB SHADOW E&M-EST. PATIENT-LVL IV: CPT | Mod: PBBFAC,,, | Performed by: FAMILY MEDICINE

## 2021-06-09 PROCEDURE — 1159F MED LIST DOCD IN RCRD: CPT | Mod: S$GLB,,, | Performed by: FAMILY MEDICINE

## 2021-06-09 RX ORDER — DOXYCYCLINE 100 MG/1
100 CAPSULE ORAL EVERY 12 HOURS
Qty: 28 CAPSULE | Refills: 0 | Status: SHIPPED | OUTPATIENT
Start: 2021-06-09 | End: 2021-06-23

## 2021-06-09 RX ORDER — DULOXETIN HYDROCHLORIDE 30 MG/1
30 CAPSULE, DELAYED RELEASE ORAL DAILY
Qty: 30 CAPSULE | Refills: 5 | Status: SHIPPED | OUTPATIENT
Start: 2021-06-09 | End: 2021-08-27 | Stop reason: SDUPTHER

## 2021-06-18 ENCOUNTER — PATIENT MESSAGE (OUTPATIENT)
Dept: PAIN MEDICINE | Facility: CLINIC | Age: 86
End: 2021-06-18

## 2021-06-21 ENCOUNTER — OFFICE VISIT (OUTPATIENT)
Dept: PAIN MEDICINE | Facility: CLINIC | Age: 86
End: 2021-06-21
Payer: COMMERCIAL

## 2021-06-21 VITALS
RESPIRATION RATE: 16 BRPM | HEART RATE: 63 BPM | BODY MASS INDEX: 32.65 KG/M2 | HEIGHT: 66 IN | WEIGHT: 203.13 LBS | OXYGEN SATURATION: 99 %

## 2021-06-21 DIAGNOSIS — M47.816 LUMBAR SPONDYLOSIS: ICD-10-CM

## 2021-06-21 DIAGNOSIS — M54.9 DORSALGIA, UNSPECIFIED: ICD-10-CM

## 2021-06-21 DIAGNOSIS — M48.062 SPINAL STENOSIS OF LUMBAR REGION WITH NEUROGENIC CLAUDICATION: ICD-10-CM

## 2021-06-21 DIAGNOSIS — M25.561 CHRONIC KNEE PAIN AFTER TOTAL REPLACEMENT OF RIGHT KNEE JOINT: ICD-10-CM

## 2021-06-21 DIAGNOSIS — Z96.651 CHRONIC KNEE PAIN AFTER TOTAL REPLACEMENT OF RIGHT KNEE JOINT: ICD-10-CM

## 2021-06-21 DIAGNOSIS — G89.29 CHRONIC KNEE PAIN AFTER TOTAL REPLACEMENT OF RIGHT KNEE JOINT: ICD-10-CM

## 2021-06-21 PROCEDURE — 99999 PR PBB SHADOW E&M-EST. PATIENT-LVL V: ICD-10-PCS | Mod: PBBFAC,,, | Performed by: PHYSICAL MEDICINE & REHABILITATION

## 2021-06-21 PROCEDURE — 1101F PR PT FALLS ASSESS DOC 0-1 FALLS W/OUT INJ PAST YR: ICD-10-PCS | Mod: CPTII,S$GLB,, | Performed by: PHYSICAL MEDICINE & REHABILITATION

## 2021-06-21 PROCEDURE — 1125F PR PAIN SEVERITY QUANTIFIED, PAIN PRESENT: ICD-10-PCS | Mod: S$GLB,,, | Performed by: PHYSICAL MEDICINE & REHABILITATION

## 2021-06-21 PROCEDURE — 3288F FALL RISK ASSESSMENT DOCD: CPT | Mod: CPTII,S$GLB,, | Performed by: PHYSICAL MEDICINE & REHABILITATION

## 2021-06-21 PROCEDURE — 1101F PT FALLS ASSESS-DOCD LE1/YR: CPT | Mod: CPTII,S$GLB,, | Performed by: PHYSICAL MEDICINE & REHABILITATION

## 2021-06-21 PROCEDURE — 1159F PR MEDICATION LIST DOCUMENTED IN MEDICAL RECORD: ICD-10-PCS | Mod: S$GLB,,, | Performed by: PHYSICAL MEDICINE & REHABILITATION

## 2021-06-21 PROCEDURE — 3288F PR FALLS RISK ASSESSMENT DOCUMENTED: ICD-10-PCS | Mod: CPTII,S$GLB,, | Performed by: PHYSICAL MEDICINE & REHABILITATION

## 2021-06-21 PROCEDURE — 1159F MED LIST DOCD IN RCRD: CPT | Mod: S$GLB,,, | Performed by: PHYSICAL MEDICINE & REHABILITATION

## 2021-06-21 PROCEDURE — 99214 PR OFFICE/OUTPT VISIT, EST, LEVL IV, 30-39 MIN: ICD-10-PCS | Mod: S$GLB,,, | Performed by: PHYSICAL MEDICINE & REHABILITATION

## 2021-06-21 PROCEDURE — 99214 OFFICE O/P EST MOD 30 MIN: CPT | Mod: S$GLB,,, | Performed by: PHYSICAL MEDICINE & REHABILITATION

## 2021-06-21 PROCEDURE — 99999 PR PBB SHADOW E&M-EST. PATIENT-LVL V: CPT | Mod: PBBFAC,,, | Performed by: PHYSICAL MEDICINE & REHABILITATION

## 2021-06-21 PROCEDURE — 1125F AMNT PAIN NOTED PAIN PRSNT: CPT | Mod: S$GLB,,, | Performed by: PHYSICAL MEDICINE & REHABILITATION

## 2021-06-21 RX ORDER — HYDROCODONE BITARTRATE AND ACETAMINOPHEN 10; 325 MG/1; MG/1
1 TABLET ORAL EVERY 12 HOURS PRN
Qty: 60 TABLET | Refills: 0 | Status: SHIPPED | OUTPATIENT
Start: 2021-06-23 | End: 2021-09-07 | Stop reason: SDUPTHER

## 2021-07-28 ENCOUNTER — LAB VISIT (OUTPATIENT)
Dept: LAB | Facility: HOSPITAL | Age: 86
End: 2021-07-28
Attending: FAMILY MEDICINE
Payer: MEDICARE

## 2021-07-28 DIAGNOSIS — E11.9 TYPE 2 DIABETES MELLITUS WITHOUT COMPLICATION, WITHOUT LONG-TERM CURRENT USE OF INSULIN: ICD-10-CM

## 2021-07-28 LAB
ALBUMIN SERPL BCP-MCNC: 3.9 G/DL (ref 3.5–5.2)
ALP SERPL-CCNC: 55 U/L (ref 55–135)
ALT SERPL W/O P-5'-P-CCNC: 16 U/L (ref 10–44)
ANION GAP SERPL CALC-SCNC: 10 MMOL/L (ref 8–16)
AST SERPL-CCNC: 16 U/L (ref 10–40)
BILIRUB SERPL-MCNC: 0.4 MG/DL (ref 0.1–1)
BUN SERPL-MCNC: 15 MG/DL (ref 8–23)
CALCIUM SERPL-MCNC: 9.9 MG/DL (ref 8.7–10.5)
CHLORIDE SERPL-SCNC: 100 MMOL/L (ref 95–110)
CO2 SERPL-SCNC: 26 MMOL/L (ref 23–29)
CREAT SERPL-MCNC: 0.9 MG/DL (ref 0.5–1.4)
EST. GFR  (AFRICAN AMERICAN): >60 ML/MIN/1.73 M^2
EST. GFR  (NON AFRICAN AMERICAN): >60 ML/MIN/1.73 M^2
ESTIMATED AVG GLUCOSE: 126 MG/DL (ref 68–131)
GLUCOSE SERPL-MCNC: 121 MG/DL (ref 70–110)
HBA1C MFR BLD: 6 % (ref 4–5.6)
POTASSIUM SERPL-SCNC: 5.1 MMOL/L (ref 3.5–5.1)
PROT SERPL-MCNC: 6.5 G/DL (ref 6–8.4)
SODIUM SERPL-SCNC: 136 MMOL/L (ref 136–145)

## 2021-07-28 PROCEDURE — 80053 COMPREHEN METABOLIC PANEL: CPT | Performed by: FAMILY MEDICINE

## 2021-07-28 PROCEDURE — 83036 HEMOGLOBIN GLYCOSYLATED A1C: CPT | Performed by: FAMILY MEDICINE

## 2021-07-28 PROCEDURE — 36415 COLL VENOUS BLD VENIPUNCTURE: CPT | Performed by: FAMILY MEDICINE

## 2021-07-29 ENCOUNTER — PATIENT MESSAGE (OUTPATIENT)
Dept: PAIN MEDICINE | Facility: CLINIC | Age: 86
End: 2021-07-29

## 2021-08-03 ENCOUNTER — TELEPHONE (OUTPATIENT)
Dept: PAIN MEDICINE | Facility: CLINIC | Age: 86
End: 2021-08-03

## 2021-08-03 ENCOUNTER — OFFICE VISIT (OUTPATIENT)
Dept: PAIN MEDICINE | Facility: CLINIC | Age: 86
End: 2021-08-03
Payer: MEDICARE

## 2021-08-03 VITALS — DIASTOLIC BLOOD PRESSURE: 64 MMHG | HEART RATE: 64 BPM | SYSTOLIC BLOOD PRESSURE: 120 MMHG

## 2021-08-03 DIAGNOSIS — M54.41 CHRONIC BILATERAL LOW BACK PAIN WITH BILATERAL SCIATICA: ICD-10-CM

## 2021-08-03 DIAGNOSIS — G89.29 BILATERAL CHRONIC KNEE PAIN: ICD-10-CM

## 2021-08-03 DIAGNOSIS — M48.062 SPINAL STENOSIS OF LUMBAR REGION WITH NEUROGENIC CLAUDICATION: ICD-10-CM

## 2021-08-03 DIAGNOSIS — G89.29 CHRONIC BILATERAL LOW BACK PAIN WITH BILATERAL SCIATICA: ICD-10-CM

## 2021-08-03 DIAGNOSIS — M11.262 PSEUDOGOUT OF LEFT KNEE: Primary | ICD-10-CM

## 2021-08-03 DIAGNOSIS — M54.16 LUMBAR RADICULOPATHY: ICD-10-CM

## 2021-08-03 DIAGNOSIS — M54.16 LUMBAR RADICULOPATHY: Primary | ICD-10-CM

## 2021-08-03 DIAGNOSIS — M47.816 LUMBAR SPONDYLOSIS: ICD-10-CM

## 2021-08-03 DIAGNOSIS — M25.562 BILATERAL CHRONIC KNEE PAIN: ICD-10-CM

## 2021-08-03 DIAGNOSIS — E11.42 DIABETIC POLYNEUROPATHY ASSOCIATED WITH TYPE 2 DIABETES MELLITUS: ICD-10-CM

## 2021-08-03 DIAGNOSIS — M25.561 BILATERAL CHRONIC KNEE PAIN: ICD-10-CM

## 2021-08-03 DIAGNOSIS — M17.12 PRIMARY OSTEOARTHRITIS OF LEFT KNEE: ICD-10-CM

## 2021-08-03 DIAGNOSIS — M54.42 CHRONIC BILATERAL LOW BACK PAIN WITH BILATERAL SCIATICA: ICD-10-CM

## 2021-08-03 PROCEDURE — 20610 LARGE JOINT ASPIRATION/INJECTION: L KNEE JOINT: ICD-10-PCS | Mod: LT,S$GLB,, | Performed by: PHYSICAL MEDICINE & REHABILITATION

## 2021-08-03 PROCEDURE — 1125F AMNT PAIN NOTED PAIN PRSNT: CPT | Mod: CPTII,S$GLB,, | Performed by: PHYSICAL MEDICINE & REHABILITATION

## 2021-08-03 PROCEDURE — 1125F PR PAIN SEVERITY QUANTIFIED, PAIN PRESENT: ICD-10-PCS | Mod: CPTII,S$GLB,, | Performed by: PHYSICAL MEDICINE & REHABILITATION

## 2021-08-03 PROCEDURE — 99214 OFFICE O/P EST MOD 30 MIN: CPT | Mod: 25,S$GLB,, | Performed by: PHYSICAL MEDICINE & REHABILITATION

## 2021-08-03 PROCEDURE — 1159F MED LIST DOCD IN RCRD: CPT | Mod: CPTII,S$GLB,, | Performed by: PHYSICAL MEDICINE & REHABILITATION

## 2021-08-03 PROCEDURE — 99999 PR PBB SHADOW E&M-EST. PATIENT-LVL IV: ICD-10-PCS | Mod: PBBFAC,,, | Performed by: PHYSICAL MEDICINE & REHABILITATION

## 2021-08-03 PROCEDURE — 99214 PR OFFICE/OUTPT VISIT, EST, LEVL IV, 30-39 MIN: ICD-10-PCS | Mod: 25,S$GLB,, | Performed by: PHYSICAL MEDICINE & REHABILITATION

## 2021-08-03 PROCEDURE — 20610 DRAIN/INJ JOINT/BURSA W/O US: CPT | Mod: LT,S$GLB,, | Performed by: PHYSICAL MEDICINE & REHABILITATION

## 2021-08-03 PROCEDURE — 1160F RVW MEDS BY RX/DR IN RCRD: CPT | Mod: CPTII,S$GLB,, | Performed by: PHYSICAL MEDICINE & REHABILITATION

## 2021-08-03 PROCEDURE — 99999 PR PBB SHADOW E&M-EST. PATIENT-LVL IV: CPT | Mod: PBBFAC,,, | Performed by: PHYSICAL MEDICINE & REHABILITATION

## 2021-08-03 PROCEDURE — 1159F PR MEDICATION LIST DOCUMENTED IN MEDICAL RECORD: ICD-10-PCS | Mod: CPTII,S$GLB,, | Performed by: PHYSICAL MEDICINE & REHABILITATION

## 2021-08-03 PROCEDURE — 1160F PR REVIEW ALL MEDS BY PRESCRIBER/CLIN PHARMACIST DOCUMENTED: ICD-10-PCS | Mod: CPTII,S$GLB,, | Performed by: PHYSICAL MEDICINE & REHABILITATION

## 2021-08-03 RX ORDER — LIDOCAINE HYDROCHLORIDE 10 MG/ML
3 INJECTION INFILTRATION; PERINEURAL
Status: DISCONTINUED | OUTPATIENT
Start: 2021-08-03 | End: 2021-08-03 | Stop reason: HOSPADM

## 2021-08-03 RX ORDER — TRIAMCINOLONE ACETONIDE 40 MG/ML
40 INJECTION, SUSPENSION INTRA-ARTICULAR; INTRAMUSCULAR
Status: DISCONTINUED | OUTPATIENT
Start: 2021-08-03 | End: 2021-08-03 | Stop reason: HOSPADM

## 2021-08-03 RX ADMIN — LIDOCAINE HYDROCHLORIDE 3 ML: 10 INJECTION INFILTRATION; PERINEURAL at 11:08

## 2021-08-03 RX ADMIN — TRIAMCINOLONE ACETONIDE 40 MG: 40 INJECTION, SUSPENSION INTRA-ARTICULAR; INTRAMUSCULAR at 11:08

## 2021-08-04 ENCOUNTER — OFFICE VISIT (OUTPATIENT)
Dept: FAMILY MEDICINE | Facility: CLINIC | Age: 86
End: 2021-08-04
Payer: MEDICARE

## 2021-08-04 VITALS
DIASTOLIC BLOOD PRESSURE: 70 MMHG | WEIGHT: 199.31 LBS | SYSTOLIC BLOOD PRESSURE: 126 MMHG | HEART RATE: 72 BPM | BODY MASS INDEX: 32.03 KG/M2 | HEIGHT: 66 IN | RESPIRATION RATE: 18 BRPM

## 2021-08-04 DIAGNOSIS — E78.2 MIXED HYPERLIPIDEMIA: Primary | ICD-10-CM

## 2021-08-04 DIAGNOSIS — E11.9 TYPE 2 DIABETES MELLITUS WITHOUT COMPLICATION, WITH LONG-TERM CURRENT USE OF INSULIN: ICD-10-CM

## 2021-08-04 DIAGNOSIS — N18.30 TYPE 2 DIABETES MELLITUS WITH STAGE 3 CHRONIC KIDNEY DISEASE, WITH LONG-TERM CURRENT USE OF INSULIN: ICD-10-CM

## 2021-08-04 DIAGNOSIS — I10 ESSENTIAL HYPERTENSION: ICD-10-CM

## 2021-08-04 DIAGNOSIS — Z79.4 TYPE 2 DIABETES MELLITUS WITHOUT COMPLICATION, WITH LONG-TERM CURRENT USE OF INSULIN: ICD-10-CM

## 2021-08-04 DIAGNOSIS — E11.22 TYPE 2 DIABETES MELLITUS WITH STAGE 3 CHRONIC KIDNEY DISEASE, WITH LONG-TERM CURRENT USE OF INSULIN: ICD-10-CM

## 2021-08-04 DIAGNOSIS — I48.0 PAROXYSMAL ATRIAL FIBRILLATION: ICD-10-CM

## 2021-08-04 DIAGNOSIS — E11.22 TYPE 2 DIABETES MELLITUS WITH CHRONIC KIDNEY DISEASE, WITHOUT LONG-TERM CURRENT USE OF INSULIN, UNSPECIFIED CKD STAGE: ICD-10-CM

## 2021-08-04 DIAGNOSIS — E87.6 HYPOKALEMIA DUE TO LOSS OF POTASSIUM: ICD-10-CM

## 2021-08-04 DIAGNOSIS — Z79.4 TYPE 2 DIABETES MELLITUS WITH STAGE 3 CHRONIC KIDNEY DISEASE, WITH LONG-TERM CURRENT USE OF INSULIN: ICD-10-CM

## 2021-08-04 PROCEDURE — 99214 PR OFFICE/OUTPT VISIT, EST, LEVL IV, 30-39 MIN: ICD-10-PCS | Mod: S$GLB,,, | Performed by: FAMILY MEDICINE

## 2021-08-04 PROCEDURE — 99214 OFFICE O/P EST MOD 30 MIN: CPT | Mod: S$GLB,,, | Performed by: FAMILY MEDICINE

## 2021-08-04 PROCEDURE — 1160F RVW MEDS BY RX/DR IN RCRD: CPT | Mod: CPTII,S$GLB,, | Performed by: FAMILY MEDICINE

## 2021-08-04 PROCEDURE — 1126F AMNT PAIN NOTED NONE PRSNT: CPT | Mod: CPTII,S$GLB,, | Performed by: FAMILY MEDICINE

## 2021-08-04 PROCEDURE — 1100F PTFALLS ASSESS-DOCD GE2>/YR: CPT | Mod: CPTII,S$GLB,, | Performed by: FAMILY MEDICINE

## 2021-08-04 PROCEDURE — 1160F PR REVIEW ALL MEDS BY PRESCRIBER/CLIN PHARMACIST DOCUMENTED: ICD-10-PCS | Mod: CPTII,S$GLB,, | Performed by: FAMILY MEDICINE

## 2021-08-04 PROCEDURE — 99999 PR PBB SHADOW E&M-EST. PATIENT-LVL III: CPT | Mod: PBBFAC,,, | Performed by: FAMILY MEDICINE

## 2021-08-04 PROCEDURE — 1159F MED LIST DOCD IN RCRD: CPT | Mod: CPTII,S$GLB,, | Performed by: FAMILY MEDICINE

## 2021-08-04 PROCEDURE — 1126F PR PAIN SEVERITY QUANTIFIED, NO PAIN PRESENT: ICD-10-PCS | Mod: CPTII,S$GLB,, | Performed by: FAMILY MEDICINE

## 2021-08-04 PROCEDURE — 3288F FALL RISK ASSESSMENT DOCD: CPT | Mod: CPTII,S$GLB,, | Performed by: FAMILY MEDICINE

## 2021-08-04 PROCEDURE — 1100F PR PT FALLS ASSESS DOC 2+ FALLS/FALL W/INJURY/YR: ICD-10-PCS | Mod: CPTII,S$GLB,, | Performed by: FAMILY MEDICINE

## 2021-08-04 PROCEDURE — 1159F PR MEDICATION LIST DOCUMENTED IN MEDICAL RECORD: ICD-10-PCS | Mod: CPTII,S$GLB,, | Performed by: FAMILY MEDICINE

## 2021-08-04 PROCEDURE — 3288F PR FALLS RISK ASSESSMENT DOCUMENTED: ICD-10-PCS | Mod: CPTII,S$GLB,, | Performed by: FAMILY MEDICINE

## 2021-08-04 PROCEDURE — 99999 PR PBB SHADOW E&M-EST. PATIENT-LVL III: ICD-10-PCS | Mod: PBBFAC,,, | Performed by: FAMILY MEDICINE

## 2021-08-04 RX ORDER — EVOLOCUMAB 140 MG/ML
INJECTION, SOLUTION SUBCUTANEOUS
COMMUNITY
Start: 2021-07-20 | End: 2023-09-17

## 2021-08-04 RX ORDER — LISINOPRIL 10 MG/1
10 TABLET ORAL 2 TIMES DAILY
Qty: 180 TABLET | Refills: 1 | Status: SHIPPED | OUTPATIENT
Start: 2021-08-04 | End: 2021-12-29

## 2021-08-04 RX ORDER — METFORMIN HYDROCHLORIDE 1000 MG/1
1000 TABLET ORAL 2 TIMES DAILY WITH MEALS
Qty: 180 TABLET | Refills: 1 | Status: SHIPPED | OUTPATIENT
Start: 2021-08-04 | End: 2022-01-30

## 2021-08-04 RX ORDER — PIOGLITAZONEHYDROCHLORIDE 15 MG/1
15 TABLET ORAL DAILY
Qty: 90 TABLET | Refills: 1 | Status: SHIPPED | OUTPATIENT
Start: 2021-08-04 | End: 2022-01-02

## 2021-08-04 RX ORDER — ROSUVASTATIN CALCIUM 40 MG/1
10 TABLET, COATED ORAL NIGHTLY
COMMUNITY
End: 2021-10-25

## 2021-08-04 RX ORDER — COLCHICINE 0.6 MG/1
1 CAPSULE ORAL DAILY
COMMUNITY
Start: 2021-07-15

## 2021-08-04 RX ORDER — METOPROLOL TARTRATE 25 MG/1
25 TABLET, FILM COATED ORAL 2 TIMES DAILY
COMMUNITY
Start: 2021-07-03

## 2021-08-23 ENCOUNTER — HOSPITAL ENCOUNTER (EMERGENCY)
Facility: HOSPITAL | Age: 86
Discharge: HOME | End: 2021-08-23
Attending: SURGERY
Payer: MEDICARE

## 2021-08-23 VITALS
SYSTOLIC BLOOD PRESSURE: 128 MMHG | HEART RATE: 74 BPM | DIASTOLIC BLOOD PRESSURE: 75 MMHG | TEMPERATURE: 98 F | RESPIRATION RATE: 18 BRPM

## 2021-08-23 DIAGNOSIS — S51.811A SKIN TEAR OF RIGHT FOREARM WITHOUT COMPLICATION, INITIAL ENCOUNTER: Primary | ICD-10-CM

## 2021-08-23 PROCEDURE — 99284 EMERGENCY DEPT VISIT MOD MDM: CPT

## 2021-08-23 RX ORDER — MUPIROCIN 20 MG/G
OINTMENT TOPICAL 3 TIMES DAILY
Qty: 15 G | Refills: 0 | Status: SHIPPED | OUTPATIENT
Start: 2021-08-23 | End: 2021-09-02

## 2021-08-23 RX ORDER — CEPHALEXIN 500 MG/1
500 CAPSULE ORAL EVERY 6 HOURS
Qty: 28 CAPSULE | Refills: 0 | Status: SHIPPED | OUTPATIENT
Start: 2021-08-23 | End: 2021-08-30

## 2021-08-24 ENCOUNTER — TELEPHONE (OUTPATIENT)
Dept: FAMILY MEDICINE | Facility: CLINIC | Age: 86
End: 2021-08-24

## 2021-08-27 ENCOUNTER — PATIENT MESSAGE (OUTPATIENT)
Dept: FAMILY MEDICINE | Facility: CLINIC | Age: 86
End: 2021-08-27

## 2021-08-27 DIAGNOSIS — G89.29 OTHER CHRONIC PAIN: ICD-10-CM

## 2021-08-27 DIAGNOSIS — F32.A DEPRESSION, UNSPECIFIED DEPRESSION TYPE: ICD-10-CM

## 2021-08-27 DIAGNOSIS — E11.42 DIABETIC POLYNEUROPATHY ASSOCIATED WITH TYPE 2 DIABETES MELLITUS: ICD-10-CM

## 2021-09-01 ENCOUNTER — PATIENT MESSAGE (OUTPATIENT)
Dept: SURGERY | Facility: HOSPITAL | Age: 86
End: 2021-09-01

## 2021-09-01 DIAGNOSIS — G89.29 CHRONIC KNEE PAIN AFTER TOTAL REPLACEMENT OF RIGHT KNEE JOINT: ICD-10-CM

## 2021-09-01 DIAGNOSIS — M25.561 CHRONIC KNEE PAIN AFTER TOTAL REPLACEMENT OF RIGHT KNEE JOINT: ICD-10-CM

## 2021-09-01 DIAGNOSIS — Z96.651 CHRONIC KNEE PAIN AFTER TOTAL REPLACEMENT OF RIGHT KNEE JOINT: ICD-10-CM

## 2021-09-01 DIAGNOSIS — M47.816 LUMBAR SPONDYLOSIS: ICD-10-CM

## 2021-09-01 DIAGNOSIS — M48.062 SPINAL STENOSIS OF LUMBAR REGION WITH NEUROGENIC CLAUDICATION: ICD-10-CM

## 2021-09-07 RX ORDER — DULOXETIN HYDROCHLORIDE 30 MG/1
30 CAPSULE, DELAYED RELEASE ORAL DAILY
Qty: 30 CAPSULE | Refills: 5 | Status: SHIPPED | OUTPATIENT
Start: 2021-09-07 | End: 2022-02-09

## 2021-09-07 RX ORDER — HYDROCODONE BITARTRATE AND ACETAMINOPHEN 10; 325 MG/1; MG/1
1 TABLET ORAL EVERY 12 HOURS PRN
Qty: 60 TABLET | Refills: 0 | Status: SHIPPED | OUTPATIENT
Start: 2021-09-07 | End: 2021-10-25 | Stop reason: SDUPTHER

## 2021-09-23 ENCOUNTER — PATIENT OUTREACH (OUTPATIENT)
Dept: ADMINISTRATIVE | Facility: OTHER | Age: 86
End: 2021-09-23

## 2021-09-25 ENCOUNTER — PATIENT MESSAGE (OUTPATIENT)
Dept: PAIN MEDICINE | Facility: CLINIC | Age: 86
End: 2021-09-25

## 2021-09-25 DIAGNOSIS — Z96.651 CHRONIC KNEE PAIN AFTER TOTAL REPLACEMENT OF RIGHT KNEE JOINT: ICD-10-CM

## 2021-09-25 DIAGNOSIS — G89.29 CHRONIC KNEE PAIN AFTER TOTAL REPLACEMENT OF RIGHT KNEE JOINT: ICD-10-CM

## 2021-09-25 DIAGNOSIS — M48.061 SPINAL STENOSIS OF LUMBAR REGION WITHOUT NEUROGENIC CLAUDICATION: ICD-10-CM

## 2021-09-25 DIAGNOSIS — M25.561 CHRONIC KNEE PAIN AFTER TOTAL REPLACEMENT OF RIGHT KNEE JOINT: ICD-10-CM

## 2021-09-25 DIAGNOSIS — E11.42 DIABETIC POLYNEUROPATHY ASSOCIATED WITH TYPE 2 DIABETES MELLITUS: ICD-10-CM

## 2021-09-25 DIAGNOSIS — M47.816 LUMBAR SPONDYLOSIS: ICD-10-CM

## 2021-09-27 ENCOUNTER — TELEPHONE (OUTPATIENT)
Dept: PAIN MEDICINE | Facility: CLINIC | Age: 86
End: 2021-09-27

## 2021-09-27 RX ORDER — PREGABALIN 75 MG/1
75 CAPSULE ORAL 2 TIMES DAILY
Qty: 60 CAPSULE | Refills: 5 | Status: SHIPPED | OUTPATIENT
Start: 2021-09-27 | End: 2021-10-25

## 2021-10-25 ENCOUNTER — HOSPITAL ENCOUNTER (OUTPATIENT)
Dept: RADIOLOGY | Facility: HOSPITAL | Age: 86
Discharge: HOME | End: 2021-10-25
Attending: PHYSICAL MEDICINE & REHABILITATION
Payer: MEDICARE

## 2021-10-25 ENCOUNTER — OFFICE VISIT (OUTPATIENT)
Dept: PAIN MEDICINE | Facility: CLINIC | Age: 86
End: 2021-10-25
Payer: MEDICARE

## 2021-10-25 VITALS
WEIGHT: 194.75 LBS | BODY MASS INDEX: 31.3 KG/M2 | RESPIRATION RATE: 19 BRPM | HEIGHT: 66 IN | DIASTOLIC BLOOD PRESSURE: 70 MMHG | SYSTOLIC BLOOD PRESSURE: 124 MMHG

## 2021-10-25 DIAGNOSIS — Z96.651 CHRONIC KNEE PAIN AFTER TOTAL REPLACEMENT OF RIGHT KNEE JOINT: ICD-10-CM

## 2021-10-25 DIAGNOSIS — G89.29 CHRONIC RIGHT SHOULDER PAIN: Primary | ICD-10-CM

## 2021-10-25 DIAGNOSIS — G89.29 CHRONIC RIGHT SHOULDER PAIN: ICD-10-CM

## 2021-10-25 DIAGNOSIS — M48.062 SPINAL STENOSIS OF LUMBAR REGION WITH NEUROGENIC CLAUDICATION: ICD-10-CM

## 2021-10-25 DIAGNOSIS — M25.511 CHRONIC RIGHT SHOULDER PAIN: Primary | ICD-10-CM

## 2021-10-25 DIAGNOSIS — M25.561 CHRONIC KNEE PAIN AFTER TOTAL REPLACEMENT OF RIGHT KNEE JOINT: ICD-10-CM

## 2021-10-25 DIAGNOSIS — G89.29 CHRONIC KNEE PAIN AFTER TOTAL REPLACEMENT OF RIGHT KNEE JOINT: ICD-10-CM

## 2021-10-25 DIAGNOSIS — M47.816 LUMBAR SPONDYLOSIS: ICD-10-CM

## 2021-10-25 DIAGNOSIS — M25.511 CHRONIC RIGHT SHOULDER PAIN: ICD-10-CM

## 2021-10-25 DIAGNOSIS — M48.061 SPINAL STENOSIS OF LUMBAR REGION WITHOUT NEUROGENIC CLAUDICATION: ICD-10-CM

## 2021-10-25 DIAGNOSIS — M75.21 BICEPS TENDINITIS OF RIGHT UPPER EXTREMITY: ICD-10-CM

## 2021-10-25 DIAGNOSIS — E11.42 DIABETIC POLYNEUROPATHY ASSOCIATED WITH TYPE 2 DIABETES MELLITUS: ICD-10-CM

## 2021-10-25 PROCEDURE — 99214 OFFICE O/P EST MOD 30 MIN: CPT | Mod: S$GLB,,, | Performed by: PHYSICAL MEDICINE & REHABILITATION

## 2021-10-25 PROCEDURE — 1160F RVW MEDS BY RX/DR IN RCRD: CPT | Mod: CPTII,S$GLB,, | Performed by: PHYSICAL MEDICINE & REHABILITATION

## 2021-10-25 PROCEDURE — 99214 PR OFFICE/OUTPT VISIT, EST, LEVL IV, 30-39 MIN: ICD-10-PCS | Mod: S$GLB,,, | Performed by: PHYSICAL MEDICINE & REHABILITATION

## 2021-10-25 PROCEDURE — 1101F PT FALLS ASSESS-DOCD LE1/YR: CPT | Mod: CPTII,S$GLB,, | Performed by: PHYSICAL MEDICINE & REHABILITATION

## 2021-10-25 PROCEDURE — 73030 X-RAY EXAM OF SHOULDER: CPT | Mod: 26,RT,, | Performed by: RADIOLOGY

## 2021-10-25 PROCEDURE — 3288F FALL RISK ASSESSMENT DOCD: CPT | Mod: CPTII,S$GLB,, | Performed by: PHYSICAL MEDICINE & REHABILITATION

## 2021-10-25 PROCEDURE — 99999 PR PBB SHADOW E&M-EST. PATIENT-LVL V: CPT | Mod: PBBFAC,,, | Performed by: PHYSICAL MEDICINE & REHABILITATION

## 2021-10-25 PROCEDURE — 1126F AMNT PAIN NOTED NONE PRSNT: CPT | Mod: CPTII,S$GLB,, | Performed by: PHYSICAL MEDICINE & REHABILITATION

## 2021-10-25 PROCEDURE — 1126F PR PAIN SEVERITY QUANTIFIED, NO PAIN PRESENT: ICD-10-PCS | Mod: CPTII,S$GLB,, | Performed by: PHYSICAL MEDICINE & REHABILITATION

## 2021-10-25 PROCEDURE — 73030 XR SHOULDER COMPLETE 2 OR MORE VIEWS RIGHT: ICD-10-PCS | Mod: 26,RT,, | Performed by: RADIOLOGY

## 2021-10-25 PROCEDURE — 99999 PR PBB SHADOW E&M-EST. PATIENT-LVL V: ICD-10-PCS | Mod: PBBFAC,,, | Performed by: PHYSICAL MEDICINE & REHABILITATION

## 2021-10-25 PROCEDURE — 1159F PR MEDICATION LIST DOCUMENTED IN MEDICAL RECORD: ICD-10-PCS | Mod: CPTII,S$GLB,, | Performed by: PHYSICAL MEDICINE & REHABILITATION

## 2021-10-25 PROCEDURE — 73030 X-RAY EXAM OF SHOULDER: CPT | Mod: TC,RT

## 2021-10-25 PROCEDURE — 1160F PR REVIEW ALL MEDS BY PRESCRIBER/CLIN PHARMACIST DOCUMENTED: ICD-10-PCS | Mod: CPTII,S$GLB,, | Performed by: PHYSICAL MEDICINE & REHABILITATION

## 2021-10-25 PROCEDURE — 1101F PR PT FALLS ASSESS DOC 0-1 FALLS W/OUT INJ PAST YR: ICD-10-PCS | Mod: CPTII,S$GLB,, | Performed by: PHYSICAL MEDICINE & REHABILITATION

## 2021-10-25 PROCEDURE — 1159F MED LIST DOCD IN RCRD: CPT | Mod: CPTII,S$GLB,, | Performed by: PHYSICAL MEDICINE & REHABILITATION

## 2021-10-25 PROCEDURE — 3288F PR FALLS RISK ASSESSMENT DOCUMENTED: ICD-10-PCS | Mod: CPTII,S$GLB,, | Performed by: PHYSICAL MEDICINE & REHABILITATION

## 2021-10-25 RX ORDER — PREGABALIN 100 MG/1
100 CAPSULE ORAL 2 TIMES DAILY
Qty: 60 CAPSULE | Refills: 5 | Status: SHIPPED | OUTPATIENT
Start: 2021-10-25 | End: 2021-11-29 | Stop reason: SDUPTHER

## 2021-10-25 RX ORDER — HYDROCODONE BITARTRATE AND ACETAMINOPHEN 10; 325 MG/1; MG/1
1 TABLET ORAL EVERY 12 HOURS PRN
Qty: 60 TABLET | Refills: 0 | Status: SHIPPED | OUTPATIENT
Start: 2021-10-25 | End: 2021-11-29 | Stop reason: SDUPTHER

## 2021-10-29 ENCOUNTER — PATIENT MESSAGE (OUTPATIENT)
Dept: PAIN MEDICINE | Facility: CLINIC | Age: 86
End: 2021-10-29
Payer: MEDICARE

## 2021-11-29 ENCOUNTER — OFFICE VISIT (OUTPATIENT)
Dept: PAIN MEDICINE | Facility: CLINIC | Age: 86
End: 2021-11-29
Payer: MEDICARE

## 2021-11-29 ENCOUNTER — TELEPHONE (OUTPATIENT)
Dept: PAIN MEDICINE | Facility: CLINIC | Age: 86
End: 2021-11-29
Payer: MEDICARE

## 2021-11-29 VITALS
SYSTOLIC BLOOD PRESSURE: 134 MMHG | HEART RATE: 70 BPM | RESPIRATION RATE: 16 BRPM | DIASTOLIC BLOOD PRESSURE: 78 MMHG | WEIGHT: 196.19 LBS | HEIGHT: 66 IN | BODY MASS INDEX: 31.53 KG/M2

## 2021-11-29 DIAGNOSIS — M48.062 SPINAL STENOSIS OF LUMBAR REGION WITH NEUROGENIC CLAUDICATION: ICD-10-CM

## 2021-11-29 DIAGNOSIS — Z96.651 CHRONIC KNEE PAIN AFTER TOTAL REPLACEMENT OF RIGHT KNEE JOINT: ICD-10-CM

## 2021-11-29 DIAGNOSIS — G89.29 CHRONIC KNEE PAIN AFTER TOTAL REPLACEMENT OF RIGHT KNEE JOINT: ICD-10-CM

## 2021-11-29 DIAGNOSIS — M47.816 LUMBAR SPONDYLOSIS: ICD-10-CM

## 2021-11-29 DIAGNOSIS — M25.561 CHRONIC KNEE PAIN AFTER TOTAL REPLACEMENT OF RIGHT KNEE JOINT: ICD-10-CM

## 2021-11-29 DIAGNOSIS — E11.42 DIABETIC POLYNEUROPATHY ASSOCIATED WITH TYPE 2 DIABETES MELLITUS: ICD-10-CM

## 2021-11-29 DIAGNOSIS — M48.061 SPINAL STENOSIS OF LUMBAR REGION WITHOUT NEUROGENIC CLAUDICATION: ICD-10-CM

## 2021-11-29 DIAGNOSIS — M47.816 LUMBAR SPONDYLOSIS: Primary | ICD-10-CM

## 2021-11-29 PROCEDURE — 99999 PR PBB SHADOW E&M-EST. PATIENT-LVL V: ICD-10-PCS | Mod: PBBFAC,,, | Performed by: PHYSICAL MEDICINE & REHABILITATION

## 2021-11-29 PROCEDURE — 99214 OFFICE O/P EST MOD 30 MIN: CPT | Mod: S$GLB,,, | Performed by: PHYSICAL MEDICINE & REHABILITATION

## 2021-11-29 PROCEDURE — 99999 PR PBB SHADOW E&M-EST. PATIENT-LVL V: CPT | Mod: PBBFAC,,, | Performed by: PHYSICAL MEDICINE & REHABILITATION

## 2021-11-29 PROCEDURE — 99214 PR OFFICE/OUTPT VISIT, EST, LEVL IV, 30-39 MIN: ICD-10-PCS | Mod: S$GLB,,, | Performed by: PHYSICAL MEDICINE & REHABILITATION

## 2021-11-29 RX ORDER — HYDROCODONE BITARTRATE AND ACETAMINOPHEN 10; 325 MG/1; MG/1
1 TABLET ORAL EVERY 12 HOURS PRN
Qty: 60 TABLET | Refills: 0 | Status: SHIPPED | OUTPATIENT
Start: 2021-11-29 | End: 2022-02-21 | Stop reason: SDUPTHER

## 2021-11-29 RX ORDER — PREGABALIN 100 MG/1
100 CAPSULE ORAL 2 TIMES DAILY
Qty: 60 CAPSULE | Refills: 5 | Status: SHIPPED | OUTPATIENT
Start: 2021-11-29 | End: 2022-03-28 | Stop reason: SDUPTHER

## 2021-11-29 RX ORDER — BUMETANIDE 1 MG/1
1 TABLET ORAL DAILY
COMMUNITY
End: 2023-07-25

## 2021-12-16 LAB
LEFT EYE DM RETINOPATHY: NEGATIVE
RIGHT EYE DM RETINOPATHY: NEGATIVE

## 2021-12-17 ENCOUNTER — TELEPHONE (OUTPATIENT)
Dept: PAIN MEDICINE | Facility: CLINIC | Age: 86
End: 2021-12-17
Payer: MEDICARE

## 2022-01-19 DIAGNOSIS — E11.22 TYPE 2 DIABETES MELLITUS WITH STAGE 3 CHRONIC KIDNEY DISEASE, WITH LONG-TERM CURRENT USE OF INSULIN: ICD-10-CM

## 2022-01-19 DIAGNOSIS — N18.30 TYPE 2 DIABETES MELLITUS WITH STAGE 3 CHRONIC KIDNEY DISEASE, WITH LONG-TERM CURRENT USE OF INSULIN: ICD-10-CM

## 2022-01-19 DIAGNOSIS — Z79.4 TYPE 2 DIABETES MELLITUS WITH STAGE 3 CHRONIC KIDNEY DISEASE, WITH LONG-TERM CURRENT USE OF INSULIN: ICD-10-CM

## 2022-01-19 NOTE — TELEPHONE ENCOUNTER
No new care gaps identified.  Powered by [employer]. Reference number: [id number].   1/19/2022 4:48:13 AM CST

## 2022-01-20 NOTE — PRE-PROCEDURE INSTRUCTIONS
Patient scheduled for pain management injection with Dr. Ortiz tomorrow, Friday, 1/21/22.  Spoke with patient's wife, Carol, instructed to be here at 6:30 and to enter through ER.--Can take medications in morning (excluding diabetic medications).  Must have transportation home.  Should be fasting after midnight with exception of water with medications.  Verbalized understanding.

## 2022-01-21 ENCOUNTER — HOSPITAL ENCOUNTER (OUTPATIENT)
Facility: HOSPITAL | Age: 87
Discharge: HOME | End: 2022-01-21
Attending: PHYSICAL MEDICINE & REHABILITATION | Admitting: PHYSICAL MEDICINE & REHABILITATION
Payer: MEDICARE

## 2022-01-21 VITALS
OXYGEN SATURATION: 97 % | HEART RATE: 105 BPM | SYSTOLIC BLOOD PRESSURE: 163 MMHG | TEMPERATURE: 97 F | DIASTOLIC BLOOD PRESSURE: 73 MMHG | RESPIRATION RATE: 16 BRPM

## 2022-01-21 DIAGNOSIS — M47.816 LUMBAR SPONDYLOSIS: Primary | ICD-10-CM

## 2022-01-21 DIAGNOSIS — R52 PAIN: ICD-10-CM

## 2022-01-21 LAB — POCT GLUCOSE: 95 MG/DL (ref 70–110)

## 2022-01-21 PROCEDURE — 64635 DESTROY LUMB/SAC FACET JNT: CPT | Mod: LT,,, | Performed by: PHYSICAL MEDICINE & REHABILITATION

## 2022-01-21 PROCEDURE — 82962 GLUCOSE BLOOD TEST: CPT | Performed by: PHYSICAL MEDICINE & REHABILITATION

## 2022-01-21 PROCEDURE — 64636 PR DESTROY L/S FACET JNT ADDL: ICD-10-PCS | Mod: LT,,, | Performed by: PHYSICAL MEDICINE & REHABILITATION

## 2022-01-21 PROCEDURE — 25000003 PHARM REV CODE 250: Performed by: PHYSICAL MEDICINE & REHABILITATION

## 2022-01-21 PROCEDURE — 64635 DESTROY LUMB/SAC FACET JNT: CPT | Mod: LT | Performed by: PHYSICAL MEDICINE & REHABILITATION

## 2022-01-21 PROCEDURE — 63600175 PHARM REV CODE 636 W HCPCS: Performed by: PHYSICAL MEDICINE & REHABILITATION

## 2022-01-21 PROCEDURE — 64636 DESTROY L/S FACET JNT ADDL: CPT | Mod: LT,,, | Performed by: PHYSICAL MEDICINE & REHABILITATION

## 2022-01-21 PROCEDURE — 64635 PR DESTROY LUMB/SAC FACET JNT: ICD-10-PCS | Mod: LT,,, | Performed by: PHYSICAL MEDICINE & REHABILITATION

## 2022-01-21 PROCEDURE — 64636 DESTROY L/S FACET JNT ADDL: CPT | Mod: LT | Performed by: PHYSICAL MEDICINE & REHABILITATION

## 2022-01-21 RX ORDER — MIDAZOLAM HYDROCHLORIDE 1 MG/ML
INJECTION, SOLUTION INTRAMUSCULAR; INTRAVENOUS
Status: DISCONTINUED | OUTPATIENT
Start: 2022-01-21 | End: 2022-01-21 | Stop reason: HOSPADM

## 2022-01-21 RX ORDER — METHYLPREDNISOLONE ACETATE 40 MG/ML
INJECTION, SUSPENSION INTRA-ARTICULAR; INTRALESIONAL; INTRAMUSCULAR; SOFT TISSUE
Status: DISCONTINUED
Start: 2022-01-21 | End: 2022-01-21 | Stop reason: WASHOUT

## 2022-01-21 RX ORDER — SODIUM CHLORIDE 9 MG/ML
INJECTION, SOLUTION INTRAVENOUS CONTINUOUS
Status: ACTIVE | OUTPATIENT
Start: 2022-01-21

## 2022-01-21 RX ORDER — FENTANYL CITRATE 50 UG/ML
INJECTION, SOLUTION INTRAMUSCULAR; INTRAVENOUS
Status: DISCONTINUED
Start: 2022-01-21 | End: 2022-01-21 | Stop reason: HOSPADM

## 2022-01-21 RX ORDER — FENTANYL CITRATE 50 UG/ML
INJECTION, SOLUTION INTRAMUSCULAR; INTRAVENOUS
Status: DISCONTINUED | OUTPATIENT
Start: 2022-01-21 | End: 2022-01-21 | Stop reason: HOSPADM

## 2022-01-21 RX ORDER — MIDAZOLAM HYDROCHLORIDE 1 MG/ML
INJECTION INTRAMUSCULAR; INTRAVENOUS
Status: DISCONTINUED
Start: 2022-01-21 | End: 2022-01-21 | Stop reason: HOSPADM

## 2022-01-21 RX ORDER — LIDOCAINE HYDROCHLORIDE 10 MG/ML
INJECTION INFILTRATION; PERINEURAL
Status: DISCONTINUED | OUTPATIENT
Start: 2022-01-21 | End: 2022-01-21 | Stop reason: HOSPADM

## 2022-01-21 RX ORDER — LIDOCAINE HYDROCHLORIDE 20 MG/ML
INJECTION, SOLUTION INFILTRATION; PERINEURAL
Status: DISCONTINUED | OUTPATIENT
Start: 2022-01-21 | End: 2022-01-21 | Stop reason: HOSPADM

## 2022-01-21 RX ORDER — BUPIVACAINE HYDROCHLORIDE 2.5 MG/ML
INJECTION, SOLUTION EPIDURAL; INFILTRATION; INTRACAUDAL
Status: DISCONTINUED | OUTPATIENT
Start: 2022-01-21 | End: 2022-01-21 | Stop reason: HOSPADM

## 2022-01-21 RX ORDER — LIDOCAINE HYDROCHLORIDE 20 MG/ML
INJECTION, SOLUTION EPIDURAL; INFILTRATION; INTRACAUDAL; PERINEURAL
Status: DISCONTINUED
Start: 2022-01-21 | End: 2022-01-21 | Stop reason: HOSPADM

## 2022-01-21 RX ORDER — LIDOCAINE HYDROCHLORIDE 10 MG/ML
INJECTION INFILTRATION; PERINEURAL
Status: DISCONTINUED
Start: 2022-01-21 | End: 2022-01-21 | Stop reason: HOSPADM

## 2022-01-21 RX ORDER — BUPIVACAINE HYDROCHLORIDE 2.5 MG/ML
INJECTION, SOLUTION EPIDURAL; INFILTRATION; INTRACAUDAL
Status: DISCONTINUED
Start: 2022-01-21 | End: 2022-01-21 | Stop reason: HOSPADM

## 2022-01-21 NOTE — DISCHARGE INSTRUCTIONS
DIET: You may resume your normal diet today.    BATHING: You may resume your normal bathing.          You may shower, no hot water directly on site for 24 hours.    DRESSING: You may remove your bandage today.    ACTIVITY LEVEL: You may resume your normal activities 24 hours after your  procedure.    If you have received sedation or an anesthetic, you may feel sleepy for several hours. Rest until you are more awake. Gradually resume your normal activities tomorrow.    If you have received sedation or an anesthetic, do not drive or operate heavy machinery for at least 24 hours.    MEDICATION: You may resume your normal medications today.    You will receive instructions for any pain prescriptions. Pain medications should be taken only as directed.    SPECIAL INSTRUCTIONS: No heat to the injection site for 24 hours including: bath or shower, heating pad, moist heat, hot tubs.    Use ice pack to injection site for any pain or discomfort. Apply ice pack to 20 minutes then remove for 20 minutes before re-applying to site.    WHEN TO CALL DOCTOR: Redness or swelling around injection site    Fever of 101F    Drainage (pus) from the injection site    For any continuous bleeding (some dried blood over the incision is normal).    FOLLOW UP: Follow up phone call will be made by office.    FOR EMERGENCIES: If any unusual problems or difficulties occur during clinic hours, call [phone] or [phone].

## 2022-01-21 NOTE — H&P
"HPI  Patient presenting for Procedure(s) (LRB):  RADIOFREQUENCY ABLATION (L4-5,L5-S1) (Left)     Patient on Anti-coagulation Eliquis    No health changes since previous encounter. No changes in pain since procedure was scheduled at previous visit. Denies any fevers or infections.     No current facility-administered medications on file prior to encounter.     Current Outpatient Medications on File Prior to Encounter   Medication Sig Dispense Refill    metoprolol tartrate (LOPRESSOR) 25 MG tablet       blood sugar diagnostic (BLOOD GLUCOSE TEST) Strp Use one Accu-Chek Felicia Plus Test Strip per glucometer to test blood glucose three times a day. Dx: E11.22 600 strip 3    blood-glucose meter kit Accu-Chek Felicia Plus Meter to test blood glucose three times a day. Dx: E11.22 1 each 0    bumetanide (BUMEX) 1 MG tablet Take 1 mg by mouth once daily.      DULoxetine (CYMBALTA) 30 MG capsule Take 1 capsule (30 mg total) by mouth once daily. 30 capsule 5    ELIQUIS 5 mg Tab TAKE 1 TABLET TWICE DAILY 180 tablet 1    HYDROcodone-acetaminophen (NORCO)  mg per tablet Take 1 tablet by mouth every 12 (twelve) hours as needed for Pain. 60 tablet 0    insulin (LANTUS SOLOSTAR U-100 INSULIN) glargine 100 units/mL (3mL) SubQ pen INJECT 42 UNITS SUBCUTANEOUSLY EVERY EVENING 45 mL 5    lancets Misc Use one Accu-Chek Softclix Lancet per lancing device to test blood glucose three times a day. Dx: E11.22 600 each 3    levoFLOXacin (LEVAQUIN) 500 MG tablet Take 500 mg by mouth once daily.      metFORMIN (GLUCOPHAGE) 1000 MG tablet Take 1 tablet (1,000 mg total) by mouth 2 (two) times daily with meals. 180 tablet 1    MITIGARE 0.6 mg Cap       pen needle, diabetic 31 gauge x 3/16" Ndle 1 Device by Misc.(Non-Drug; Combo Route) route 3 (three) times daily. 100 each 11    pregabalin (LYRICA) 100 MG capsule Take 1 capsule (100 mg total) by mouth 2 (two) times daily. 60 capsule 5    REPATHA SURECLICK 140 mg/mL PnIj       " "SITagliptin (JANUVIA) 100 MG Tab Take 1 tablet (100 mg total) by mouth once daily. 90 tablet 5    sucralfate (CARAFATE) 1 gram tablet Take 1 tablet (1 g total) by mouth 4 (four) times daily before meals and nightly. (Patient taking differently: Take 1 g by mouth as needed. AS NEEDED) 120 tablet 5    traZODone (DESYREL) 50 MG tablet TAKE 1 TABLET EVERY EVENING. 90 tablet 1    turmeric 400 mg Cap Take 1,200 mg by mouth.      UNABLE TO FIND 1,500 mg. medication name: CBD Oil      vit C/E/zinc ox/ryan/lut/zeax (ICAPS AREDS2 ORAL) Take 1 capsule by mouth. 4 a day       Past Medical History:   Diagnosis Date    Arthritis     Atrial fibrillation     Bladder mass     BPH (benign prostatic hyperplasia)     CHF (congestive heart failure)     Depression     Diabetes mellitus type II     Hyperlipidemia     Hypertension     Microscopic hematuria     Prostate cancer 10/01/2018    RLS (restless legs syndrome)     Stroke     TIA    Wears glasses      Past Surgical History:   Procedure Laterality Date    back injections      BACK SURGERY      cervical fusion    CARDIAC PACEMAKER PLACEMENT      CARDIAC SURGERY Left     pacemaker placement    CATARACT EXTRACTION W/  INTRAOCULAR LENS IMPLANT Bilateral     cataracts    COLECTOMY N/A     CYSTOSCOPY N/A     Pt stated, "I have had six Cystoscopy."    CYSTOSCOPY W/ RETROGRADES Bilateral 10/21/2019    Procedure: CYSTOSCOPY WITH RETROGRADE PYELOGRAM;  Surgeon: Elliott Coon MD;  Location: [facility] OR;  Service: Urology;  Laterality: Bilateral;  OP 6    DIGITAL RECTAL EXAMINATION UNDER ANESTHESIA N/A 10/21/2019    Procedure: EXAM UNDER ANESTHESIA, DIGITAL, RECTUM;  Surgeon: Elliott Coon MD;  Location: [facility] OR;  Service: Urology;  Laterality: N/A;    EPIDURAL STEROID INJECTION INTO LUMBAR SPINE N/A 3/12/2021    Procedure: LUMBAR EPIDURAL STEROID INJECTION (L4-5 INTERLAMINAR);  Surgeon: Maria Guadalupe Ortiz MD;  [facility];  Service: Pain Management;  " Laterality: N/A;    Ganglion Cyst Removed  Right     INJECTION OF ANESTHETIC AGENT AROUND MEDIAL BRANCH NERVES INNERVATING LUMBAR FACET JOINT Bilateral 12/18/2020    Procedure: LUMBAR FACET JOINT BLOCK (L4-5,L5-S1);  Surgeon: Maria Guadalupe Ortiz MD;  Location: [facility] OR;  Service: Pain Management;  Laterality: Bilateral;    INJECTION OF ANESTHETIC AGENT AROUND MEDIAL BRANCH NERVES INNERVATING LUMBAR FACET JOINT Bilateral 1/15/2021    Procedure: LUMBAR FACET JOINT BLOCK (L4-5,L5-S1);  Surgeon: Maria Guadalupe Ortiz MD;  Location: [facility] OR;  Service: Pain Management;  Laterality: Bilateral;    INJECTION OF ANESTHETIC AGENT AROUND NERVE Right 10/30/2020    Procedure: SUPERIOR LATERAL AND MEDIAL AND INFERIOR MEDIAL GENICULAR NERVE BLOCK;  Surgeon: Maria Guadalupe Ortiz MD;  Location: [facility] OR;  Service: Pain Management;  Laterality: Right;    Neck Fusion Bilateral     PROSTATE SURGERY      ROTATOR CUFF REPAIR Right     SKIN BIOPSY      skin cancer    TOTAL KNEE ARTHROPLASTY  03/30/2017    right knee    TRANSURETHRAL RESECTION OF PROSTATE       Review of patient's allergies indicates:   Allergen Reactions    Iodinated contrast media     Iodine Hives     Iv iodine only      Current Facility-Administered Medications   Medication    0.9%  NaCl infusion    BUPivacaine (PF) 0.25% (2.5 mg/ml) (MARCAINE) 0.25 % (2.5 mg/mL) injection    fentaNYL (SUBLIMAZE) 50 mcg/mL injection    LIDOcaine (PF) 20 mg/mL (2%) 20 mg/mL (2 %) injection    LIDOcaine HCL 10 mg/ml (1%) 10 mg/mL (1 %) injection    methylPREDNISolone acetate (DEPO-MEDROL) 40 mg/mL injection    midazolam (VERSED) 1 mg/mL injection       PMHx, PSHx, Allergies, Medications reviewed in epic    ROS negative except pain complaints in HPI    OBJECTIVE:    BP (!) 160/80   Pulse 77   Resp 17   SpO2 (!) 94%     PHYSICAL EXAMINATION:    GENERAL: Well appearing, in no acute distress, alert and oriented.  PSYCH:  Mood and affect appropriate.  SKIN: Skin color, texture, turgor  normal in procedure area, no rashes or lesions which will impact the procedure.  CV: RRR with palpation of the radial artery.  PULM: No evidence of respiratory difficulty, symmetric chest rise. Clear to auscultation.  NEURO: Alert. Oriented. Speech fluent and appropriate. Moving all extremities.    Plan:    Proceed with procedure as planned Procedure(s) (LRB):  RADIOFREQUENCY ABLATION (L4-5,L5-S1) (Left)    Maria Guadalupe Ortiz  01/21/2022

## 2022-01-21 NOTE — OP NOTE
Lumbar Medial Branch Radiofrequency Ablation Under Fluoroscopic Guidance:  I have reviewed the patient's medications, allergies and relevant histories prior to the procedure and no contraindications have been identified. The risks, benefits and alternatives to the procedure were discussed with the patient, and all questions regarding the procedure were answered to the patient's satisfaction. I personally obtained Erasmo's consent prior to the start of the procedure and the signed consent can be found in the patient's chart.                                                         Time-out was taken to identify patient, procedure, laterality, and allergies prior to starting the procedure.       Date of Service: 01/21/2022  Procedure: Left L4-5, L5-S1 medial branch radiofrequency ablation  Pre-Operative Diagnosis: Lumbar Spondylosis  Post-Operative Diagnosis: Lumbar Spondylosis    Physician: Maria Guadalupe Ortiz M.D.  Assistants: None    Medications Injected:  Lidocaine 2% MPF. Of that, 1.5mL injected per level prior to ablation. 3 mL Bupivacaine 0.25%. Of that, 1 mL injected per level after the ablation.  Local Anesthetic: Xylocaine 1% 10 mL    Sedation Medications: Versed 1 mg, Fentanyl 25 mcg. Under my direct observation, intravenous moderate sedation was administered during the course of this procedure, with continuous hemodynamic monitoring including blood pressure, EKG, and pulse oximetry. Please see RN documentation of total intraservice time for moderate sedation.    Procedural Technique:   Laying in a prone position, the patient was prepped and draped in the usual sterile fashion using ChloraPrep and fenestrated drape.  The level was determined under fluoroscopic guidance.  Local anesthetic was given by going down to the hub of the 25-gauge 1.5 in needle and raising a wheel.  The 20-gauge needle was introduced to the anatomic local of the medial branch at the junction of the superior articular process & transverse  process utilizing live fluoroscopy. Motor stimulation performed to confirm no motor nerve ablation takes place up to 2 Volt 2Hz.  Lidocaine 2% MPF was then injected slowly to anesthetize the area.  Ablation then done per level utilizing WeStudy.In radiofrequency generator setting of 80°C for 90 seconds.  Following the ablation, 1 mL of the Bupivacaine per site. The needle was removed and bandage applied to the area.    Estimated Blood Loss:  None.  Complications:  None.     Disposition: The patient tolerated the procedure well, and there were no apparent complications. Vital signs remained stable throughout the procedure. The patient was taken to the recovery area and monitored. The patient was supplied with written discharge instructions for the procedure. If helpful, we can repeat as needed. The patient was discharged in a stable condition.    Follow-Up: We will see the patient back in 1-2 weeks or the patient may call to inform of status.

## 2022-01-27 ENCOUNTER — CLINICAL SUPPORT (OUTPATIENT)
Dept: FAMILY MEDICINE | Facility: CLINIC | Age: 87
End: 2022-01-27
Payer: MEDICARE

## 2022-01-27 DIAGNOSIS — E78.2 MIXED HYPERLIPIDEMIA: ICD-10-CM

## 2022-01-27 DIAGNOSIS — E11.22 TYPE 2 DIABETES MELLITUS WITH CHRONIC KIDNEY DISEASE, WITHOUT LONG-TERM CURRENT USE OF INSULIN, UNSPECIFIED CKD STAGE: ICD-10-CM

## 2022-01-27 DIAGNOSIS — I10 ESSENTIAL HYPERTENSION: ICD-10-CM

## 2022-01-27 DIAGNOSIS — E87.6 HYPOKALEMIA DUE TO LOSS OF POTASSIUM: ICD-10-CM

## 2022-01-27 LAB
ALBUMIN SERPL BCP-MCNC: 3.7 G/DL (ref 3.5–5.2)
ALP SERPL-CCNC: 54 U/L (ref 55–135)
ALT SERPL W/O P-5'-P-CCNC: 34 U/L (ref 10–44)
ANION GAP SERPL CALC-SCNC: 11 MMOL/L (ref 8–16)
AST SERPL-CCNC: 26 U/L (ref 10–40)
BASOPHILS # BLD AUTO: 0.09 K/UL (ref 0–0.2)
BASOPHILS NFR BLD: 1 % (ref 0–1.9)
BILIRUB SERPL-MCNC: 0.4 MG/DL (ref 0.1–1)
BUN SERPL-MCNC: 12 MG/DL (ref 8–23)
CALCIUM SERPL-MCNC: 9.7 MG/DL (ref 8.7–10.5)
CHLORIDE SERPL-SCNC: 103 MMOL/L (ref 95–110)
CHOLEST SERPL-MCNC: 160 MG/DL (ref 120–199)
CHOLEST/HDLC SERPL: 3.4 {RATIO} (ref 2–5)
CO2 SERPL-SCNC: 28 MMOL/L (ref 23–29)
CREAT SERPL-MCNC: 0.9 MG/DL (ref 0.5–1.4)
DIFFERENTIAL METHOD: ABNORMAL
EOSINOPHIL # BLD AUTO: 1.1 K/UL (ref 0–0.5)
EOSINOPHIL NFR BLD: 12.2 % (ref 0–8)
ERYTHROCYTE [DISTWIDTH] IN BLOOD BY AUTOMATED COUNT: 14.4 % (ref 11.5–14.5)
EST. GFR  (AFRICAN AMERICAN): >60 ML/MIN/1.73 M^2
EST. GFR  (NON AFRICAN AMERICAN): >60 ML/MIN/1.73 M^2
ESTIMATED AVG GLUCOSE: 131 MG/DL (ref 68–131)
GLUCOSE SERPL-MCNC: 126 MG/DL (ref 70–110)
HBA1C MFR BLD: 6.2 % (ref 4–5.6)
HCT VFR BLD AUTO: 37 % (ref 40–54)
HDLC SERPL-MCNC: 47 MG/DL (ref 40–75)
HDLC SERPL: 29.4 % (ref 20–50)
HGB BLD-MCNC: 11.4 G/DL (ref 14–18)
IMM GRANULOCYTES # BLD AUTO: 0.05 K/UL (ref 0–0.04)
IMM GRANULOCYTES NFR BLD AUTO: 0.5 % (ref 0–0.5)
LDLC SERPL CALC-MCNC: 63.4 MG/DL (ref 63–159)
LYMPHOCYTES # BLD AUTO: 2.4 K/UL (ref 1–4.8)
LYMPHOCYTES NFR BLD: 26 % (ref 18–48)
MCH RBC QN AUTO: 29.8 PG (ref 27–31)
MCHC RBC AUTO-ENTMCNC: 30.8 G/DL (ref 32–36)
MCV RBC AUTO: 97 FL (ref 82–98)
MONOCYTES # BLD AUTO: 0.9 K/UL (ref 0.3–1)
MONOCYTES NFR BLD: 9.6 % (ref 4–15)
NEUTROPHILS # BLD AUTO: 4.6 K/UL (ref 1.8–7.7)
NEUTROPHILS NFR BLD: 50.7 % (ref 38–73)
NONHDLC SERPL-MCNC: 113 MG/DL
NRBC BLD-RTO: 0 /100 WBC
PLATELET # BLD AUTO: 352 K/UL (ref 150–450)
PMV BLD AUTO: 9.8 FL (ref 9.2–12.9)
POTASSIUM SERPL-SCNC: 4.4 MMOL/L (ref 3.5–5.1)
PROT SERPL-MCNC: 6.4 G/DL (ref 6–8.4)
RBC # BLD AUTO: 3.82 M/UL (ref 4.6–6.2)
SODIUM SERPL-SCNC: 142 MMOL/L (ref 136–145)
TRIGL SERPL-MCNC: 248 MG/DL (ref 30–150)
TSH SERPL DL<=0.005 MIU/L-ACNC: 0.6 UIU/ML (ref 0.4–4)
WBC # BLD AUTO: 9.12 K/UL (ref 3.9–12.7)

## 2022-01-27 PROCEDURE — 84443 ASSAY THYROID STIM HORMONE: CPT | Performed by: FAMILY MEDICINE

## 2022-01-27 PROCEDURE — 80053 COMPREHEN METABOLIC PANEL: CPT | Performed by: FAMILY MEDICINE

## 2022-01-27 PROCEDURE — 80061 LIPID PANEL: CPT | Performed by: FAMILY MEDICINE

## 2022-01-27 PROCEDURE — 83036 HEMOGLOBIN GLYCOSYLATED A1C: CPT | Performed by: FAMILY MEDICINE

## 2022-01-27 PROCEDURE — 36415 COLL VENOUS BLD VENIPUNCTURE: CPT | Mod: S$GLB,,, | Performed by: FAMILY MEDICINE

## 2022-01-27 PROCEDURE — 85025 COMPLETE CBC W/AUTO DIFF WBC: CPT | Performed by: FAMILY MEDICINE

## 2022-01-27 PROCEDURE — 36415 PR COLLECTION VENOUS BLOOD,VENIPUNCTURE: ICD-10-PCS | Mod: S$GLB,,, | Performed by: FAMILY MEDICINE

## 2022-01-30 RX ORDER — METFORMIN HYDROCHLORIDE 1000 MG/1
1000 TABLET ORAL 2 TIMES DAILY WITH MEALS
Qty: 180 TABLET | Refills: 1 | Status: SHIPPED | OUTPATIENT
Start: 2022-01-30 | End: 2022-06-27

## 2022-02-03 ENCOUNTER — CLINICAL SUPPORT (OUTPATIENT)
Dept: FAMILY MEDICINE | Facility: CLINIC | Age: 87
End: 2022-02-03
Payer: MEDICARE

## 2022-02-03 ENCOUNTER — APPOINTMENT (OUTPATIENT)
Dept: RADIOLOGY | Facility: CLINIC | Age: 87
End: 2022-02-03
Attending: FAMILY MEDICINE
Payer: MEDICARE

## 2022-02-03 ENCOUNTER — OFFICE VISIT (OUTPATIENT)
Dept: FAMILY MEDICINE | Facility: CLINIC | Age: 87
End: 2022-02-03
Payer: MEDICARE

## 2022-02-03 VITALS
HEIGHT: 66 IN | DIASTOLIC BLOOD PRESSURE: 68 MMHG | SYSTOLIC BLOOD PRESSURE: 138 MMHG | HEART RATE: 61 BPM | WEIGHT: 202.94 LBS | OXYGEN SATURATION: 97 % | BODY MASS INDEX: 32.61 KG/M2 | RESPIRATION RATE: 20 BRPM
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
## 2022-02-03 DIAGNOSIS — E11.22 TYPE 2 DIABETES MELLITUS WITH CHRONIC KIDNEY DISEASE, WITHOUT LONG-TERM CURRENT USE OF INSULIN, UNSPECIFIED CKD STAGE: ICD-10-CM

## 2022-02-03 DIAGNOSIS — E78.2 MIXED HYPERLIPIDEMIA: ICD-10-CM
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
## 2022-02-03 DIAGNOSIS — I48.0 PAROXYSMAL ATRIAL FIBRILLATION: ICD-10-CM

## 2022-02-03 DIAGNOSIS — I10 PRIMARY HYPERTENSION: ICD-10-CM

## 2022-02-03 DIAGNOSIS — M48.062 SPINAL STENOSIS OF LUMBAR REGION WITH NEUROGENIC CLAUDICATION: ICD-10-CM

## 2022-02-03 DIAGNOSIS — S20.212A CONTUSION OF LEFT CHEST WALL, INITIAL ENCOUNTER: ICD-10-CM

## 2022-02-03 LAB
ALBUMIN SERPL BCP-MCNC: 3.7 G/DL (ref 3.5–5.2)
ALP SERPL-CCNC: 67 U/L (ref 55–135)
ALT SERPL W/O P-5'-P-CCNC: 19 U/L (ref 10–44)
ANION GAP SERPL CALC-SCNC: 9 MMOL/L (ref 8–16)
AST SERPL-CCNC: 15 U/L (ref 10–40)
BILIRUB SERPL-MCNC: 0.3 MG/DL (ref 0.1–1)
BUN SERPL-MCNC: 20 MG/DL (ref 8–23)
CALCIUM SERPL-MCNC: 9.3 MG/DL (ref 8.7–10.5)
CHLORIDE SERPL-SCNC: 97 MMOL/L (ref 95–110)
CO2 SERPL-SCNC: 30 MMOL/L (ref 23–29)
CREAT SERPL-MCNC: 0.9 MG/DL (ref 0.5–1.4)
EST. GFR  (AFRICAN AMERICAN): >60 ML/MIN/1.73 M^2
EST. GFR  (NON AFRICAN AMERICAN): >60 ML/MIN/1.73 M^2
GLUCOSE SERPL-MCNC: 130 MG/DL (ref 70–110)
POTASSIUM SERPL-SCNC: 4.5 MMOL/L (ref 3.5–5.1)
PROT SERPL-MCNC: 6.5 G/DL (ref 6–8.4)
SODIUM SERPL-SCNC: 136 MMOL/L (ref 136–145)

## 2022-02-03 PROCEDURE — 80053 COMPREHEN METABOLIC PANEL: CPT | Performed by: FAMILY MEDICINE

## 2022-02-03 PROCEDURE — 99214 OFFICE O/P EST MOD 30 MIN: CPT | Mod: S$GLB,,, | Performed by: FAMILY MEDICINE

## 2022-02-03 PROCEDURE — 3288F PR FALLS RISK ASSESSMENT DOCUMENTED: ICD-10-PCS | Mod: CPTII,S$GLB,, | Performed by: FAMILY MEDICINE

## 2022-02-03 PROCEDURE — 71100 X-RAY EXAM RIBS UNI 2 VIEWS: CPT | Mod: TC,PO,LT

## 2022-02-03 PROCEDURE — 1160F RVW MEDS BY RX/DR IN RCRD: CPT | Mod: CPTII,S$GLB,, | Performed by: FAMILY MEDICINE

## 2022-02-03 PROCEDURE — 36415 PR COLLECTION VENOUS BLOOD,VENIPUNCTURE: ICD-10-PCS | Mod: S$GLB,,, | Performed by: FAMILY MEDICINE

## 2022-02-03 PROCEDURE — 99999 PR PBB SHADOW E&M-EST. PATIENT-LVL V: CPT | Mod: PBBFAC,,, | Performed by: FAMILY MEDICINE

## 2022-02-03 PROCEDURE — 71100 XR RIBS 2 VIEW LEFT: ICD-10-PCS | Mod: 26,LT,, | Performed by: RADIOLOGY

## 2022-02-03 PROCEDURE — 99999 PR PBB SHADOW E&M-EST. PATIENT-LVL V: ICD-10-PCS | Mod: PBBFAC,,, | Performed by: FAMILY MEDICINE

## 2022-02-03 PROCEDURE — 1100F PR PT FALLS ASSESS DOC 2+ FALLS/FALL W/INJURY/YR: ICD-10-PCS | Mod: CPTII,S$GLB,, | Performed by: FAMILY MEDICINE

## 2022-02-03 PROCEDURE — 1159F MED LIST DOCD IN RCRD: CPT | Mod: CPTII,S$GLB,, | Performed by: FAMILY MEDICINE

## 2022-02-03 PROCEDURE — 83036 HEMOGLOBIN GLYCOSYLATED A1C: CPT | Performed by: FAMILY MEDICINE

## 2022-02-03 PROCEDURE — 1125F PR PAIN SEVERITY QUANTIFIED, PAIN PRESENT: ICD-10-PCS | Mod: CPTII,S$GLB,, | Performed by: FAMILY MEDICINE

## 2022-02-03 PROCEDURE — 1159F PR MEDICATION LIST DOCUMENTED IN MEDICAL RECORD: ICD-10-PCS | Mod: CPTII,S$GLB,, | Performed by: FAMILY MEDICINE

## 2022-02-03 PROCEDURE — 99214 PR OFFICE/OUTPT VISIT, EST, LEVL IV, 30-39 MIN: ICD-10-PCS | Mod: S$GLB,,, | Performed by: FAMILY MEDICINE

## 2022-02-03 PROCEDURE — 1160F PR REVIEW ALL MEDS BY PRESCRIBER/CLIN PHARMACIST DOCUMENTED: ICD-10-PCS | Mod: CPTII,S$GLB,, | Performed by: FAMILY MEDICINE

## 2022-02-03 PROCEDURE — 3288F FALL RISK ASSESSMENT DOCD: CPT | Mod: CPTII,S$GLB,, | Performed by: FAMILY MEDICINE

## 2022-02-03 PROCEDURE — 1100F PTFALLS ASSESS-DOCD GE2>/YR: CPT | Mod: CPTII,S$GLB,, | Performed by: FAMILY MEDICINE

## 2022-02-03 PROCEDURE — 1125F AMNT PAIN NOTED PAIN PRSNT: CPT | Mod: CPTII,S$GLB,, | Performed by: FAMILY MEDICINE

## 2022-02-03 PROCEDURE — 36415 COLL VENOUS BLD VENIPUNCTURE: CPT | Mod: S$GLB,,, | Performed by: FAMILY MEDICINE

## 2022-02-03 PROCEDURE — 71100 X-RAY EXAM RIBS UNI 2 VIEWS: CPT | Mod: 26,LT,, | Performed by: RADIOLOGY

## 2022-02-03 RX ORDER — TIZANIDINE 4 MG/1
4 TABLET ORAL EVERY 6 HOURS PRN
Qty: 30 TABLET | Refills: 1 | Status: SHIPPED | OUTPATIENT
Start: 2022-02-03 | End: 2022-02-13

## 2022-02-03 RX ORDER — TRAMADOL HYDROCHLORIDE 50 MG/1
50 TABLET ORAL EVERY 8 HOURS PRN
Qty: 30 EACH | Refills: 0 | Status: SHIPPED | OUTPATIENT
Start: 2022-02-03 | End: 2022-02-21

## 2022-02-03 NOTE — PROGRESS NOTES
Subjective:       Patient ID: Erasmo Dunbar is a 86 y.o. male.    Chief Complaint: Follow-up and Fall (6 month f/u, fall f/u from 02/01)    Pt is a 86 y.o. male who presents for evaluation and management of   Encounter Diagnoses   Name Primary?    Fall, initial encounter Yes    Primary hypertension     Mixed hyperlipidemia     Paroxysmal atrial fibrillation     Type 2 diabetes mellitus with chronic kidney disease, without long-term current use of insulin, unspecified CKD stage     Spinal stenosis of lumbar region with neurogenic claudication    .  Doing well on current meds. Denies any side effects. Prevention is up to date.  Review of Systems   Constitutional: Negative for fever.   Respiratory: Negative for shortness of breath.    Cardiovascular: Positive for chest pain.        Left chest wall pain since fall 2 nights ago    Gastrointestinal: Negative for anal bleeding and blood in stool.   Genitourinary: Negative for dysuria.   Neurological: Negative for dizziness and light-headedness.       Objective:      Physical Exam  Constitutional:       Appearance: He is well-developed and well-nourished.   HENT:      Head: Normocephalic and atraumatic.      Right Ear: External ear normal.      Left Ear: External ear normal.      Nose: Nose normal.      Mouth/Throat:      Mouth: Oropharynx is clear and moist.   Eyes:      General: No scleral icterus.        Right eye: No discharge.         Left eye: No discharge.      Extraocular Movements: EOM normal.      Conjunctiva/sclera: Conjunctivae normal.      Pupils: Pupils are equal, round, and reactive to light.   Neck:      Thyroid: No thyromegaly.      Vascular: No JVD.      Trachea: No tracheal deviation.   Cardiovascular:      Rate and Rhythm: Normal rate and regular rhythm.      Pulses: Intact distal pulses.      Heart sounds: Normal heart sounds. No murmur heard.      Pulmonary:      Effort: Pulmonary effort is normal. No respiratory distress.      Breath sounds:  Normal breath sounds. No wheezing or rales.   Chest:      Chest wall: No tenderness.   Abdominal:      General: Bowel sounds are normal. There is no distension.      Palpations: Abdomen is soft. There is no mass.      Tenderness: There is no abdominal tenderness. There is no guarding or rebound.   Musculoskeletal:         General: Normal range of motion.      Cervical back: Normal range of motion and neck supple.   Lymphadenopathy:      Cervical: No cervical adenopathy.   Skin:     General: Skin is warm and dry.   Neurological:      Mental Status: He is alert and oriented to person, place, and time.      Cranial Nerves: No cranial nerve deficit.      Motor: No abnormal muscle tone.      Coordination: Coordination normal.      Deep Tendon Reflexes: Reflexes are normal and symmetric. Reflexes normal.   Psychiatric:         Mood and Affect: Mood and affect normal.         Behavior: Behavior normal.         Thought Content: Thought content normal.         Judgment: Judgment normal.         Assessment:       1. Fall, initial encounter    2. Primary hypertension    3. Mixed hyperlipidemia    4. Paroxysmal atrial fibrillation    5. Type 2 diabetes mellitus with chronic kidney disease, without long-term current use of insulin, unspecified CKD stage    6. Spinal stenosis of lumbar region with neurogenic claudication        Plan:   Erasmo was seen today for follow-up and fall.    Diagnoses and all orders for this visit:    Fall, initial encounter  -     X-Ray Ribs 2 View Left; Future  -     WHEELCHAIR FOR HOME USE    Primary hypertension  -     Comprehensive Metabolic Panel; Future    Mixed hyperlipidemia    Paroxysmal atrial fibrillation    Type 2 diabetes mellitus with chronic kidney disease, without long-term current use of insulin, unspecified CKD stage  -     Hemoglobin A1C; Future    Spinal stenosis of lumbar region with neurogenic claudication  -     WHEELCHAIR FOR HOME USE    Contusion of left chest wall, initial  encounter  -     traMADoL (ULTRAM) 50 mg tablet; Take 1 tablet (50 mg total) by mouth every 8 (eight) hours as needed for Pain.    Other orders  -     tiZANidine (ZANAFLEX) 4 MG tablet; Take 1 tablet (4 mg total) by mouth every 6 (six) hours as needed.    ````````````````````````````  Problem List Items Addressed This Visit     Hyperlipidemia    Hypertension    Relevant Orders    Comprehensive Metabolic Panel    Paroxysmal atrial fibrillation    Overview     eliquis         Spinal stenosis of lumbar region with neurogenic claudication    Relevant Orders    WHEELCHAIR FOR HOME USE    Type 2 diabetes mellitus, without long-term current use of insulin    Overview     Lab Results   Component Value Date    HGBA1C 6.2 (H) 01/27/2022              Relevant Orders    Hemoglobin A1C      Other Visit Diagnoses     Fall, initial encounter    -  Primary    Relevant Orders    X-Ray Ribs 2 View Left    WHEELCHAIR FOR HOME USE        No follow-ups on file.

## 2022-02-04 ENCOUNTER — TELEPHONE (OUTPATIENT)
Dept: FAMILY MEDICINE | Facility: CLINIC | Age: 87
End: 2022-02-04
Payer: MEDICARE

## 2022-02-04 LAB
ESTIMATED AVG GLUCOSE: 128 MG/DL (ref 68–131)
HBA1C MFR BLD: 6.1 % (ref 4–5.6)

## 2022-02-04 NOTE — TELEPHONE ENCOUNTER
Walker order, demographics, and clinical notes faxed to [facility]. Fax confirmation received.     [facility]  Ph: [phone]  Fax: [phone]

## 2022-02-15 ENCOUNTER — TELEPHONE (OUTPATIENT)
Dept: NEUROLOGY | Facility: CLINIC | Age: 87
End: 2022-02-15
Payer: MEDICARE

## 2022-02-15 ENCOUNTER — PATIENT OUTREACH (OUTPATIENT)
Dept: ADMINISTRATIVE | Facility: OTHER | Age: 87
End: 2022-02-15
Payer: MEDICARE

## 2022-02-15 NOTE — PROGRESS NOTES
Health Maintenance Due   Topic Date Due    TETANUS VACCINE  Never done    Shingles Vaccine (2 of 3) 02/12/2015    Diabetes Urine Screening  07/29/2019    Foot Exam  07/22/2021    Eye Exam  01/18/2022     Updates were requested from care everywhere.  Chart was reviewed for overdue Proactive [facility] Encounters (TAMMI) topics (CRS, Breast Cancer Screening, Eye exam)  Health Maintenance has been updated.  [facility] immunization registry triggered.  Immunizations were reconciled.

## 2022-02-15 NOTE — TELEPHONE ENCOUNTER
I was notified by patient's Rheumatologist that patient has some UE atrophy. May need an EMG.  I would like to examine him first.  Noting after call the patient is scheduled for a new patient appointment soon.  This is a note for MD for that visit.

## 2022-02-18 ENCOUNTER — TELEPHONE (OUTPATIENT)
Dept: FAMILY MEDICINE | Facility: CLINIC | Age: 87
End: 2022-02-18
Payer: MEDICARE

## 2022-02-18 NOTE — TELEPHONE ENCOUNTER
----- Message from Zia Rock sent at 2022 10:14 AM CST -----  Contact: jody/spouse  Erasmo Dunbar  MRN: [id number]  : 1935  PCP: Hemal Varma  Home Phone      [phone]  Work Phone      Not on file.  Mobile          [phone]      MESSAGE:     Pt spouse called and is looking for an update regarding the wheelchair the pt is supposed to be getting.     Phone: [phone]

## 2022-02-18 NOTE — H&P (VIEW-ONLY)
Pain Medicine      Chief Complaint:   Chief Complaint   Patient presents with    Back Pain       History of Present Illness: Erasmo Dunbar is a 86 y.o. male referred by Dr. Hemal Varma MD for chronic LBP.      Onset: years of back pain, but worsened in the past few months  Location: right sided lower back  Radiation: across lower back on the left side, and sometimes into the posterior thighs, but not below the knees.   Timing: Intermittent, only when he stands and walks and completely improves with sitting down  Quality: Throbbing, Deep and Sharp  Exacerbating Factors: standing and walking  Alleviating Factors: sitting, heat, ice, medications and rest  Associated Symptoms: He has numbness in his feet and legs from neuropathy. denies night fever/night sweats, urinary incontinence, bowel incontinence, significant weight loss and significant motor weakness     Severity: Currently: 8/10   Typical Range: 5-10/10     Exacerbation: 10/10     Hydrocodone 7.5/325 mg brings pain from a 10/10 to a 9/10 for maybe 3-4 hours. He denies any side effects.      He also notes bilateral knee pain. He has a history of right knee replacement in 2017. Knee pain worse with standing and walking. Braces help. Medications help, but not completely. He does feel swelling in the left knee at times. Knees will go out on him at times.     Shoulder Pain 10/25/21:  He also notes right shoulder pain that began 4-5 weeks ago.  He went saw Rheumatology and had a right shoulder injection which helped, but then he underwent a caudal epidural steroid injection with Dr. Mathew and afterwards his shoulder pain was worse again.  He states he was completely unconscious for the injection and things that potentially reaggravated the shoulder.  He localizes the shoulder pain to right subacromial/deltoid area and this radiates into the right biceps.  Pain seems to be worse with any overhead activity. He does have a history of right shoulder surgyer.      Interval History (02/18/2022):  Erasmo Dunbar returns today for follow up.  At the last clinic visit, scheduled RFA.    Left L4-5, L5-S1 MB RFA  provided 60% relief.     Currently, the back pain is improved on the left, but he still has the pain on the right low back..  He is still feeling the bilateral shoulder pain which is his worst pain.  He saw rheumatology.  He tried shoulder injections with limited relief.  Rheumatology states they feel like it is coming from the cervical spine.  He tried physical therapy.  He feels like his right arm is getting weaker.  He is noticing that his biceps is getting smaller.  No radiation in the shoulder pains past the elbows ever.  Movements of the neck do seem to aggravate the shoulder pain as well.  He denies any changes in bowel or bladder function.  He denies any numbness.    He is taking Lyrica 100 mg twice daily and Norco  twice daily as needed.  He does need to take the Norco every day.  These provide 30-40% relief for him.  He states they allow him to stay functioning, and he is still able to fish.  He denies any side effects from medications.    Current Pain Scales:  Current: 4/10              Typical Range: 0-10/10          Pain Disability Index  Family/Home Responsibilities:: 7  Recreation:: 8  Social Activity:: 7  Occupation:: 8  Sexual Behavior:: 10  Self Care:: 5  Life-Support Activities:: 1  Pain Disability Index (PDI): 46    Previous Interventions:  - 3/12/21: L4-5 IL JACK w/ 0% relief  - 1/15/21: Bilateral L4-5, L5-S1 MBBs w/ 50% relief   - 12/18/20: Bilateral L4-5, L5-S1 MBBs w/ % relief for 2 hours  - 11/20/20: Bilateral GTB injection  - 10/30/20: Right genicular nerve block w/ 95% relief  - 10/12/20: Bilateral Cluneal nerve block with good relief for a few days.  - Dr. Varma, Dr. Jain both provided injections in the past, with limited benefit.     Previous Therapies:  PT/OT: yes   Relevant Surgery: yes    - Right knee replacement in  2017   - Cervical fusion 30 years ago  Previous Medications:   - NSAIDS: Tylenol. Avoiding other nsaids due to blood thinners.  - Muscle Relaxants:    - TCAs:   - SNRIs:   - Topicals: Many different topicals.   - Anticonvulsants:  Gabapentin was on this for a long time.   - Opioids: Hydrocodone    Current Pain Medications:  1. Norco  mg BID prn  2. Lyrica 100 mg BID  3. Cymbalta 30 mg daily  4. traZODone 50 MG tablet    Blood Thinners: Eliquis    Full Medication List:    Current Outpatient Medications:     blood sugar diagnostic (BLOOD GLUCOSE TEST) Strp, Use one Accu-Chek Felicia Plus Test Strip per glucometer to test blood glucose three times a day. Dx: E11.22, Disp: 600 strip, Rfl: 3    blood-glucose meter kit, Accu-Chek Felicia Plus Meter to test blood glucose three times a day. Dx: E11.22, Disp: 1 each, Rfl: 0    bumetanide (BUMEX) 1 MG tablet, Take 1 mg by mouth once daily., Disp: , Rfl:     DULoxetine (CYMBALTA) 30 MG capsule, TAKE 1 CAPSULE EVERY DAY (Patient taking differently: Take 30 mg by mouth once daily.), Disp: 90 capsule, Rfl: 1    ELIQUIS 5 mg Tab, TAKE 1 TABLET TWICE DAILY (Patient taking differently: Take 5 mg by mouth 2 (two) times daily.), Disp: 180 tablet, Rfl: 1    insulin (LANTUS SOLOSTAR U-100 INSULIN) glargine 100 units/mL (3mL) SubQ pen, INJECT 42 UNITS SUBCUTANEOUSLY EVERY EVENING, Disp: 45 mL, Rfl: 5    JANUVIA 100 mg Tab, TAKE 1 TABLET EVERY DAY (Patient taking differently: Take 100 mg by mouth once daily.), Disp: 90 tablet, Rfl: 5    lancets Misc, Use one Accu-Chek Softclix Lancet per lancing device to test blood glucose three times a day. Dx: E11.22, Disp: 600 each, Rfl: 3    levoFLOXacin (LEVAQUIN) 500 MG tablet, Take 500 mg by mouth once daily., Disp: , Rfl:     metFORMIN (GLUCOPHAGE) 1000 MG tablet, TAKE 1 TABLET (1,000 MG TOTAL) BY MOUTH 2 (TWO) TIMES DAILY WITH MEALS., Disp: 180 tablet, Rfl: 1    metoprolol tartrate (LOPRESSOR) 25 MG tablet, Take 25 mg by mouth once  "daily., Disp: , Rfl:     MITIGARE 0.6 mg Cap, , Disp: , Rfl:     pen needle, diabetic 31 gauge x 3/16" Ndle, 1 Device by Misc.(Non-Drug; Combo Route) route 3 (three) times daily., Disp: 100 each, Rfl: 11    pioglitazone (ACTOS) 15 MG tablet, TAKE 1 TABLET EVERY DAY (Patient taking differently: Take 15 mg by mouth once daily.), Disp: 90 tablet, Rfl: 1    pregabalin (LYRICA) 100 MG capsule, Take 1 capsule (100 mg total) by mouth 2 (two) times daily., Disp: 60 capsule, Rfl: 5    REPATHA SURECLICK 140 mg/mL PnIj, , Disp: , Rfl:     sucralfate (CARAFATE) 1 gram tablet, Take 1 tablet (1 g total) by mouth 4 (four) times daily before meals and nightly., Disp: 120 tablet, Rfl: 5    traZODone (DESYREL) 50 MG tablet, TAKE 1 TABLET EVERY EVENING. (Patient taking differently: Take 50 mg by mouth nightly as needed. TAKE 1 TABLET EVERY EVENING.), Disp: 90 tablet, Rfl: 1    turmeric 400 mg Cap, Take 1,200 mg by mouth., Disp: , Rfl:     UNABLE TO FIND, 1,500 mg. medication name: CBD Oil, Disp: , Rfl:     vit C/E/zinc ox/ryan/lut/zeax (ICAPS AREDS2 ORAL), Take 1 capsule by mouth. 4 a day, Disp: , Rfl:     HYDROcodone-acetaminophen (NORCO)  mg per tablet, Take 1 tablet by mouth every 12 (twelve) hours as needed for Pain., Disp: 60 tablet, Rfl: 0    lisinopriL 10 MG tablet, TAKE 1 TABLET (10 MG TOTAL) BY MOUTH 2 (TWO) TIMES DAILY., Disp: 180 tablet, Rfl: 1  No current facility-administered medications for this visit.    Facility-Administered Medications Ordered in Other Visits:     0.9%  NaCl infusion, , Intravenous, Continuous, Maria Guadalupe Ortiz MD     Review of Systems:  Review of Systems   Constitutional: Negative for fever and weight loss.   HENT: Negative for ear pain and tinnitus.    Eyes: Negative for pain and redness.   Respiratory: Negative for cough and shortness of breath.    Cardiovascular: Negative for chest pain and palpitations.   Gastrointestinal: Positive for diarrhea. Negative for constipation and " heartburn.   Genitourinary: Negative.         Denies urinary incontinence. Denies urine retention.    Musculoskeletal: Positive for back pain. Negative for neck pain.   Skin: Negative for itching and rash.   Neurological: Positive for tingling. Negative for focal weakness and seizures.   Endo/Heme/Allergies: Negative for environmental allergies. Bruises/bleeds easily.   Psychiatric/Behavioral: Negative for depression. The patient has insomnia. The patient is not nervous/anxious.        Allergies:  Iodinated contrast media and Iodine     Medical History:   has a past medical history of Arthritis, Atrial fibrillation, Bladder mass, BPH (benign prostatic hyperplasia), CHF (congestive heart failure), Depression, Diabetes mellitus type II, Hyperlipidemia, Hypertension, Microscopic hematuria, Prostate cancer (10/01/2018), RLS (restless legs syndrome), Stroke, and Wears glasses.    Surgical History:   has a past surgical history that includes Transurethral resection of prostate; Prostate surgery; Rotator cuff repair (Right); Ganglion Cyst Removed  (Right); Neck Fusion (Bilateral); Colectomy (N/A); Cystoscopy (N/A); Back surgery; Cardiac surgery (Left); Skin biopsy; Total knee arthroplasty (03/30/2017); Cardiac pacemaker placement; back injections; Cystoscopy w/ retrogrades (Bilateral, 10/21/2019); Digital rectal examination under anesthesia (N/A, 10/21/2019); Cataract extraction w/  intraocular lens implant (Bilateral); Injection of anesthetic agent around nerve (Right, 10/30/2020); Injection of anesthetic agent around medial branch nerves innervating lumbar facet joint (Bilateral, 12/18/2020); Injection of anesthetic agent around medial branch nerves innervating lumbar facet joint (Bilateral, 1/15/2021); Epidural steroid injection into lumbar spine (N/A, 3/12/2021); and Radiofrequency ablation of lumbar medial branch nerve at single level (Left, 1/21/2022).    Family History:  family history includes COPD in his daughter;  "Cancer in his daughter and mother; Diabetes in his brother, daughter, and sister; Heart disease in his brother and father; Seizures in his daughter.    Social History:   reports that he quit smoking about 41 years ago. His smoking use included cigarettes. He has a 52.50 pack-year smoking history. He has never used smokeless tobacco. He reports current alcohol use of about 8.0 standard drinks of alcohol per week. He reports that he does not use drugs.    Physical Exam:  /70 (BP Location: Left arm, Patient Position: Sitting, BP Method: Medium (Manual))   Pulse 80   Resp 16   Ht 5' 6" (1.676 m)   Wt 90.7 kg (199 lb 15.3 oz)   BMI 32.27 kg/m²   GEN: No acute distress. Calm, comfortable  HENT: Normocephalic, atraumatic, moist mucous membranes  EYE: Anicteric sclera, non-injected.   CV: Non-diaphoretic.   RESP: Breathing comfortably. Chest expansion symmetric.  EXT: No clubbing, cyanosis.   SKIN: No visible rashes or lesions of exposed skin.   PSYCH: Pleasant mood and appropriate affect. Recent and remote memory intact.   Neck Exam:       Inspection: No erythema, bruising.       Palpation: (+) TTP of right trapezius      ROM:  Limitation in all planes      Provocative Maneuvers:  (-) Spurling's bilaterally  Shoulder Exam:       Inspection: No erythema, bruising.       Palpation: (-) TTP of right AC joitn and subacromial area.       ROM:  Limited in abduction, internal rotation on the right      Provocative Maneuvers:  (-) Hawkin's              (-) Empty Can              (-) Cross arm              (+) Speed's ont he right  Neurologic Exam:     Alert. Speech is fluent and appropriate.     Strength: 4/5 in right elbow flexion, otherwise 5/5 throughout bilateral upper & lower extremities     Sensation:  Grossly intact to light touch in bilateral upper & lower extremities     Reflexes: 1+ in b/l patella, achilles, biceps, brachioradialis, triceps     Tone: No abnormality appreciated in bilateral upper or lower " extremities     (-) Salvador bilaterally      Imaging:  Xray Shoulder 10/25/2021:  The bones are osteopenic.  There is mild widening of the acromioclavicular joint which can be seen the setting of type 1 AC joint separation.  Mild degenerative changes of the acromioclavicular joint are seen.  The humeral head is high-riding suggesting underlying rotator cuff pathology.  Small calcification measuring 5 mm adjacent to the greater tuberosity suggesting calcific tendinitis.  No fracture or dislocation is seen.  No evidence of lytic or blastic lesions. Leads from a pacer device are partially imaged.  XRAY Bilateral Hips 02/02/2021:  The bones are osteopenic.  There are mild degenerative changes of the bilateral sacroiliac joints, hip joints and pubic symphysis.  No fracture or dislocation.  Vascular calcifications.     XRAY Bilateral Knee 01/29/2021:   Postoperative changes of a right total knee arthroplasty are seen in satisfactory alignment without hardware complication.  The left knee demonstrates medial, lateral and patellofemoral compartment degenerative change with joint space narrowing, subchondral sclerosis and marginal osteophyte formation, most probably involving the medial compartment of the knee.  There is also apparent chondrocalcinosis of the lateral compartment of the left knee.  No joint effusion.    - X-ray Lumbar Spine 5/13/20:  Moderate facet arthropathy throughout the lower lumbar spine greatest at L5/S1.  Diffuse osteopenia.  Mild spondylosis L3/4. Overall alignment is well maintained.  No evidence of spondylolysis or spondylolisthesis. Disk and vertebral body heights are normal.  Severe atherosclerotic disease.  Mild constipation.    - CT Lumbar spine 3/19/19:  The incidentally visualized soft tissue structures of the abdomen show calcifications of the aorta and its major branch vessels.  Vertebral body alignment and heights are maintained.  There is disc space narrowing at the L3-4 level.  No  fracture or subluxation is identified.  At T12-L1, no disc herniation, central canal stenosis or neural foraminal narrowing.  At L1-2, no disc herniation, central canal stenosis or neural foraminal narrowing.  At L2-3, there is a broad-based posterior disc bulge contributing to mild central canal stenosis with mild bilateral neural foraminal narrowing.  At L3-4, there is a broad-based posterior disc bulge with ligamentum flavum hypertrophy contributing to mild central canal stenosis with moderate right and moderate severe left-sided neural foraminal narrowing.  At L4-5, there is a broad-based posterior disc bulge with facet arthropathy contributing to mild central canal stenosis with moderate bilateral neural foraminal narrowing.  At L5-S1, there is a broad-based posterior disc bulge and facet arthropathy contributing to mild central canal stenosis with moderate left and moderate severe right-sided neural foraminal narrowing.  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  Please see above report for level by level description.      Labs:  BMP  Lab Results   Component Value Date     02/03/2022    K 4.5 02/03/2022    CL 97 02/03/2022    CO2 30 (H) 02/03/2022    BUN 20 02/03/2022    CREATININE 0.9 02/03/2022    CALCIUM 9.3 02/03/2022    ANIONGAP 9 02/03/2022    ESTGFRAFRICA >60 02/03/2022    EGFRNONAA >60 02/03/2022     Lab Results   Component Value Date    ALT 19 02/03/2022    AST 15 02/03/2022    ALKPHOS 67 02/03/2022    BILITOT 0.3 02/03/2022     Lab Results   Component Value Date     01/27/2022       Assessment:  Erasmo Dunbar is a 86 y.o. male with the following diagnoses based on history, exam, and imaging:    Problem List Items Addressed This Visit        Neuro    Lumbar spondylosis    Relevant Medications    HYDROcodone-acetaminophen (NORCO)  mg per tablet    Spinal stenosis of lumbar region with  neurogenic claudication    Relevant Medications    HYDROcodone-acetaminophen (NORCO)  mg per tablet      Other Visit Diagnoses     Cervical myelopathy with cervical radiculopathy    -  Primary    Relevant Medications    HYDROcodone-acetaminophen (NORCO)  mg per tablet    Other Relevant Orders    CT Myelography Cervical Spine    Atrophy of muscle of right upper arm        Relevant Orders    CT Myelography Cervical Spine    Chronic pain of both shoulders        Relevant Orders    CT Myelography Cervical Spine    Chronic knee pain after total replacement of right knee joint        Relevant Medications    HYDROcodone-acetaminophen (NORCO)  mg per tablet          This is a pleasant 86 y.o. gentleman presenting with:     - Chronic bilateral shoulder pain: AC joint separation prior. Calcific tendonitis and prior RTC tears. He had steroid injection into shoulders with limited relief. Suspect cervical pathology.   - Chronic bilateral low back pain: His pain appears multifactorial with facetogenic pain and LSS w/ neurogenic claudication. He had % and then 50% improvement in pain after MBBs, and left L3-5 MB RFA w/ 60% relief. He does have multilevel canal and foraminal stenosis.   - Bilateral trochanteric bursitis  - Chronic right knee pain after total knee replacement  - Chronic left knee pain:  Pseudogout on imaging, has been referred to Rheumatology, had CSI with good relief, and also colchicine which is helping.   - Chronic opioid use: I do think this is appropriate considering his age and limited medication options due to comorbidities.   - Peripheral neuropathy 2/2 DM2.   - Comorbidities: Chronic anticoagulation on eliquis for paroxysmal A-fib, pacemaker    Treatment Plan:   - PT/OT/HEP:  Cont HEP for shoulder pain.   - Procedures:    - Schedule right L4-5, L5-S1 MB RFA   - Medications:    - Cont hydrocodone-APAP 10/325 mg q 12 hrs prn pain. Refill needed. He breaks these in 1/2 at times if his  pain is not too severe.    - Counseled patient regarding the risks and benefits of chronic opioid therapy for chronic painful conditions and patient expresses understanding.  I discussed with patient that he is able to limit the amount filled of the prescription if he decides to.   -  reviewed with no red flags.    - Opioid contract signed 10/12/2020     - Cont lyrica 100 mg BID for sleep and neuropathic pain. Caution with elevation due to baseline balance issues.    - Imaging: Reviewed. Order CT c-spine with myelography to assess for cervical stenosis.   - Labs: Reviewed.      Follow Up: RTC 3-4 weeks after RFA    Maria Guadalupe Ortiz M.D.  Interventional Pain Medicine / Physical Medicine & Rehabilitation    Disclaimer: This note was partly generated using dictation software which may occasionally result in transcription errors.

## 2022-02-21 ENCOUNTER — OFFICE VISIT (OUTPATIENT)
Dept: PAIN MEDICINE | Facility: CLINIC | Age: 87
End: 2022-02-21
Payer: MEDICARE

## 2022-02-21 ENCOUNTER — TELEPHONE (OUTPATIENT)
Dept: PAIN MEDICINE | Facility: CLINIC | Age: 87
End: 2022-02-21
Payer: MEDICARE

## 2022-02-21 VITALS
SYSTOLIC BLOOD PRESSURE: 124 MMHG | WEIGHT: 199.94 LBS | RESPIRATION RATE: 16 BRPM | HEIGHT: 66 IN | BODY MASS INDEX: 32.13 KG/M2 | DIASTOLIC BLOOD PRESSURE: 70 MMHG | HEART RATE: 80 BPM

## 2022-02-21 DIAGNOSIS — G89.29 CHRONIC PAIN OF BOTH SHOULDERS: ICD-10-CM

## 2022-02-21 DIAGNOSIS — M47.816 LUMBAR SPONDYLOSIS: Primary | ICD-10-CM

## 2022-02-21 DIAGNOSIS — M54.12 CERVICAL MYELOPATHY WITH CERVICAL RADICULOPATHY: Primary | ICD-10-CM

## 2022-02-21 DIAGNOSIS — M48.062 SPINAL STENOSIS OF LUMBAR REGION WITH NEUROGENIC CLAUDICATION: ICD-10-CM

## 2022-02-21 DIAGNOSIS — M25.512 CHRONIC PAIN OF BOTH SHOULDERS: ICD-10-CM

## 2022-02-21 DIAGNOSIS — G89.29 CHRONIC KNEE PAIN AFTER TOTAL REPLACEMENT OF RIGHT KNEE JOINT: ICD-10-CM

## 2022-02-21 DIAGNOSIS — Z96.651 CHRONIC KNEE PAIN AFTER TOTAL REPLACEMENT OF RIGHT KNEE JOINT: ICD-10-CM

## 2022-02-21 DIAGNOSIS — M25.511 CHRONIC PAIN OF BOTH SHOULDERS: ICD-10-CM

## 2022-02-21 DIAGNOSIS — M62.521 ATROPHY OF MUSCLE OF RIGHT UPPER ARM: ICD-10-CM

## 2022-02-21 DIAGNOSIS — M47.816 LUMBAR SPONDYLOSIS: ICD-10-CM

## 2022-02-21 DIAGNOSIS — M25.561 CHRONIC KNEE PAIN AFTER TOTAL REPLACEMENT OF RIGHT KNEE JOINT: ICD-10-CM

## 2022-02-21 DIAGNOSIS — G95.9 CERVICAL MYELOPATHY WITH CERVICAL RADICULOPATHY: Primary | ICD-10-CM

## 2022-02-21 PROCEDURE — 1125F PR PAIN SEVERITY QUANTIFIED, PAIN PRESENT: ICD-10-PCS | Mod: CPTII,S$GLB,, | Performed by: PHYSICAL MEDICINE & REHABILITATION

## 2022-02-21 PROCEDURE — 1160F RVW MEDS BY RX/DR IN RCRD: CPT | Mod: CPTII,S$GLB,, | Performed by: PHYSICAL MEDICINE & REHABILITATION

## 2022-02-21 PROCEDURE — 99999 PR PBB SHADOW E&M-EST. PATIENT-LVL III: ICD-10-PCS | Mod: PBBFAC,,, | Performed by: PHYSICAL MEDICINE & REHABILITATION

## 2022-02-21 PROCEDURE — 99214 OFFICE O/P EST MOD 30 MIN: CPT | Mod: S$GLB,,, | Performed by: PHYSICAL MEDICINE & REHABILITATION

## 2022-02-21 PROCEDURE — 1159F MED LIST DOCD IN RCRD: CPT | Mod: CPTII,S$GLB,, | Performed by: PHYSICAL MEDICINE & REHABILITATION

## 2022-02-21 PROCEDURE — 1125F AMNT PAIN NOTED PAIN PRSNT: CPT | Mod: CPTII,S$GLB,, | Performed by: PHYSICAL MEDICINE & REHABILITATION

## 2022-02-21 PROCEDURE — 1159F PR MEDICATION LIST DOCUMENTED IN MEDICAL RECORD: ICD-10-PCS | Mod: CPTII,S$GLB,, | Performed by: PHYSICAL MEDICINE & REHABILITATION

## 2022-02-21 PROCEDURE — 1160F PR REVIEW ALL MEDS BY PRESCRIBER/CLIN PHARMACIST DOCUMENTED: ICD-10-PCS | Mod: CPTII,S$GLB,, | Performed by: PHYSICAL MEDICINE & REHABILITATION

## 2022-02-21 PROCEDURE — 99214 PR OFFICE/OUTPT VISIT, EST, LEVL IV, 30-39 MIN: ICD-10-PCS | Mod: S$GLB,,, | Performed by: PHYSICAL MEDICINE & REHABILITATION

## 2022-02-21 PROCEDURE — 99999 PR PBB SHADOW E&M-EST. PATIENT-LVL III: CPT | Mod: PBBFAC,,, | Performed by: PHYSICAL MEDICINE & REHABILITATION

## 2022-02-21 RX ORDER — HYDROCODONE BITARTRATE AND ACETAMINOPHEN 10; 325 MG/1; MG/1
1 TABLET ORAL EVERY 12 HOURS PRN
Qty: 60 TABLET | Refills: 0 | Status: SHIPPED | OUTPATIENT
Start: 2022-02-21 | End: 2022-03-28 | Stop reason: SDUPTHER

## 2022-02-21 NOTE — TELEPHONE ENCOUNTER
Right L4-5, L5-S1 RFA scheduled 03/04/22. Patient has  had Covid vaccinations. Advised that pre admit would contact patient with time of procedure. Advised patient to contact office if he starts any type of antibiotics or new blood thinners. Voiced understanding.

## 2022-02-25 ENCOUNTER — TELEPHONE (OUTPATIENT)
Dept: PAIN MEDICINE | Facility: CLINIC | Age: 87
End: 2022-02-25
Payer: MEDICARE

## 2022-02-25 ENCOUNTER — HOSPITAL ENCOUNTER (OUTPATIENT)
Dept: RADIOLOGY | Facility: HOSPITAL | Age: 87
Discharge: HOME | End: 2022-02-25
Attending: PHYSICAL MEDICINE & REHABILITATION
Payer: MEDICARE

## 2022-02-25 DIAGNOSIS — G95.9 CERVICAL MYELOPATHY WITH CERVICAL RADICULOPATHY: Primary | ICD-10-CM

## 2022-02-25 DIAGNOSIS — M54.12 CERVICAL MYELOPATHY WITH CERVICAL RADICULOPATHY: Primary | ICD-10-CM

## 2022-02-25 DIAGNOSIS — M62.521 ATROPHY OF MUSCLE OF RIGHT UPPER ARM: ICD-10-CM

## 2022-02-25 DIAGNOSIS — M25.511 CHRONIC PAIN OF BOTH SHOULDERS: ICD-10-CM

## 2022-02-25 DIAGNOSIS — M25.512 CHRONIC PAIN OF BOTH SHOULDERS: ICD-10-CM

## 2022-02-25 DIAGNOSIS — M54.12 CERVICAL MYELOPATHY WITH CERVICAL RADICULOPATHY: ICD-10-CM

## 2022-02-25 DIAGNOSIS — G95.9 CERVICAL MYELOPATHY WITH CERVICAL RADICULOPATHY: ICD-10-CM

## 2022-02-25 DIAGNOSIS — G89.29 CHRONIC PAIN OF BOTH SHOULDERS: ICD-10-CM

## 2022-02-25 NOTE — TELEPHONE ENCOUNTER
----- Message from Chelsey Cornell MA sent at 2022 10:03 AM CST -----  Contact: Carol / spouse  This is the CT Myelogram patient. They do not perform the CTs here. Where can we send them or where do they normally go.   ----- Message -----  From: Tatyana Sanz MA  Sent: 2022   9:20 AM CST  To: [facility] Staff Pain Mgmt      ----- Message -----  From: Mohini Dunbar MA  Sent: 2022   9:15 AM CST  To: [facility] Staff    Erasmo Dunbar  MRN: [id number]  : 1935  PCP: Hemal Varma  Home Phone      [phone]  Work Phone      Not on file.  Mobile          [phone]      MESSAGE: Needs to speak to nurse regarding CT scan that was ordered.  Stated went to [facility] this morning to have CT scan done and they are stating they can not do the scan there, he has to go to another facility to have it done.      Phone:  [phone]

## 2022-02-25 NOTE — TELEPHONE ENCOUNTER
Spoke with [facility] [place] [facility]. States patient needs Xray myelogram prior to CT myelogram. Please place orders. Will schedule once orders placed.

## 2022-02-28 NOTE — TELEPHONE ENCOUNTER
Patient wife, Carol called back. States they will go to [facility] for testing.     Contacted [facility] for scheduling. States they will need to get this testing approved with radiologist, then they will contact the patient for scheduling.     Patient wife notified, verbalized understanding with no questions or concerns.

## 2022-02-28 NOTE — TELEPHONE ENCOUNTER
Spoke with patient wife, Carol. Requesting to know if there is somewhere closer that may do these scans. States it is hard to go that far with patient. Please advise.

## 2022-03-03 NOTE — PRE-PROCEDURE INSTRUCTIONS
Patient scheduled for pain management procedure tomorrow, 3/4/22, with Dr. RICK Ortiz.  Patient denies any known infection or use of antibiotics in the past 2 weeks.  Patient instructed to be here at 6:30 (through ER entrance).  NPO after midnight tonight. Instructed to take all medications in morning (excluding any insulin).  Will need transportation home.  Verbalizes understanding.

## 2022-03-04 ENCOUNTER — HOSPITAL ENCOUNTER (OUTPATIENT)
Facility: HOSPITAL | Age: 87
Discharge: HOME | End: 2022-03-04
Attending: PHYSICAL MEDICINE & REHABILITATION | Admitting: PHYSICAL MEDICINE & REHABILITATION
Payer: MEDICARE

## 2022-03-04 ENCOUNTER — PATIENT MESSAGE (OUTPATIENT)
Dept: SURGERY | Facility: HOSPITAL | Age: 87
End: 2022-03-04
Payer: MEDICARE

## 2022-03-04 ENCOUNTER — HOSPITAL ENCOUNTER (OUTPATIENT)
Dept: RADIOLOGY | Facility: HOSPITAL | Age: 87
Discharge: HOME | End: 2022-03-04
Attending: PHYSICAL MEDICINE & REHABILITATION
Payer: MEDICARE

## 2022-03-04 ENCOUNTER — TELEPHONE (OUTPATIENT)
Dept: PAIN MEDICINE | Facility: CLINIC | Age: 87
End: 2022-03-04
Payer: MEDICARE

## 2022-03-04 VITALS
TEMPERATURE: 97 F | SYSTOLIC BLOOD PRESSURE: 156 MMHG | HEART RATE: 62 BPM | RESPIRATION RATE: 18 BRPM | DIASTOLIC BLOOD PRESSURE: 64 MMHG | OXYGEN SATURATION: 97 %

## 2022-03-04 DIAGNOSIS — G95.9 CERVICAL MYELOPATHY WITH CERVICAL RADICULOPATHY: Primary | ICD-10-CM

## 2022-03-04 DIAGNOSIS — M47.816 LUMBAR SPONDYLOSIS: Primary | ICD-10-CM

## 2022-03-04 DIAGNOSIS — R52 PAIN: ICD-10-CM

## 2022-03-04 DIAGNOSIS — M54.12 CERVICAL MYELOPATHY WITH CERVICAL RADICULOPATHY: Primary | ICD-10-CM

## 2022-03-04 DIAGNOSIS — M47.816 LUMBAR SPONDYLOSIS: ICD-10-CM

## 2022-03-04 LAB
GLUCOSE SERPL-MCNC: 96 MG/DL (ref 70–110)
POCT GLUCOSE: 96 MG/DL (ref 70–110)

## 2022-03-04 PROCEDURE — 25000003 PHARM REV CODE 250: Performed by: PHYSICAL MEDICINE & REHABILITATION

## 2022-03-04 PROCEDURE — 64636 DESTROY L/S FACET JNT ADDL: CPT | Mod: RT | Performed by: PHYSICAL MEDICINE & REHABILITATION

## 2022-03-04 PROCEDURE — 82962 GLUCOSE BLOOD TEST: CPT | Performed by: PHYSICAL MEDICINE & REHABILITATION

## 2022-03-04 PROCEDURE — 64635 PR DESTROY LUMB/SAC FACET JNT: ICD-10-PCS | Mod: RT,,, | Performed by: PHYSICAL MEDICINE & REHABILITATION

## 2022-03-04 PROCEDURE — 64636 PR DESTROY L/S FACET JNT ADDL: ICD-10-PCS | Mod: RT,,, | Performed by: PHYSICAL MEDICINE & REHABILITATION

## 2022-03-04 PROCEDURE — 63600175 PHARM REV CODE 636 W HCPCS: Performed by: PHYSICAL MEDICINE & REHABILITATION

## 2022-03-04 PROCEDURE — 64635 DESTROY LUMB/SAC FACET JNT: CPT | Mod: RT | Performed by: PHYSICAL MEDICINE & REHABILITATION

## 2022-03-04 PROCEDURE — 64636 DESTROY L/S FACET JNT ADDL: CPT | Mod: RT,,, | Performed by: PHYSICAL MEDICINE & REHABILITATION

## 2022-03-04 PROCEDURE — 64635 DESTROY LUMB/SAC FACET JNT: CPT | Mod: RT,,, | Performed by: PHYSICAL MEDICINE & REHABILITATION

## 2022-03-04 RX ORDER — METHYLPREDNISOLONE ACETATE 40 MG/ML
INJECTION, SUSPENSION INTRA-ARTICULAR; INTRALESIONAL; INTRAMUSCULAR; SOFT TISSUE
Status: DISCONTINUED
Start: 2022-03-04 | End: 2022-03-04 | Stop reason: HOSPADM

## 2022-03-04 RX ORDER — BUPIVACAINE HYDROCHLORIDE 2.5 MG/ML
INJECTION, SOLUTION EPIDURAL; INFILTRATION; INTRACAUDAL
Status: DISCONTINUED
Start: 2022-03-04 | End: 2022-03-04 | Stop reason: HOSPADM

## 2022-03-04 RX ORDER — MIDAZOLAM HYDROCHLORIDE 1 MG/ML
INJECTION, SOLUTION INTRAMUSCULAR; INTRAVENOUS
Status: DISCONTINUED | OUTPATIENT
Start: 2022-03-04 | End: 2022-03-04 | Stop reason: HOSPADM

## 2022-03-04 RX ORDER — MIDAZOLAM HYDROCHLORIDE 1 MG/ML
INJECTION INTRAMUSCULAR; INTRAVENOUS
Status: DISCONTINUED
Start: 2022-03-04 | End: 2022-03-04 | Stop reason: HOSPADM

## 2022-03-04 RX ORDER — LIDOCAINE HYDROCHLORIDE 20 MG/ML
INJECTION, SOLUTION EPIDURAL; INFILTRATION; INTRACAUDAL; PERINEURAL
Status: DISCONTINUED
Start: 2022-03-04 | End: 2022-03-04 | Stop reason: HOSPADM

## 2022-03-04 RX ORDER — LIDOCAINE HYDROCHLORIDE 20 MG/ML
INJECTION, SOLUTION EPIDURAL; INFILTRATION; INTRACAUDAL; PERINEURAL
Status: DISCONTINUED | OUTPATIENT
Start: 2022-03-04 | End: 2022-03-04 | Stop reason: HOSPADM

## 2022-03-04 RX ORDER — BUPIVACAINE HYDROCHLORIDE 2.5 MG/ML
INJECTION, SOLUTION EPIDURAL; INFILTRATION; INTRACAUDAL
Status: DISCONTINUED | OUTPATIENT
Start: 2022-03-04 | End: 2022-03-04 | Stop reason: HOSPADM

## 2022-03-04 RX ORDER — SODIUM CHLORIDE 9 MG/ML
500 INJECTION, SOLUTION INTRAVENOUS CONTINUOUS
Status: DISCONTINUED | OUTPATIENT
Start: 2022-03-04 | End: 2022-03-04 | Stop reason: HOSPADM

## 2022-03-04 RX ORDER — LIDOCAINE HYDROCHLORIDE 10 MG/ML
INJECTION INFILTRATION; PERINEURAL
Status: DISCONTINUED
Start: 2022-03-04 | End: 2022-03-04 | Stop reason: HOSPADM

## 2022-03-04 RX ORDER — LIDOCAINE HYDROCHLORIDE 10 MG/ML
INJECTION INFILTRATION; PERINEURAL
Status: DISCONTINUED | OUTPATIENT
Start: 2022-03-04 | End: 2022-03-04 | Stop reason: HOSPADM

## 2022-03-04 RX ORDER — FENTANYL CITRATE 50 UG/ML
INJECTION, SOLUTION INTRAMUSCULAR; INTRAVENOUS
Status: DISCONTINUED | OUTPATIENT
Start: 2022-03-04 | End: 2022-03-04 | Stop reason: HOSPADM

## 2022-03-04 RX ORDER — FENTANYL CITRATE 50 UG/ML
INJECTION, SOLUTION INTRAMUSCULAR; INTRAVENOUS
Status: DISCONTINUED
Start: 2022-03-04 | End: 2022-03-04 | Stop reason: HOSPADM

## 2022-03-04 RX ORDER — LIDOCAINE HYDROCHLORIDE 10 MG/ML
INJECTION INFILTRATION; PERINEURAL
Status: DISCONTINUED
Start: 2022-03-04 | End: 2022-03-04 | Stop reason: WASHOUT

## 2022-03-04 RX ORDER — METHYLPREDNISOLONE ACETATE 40 MG/ML
INJECTION, SUSPENSION INTRA-ARTICULAR; INTRALESIONAL; INTRAMUSCULAR; SOFT TISSUE
Status: DISCONTINUED | OUTPATIENT
Start: 2022-03-04 | End: 2022-03-04 | Stop reason: HOSPADM

## 2022-03-04 NOTE — DISCHARGE INSTRUCTIONS
DIET: You may resume your normal diet today.    BATHING: You may resume your normal bathing.          You may shower, no hot water directly on site for 24 hours.    DRESSING: You may remove your bandage today.    ACTIVITY LEVEL: You may resume your normal activities 24 hours after your  procedure.    If you have received sedation or an anesthetic, you may feel sleepy for several hours. Rest until you are more awake. Gradually resume your normal activities tomorrow.    If you have received sedation or an anesthetic, do not drive or operate heavy machinery for at least 24 hours.    MEDICATION: You may resume your normal medications today.    You will receive instructions for any pain prescriptions. Pain medications should be taken only as directed.    SPECIAL INSTRUCTIONS: No heat to the injection site for 24 hours including: bath or shower, heating pad, moist heat, hot tubs.    Use ice pack to injection site for any pain or discomfort. Apply ice pack to 20 minutes then remove for 20 minutes before re-applying to site.    WHEN TO CALL DOCTOR: Redness or swelling around injection site    Fever of 101F    Drainage (pus) from the injection site    For any continuous bleeding (some dried blood over the incision is normal).    FOLLOW UP: Follow up phone call will be made by office.    FOR EMERGENCIES: If any unusual problems or difficulties occur during clinic hours, call [phone] or [phone].

## 2022-03-04 NOTE — DISCHARGE SUMMARY
[facility]  Discharge Note  Short Stay     Admit Date: 3/4/2022    Discharge Date: 3/4/2022     Attending Physician: Maria Guadalupe Ortiz M.D.    Diagnoses:  Active Hospital Problems    Diagnosis  POA    *Lumbar spondylosis [M47.816]  Yes      Resolved Hospital Problems   No resolved problems to display.     Discharged Condition: Good     Hospital Course: Patient was admitted for an outpatient interventional pain management procedure and tolerated the procedure well with no complications.     Final Diagnoses: Same as principal problem.     Disposition: Home or Self Care     Follow up/Patient Instructions:   Follow-up in 1-2 weeks unless otherwise instructed. May return sooner as needed.       Reconciled Medications:     Medication List      CHANGE how you take these medications    ELIQUIS 5 mg Tab  Generic drug: apixaban  TAKE 1 TABLET TWICE DAILY  What changed: how much to take     JANUVIA 100 MG Tab  Generic drug: SITagliptin  TAKE 1 TABLET EVERY DAY  What changed: how much to take     traZODone 50 MG tablet  Commonly known as: DESYREL  TAKE 1 TABLET EVERY EVENING.  What changed:   · when to take this  · reasons to take this  · additional instructions        CONTINUE taking these medications    blood sugar diagnostic Strp  Commonly known as: BLOOD GLUCOSE TEST  Use one Accu-Chek Felicai Plus Test Strip per glucometer to test blood glucose three times a day. Dx: E11.22     blood-glucose meter kit  Accu-Chek Felicia Plus Meter to test blood glucose three times a day. Dx: E11.22     bumetanide 1 MG tablet  Commonly known as: BUMEX  Take 1 mg by mouth once daily.     DULoxetine 30 MG capsule  Commonly known as: CYMBALTA  TAKE 1 CAPSULE EVERY DAY     HYDROcodone-acetaminophen  mg per tablet  Commonly known as: NORCO  Take 1 tablet by mouth every 12 (twelve) hours as needed for Pain.     ICAPS AREDS2 ORAL  Take 1 capsule by mouth. 4 a day     lancets Misc  Use one Accu-Chek Softclix Lancet per lancing device to  "test blood glucose three times a day. Dx: E11.22     LANTUS SOLOSTAR U-100 INSULIN glargine 100 units/mL (3mL) SubQ pen  Generic drug: insulin  INJECT 42 UNITS SUBCUTANEOUSLY EVERY EVENING     levoFLOXacin 500 MG tablet  Commonly known as: LEVAQUIN  Take 500 mg by mouth once daily.     lisinopriL 10 MG tablet  TAKE 1 TABLET (10 MG TOTAL) BY MOUTH 2 (TWO) TIMES DAILY.     metFORMIN 1000 MG tablet  Commonly known as: GLUCOPHAGE  TAKE 1 TABLET (1,000 MG TOTAL) BY MOUTH 2 (TWO) TIMES DAILY WITH MEALS.     metoprolol tartrate 25 MG tablet  Commonly known as: LOPRESSOR  Take 25 mg by mouth once daily.     MITIGARE 0.6 mg Cap  Generic drug: colchicine     pen needle, diabetic 31 gauge x 3/16" Ndle  1 Device by Misc.(Non-Drug; Combo Route) route 3 (three) times daily.     pioglitazone 15 MG tablet  Commonly known as: ACTOS  TAKE 1 TABLET EVERY DAY     pregabalin 100 MG capsule  Commonly known as: LYRICA  Take 1 capsule (100 mg total) by mouth 2 (two) times daily.     REPATHA SURECLICK 140 mg/mL Pnij  Generic drug: evolocumab     sucralfate 1 gram tablet  Commonly known as: CARAFATE  Take 1 tablet (1 g total) by mouth 4 (four) times daily before meals and nightly.     turmeric 400 mg Cap  Take 1,200 mg by mouth.     UNABLE TO FIND  1,500 mg. medication name: CBD Oil           Discharge Procedure Orders (must include Diet, Follow-up, Activity)   Ice to affected area   Order Comments: 20 minutes of ice or until area numb to the touch if area is sore 2-3 times per day as needed     No driving until:   Order Comments: Until following day     No dressing needed     Notify your health care provider if you experience any of the following:  temperature >100.4     Notify your health care provider if you experience any of the following:  persistent nausea and vomiting or diarrhea     Notify your health care provider if you experience any of the following:  severe uncontrolled pain     Notify your health care provider if you experience " any of the following:  redness, tenderness, or signs of infection (pain, swelling, redness, odor or green/yellow discharge around incision site)     Notify your health care provider if you experience any of the following:  difficulty breathing or increased cough     Notify your health care provider if you experience any of the following:  severe persistent headache     Notify your health care provider if you experience any of the following:  worsening rash     Notify your health care provider if you experience any of the following:  persistent dizziness, light-headedness, or visual disturbances     Notify your health care provider if you experience any of the following:  increased confusion or weakness     Shower on day dressing removed (No bath)       Maria Guadalupe Ortiz M.D.  Interventional Pain Medicine / Physical Medicine & Rehabilitation

## 2022-03-04 NOTE — OP NOTE
Lumbar Medial Branch Radiofrequency Ablation Under Fluoroscopic Guidance:  I have reviewed the patient's medications, allergies and relevant histories prior to the procedure and no contraindications have been identified. The risks, benefits and alternatives to the procedure were discussed with the patient, and all questions regarding the procedure were answered to the patient's satisfaction. I personally obtained Erasmo's consent prior to the start of the procedure and the signed consent can be found in the patient's chart.                                                         Time-out was taken to identify patient, procedure, laterality, and allergies prior to starting the procedure.       Date of Service: 03/04/2022  Procedure: Right L4-5, L5-S1 medial branch radiofrequency ablation  Pre-Operative Diagnosis: Lumbar Spondylosis  Post-Operative Diagnosis: Lumbar Spondylosis    Physician: Maria Guadalupe Ortiz M.D.  Assistants: None    Medications Injected:  Lidocaine 2% MPF. Of that, 1.5mL injected per level prior to ablation. Depo-Medrol 40 mg/mL and 2 mL Bupivacaine 0.25%. Of that, 1 mL injected per level after the ablation.  Local Anesthetic: Xylocaine 1% 10 mL    Sedation Medications: Versed 1 mg, Fentanyl 25 mcg. Under my direct observation, intravenous moderate sedation was administered during the course of this procedure, with continuous hemodynamic monitoring including blood pressure, EKG, and pulse oximetry. Please see RN documentation of total intraservice time for moderate sedation.    Procedural Technique:   Laying in a prone position, the patient was prepped and draped in the usual sterile fashion using ChloraPrep and fenestrated drape.  The level was determined under fluoroscopic guidance.  Local anesthetic was given by going down to the hub of the 25-gauge 1.5 in needle and raising a wheel.  The 20-gauge needle was introduced to the anatomic local of the medial branch at the junction of the superior articular  process & transverse process utilizing live fluoroscopy. Motor stimulation performed to confirm no motor nerve ablation takes place up to 2 Volt 2Hz.  Lidocaine 2% MPF was then injected slowly to anesthetize the area.  Ablation then done per level utilizing [employer] radiofrequency generator setting of 80°C for 90 seconds.  Following the ablation, 1 mL of the Depo-Medrol and Bupivacaine per site. The needle was removed and bandage applied to the area.    Estimated Blood Loss:  None.  Complications:  None.     Disposition: The patient tolerated the procedure well, and there were no apparent complications. Vital signs remained stable throughout the procedure. The patient was taken to the recovery area and monitored. The patient was supplied with written discharge instructions for the procedure. If helpful, we can repeat as needed. The patient was discharged in a stable condition.    Follow-Up: We will see the patient back in 1-2 weeks or the patient may call to inform of status.

## 2022-03-07 ENCOUNTER — LAB VISIT (OUTPATIENT)
Dept: LAB | Facility: HOSPITAL | Age: 87
End: 2022-03-07
Attending: PSYCHIATRY & NEUROLOGY
Payer: MEDICARE

## 2022-03-07 ENCOUNTER — OFFICE VISIT (OUTPATIENT)
Dept: NEUROLOGY | Facility: CLINIC | Age: 87
End: 2022-03-07
Payer: MEDICARE

## 2022-03-07 VITALS
BODY MASS INDEX: 32.27 KG/M2 | HEIGHT: 66 IN | RESPIRATION RATE: 16 BRPM | WEIGHT: 200.81 LBS | SYSTOLIC BLOOD PRESSURE: 134 MMHG | DIASTOLIC BLOOD PRESSURE: 78 MMHG | HEART RATE: 72 BPM

## 2022-03-07 DIAGNOSIS — R41.3 MEMORY LOSS: Primary | ICD-10-CM

## 2022-03-07 DIAGNOSIS — M54.12 CERVICAL RADICULAR PAIN: ICD-10-CM

## 2022-03-07 DIAGNOSIS — G93.0 CEREBELLAR CYST: ICD-10-CM

## 2022-03-07 DIAGNOSIS — M62.50 MUSCULAR ATROPHY, UNSPECIFIED SITE: ICD-10-CM

## 2022-03-07 DIAGNOSIS — E11.42 DIABETIC POLYNEUROPATHY ASSOCIATED WITH TYPE 2 DIABETES MELLITUS: ICD-10-CM

## 2022-03-07 DIAGNOSIS — R41.3 MEMORY LOSS: ICD-10-CM

## 2022-03-07 LAB — VIT B12 SERPL-MCNC: 202 PG/ML (ref 210–950)

## 2022-03-07 PROCEDURE — 3288F PR FALLS RISK ASSESSMENT DOCUMENTED: ICD-10-PCS | Mod: CPTII,S$GLB,, | Performed by: PSYCHIATRY & NEUROLOGY

## 2022-03-07 PROCEDURE — 1126F PR PAIN SEVERITY QUANTIFIED, NO PAIN PRESENT: ICD-10-PCS | Mod: CPTII,S$GLB,, | Performed by: PSYCHIATRY & NEUROLOGY

## 2022-03-07 PROCEDURE — 99214 OFFICE O/P EST MOD 30 MIN: CPT | Mod: S$GLB,,, | Performed by: PSYCHIATRY & NEUROLOGY

## 2022-03-07 PROCEDURE — 99999 PR PBB SHADOW E&M-EST. PATIENT-LVL V: CPT | Mod: PBBFAC,,, | Performed by: PSYCHIATRY & NEUROLOGY

## 2022-03-07 PROCEDURE — 99214 PR OFFICE/OUTPT VISIT, EST, LEVL IV, 30-39 MIN: ICD-10-PCS | Mod: S$GLB,,, | Performed by: PSYCHIATRY & NEUROLOGY

## 2022-03-07 PROCEDURE — 1159F MED LIST DOCD IN RCRD: CPT | Mod: CPTII,S$GLB,, | Performed by: PSYCHIATRY & NEUROLOGY

## 2022-03-07 PROCEDURE — 1100F PTFALLS ASSESS-DOCD GE2>/YR: CPT | Mod: CPTII,S$GLB,, | Performed by: PSYCHIATRY & NEUROLOGY

## 2022-03-07 PROCEDURE — 36415 COLL VENOUS BLD VENIPUNCTURE: CPT | Performed by: PSYCHIATRY & NEUROLOGY

## 2022-03-07 PROCEDURE — 1159F PR MEDICATION LIST DOCUMENTED IN MEDICAL RECORD: ICD-10-PCS | Mod: CPTII,S$GLB,, | Performed by: PSYCHIATRY & NEUROLOGY

## 2022-03-07 PROCEDURE — 1126F AMNT PAIN NOTED NONE PRSNT: CPT | Mod: CPTII,S$GLB,, | Performed by: PSYCHIATRY & NEUROLOGY

## 2022-03-07 PROCEDURE — 99999 PR PBB SHADOW E&M-EST. PATIENT-LVL V: ICD-10-PCS | Mod: PBBFAC,,, | Performed by: PSYCHIATRY & NEUROLOGY

## 2022-03-07 PROCEDURE — 1100F PR PT FALLS ASSESS DOC 2+ FALLS/FALL W/INJURY/YR: ICD-10-PCS | Mod: CPTII,S$GLB,, | Performed by: PSYCHIATRY & NEUROLOGY

## 2022-03-07 PROCEDURE — 3288F FALL RISK ASSESSMENT DOCD: CPT | Mod: CPTII,S$GLB,, | Performed by: PSYCHIATRY & NEUROLOGY

## 2022-03-07 PROCEDURE — 82607 VITAMIN B-12: CPT | Performed by: PSYCHIATRY & NEUROLOGY

## 2022-03-07 RX ORDER — MEMANTINE HYDROCHLORIDE 5 MG/1
TABLET ORAL
Qty: 180 TABLET | Refills: 3 | Status: SHIPPED | OUTPATIENT
Start: 2022-03-07 | End: 2023-03-23

## 2022-03-07 NOTE — PROGRESS NOTES
"HPI:   Erasmo Dunbar is a 86 y.o. male with dizziness reported as early am "imbalance/ falling to the right" which is a chronic complaint (now resolved)  Cerebellar region arachnoid cyst noted by Head CT by prior imaging is stable. Had TIA vs CVA with fluent aphasia episode in 2017.     Patient has not seen me since   His rheumatologist called in 2022 re: some UE atrophy/ May need an EMG.  I wanted to examine him first.  This was also noted by pain management who has ordered a CT myelogram of the C spine which is pending      Wife also wants to discuss this patient's memory and that was the initial reason for his appointment today      Memory loss is somewhat noted by patient. Wife (here today) notices this everyday and he forgets where he put something or if he said something. Repeats self often.  This started over the past few years and worsening  No hallucinations. He is no longer driving. Wife gives him his meds and does the finances.   Mood has been ok/ as expected from grief      The patient sees rheumatology for OA/ chronic pain  Atrophy of of the right shoulder muscles are note since last year. He has pain in the bilateral arms radiating to the neck and some episodic numbness in the right hand.     Dizziness remains resolved    Numb feet noted but no pain    Still on NOAC    Still sees PCP for fasting labs (noted below)    His daughter  of CA in January    Review of Systems   Constitutional: Negative for fever.   HENT: Negative for nosebleeds.    Eyes: Negative for double vision.   Respiratory: Negative for hemoptysis.    Cardiovascular: Negative for leg swelling.   Gastrointestinal: Negative for blood in stool.   Genitourinary: Negative for hematuria.   Musculoskeletal: Positive for back pain.   Skin: Negative for rash.   Neurological: Negative for sensory change, speech change and focal weakness.   Psychiatric/Behavioral: The patient does not have insomnia.      Exam:  Gen Appearance, well " "developed/nourished in no apparent distress  CV: 2+ distal pulses with no edema or swelling  Neuro:  MS: Awake, alert,  Sustains attention, Oriented to person, place, mostly to situation and time as March but does not know day of the week. Recent recall is impaired at least mildly/remote memory intact, Language is full to spontaneous speech/comprehension. Fund of Knowledge is full  CN: Optic discs are flat with normal vasculature, PERRL, Extraoccular movements and visual fields are full. Normal facial sensation and strength, Hearing symmetric, Tongue and Palate are midline and strong. Shoulder Shrug symmetric and strong.  Motor: Normal bulk except right shoulder region atrophy mild with mild decreased mass in the right bicept, tone, no abnormal movements.5/5 strength bilateral upper/lower extremities with 1+ reflexes in the arms, absent starting at the knees  Sensory:   and intact to temp and vibration but romberg mildly positive  Cerebellar: Finger-noseRapid alternating movements intact  Gait: Normal stance, no ataxia      Imaging: CT head 12/9/14: Age advanced cerebral volume loss with mild chronic microvascular ischemic disease. There is no evidence for acute intracranial hemorrhage and no new parenchymal hypoattenuation is identified to suggest an acute infarction.  Age advanced cerebral volume loss with mild chronic microvascular ischemic disease. There is no evidence for acute intracranial hemorrhage and no new parenchymal hypoattenuation is identified to suggest an acute infarction.      CTA head 2017: No acute abnormality and no significant vascular stenoses or occlusions.    Moderate generalized cerebral volume loss with findings of chronic microvascular ischemic disease    Echo 2017: normal ef    Labs:  2022 CMP,CBC, Reviewed. TSH normal  A1C 6.1  LDL less than 70      Assessment/Plan: Erasmo Dunbar is a 86 y.o. male with dizziness reported as early am "imbalance/ falling to the right" which is a " chronic complaint (now resolved)  Cerebellar region arachnoid cyst noted by Head CT by prior imaging was stable in 2017. Had TIA vs CVA with fluent aphasia episode in 7/2017.   I recommend:     1. 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   2. CTA 2017 unremarkable for TIA (or completed CVA) of fluent aphasia in 7/2017 and he is now on NOAC for afib/ stroke prevention found near the time of the event. Goal A1C less than 7 and goal LDL less than 7 for stroke prevention. Continue  Current meds for this,  and continue excellent control of HTN all managed by PCP  3. Physical therapy evaluation did help his balance prior (saw [facility] balance [place]), and all symptoms of imbalance are improved. Fall precautions reviewed.   4. He has a personal history of DM neuropathy and  used gabapentin with good relief of neuropathy symptoms. Neuropathy likely  contributed to gait challenges prior. No pain with neuropathy (no longer needing gabapentin)  -now seeing pain management for chronic lower back pain  5. CT myelogram pending with pain management as he has cervical radicular pain and some muscle atrophy in the right shoulder and arm noted by pain management and rheumatology (sees for OA). This is occurring with cervical radicular pain and some numbness. Suspect the atrophy is related.   -He will continue to follow with pain management and will consider EMG/NCS if needed/ if worse or no improvement soon  6. Memory loss noted worsening over the past few years. Could be MCI or early AD  -Update CT head  -B12  -Start namenda trial. He is no longer driving and well supervised by wife  RTC  6months

## 2022-03-13 DIAGNOSIS — G47.00 INSOMNIA, UNSPECIFIED TYPE: ICD-10-CM

## 2022-03-13 NOTE — TELEPHONE ENCOUNTER
No new care gaps identified.  Powered by [employer]. Reference number: [id number].   3/13/2022 4:07:43 AM CDT

## 2022-03-15 RX ORDER — TRAZODONE HYDROCHLORIDE 50 MG/1
TABLET ORAL
Qty: 90 TABLET | Refills: 1 | Status: SHIPPED | OUTPATIENT
Start: 2022-03-15 | End: 2024-02-19 | Stop reason: SDUPTHER

## 2022-03-15 NOTE — TELEPHONE ENCOUNTER
This Rx Request does not qualify for assessment with the [facility]   Please review protocol details and the Care Due Message for extra clinical information    Reasons Rx Request may be deferred:  1. Labs/Vitals overdue  2. Labs/Vitals abnormal  3. Patient has been seen in the ED/Hospital since the last PCP visit  4. Medication is non-delegated  5. Medication associated diagnosis code is outside of scope  6. Provider is a non-participating provider  7. Visit criteria not met (Patient needs to be seen at least every 15 months by PCP)    Note composed:2:50 PM 03/15/2022

## 2022-03-16 ENCOUNTER — PATIENT OUTREACH (OUTPATIENT)
Dept: ADMINISTRATIVE | Facility: HOSPITAL | Age: 87
End: 2022-03-16
Payer: MEDICARE

## 2022-03-16 NOTE — PROGRESS NOTES
Chart reviewed, immunization record updated.  No new results noted on [facility] or [facility] web site.  Care Everywhere updated.   Patient care coordination note and upcoming PCP visit updated.  Dr. Avendano added to patient care team.  Received eye exam completed on 12/16/2021, updated in .  Patient has scheduled PCP visit on 8/04/2022.

## 2022-03-24 ENCOUNTER — TELEPHONE (OUTPATIENT)
Dept: PAIN MEDICINE | Facility: CLINIC | Age: 87
End: 2022-03-24
Payer: MEDICARE

## 2022-03-24 DIAGNOSIS — G95.9 CERVICAL MYELOPATHY WITH CERVICAL RADICULOPATHY: Primary | ICD-10-CM

## 2022-03-24 DIAGNOSIS — Z88.8 ALLERGY TO IODINE: ICD-10-CM

## 2022-03-24 DIAGNOSIS — M54.12 CERVICAL MYELOPATHY WITH CERVICAL RADICULOPATHY: Primary | ICD-10-CM

## 2022-03-24 NOTE — TELEPHONE ENCOUNTER
Spoke with [facility] radiology department. States that patient has an Iodine allergy and will need an Allergy kit ordered for Myelogram to be performed.     Allergy kit:  50 mg prednisone, quantity 3  50 mg benadryl quantity 1    Patient will need to take 1 prednisone 13 hours prior to procedure then take 1 prednisone 7 hours prior to procedure, then take 1 prednisone and 1 benadryl 1 hour prior to procedure.

## 2022-03-25 NOTE — PROGRESS NOTES
Pain Medicine      Chief Complaint:   Chief Complaint   Patient presents with    Low-back Pain       History of Present Illness: Erasmo Dunbar is a 86 y.o. male referred by Dr. Hemal Varma MD for chronic LBP.      Onset: years of back pain, but worsened in the past few months  Location: right sided lower back  Radiation: across lower back on the left side, and sometimes into the posterior thighs, but not below the knees.   Timing: Intermittent, only when he stands and walks and completely improves with sitting down  Quality: Throbbing, Deep and Sharp  Exacerbating Factors: standing and walking  Alleviating Factors: sitting, heat, ice, medications and rest  Associated Symptoms: He has numbness in his feet and legs from neuropathy. denies night fever/night sweats, urinary incontinence, bowel incontinence, significant weight loss and significant motor weakness     Severity: Currently: 8/10   Typical Range: 5-10/10     Exacerbation: 10/10     Hydrocodone 7.5/325 mg brings pain from a 10/10 to a 9/10 for maybe 3-4 hours. He denies any side effects.      He also notes bilateral knee pain. He has a history of right knee replacement in 2017. Knee pain worse with standing and walking. Braces help. Medications help, but not completely. He does feel swelling in the left knee at times. Knees will go out on him at times.     Shoulder Pain 10/25/21:  He also notes right shoulder pain that began 4-5 weeks ago.  He went saw Rheumatology and had a right shoulder injection which helped, but then he underwent a caudal epidural steroid injection with Dr. Mathew and afterwards his shoulder pain was worse again.  He states he was completely unconscious for the injection and things that potentially reaggravated the shoulder.  He localizes the shoulder pain to right subacromial/deltoid area and this radiates into the right biceps.  Pain seems to be worse with any overhead activity. He does have a history of right shoulder surgyer.      Interval History (03/28/2022):  Erasmo Dunbar returns today for follow up.  At the last clinic visit, scheduled right RFA    RIGHT RADIOFREQUENCY THERMAL COAGULATION (L4-5,L5-S1) provided no relief.     Currently, the back pain is stable.  No change in the location or quality of the pain since the most recent visit is reported.  No significant interval events or traumas. No change in bowel or bladder function, & no new weakness or numbness is reported. No new musculoskeletal pain complaints.    Neck pain is stable and he is still feeling weakness in the right arm.    Current Pain Scales:  Current: 7/10              Typical Range: 7-10/10          Pain Disability Index  Family/Home Responsibilities:: 10  Recreation:: 9  Social Activity:: 8  Occupation:: 10  Sexual Behavior:: 10  Self Care:: 6  Life-Support Activities:: 3  Pain Disability Index (PDI): 56    Previous Interventions:  - 3/12/21: L4-5 IL JACK w/ 0% relief  - 1/15/21: Bilateral L4-5, L5-S1 MBBs w/ 50% relief   - 12/18/20: Bilateral L4-5, L5-S1 MBBs w/ % relief for 2 hours  - 11/20/20: Bilateral GTB injection  - 10/30/20: Right genicular nerve block w/ 95% relief  - 10/12/20: Bilateral Cluneal nerve block with good relief for a few days.  - Dr. Varma, Dr. Jain both provided injections in the past, with limited benefit.     Previous Therapies:  PT/OT: yes   Relevant Surgery: yes    - Right knee replacement in 2017   - Cervical fusion 30 years ago  Previous Medications:   - NSAIDS: Tylenol. Avoiding other nsaids due to blood thinners.  - Muscle Relaxants:    - TCAs:   - SNRIs:   - Topicals: Many different topicals.   - Anticonvulsants:  Gabapentin was on this for a long time.   - Opioids: Hydrocodone    Current Pain Medications:  1. Norco  mg BID prn  2. Lyrica 100 mg BID  3. Cymbalta 30 mg daily  4. traZODone 50 MG tablet    Blood Thinners: Eliquis    Full Medication List:    Current Outpatient Medications:     blood sugar  "diagnostic (BLOOD GLUCOSE TEST) Strp, Use one Accu-Chek Felicia Plus Test Strip per glucometer to test blood glucose three times a day. Dx: E11.22, Disp: 600 strip, Rfl: 3    blood-glucose meter kit, Accu-Chek Felicia Plus Meter to test blood glucose three times a day. Dx: E11.22, Disp: 1 each, Rfl: 0    bumetanide (BUMEX) 1 MG tablet, Take 1 mg by mouth once daily., Disp: , Rfl:     DULoxetine (CYMBALTA) 30 MG capsule, TAKE 1 CAPSULE EVERY DAY (Patient taking differently: Take 30 mg by mouth once daily.), Disp: 90 capsule, Rfl: 1    ELIQUIS 5 mg Tab, TAKE 1 TABLET TWICE DAILY (Patient taking differently: Take 5 mg by mouth 2 (two) times daily.), Disp: 180 tablet, Rfl: 1    insulin (LANTUS SOLOSTAR U-100 INSULIN) glargine 100 units/mL (3mL) SubQ pen, INJECT 42 UNITS SUBCUTANEOUSLY EVERY EVENING, Disp: 45 mL, Rfl: 5    JANUVIA 100 mg Tab, TAKE 1 TABLET EVERY DAY (Patient taking differently: Take 100 mg by mouth once daily.), Disp: 90 tablet, Rfl: 5    lancets Misc, Use one Accu-Chek Softclix Lancet per lancing device to test blood glucose three times a day. Dx: E11.22, Disp: 600 each, Rfl: 3    levoFLOXacin (LEVAQUIN) 500 MG tablet, Take 500 mg by mouth once daily., Disp: , Rfl:     lisinopriL 10 MG tablet, TAKE 1 TABLET (10 MG TOTAL) BY MOUTH 2 (TWO) TIMES DAILY., Disp: 180 tablet, Rfl: 1    memantine (NAMENDA) 5 MG Tab, Take one nightly for 3 weeks, then one BID, Disp: 180 tablet, Rfl: 3    metFORMIN (GLUCOPHAGE) 1000 MG tablet, TAKE 1 TABLET (1,000 MG TOTAL) BY MOUTH 2 (TWO) TIMES DAILY WITH MEALS., Disp: 180 tablet, Rfl: 1    metoprolol tartrate (LOPRESSOR) 25 MG tablet, Take 25 mg by mouth once daily., Disp: , Rfl:     MITIGARE 0.6 mg Cap, , Disp: , Rfl:     pen needle, diabetic 31 gauge x 3/16" Ndle, 1 Device by Misc.(Non-Drug; Combo Route) route 3 (three) times daily., Disp: 100 each, Rfl: 11    pioglitazone (ACTOS) 15 MG tablet, TAKE 1 TABLET EVERY DAY (Patient taking differently: Take 15 mg by " mouth once daily.), Disp: 90 tablet, Rfl: 1    REPATHA SURECLICK 140 mg/mL PnIj, , Disp: , Rfl:     traZODone (DESYREL) 50 MG tablet, TAKE 1 TABLET EVERY EVENING., Disp: 90 tablet, Rfl: 1    turmeric 400 mg Cap, Take 1,200 mg by mouth., Disp: , Rfl:     UNABLE TO FIND, 1,500 mg. medication name: CBD Oil, Disp: , Rfl:     vit C/E/zinc ox/ryan/lut/zeax (ICAPS AREDS2 ORAL), Take 1 capsule by mouth. 4 a day, Disp: , Rfl:     HYDROcodone-acetaminophen (NORCO)  mg per tablet, Take 1 tablet by mouth every 12 (twelve) hours as needed for Pain., Disp: 60 tablet, Rfl: 0    pregabalin (LYRICA) 100 MG capsule, Take 1 capsule (100 mg total) by mouth 2 (two) times daily., Disp: 180 capsule, Rfl: 1  No current facility-administered medications for this visit.    Facility-Administered Medications Ordered in Other Visits:     0.9%  NaCl infusion, , Intravenous, Continuous, Maria Guadalupe Ortiz MD     Review of Systems:  Review of Systems   Constitutional: Negative for fever and weight loss.   HENT: Negative for ear pain and tinnitus.    Eyes: Negative for pain and redness.   Respiratory: Negative for cough and shortness of breath.    Cardiovascular: Negative for chest pain and palpitations.   Gastrointestinal: Positive for diarrhea. Negative for constipation and heartburn.   Genitourinary: Negative.         Denies urinary incontinence. Denies urine retention.    Musculoskeletal: Positive for back pain. Negative for neck pain.   Skin: Negative for itching and rash.   Neurological: Positive for tingling. Negative for focal weakness and seizures.   Endo/Heme/Allergies: Negative for environmental allergies. Bruises/bleeds easily.   Psychiatric/Behavioral: Negative for depression. The patient has insomnia. The patient is not nervous/anxious.        Allergies:  Iodinated contrast media and Iodine     Medical History:   has a past medical history of Arthritis, Atrial fibrillation, Bladder mass, BPH (benign prostatic hyperplasia),  "CHF (congestive heart failure), Depression, Diabetes mellitus type II, Hyperlipidemia, Hypertension, Microscopic hematuria, Prostate cancer (10/01/2018), RLS (restless legs syndrome), Stroke, and Wears glasses.    Surgical History:   has a past surgical history that includes Transurethral resection of prostate; Prostate surgery; Rotator cuff repair (Right); Ganglion Cyst Removed  (Right); Neck Fusion (Bilateral); Colectomy (N/A); Cystoscopy (N/A); Back surgery; Cardiac surgery (Left); Skin biopsy; Total knee arthroplasty (03/30/2017); Cardiac pacemaker placement; back injections; Cystoscopy w/ retrogrades (Bilateral, 10/21/2019); Digital rectal examination under anesthesia (N/A, 10/21/2019); Cataract extraction w/  intraocular lens implant (Bilateral); Injection of anesthetic agent around nerve (Right, 10/30/2020); Injection of anesthetic agent around medial branch nerves innervating lumbar facet joint (Bilateral, 12/18/2020); Injection of anesthetic agent around medial branch nerves innervating lumbar facet joint (Bilateral, 1/15/2021); Epidural steroid injection into lumbar spine (N/A, 3/12/2021); Radiofrequency ablation of lumbar medial branch nerve at single level (Left, 1/21/2022); and Radiofrequency thermocoagulation (Right, 3/4/2022).    Family History:  family history includes COPD in his daughter; Cancer in his daughter and mother; Diabetes in his brother, daughter, and sister; Heart disease in his brother and father; Seizures in his daughter.    Social History:   reports that he quit smoking about 41 years ago. His smoking use included cigarettes. He has a 52.50 pack-year smoking history. He has never used smokeless tobacco. He reports current alcohol use of about 8.0 standard drinks of alcohol per week. He reports that he does not use drugs.    Physical Exam:  /60 (BP Location: Right arm, Patient Position: Sitting, BP Method: Medium (Manual))   Pulse 61   Resp 18   Ht 5' 6" (1.676 m)   Wt 90.9 " kg (200 lb 4.6 oz)   BMI 32.33 kg/m²   GEN: No acute distress. Calm, comfortable  HENT: Normocephalic, atraumatic, moist mucous membranes  EYE: Anicteric sclera, non-injected.   CV: Non-diaphoretic.   RESP: Breathing comfortably. Chest expansion symmetric.  EXT: No clubbing, cyanosis.   SKIN: No visible rashes or lesions of exposed skin.   PSYCH: Pleasant mood and appropriate affect. Recent and remote memory intact.   Neck Exam:       Inspection: No erythema, bruising.       Palpation: (+) TTP of right trapezius      ROM:  Limitation in all planes      Provocative Maneuvers:  (-) Spurling's bilaterally  Shoulder Exam:       Inspection: No erythema, bruising.       Palpation: (-) TTP of right AC joitn and subacromial area.       ROM:  Limited in abduction, internal rotation on the right      Provocative Maneuvers:  (-) Hawkin's              (-) Empty Can              (-) Cross arm              (+) Speed's ont he right  Neurologic Exam:     Alert. Speech is fluent and appropriate.     Strength: 4/5 in right elbow flexion, right finger flexion, right ABD, otherwise 5/5 throughout bilateral upper & lower extremities     Sensation:  Grossly intact to light touch in bilateral upper & lower extremities     Reflexes: 1+ in b/l patella, achilles, biceps, brachioradialis, triceps     Tone: No abnormality appreciated in bilateral upper or lower extremities     (-) Salvador bilaterally      Imaging:  Xray Shoulder 10/25/2021:  The bones are osteopenic.  There is mild widening of the acromioclavicular joint which can be seen the setting of type 1 AC joint separation.  Mild degenerative changes of the acromioclavicular joint are seen.  The humeral head is high-riding suggesting underlying rotator cuff pathology.  Small calcification measuring 5 mm adjacent to the greater tuberosity suggesting calcific tendinitis.  No fracture or dislocation is seen.  No evidence of lytic or blastic lesions. Leads from a pacer device are partially  imaged.  XRAY Bilateral Hips 02/02/2021:  The bones are osteopenic.  There are mild degenerative changes of the bilateral sacroiliac joints, hip joints and pubic symphysis.  No fracture or dislocation.  Vascular calcifications.     XRAY Bilateral Knee 01/29/2021:   Postoperative changes of a right total knee arthroplasty are seen in satisfactory alignment without hardware complication.  The left knee demonstrates medial, lateral and patellofemoral compartment degenerative change with joint space narrowing, subchondral sclerosis and marginal osteophyte formation, most probably involving the medial compartment of the knee.  There is also apparent chondrocalcinosis of the lateral compartment of the left knee.  No joint effusion.    - X-ray Lumbar Spine 5/13/20:  Moderate facet arthropathy throughout the lower lumbar spine greatest at L5/S1.  Diffuse osteopenia.  Mild spondylosis L3/4. Overall alignment is well maintained.  No evidence of spondylolysis or spondylolisthesis. Disk and vertebral body heights are normal.  Severe atherosclerotic disease.  Mild constipation.    - CT Lumbar spine 3/19/19:  The incidentally visualized soft tissue structures of the abdomen show calcifications of the aorta and its major branch vessels.  Vertebral body alignment and heights are maintained.  There is disc space narrowing at the L3-4 level.  No fracture or subluxation is identified.  At T12-L1, no disc herniation, central canal stenosis or neural foraminal narrowing.  At L1-2, no disc herniation, central canal stenosis or neural foraminal narrowing.  At L2-3, there is a broad-based posterior disc bulge contributing to mild central canal stenosis with mild bilateral neural foraminal narrowing.  At L3-4, there is a broad-based posterior disc bulge with ligamentum flavum hypertrophy contributing to mild central canal stenosis with moderate right and moderate severe left-sided neural foraminal narrowing.  At L4-5, there is a broad-based  posterior disc bulge with facet arthropathy contributing to mild central canal stenosis with moderate bilateral neural foraminal narrowing.  At L5-S1, there is a broad-based posterior disc bulge and facet arthropathy contributing to mild central canal stenosis with moderate left and moderate severe right-sided neural foraminal narrowing.  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  Please see above report for level by level description.      Labs:  BMP  Lab Results   Component Value Date     02/03/2022    K 4.5 02/03/2022    CL 97 02/03/2022    CO2 30 (H) 02/03/2022    BUN 20 02/03/2022    CREATININE 0.9 02/03/2022    CALCIUM 9.3 02/03/2022    ANIONGAP 9 02/03/2022    ESTGFRAFRICA >60 02/03/2022    EGFRNONAA >60 02/03/2022     Lab Results   Component Value Date    ALT 19 02/03/2022    AST 15 02/03/2022    ALKPHOS 67 02/03/2022    BILITOT 0.3 02/03/2022     Lab Results   Component Value Date     01/27/2022       Assessment:  Erasmo Dunbar is a 86 y.o. male with the following diagnoses based on history, exam, and imaging:    Problem List Items Addressed This Visit        Neuro    Neuropathy, diabetic    Relevant Medications    pregabalin (LYRICA) 100 MG capsule    Lumbar spondylosis    Relevant Medications    HYDROcodone-acetaminophen (NORCO)  mg per tablet    pregabalin (LYRICA) 100 MG capsule    Spinal stenosis of lumbar region with neurogenic claudication    Relevant Medications    HYDROcodone-acetaminophen (NORCO)  mg per tablet      Other Visit Diagnoses     Cervical myelopathy with cervical radiculopathy        Relevant Medications    HYDROcodone-acetaminophen (NORCO)  mg per tablet    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 gentleman presenting with:     - Chronic bilateral shoulder pain: AC joint separation prior. Calcific tendonitis and prior RTC tears. He had steroid injection into shoulders with limited relief. Suspect cervical pathology.   - Chronic bilateral low back pain: His pain appears multifactorial with facetogenic pain and LSS w/ neurogenic claudication. He had % and then 50% improvement in pain after MBBs, and left L3-5 MB RFA w/ 60% relief. He had no relief from right L3-5 MB RFA. He does have multilevel canal and foraminal stenosis.   - Bilateral trochanteric bursitis  - Chronic right knee pain after total knee replacement  - Chronic left knee pain:  Pseudogout on imaging, has been referred to Rheumatology, had CSI with good relief, and also colchicine which is helping.   - Chronic opioid use: I do think this is appropriate considering his age and limited medication options due to comorbidities.   - Peripheral neuropathy 2/2 DM2.   - Comorbidities: Chronic anticoagulation on eliquis for paroxysmal A-fib, pacemaker    Treatment Plan:   - PT/OT/HEP:  Cont HEP for shoulder pain.   - Procedures: None at this time.   - Medications:    - Cont hydrocodone-APAP 10/325 mg q 12 hrs prn pain. He breaks these in 1/2 at times if his pain is not too severe.    - Counseled patient regarding the risks and benefits of chronic opioid therapy for chronic painful conditions and patient expresses understanding.  I discussed with patient that he is able to limit the amount filled of the prescription if he decides to.   -  reviewed with no red flags.    - Opioid contract signed 10/12/2020     - Cont lyrica 100 mg BID for sleep and neuropathic pain. Caution with elevation due to baseline balance issues.    - Imaging: Reviewed. Ordered CT c-spine with myelography to assess for cervical  stenosis, pending   - Labs: Reviewed.      Follow Up: RTC 3 months    Maria Guadalupe Ortiz M.D.  Interventional Pain Medicine / Physical Medicine & Rehabilitation    Disclaimer: This note was partly generated using dictation software which may occasionally result in transcription errors.

## 2022-03-28 ENCOUNTER — OFFICE VISIT (OUTPATIENT)
Dept: PAIN MEDICINE | Facility: CLINIC | Age: 87
End: 2022-03-28
Payer: MEDICARE

## 2022-03-28 VITALS
HEART RATE: 61 BPM | WEIGHT: 200.31 LBS | BODY MASS INDEX: 32.19 KG/M2 | SYSTOLIC BLOOD PRESSURE: 132 MMHG | RESPIRATION RATE: 18 BRPM | HEIGHT: 66 IN | DIASTOLIC BLOOD PRESSURE: 60 MMHG

## 2022-03-28 DIAGNOSIS — Z96.651 CHRONIC KNEE PAIN AFTER TOTAL REPLACEMENT OF RIGHT KNEE JOINT: ICD-10-CM

## 2022-03-28 DIAGNOSIS — M48.062 SPINAL STENOSIS OF LUMBAR REGION WITH NEUROGENIC CLAUDICATION: ICD-10-CM

## 2022-03-28 DIAGNOSIS — M47.816 LUMBAR SPONDYLOSIS: ICD-10-CM

## 2022-03-28 DIAGNOSIS — M54.12 CERVICAL MYELOPATHY WITH CERVICAL RADICULOPATHY: ICD-10-CM

## 2022-03-28 DIAGNOSIS — E11.42 DIABETIC POLYNEUROPATHY ASSOCIATED WITH TYPE 2 DIABETES MELLITUS: ICD-10-CM

## 2022-03-28 DIAGNOSIS — M25.561 CHRONIC KNEE PAIN AFTER TOTAL REPLACEMENT OF RIGHT KNEE JOINT: ICD-10-CM

## 2022-03-28 DIAGNOSIS — G89.29 CHRONIC KNEE PAIN AFTER TOTAL REPLACEMENT OF RIGHT KNEE JOINT: ICD-10-CM

## 2022-03-28 DIAGNOSIS — G95.9 CERVICAL MYELOPATHY WITH CERVICAL RADICULOPATHY: ICD-10-CM

## 2022-03-28 DIAGNOSIS — M48.061 SPINAL STENOSIS OF LUMBAR REGION WITHOUT NEUROGENIC CLAUDICATION: ICD-10-CM

## 2022-03-28 PROCEDURE — 1100F PR PT FALLS ASSESS DOC 2+ FALLS/FALL W/INJURY/YR: ICD-10-PCS | Mod: CPTII,S$GLB,, | Performed by: PHYSICAL MEDICINE & REHABILITATION

## 2022-03-28 PROCEDURE — 99999 PR PBB SHADOW E&M-EST. PATIENT-LVL IV: ICD-10-PCS | Mod: PBBFAC,,, | Performed by: PHYSICAL MEDICINE & REHABILITATION

## 2022-03-28 PROCEDURE — 3288F FALL RISK ASSESSMENT DOCD: CPT | Mod: CPTII,S$GLB,, | Performed by: PHYSICAL MEDICINE & REHABILITATION

## 2022-03-28 PROCEDURE — 1159F PR MEDICATION LIST DOCUMENTED IN MEDICAL RECORD: ICD-10-PCS | Mod: CPTII,S$GLB,, | Performed by: PHYSICAL MEDICINE & REHABILITATION

## 2022-03-28 PROCEDURE — 3288F PR FALLS RISK ASSESSMENT DOCUMENTED: ICD-10-PCS | Mod: CPTII,S$GLB,, | Performed by: PHYSICAL MEDICINE & REHABILITATION

## 2022-03-28 PROCEDURE — 99214 OFFICE O/P EST MOD 30 MIN: CPT | Mod: S$GLB,,, | Performed by: PHYSICAL MEDICINE & REHABILITATION

## 2022-03-28 PROCEDURE — 1159F MED LIST DOCD IN RCRD: CPT | Mod: CPTII,S$GLB,, | Performed by: PHYSICAL MEDICINE & REHABILITATION

## 2022-03-28 PROCEDURE — 99214 PR OFFICE/OUTPT VISIT, EST, LEVL IV, 30-39 MIN: ICD-10-PCS | Mod: S$GLB,,, | Performed by: PHYSICAL MEDICINE & REHABILITATION

## 2022-03-28 PROCEDURE — 1160F RVW MEDS BY RX/DR IN RCRD: CPT | Mod: CPTII,S$GLB,, | Performed by: PHYSICAL MEDICINE & REHABILITATION

## 2022-03-28 PROCEDURE — 99999 PR PBB SHADOW E&M-EST. PATIENT-LVL IV: CPT | Mod: PBBFAC,,, | Performed by: PHYSICAL MEDICINE & REHABILITATION

## 2022-03-28 PROCEDURE — 1125F PR PAIN SEVERITY QUANTIFIED, PAIN PRESENT: ICD-10-PCS | Mod: CPTII,S$GLB,, | Performed by: PHYSICAL MEDICINE & REHABILITATION

## 2022-03-28 PROCEDURE — 1125F AMNT PAIN NOTED PAIN PRSNT: CPT | Mod: CPTII,S$GLB,, | Performed by: PHYSICAL MEDICINE & REHABILITATION

## 2022-03-28 PROCEDURE — 1160F PR REVIEW ALL MEDS BY PRESCRIBER/CLIN PHARMACIST DOCUMENTED: ICD-10-PCS | Mod: CPTII,S$GLB,, | Performed by: PHYSICAL MEDICINE & REHABILITATION

## 2022-03-28 PROCEDURE — 1100F PTFALLS ASSESS-DOCD GE2>/YR: CPT | Mod: CPTII,S$GLB,, | Performed by: PHYSICAL MEDICINE & REHABILITATION

## 2022-03-28 RX ORDER — PREGABALIN 100 MG/1
100 CAPSULE ORAL 2 TIMES DAILY
Qty: 180 CAPSULE | Refills: 1 | Status: SHIPPED | OUTPATIENT
Start: 2022-03-28 | End: 2022-08-05 | Stop reason: SDUPTHER

## 2022-03-28 RX ORDER — HYDROCODONE BITARTRATE AND ACETAMINOPHEN 10; 325 MG/1; MG/1
1 TABLET ORAL EVERY 12 HOURS PRN
Qty: 60 TABLET | Refills: 0 | Status: SHIPPED | OUTPATIENT
Start: 2022-03-28 | End: 2022-06-03 | Stop reason: SDUPTHER

## 2022-03-29 RX ORDER — PREDNISONE 50 MG/1
TABLET ORAL
Qty: 3 TABLET | Refills: 0 | Status: SHIPPED | OUTPATIENT
Start: 2022-03-29 | End: 2022-06-03

## 2022-03-29 RX ORDER — DIPHENHYDRAMINE HCL 50 MG
CAPSULE ORAL
Qty: 1 CAPSULE | Refills: 0 | Status: SHIPPED | OUTPATIENT
Start: 2022-03-29 | End: 2022-06-03

## 2022-04-04 ENCOUNTER — HOSPITAL ENCOUNTER (OUTPATIENT)
Facility: HOSPITAL | Age: 87
Discharge: HOME | End: 2022-04-04
Attending: PHYSICAL MEDICINE & REHABILITATION | Admitting: PHYSICAL MEDICINE & REHABILITATION
Payer: MEDICARE

## 2022-04-04 ENCOUNTER — DOCUMENTATION ONLY (OUTPATIENT)
Dept: CARDIOLOGY | Facility: HOSPITAL | Age: 87
End: 2022-04-04
Payer: MEDICARE

## 2022-04-04 ENCOUNTER — HOSPITAL ENCOUNTER (OUTPATIENT)
Dept: RADIOLOGY | Facility: HOSPITAL | Age: 87
Discharge: HOME | End: 2022-04-04
Attending: PHYSICAL MEDICINE & REHABILITATION
Payer: MEDICARE

## 2022-04-04 ENCOUNTER — HOSPITAL ENCOUNTER (OUTPATIENT)
Dept: RADIOLOGY | Facility: HOSPITAL | Age: 87
Discharge: HOME | End: 2022-04-04
Attending: PHYSICAL MEDICINE & REHABILITATION | Admitting: PHYSICAL MEDICINE & REHABILITATION
Payer: MEDICARE

## 2022-04-04 VITALS
WEIGHT: 200.38 LBS | HEART RATE: 86 BPM | TEMPERATURE: 98 F | HEIGHT: 66 IN | OXYGEN SATURATION: 96 % | BODY MASS INDEX: 32.2 KG/M2 | SYSTOLIC BLOOD PRESSURE: 155 MMHG | DIASTOLIC BLOOD PRESSURE: 70 MMHG | RESPIRATION RATE: 18 BRPM

## 2022-04-04 DIAGNOSIS — G95.9 CERVICAL MYELOPATHY WITH CERVICAL RADICULOPATHY: ICD-10-CM

## 2022-04-04 DIAGNOSIS — M54.12 CERVICAL MYELOPATHY WITH CERVICAL RADICULOPATHY: ICD-10-CM

## 2022-04-04 LAB — POCT GLUCOSE: 235 MG/DL (ref 70–110)

## 2022-04-04 PROCEDURE — 62302 FL MYELOGRAM CERVICAL: ICD-10-PCS | Mod: ,,, | Performed by: RADIOLOGY

## 2022-04-04 PROCEDURE — 25000003 PHARM REV CODE 250: Performed by: PHYSICAL MEDICINE & REHABILITATION

## 2022-04-04 PROCEDURE — 72126 CT NECK SPINE W/DYE: CPT | Mod: TC

## 2022-04-04 PROCEDURE — 25500020 PHARM REV CODE 255: Performed by: PHYSICAL MEDICINE & REHABILITATION

## 2022-04-04 PROCEDURE — 62302 MYELOGRAPHY LUMBAR INJECTION: CPT

## 2022-04-04 PROCEDURE — 82962 GLUCOSE BLOOD TEST: CPT | Performed by: RADIOLOGY

## 2022-04-04 PROCEDURE — 62302 MYELOGRAPHY LUMBAR INJECTION: CPT | Mod: ,,, | Performed by: RADIOLOGY

## 2022-04-04 PROCEDURE — 72126 CT NECK SPINE W/DYE: CPT | Mod: 26,,, | Performed by: RADIOLOGY

## 2022-04-04 PROCEDURE — 72126 CT MYELOGRAPHY CERVICAL SPINE: ICD-10-PCS | Mod: 26,,, | Performed by: RADIOLOGY

## 2022-04-04 RX ORDER — LIDOCAINE HYDROCHLORIDE 10 MG/ML
1 INJECTION, SOLUTION EPIDURAL; INFILTRATION; INTRACAUDAL; PERINEURAL ONCE
Status: COMPLETED | OUTPATIENT
Start: 2022-04-04 | End: 2022-04-04

## 2022-04-04 RX ORDER — SODIUM CHLORIDE 9 MG/ML
INJECTION, SOLUTION INTRAVENOUS CONTINUOUS
Status: DISCONTINUED | OUTPATIENT
Start: 2022-04-04 | End: 2022-04-04 | Stop reason: HOSPADM

## 2022-04-04 RX ORDER — LIDOCAINE HYDROCHLORIDE 10 MG/ML
5 INJECTION INFILTRATION; PERINEURAL ONCE
Status: COMPLETED | OUTPATIENT
Start: 2022-04-04 | End: 2022-04-04

## 2022-04-04 RX ADMIN — LIDOCAINE HYDROCHLORIDE 5 ML: 10 INJECTION, SOLUTION INFILTRATION; PERINEURAL at 10:04

## 2022-04-04 RX ADMIN — SODIUM CHLORIDE: 0.9 INJECTION, SOLUTION INTRAVENOUS at 09:04

## 2022-04-04 RX ADMIN — IOHEXOL 10 ML: 300 INJECTION, SOLUTION INTRAVENOUS at 10:04

## 2022-04-04 RX ADMIN — LIDOCAINE HYDROCHLORIDE 1 MG: 10 INJECTION, SOLUTION EPIDURAL; INFILTRATION; INTRACAUDAL at 09:04

## 2022-04-04 NOTE — PROCEDURES
Radiology Post-Procedure Note    Pre Op Diagnosis: cervical radiculopathy.    Post Op Diagnosis: Same    Procedure: LP myelogram    Procedure performed by: Trudi Samuels and Gee Villarreal    Written Informed Consent Obtained: Yes    Estimated Blood Loss: Minimal    Findings:  1% lidocaine was injected into the skin and a 22-gauge spinal needle was introduced into the L4-5 interlaminar space under fluoroscopic guidance. Almost 8 cm of spinal needle was advanced till thecal sac reached. Position of the tip was confirmed to be in the thecal sac and 10 mL Omnipaque 300 was injected under fluoroscopic surveillance.  Postprocedural images demonstrate satisfactory injection of contrast material and dynamic imaging was performed.  The patient tolerated the procedure well and was transferred to CT for further imaging.    Trudi Samuels MD MSCR  PGY-3 Radiology Resident

## 2022-04-04 NOTE — DISCHARGE SUMMARY
[facility] - [facility] (2nd Fl)  Discharge Summary      Admit Date: 4/4/2022    Discharge Date and Time:  04/04/2022 4:11 PM    Attending Physician: No att. providers found     Reason for Admission: cervical myelogram.    Procedures Performed: Procedure(s) (LRB):  Myelogram (N/A)    Hospital Course: Pt undergone LP for cervical myelogram without complications.     Goals of Care Treatment Preferences:  Code Status: Full Code      Consults: none    Significant Diagnostic Studies: Radiology: CT scan: CT cervical spine myelogram.    Final Diagnoses:    Cervical radiculopathy.    Discharged Condition: good    Disposition: Home or Self Care    Follow Up/Patient Instructions:     Medications:  Resume home medication:      Medication List      ASK your doctor about these medications    blood sugar diagnostic Strp  Commonly known as: BLOOD GLUCOSE TEST  Use one Accu-Chek Felicia Plus Test Strip per glucometer to test blood glucose three times a day. Dx: E11.22     blood-glucose meter kit  Accu-Chek Felicia Plus Meter to test blood glucose three times a day. Dx: E11.22     bumetanide 1 MG tablet  Commonly known as: BUMEX  Take 1 mg by mouth once daily.     diphenhydrAMINE 50 MG capsule  Commonly known as: BENADRYL  Take 50 mg capsule 1 hour prior to myelogram.     DULoxetine 30 MG capsule  Commonly known as: CYMBALTA  TAKE 1 CAPSULE EVERY DAY     ELIQUIS 5 mg Tab  Generic drug: apixaban  TAKE 1 TABLET TWICE DAILY     HYDROcodone-acetaminophen  mg per tablet  Commonly known as: NORCO  Take 1 tablet by mouth every 12 (twelve) hours as needed for Pain.     ICAPS AREDS2 ORAL  Take 1 capsule by mouth. 4 a day     JANUVIA 100 MG Tab  Generic drug: SITagliptin  TAKE 1 TABLET EVERY DAY     lancets Misc  Use one Accu-Chek Softclix Lancet per lancing device to test blood glucose three times a day. Dx: E11.22     LANTUS SOLOSTAR U-100 INSULIN glargine 100 units/mL (3mL) SubQ pen  Generic drug: insulin  INJECT 42 UNITS SUBCUTANEOUSLY EVERY  "EVENING     levoFLOXacin 500 MG tablet  Commonly known as: LEVAQUIN  Take 500 mg by mouth once daily.     lisinopriL 10 MG tablet  TAKE 1 TABLET (10 MG TOTAL) BY MOUTH 2 (TWO) TIMES DAILY.     memantine 5 MG Tab  Commonly known as: NAMENDA  Take one nightly for 3 weeks, then one BID     metFORMIN 1000 MG tablet  Commonly known as: GLUCOPHAGE  TAKE 1 TABLET (1,000 MG TOTAL) BY MOUTH 2 (TWO) TIMES DAILY WITH MEALS.     metoprolol tartrate 25 MG tablet  Commonly known as: LOPRESSOR  Take 25 mg by mouth once daily.     MITIGARE 0.6 mg Cap  Generic drug: colchicine     pen needle, diabetic 31 gauge x 3/16" Ndle  1 Device by Misc.(Non-Drug; Combo Route) route 3 (three) times daily.     pioglitazone 15 MG tablet  Commonly known as: ACTOS  TAKE 1 TABLET EVERY DAY     predniSONE 50 MG Tab  Commonly known as: DELTASONE  Take 50 mg tablet 13 hours prior to myelogram, then take 50 mg tablet 7 hours prior to myelogram, then take 50 mg tablet 1 hour prior to myelogram     pregabalin 100 MG capsule  Commonly known as: LYRICA  Take 1 capsule (100 mg total) by mouth 2 (two) times daily.     REPATHA SURECLICK 140 mg/mL Pnij  Generic drug: evolocumab     traZODone 50 MG tablet  Commonly known as: DESYREL  TAKE 1 TABLET EVERY EVENING.     turmeric 400 mg Cap  Take 1,200 mg by mouth.     UNABLE TO FIND  1,500 mg. medication name: CBD Oil          No discharge procedures on file.       Trudi Samuels MD MSCR  PGY-3 Radiology Resident    "

## 2022-04-04 NOTE — PROGRESS NOTES
Received a call from Cris STEIN in  in relation to this pt having a [employer] PPM and is having a  Myelogram this am.      Per protocol, no pacemaker reprogramming is required for this scan.    For additional questions, please contact the Arrhythmia Department at Ext [phone].

## 2022-04-04 NOTE — H&P
"Inpatient Radiology Pre-procedure Note    History of Present Illness:  Erasmo Dunbar is a 86 y.o. male who presents for LP for cervical myelogram.    Admission H&P reviewed.  Past Medical History:   Diagnosis Date    Arthritis     Atrial fibrillation     Bladder mass     BPH (benign prostatic hyperplasia)     CHF (congestive heart failure)     Depression     Diabetes mellitus type II     Hyperlipidemia     Hypertension     Microscopic hematuria     Prostate cancer 10/01/2018    RLS (restless legs syndrome)     Stroke     TIA    Wears glasses      Past Surgical History:   Procedure Laterality Date    back injections      BACK SURGERY      cervical fusion    CARDIAC PACEMAKER PLACEMENT      CARDIAC SURGERY Left     pacemaker placement    CATARACT EXTRACTION W/  INTRAOCULAR LENS IMPLANT Bilateral     cataracts    COLECTOMY N/A     CYSTOSCOPY N/A     Pt stated, "I have had six Cystoscopy."    CYSTOSCOPY W/ RETROGRADES Bilateral 10/21/2019    Procedure: CYSTOSCOPY WITH RETROGRADE PYELOGRAM;  Surgeon: Elliott Coon MD;  Location: [facility] OR;  Service: Urology;  Laterality: Bilateral;  OP 6    DIGITAL RECTAL EXAMINATION UNDER ANESTHESIA N/A 10/21/2019    Procedure: EXAM UNDER ANESTHESIA, DIGITAL, RECTUM;  Surgeon: Elliott Coon MD;  Location: [facility] OR;  Service: Urology;  Laterality: N/A;    EPIDURAL STEROID INJECTION INTO LUMBAR SPINE N/A 3/12/2021    Procedure: LUMBAR EPIDURAL STEROID INJECTION (L4-5 INTERLAMINAR);  Surgeon: Maria Guadalupe Ortiz MD;  [facility];  Service: Pain Management;  Laterality: N/A;    Ganglion Cyst Removed  Right     INJECTION OF ANESTHETIC AGENT AROUND MEDIAL BRANCH NERVES INNERVATING LUMBAR FACET JOINT Bilateral 12/18/2020    Procedure: LUMBAR FACET JOINT BLOCK (L4-5,L5-S1);  Surgeon: Maria Guadalupe Ortiz MD;  [facility];  Service: Pain Management;  Laterality: Bilateral;    INJECTION OF ANESTHETIC AGENT AROUND MEDIAL BRANCH NERVES INNERVATING LUMBAR FACET " JOINT Bilateral 1/15/2021    Procedure: LUMBAR FACET JOINT BLOCK (L4-5,L5-S1);  Surgeon: Maria Guadalupe Ortiz MD;  Location: [facility] OR;  Service: Pain Management;  Laterality: Bilateral;    INJECTION OF ANESTHETIC AGENT AROUND NERVE Right 10/30/2020    Procedure: SUPERIOR LATERAL AND MEDIAL AND INFERIOR MEDIAL GENICULAR NERVE BLOCK;  Surgeon: Maria Guadalupe Ortiz MD;  Location: [facility] OR;  Service: Pain Management;  Laterality: Right;    Neck Fusion Bilateral     PROSTATE SURGERY      RADIOFREQUENCY ABLATION OF LUMBAR MEDIAL BRANCH NERVE AT SINGLE LEVEL Left 1/21/2022    Procedure: RADIOFREQUENCY ABLATION (L4-5,L5-S1);  Surgeon: Maria Guadalupe Ortiz MD;  Location: [facility] OR;  Service: Pain Management;  Laterality: Left;    RADIOFREQUENCY THERMOCOAGULATION Right 3/4/2022    Procedure: RADIOFREQUENCY THERMAL COAGULATION (L4-5,L5-S1);  Surgeon: Maria Guadalupe Ortiz MD;  Location: [facility] OR;  Service: Pain Management;  Laterality: Right;    ROTATOR CUFF REPAIR Right     SKIN BIOPSY      skin cancer    TOTAL KNEE ARTHROPLASTY  03/30/2017    right knee    TRANSURETHRAL RESECTION OF PROSTATE         Review of Systems:   As documented in primary team H&P    Home Meds:   Prior to Admission medications    Medication Sig Start Date End Date Taking? Authorizing Provider   bumetanide (BUMEX) 1 MG tablet Take 1 mg by mouth once daily.   Yes Historical Provider   diphenhydrAMINE (BENADRYL) 50 MG capsule Take 50 mg capsule 1 hour prior to myelogram. 3/29/22  Yes Maria Guadalupe Ortiz MD   DULoxetine (CYMBALTA) 30 MG capsule TAKE 1 CAPSULE EVERY DAY  Patient taking differently: Take 30 mg by mouth once daily. 2/9/22  Yes Hemal Varma MD   HYDROcodone-acetaminophen (NORCO)  mg per tablet Take 1 tablet by mouth every 12 (twelve) hours as needed for Pain. 3/28/22 4/27/22 Yes Maria Guadalupe Ortiz MD   JANUVIA 100 mg Tab TAKE 1 TABLET EVERY DAY  Patient taking differently: Take 100 mg by mouth once daily. 2/10/22  Yes Hemal Varma MD    levoFLOXacin (LEVAQUIN) 500 MG tablet Take 500 mg by mouth once daily. 10/22/20  Yes Historical Provider   lisinopriL 10 MG tablet TAKE 1 TABLET (10 MG TOTAL) BY MOUTH 2 (TWO) TIMES DAILY. 12/29/21 1/28/22 Yes Hemal Varma MD   memantine (NAMENDA) 5 MG Tab Take one nightly for 3 weeks, then one BID 3/7/22  Yes Zia Nugent MD   metFORMIN (GLUCOPHAGE) 1000 MG tablet TAKE 1 TABLET (1,000 MG TOTAL) BY MOUTH 2 (TWO) TIMES DAILY WITH MEALS. 1/30/22  Yes Hemal Varma MD   metoprolol tartrate (LOPRESSOR) 25 MG tablet Take 25 mg by mouth once daily. 7/3/21  Yes Historical Provider   MITIGARE 0.6 mg Cap  7/15/21  Yes Historical Provider   pioglitazone (ACTOS) 15 MG tablet TAKE 1 TABLET EVERY DAY  Patient taking differently: Take 15 mg by mouth once daily. 1/2/22  Yes Hemal Varma MD   predniSONE (DELTASONE) 50 MG Tab Take 50 mg tablet 13 hours prior to myelogram, then take 50 mg tablet 7 hours prior to myelogram, then take 50 mg tablet 1 hour prior to myelogram  Patient taking differently: Take 50 mg tablet 13 hours prior to myelogram, then take 50 mg tablet 7 hours prior to myelogram, then take 50 mg tablet 1 hour prior to myelogram 3/29/22  Yes Maria Guadalupe Ortiz MD   pregabalin (LYRICA) 100 MG capsule Take 1 capsule (100 mg total) by mouth 2 (two) times daily. 3/28/22 9/26/22 Yes Maria Guadalupe Ortiz MD   traZODone (DESYREL) 50 MG tablet TAKE 1 TABLET EVERY EVENING. 3/15/22  Yes Hemal Varma MD   turmeric 400 mg Cap Take 1,200 mg by mouth.   Yes Historical Provider   UNABLE TO FIND 1,500 mg. medication name: CBD Oil   Yes Historical Provider   vit C/E/zinc ox/ryan/lut/zeax (ICAPS AREDS2 ORAL) Take 1 capsule by mouth. 4 a day   Yes Historical Provider   blood sugar diagnostic (BLOOD GLUCOSE TEST) Strp Use one Accu-Chek Felicia Plus Test Strip per glucometer to test blood glucose three times a day. Dx: E11.22 9/22/19   Hemal Varma MD   blood-glucose meter kit Accu-Chek Felicia Plus Meter to  "test blood glucose three times a day. Dx: E11.22 9/22/19   Hemal Varma MD   ELIQUIS 5 mg Tab TAKE 1 TABLET TWICE DAILY  Patient taking differently: Take 5 mg by mouth 2 (two) times daily. 8/9/21   Hemal Varma MD   insulin (LANTUS SOLOSTAR U-100 INSULIN) glargine 100 units/mL (3mL) SubQ pen INJECT 42 UNITS SUBCUTANEOUSLY EVERY EVENING 2/2/21   Hemal Varma MD   lancets Misc Use one Accu-Chek Softclix Lancet per lancing device to test blood glucose three times a day. Dx: E11.22 9/22/19   Hemal Varma MD   pen needle, diabetic 31 gauge x 3/16" Ndle 1 Device by Misc.(Non-Drug; Combo Route) route 3 (three) times daily. 12/21/20   Jc Noonan MD   REPATHA SURECLICK 140 mg/mL PnIj  7/20/21   Historical Provider     Scheduled Meds:   Continuous Infusions:    sodium chloride 0.9% 10 mL/hr at 04/04/22 0900     PRN Meds:  Anticoagulants/Antiplatelets: Eliquis (held on Friday)    Allergies:   Review of patient's allergies indicates:   Allergen Reactions    Iodinated contrast media     Iodine Hives     Iv iodine only     Sedation Hx: have not been any systemic reactions    Labs:  No results for input(s): INR in the last 168 hours.    Invalid input(s):  PT,  PTT  No results for input(s): WBC, HGB, HCT, MCV, PLT in the last 168 hours. No results for input(s): GLU, NA, K, CL, CO2, BUN, CREATININE, CALCIUM, MG, ALT, AST, ALBUMIN, BILITOT, BILIDIR in the last 168 hours.      Vitals:  Temp: 98.1 °F (36.7 °C) (04/04/22 0832)  Pulse: 71 (04/04/22 0832)  Resp: 18 (04/04/22 0832)  BP: 134/76 (04/04/22 0832)  SpO2: 98 % (04/04/22 0832)     Physical Exam:  ASA: 2  Mallampati: 2    General: no acute distress  Mental Status: alert and oriented to person, place and time  HEENT: normocephalic, atraumatic  Chest: unlabored breathing  Heart: regular heart rate  Abdomen: nondistended  Extremity: moves all extremities    Plan: LP for cervical myelogram.  Sedation Plan: local    Trudi Samuels MD " MSCR  PGY-3 Radiology Resident

## 2022-04-08 ENCOUNTER — OFFICE VISIT (OUTPATIENT)
Dept: PAIN MEDICINE | Facility: CLINIC | Age: 87
End: 2022-04-08
Payer: MEDICARE

## 2022-04-08 VITALS
RESPIRATION RATE: 16 BRPM | BODY MASS INDEX: 31.53 KG/M2 | SYSTOLIC BLOOD PRESSURE: 156 MMHG | HEIGHT: 66 IN | HEART RATE: 66 BPM | WEIGHT: 196.19 LBS | DIASTOLIC BLOOD PRESSURE: 70 MMHG

## 2022-04-08 DIAGNOSIS — M25.511 CHRONIC PAIN OF BOTH SHOULDERS: ICD-10-CM

## 2022-04-08 DIAGNOSIS — M25.512 CHRONIC PAIN OF BOTH SHOULDERS: ICD-10-CM

## 2022-04-08 DIAGNOSIS — G89.29 CHRONIC PAIN OF BOTH SHOULDERS: ICD-10-CM

## 2022-04-08 DIAGNOSIS — M48.062 SPINAL STENOSIS OF LUMBAR REGION WITH NEUROGENIC CLAUDICATION: ICD-10-CM

## 2022-04-08 DIAGNOSIS — M47.816 LUMBAR SPONDYLOSIS: ICD-10-CM

## 2022-04-08 DIAGNOSIS — M54.12 CERVICAL RADICULOPATHY: Primary | ICD-10-CM

## 2022-04-08 DIAGNOSIS — E11.42 DIABETIC POLYNEUROPATHY ASSOCIATED WITH TYPE 2 DIABETES MELLITUS: ICD-10-CM

## 2022-04-08 PROCEDURE — 99213 OFFICE O/P EST LOW 20 MIN: CPT | Mod: S$GLB,,, | Performed by: PHYSICAL MEDICINE & REHABILITATION

## 2022-04-08 PROCEDURE — 99999 PR PBB SHADOW E&M-EST. PATIENT-LVL IV: ICD-10-PCS | Mod: PBBFAC,,, | Performed by: PHYSICAL MEDICINE & REHABILITATION

## 2022-04-08 PROCEDURE — 1160F RVW MEDS BY RX/DR IN RCRD: CPT | Mod: CPTII,S$GLB,, | Performed by: PHYSICAL MEDICINE & REHABILITATION

## 2022-04-08 PROCEDURE — 99999 PR PBB SHADOW E&M-EST. PATIENT-LVL IV: CPT | Mod: PBBFAC,,, | Performed by: PHYSICAL MEDICINE & REHABILITATION

## 2022-04-08 PROCEDURE — 1125F AMNT PAIN NOTED PAIN PRSNT: CPT | Mod: CPTII,S$GLB,, | Performed by: PHYSICAL MEDICINE & REHABILITATION

## 2022-04-08 PROCEDURE — 1160F PR REVIEW ALL MEDS BY PRESCRIBER/CLIN PHARMACIST DOCUMENTED: ICD-10-PCS | Mod: CPTII,S$GLB,, | Performed by: PHYSICAL MEDICINE & REHABILITATION

## 2022-04-08 PROCEDURE — 1125F PR PAIN SEVERITY QUANTIFIED, PAIN PRESENT: ICD-10-PCS | Mod: CPTII,S$GLB,, | Performed by: PHYSICAL MEDICINE & REHABILITATION

## 2022-04-08 PROCEDURE — 1159F PR MEDICATION LIST DOCUMENTED IN MEDICAL RECORD: ICD-10-PCS | Mod: CPTII,S$GLB,, | Performed by: PHYSICAL MEDICINE & REHABILITATION

## 2022-04-08 PROCEDURE — 99213 PR OFFICE/OUTPT VISIT, EST, LEVL III, 20-29 MIN: ICD-10-PCS | Mod: S$GLB,,, | Performed by: PHYSICAL MEDICINE & REHABILITATION

## 2022-04-08 PROCEDURE — 1159F MED LIST DOCD IN RCRD: CPT | Mod: CPTII,S$GLB,, | Performed by: PHYSICAL MEDICINE & REHABILITATION

## 2022-04-08 NOTE — PROGRESS NOTES
Pain Medicine      Chief Complaint:   Chief Complaint   Patient presents with    Shoulder Pain     Follow up for CT Myelogram results       History of Present Illness: Erasmo Dunbar is a 86 y.o. male referred by Dr. Hemal Varma MD for chronic LBP.      Onset: years of back pain, but worsened in the past few months  Location: right sided lower back  Radiation: across lower back on the left side, and sometimes into the posterior thighs, but not below the knees.   Timing: Intermittent, only when he stands and walks and completely improves with sitting down  Quality: Throbbing, Deep and Sharp  Exacerbating Factors: standing and walking  Alleviating Factors: sitting, heat, ice, medications and rest  Associated Symptoms: He has numbness in his feet and legs from neuropathy. denies night fever/night sweats, urinary incontinence, bowel incontinence, significant weight loss and significant motor weakness     Severity: Currently: 8/10   Typical Range: 5-10/10     Exacerbation: 10/10     Hydrocodone 7.5/325 mg brings pain from a 10/10 to a 9/10 for maybe 3-4 hours. He denies any side effects.      He also notes bilateral knee pain. He has a history of right knee replacement in 2017. Knee pain worse with standing and walking. Braces help. Medications help, but not completely. He does feel swelling in the left knee at times. Knees will go out on him at times.     Shoulder Pain 10/25/21:  He also notes right shoulder pain that began 4-5 weeks ago.  He went saw Rheumatology and had a right shoulder injection which helped, but then he underwent a caudal epidural steroid injection with Dr. Mathew and afterwards his shoulder pain was worse again.  He states he was completely unconscious for the injection and things that potentially reaggravated the shoulder.  He localizes the shoulder pain to right subacromial/deltoid area and this radiates into the right biceps.  Pain seems to be worse with any overhead activity. He does  have a history of right shoulder surgyer.     Interval History (03/28/2022):  Erasmo Dunbar returns today for follow up.  At the last clinic visit, scheduled right RFA    RIGHT RADIOFREQUENCY THERMAL COAGULATION (L4-5,L5-S1) provided no relief.     Currently, the back pain is stable.  No change in the location or quality of the pain since the most recent visit is reported.  No significant interval events or traumas. No change in bowel or bladder function, & no new weakness or numbness is reported. No new musculoskeletal pain complaints.    Neck pain is stable and he is still feeling weakness in the right arm.    Current Pain Scales:  Current: 7/10              Typical Range: 7-10/10        Interval History (04/08/2022):  Erasmo Dunbar returns today for follow up.  At the last clinic visit, ordered imaging, CT Myelogram, continued Norco, and continue Lyrica    Currently, the shoulder pain is improved since the myelogram and back pain is stable.  No change in the location or quality of the back pain since the most recent visit is reported.  No significant interval events or traumas. No change in bowel or bladder function, & no new weakness or numbness is reported. No new musculoskeletal pain complaints.    He continues to take Norco  twice a day as needed.  He sometimes breaks these in half if pain is not too severe.  This will provide him 50% relief at times, but sometimes not so much.  He denies any side effects at this time.    Current Pain Scales:  Current: 7/10              Typical Range: 6-10/10      Pain Disability Index  Family/Home Responsibilities:: 8  Recreation:: 9  Social Activity:: 9  Occupation:: 0  Sexual Behavior:: 10  Self Care:: 6  Life-Support Activities:: 1  Pain Disability Index (PDI): 43    Previous Interventions:  - 3/12/21: L4-5 IL JACK w/ 0% relief  - 1/15/21: Bilateral L4-5, L5-S1 MBBs w/ 50% relief   - 12/18/20: Bilateral L4-5, L5-S1 MBBs w/ % relief for 2 hours  -  11/20/20: Bilateral GTB injection  - 10/30/20: Right genicular nerve block w/ 95% relief  - 10/12/20: Bilateral Cluneal nerve block with good relief for a few days.  - Dr. Varma, Dr. Jain both provided injections in the past, with limited benefit.     Previous Therapies:  PT/OT: yes   Relevant Surgery: yes    - Right knee replacement in 2017   - Cervical fusion 30 years ago  Previous Medications:   - NSAIDS: Tylenol. Avoiding other nsaids due to blood thinners.  - Muscle Relaxants:    - TCAs:   - SNRIs:   - Topicals: Many different topicals.   - Anticonvulsants:  Gabapentin was on this for a long time.   - Opioids: Hydrocodone    Current Pain Medications:  1. Norco  mg BID prn  2. Lyrica 100 mg BID  3. Cymbalta 30 mg daily  4. traZODone 50 MG tablet    Blood Thinners: Eliquis    Full Medication List:    Current Outpatient Medications:     blood sugar diagnostic (BLOOD GLUCOSE TEST) Strp, Use one Accu-Chek Felicia Plus Test Strip per glucometer to test blood glucose three times a day. Dx: E11.22, Disp: 600 strip, Rfl: 3    blood-glucose meter kit, Accu-Chek Felicia Plus Meter to test blood glucose three times a day. Dx: E11.22, Disp: 1 each, Rfl: 0    bumetanide (BUMEX) 1 MG tablet, Take 1 mg by mouth once daily., Disp: , Rfl:     diphenhydrAMINE (BENADRYL) 50 MG capsule, Take 50 mg capsule 1 hour prior to myelogram., Disp: 1 capsule, Rfl: 0    DULoxetine (CYMBALTA) 30 MG capsule, TAKE 1 CAPSULE EVERY DAY (Patient taking differently: Take 30 mg by mouth once daily.), Disp: 90 capsule, Rfl: 1    ELIQUIS 5 mg Tab, TAKE 1 TABLET TWICE DAILY (Patient taking differently: Take 5 mg by mouth 2 (two) times daily.), Disp: 180 tablet, Rfl: 1    HYDROcodone-acetaminophen (NORCO)  mg per tablet, Take 1 tablet by mouth every 12 (twelve) hours as needed for Pain., Disp: 60 tablet, Rfl: 0    insulin (LANTUS SOLOSTAR U-100 INSULIN) glargine 100 units/mL (3mL) SubQ pen, INJECT 42 UNITS SUBCUTANEOUSLY EVERY  "EVENING, Disp: 45 mL, Rfl: 5    JANUVIA 100 mg Tab, TAKE 1 TABLET EVERY DAY (Patient taking differently: Take 100 mg by mouth once daily.), Disp: 90 tablet, Rfl: 5    lancets Misc, Use one Accu-Chek Softclix Lancet per lancing device to test blood glucose three times a day. Dx: E11.22, Disp: 600 each, Rfl: 3    levoFLOXacin (LEVAQUIN) 500 MG tablet, Take 500 mg by mouth once daily., Disp: , Rfl:     lisinopriL 10 MG tablet, TAKE 1 TABLET (10 MG TOTAL) BY MOUTH 2 (TWO) TIMES DAILY., Disp: 180 tablet, Rfl: 1    memantine (NAMENDA) 5 MG Tab, Take one nightly for 3 weeks, then one BID, Disp: 180 tablet, Rfl: 3    metFORMIN (GLUCOPHAGE) 1000 MG tablet, TAKE 1 TABLET (1,000 MG TOTAL) BY MOUTH 2 (TWO) TIMES DAILY WITH MEALS., Disp: 180 tablet, Rfl: 1    metoprolol tartrate (LOPRESSOR) 25 MG tablet, Take 25 mg by mouth once daily., Disp: , Rfl:     MITIGARE 0.6 mg Cap, , Disp: , Rfl:     pen needle, diabetic 31 gauge x 3/16" Ndle, 1 Device by Misc.(Non-Drug; Combo Route) route 3 (three) times daily., Disp: 100 each, Rfl: 11    pioglitazone (ACTOS) 15 MG tablet, TAKE 1 TABLET EVERY DAY (Patient taking differently: Take 15 mg by mouth once daily.), Disp: 90 tablet, Rfl: 1    predniSONE (DELTASONE) 50 MG Tab, Take 50 mg tablet 13 hours prior to myelogram, then take 50 mg tablet 7 hours prior to myelogram, then take 50 mg tablet 1 hour prior to myelogram (Patient taking differently: Take 50 mg tablet 13 hours prior to myelogram, then take 50 mg tablet 7 hours prior to myelogram, then take 50 mg tablet 1 hour prior to myelogram), Disp: 3 tablet, Rfl: 0    pregabalin (LYRICA) 100 MG capsule, Take 1 capsule (100 mg total) by mouth 2 (two) times daily., Disp: 180 capsule, Rfl: 1    REPATHA SURECLICK 140 mg/mL PnIj, , Disp: , Rfl:     traZODone (DESYREL) 50 MG tablet, TAKE 1 TABLET EVERY EVENING., Disp: 90 tablet, Rfl: 1    turmeric 400 mg Cap, Take 1,200 mg by mouth., Disp: , Rfl:     UNABLE TO FIND, 1,500 mg. " medication name: CBD Oil, Disp: , Rfl:     vit C/E/zinc ox/ryan/lut/zeax (ICAPS AREDS2 ORAL), Take 1 capsule by mouth. 4 a day, Disp: , Rfl:   No current facility-administered medications for this visit.    Facility-Administered Medications Ordered in Other Visits:     0.9%  NaCl infusion, , Intravenous, Continuous, Maria Guadalupe Ortiz MD     Review of Systems:  Review of Systems   Constitutional: Negative for fever and weight loss.   HENT: Negative for ear pain and tinnitus.    Eyes: Negative for pain and redness.   Respiratory: Negative for cough and shortness of breath.    Cardiovascular: Negative for chest pain and palpitations.   Gastrointestinal: Positive for diarrhea. Negative for constipation and heartburn.   Genitourinary: Negative.         Denies urinary incontinence. Denies urine retention.    Musculoskeletal: Positive for back pain. Negative for neck pain.   Skin: Negative for itching and rash.   Neurological: Positive for tingling. Negative for focal weakness and seizures.   Endo/Heme/Allergies: Negative for environmental allergies. Bruises/bleeds easily.   Psychiatric/Behavioral: Negative for depression. The patient has insomnia. The patient is not nervous/anxious.        Allergies:  Iodinated contrast media and Iodine     Medical History:   has a past medical history of Arthritis, Atrial fibrillation, Bladder mass, BPH (benign prostatic hyperplasia), CHF (congestive heart failure), Depression, Diabetes mellitus type II, Hyperlipidemia, Hypertension, Microscopic hematuria, Prostate cancer (10/01/2018), RLS (restless legs syndrome), Stroke, and Wears glasses.    Surgical History:   has a past surgical history that includes Transurethral resection of prostate; Prostate surgery; Rotator cuff repair (Right); Ganglion Cyst Removed  (Right); Neck Fusion (Bilateral); Colectomy (N/A); Cystoscopy (N/A); Back surgery; Cardiac surgery (Left); Skin biopsy; Total knee arthroplasty (03/30/2017); Cardiac pacemaker  "placement; back injections; Cystoscopy w/ retrogrades (Bilateral, 10/21/2019); Digital rectal examination under anesthesia (N/A, 10/21/2019); Cataract extraction w/  intraocular lens implant (Bilateral); Injection of anesthetic agent around nerve (Right, 10/30/2020); Injection of anesthetic agent around medial branch nerves innervating lumbar facet joint (Bilateral, 12/18/2020); Injection of anesthetic agent around medial branch nerves innervating lumbar facet joint (Bilateral, 1/15/2021); Epidural steroid injection into lumbar spine (N/A, 3/12/2021); Radiofrequency ablation of lumbar medial branch nerve at single level (Left, 1/21/2022); Radiofrequency thermocoagulation (Right, 3/4/2022); and Myelography (N/A, 4/4/2022).    Family History:  family history includes COPD in his daughter; Cancer in his daughter and mother; Diabetes in his brother, daughter, and sister; Heart disease in his brother and father; Seizures in his daughter.    Social History:   reports that he quit smoking about 41 years ago. His smoking use included cigarettes. He has a 52.50 pack-year smoking history. He has never used smokeless tobacco. He reports current alcohol use of about 8.0 standard drinks of alcohol per week. He reports that he does not use drugs.    Physical Exam:  BP (!) 156/70 (BP Location: Right arm, Patient Position: Sitting, BP Method: Medium (Manual))   Pulse 66   Resp 16   Ht 5' 6" (1.676 m)   Wt 89 kg (196 lb 3.4 oz)   BMI 31.67 kg/m²   GEN: No acute distress. Calm, comfortable  HENT: Normocephalic, atraumatic, moist mucous membranes  EYE: Anicteric sclera, non-injected.   CV: Non-diaphoretic.   RESP: Breathing comfortably. Chest expansion symmetric.  EXT: No clubbing, cyanosis.   SKIN: No visible rashes or lesions of exposed skin.   PSYCH: Pleasant mood and appropriate affect. Recent and remote memory intact.   Neck Exam:       Inspection: No erythema, bruising.       Palpation: (+) TTP of right trapezius      ROM:  " Limitation in all planes      Provocative Maneuvers:  (-) Spurling's bilaterally  Shoulder Exam:       Inspection: No erythema, bruising.       Palpation: (-) TTP of right AC joitn and subacromial area.       ROM:  Limited in abduction, internal rotation on the right      Provocative Maneuvers:  (-) Hawkin's              (-) Empty Can              (-) Cross arm              (+) Speed's ont he right  Neurologic Exam:     Alert. Speech is fluent and appropriate.     Strength: 4/5 in right elbow flexion, right finger flexion, right ABD, otherwise 5/5 throughout bilateral upper & lower extremities     Sensation:  Grossly intact to light touch in bilateral upper & lower extremities     Reflexes: 1+ in b/l patella, achilles, biceps, brachioradialis, triceps     Tone: No abnormality appreciated in bilateral upper or lower extremities     (-) Salvador bilaterally      Imaging:  Xray Shoulder 10/25/2021:  The bones are osteopenic.  There is mild widening of the acromioclavicular joint which can be seen the setting of type 1 AC joint separation.  Mild degenerative changes of the acromioclavicular joint are seen.  The humeral head is high-riding suggesting underlying rotator cuff pathology.  Small calcification measuring 5 mm adjacent to the greater tuberosity suggesting calcific tendinitis.  No fracture or dislocation is seen.  No evidence of lytic or blastic lesions. Leads from a pacer device are partially imaged.  XRAY Bilateral Hips 02/02/2021:  The bones are osteopenic.  There are mild degenerative changes of the bilateral sacroiliac joints, hip joints and pubic symphysis.  No fracture or dislocation.  Vascular calcifications.     XRAY Bilateral Knee 01/29/2021:   Postoperative changes of a right total knee arthroplasty are seen in satisfactory alignment without hardware complication.  The left knee demonstrates medial, lateral and patellofemoral compartment degenerative change with joint space narrowing, subchondral  sclerosis and marginal osteophyte formation, most probably involving the medial compartment of the knee.  There is also apparent chondrocalcinosis of the lateral compartment of the left knee.  No joint effusion.    - X-ray Lumbar Spine 5/13/20:  Moderate facet arthropathy throughout the lower lumbar spine greatest at L5/S1.  Diffuse osteopenia.  Mild spondylosis L3/4. Overall alignment is well maintained.  No evidence of spondylolysis or spondylolisthesis. Disk and vertebral body heights are normal.  Severe atherosclerotic disease.  Mild constipation.    - CT Lumbar spine 3/19/19:  The incidentally visualized soft tissue structures of the abdomen show calcifications of the aorta and its major branch vessels.  Vertebral body alignment and heights are maintained.  There is disc space narrowing at the L3-4 level.  No fracture or subluxation is identified.  At T12-L1, no disc herniation, central canal stenosis or neural foraminal narrowing.  At L1-2, no disc herniation, central canal stenosis or neural foraminal narrowing.  At L2-3, there is a broad-based posterior disc bulge contributing to mild central canal stenosis with mild bilateral neural foraminal narrowing.  At L3-4, there is a broad-based posterior disc bulge with ligamentum flavum hypertrophy contributing to mild central canal stenosis with moderate right and moderate severe left-sided neural foraminal narrowing.  At L4-5, there is a broad-based posterior disc bulge with facet arthropathy contributing to mild central canal stenosis with moderate bilateral neural foraminal narrowing.  At L5-S1, there is a broad-based posterior disc bulge and facet arthropathy contributing to mild central canal stenosis with moderate left and moderate severe right-sided neural foraminal narrowing.  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  Please see above report for level by level  description.      Labs:  BMP  Lab Results   Component Value Date     02/03/2022    K 4.5 02/03/2022    CL 97 02/03/2022    CO2 30 (H) 02/03/2022    BUN 20 02/03/2022    CREATININE 0.9 02/03/2022    CALCIUM 9.3 02/03/2022    ANIONGAP 9 02/03/2022    ESTGFRAFRICA >60 02/03/2022    EGFRNONAA >60 02/03/2022     Lab Results   Component Value Date    ALT 19 02/03/2022    AST 15 02/03/2022    ALKPHOS 67 02/03/2022    BILITOT 0.3 02/03/2022     Lab Results   Component Value Date     01/27/2022       Assessment:  Erasmo Dunbar is a 86 y.o. male with the following diagnoses based on history, exam, and imaging:    Problem List Items Addressed This Visit        Neuro    Neuropathy, diabetic    Lumbar spondylosis    Spinal stenosis of lumbar region with neurogenic claudication      Other Visit Diagnoses     Cervical radiculopathy    -  Primary    Chronic pain of both shoulders              This is a pleasant 86 y.o. gentleman presenting with:     - Chronic bilateral shoulder pain: AC joint separation prior. Calcific tendonitis and prior RTC tears. Complicated by his cervical pathology as well with severe stenosis bilaterally at C5-6.   - Chronic neck pain: more shoulder pain than neck pain.  - Chronic bilateral low back pain: His pain appears multifactorial with facetogenic pain and LSS w/ neurogenic claudication. He had % and then 50% improvement in pain after MBBs, and left L3-5 MB RFA w/ 60% relief. He had no relief from right L3-5 MB RFA. He does have multilevel canal and foraminal stenosis.   - Bilateral trochanteric bursitis  - Chronic right knee pain after total knee replacement  - Chronic left knee pain:  Pseudogout on imaging, has been referred to Rheumatology, had CSI with good relief, and also colchicine which is helping.   - Chronic opioid use: I do think this is appropriate considering his age and limited medication options due to comorbidities.   - Peripheral neuropathy 2/2 DM2.   -  Comorbidities: Chronic anticoagulation on eliquis for paroxysmal A-fib, pacemaker    Treatment Plan:   - PT/OT/HEP:  Cont HEP for shoulder pain.   - Procedures: None at this time.  Hold off on cervical epidural steroid injection this time, pain is currently better in the shoulders and neck since getting steroids prior to the myelogram.  - Medications:    - Cont hydrocodone-APAP 10/325 mg q 12 hrs prn pain. He breaks these in 1/2 at times if his pain is not too severe.    - Counseled patient regarding the risks and benefits of chronic opioid therapy for chronic painful conditions and patient expresses understanding.  I discussed with patient that he is able to limit the amount filled of the prescription if he decides to.   -  reviewed with no red flags.    - Opioid contract signed 10/12/2020     - Cont lyrica 100 mg BID for sleep and neuropathic pain. Caution with elevation due to baseline balance issues.    - Imaging: Reviewed. Ordered CT c-spine with myelography to assess for cervical stenosis, pending   - Labs: Reviewed.      Follow Up: RTC 3 months or sooner PRN    Maria Guadalupe Ortiz M.D.  Interventional Pain Medicine / Physical Medicine & Rehabilitation    Disclaimer: This note was partly generated using dictation software which may occasionally result in transcription errors.

## 2022-04-12 ENCOUNTER — PATIENT MESSAGE (OUTPATIENT)
Dept: PAIN MEDICINE | Facility: CLINIC | Age: 87
End: 2022-04-12
Payer: MEDICARE

## 2022-04-12 DIAGNOSIS — M54.12 CERVICAL RADICULOPATHY: Primary | ICD-10-CM

## 2022-04-12 NOTE — TELEPHONE ENCOUNTER
C7-T1 IL JACK scheduled 05/13/2022. Patient will need clearance to hold Eliquis x 3 days prior to procedure. Clearance faxed to Dr. Greene with [facility] for approval. Patient has had Covid vaccinations. Advised that pre admit would contact patient with time of procedure. Advised patient to contact office if he starts any type of antibiotics or new blood thinners. Voiced understanding.

## 2022-05-09 NOTE — TELEPHONE ENCOUNTER
Clearance to hold Eliquis x3 days prior to procedure received from [facility]. Patient wife, Carol, notified. Voiced understanding.

## 2022-05-12 NOTE — PRE-PROCEDURE INSTRUCTIONS
Patient scheduled for pain procedure tomorrow with Dr. Maria Guadalupe Ortiz.  Denies any known infection or use of antibiotics in past 2 weeks.  Patient instructed to be here at 6:30 a.m.  (through ER entrance).  Patient has been holding Eliquis as instructed by MD for 3 days.  No solid food after midnight but OK to have liquids in morning.    Encouraged to take all morning medications (excluding insulin).  Must have transportation home after.  Patient verbalizes understanding.

## 2022-05-13 ENCOUNTER — HOSPITAL ENCOUNTER (OUTPATIENT)
Facility: HOSPITAL | Age: 87
Discharge: HOME | End: 2022-05-13
Attending: PHYSICAL MEDICINE & REHABILITATION | Admitting: PHYSICAL MEDICINE & REHABILITATION
Payer: MEDICARE

## 2022-05-13 ENCOUNTER — HOSPITAL ENCOUNTER (OUTPATIENT)
Dept: RADIOLOGY | Facility: HOSPITAL | Age: 87
Discharge: HOME | End: 2022-05-13
Attending: PHYSICAL MEDICINE & REHABILITATION
Payer: MEDICARE

## 2022-05-13 VITALS
HEART RATE: 62 BPM | OXYGEN SATURATION: 96 % | DIASTOLIC BLOOD PRESSURE: 87 MMHG | RESPIRATION RATE: 18 BRPM | SYSTOLIC BLOOD PRESSURE: 189 MMHG

## 2022-05-13 DIAGNOSIS — G89.29 CHRONIC PAIN: ICD-10-CM

## 2022-05-13 DIAGNOSIS — M54.12 CERVICAL RADICULOPATHY: Primary | ICD-10-CM

## 2022-05-13 DIAGNOSIS — M54.12 CERVICAL RADICULOPATHY: ICD-10-CM

## 2022-05-13 LAB — GLUCOSE SERPL-MCNC: 150 MG/DL (ref 70–110)

## 2022-05-13 PROCEDURE — A9585 GADOBUTROL INJECTION: HCPCS | Performed by: PHYSICAL MEDICINE & REHABILITATION

## 2022-05-13 PROCEDURE — 25500020 PHARM REV CODE 255: Performed by: PHYSICAL MEDICINE & REHABILITATION

## 2022-05-13 PROCEDURE — 25000003 PHARM REV CODE 250: Performed by: PHYSICAL MEDICINE & REHABILITATION

## 2022-05-13 PROCEDURE — 62321 NJX INTERLAMINAR CRV/THRC: CPT | Performed by: PHYSICAL MEDICINE & REHABILITATION

## 2022-05-13 PROCEDURE — 62321 NJX INTERLAMINAR CRV/THRC: CPT | Mod: ,,, | Performed by: PHYSICAL MEDICINE & REHABILITATION

## 2022-05-13 PROCEDURE — 82962 GLUCOSE BLOOD TEST: CPT | Performed by: PHYSICAL MEDICINE & REHABILITATION

## 2022-05-13 PROCEDURE — 62321 PR INJ CERV/THORAC, W/GUIDANCE: ICD-10-PCS | Mod: ,,, | Performed by: PHYSICAL MEDICINE & REHABILITATION

## 2022-05-13 PROCEDURE — 63600175 PHARM REV CODE 636 W HCPCS: Performed by: PHYSICAL MEDICINE & REHABILITATION

## 2022-05-13 RX ORDER — LIDOCAINE HYDROCHLORIDE 10 MG/ML
INJECTION INFILTRATION; PERINEURAL
Status: DISCONTINUED
Start: 2022-05-13 | End: 2022-05-13 | Stop reason: HOSPADM

## 2022-05-13 RX ORDER — GADOBUTROL 604.72 MG/ML
INJECTION INTRAVENOUS
Status: DISCONTINUED | OUTPATIENT
Start: 2022-05-13 | End: 2022-05-13 | Stop reason: HOSPADM

## 2022-05-13 RX ORDER — DEXAMETHASONE SODIUM PHOSPHATE 10 MG/ML
INJECTION INTRAMUSCULAR; INTRAVENOUS
Status: DISCONTINUED
Start: 2022-05-13 | End: 2022-05-13 | Stop reason: HOSPADM

## 2022-05-13 RX ORDER — GADOBUTROL 604.72 MG/ML
INJECTION INTRAVENOUS
Status: DISCONTINUED
Start: 2022-05-13 | End: 2022-05-13 | Stop reason: HOSPADM

## 2022-05-13 RX ORDER — DEXAMETHASONE SODIUM PHOSPHATE 10 MG/ML
INJECTION INTRAMUSCULAR; INTRAVENOUS
Status: DISCONTINUED | OUTPATIENT
Start: 2022-05-13 | End: 2022-05-13 | Stop reason: HOSPADM

## 2022-05-13 RX ORDER — LIDOCAINE HYDROCHLORIDE 10 MG/ML
INJECTION INFILTRATION; PERINEURAL
Status: DISCONTINUED | OUTPATIENT
Start: 2022-05-13 | End: 2022-05-13 | Stop reason: HOSPADM

## 2022-05-13 NOTE — DISCHARGE INSTRUCTIONS
DIET: You may resume your normal diet today.    BATHING: You may resume your normal bathing.          You may shower, no hot water directly on site for 24 hours.    DRESSING: You may remove your bandage today.    ACTIVITY LEVEL: You may resume your normal activities 24 hours after your  procedure.    If you have received sedation or an anesthetic, you may feel sleepy for several hours. Rest until you are more awake. Gradually resume your normal activities tomorrow.    If you have received sedation or an anesthetic, do not drive or operate heavy machinery for at least 24 hours.    MEDICATION: You may resume your normal medications today.    You will receive instructions for any pain prescriptions. Pain medications should be taken only as directed.    SPECIAL INSTRUCTIONS: No heat to the injection site for 24 hours including: bath or shower, heating pad, moist heat, hot tubs.    Use ice pack to injection site for any pain or discomfort. Apply ice pack to 20 minutes then remove for 20 minutes before re-applying to site.    WHEN TO CALL DOCTOR: Redness or swelling around injection site    Fever of 101F    Drainage (pus) from the injection site    For any continuous bleeding (some dried blood over the incision is normal).    FOLLOW UP: Follow up phone call will be made by office.    FOR EMERGENCIES: If any unusual problems or difficulties occur during clinic hours, call [phone] or [phone].

## 2022-05-13 NOTE — DISCHARGE SUMMARY
[facility]  Discharge Note  Short Stay     Admit Date: 5/13/2022    Discharge Date: 5/13/2022     Attending Physician: Maria Guadalupe Ortiz M.D.    Diagnoses:  There are no hospital problems to display for this patient.    Discharged Condition: Good     Hospital Course: Patient was admitted for an outpatient interventional pain management procedure and tolerated the procedure well with no complications.     Final Diagnoses: Same as principal problem.     Disposition: Home or Self Care     Follow up/Patient Instructions:   Follow-up in 1-2 weeks unless otherwise instructed. May return sooner as needed.       Reconciled Medications:     Medication List      ASK your doctor about these medications    blood sugar diagnostic Strp  Commonly known as: BLOOD GLUCOSE TEST  Use one Accu-Chek Felicia Plus Test Strip per glucometer to test blood glucose three times a day. Dx: E11.22     blood-glucose meter kit  Accu-Chek Felicia Plus Meter to test blood glucose three times a day. Dx: E11.22     bumetanide 1 MG tablet  Commonly known as: BUMEX  Take 1 mg by mouth once daily.     diphenhydrAMINE 50 MG capsule  Commonly known as: BENADRYL  Take 50 mg capsule 1 hour prior to myelogram.     DULoxetine 30 MG capsule  Commonly known as: CYMBALTA  TAKE 1 CAPSULE EVERY DAY     ELIQUIS 5 mg Tab  Generic drug: apixaban  TAKE 1 TABLET TWICE DAILY     HYDROcodone-acetaminophen  mg per tablet  Commonly known as: NORCO  Take 1 tablet by mouth every 12 (twelve) hours as needed for Pain.     ICAPS AREDS2 ORAL  Take 1 capsule by mouth. 4 a day     JANUVIA 100 MG Tab  Generic drug: SITagliptin  TAKE 1 TABLET EVERY DAY     lancets Misc  Use one Accu-Chek Softclix Lancet per lancing device to test blood glucose three times a day. Dx: E11.22     LANTUS SOLOSTAR U-100 INSULIN glargine 100 units/mL (3mL) SubQ pen  Generic drug: insulin  INJECT 42 UNITS SUBCUTANEOUSLY EVERY EVENING     levoFLOXacin 500 MG tablet  Commonly known as: LEVAQUIN  Take  "500 mg by mouth once daily.     lisinopriL 10 MG tablet  TAKE 1 TABLET (10 MG TOTAL) BY MOUTH 2 (TWO) TIMES DAILY.     memantine 5 MG Tab  Commonly known as: NAMENDA  Take one nightly for 3 weeks, then one BID     metFORMIN 1000 MG tablet  Commonly known as: GLUCOPHAGE  TAKE 1 TABLET (1,000 MG TOTAL) BY MOUTH 2 (TWO) TIMES DAILY WITH MEALS.     metoprolol tartrate 25 MG tablet  Commonly known as: LOPRESSOR  Take 25 mg by mouth once daily.     MITIGARE 0.6 mg Cap  Generic drug: colchicine     pen needle, diabetic 31 gauge x 3/16" Ndle  1 Device by Misc.(Non-Drug; Combo Route) route 3 (three) times daily.     pioglitazone 15 MG tablet  Commonly known as: ACTOS  TAKE 1 TABLET EVERY DAY     predniSONE 50 MG Tab  Commonly known as: DELTASONE  Take 50 mg tablet 13 hours prior to myelogram, then take 50 mg tablet 7 hours prior to myelogram, then take 50 mg tablet 1 hour prior to myelogram     pregabalin 100 MG capsule  Commonly known as: LYRICA  Take 1 capsule (100 mg total) by mouth 2 (two) times daily.     REPATHA SURECLICK 140 mg/mL Pnij  Generic drug: evolocumab     traZODone 50 MG tablet  Commonly known as: DESYREL  TAKE 1 TABLET EVERY EVENING.     turmeric 400 mg Cap  Take 1,200 mg by mouth.     UNABLE TO FIND  1,500 mg. medication name: CBD Oil          No discharge procedures on file.    Maria Guadalupe Ortiz M.D.  Interventional Pain Medicine / Physical Medicine & Rehabilitation  "

## 2022-05-13 NOTE — OP NOTE
Cervical Interlaminar Epidural Steroid Injection Under Fluoroscopic Guidance:  I have reviewed the patient's medications, allergies and relevant histories prior to the procedure and no contraindications have been identified. The risks, benefits and alternatives to the procedure were discussed with the patient, and all questions regarding the procedure were answered to the patient's satisfaction. I personally obtained Erasmo's consent prior to the start of the procedure and the signed consent can be found in the patient's chart.                                                         Time-out was taken to identify patient, procedure, laterality, and allergies prior to starting the procedure.       Date of Service: 05/13/2022  Procedure: C7-T1 cervical interlaminar epidural steroid injection under fluoroscopy.  Pre-Operative Diagnosis: Cervical Radiculopathy  Post-Operative Diagnosis: Cervical Radiculopathy    Physician: Maria Guadalupe Ortiz M.D.  Assistants: None    Medications Injected: Preservative-free dexamethasone 10 mg/mL and 2 mL of Normal Saline   Local Anesthetic: Xylocaine 1% 10 mL.   Sedation Medications: None    Procedural Technique:  With the patient laying in a prone position with the neck in a slightly forward flexed position, the area was prepped and draped in the usual sterile fashion using ChloraPrep and a fenestrated drape.  The area was determined under fluoroscopic guidance.  Local anesthetic was utilized to anesthetize the skin and subcutaneous tissues in the area using a 25-gauge needle.  A 3.5 inch 18-gauge Touhy needle was introduced under fluoroscopic guidance to meet the lamina of T1.  The needle was then directed superior-medially and advanced using loss of resistance technique.  Once the tip of the needle was in the desired position, the contrast dye, Gadavist (due to iodine allergy), was injected to determine epidural placement and no vascular runoff with live fluoroscopy to visualize. The  medications were then injected slowly. The needle was removed and bandage applied.     Estimated Blood Loss:  None.  Complications:  None.     Disposition: The patient tolerated the procedure well, and there were no apparent complications. Vital signs remained stable throughout the procedure. The patient was taken to the recovery area and monitored after the procedure. The patient was supplied with written discharge instructions for the procedure. If helpful, we can repeat as needed. The patient was discharged in a stable condition.    Follow-Up: We will see the patient back in 1-2 weeks or the patient may call to inform of status.

## 2022-05-13 NOTE — H&P
HPI  Patient presenting for Procedure(s) (LRB):  CERVICAL EPIDURAL STEROID INJECTION (C7-T1 INTERLAMINAR) (N/A)     Patient on Anti-coagulation Held eliquis x 3 days.    No health changes since previous encounter. No changes in pain since procedure was scheduled at previous visit. Denies any fevers or infections.     Current Facility-Administered Medications on File Prior to Encounter   Medication Dose Route Frequency Provider Last Rate Last Admin    0.9%  NaCl infusion   Intravenous Continuous Maria Guadalupe Ortiz MD         Current Outpatient Medications on File Prior to Encounter   Medication Sig Dispense Refill    bumetanide (BUMEX) 1 MG tablet Take 1 mg by mouth once daily.      DULoxetine (CYMBALTA) 30 MG capsule TAKE 1 CAPSULE EVERY DAY (Patient taking differently: Take 30 mg by mouth once daily.) 90 capsule 1    insulin (LANTUS SOLOSTAR U-100 INSULIN) glargine 100 units/mL (3mL) SubQ pen INJECT 42 UNITS SUBCUTANEOUSLY EVERY EVENING 45 mL 5    JANUVIA 100 mg Tab TAKE 1 TABLET EVERY DAY (Patient taking differently: Take 100 mg by mouth once daily.) 90 tablet 5    memantine (NAMENDA) 5 MG Tab Take one nightly for 3 weeks, then one  tablet 3    metFORMIN (GLUCOPHAGE) 1000 MG tablet TAKE 1 TABLET (1,000 MG TOTAL) BY MOUTH 2 (TWO) TIMES DAILY WITH MEALS. 180 tablet 1    metoprolol tartrate (LOPRESSOR) 25 MG tablet Take 25 mg by mouth once daily.      MITIGARE 0.6 mg Cap       pioglitazone (ACTOS) 15 MG tablet TAKE 1 TABLET EVERY DAY (Patient taking differently: Take 15 mg by mouth once daily.) 90 tablet 1    predniSONE (DELTASONE) 50 MG Tab Take 50 mg tablet 13 hours prior to myelogram, then take 50 mg tablet 7 hours prior to myelogram, then take 50 mg tablet 1 hour prior to myelogram (Patient taking differently: Take 50 mg tablet 13 hours prior to myelogram, then take 50 mg tablet 7 hours prior to myelogram, then take 50 mg tablet 1 hour prior to myelogram) 3 tablet 0    pregabalin (LYRICA) 100 MG  "capsule Take 1 capsule (100 mg total) by mouth 2 (two) times daily. 180 capsule 1    traZODone (DESYREL) 50 MG tablet TAKE 1 TABLET EVERY EVENING. 90 tablet 1    turmeric 400 mg Cap Take 1,200 mg by mouth.      vit C/E/zinc ox/ryan/lut/zeax (ICAPS AREDS2 ORAL) Take 1 capsule by mouth. 4 a day      blood sugar diagnostic (BLOOD GLUCOSE TEST) Strp Use one Accu-Chek Felicia Plus Test Strip per glucometer to test blood glucose three times a day. Dx: E11.22 600 strip 3    blood-glucose meter kit Accu-Chek Felicia Plus Meter to test blood glucose three times a day. Dx: E11.22 1 each 0    diphenhydrAMINE (BENADRYL) 50 MG capsule Take 50 mg capsule 1 hour prior to myelogram. 1 capsule 0    HYDROcodone-acetaminophen (NORCO)  mg per tablet Take 1 tablet by mouth every 12 (twelve) hours as needed for Pain. 60 tablet 0    lancets Misc Use one Accu-Chek Softclix Lancet per lancing device to test blood glucose three times a day. Dx: E11.22 600 each 3    levoFLOXacin (LEVAQUIN) 500 MG tablet Take 500 mg by mouth once daily.      lisinopriL 10 MG tablet TAKE 1 TABLET (10 MG TOTAL) BY MOUTH 2 (TWO) TIMES DAILY. 180 tablet 1    pen needle, diabetic 31 gauge x 3/16" Ndle 1 Device by Misc.(Non-Drug; Combo Route) route 3 (three) times daily. 100 each 11    REPATHA SURECLICK 140 mg/mL PnIj       UNABLE TO FIND 1,500 mg. medication name: CBD Oil       Past Medical History:   Diagnosis Date    Arthritis     Atrial fibrillation     Bladder mass     BPH (benign prostatic hyperplasia)     CHF (congestive heart failure)     Depression     Diabetes mellitus type II     Hyperlipidemia     Hypertension     Microscopic hematuria     Prostate cancer 10/01/2018    RLS (restless legs syndrome)     Stroke     TIA    Wears glasses      Past Surgical History:   Procedure Laterality Date    back injections      BACK SURGERY      cervical fusion    CARDIAC PACEMAKER PLACEMENT      CARDIAC SURGERY Left     pacemaker " "placement    CATARACT EXTRACTION W/  INTRAOCULAR LENS IMPLANT Bilateral     cataracts    COLECTOMY N/A     CYSTOSCOPY N/A     Pt stated, "I have had six Cystoscopy."    CYSTOSCOPY W/ RETROGRADES Bilateral 10/21/2019    Procedure: CYSTOSCOPY WITH RETROGRADE PYELOGRAM;  Surgeon: Elliott Coon MD;  Location: [facility] OR;  Service: Urology;  Laterality: Bilateral;  OP 6    DIGITAL RECTAL EXAMINATION UNDER ANESTHESIA N/A 10/21/2019    Procedure: EXAM UNDER ANESTHESIA, DIGITAL, RECTUM;  Surgeon: Elliott Coon MD;  Location: [facility] OR;  Service: Urology;  Laterality: N/A;    EPIDURAL STEROID INJECTION INTO LUMBAR SPINE N/A 3/12/2021    Procedure: LUMBAR EPIDURAL STEROID INJECTION (L4-5 INTERLAMINAR);  Surgeon: Maria Guadalupe Ortiz MD;  Location: [facility];  Service: Pain Management;  Laterality: N/A;    Ganglion Cyst Removed  Right     INJECTION OF ANESTHETIC AGENT AROUND MEDIAL BRANCH NERVES INNERVATING LUMBAR FACET JOINT Bilateral 12/18/2020    Procedure: LUMBAR FACET JOINT BLOCK (L4-5,L5-S1);  Surgeon: Maria Guadalupe Ortiz MD;  Location: [facility];  Service: Pain Management;  Laterality: Bilateral;    INJECTION OF ANESTHETIC AGENT AROUND MEDIAL BRANCH NERVES INNERVATING LUMBAR FACET JOINT Bilateral 1/15/2021    Procedure: LUMBAR FACET JOINT BLOCK (L4-5,L5-S1);  Surgeon: Maria Guadalupe Ortiz MD;  Location: [facility];  Service: Pain Management;  Laterality: Bilateral;    INJECTION OF ANESTHETIC AGENT AROUND NERVE Right 10/30/2020    Procedure: SUPERIOR LATERAL AND MEDIAL AND INFERIOR MEDIAL GENICULAR NERVE BLOCK;  Surgeon: Maria Guadalupe Ortiz MD;  Location: [facility];  Service: Pain Management;  Laterality: Right;    MYELOGRAPHY N/A 4/4/2022    Procedure: Myelogram;  Surgeon: Rufino Ramos MD;  Location: [facility] OR [address] [place] [address];  Service: Radiology;  Laterality: N/A;    Neck Fusion Bilateral     PROSTATE SURGERY      RADIOFREQUENCY ABLATION OF LUMBAR MEDIAL BRANCH NERVE AT SINGLE LEVEL Left 1/21/2022    Procedure: RADIOFREQUENCY ABLATION " (L4-5,L5-S1);  Surgeon: Maria Guadalupe Ortiz MD;  Location: [facility] OR;  Service: Pain Management;  Laterality: Left;    RADIOFREQUENCY THERMOCOAGULATION Right 3/4/2022    Procedure: RADIOFREQUENCY THERMAL COAGULATION (L4-5,L5-S1);  Surgeon: Maria Guadalupe Ortiz MD;  Location: [facility] OR;  Service: Pain Management;  Laterality: Right;    ROTATOR CUFF REPAIR Right     SKIN BIOPSY      skin cancer    TOTAL KNEE ARTHROPLASTY  03/30/2017    right knee    TRANSURETHRAL RESECTION OF PROSTATE       Review of patient's allergies indicates:   Allergen Reactions    Iodinated contrast media     Iodine Hives     Iv iodine only      Current Facility-Administered Medications   Medication    dexAMETHasone sodium phos (PF) 10 mg/mL injection    gadobutroL (GADAVIST) 10 mmol/10 mL (1 mmol/mL) injection    iohexoL (OMNIPAQUE 300) 300 mg iodine/mL injection    LIDOcaine HCL 10 mg/ml (1%) 10 mg/mL (1 %) injection     Facility-Administered Medications Ordered in Other Encounters   Medication    0.9%  NaCl infusion       PMHx, PSHx, Allergies, Medications reviewed in epic    ROS negative except pain complaints in HPI    OBJECTIVE:    BP (!) 184/82     PHYSICAL EXAMINATION:    GENERAL: Well appearing, in no acute distress, alert and oriented.  PSYCH:  Mood and affect appropriate.  SKIN: Skin color, texture, turgor normal in procedure area, no rashes or lesions which will impact the procedure.  CV: RRR with palpation of the radial artery.  PULM: No evidence of respiratory difficulty, symmetric chest rise. Clear to auscultation.  NEURO: Alert. Oriented. Speech fluent and appropriate. Moving all extremities.    Plan:    Proceed with procedure as planned Procedure(s) (LRB):  CERVICAL EPIDURAL STEROID INJECTION (C7-T1 INTERLAMINAR) (N/A)    Maria Guadalupe Ortiz  05/13/2022

## 2022-05-26 DIAGNOSIS — I10 ESSENTIAL HYPERTENSION: ICD-10-CM

## 2022-05-26 RX ORDER — LISINOPRIL 10 MG/1
TABLET ORAL
Qty: 180 TABLET | Refills: 1 | Status: SHIPPED | OUTPATIENT
Start: 2022-05-26 | End: 2023-01-04 | Stop reason: SDUPTHER

## 2022-05-26 NOTE — TELEPHONE ENCOUNTER
Refill Routing Note   Medication(s) are not appropriate for processing by [facility] for the following reason(s):      - Required vitals are abnormal  - Patient has been seen in the ED/Hospital since the last PCP visit    [facility] action(s):  Route Medication-related problems identified: Requires labs        Medication reconciliation completed: No     Appointments  past 12m or future 3m with PCP    Date Provider   Last Visit   2/3/2022 Hemal Varma MD   Next Visit   8/4/2022 Hemal Varma MD   ED visits in past 90 days: 0        Note composed:9:27 AM 05/26/2022

## 2022-05-26 NOTE — TELEPHONE ENCOUNTER
Care Due:                  Date            Visit Type   Department     Provider  --------------------------------------------------------------------------------                                 -                              [facility]  Last Visit: 02-      CARE ([facility])   JACOB Varma                              EP -                              PRIMARY      [facility]  Next Visit: 08-      CARE ([facility])   JACOB Varma                                                            Last  Test          Frequency    Reason                     Performed    Due Date  --------------------------------------------------------------------------------    HBA1C.......  6 months...  JANUVIA, insulin,          02- 08-                             metFORMIN, pioglitazone..    [employer] Embedded Care Gaps. Reference number: [id number]. 5/26/2022   2:56:13 AM CDT

## 2022-06-03 ENCOUNTER — PATIENT MESSAGE (OUTPATIENT)
Dept: PAIN MEDICINE | Facility: CLINIC | Age: 87
End: 2022-06-03
Payer: MEDICARE

## 2022-06-03 ENCOUNTER — OFFICE VISIT (OUTPATIENT)
Dept: PAIN MEDICINE | Facility: CLINIC | Age: 87
End: 2022-06-03
Payer: MEDICARE

## 2022-06-03 VITALS
WEIGHT: 203.06 LBS | RESPIRATION RATE: 18 BRPM | HEIGHT: 66 IN | SYSTOLIC BLOOD PRESSURE: 166 MMHG | DIASTOLIC BLOOD PRESSURE: 72 MMHG | BODY MASS INDEX: 32.64 KG/M2 | HEART RATE: 85 BPM | OXYGEN SATURATION: 95 %

## 2022-06-03 DIAGNOSIS — M54.12 CERVICAL MYELOPATHY WITH CERVICAL RADICULOPATHY: ICD-10-CM

## 2022-06-03 DIAGNOSIS — M48.062 SPINAL STENOSIS OF LUMBAR REGION WITH NEUROGENIC CLAUDICATION: ICD-10-CM

## 2022-06-03 DIAGNOSIS — Z96.651 CHRONIC KNEE PAIN AFTER TOTAL REPLACEMENT OF RIGHT KNEE JOINT: ICD-10-CM

## 2022-06-03 DIAGNOSIS — M25.511 CHRONIC RIGHT SHOULDER PAIN: Primary | ICD-10-CM

## 2022-06-03 DIAGNOSIS — M47.816 LUMBAR SPONDYLOSIS: ICD-10-CM

## 2022-06-03 DIAGNOSIS — G89.29 CHRONIC RIGHT SHOULDER PAIN: Primary | ICD-10-CM

## 2022-06-03 DIAGNOSIS — G89.29 CHRONIC KNEE PAIN AFTER TOTAL REPLACEMENT OF RIGHT KNEE JOINT: ICD-10-CM

## 2022-06-03 DIAGNOSIS — M25.561 CHRONIC KNEE PAIN AFTER TOTAL REPLACEMENT OF RIGHT KNEE JOINT: ICD-10-CM

## 2022-06-03 DIAGNOSIS — G95.9 CERVICAL MYELOPATHY WITH CERVICAL RADICULOPATHY: ICD-10-CM

## 2022-06-03 PROCEDURE — 99999 PR PBB SHADOW E&M-EST. PATIENT-LVL V: CPT | Mod: PBBFAC,,, | Performed by: PHYSICAL MEDICINE & REHABILITATION

## 2022-06-03 PROCEDURE — 1100F PTFALLS ASSESS-DOCD GE2>/YR: CPT | Mod: CPTII,S$GLB,, | Performed by: PHYSICAL MEDICINE & REHABILITATION

## 2022-06-03 PROCEDURE — 3288F PR FALLS RISK ASSESSMENT DOCUMENTED: ICD-10-PCS | Mod: CPTII,S$GLB,, | Performed by: PHYSICAL MEDICINE & REHABILITATION

## 2022-06-03 PROCEDURE — 1159F MED LIST DOCD IN RCRD: CPT | Mod: CPTII,S$GLB,, | Performed by: PHYSICAL MEDICINE & REHABILITATION

## 2022-06-03 PROCEDURE — 1100F PR PT FALLS ASSESS DOC 2+ FALLS/FALL W/INJURY/YR: ICD-10-PCS | Mod: CPTII,S$GLB,, | Performed by: PHYSICAL MEDICINE & REHABILITATION

## 2022-06-03 PROCEDURE — 3288F FALL RISK ASSESSMENT DOCD: CPT | Mod: CPTII,S$GLB,, | Performed by: PHYSICAL MEDICINE & REHABILITATION

## 2022-06-03 PROCEDURE — 1160F PR REVIEW ALL MEDS BY PRESCRIBER/CLIN PHARMACIST DOCUMENTED: ICD-10-PCS | Mod: CPTII,S$GLB,, | Performed by: PHYSICAL MEDICINE & REHABILITATION

## 2022-06-03 PROCEDURE — 99999 PR PBB SHADOW E&M-EST. PATIENT-LVL V: ICD-10-PCS | Mod: PBBFAC,,, | Performed by: PHYSICAL MEDICINE & REHABILITATION

## 2022-06-03 PROCEDURE — 1159F PR MEDICATION LIST DOCUMENTED IN MEDICAL RECORD: ICD-10-PCS | Mod: CPTII,S$GLB,, | Performed by: PHYSICAL MEDICINE & REHABILITATION

## 2022-06-03 PROCEDURE — 1125F PR PAIN SEVERITY QUANTIFIED, PAIN PRESENT: ICD-10-PCS | Mod: CPTII,S$GLB,, | Performed by: PHYSICAL MEDICINE & REHABILITATION

## 2022-06-03 PROCEDURE — 99214 PR OFFICE/OUTPT VISIT, EST, LEVL IV, 30-39 MIN: ICD-10-PCS | Mod: S$GLB,,, | Performed by: PHYSICAL MEDICINE & REHABILITATION

## 2022-06-03 PROCEDURE — 99214 OFFICE O/P EST MOD 30 MIN: CPT | Mod: S$GLB,,, | Performed by: PHYSICAL MEDICINE & REHABILITATION

## 2022-06-03 PROCEDURE — 1160F RVW MEDS BY RX/DR IN RCRD: CPT | Mod: CPTII,S$GLB,, | Performed by: PHYSICAL MEDICINE & REHABILITATION

## 2022-06-03 PROCEDURE — 1125F AMNT PAIN NOTED PAIN PRSNT: CPT | Mod: CPTII,S$GLB,, | Performed by: PHYSICAL MEDICINE & REHABILITATION

## 2022-06-03 RX ORDER — HYDROCODONE BITARTRATE AND ACETAMINOPHEN 10; 325 MG/1; MG/1
1 TABLET ORAL EVERY 12 HOURS PRN
Qty: 60 TABLET | Refills: 0 | Status: SHIPPED | OUTPATIENT
Start: 2022-06-03 | End: 2022-08-05 | Stop reason: SDUPTHER

## 2022-06-03 RX ORDER — TIZANIDINE 4 MG/1
TABLET ORAL DAILY PRN
COMMUNITY
Start: 2022-05-11 | End: 2022-07-01 | Stop reason: SDUPTHER

## 2022-06-03 NOTE — TELEPHONE ENCOUNTER
Spoke with patient wife, Carol. Advised to have patient come in today, 6/3/2022 at 1:30 pm, per Dr. Ortiz. Voiced understanding.

## 2022-06-03 NOTE — PROGRESS NOTES
Pain Medicine      Chief Complaint:   Chief Complaint   Patient presents with    Shoulder Pain       History of Present Illness: Erasmo Dunbar is a 86 y.o. male referred by Dr. Hemal Varma MD for chronic LBP.      Onset: years of back pain, but worsened in the past few months  Location: right sided lower back  Radiation: across lower back on the left side, and sometimes into the posterior thighs, but not below the knees.   Timing: Intermittent, only when he stands and walks and completely improves with sitting down  Quality: Throbbing, Deep and Sharp  Exacerbating Factors: standing and walking  Alleviating Factors: sitting, heat, ice, medications and rest  Associated Symptoms: He has numbness in his feet and legs from neuropathy. denies night fever/night sweats, urinary incontinence, bowel incontinence, significant weight loss and significant motor weakness     Severity: Currently: 8/10   Typical Range: 5-10/10     Exacerbation: 10/10     Hydrocodone 7.5/325 mg brings pain from a 10/10 to a 9/10 for maybe 3-4 hours. He denies any side effects.      He also notes bilateral knee pain. He has a history of right knee replacement in 2017. Knee pain worse with standing and walking. Braces help. Medications help, but not completely. He does feel swelling in the left knee at times. Knees will go out on him at times.     Shoulder Pain 10/25/21:  He also notes right shoulder pain that began 4-5 weeks ago.  He went saw Rheumatology and had a right shoulder injection which helped, but then he underwent a caudal epidural steroid injection with Dr. Mathew and afterwards his shoulder pain was worse again.  He states he was completely unconscious for the injection and things that potentially reaggravated the shoulder.  He localizes the shoulder pain to right subacromial/deltoid area and this radiates into the right biceps.  Pain seems to be worse with any overhead activity. He does have a history of right shoulder surgyer.      Interval History (03/28/2022):  Erasmo Dunbar returns today for follow up.  At the last clinic visit, scheduled right RFA    RIGHT RADIOFREQUENCY THERMAL COAGULATION (L4-5,L5-S1) provided no relief.     Currently, the back pain is stable.  No change in the location or quality of the pain since the most recent visit is reported.  No significant interval events or traumas. No change in bowel or bladder function, & no new weakness or numbness is reported. No new musculoskeletal pain complaints.    Neck pain is stable and he is still feeling weakness in the right arm.    Current Pain Scales:  Current: 7/10              Typical Range: 7-10/10        Interval History (06/03/2022):  Erasmo Dunbar returns today for follow up and to address a new problem today of right shoulder pain. At the last clinic visit, we cont current medications and held off on JAKOB. He ended up calling to get scheduled for JAKOB.    JAKOB on 5/13 provided complete relief of his shoulder pain for a few days, but pain returned.     Right shoulder pain has been present since fall on 06/02/2022. He was off balance and in the bathroom and fell a short distance and hit his back on the seat and he may have hit the wall with his shoulder, but he doesn't remember exactly. Right shoulder pain was present before, but this worsened it. He denies any significant bruising in the area. He denies radiation down the arm. He denies any new weakness or numbness.     Current Pain Scales:  Current: 10/10              Typical Range: 2-10/10        Pain Disability Index  Family/Home Responsibilities:: 9  Recreation:: 10  Social Activity:: 9  Occupation:: 10  Sexual Behavior:: 10  Self Care:: 7  Life-Support Activities:: 3  Pain Disability Index (PDI): 58    Previous Interventions:  - 5/13/22: C7-T1 IL JACK w/ 100% relief for 2 days  - 3/12/21: L4-5 IL JACK w/ 0% relief  - 1/15/21: Bilateral L4-5, L5-S1 MBBs w/ 50% relief   - 12/18/20: Bilateral L4-5, L5-S1 MBBs w/  % relief for 2 hours  - 11/20/20: Bilateral GTB injection  - 10/30/20: Right genicular nerve block w/ 95% relief  - 10/12/20: Bilateral Cluneal nerve block with good relief for a few days.  - Dr. Varma, Dr. Jain both provided injections in the past, with limited benefit.     Previous Therapies:  PT/OT: yes   Relevant Surgery: yes    - Right knee replacement in 2017   - Cervical fusion 30 years ago  Previous Medications:   - NSAIDS: Tylenol. Avoiding other nsaids due to blood thinners.  - Muscle Relaxants:    - TCAs:   - SNRIs:   - Topicals: Many different topicals.   - Anticonvulsants:  Gabapentin was on this for a long time.   - Opioids: Hydrocodone    Current Pain Medications:  1. Norco  mg BID prn  2. Lyrica 100 mg BID  3. Cymbalta 30 mg daily  4. traZODone 50 MG tablet    Blood Thinners: Eliquis    Full Medication List:    Current Outpatient Medications:     blood sugar diagnostic (BLOOD GLUCOSE TEST) Strp, Use one Accu-Chek Felicia Plus Test Strip per glucometer to test blood glucose three times a day. Dx: E11.22, Disp: 600 strip, Rfl: 3    blood-glucose meter kit, Accu-Chek Felicia Plus Meter to test blood glucose three times a day. Dx: E11.22, Disp: 1 each, Rfl: 0    bumetanide (BUMEX) 1 MG tablet, Take 1 mg by mouth once daily., Disp: , Rfl:     ELIQUIS 5 mg Tab, TAKE 1 TABLET TWICE DAILY, Disp: 180 tablet, Rfl: 1    insulin (LANTUS SOLOSTAR U-100 INSULIN) glargine 100 units/mL (3mL) SubQ pen, INJECT 42 UNITS SUBCUTANEOUSLY EVERY EVENING, Disp: 45 mL, Rfl: 5    JANUVIA 100 mg Tab, TAKE 1 TABLET EVERY DAY (Patient taking differently: Take 100 mg by mouth once daily.), Disp: 90 tablet, Rfl: 5    lancets Misc, Use one Accu-Chek Softclix Lancet per lancing device to test blood glucose three times a day. Dx: E11.22, Disp: 600 each, Rfl: 3    lisinopriL 10 MG tablet, TAKE 1 TABLET TWICE DAILY, Disp: 180 tablet, Rfl: 1    memantine (NAMENDA) 5 MG Tab, Take one nightly for 3 weeks, then one  "BID (Patient taking differently: Take 5 mg by mouth once daily. Take one nightly for 3 weeks, then one BID), Disp: 180 tablet, Rfl: 3    metFORMIN (GLUCOPHAGE) 1000 MG tablet, TAKE 1 TABLET (1,000 MG TOTAL) BY MOUTH 2 (TWO) TIMES DAILY WITH MEALS., Disp: 180 tablet, Rfl: 1    metoprolol tartrate (LOPRESSOR) 25 MG tablet, Take 25 mg by mouth 2 (two) times daily., Disp: , Rfl:     MITIGARE 0.6 mg Cap, Take 1 capsule by mouth once daily., Disp: , Rfl:     pen needle, diabetic 31 gauge x 3/16" Ndle, 1 Device by Misc.(Non-Drug; Combo Route) route 3 (three) times daily., Disp: 100 each, Rfl: 11    pioglitazone (ACTOS) 15 MG tablet, TAKE 1 TABLET EVERY DAY, Disp: 90 tablet, Rfl: 1    pregabalin (LYRICA) 100 MG capsule, Take 1 capsule (100 mg total) by mouth 2 (two) times daily., Disp: 180 capsule, Rfl: 1    REPATHA SURECLICK 140 mg/mL PnIj, Every other week, Disp: , Rfl:     traZODone (DESYREL) 50 MG tablet, TAKE 1 TABLET EVERY EVENING., Disp: 90 tablet, Rfl: 1    UNABLE TO FIND, 1,500 mg. medication name: CBD Oil, Disp: , Rfl:     vit C/E/zinc ox/ryan/lut/zeax (ICAPS AREDS2 ORAL), Take 1 capsule by mouth. 4 a day, Disp: , Rfl:     HYDROcodone-acetaminophen (NORCO)  mg per tablet, Take 1 tablet by mouth every 12 (twelve) hours as needed for Pain., Disp: 60 tablet, Rfl: 0    levoFLOXacin (LEVAQUIN) 500 MG tablet, Take 500 mg by mouth once daily., Disp: , Rfl:     tiZANidine (ZANAFLEX) 4 MG tablet, Take by mouth daily as needed., Disp: , Rfl:     turmeric 400 mg Cap, Take 1,200 mg by mouth., Disp: , Rfl:   No current facility-administered medications for this visit.    Facility-Administered Medications Ordered in Other Visits:     0.9%  NaCl infusion, , Intravenous, Continuous, Maria Guadalupe Ortiz MD     Review of Systems:  Review of Systems   Constitutional: Negative for fever and weight loss.   HENT: Negative for ear pain and tinnitus.    Eyes: Negative for pain and redness.   Respiratory: Negative for " cough and shortness of breath.    Cardiovascular: Negative for chest pain and palpitations.   Gastrointestinal: Positive for diarrhea. Negative for constipation and heartburn.   Genitourinary: Negative.         Denies urinary incontinence. Denies urine retention.    Musculoskeletal: Positive for back pain. Negative for neck pain.   Skin: Negative for itching and rash.   Neurological: Positive for tingling. Negative for focal weakness and seizures.   Endo/Heme/Allergies: Negative for environmental allergies. Bruises/bleeds easily.   Psychiatric/Behavioral: Negative for depression. The patient has insomnia. The patient is not nervous/anxious.        Allergies:  Iodinated contrast media and Iodine     Medical History:   has a past medical history of Arthritis, Atrial fibrillation, Bladder mass, BPH (benign prostatic hyperplasia), CHF (congestive heart failure), Depression, Diabetes mellitus type II, Hyperlipidemia, Hypertension, Microscopic hematuria, Prostate cancer (10/01/2018), RLS (restless legs syndrome), Stroke, and Wears glasses.    Surgical History:   has a past surgical history that includes Transurethral resection of prostate; Prostate surgery; Rotator cuff repair (Right); Ganglion Cyst Removed  (Right); Neck Fusion (Bilateral); Colectomy (N/A); Cystoscopy (N/A); Back surgery; Cardiac surgery (Left); Skin biopsy; Total knee arthroplasty (03/30/2017); Cardiac pacemaker placement; back injections; Cystoscopy w/ retrogrades (Bilateral, 10/21/2019); Digital rectal examination under anesthesia (N/A, 10/21/2019); Cataract extraction w/  intraocular lens implant (Bilateral); Injection of anesthetic agent around nerve (Right, 10/30/2020); Injection of anesthetic agent around medial branch nerves innervating lumbar facet joint (Bilateral, 12/18/2020); Injection of anesthetic agent around medial branch nerves innervating lumbar facet joint (Bilateral, 1/15/2021); Epidural steroid injection into lumbar spine (N/A,  "3/12/2021); Radiofrequency ablation of lumbar medial branch nerve at single level (Left, 1/21/2022); Radiofrequency thermocoagulation (Right, 3/4/2022); Myelography (N/A, 4/4/2022); and Epidural steroid injection into cervical spine (N/A, 5/13/2022).    Family History:  family history includes COPD in his daughter; Cancer in his daughter and mother; Diabetes in his brother, daughter, and sister; Heart disease in his brother and father; Seizures in his daughter.    Social History:   reports that he quit smoking about 41 years ago. His smoking use included cigarettes. He has a 52.50 pack-year smoking history. He has never used smokeless tobacco. He reports current alcohol use of about 8.0 standard drinks of alcohol per week. He reports that he does not use drugs.    Physical Exam:  BP (!) 166/72   Pulse 85   Resp 18   Ht 5' 6" (1.676 m)   Wt 92.1 kg (203 lb 0.7 oz)   SpO2 95%   BMI 32.77 kg/m²   GEN: No acute distress. Calm, comfortable  HENT: Normocephalic, atraumatic, moist mucous membranes  EYE: Anicteric sclera, non-injected.   CV: Non-diaphoretic.   RESP: Breathing comfortably. Chest expansion symmetric.  EXT: No clubbing, cyanosis.   SKIN: No visible rashes or lesions of exposed skin.   PSYCH: Pleasant mood and appropriate affect. Recent and remote memory intact.   Shoulder Exam:       Inspection: No erythema, bruising.       Palpation: (+) TTP of right AC joitn and subacromial area.       ROM:  Limited in all planes of motion on the right with pain  Neurologic Exam:     Alert. Speech is fluent and appropriate.     Strength: 4/5 in right elbow flexion, right finger flexion, right ABD, otherwise 5/5 throughout bilateral upper & lower extremities     Sensation:  Grossly intact to light touch in bilateral upper & lower extremities     Reflexes: 1+ in b/l patella, achilles, biceps, brachioradialis, triceps     Tone: No abnormality appreciated in bilateral upper or lower extremities     (-) Salvador " bilaterally      Imaging:  - Xray Shoulder 10/25/2021:  The bones are osteopenic.  There is mild widening of the acromioclavicular joint which can be seen the setting of type 1 AC joint separation.  Mild degenerative changes of the acromioclavicular joint are seen.  The humeral head is high-riding suggesting underlying rotator cuff pathology.  Small calcification measuring 5 mm adjacent to the greater tuberosity suggesting calcific tendinitis.  No fracture or dislocation is seen.  No evidence of lytic or blastic lesions. Leads from a pacer device are partially imaged.    - XRAY Bilateral Hips 02/02/2021:  The bones are osteopenic.  There are mild degenerative changes of the bilateral sacroiliac joints, hip joints and pubic symphysis.  No fracture or dislocation.  Vascular calcifications.     - XRAY Bilateral Knee 01/29/2021:   Postoperative changes of a right total knee arthroplasty are seen in satisfactory alignment without hardware complication.  The left knee demonstrates medial, lateral and patellofemoral compartment degenerative change with joint space narrowing, subchondral sclerosis and marginal osteophyte formation, most probably involving the medial compartment of the knee.  There is also apparent chondrocalcinosis of the lateral compartment of the left knee.  No joint effusion.    - X-ray Lumbar Spine 5/13/20:  Moderate facet arthropathy throughout the lower lumbar spine greatest at L5/S1.  Diffuse osteopenia.  Mild spondylosis L3/4. Overall alignment is well maintained.  No evidence of spondylolysis or spondylolisthesis. Disk and vertebral body heights are normal.  Severe atherosclerotic disease.  Mild constipation.    - CT Lumbar spine 3/19/19:  The incidentally visualized soft tissue structures of the abdomen show calcifications of the aorta and its major branch vessels.  Vertebral body alignment and heights are maintained.  There is disc space narrowing at the L3-4 level.  No fracture or subluxation is  identified.  At T12-L1, no disc herniation, central canal stenosis or neural foraminal narrowing.  At L1-2, no disc herniation, central canal stenosis or neural foraminal narrowing.  At L2-3, there is a broad-based posterior disc bulge contributing to mild central canal stenosis with mild bilateral neural foraminal narrowing.  At L3-4, there is a broad-based posterior disc bulge with ligamentum flavum hypertrophy contributing to mild central canal stenosis with moderate right and moderate severe left-sided neural foraminal narrowing.  At L4-5, there is a broad-based posterior disc bulge with facet arthropathy contributing to mild central canal stenosis with moderate bilateral neural foraminal narrowing.  At L5-S1, there is a broad-based posterior disc bulge and facet arthropathy contributing to mild central canal stenosis with moderate left and moderate severe right-sided neural foraminal narrowing.  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  Please see above report for level by level description.      Labs:  BMP  Lab Results   Component Value Date     02/03/2022    K 4.5 02/03/2022    CL 97 02/03/2022    CO2 30 (H) 02/03/2022    BUN 20 02/03/2022    CREATININE 0.9 02/03/2022    CALCIUM 9.3 02/03/2022    ANIONGAP 9 02/03/2022    ESTGFRAFRICA >60 02/03/2022    EGFRNONAA >60 02/03/2022     Lab Results   Component Value Date    ALT 19 02/03/2022    AST 15 02/03/2022    ALKPHOS 67 02/03/2022    BILITOT 0.3 02/03/2022     Lab Results   Component Value Date     01/27/2022       Assessment:  Erasmo Dunbar is a 86 y.o. male with the following diagnoses based on history, exam, and imaging:    Problem List Items Addressed This Visit        Neuro    Lumbar spondylosis    Relevant Medications    HYDROcodone-acetaminophen (NORCO)  mg per tablet    Spinal stenosis of lumbar region with neurogenic claudication     Relevant Medications    HYDROcodone-acetaminophen (NORCO)  mg per tablet      Other Visit Diagnoses     Chronic right shoulder pain    -  Primary    Relevant Orders    X-Ray Shoulder Trauma 3 view Right    Cervical myelopathy with cervical radiculopathy        Relevant Medications    HYDROcodone-acetaminophen (NORCO)  mg per tablet    Chronic knee pain after total replacement of right knee joint        Relevant Medications    HYDROcodone-acetaminophen (NORCO)  mg per tablet          This is a pleasant 86 y.o. gentleman presenting with:     - Chronic bilateral shoulder pain: AC joint separation prior. Calcific tendonitis and prior RTC tears. Complicated by his cervical pathology as well with severe stenosis bilaterally at C5-6.    - Right shoulder pain exacerbated by acute fall on 6/2. No significant bruising or erythema.   - Chronic neck pain: more shoulder pain than neck pain.  - Chronic bilateral low back pain: His pain appears multifactorial with facetogenic pain and LSS w/ neurogenic claudication. He had % and then 50% improvement in pain after MBBs, and left L3-5 MB RFA w/ 60% relief. He had no relief from right L3-5 MB RFA. He does have multilevel canal and foraminal stenosis.   - Bilateral trochanteric bursitis  - Chronic right knee pain after total knee replacement  - Chronic left knee pain:  Pseudogout on imaging, has been referred to Rheumatology, had CSI with good relief, and also colchicine which is helping.   - Chronic opioid use: I do think this is appropriate considering his age and limited medication options due to comorbidities.   - Peripheral neuropathy 2/2 DM2.   - Comorbidities: Chronic anticoagulation on eliquis for paroxysmal A-fib, pacemaker    Treatment Plan:   - PT/OT/HEP:  Cont HEP for shoulder pain. Instructed on codman pendulum exercises    - Call in 4-6 weeks if still having significant limitations in ROM and we will get him back into PT.  - Procedures: None at  this time.  Hold off on cervical epidural steroid injection this time, pain is currently better in the shoulders and neck since getting steroids prior to the myelogram.  - Medications:    - Refill hydrocodone-APAP 10/325 mg q 12 hrs prn pain. He breaks these in 1/2 at times if his pain is not too severe.    - Counseled patient regarding the risks and benefits of chronic opioid therapy for chronic painful conditions and patient expresses understanding.  I discussed with patient that he is able to limit the amount filled of the prescription if he decides to.   -  reviewed with no red flags.    - Opioid contract signed 10/12/2020     - Cont lyrica 100 mg BID for sleep and neuropathic pain. Caution with elevation due to baseline balance issues.    - Imaging: Reviewed. Ordered x-ray of right shoulder considering trauma to the area, but low suspicion of fracture.  - Labs: Reviewed.      Follow Up: RTC 3 months or sooner PRN    Maria Guadalupe Ortiz M.D.  Interventional Pain Medicine / Physical Medicine & Rehabilitation    Disclaimer: This note was partly generated using dictation software which may occasionally result in transcription errors.

## 2022-06-08 ENCOUNTER — PATIENT MESSAGE (OUTPATIENT)
Dept: PAIN MEDICINE | Facility: CLINIC | Age: 87
End: 2022-06-08
Payer: MEDICARE

## 2022-06-15 ENCOUNTER — PATIENT MESSAGE (OUTPATIENT)
Dept: PAIN MEDICINE | Facility: CLINIC | Age: 87
End: 2022-06-15
Payer: MEDICARE

## 2022-06-15 DIAGNOSIS — G89.29 CHRONIC PAIN OF BOTH SHOULDERS: Primary | ICD-10-CM

## 2022-06-15 DIAGNOSIS — M54.12 CERVICAL MYELOPATHY WITH CERVICAL RADICULOPATHY: ICD-10-CM

## 2022-06-15 DIAGNOSIS — M25.511 CHRONIC PAIN OF BOTH SHOULDERS: Primary | ICD-10-CM

## 2022-06-15 DIAGNOSIS — G95.9 CERVICAL MYELOPATHY WITH CERVICAL RADICULOPATHY: ICD-10-CM

## 2022-06-15 DIAGNOSIS — M25.512 CHRONIC PAIN OF BOTH SHOULDERS: Primary | ICD-10-CM

## 2022-06-17 ENCOUNTER — PATIENT MESSAGE (OUTPATIENT)
Dept: FAMILY MEDICINE | Facility: CLINIC | Age: 87
End: 2022-06-17
Payer: MEDICARE

## 2022-06-20 RX ORDER — PEN NEEDLE, DIABETIC 30 GX3/16"
1 NEEDLE, DISPOSABLE MISCELLANEOUS 3 TIMES DAILY
Qty: 100 EACH | Refills: 11 | Status: SHIPPED | OUTPATIENT
Start: 2022-06-20

## 2022-06-20 NOTE — TELEPHONE ENCOUNTER
No new care gaps identified.  [facility] Embedded Care Gaps. Reference number: [id number]. 6/20/2022   9:51:57 AM CAMILLAT

## 2022-07-01 ENCOUNTER — PATIENT MESSAGE (OUTPATIENT)
Dept: PAIN MEDICINE | Facility: CLINIC | Age: 87
End: 2022-07-01
Payer: MEDICARE

## 2022-07-01 NOTE — TELEPHONE ENCOUNTER
----- Message from Chelsey Goldberg sent at 7/1/2022 12:01 PM CDT -----  Contact: via FAX  Is this a refill or new RX:   refill  RX name and strength:   DULoxetine (CYMBALTA) 30 MG capsule  tiZANidine (ZANAFLEX) 4 MG tablet  Last office visit:   02/03/2022  Is this a 30-day or 90-day RX:  90 / 30   Pharmacy name and location:  [facility]  Comments:      Phone:  [phone]

## 2022-07-01 NOTE — TELEPHONE ENCOUNTER
No new care gaps identified.  [facility] Embedded Care Gaps. Reference number: [id number]. 7/01/2022   12:37:42 PM CDT

## 2022-07-01 NOTE — TELEPHONE ENCOUNTER
Pt requesting refill not on current med list.    DULoxetine (CYMBALTA) 30 MG capsule    LOV: 02/03/22

## 2022-07-04 RX ORDER — TIZANIDINE 4 MG/1
4 TABLET ORAL DAILY PRN
Qty: 30 TABLET | Refills: 5 | Status: SHIPPED | OUTPATIENT
Start: 2022-07-04

## 2022-07-06 RX ORDER — DULOXETIN HYDROCHLORIDE 30 MG/1
CAPSULE, DELAYED RELEASE ORAL
Qty: 90 CAPSULE | OUTPATIENT
Start: 2022-07-06

## 2022-07-06 NOTE — TELEPHONE ENCOUNTER
No new care gaps identified.  [facility] Embedded Care Gaps. Reference number: [id number]. 7/06/2022   3:28:39 AM CAMILLAT

## 2022-07-06 NOTE — TELEPHONE ENCOUNTER
Quick DC. Inappropriate Request    Refill Authorization Note   Erasmo Dunbar  is requesting a refill authorization.  Brief Assessment and Rationale for Refill:  Quick Discontinue  Medication Therapy Plan:       Medication Reconciliation Completed:  No      Comments:   Pended Medication(s)       Requested Prescriptions     Pending Prescriptions Disp Refills    DULoxetine (CYMBALTA) 30 MG capsule [Pharmacy Med Name: DULOXETINE HCL 30 MG Capsule Delayed Release Particles] 90 capsule      Sig: TAKE 1 CAPSULE EVERY DAY        Duplicate Pended Encounter(s)/ Last Prescribed Details: (includes pharmacy & prescriber details)                 Note composed:11:09 AM 07/06/2022

## 2022-07-15 NOTE — TELEPHONE ENCOUNTER
No new care gaps identified.  [facility] Embedded Care Gaps. Reference number: [id number]. 7/15/2022   12:12:50 PM CDT

## 2022-07-18 RX ORDER — METOPROLOL TARTRATE 25 MG/1
TABLET, FILM COATED ORAL
Refills: 0 | OUTPATIENT
Start: 2022-07-18

## 2022-08-05 DIAGNOSIS — M25.561 CHRONIC KNEE PAIN AFTER TOTAL REPLACEMENT OF RIGHT KNEE JOINT: ICD-10-CM

## 2022-08-05 DIAGNOSIS — M54.12 CERVICAL MYELOPATHY WITH CERVICAL RADICULOPATHY: ICD-10-CM

## 2022-08-05 DIAGNOSIS — M48.062 SPINAL STENOSIS OF LUMBAR REGION WITH NEUROGENIC CLAUDICATION: ICD-10-CM

## 2022-08-05 DIAGNOSIS — M47.816 LUMBAR SPONDYLOSIS: ICD-10-CM

## 2022-08-05 DIAGNOSIS — G95.9 CERVICAL MYELOPATHY WITH CERVICAL RADICULOPATHY: ICD-10-CM

## 2022-08-05 DIAGNOSIS — G89.29 CHRONIC KNEE PAIN AFTER TOTAL REPLACEMENT OF RIGHT KNEE JOINT: ICD-10-CM

## 2022-08-05 DIAGNOSIS — Z96.651 CHRONIC KNEE PAIN AFTER TOTAL REPLACEMENT OF RIGHT KNEE JOINT: ICD-10-CM

## 2022-08-05 DIAGNOSIS — M48.061 SPINAL STENOSIS OF LUMBAR REGION WITHOUT NEUROGENIC CLAUDICATION: ICD-10-CM

## 2022-08-05 DIAGNOSIS — E11.42 DIABETIC POLYNEUROPATHY ASSOCIATED WITH TYPE 2 DIABETES MELLITUS: ICD-10-CM

## 2022-08-08 RX ORDER — PREGABALIN 100 MG/1
100 CAPSULE ORAL 2 TIMES DAILY
Qty: 180 CAPSULE | Refills: 1 | Status: SHIPPED | OUTPATIENT
Start: 2022-08-08 | End: 2023-05-09 | Stop reason: SDUPTHER

## 2022-08-08 RX ORDER — HYDROCODONE BITARTRATE AND ACETAMINOPHEN 10; 325 MG/1; MG/1
1 TABLET ORAL EVERY 12 HOURS PRN
Qty: 60 TABLET | Refills: 0 | Status: SHIPPED | OUTPATIENT
Start: 2022-08-08 | End: 2022-09-09 | Stop reason: SDUPTHER

## 2022-08-08 NOTE — TELEPHONE ENCOUNTER
Refilled per cross coverage for Dr. Ortiz.  Chart and  reviewed; refill appears appropriate.    Tracy Bain Jr, MD  Interventional Pain Medicine / Anesthesiology

## 2022-08-08 NOTE — TELEPHONE ENCOUNTER
Erasmo desire refill of Lyrica and Norco. LOV 06/03/2022 with Dr. Ortiz. Please advise.   Patient Active Problem List   Diagnosis    Hyperlipidemia    Hypertension    Neuropathy, diabetic    Cerebellar cyst    Leucocytosis    Type 2 diabetes mellitus, without long-term current use of insulin    Vertigo    Pacemaker    Transient ischemic attack (TIA)    Orchitis    Sepsis due to Enterococcus     Prostate cancer    Acute on chronic diastolic CHF (congestive heart failure)    Paroxysmal atrial fibrillation    Hypokalemia due to loss of potassium    Bladder mass    Microscopic hematuria    Intractable nausea and vomiting    Nausea    Chronic pain    Knee pain    Pain    Lumbar spondylosis    Spinal stenosis of lumbar region with neurogenic claudication     Prior to Admission medications    Medication Sig Start Date End Date Taking? Authorizing Provider   blood sugar diagnostic (BLOOD GLUCOSE TEST) Strp Use one Accu-Chek Felicia Plus Test Strip per glucometer to test blood glucose three times a day. Dx: E11.22 9/22/19   Hemal Varma MD   blood-glucose meter kit Accu-Chek Felicia Plus Meter to test blood glucose three times a day. Dx: E11.22 9/22/19   Hemal Varma MD   bumetanide (BUMEX) 1 MG tablet Take 1 mg by mouth once daily.    Historical Provider   ELIQUIS 5 mg Tab TAKE 1 TABLET TWICE DAILY 5/9/22   Hemal Varma MD   HYDROcodone-acetaminophen (NORCO)  mg per tablet Take 1 tablet by mouth every 12 (twelve) hours as needed for Pain. 6/3/22 7/3/22  Maria Guadalupe Ortiz MD   insulin (LANTUS SOLOSTAR U-100 INSULIN) glargine 100 units/mL (3mL) SubQ pen INJECT 42 UNITS SUBCUTANEOUSLY EVERY EVENING 2/2/21   Hemal Varma MD   JANUVIA 100 mg Tab TAKE 1 TABLET EVERY DAY  Patient taking differently: Take 100 mg by mouth once daily. 2/10/22   Hemal Varma MD   lancets Misc Use one Accu-Chek Softclix Lancet per lancing device to test blood glucose three times a day. Dx:  "E11.22 9/22/19   Hemal Varma MD   levoFLOXacin (LEVAQUIN) 500 MG tablet Take 500 mg by mouth once daily. 10/22/20   Historical Provider   lisinopriL 10 MG tablet TAKE 1 TABLET TWICE DAILY 5/26/22   Hemal Varma MD   memantine (NAMENDA) 5 MG Tab Take one nightly for 3 weeks, then one BID  Patient taking differently: Take 5 mg by mouth once daily. Take one nightly for 3 weeks, then one BID 3/7/22   Zia Nugent MD   metFORMIN (GLUCOPHAGE) 1000 MG tablet TAKE 1 TABLET (1,000 MG TOTAL) BY MOUTH 2 (TWO) TIMES DAILY WITH MEALS. 6/27/22   Hemal Varma MD   metoprolol tartrate (LOPRESSOR) 25 MG tablet Take 25 mg by mouth 2 (two) times daily. 7/3/21   Historical Provider   MITIGARE 0.6 mg Cap Take 1 capsule by mouth once daily. 7/15/21   Historical Provider   pen needle, diabetic 31 gauge x 3/16" Ndle 1 Device by Misc.(Non-Drug; Combo Route) route 3 (three) times daily. 6/20/22   Hemal Varma MD   pioglitazone (ACTOS) 15 MG tablet TAKE 1 TABLET EVERY DAY 5/30/22   Hemal Varma MD   pregabalin (LYRICA) 100 MG capsule Take 1 capsule (100 mg total) by mouth 2 (two) times daily. 3/28/22 9/26/22  Maria Guadalupe Ortiz MD   REPATHA SURECLICK 140 mg/mL PnIj Every other week 7/20/21   Historical Provider   tiZANidine (ZANAFLEX) 4 MG tablet Take 1 tablet (4 mg total) by mouth daily as needed. 7/4/22   Hemal Varma MD   traZODone (DESYREL) 50 MG tablet TAKE 1 TABLET EVERY EVENING. 3/15/22   Hemal Varma MD   turmeric 400 mg Cap Take 1,200 mg by mouth.    Historical Provider   UNABLE TO FIND 1,500 mg. medication name: CBD Oil    Historical Provider   vit C/E/zinc ox/ryan/lut/zeax (ICAPS AREDS2 ORAL) Take 1 capsule by mouth. 4 a day    Historical Provider       "

## 2022-08-15 ENCOUNTER — OFFICE VISIT (OUTPATIENT)
Dept: PAIN MEDICINE | Facility: CLINIC | Age: 87
End: 2022-08-15
Payer: MEDICARE

## 2022-08-15 VITALS
BODY MASS INDEX: 31.89 KG/M2 | HEART RATE: 63 BPM | SYSTOLIC BLOOD PRESSURE: 130 MMHG | DIASTOLIC BLOOD PRESSURE: 78 MMHG | RESPIRATION RATE: 12 BRPM | WEIGHT: 198.44 LBS | HEIGHT: 66 IN

## 2022-08-15 DIAGNOSIS — M79.2 NEURALGIA: Primary | ICD-10-CM

## 2022-08-15 DIAGNOSIS — M47.816 LUMBAR SPONDYLOSIS: ICD-10-CM

## 2022-08-15 DIAGNOSIS — M54.50 CHRONIC RIGHT-SIDED LOW BACK PAIN WITHOUT SCIATICA: ICD-10-CM

## 2022-08-15 DIAGNOSIS — M48.061 SPINAL STENOSIS OF LUMBAR REGION WITHOUT NEUROGENIC CLAUDICATION: ICD-10-CM

## 2022-08-15 DIAGNOSIS — G89.29 CHRONIC RIGHT-SIDED LOW BACK PAIN WITHOUT SCIATICA: ICD-10-CM

## 2022-08-15 PROCEDURE — 1159F MED LIST DOCD IN RCRD: CPT | Mod: CPTII,S$GLB,, | Performed by: PHYSICAL MEDICINE & REHABILITATION

## 2022-08-15 PROCEDURE — 1159F PR MEDICATION LIST DOCUMENTED IN MEDICAL RECORD: ICD-10-PCS | Mod: CPTII,S$GLB,, | Performed by: PHYSICAL MEDICINE & REHABILITATION

## 2022-08-15 PROCEDURE — 1100F PTFALLS ASSESS-DOCD GE2>/YR: CPT | Mod: CPTII,S$GLB,, | Performed by: PHYSICAL MEDICINE & REHABILITATION

## 2022-08-15 PROCEDURE — 64450 PR NERVE BLOCK INJ, ANES/STEROID, OTHER PERIPHERAL: ICD-10-PCS | Mod: RT,S$GLB,, | Performed by: PHYSICAL MEDICINE & REHABILITATION

## 2022-08-15 PROCEDURE — 1160F PR REVIEW ALL MEDS BY PRESCRIBER/CLIN PHARMACIST DOCUMENTED: ICD-10-PCS | Mod: CPTII,S$GLB,, | Performed by: PHYSICAL MEDICINE & REHABILITATION

## 2022-08-15 PROCEDURE — 64450 NJX AA&/STRD OTHER PN/BRANCH: CPT | Mod: RT,S$GLB,, | Performed by: PHYSICAL MEDICINE & REHABILITATION

## 2022-08-15 PROCEDURE — 1126F PR PAIN SEVERITY QUANTIFIED, NO PAIN PRESENT: ICD-10-PCS | Mod: CPTII,S$GLB,, | Performed by: PHYSICAL MEDICINE & REHABILITATION

## 2022-08-15 PROCEDURE — 3288F FALL RISK ASSESSMENT DOCD: CPT | Mod: CPTII,S$GLB,, | Performed by: PHYSICAL MEDICINE & REHABILITATION

## 2022-08-15 PROCEDURE — 99214 PR OFFICE/OUTPT VISIT, EST, LEVL IV, 30-39 MIN: ICD-10-PCS | Mod: 25,S$GLB,, | Performed by: PHYSICAL MEDICINE & REHABILITATION

## 2022-08-15 PROCEDURE — 99999 PR PBB SHADOW E&M-EST. PATIENT-LVL IV: ICD-10-PCS | Mod: PBBFAC,,, | Performed by: PHYSICAL MEDICINE & REHABILITATION

## 2022-08-15 PROCEDURE — 3288F PR FALLS RISK ASSESSMENT DOCUMENTED: ICD-10-PCS | Mod: CPTII,S$GLB,, | Performed by: PHYSICAL MEDICINE & REHABILITATION

## 2022-08-15 PROCEDURE — 99999 PR PBB SHADOW E&M-EST. PATIENT-LVL IV: CPT | Mod: PBBFAC,,, | Performed by: PHYSICAL MEDICINE & REHABILITATION

## 2022-08-15 PROCEDURE — 1100F PR PT FALLS ASSESS DOC 2+ FALLS/FALL W/INJURY/YR: ICD-10-PCS | Mod: CPTII,S$GLB,, | Performed by: PHYSICAL MEDICINE & REHABILITATION

## 2022-08-15 PROCEDURE — 1126F AMNT PAIN NOTED NONE PRSNT: CPT | Mod: CPTII,S$GLB,, | Performed by: PHYSICAL MEDICINE & REHABILITATION

## 2022-08-15 PROCEDURE — 1160F RVW MEDS BY RX/DR IN RCRD: CPT | Mod: CPTII,S$GLB,, | Performed by: PHYSICAL MEDICINE & REHABILITATION

## 2022-08-15 PROCEDURE — 99214 OFFICE O/P EST MOD 30 MIN: CPT | Mod: 25,S$GLB,, | Performed by: PHYSICAL MEDICINE & REHABILITATION

## 2022-08-15 NOTE — PROGRESS NOTES
Pain Medicine      Chief Complaint:   Chief Complaint   Patient presents with    Back Pain       History of Present Illness: Erasmo Dunbar is a 87 y.o. male referred by Dr. Hemal Varma MD for chronic LBP.      Onset: years of back pain, but worsened in the past few months  Location: right sided lower back  Radiation: across lower back on the left side, and sometimes into the posterior thighs, but not below the knees.   Timing: Intermittent, only when he stands and walks and completely improves with sitting down  Quality: Throbbing, Deep and Sharp  Exacerbating Factors: standing and walking  Alleviating Factors: sitting, heat, ice, medications and rest  Associated Symptoms: He has numbness in his feet and legs from neuropathy. denies night fever/night sweats, urinary incontinence, bowel incontinence, significant weight loss and significant motor weakness     Severity: Currently: 8/10   Typical Range: 5-10/10     Exacerbation: 10/10     Hydrocodone 7.5/325 mg brings pain from a 10/10 to a 9/10 for maybe 3-4 hours. He denies any side effects.      He also notes bilateral knee pain. He has a history of right knee replacement in 2017. Knee pain worse with standing and walking. Braces help. Medications help, but not completely. He does feel swelling in the left knee at times. Knees will go out on him at times.     Shoulder Pain 10/25/21:  He also notes right shoulder pain that began 4-5 weeks ago.  He went saw Rheumatology and had a right shoulder injection which helped, but then he underwent a caudal epidural steroid injection with Dr. Mathew and afterwards his shoulder pain was worse again.  He states he was completely unconscious for the injection and things that potentially reaggravated the shoulder.  He localizes the shoulder pain to right subacromial/deltoid area and this radiates into the right biceps.  Pain seems to be worse with any overhead activity. He does have a history of right shoulder surgyer.        Interval History (08/15/2022):  Erasmo Dunbar returns today for follow up.  At the last clinic visit, cont HEP, medications.    His right shoulder pain is doing better.    Currently, the back pain is worse. Pain localized to the right paraspinal region with no radiation down the legs. He denies any new weakness or numbness. He denies changes in bowel or bladder function.    The Hydrocodone helps, but he only takes this twice a day, but not even every day at that frequency. He denies side effects. He also takes the lyrica which he feels is helping. He also has tizanidine which helps at times.         Current Pain Scales:  Current: 0/10              Typical Range: 10/10              Pain Disability Index  Family/Home Responsibilities:: 10  Recreation:: 10  Social Activity:: 7  Occupation:: 10  Sexual Behavior:: 8  Self Care:: 5  Life-Support Activities:: 1  Pain Disability Index (PDI): 51    Previous Interventions:  - 5/13/22: C7-T1 IL JACK w/ 100% relief for 2 days  - 3/12/21: L4-5 IL JACK w/ 0% relief  - 1/15/21: Bilateral L4-5, L5-S1 MBBs w/ 50% relief   - 12/18/20: Bilateral L4-5, L5-S1 MBBs w/ % relief for 2 hours  - 11/20/20: Bilateral GTB injection  - 10/30/20: Right genicular nerve block w/ 95% relief  - 10/12/20: Bilateral Cluneal nerve block with good relief for a few days.  - Dr. Varma, Dr. Jain both provided injections in the past, with limited benefit.     Previous Therapies:  PT/OT: yes   Relevant Surgery: yes    - Right knee replacement in 2017   - Cervical fusion 30 years ago  Previous Medications:   - NSAIDS: Tylenol. Avoiding other nsaids due to blood thinners.  - Muscle Relaxants:    - TCAs:   - SNRIs:   - Topicals: Many different topicals.   - Anticonvulsants:  Gabapentin was on this for a long time.   - Opioids: Hydrocodone    Current Pain Medications:  1. Norco  mg BID prn  2. Lyrica 100 mg BID  3. traZODone 50 MG tablet    Blood Thinners: Eliquis    Full Medication  "List:    Current Outpatient Medications:     blood sugar diagnostic (BLOOD GLUCOSE TEST) Strp, Use one Accu-Chek Feliica Plus Test Strip per glucometer to test blood glucose three times a day. Dx: E11.22, Disp: 600 strip, Rfl: 3    blood-glucose meter kit, Accu-Chek Felicia Plus Meter to test blood glucose three times a day. Dx: E11.22, Disp: 1 each, Rfl: 0    bumetanide (BUMEX) 1 MG tablet, Take 1 mg by mouth once daily., Disp: , Rfl:     ELIQUIS 5 mg Tab, TAKE 1 TABLET TWICE DAILY, Disp: 180 tablet, Rfl: 1    HYDROcodone-acetaminophen (NORCO)  mg per tablet, Take 1 tablet by mouth every 12 (twelve) hours as needed for Pain., Disp: 60 tablet, Rfl: 0    insulin (LANTUS SOLOSTAR U-100 INSULIN) glargine 100 units/mL (3mL) SubQ pen, INJECT 42 UNITS SUBCUTANEOUSLY EVERY EVENING, Disp: 45 mL, Rfl: 5    JANUVIA 100 mg Tab, TAKE 1 TABLET EVERY DAY (Patient taking differently: Take 100 mg by mouth once daily.), Disp: 90 tablet, Rfl: 5    lancets Misc, Use one Accu-Chek Softclix Lancet per lancing device to test blood glucose three times a day. Dx: E11.22, Disp: 600 each, Rfl: 3    levoFLOXacin (LEVAQUIN) 500 MG tablet, Take 500 mg by mouth once daily., Disp: , Rfl:     lisinopriL 10 MG tablet, TAKE 1 TABLET TWICE DAILY, Disp: 180 tablet, Rfl: 1    memantine (NAMENDA) 5 MG Tab, Take one nightly for 3 weeks, then one BID (Patient taking differently: Take 5 mg by mouth once daily. Take one nightly for 3 weeks, then one BID), Disp: 180 tablet, Rfl: 3    metFORMIN (GLUCOPHAGE) 1000 MG tablet, TAKE 1 TABLET (1,000 MG TOTAL) BY MOUTH 2 (TWO) TIMES DAILY WITH MEALS., Disp: 180 tablet, Rfl: 0    metoprolol tartrate (LOPRESSOR) 25 MG tablet, Take 25 mg by mouth 2 (two) times daily., Disp: , Rfl:     MITIGARE 0.6 mg Cap, Take 1 capsule by mouth once daily., Disp: , Rfl:     pen needle, diabetic 31 gauge x 3/16" Ndle, 1 Device by Misc.(Non-Drug; Combo Route) route 3 (three) times daily., Disp: 100 each, Rfl: 11    " pioglitazone (ACTOS) 15 MG tablet, TAKE 1 TABLET EVERY DAY, Disp: 90 tablet, Rfl: 1    pregabalin (LYRICA) 100 MG capsule, Take 1 capsule (100 mg total) by mouth 2 (two) times daily., Disp: 180 capsule, Rfl: 1    REPATHA SURECLICK 140 mg/mL PnIj, Every other week, Disp: , Rfl:     tiZANidine (ZANAFLEX) 4 MG tablet, Take 1 tablet (4 mg total) by mouth daily as needed., Disp: 30 tablet, Rfl: 5    traZODone (DESYREL) 50 MG tablet, TAKE 1 TABLET EVERY EVENING., Disp: 90 tablet, Rfl: 1    turmeric 400 mg Cap, Take 1,200 mg by mouth., Disp: , Rfl:     UNABLE TO FIND, 1,500 mg. medication name: CBD Oil, Disp: , Rfl:     vit C/E/zinc ox/ryan/lut/zeax (ICAPS AREDS2 ORAL), Take 1 capsule by mouth. 4 a day, Disp: , Rfl:   No current facility-administered medications for this visit.    Facility-Administered Medications Ordered in Other Visits:     0.9%  NaCl infusion, , Intravenous, Continuous, Maria Guadalupe Ortiz MD     Review of Systems:  Review of Systems   Constitutional: Negative for fever and weight loss.   HENT: Negative for ear pain and tinnitus.    Eyes: Negative for pain and redness.   Respiratory: Negative for cough and shortness of breath.    Cardiovascular: Negative for chest pain and palpitations.   Gastrointestinal: Positive for diarrhea. Negative for constipation and heartburn.   Genitourinary: Negative.         Denies urinary incontinence. Denies urine retention.    Musculoskeletal: Positive for back pain. Negative for neck pain.   Skin: Negative for itching and rash.   Neurological: Positive for tingling. Negative for focal weakness and seizures.   Endo/Heme/Allergies: Negative for environmental allergies. Bruises/bleeds easily.   Psychiatric/Behavioral: Negative for depression. The patient has insomnia. The patient is not nervous/anxious.        Allergies:  Iodinated contrast media and Iodine     Medical History:   has a past medical history of Arthritis, Atrial fibrillation, Bladder mass, BPH (benign  prostatic hyperplasia), CHF (congestive heart failure), Depression, Diabetes mellitus type II, Hyperlipidemia, Hypertension, Microscopic hematuria, Prostate cancer (10/01/2018), RLS (restless legs syndrome), Stroke, and Wears glasses.    Surgical History:   has a past surgical history that includes Transurethral resection of prostate; Prostate surgery; Rotator cuff repair (Right); Ganglion Cyst Removed  (Right); Neck Fusion (Bilateral); Colectomy (N/A); Cystoscopy (N/A); Back surgery; Cardiac surgery (Left); Skin biopsy; Total knee arthroplasty (03/30/2017); Cardiac pacemaker placement; back injections; Cystoscopy w/ retrogrades (Bilateral, 10/21/2019); Digital rectal examination under anesthesia (N/A, 10/21/2019); Cataract extraction w/  intraocular lens implant (Bilateral); Injection of anesthetic agent around nerve (Right, 10/30/2020); Injection of anesthetic agent around medial branch nerves innervating lumbar facet joint (Bilateral, 12/18/2020); Injection of anesthetic agent around medial branch nerves innervating lumbar facet joint (Bilateral, 1/15/2021); Epidural steroid injection into lumbar spine (N/A, 3/12/2021); Radiofrequency ablation of lumbar medial branch nerve at single level (Left, 1/21/2022); Radiofrequency thermocoagulation (Right, 3/4/2022); Myelography (N/A, 4/4/2022); and Epidural steroid injection into cervical spine (N/A, 5/13/2022).    Family History:  family history includes COPD in his daughter; Cancer in his daughter and mother; Diabetes in his brother, daughter, and sister; Heart disease in his brother and father; Seizures in his daughter.    Social History:   reports that he quit smoking about 41 years ago. His smoking use included cigarettes. He has a 52.50 pack-year smoking history. He has never used smokeless tobacco. He reports current alcohol use of about 8.0 standard drinks of alcohol per week. He reports that he does not use drugs.    Physical Exam:  /78 (BP Location: Right  "arm, Patient Position: Sitting, BP Method: Medium (Manual))   Pulse 63   Resp 12   Ht 5' 6" (1.676 m)   Wt 90 kg (198 lb 6.6 oz)   BMI 32.02 kg/m²   GEN: No acute distress. Calm, comfortable  HENT: Normocephalic, atraumatic, moist mucous membranes  EYE: Anicteric sclera, non-injected.   CV: Non-diaphoretic.   RESP: Breathing comfortably. Chest expansion symmetric.  EXT: No clubbing, cyanosis.   SKIN: No visible rashes or lesions of exposed skin.   PSYCH: Pleasant mood and appropriate affect. Recent and remote memory intact.   Lumbar Spine Exam:       Inspection: No erythema, bruising.       Palpation: (+) TTP of lumbar and sacral paraspinals and right gluteal musculature on the right       ROM:  Limited in flexion, extension, lateral bending.       (+) Facet loading on right  Neurologic Exam:     Alert. Speech is fluent and appropriate.     Strength: 4/5 in right elbow flexion, right finger flexion, right ABD, otherwise 5/5 throughout bilateral upper & lower extremities     Sensation:  Grossly intact to light touch in bilateral upper & lower extremities     Reflexes: 1+ in b/l patella, achilles, biceps, brachioradialis, triceps     Tone: No abnormality appreciated in bilateral upper or lower extremities     (-) Salvador bilaterally      Imaging:  - Xray Shoulder 10/25/2021:  The bones are osteopenic.  There is mild widening of the acromioclavicular joint which can be seen the setting of type 1 AC joint separation.  Mild degenerative changes of the acromioclavicular joint are seen.  The humeral head is high-riding suggesting underlying rotator cuff pathology.  Small calcification measuring 5 mm adjacent to the greater tuberosity suggesting calcific tendinitis.  No fracture or dislocation is seen.  No evidence of lytic or blastic lesions. Leads from a pacer device are partially imaged.    - XRAY Bilateral Hips 02/02/2021:  The bones are osteopenic.  There are mild degenerative changes of the bilateral sacroiliac " joints, hip joints and pubic symphysis.  No fracture or dislocation.  Vascular calcifications.     - XRAY Bilateral Knee 01/29/2021:   Postoperative changes of a right total knee arthroplasty are seen in satisfactory alignment without hardware complication.  The left knee demonstrates medial, lateral and patellofemoral compartment degenerative change with joint space narrowing, subchondral sclerosis and marginal osteophyte formation, most probably involving the medial compartment of the knee.  There is also apparent chondrocalcinosis of the lateral compartment of the left knee.  No joint effusion.    - X-ray Lumbar Spine 5/13/20:  Moderate facet arthropathy throughout the lower lumbar spine greatest at L5/S1.  Diffuse osteopenia.  Mild spondylosis L3/4. Overall alignment is well maintained.  No evidence of spondylolysis or spondylolisthesis. Disk and vertebral body heights are normal.  Severe atherosclerotic disease.  Mild constipation.    - CT Lumbar spine 3/19/19:  The incidentally visualized soft tissue structures of the abdomen show calcifications of the aorta and its major branch vessels.  Vertebral body alignment and heights are maintained.  There is disc space narrowing at the L3-4 level.  No fracture or subluxation is identified.  At T12-L1, no disc herniation, central canal stenosis or neural foraminal narrowing.  At L1-2, no disc herniation, central canal stenosis or neural foraminal narrowing.  At L2-3, there is a broad-based posterior disc bulge contributing to mild central canal stenosis with mild bilateral neural foraminal narrowing.  At L3-4, there is a broad-based posterior disc bulge with ligamentum flavum hypertrophy contributing to mild central canal stenosis with moderate right and moderate severe left-sided neural foraminal narrowing.  At L4-5, there is a broad-based posterior disc bulge with facet arthropathy contributing to mild central canal stenosis with moderate bilateral neural foraminal  narrowing.  At L5-S1, there is a broad-based posterior disc bulge and facet arthropathy contributing to mild central canal stenosis with moderate left and moderate severe right-sided neural foraminal narrowing.  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  Please see above report for level by level description.      Labs:  BMP  Lab Results   Component Value Date     02/03/2022    K 4.5 02/03/2022    CL 97 02/03/2022    CO2 30 (H) 02/03/2022    BUN 20 02/03/2022    CREATININE 0.9 02/03/2022    CALCIUM 9.3 02/03/2022    ANIONGAP 9 02/03/2022    ESTGFRAFRICA >60 02/03/2022    EGFRNONAA >60 02/03/2022     Lab Results   Component Value Date    ALT 19 02/03/2022    AST 15 02/03/2022    ALKPHOS 67 02/03/2022    BILITOT 0.3 02/03/2022     Lab Results   Component Value Date     01/27/2022       Assessment:  Erasmo Dunbar is a 87 y.o. male with the following diagnoses based on history, exam, and imaging:    Problem List Items Addressed This Visit        Neuro    Lumbar spondylosis      Other Visit Diagnoses     Neuralgia    -  Primary    Spinal stenosis of lumbar region without neurogenic claudication        Chronic right-sided low back pain without sciatica              This is a pleasant 87 y.o. gentleman presenting with:     - Chronic bilateral shoulder pain: AC joint separation prior. Calcific tendonitis and prior RTC tears. Complicated by his cervical pathology as well with severe stenosis bilaterally at C5-6.    - Right shoulder pain exacerbated by acute fall on 6/2. No significant bruising or erythema.   - Chronic neck pain: more shoulder pain than neck pain.  - Chronic bilateral low back pain: His pain appears multifactorial with facetogenic pain and LSS w/ neurogenic claudication. He had % and then 50% improvement in pain after MBBs, and left L3-5 MB RFA w/ 60% relief. He had no relief from right L3-5 MB  RFA. He does have multilevel canal and foraminal stenosis.    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   - Bilateral trochanteric bursitis  - Chronic right knee pain after total knee replacement  - Chronic left knee pain:  Pseudogout on imaging, has been referred to Rheumatology, had CSI with good relief, and also colchicine which is helping.   - Chronic opioid use: I do think this is appropriate considering his age and limited medication options due to comorbidities.   - Peripheral neuropathy 2/2 DM2.   - Comorbidities: Chronic anticoagulation on eliquis for paroxysmal A-fib, pacemaker    Treatment Plan:   - PT/OT/HEP:  Cont HEP   - Procedures: Performed right cluneal nerve block today.   - Medications:    - Cont hydrocodone-APAP 10/325 mg q 12 hrs prn pain. He breaks these in 1/2 at times if his pain is not too severe.    - Counseled patient regarding the risks and benefits of chronic opioid therapy for chronic painful conditions and patient expresses understanding.  I discussed with patient that he is able to limit the amount filled of the prescription if he decides to.   -  reviewed with no red flags.    - Opioid contract signed 10/12/2020     - Cont lyrica 100 mg BID for sleep and neuropathic pain. Caution with elevation due to baseline balance issues.     - Cont tizanidine 4 mg   - Imaging: Reviewed.   - Labs: Reviewed.      Follow Up: RTC in 3-4 weeks or sooner PRN    Maria Guadalupe Ortiz M.D.  Interventional Pain Medicine / Physical Medicine & Rehabilitation    Disclaimer: This note was partly generated using dictation software which may occasionally result in transcription errors.

## 2022-08-15 NOTE — PROCEDURES
Procedures     Procedure: Cluneal nerve block  Under clean technique & after discussing the risks, benefits and alternatives of the procedure with patient the Right cluneal nerve was injected with 4 mL solution of medications, per side, from a mixture of 4 mL of lidocaine 1% and 40mg of Depo-Medrol     Patient tolerated procedure well.

## 2022-08-23 ENCOUNTER — OFFICE VISIT (OUTPATIENT)
Dept: PAIN MEDICINE | Facility: CLINIC | Age: 87
End: 2022-08-23
Payer: MEDICARE

## 2022-08-23 VITALS
OXYGEN SATURATION: 98 % | SYSTOLIC BLOOD PRESSURE: 146 MMHG | HEIGHT: 66 IN | BODY MASS INDEX: 32.27 KG/M2 | DIASTOLIC BLOOD PRESSURE: 62 MMHG | WEIGHT: 200.81 LBS | RESPIRATION RATE: 16 BRPM | HEART RATE: 69 BPM

## 2022-08-23 DIAGNOSIS — Z79.891 CHRONIC PRESCRIPTION OPIATE USE: ICD-10-CM

## 2022-08-23 DIAGNOSIS — G89.4 CHRONIC PAIN SYNDROME: ICD-10-CM

## 2022-08-23 DIAGNOSIS — M54.50 CHRONIC RIGHT-SIDED LOW BACK PAIN WITHOUT SCIATICA: Primary | ICD-10-CM

## 2022-08-23 DIAGNOSIS — M47.816 LUMBAR SPONDYLOSIS: ICD-10-CM

## 2022-08-23 DIAGNOSIS — G89.29 CHRONIC RIGHT-SIDED LOW BACK PAIN WITHOUT SCIATICA: Primary | ICD-10-CM

## 2022-08-23 DIAGNOSIS — M48.062 SPINAL STENOSIS OF LUMBAR REGION WITH NEUROGENIC CLAUDICATION: ICD-10-CM

## 2022-08-23 PROCEDURE — 1160F PR REVIEW ALL MEDS BY PRESCRIBER/CLIN PHARMACIST DOCUMENTED: ICD-10-PCS | Mod: CPTII,S$GLB,, | Performed by: PHYSICAL MEDICINE & REHABILITATION

## 2022-08-23 PROCEDURE — 99214 OFFICE O/P EST MOD 30 MIN: CPT | Mod: S$GLB,,, | Performed by: PHYSICAL MEDICINE & REHABILITATION

## 2022-08-23 PROCEDURE — 1159F MED LIST DOCD IN RCRD: CPT | Mod: CPTII,S$GLB,, | Performed by: PHYSICAL MEDICINE & REHABILITATION

## 2022-08-23 PROCEDURE — 1125F PR PAIN SEVERITY QUANTIFIED, PAIN PRESENT: ICD-10-PCS | Mod: CPTII,S$GLB,, | Performed by: PHYSICAL MEDICINE & REHABILITATION

## 2022-08-23 PROCEDURE — 1159F PR MEDICATION LIST DOCUMENTED IN MEDICAL RECORD: ICD-10-PCS | Mod: CPTII,S$GLB,, | Performed by: PHYSICAL MEDICINE & REHABILITATION

## 2022-08-23 PROCEDURE — 99214 PR OFFICE/OUTPT VISIT, EST, LEVL IV, 30-39 MIN: ICD-10-PCS | Mod: S$GLB,,, | Performed by: PHYSICAL MEDICINE & REHABILITATION

## 2022-08-23 PROCEDURE — 1101F PR PT FALLS ASSESS DOC 0-1 FALLS W/OUT INJ PAST YR: ICD-10-PCS | Mod: CPTII,S$GLB,, | Performed by: PHYSICAL MEDICINE & REHABILITATION

## 2022-08-23 PROCEDURE — 1160F RVW MEDS BY RX/DR IN RCRD: CPT | Mod: CPTII,S$GLB,, | Performed by: PHYSICAL MEDICINE & REHABILITATION

## 2022-08-23 PROCEDURE — 3288F PR FALLS RISK ASSESSMENT DOCUMENTED: ICD-10-PCS | Mod: CPTII,S$GLB,, | Performed by: PHYSICAL MEDICINE & REHABILITATION

## 2022-08-23 PROCEDURE — 1101F PT FALLS ASSESS-DOCD LE1/YR: CPT | Mod: CPTII,S$GLB,, | Performed by: PHYSICAL MEDICINE & REHABILITATION

## 2022-08-23 PROCEDURE — 1125F AMNT PAIN NOTED PAIN PRSNT: CPT | Mod: CPTII,S$GLB,, | Performed by: PHYSICAL MEDICINE & REHABILITATION

## 2022-08-23 PROCEDURE — 99999 PR PBB SHADOW E&M-EST. PATIENT-LVL V: CPT | Mod: PBBFAC,,, | Performed by: PHYSICAL MEDICINE & REHABILITATION

## 2022-08-23 PROCEDURE — 3288F FALL RISK ASSESSMENT DOCD: CPT | Mod: CPTII,S$GLB,, | Performed by: PHYSICAL MEDICINE & REHABILITATION

## 2022-08-23 PROCEDURE — 99999 PR PBB SHADOW E&M-EST. PATIENT-LVL V: ICD-10-PCS | Mod: PBBFAC,,, | Performed by: PHYSICAL MEDICINE & REHABILITATION

## 2022-08-23 NOTE — PROGRESS NOTES
Pain Medicine      Chief Complaint:   Chief Complaint   Patient presents with    Back Pain       History of Present Illness: Erasmo Dunbar is a 87 y.o. male referred by Dr. Hemal Varma MD for chronic LBP.      Onset: years of back pain, but worsened in the past few months  Location: right sided lower back  Radiation: across lower back on the left side, and sometimes into the posterior thighs, but not below the knees.   Timing: Intermittent, only when he stands and walks and completely improves with sitting down  Quality: Throbbing, Deep and Sharp  Exacerbating Factors: standing and walking  Alleviating Factors: sitting, heat, ice, medications and rest  Associated Symptoms: He has numbness in his feet and legs from neuropathy. denies night fever/night sweats, urinary incontinence, bowel incontinence, significant weight loss and significant motor weakness     Severity: Currently: 8/10   Typical Range: 5-10/10     Exacerbation: 10/10     Hydrocodone 7.5/325 mg brings pain from a 10/10 to a 9/10 for maybe 3-4 hours. He denies any side effects.      He also notes bilateral knee pain. He has a history of right knee replacement in 2017. Knee pain worse with standing and walking. Braces help. Medications help, but not completely. He does feel swelling in the left knee at times. Knees will go out on him at times.     Shoulder Pain 10/25/21:  He also notes right shoulder pain that began 4-5 weeks ago.  He went saw Rheumatology and had a right shoulder injection which helped, but then he underwent a caudal epidural steroid injection with Dr. Mathew and afterwards his shoulder pain was worse again.  He states he was completely unconscious for the injection and things that potentially reaggravated the shoulder.  He localizes the shoulder pain to right subacromial/deltoid area and this radiates into the right biceps.  Pain seems to be worse with any overhead activity. He does have a history of right shoulder surgyer.        Interval History (08/23/2022):  Erasmo Dunbar returns today for follow up.  At the last clinic visit, performed cluneal nerve block    Performed right cluneal nerve block provided 0% relief.     Currently, the low back pain is okay. He is not having much pain today. He did have a bad weekend though. Pain still worse first in the morning and when he walks. He is limited in how far he can walk.  He has a walker, but using his cane today.    He takes his Norco  either as a whole tablet or half a tablet, but he does not take this every day.  He does much better on the days that he does take it.  He denies any side effects.    Current Pain Scales:  Current: 5/10              Typical Range: 5-10/10      Pain Disability Index  Family/Home Responsibilities:: 10  Recreation:: 10  Social Activity:: 10  Occupation:: 10  Sexual Behavior:: 10  Self Care:: 0  Life-Support Activities:: 0  Pain Disability Index (PDI): 50    Previous Interventions:  - 5/13/22: C7-T1 IL JACK w/ 100% relief for 2 days  - 3/12/21: L4-5 IL JACK w/ 0% relief  - 1/15/21: Bilateral L4-5, L5-S1 MBBs w/ 50% relief   - 12/18/20: Bilateral L4-5, L5-S1 MBBs w/ % relief for 2 hours  - 11/20/20: Bilateral GTB injection  - 10/30/20: Right genicular nerve block w/ 95% relief  - 10/12/20: Bilateral Cluneal nerve block with good relief for a few days.  - Dr. Varma, Dr. Jain both provided injections in the past, with limited benefit.     Previous Therapies:  PT/OT: yes   Relevant Surgery: yes    - Right knee replacement in 2017   - Cervical fusion 30 years ago  Previous Medications:   - NSAIDS: Tylenol. Avoiding other nsaids due to blood thinners.  - Muscle Relaxants:    - TCAs:   - SNRIs:   - Topicals: Many different topicals.   - Anticonvulsants:  Gabapentin was on this for a long time.   - Opioids: Hydrocodone    Current Pain Medications:  1. Norco  mg BID prn  2. Lyrica 100 mg BID  3. traZODone 50 MG tablet    Blood Thinners:  "Eliquis    Full Medication List:    Current Outpatient Medications:     blood sugar diagnostic (BLOOD GLUCOSE TEST) Strp, Use one Accu-Chek Felicia Plus Test Strip per glucometer to test blood glucose three times a day. Dx: E11.22, Disp: 600 strip, Rfl: 3    blood-glucose meter kit, Accu-Chek Felicia Plus Meter to test blood glucose three times a day. Dx: E11.22, Disp: 1 each, Rfl: 0    bumetanide (BUMEX) 1 MG tablet, Take 1 mg by mouth once daily., Disp: , Rfl:     ELIQUIS 5 mg Tab, TAKE 1 TABLET TWICE DAILY, Disp: 180 tablet, Rfl: 1    HYDROcodone-acetaminophen (NORCO)  mg per tablet, Take 1 tablet by mouth every 12 (twelve) hours as needed for Pain., Disp: 60 tablet, Rfl: 0    insulin (LANTUS SOLOSTAR U-100 INSULIN) glargine 100 units/mL (3mL) SubQ pen, INJECT 42 UNITS SUBCUTANEOUSLY EVERY EVENING, Disp: 45 mL, Rfl: 5    JANUVIA 100 mg Tab, TAKE 1 TABLET EVERY DAY (Patient taking differently: Take 100 mg by mouth once daily.), Disp: 90 tablet, Rfl: 5    lancets Misc, Use one Accu-Chek Softclix Lancet per lancing device to test blood glucose three times a day. Dx: E11.22, Disp: 600 each, Rfl: 3    levoFLOXacin (LEVAQUIN) 500 MG tablet, Take 500 mg by mouth once daily., Disp: , Rfl:     lisinopriL 10 MG tablet, TAKE 1 TABLET TWICE DAILY, Disp: 180 tablet, Rfl: 1    memantine (NAMENDA) 5 MG Tab, Take one nightly for 3 weeks, then one BID (Patient taking differently: Take 5 mg by mouth once daily. Take one nightly for 3 weeks, then one BID), Disp: 180 tablet, Rfl: 3    metFORMIN (GLUCOPHAGE) 1000 MG tablet, TAKE 1 TABLET (1,000 MG TOTAL) BY MOUTH 2 (TWO) TIMES DAILY WITH MEALS., Disp: 180 tablet, Rfl: 0    metoprolol tartrate (LOPRESSOR) 25 MG tablet, Take 25 mg by mouth 2 (two) times daily., Disp: , Rfl:     MITIGARE 0.6 mg Cap, Take 1 capsule by mouth once daily., Disp: , Rfl:     pen needle, diabetic 31 gauge x 3/16" Ndle, 1 Device by Misc.(Non-Drug; Combo Route) route 3 (three) times daily., " Disp: 100 each, Rfl: 11    pioglitazone (ACTOS) 15 MG tablet, TAKE 1 TABLET EVERY DAY, Disp: 90 tablet, Rfl: 1    pregabalin (LYRICA) 100 MG capsule, Take 1 capsule (100 mg total) by mouth 2 (two) times daily., Disp: 180 capsule, Rfl: 1    REPATHA SURECLICK 140 mg/mL PnIj, Every other week, Disp: , Rfl:     tiZANidine (ZANAFLEX) 4 MG tablet, Take 1 tablet (4 mg total) by mouth daily as needed., Disp: 30 tablet, Rfl: 5    traZODone (DESYREL) 50 MG tablet, TAKE 1 TABLET EVERY EVENING., Disp: 90 tablet, Rfl: 1    turmeric 400 mg Cap, Take 1,200 mg by mouth., Disp: , Rfl:     UNABLE TO FIND, 1,500 mg. medication name: CBD Oil, Disp: , Rfl:     vit C/E/zinc ox/ryan/lut/zeax (ICAPS AREDS2 ORAL), Take 1 capsule by mouth. 4 a day, Disp: , Rfl:   No current facility-administered medications for this visit.    Facility-Administered Medications Ordered in Other Visits:     0.9%  NaCl infusion, , Intravenous, Continuous, Maria Guadalupe Ortiz MD     Review of Systems:  Review of Systems   Constitutional: Negative for fever and weight loss.   HENT: Negative for ear pain and tinnitus.    Eyes: Negative for pain and redness.   Respiratory: Negative for cough and shortness of breath.    Cardiovascular: Negative for chest pain and palpitations.   Gastrointestinal: Positive for diarrhea. Negative for constipation and heartburn.   Genitourinary: Negative.         Denies urinary incontinence. Denies urine retention.    Musculoskeletal: Positive for back pain. Negative for neck pain.   Skin: Negative for itching and rash.   Neurological: Positive for tingling. Negative for focal weakness and seizures.   Endo/Heme/Allergies: Negative for environmental allergies. Bruises/bleeds easily.   Psychiatric/Behavioral: Negative for depression. The patient has insomnia. The patient is not nervous/anxious.        Allergies:  Iodinated contrast media and Iodine     Medical History:   has a past medical history of Arthritis, Atrial fibrillation,  Bladder mass, BPH (benign prostatic hyperplasia), CHF (congestive heart failure), Depression, Diabetes mellitus type II, Hyperlipidemia, Hypertension, Microscopic hematuria, Prostate cancer (10/01/2018), RLS (restless legs syndrome), Stroke, and Wears glasses.    Surgical History:   has a past surgical history that includes Transurethral resection of prostate; Prostate surgery; Rotator cuff repair (Right); Ganglion Cyst Removed  (Right); Neck Fusion (Bilateral); Colectomy (N/A); Cystoscopy (N/A); Back surgery; Cardiac surgery (Left); Skin biopsy; Total knee arthroplasty (03/30/2017); Cardiac pacemaker placement; back injections; Cystoscopy w/ retrogrades (Bilateral, 10/21/2019); Digital rectal examination under anesthesia (N/A, 10/21/2019); Cataract extraction w/  intraocular lens implant (Bilateral); Injection of anesthetic agent around nerve (Right, 10/30/2020); Injection of anesthetic agent around medial branch nerves innervating lumbar facet joint (Bilateral, 12/18/2020); Injection of anesthetic agent around medial branch nerves innervating lumbar facet joint (Bilateral, 1/15/2021); Epidural steroid injection into lumbar spine (N/A, 3/12/2021); Radiofrequency ablation of lumbar medial branch nerve at single level (Left, 1/21/2022); Radiofrequency thermocoagulation (Right, 3/4/2022); Myelography (N/A, 4/4/2022); and Epidural steroid injection into cervical spine (N/A, 5/13/2022).    Family History:  family history includes COPD in his daughter; Cancer in his daughter and mother; Diabetes in his brother, daughter, and sister; Heart disease in his brother and father; Seizures in his daughter.    Social History:   reports that he quit smoking about 41 years ago. His smoking use included cigarettes. He has a 52.50 pack-year smoking history. He has never used smokeless tobacco. He reports current alcohol use of about 8.0 standard drinks of alcohol per week. He reports that he does not use drugs.    Physical Exam:  BP  "(!) 146/62   Pulse 69   Resp 16   Ht 5' 6" (1.676 m)   Wt 91.1 kg (200 lb 13.4 oz)   SpO2 98%   BMI 32.42 kg/m²   GEN: No acute distress. Calm, comfortable  HENT: Normocephalic, atraumatic, moist mucous membranes  EYE: Anicteric sclera, non-injected.   CV: Non-diaphoretic.   RESP: Breathing comfortably. Chest expansion symmetric.  EXT: No clubbing, cyanosis.   SKIN: No visible rashes or lesions of exposed skin.   PSYCH: Pleasant mood and appropriate affect. Recent and remote memory intact.   Lumbar Spine Exam:       Inspection: No erythema, bruising.       Palpation: (+) TTP of lumbar and sacral paraspinals and right gluteal musculature on the right       ROM:  Limited in flexion, extension, lateral bending.       (+) Facet loading on right  Neurologic Exam:     Alert. Speech is fluent and appropriate.     Strength: 4/5 in right elbow flexion, right finger flexion, right ABD, otherwise 5/5 throughout bilateral upper & lower extremities     Sensation:  Grossly intact to light touch in bilateral upper & lower extremities     Reflexes: 1+ in b/l patella, achilles, biceps, brachioradialis, triceps     Tone: No abnormality appreciated in bilateral upper or lower extremities     (-) Salvador bilaterally      Imaging:  - Xray Shoulder 10/25/2021:  The bones are osteopenic.  There is mild widening of the acromioclavicular joint which can be seen the setting of type 1 AC joint separation.  Mild degenerative changes of the acromioclavicular joint are seen.  The humeral head is high-riding suggesting underlying rotator cuff pathology.  Small calcification measuring 5 mm adjacent to the greater tuberosity suggesting calcific tendinitis.  No fracture or dislocation is seen.  No evidence of lytic or blastic lesions. Leads from a pacer device are partially imaged.    - XRAY Bilateral Hips 02/02/2021:  The bones are osteopenic.  There are mild degenerative changes of the bilateral sacroiliac joints, hip joints and pubic " symphysis.  No fracture or dislocation.  Vascular calcifications.     - XRAY Bilateral Knee 01/29/2021:   Postoperative changes of a right total knee arthroplasty are seen in satisfactory alignment without hardware complication.  The left knee demonstrates medial, lateral and patellofemoral compartment degenerative change with joint space narrowing, subchondral sclerosis and marginal osteophyte formation, most probably involving the medial compartment of the knee.  There is also apparent chondrocalcinosis of the lateral compartment of the left knee.  No joint effusion.    - X-ray Lumbar Spine 5/13/20:  Moderate facet arthropathy throughout the lower lumbar spine greatest at L5/S1.  Diffuse osteopenia.  Mild spondylosis L3/4. Overall alignment is well maintained.  No evidence of spondylolysis or spondylolisthesis. Disk and vertebral body heights are normal.  Severe atherosclerotic disease.  Mild constipation.    - CT Lumbar spine 3/19/19:  The incidentally visualized soft tissue structures of the abdomen show calcifications of the aorta and its major branch vessels.  Vertebral body alignment and heights are maintained.  There is disc space narrowing at the L3-4 level.  No fracture or subluxation is identified.  At T12-L1, no disc herniation, central canal stenosis or neural foraminal narrowing.  At L1-2, no disc herniation, central canal stenosis or neural foraminal narrowing.  At L2-3, there is a broad-based posterior disc bulge contributing to mild central canal stenosis with mild bilateral neural foraminal narrowing.  At L3-4, there is a broad-based posterior disc bulge with ligamentum flavum hypertrophy contributing to mild central canal stenosis with moderate right and moderate severe left-sided neural foraminal narrowing.  At L4-5, there is a broad-based posterior disc bulge with facet arthropathy contributing to mild central canal stenosis with moderate bilateral neural foraminal narrowing.  At L5-S1, there is  a broad-based posterior disc bulge and facet arthropathy contributing to mild central canal stenosis with moderate left and moderate severe right-sided neural foraminal narrowing.  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  Please see above report for level by level description.      Labs:  BMP  Lab Results   Component Value Date     02/03/2022    K 4.5 02/03/2022    CL 97 02/03/2022    CO2 30 (H) 02/03/2022    BUN 20 02/03/2022    CREATININE 0.9 02/03/2022    CALCIUM 9.3 02/03/2022    ANIONGAP 9 02/03/2022    ESTGFRAFRICA >60 02/03/2022    EGFRNONAA >60 02/03/2022     Lab Results   Component Value Date    ALT 19 02/03/2022    AST 15 02/03/2022    ALKPHOS 67 02/03/2022    BILITOT 0.3 02/03/2022     Lab Results   Component Value Date     01/27/2022       Assessment:  Erasmo Dunbar is a 87 y.o. male with the following diagnoses based on history, exam, and imaging:    Problem List Items Addressed This Visit        Neuro    Chronic pain    Lumbar spondylosis    Spinal stenosis of lumbar region with neurogenic claudication      Other Visit Diagnoses     Chronic right-sided low back pain without sciatica    -  Primary    Chronic prescription opiate use              This is a pleasant 87 y.o. gentleman presenting with:     - Chronic bilateral low back pain: His pain appears multifactorial with facetogenic pain and LSS w/ neurogenic claudication. He had % and then 50% improvement in pain after MBBs, and left L3-5 MB RFA w/ 60% relief. He had no relief from right L3-5 MB RFA. He does have multilevel canal and foraminal stenosis.    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   - Chronic bilateral shoulder pain: AC joint separation prior. Calcific tendonitis and prior RTC tears. Complicated by his cervical pathology as well with severe stenosis bilaterally at C5-6.    - Right shoulder pain  exacerbated by acute fall on 6/2. No significant bruising or erythema.   - Chronic neck pain: more shoulder pain than neck pain.  - Bilateral trochanteric bursitis  - Chronic right knee pain after total knee replacement  - Chronic left knee pain:  Pseudogout on imaging, has been referred to Rheumatology, had CSI with good relief, and also colchicine which is helping.   - Chronic opioid use: I do think this is appropriate considering his age and limited medication options due to comorbidities.   - Peripheral neuropathy 2/2 DM2.   - Comorbidities: Chronic anticoagulation on eliquis for paroxysmal A-fib, pacemaker    Treatment Plan:   - PT/OT/HEP:  Cont HEP    - Long discussion about utilizing walker with seat to help with mobility.   - Procedures: None at this time  - Medications:    - Cont hydrocodone-APAP 10/325 mg q 12 hrs prn pain. He breaks these in 1/2 at times if his pain is not too severe.     - I recommended he consider taking this more regularly if it always him to increase his function without worsening side effects and that we can fill it monthly as prescribed.    - Counseled patient regarding the risks and benefits of chronic opioid therapy for chronic painful conditions and patient expresses understanding.  I discussed with patient that he is able to limit the amount filled of the prescription if he decides to.   -  reviewed with no red flags.    - Opioid contract signed 10/12/2020     - Cont lyrica 100 mg BID for sleep and neuropathic pain. Caution with elevation due to baseline balance issues.     - Cont tizanidine 4 mg   - Imaging: Reviewed.   - Labs: Reviewed.      Follow Up: RTC in 3 months or sooner PRN    I spent greater than 30 minutes in total in todays visit including face-to-face time with the patient, and time reviewing records/imaging/labs, and documenting.       Maria Guadalupe Ortiz M.D.  Interventional Pain Medicine / Physical Medicine & Rehabilitation    Disclaimer: This note was partly  generated using dictation software which may occasionally result in transcription errors.

## 2022-08-26 DIAGNOSIS — G89.29 OTHER CHRONIC PAIN: ICD-10-CM

## 2022-08-26 DIAGNOSIS — E11.42 DIABETIC POLYNEUROPATHY ASSOCIATED WITH TYPE 2 DIABETES MELLITUS: ICD-10-CM

## 2022-08-26 DIAGNOSIS — F32.A DEPRESSION, UNSPECIFIED DEPRESSION TYPE: ICD-10-CM

## 2022-08-26 RX ORDER — DULOXETIN HYDROCHLORIDE 30 MG/1
CAPSULE, DELAYED RELEASE ORAL
Qty: 90 CAPSULE | Refills: 1 | OUTPATIENT
Start: 2022-08-26

## 2022-08-26 NOTE — TELEPHONE ENCOUNTER
Pharmacy Call Documentation    Pharmacy Called:  [facility] Call Time: 15:42   Spoke with: Catalino 08/26/2022       Called to verify that [facility] is aware that the duloxetine has been discontinued. Catalino asked why, replied that I believed patient's pain medicine doctor had chosen an alternate therapy.    Request will be: refused     Additional actions required: no      Current Medication Requested:   Requested Prescriptions     Pending Prescriptions Disp Refills    DULoxetine (CYMBALTA) 30 MG capsule [Pharmacy Med Name: DULOXETINE HCL 30 MG Capsule Delayed Release Particles] 90 capsule 1     Sig: TAKE 1 CAPSULE EVERY DAY        Note composed:3:39 PM 08/26/2022      Refill Decision Note   Erasmo Bettina  is requesting a refill authorization.  Brief Assessment and Rationale for Refill:  Quick Discontinue     Medication Therapy Plan:       Medication Reconciliation Completed: Yes   Comments:     No Care Gaps recommended.     Note composed:3:43 PM 08/26/2022

## 2022-08-26 NOTE — TELEPHONE ENCOUNTER
Care Due:                  Date            Visit Type   Department     Provider  --------------------------------------------------------------------------------                                EP -                              PRIMARY      [facility] FAMILY  Last Visit: 02-      CARE ([facility])   MEDICINE       Hemal Varma  Next Visit: None Scheduled  None         None Found                                                            Last  Test          Frequency    Reason                     Performed    Due Date  --------------------------------------------------------------------------------    HBA1C.......  6 months...  JANUVIA, insulin,          02- 08-                             metFORMIN, pioglitazone..    [facility] Embedded Care Gaps. Reference number: [id number]. 8/26/2022   2:05:20 PM CDT

## 2022-09-09 DIAGNOSIS — Z96.651 CHRONIC KNEE PAIN AFTER TOTAL REPLACEMENT OF RIGHT KNEE JOINT: ICD-10-CM

## 2022-09-09 DIAGNOSIS — M47.816 LUMBAR SPONDYLOSIS: ICD-10-CM

## 2022-09-09 DIAGNOSIS — M48.062 SPINAL STENOSIS OF LUMBAR REGION WITH NEUROGENIC CLAUDICATION: ICD-10-CM

## 2022-09-09 DIAGNOSIS — G95.9 CERVICAL MYELOPATHY WITH CERVICAL RADICULOPATHY: ICD-10-CM

## 2022-09-09 DIAGNOSIS — M54.12 CERVICAL MYELOPATHY WITH CERVICAL RADICULOPATHY: ICD-10-CM

## 2022-09-09 DIAGNOSIS — G89.29 CHRONIC KNEE PAIN AFTER TOTAL REPLACEMENT OF RIGHT KNEE JOINT: ICD-10-CM

## 2022-09-09 DIAGNOSIS — M25.561 CHRONIC KNEE PAIN AFTER TOTAL REPLACEMENT OF RIGHT KNEE JOINT: ICD-10-CM

## 2022-09-09 RX ORDER — HYDROCODONE BITARTRATE AND ACETAMINOPHEN 10; 325 MG/1; MG/1
1 TABLET ORAL EVERY 12 HOURS PRN
Qty: 60 TABLET | Refills: 0 | Status: SHIPPED | OUTPATIENT
Start: 2022-09-09 | End: 2022-10-05 | Stop reason: SDUPTHER

## 2022-09-09 NOTE — TELEPHONE ENCOUNTER
----- Message from Zuleima Elizabeth sent at 9/9/2022  8:57 AM CDT -----  Type:  Patient  Call    Who Called:Carol     Would the patient rather a call back or a response via [facility]? Call back   Best Call Back Number:[phone]  Additional Information: pt had a fall yesterday late evening... pt hit his head and back and the pain in his back has increased

## 2022-09-09 NOTE — TELEPHONE ENCOUNTER
Spoke with patient. States that he did have a fall yesterday evening. States he is not in pain as of now, while sitting. Advised patient if pain returns/worsens to report to ER if not tolerable. Advised patient to contact office on Tuesday, if he feels he needs to be seen, since he declined same day appointment. Voiced understanding.     Patient requesting refill of Norco. Pended. LOC 08/23/2022. Please advise.

## 2022-09-15 ENCOUNTER — TELEPHONE (OUTPATIENT)
Dept: PAIN MEDICINE | Facility: CLINIC | Age: 87
End: 2022-09-15
Payer: MEDICARE

## 2022-09-15 DIAGNOSIS — M54.16 LUMBAR RADICULOPATHY: Primary | ICD-10-CM

## 2022-09-15 NOTE — TELEPHONE ENCOUNTER
----- Message from Maria Guadalupe Ortiz MD sent at 9/15/2022  1:47 PM CDT -----  Contact: Carol / spouse  Please offer to schedule for Caudal JACK with 2-3 week f/u after. Will need to get approval to hold eliquis 3 days for procedure.    Thanks,  KP  ----- Message -----  From: Melisa Mckay LPN  Sent: 9/15/2022   1:01 PM CDT  To: Maria Guadalupe Ortiz MD    Nothing in note about possible injections?    ----- Message -----  From: Mohini Dunbar MA  Sent: 9/15/2022  12:58 PM CDT  To: [facility]erm[facility] Staff Pain Mgmt    Erasmo J Bettina  MRN: [id number]  : 1935  PCP: Hemal Varma  Home Phone      [phone]  Work Phone      Not on file.  [employer]          [phone]      MESSAGE:  Would like to make appt for tomorrow to get injections in back.    Phone:  [phone]

## 2022-09-20 NOTE — TELEPHONE ENCOUNTER
Caudal JACK  scheduled 09/30/2022. Patient will need clearance to hold Eliquis x 3 days prior to procedure. Clearance request sent to [facility]. Patient has had Covid vaccinations. Advised that pre admit would contact patient with time of procedure. Advised patient to contact office if he starts any type of antibiotics or new blood thinners. Voiced understanding.

## 2022-09-30 ENCOUNTER — HOSPITAL ENCOUNTER (OUTPATIENT)
Dept: RADIOLOGY | Facility: HOSPITAL | Age: 87
Discharge: HOME | End: 2022-09-30
Attending: PHYSICAL MEDICINE & REHABILITATION
Payer: MEDICARE

## 2022-09-30 ENCOUNTER — HOSPITAL ENCOUNTER (OUTPATIENT)
Facility: HOSPITAL | Age: 87
Discharge: HOME | End: 2022-09-30
Attending: PHYSICAL MEDICINE & REHABILITATION | Admitting: PHYSICAL MEDICINE & REHABILITATION
Payer: MEDICARE

## 2022-09-30 VITALS
SYSTOLIC BLOOD PRESSURE: 164 MMHG | RESPIRATION RATE: 16 BRPM | OXYGEN SATURATION: 96 % | TEMPERATURE: 97 F | HEART RATE: 60 BPM | DIASTOLIC BLOOD PRESSURE: 76 MMHG

## 2022-09-30 DIAGNOSIS — M48.062 SPINAL STENOSIS OF LUMBAR REGION WITH NEUROGENIC CLAUDICATION: Primary | ICD-10-CM

## 2022-09-30 DIAGNOSIS — M54.16 LUMBAR RADICULOPATHY: ICD-10-CM

## 2022-09-30 DIAGNOSIS — M47.816 LUMBAR SPONDYLOSIS: ICD-10-CM

## 2022-09-30 DIAGNOSIS — G89.29 CHRONIC PAIN: ICD-10-CM

## 2022-09-30 LAB — POCT GLUCOSE: 102 MG/DL (ref 70–110)

## 2022-09-30 PROCEDURE — 82962 GLUCOSE BLOOD TEST: CPT | Performed by: PHYSICAL MEDICINE & REHABILITATION

## 2022-09-30 PROCEDURE — 62323 NJX INTERLAMINAR LMBR/SAC: CPT | Performed by: PHYSICAL MEDICINE & REHABILITATION

## 2022-09-30 PROCEDURE — 63600175 PHARM REV CODE 636 W HCPCS: Performed by: PHYSICAL MEDICINE & REHABILITATION

## 2022-09-30 PROCEDURE — 62323 NJX INTERLAMINAR LMBR/SAC: CPT | Mod: ,,, | Performed by: PHYSICAL MEDICINE & REHABILITATION

## 2022-09-30 PROCEDURE — 25000003 PHARM REV CODE 250: Performed by: PHYSICAL MEDICINE & REHABILITATION

## 2022-09-30 PROCEDURE — 62323 PR INJ LUMBAR/SACRAL, W/IMAGING GUIDANCE: ICD-10-PCS | Mod: ,,, | Performed by: PHYSICAL MEDICINE & REHABILITATION

## 2022-09-30 RX ORDER — LIDOCAINE HYDROCHLORIDE 10 MG/ML
INJECTION, SOLUTION EPIDURAL; INFILTRATION; INTRACAUDAL; PERINEURAL
Status: DISCONTINUED | OUTPATIENT
Start: 2022-09-30 | End: 2022-09-30 | Stop reason: HOSPADM

## 2022-09-30 RX ORDER — LIDOCAINE HYDROCHLORIDE 10 MG/ML
INJECTION, SOLUTION EPIDURAL; INFILTRATION; INTRACAUDAL; PERINEURAL
Status: DISCONTINUED
Start: 2022-09-30 | End: 2022-09-30 | Stop reason: HOSPADM

## 2022-09-30 RX ORDER — DIPHENHYDRAMINE HYDROCHLORIDE 50 MG/ML
50 INJECTION INTRAMUSCULAR; INTRAVENOUS ONCE
Status: COMPLETED | OUTPATIENT
Start: 2022-09-30 | End: 2022-09-30

## 2022-09-30 RX ORDER — LIDOCAINE HYDROCHLORIDE 10 MG/ML
INJECTION INFILTRATION; PERINEURAL
Status: DISCONTINUED | OUTPATIENT
Start: 2022-09-30 | End: 2022-09-30 | Stop reason: HOSPADM

## 2022-09-30 RX ORDER — DEXAMETHASONE SODIUM PHOSPHATE 10 MG/ML
INJECTION INTRAMUSCULAR; INTRAVENOUS
Status: DISCONTINUED
Start: 2022-09-30 | End: 2022-09-30 | Stop reason: HOSPADM

## 2022-09-30 RX ORDER — DIPHENHYDRAMINE HYDROCHLORIDE 50 MG/ML
INJECTION INTRAMUSCULAR; INTRAVENOUS
Status: DISCONTINUED
Start: 2022-09-30 | End: 2022-09-30 | Stop reason: HOSPADM

## 2022-09-30 RX ADMIN — DIPHENHYDRAMINE HYDROCHLORIDE 50 MG: 50 INJECTION INTRAMUSCULAR; INTRAVENOUS at 08:09

## 2022-09-30 NOTE — DISCHARGE SUMMARY
[facility]  Discharge Note  Short Stay     Admit Date: 9/30/2022    Discharge Date: 9/30/2022     Attending Physician: Maria Guadalupe Ortiz M.D.    Diagnoses:  Active Hospital Problems    Diagnosis  POA    *Spinal stenosis of lumbar region with neurogenic claudication [M48.062]  Yes      Resolved Hospital Problems   No resolved problems to display.     Discharged Condition: Good     Hospital Course: Patient was admitted for an outpatient interventional pain management procedure and tolerated the procedure well with no complications.     Final Diagnoses: Same as principal problem.     Disposition: Home or Self Care     Follow up/Patient Instructions:   Follow-up in 1-2 weeks unless otherwise instructed. May return sooner as needed.       Reconciled Medications:     Medication List        CHANGE how you take these medications      JANUVIA 100 MG Tab  Generic drug: SITagliptin  TAKE 1 TABLET EVERY DAY  What changed: how much to take     memantine 5 MG Tab  Commonly known as: NAMENDA  Take one nightly for 3 weeks, then one BID  What changed:   how much to take  how to take this  when to take this            CONTINUE taking these medications      blood sugar diagnostic Strp  Commonly known as: BLOOD GLUCOSE TEST  Use one Accu-Chek Felicia Plus Test Strip per glucometer to test blood glucose three times a day. Dx: E11.22     blood-glucose meter kit  Accu-Chek Felicia Plus Meter to test blood glucose three times a day. Dx: E11.22     bumetanide 1 MG tablet  Commonly known as: BUMEX  Take 1 mg by mouth once daily.     ELIQUIS 5 mg Tab  Generic drug: apixaban  TAKE 1 TABLET TWICE DAILY     HYDROcodone-acetaminophen  mg per tablet  Commonly known as: NORCO  Take 1 tablet by mouth every 12 (twelve) hours as needed for Pain.     ICAPS AREDS2 ORAL  Take 1 capsule by mouth. 4 a day     lancets Misc  Use one Accu-Chek Softclix Lancet per lancing device to test blood glucose three times a day. Dx: E11.22     LANTUS SOLOSTAR  "U-100 INSULIN glargine 100 units/mL SubQ pen  Generic drug: insulin  INJECT 42 UNITS SUBCUTANEOUSLY EVERY EVENING     levoFLOXacin 500 MG tablet  Commonly known as: LEVAQUIN  Take 500 mg by mouth once daily.     lisinopriL 10 MG tablet  TAKE 1 TABLET TWICE DAILY     metFORMIN 1000 MG tablet  Commonly known as: GLUCOPHAGE  TAKE 1 TABLET (1,000 MG TOTAL) BY MOUTH 2 (TWO) TIMES DAILY WITH MEALS.     metoprolol tartrate 25 MG tablet  Commonly known as: LOPRESSOR  Take 25 mg by mouth 2 (two) times daily.     MITIGARE 0.6 mg Cap  Generic drug: colchicine  Take 1 capsule by mouth once daily.     pen needle, diabetic 31 gauge x 3/16" Ndle  1 Device by Misc.(Non-Drug; Combo Route) route 3 (three) times daily.     pioglitazone 15 MG tablet  Commonly known as: ACTOS  TAKE 1 TABLET EVERY DAY     pregabalin 100 MG capsule  Commonly known as: LYRICA  Take 1 capsule (100 mg total) by mouth 2 (two) times daily.     REPATHA SURECLICK 140 mg/mL Pnij  Generic drug: evolocumab  Every other week     tiZANidine 4 MG tablet  Commonly known as: ZANAFLEX  Take 1 tablet (4 mg total) by mouth daily as needed.     traZODone 50 MG tablet  Commonly known as: DESYREL  TAKE 1 TABLET EVERY EVENING.     turmeric 400 mg Cap  Take 1,200 mg by mouth.     UNABLE TO FIND  1,500 mg. medication name: CBD Oil             Discharge Procedure Orders (must include Diet, Follow-up, Activity)   Ice to affected area   Order Comments: 20 minutes of ice or until area numb to the touch if area is sore 2-3 times per day as needed     No driving until:   Order Comments: Until following day     No dressing needed     Notify your health care provider if you experience any of the following:  temperature >100.4     Notify your health care provider if you experience any of the following:  persistent nausea and vomiting or diarrhea     Notify your health care provider if you experience any of the following:  severe uncontrolled pain     Notify your health care provider if " you experience any of the following:  redness, tenderness, or signs of infection (pain, swelling, redness, odor or green/yellow discharge around incision site)     Notify your health care provider if you experience any of the following:  difficulty breathing or increased cough     Notify your health care provider if you experience any of the following:  severe persistent headache     Notify your health care provider if you experience any of the following:  worsening rash     Notify your health care provider if you experience any of the following:  persistent dizziness, light-headedness, or visual disturbances     Notify your health care provider if you experience any of the following:  increased confusion or weakness     Shower on day dressing removed (No bath)       Maria Guadalupe Ortiz M.D.  Interventional Pain Medicine / Physical Medicine & Rehabilitation

## 2022-09-30 NOTE — H&P
HPI  Patient presenting for Procedure(s) (LRB):  CAUDAL EPIDURAL STEROID INJECTION (N/A)     Patient on Anti-coagulation Yes Held eliquis x 3 days.     No health changes since previous encounter. No changes in pain since procedure was scheduled at previous visit. Denies any fevers or infections.     He has listed allergy to iodinated contrast, but states this was > 40 years ago and it was a mild rash.     Current Facility-Administered Medications on File Prior to Encounter   Medication Dose Route Frequency Provider Last Rate Last Admin    0.9%  NaCl infusion   Intravenous Continuous Maria Guadalupe Ortiz MD         Current Outpatient Medications on File Prior to Encounter   Medication Sig Dispense Refill    bumetanide (BUMEX) 1 MG tablet Take 1 mg by mouth once daily.      ELIQUIS 5 mg Tab TAKE 1 TABLET TWICE DAILY 180 tablet 1    HYDROcodone-acetaminophen (NORCO)  mg per tablet Take 1 tablet by mouth every 12 (twelve) hours as needed for Pain. 60 tablet 0    lisinopriL 10 MG tablet TAKE 1 TABLET TWICE DAILY 180 tablet 1    memantine (NAMENDA) 5 MG Tab Take one nightly for 3 weeks, then one BID (Patient taking differently: Take 5 mg by mouth once daily. Take one nightly for 3 weeks, then one BID) 180 tablet 3    metFORMIN (GLUCOPHAGE) 1000 MG tablet TAKE 1 TABLET (1,000 MG TOTAL) BY MOUTH 2 (TWO) TIMES DAILY WITH MEALS. 180 tablet 0    metoprolol tartrate (LOPRESSOR) 25 MG tablet Take 25 mg by mouth 2 (two) times daily.      MITIGARE 0.6 mg Cap Take 1 capsule by mouth once daily.      pioglitazone (ACTOS) 15 MG tablet TAKE 1 TABLET EVERY DAY 90 tablet 1    pregabalin (LYRICA) 100 MG capsule Take 1 capsule (100 mg total) by mouth 2 (two) times daily. 180 capsule 1    tiZANidine (ZANAFLEX) 4 MG tablet Take 1 tablet (4 mg total) by mouth daily as needed. 30 tablet 5    traZODone (DESYREL) 50 MG tablet TAKE 1 TABLET EVERY EVENING. 90 tablet 1    turmeric 400 mg Cap Take 1,200 mg by mouth.      UNABLE TO FIND 1,500 mg.  "medication name: CBD Oil      vit C/E/zinc ox/ryan/lut/zeax (ICAPS AREDS2 ORAL) Take 1 capsule by mouth. 4 a day      blood sugar diagnostic (BLOOD GLUCOSE TEST) Strp Use one Accu-Chek Felicia Plus Test Strip per glucometer to test blood glucose three times a day. Dx: E11.22 600 strip 3    blood-glucose meter kit Accu-Chek Felicia Plus Meter to test blood glucose three times a day. Dx: E11.22 1 each 0    insulin (LANTUS SOLOSTAR U-100 INSULIN) glargine 100 units/mL (3mL) SubQ pen INJECT 42 UNITS SUBCUTANEOUSLY EVERY EVENING 45 mL 5    JANUVIA 100 mg Tab TAKE 1 TABLET EVERY DAY (Patient taking differently: Take 100 mg by mouth once daily.) 90 tablet 5    lancets Misc Use one Accu-Chek Softclix Lancet per lancing device to test blood glucose three times a day. Dx: E11.22 600 each 3    levoFLOXacin (LEVAQUIN) 500 MG tablet Take 500 mg by mouth once daily.      pen needle, diabetic 31 gauge x 3/16" Ndle 1 Device by Misc.(Non-Drug; Combo Route) route 3 (three) times daily. 100 each 11    REPATHA SURECLICK 140 mg/mL PnIj Every other week       Past Medical History:   Diagnosis Date    Arthritis     Atrial fibrillation     Bladder mass     BPH (benign prostatic hyperplasia)     CHF (congestive heart failure)     Depression     Diabetes mellitus type II     Hyperlipidemia     Hypertension     Microscopic hematuria     Prostate cancer 10/01/2018    RLS (restless legs syndrome)     Stroke     TIA    Wears glasses      Past Surgical History:   Procedure Laterality Date    back injections      BACK SURGERY      cervical fusion    CARDIAC PACEMAKER PLACEMENT      CARDIAC SURGERY Left     pacemaker placement    CATARACT EXTRACTION W/  INTRAOCULAR LENS IMPLANT Bilateral     cataracts    COLECTOMY N/A     CYSTOSCOPY N/A     Pt stated, "I have had six Cystoscopy."    CYSTOSCOPY W/ RETROGRADES Bilateral 10/21/2019    Procedure: CYSTOSCOPY WITH RETROGRADE PYELOGRAM;  Surgeon: Elliott Coon MD;  Location: [facility] OR;  Service: Urology;  " Laterality: Bilateral;  OP 6    DIGITAL RECTAL EXAMINATION UNDER ANESTHESIA N/A 10/21/2019    Procedure: EXAM UNDER ANESTHESIA, DIGITAL, RECTUM;  Surgeon: Elliott Coon MD;  Location: [facility];  Service: Urology;  Laterality: N/A;    EPIDURAL STEROID INJECTION INTO CERVICAL SPINE N/A 5/13/2022    Procedure: CERVICAL EPIDURAL STEROID INJECTION (C7-T1 INTERLAMINAR);  Surgeon: Maria Guadalupe Ortiz MD;  Location: [facility] OR;  Service: Pain Management;  Laterality: N/A;    EPIDURAL STEROID INJECTION INTO LUMBAR SPINE N/A 3/12/2021    Procedure: LUMBAR EPIDURAL STEROID INJECTION (L4-5 INTERLAMINAR);  Surgeon: Maria Guadalupe Ortiz MD;  Location: [facility] OR;  Service: Pain Management;  Laterality: N/A;    Ganglion Cyst Removed  Right     INJECTION OF ANESTHETIC AGENT AROUND MEDIAL BRANCH NERVES INNERVATING LUMBAR FACET JOINT Bilateral 12/18/2020    Procedure: LUMBAR FACET JOINT BLOCK (L4-5,L5-S1);  Surgeon: Maria Guadalupe Ortiz MD;  Location: [facility];  Service: Pain Management;  Laterality: Bilateral;    INJECTION OF ANESTHETIC AGENT AROUND MEDIAL BRANCH NERVES INNERVATING LUMBAR FACET JOINT Bilateral 1/15/2021    Procedure: LUMBAR FACET JOINT BLOCK (L4-5,L5-S1);  Surgeon: Maria Guadalupe Ortiz MD;  Location: [facility];  Service: Pain Management;  Laterality: Bilateral;    INJECTION OF ANESTHETIC AGENT AROUND NERVE Right 10/30/2020    Procedure: SUPERIOR LATERAL AND MEDIAL AND INFERIOR MEDIAL GENICULAR NERVE BLOCK;  Surgeon: Maria Guadalupe Ortiz MD;  Location: [facility];  Service: Pain Management;  Laterality: Right;    MYELOGRAPHY N/A 4/4/2022    Procedure: Myelogram;  Surgeon: Rufino Ramos MD;  Location: [address];  Service: Radiology;  Laterality: N/A;    Neck Fusion Bilateral     PROSTATE SURGERY      RADIOFREQUENCY ABLATION OF LUMBAR MEDIAL BRANCH NERVE AT SINGLE LEVEL Left 1/21/2022    Procedure: RADIOFREQUENCY ABLATION (L4-5,L5-S1);  Surgeon: Maria Guadalupe Ortiz MD;  Location: [facility];  Service: Pain Management;  Laterality: Left;     RADIOFREQUENCY THERMOCOAGULATION Right 3/4/2022    Procedure: RADIOFREQUENCY THERMAL COAGULATION (L4-5,L5-S1);  Surgeon: Maria Guadalupe Ortiz MD;  Location: [facility] OR;  Service: Pain Management;  Laterality: Right;    ROTATOR CUFF REPAIR Right     SKIN BIOPSY      skin cancer    TOTAL KNEE ARTHROPLASTY  03/30/2017    right knee    TRANSURETHRAL RESECTION OF PROSTATE       Review of patient's allergies indicates:   Allergen Reactions    Iodinated contrast media     Iodine Hives     Iv iodine only      Current Facility-Administered Medications   Medication    diphenhydrAMINE (BENADRYL) 50 mg/mL injection     Facility-Administered Medications Ordered in Other Encounters   Medication    0.9%  NaCl infusion       PMHx, PSHx, Allergies, Medications reviewed in epic    ROS negative except pain complaints in HPI    OBJECTIVE:    BP (!) 152/72   Pulse 81   Resp 18   SpO2 97%     PHYSICAL EXAMINATION:    GENERAL: Well appearing, in no acute distress, alert and oriented.  PSYCH:  Mood and affect appropriate.  SKIN: Skin color, texture, turgor normal in procedure area, no rashes or lesions which will impact the procedure.  CV: RRR with palpation of the radial artery.  PULM: No evidence of respiratory difficulty, symmetric chest rise. Clear to auscultation.  NEURO: Alert. Oriented. Speech fluent and appropriate. Moving all extremities.    Plan:    Proceed with procedure as planned Procedure(s) (LRB):  CAUDAL EPIDURAL STEROID INJECTION (N/A)    Risks of iodinated contrast vs gadolinium contrast discussed. Will proceed with iodinated contrast and pretreat with benadryl.     Maria Guadalupe Ortiz  09/30/2022

## 2022-09-30 NOTE — DISCHARGE INSTRUCTIONS
DIET: You may resume your normal diet today.    BATHING: You may resume your normal bathing.          You may shower, no hot water directly on site for 24 hours.    DRESSING: You may remove your bandage today.    ACTIVITY LEVEL: You may resume your normal activities 24 hours after your  procedure.    If you have received sedation or an anesthetic, you may feel sleepy for several hours. Rest until you are more awake. Gradually resume your normal activities tomorrow.    If you have received sedation or an anesthetic, do not drive or operate heavy machinery for at least 24 hours.    MEDICATION: You may resume your normal medications today.    You will receive instructions for any pain prescriptions. Pain medications should be taken only as directed.    SPECIAL INSTRUCTIONS: No heat to the injection site for 24 hours including: bath or shower, heating pad, moist heat, hot tubs.    Use ice pack to injection site for any pain or discomfort. Apply ice pack to 20 minutes then remove for 20 minutes before re-applying to site.    WHEN TO CALL DOCTOR: Redness or swelling around injection site    Fever of 101F    Drainage (pus) from the injection site    For any continuous bleeding (some dried blood over the incision is normal).    FOLLOW UP: Follow up phone call will be made by office.    FOR EMERGENCIES: If any unusual problems or difficulties occur during clinic hours, call [phone] or [phone].

## 2022-09-30 NOTE — OP NOTE
Caudal Epidural Steroid Injection Under Fluoroscopic Guidance:  I have reviewed the patient's medications, allergies and relevant histories prior to the procedure and no contraindications have been identified. The risks, benefits and alternatives to the procedure were discussed with the patient, and all questions regarding the procedure were answered to the patient's satisfaction. I personally obtained Erasmo's consent prior to the start of the procedure and the signed consent can be found in the patient's chart.                                                         Time-out was taken to identify patient, procedure, laterality, and allergies prior to starting the procedure.       Date of Service: 09/30/2022  Procedure: Caudal epidural steroid injection under fluoroscopy with insertion of flexible catheter  Pre-Operative Diagnosis: Lumbar Spinal Stenosis with Neurogenic Claudication  Post-Operative Diagnosis: Lumbar Spinal Stenosis with Neurogenic Claudication    Physician: Maria Guadalupe Ortiz M.D.  Assistants: None    Medications Injected: Preservative-free dexamethasone 10mg/mL, and preservative free Lidocaine 1% MPF 5mL.  Local Anesthetic: Xylocaine 1% 10 mL.   Sedation Medications: Benadryl 50 mg IV.     Procedural Technique: Laying in the prone position, the patient was prepped and draped in the usual sterile fashion using ChloraPrep and fenestrated drape.  Appropriate anatomic landmarks were determined including the superior LS-spine and sacral hiatus.  Local anesthetic was given by raising a wheel and going down to the periosteum.  A 3.5in 16-gauge spinal needle was introduced thru the sacral hiatus.  The flexible catheter threaded through to the desired levels. Omnipaque was injected after negative aspiration to confirm placement in the appropriate area and that there was no vascular runoff.  The medication was then injected slowly.  Needle was removed and bandage applied.     Estimated Blood Loss:   None.  Complications:  None.     Disposition: The patient tolerated the procedure well, and there were no apparent complications. Vital signs remained stable throughout the procedure. The patient was taken to the recovery area and monitored after the procedure. The patient was given written discharge instructions for the procedure. If helpful, we can repeat as needed. The patient was discharged in a stable condition.    Follow-Up: We will see the patient back in 1-2 weeks or the patient may call to inform of status.

## 2022-10-03 ENCOUNTER — PATIENT MESSAGE (OUTPATIENT)
Dept: PAIN MEDICINE | Facility: CLINIC | Age: 87
End: 2022-10-03
Payer: MEDICARE

## 2022-10-03 DIAGNOSIS — M48.062 SPINAL STENOSIS OF LUMBAR REGION WITH NEUROGENIC CLAUDICATION: Primary | ICD-10-CM

## 2022-10-03 DIAGNOSIS — M47.816 LUMBAR SPONDYLOSIS: ICD-10-CM

## 2022-10-04 ENCOUNTER — PATIENT MESSAGE (OUTPATIENT)
Dept: PAIN MEDICINE | Facility: CLINIC | Age: 87
End: 2022-10-04
Payer: MEDICARE

## 2022-10-04 DIAGNOSIS — M48.062 SPINAL STENOSIS OF LUMBAR REGION WITH NEUROGENIC CLAUDICATION: ICD-10-CM

## 2022-10-04 DIAGNOSIS — Z96.651 CHRONIC KNEE PAIN AFTER TOTAL REPLACEMENT OF RIGHT KNEE JOINT: ICD-10-CM

## 2022-10-04 DIAGNOSIS — G89.29 CHRONIC KNEE PAIN AFTER TOTAL REPLACEMENT OF RIGHT KNEE JOINT: ICD-10-CM

## 2022-10-04 DIAGNOSIS — M47.816 LUMBAR SPONDYLOSIS: ICD-10-CM

## 2022-10-04 DIAGNOSIS — G95.9 CERVICAL MYELOPATHY WITH CERVICAL RADICULOPATHY: ICD-10-CM

## 2022-10-04 DIAGNOSIS — M25.561 CHRONIC KNEE PAIN AFTER TOTAL REPLACEMENT OF RIGHT KNEE JOINT: ICD-10-CM

## 2022-10-04 DIAGNOSIS — M54.12 CERVICAL MYELOPATHY WITH CERVICAL RADICULOPATHY: ICD-10-CM

## 2022-10-05 RX ORDER — HYDROCODONE BITARTRATE AND ACETAMINOPHEN 10; 325 MG/1; MG/1
1 TABLET ORAL EVERY 12 HOURS PRN
Qty: 60 TABLET | Refills: 0 | Status: SHIPPED | OUTPATIENT
Start: 2022-10-11 | End: 2022-11-08 | Stop reason: SDUPTHER

## 2022-10-24 ENCOUNTER — PATIENT MESSAGE (OUTPATIENT)
Dept: FAMILY MEDICINE | Facility: CLINIC | Age: 87
End: 2022-10-24
Payer: MEDICARE

## 2022-10-25 NOTE — TELEPHONE ENCOUNTER
I certainly have no problem with that   Its approved. Not sure how to get in touch with them. Can someone just call [employer] on aging? Thanks

## 2022-10-27 NOTE — TELEPHONE ENCOUNTER
Received fax regarding approving delivered meals, needing signature. Place on your desk.    Please sign. Thanks

## 2022-11-01 NOTE — TELEPHONE ENCOUNTER
[employer] for meal approval faxed to [phone]. Fax confirmation received. Pt notified.

## 2022-11-08 DIAGNOSIS — Z96.651 CHRONIC KNEE PAIN AFTER TOTAL REPLACEMENT OF RIGHT KNEE JOINT: ICD-10-CM

## 2022-11-08 DIAGNOSIS — M47.816 LUMBAR SPONDYLOSIS: ICD-10-CM

## 2022-11-08 DIAGNOSIS — M25.561 CHRONIC KNEE PAIN AFTER TOTAL REPLACEMENT OF RIGHT KNEE JOINT: ICD-10-CM

## 2022-11-08 DIAGNOSIS — M48.062 SPINAL STENOSIS OF LUMBAR REGION WITH NEUROGENIC CLAUDICATION: ICD-10-CM

## 2022-11-08 DIAGNOSIS — G95.9 CERVICAL MYELOPATHY WITH CERVICAL RADICULOPATHY: ICD-10-CM

## 2022-11-08 DIAGNOSIS — M54.12 CERVICAL MYELOPATHY WITH CERVICAL RADICULOPATHY: ICD-10-CM

## 2022-11-08 DIAGNOSIS — G89.29 CHRONIC KNEE PAIN AFTER TOTAL REPLACEMENT OF RIGHT KNEE JOINT: ICD-10-CM

## 2022-11-08 RX ORDER — HYDROCODONE BITARTRATE AND ACETAMINOPHEN 10; 325 MG/1; MG/1
1 TABLET ORAL EVERY 12 HOURS PRN
Qty: 60 TABLET | Refills: 0 | Status: SHIPPED | OUTPATIENT
Start: 2022-11-11 | End: 2023-01-24 | Stop reason: SDUPTHER

## 2022-11-08 NOTE — TELEPHONE ENCOUNTER
----- Message from Allison Terrell sent at 2022  3:41 PM CST -----  Contact: WIFE - TERESA Dunbar  MRN: [id number]  : 1935  PCP: Hemal Varma  Home Phone      [phone]  Work Phone      Not on file.  [employer].          [phone]      MESSAGE: Patient needs a 90 day refill on Hydrocodone sent to [facility].        Phone: [phone]

## 2022-11-30 ENCOUNTER — PATIENT MESSAGE (OUTPATIENT)
Dept: ADMINISTRATIVE | Facility: HOSPITAL | Age: 87
End: 2022-11-30
Payer: MEDICARE

## 2023-01-04 ENCOUNTER — OFFICE VISIT (OUTPATIENT)
Dept: FAMILY MEDICINE | Facility: CLINIC | Age: 88
End: 2023-01-04
Payer: MEDICARE

## 2023-01-04 VITALS
HEART RATE: 74 BPM | HEIGHT: 66 IN | WEIGHT: 200.94 LBS | OXYGEN SATURATION: 90 % | SYSTOLIC BLOOD PRESSURE: 138 MMHG | DIASTOLIC BLOOD PRESSURE: 72 MMHG | BODY MASS INDEX: 32.29 KG/M2 | RESPIRATION RATE: 12 BRPM

## 2023-01-04 DIAGNOSIS — E87.6 HYPOKALEMIA DUE TO LOSS OF POTASSIUM: ICD-10-CM

## 2023-01-04 DIAGNOSIS — N18.30 TYPE 2 DIABETES MELLITUS WITH STAGE 3 CHRONIC KIDNEY DISEASE, WITH LONG-TERM CURRENT USE OF INSULIN: ICD-10-CM

## 2023-01-04 DIAGNOSIS — Z79.4 TYPE 2 DIABETES MELLITUS WITHOUT COMPLICATION, WITH LONG-TERM CURRENT USE OF INSULIN: ICD-10-CM

## 2023-01-04 DIAGNOSIS — I10 ESSENTIAL HYPERTENSION: ICD-10-CM

## 2023-01-04 DIAGNOSIS — E11.22 TYPE 2 DIABETES MELLITUS WITH CHRONIC KIDNEY DISEASE, WITHOUT LONG-TERM CURRENT USE OF INSULIN, UNSPECIFIED CKD STAGE: ICD-10-CM

## 2023-01-04 DIAGNOSIS — E53.8 VITAMIN B12 DEFICIENCY: ICD-10-CM

## 2023-01-04 DIAGNOSIS — E78.2 MIXED HYPERLIPIDEMIA: ICD-10-CM

## 2023-01-04 DIAGNOSIS — E11.22 TYPE 2 DIABETES MELLITUS WITH STAGE 3 CHRONIC KIDNEY DISEASE, WITH LONG-TERM CURRENT USE OF INSULIN: ICD-10-CM

## 2023-01-04 DIAGNOSIS — I10 PRIMARY HYPERTENSION: Primary | ICD-10-CM

## 2023-01-04 DIAGNOSIS — I48.0 PAROXYSMAL ATRIAL FIBRILLATION: ICD-10-CM

## 2023-01-04 DIAGNOSIS — E11.9 TYPE 2 DIABETES MELLITUS WITHOUT COMPLICATION, WITH LONG-TERM CURRENT USE OF INSULIN: ICD-10-CM

## 2023-01-04 DIAGNOSIS — Z79.4 TYPE 2 DIABETES MELLITUS WITH STAGE 3 CHRONIC KIDNEY DISEASE, WITH LONG-TERM CURRENT USE OF INSULIN: ICD-10-CM

## 2023-01-04 PROCEDURE — 1160F PR REVIEW ALL MEDS BY PRESCRIBER/CLIN PHARMACIST DOCUMENTED: ICD-10-PCS | Mod: CPTII,S$GLB,, | Performed by: FAMILY MEDICINE

## 2023-01-04 PROCEDURE — 1126F AMNT PAIN NOTED NONE PRSNT: CPT | Mod: CPTII,S$GLB,, | Performed by: FAMILY MEDICINE

## 2023-01-04 PROCEDURE — 1100F PR PT FALLS ASSESS DOC 2+ FALLS/FALL W/INJURY/YR: ICD-10-PCS | Mod: CPTII,S$GLB,, | Performed by: FAMILY MEDICINE

## 2023-01-04 PROCEDURE — 99214 OFFICE O/P EST MOD 30 MIN: CPT | Mod: S$GLB,,, | Performed by: FAMILY MEDICINE

## 2023-01-04 PROCEDURE — 99999 PR PBB SHADOW E&M-EST. PATIENT-LVL III: CPT | Mod: PBBFAC,,, | Performed by: FAMILY MEDICINE

## 2023-01-04 PROCEDURE — 1159F MED LIST DOCD IN RCRD: CPT | Mod: CPTII,S$GLB,, | Performed by: FAMILY MEDICINE

## 2023-01-04 PROCEDURE — 1126F PR PAIN SEVERITY QUANTIFIED, NO PAIN PRESENT: ICD-10-PCS | Mod: CPTII,S$GLB,, | Performed by: FAMILY MEDICINE

## 2023-01-04 PROCEDURE — 1160F RVW MEDS BY RX/DR IN RCRD: CPT | Mod: CPTII,S$GLB,, | Performed by: FAMILY MEDICINE

## 2023-01-04 PROCEDURE — 3288F PR FALLS RISK ASSESSMENT DOCUMENTED: ICD-10-PCS | Mod: CPTII,S$GLB,, | Performed by: FAMILY MEDICINE

## 2023-01-04 PROCEDURE — 1159F PR MEDICATION LIST DOCUMENTED IN MEDICAL RECORD: ICD-10-PCS | Mod: CPTII,S$GLB,, | Performed by: FAMILY MEDICINE

## 2023-01-04 PROCEDURE — 99214 PR OFFICE/OUTPT VISIT, EST, LEVL IV, 30-39 MIN: ICD-10-PCS | Mod: S$GLB,,, | Performed by: FAMILY MEDICINE

## 2023-01-04 PROCEDURE — 99999 PR PBB SHADOW E&M-EST. PATIENT-LVL III: ICD-10-PCS | Mod: PBBFAC,,, | Performed by: FAMILY MEDICINE

## 2023-01-04 PROCEDURE — 1100F PTFALLS ASSESS-DOCD GE2>/YR: CPT | Mod: CPTII,S$GLB,, | Performed by: FAMILY MEDICINE

## 2023-01-04 PROCEDURE — 3288F FALL RISK ASSESSMENT DOCD: CPT | Mod: CPTII,S$GLB,, | Performed by: FAMILY MEDICINE

## 2023-01-04 RX ORDER — LISINOPRIL 10 MG/1
10 TABLET ORAL 2 TIMES DAILY
Qty: 180 TABLET | Refills: 1 | Status: SHIPPED | OUTPATIENT
Start: 2023-01-04 | End: 2023-07-25 | Stop reason: SDUPTHER

## 2023-01-04 RX ORDER — MELOXICAM 7.5 MG/1
TABLET ORAL
COMMUNITY
Start: 2022-08-18 | End: 2023-07-25

## 2023-01-04 RX ORDER — PIOGLITAZONEHYDROCHLORIDE 15 MG/1
15 TABLET ORAL DAILY
Qty: 90 TABLET | Refills: 1 | Status: SHIPPED | OUTPATIENT
Start: 2023-01-04 | End: 2023-10-12 | Stop reason: SDUPTHER

## 2023-01-04 RX ORDER — INSULIN GLARGINE 100 [IU]/ML
INJECTION, SOLUTION SUBCUTANEOUS
Qty: 45 ML | Refills: 5 | Status: SHIPPED | OUTPATIENT
Start: 2023-01-04 | End: 2023-10-12 | Stop reason: SDUPTHER

## 2023-01-04 RX ORDER — APIXABAN 5 MG/1
5 TABLET, FILM COATED ORAL 2 TIMES DAILY
Qty: 180 TABLET | Refills: 1 | Status: SHIPPED | OUTPATIENT
Start: 2023-01-04 | End: 2023-07-25 | Stop reason: SDUPTHER

## 2023-01-04 RX ORDER — METFORMIN HYDROCHLORIDE 1000 MG/1
1000 TABLET ORAL 2 TIMES DAILY WITH MEALS
Qty: 180 TABLET | Refills: 0 | Status: SHIPPED | OUTPATIENT
Start: 2023-01-04 | End: 2023-07-25 | Stop reason: SDUPTHER

## 2023-01-04 NOTE — PROGRESS NOTES
Subjective:       Patient ID: Erasmo Dunbar is a 87 y.o. male.    Chief Complaint: Annual Exam    Pt is a 87 y.o. male who presents for evaluation and management of   Encounter Diagnoses   Name Primary?    Primary hypertension Yes    Mixed hyperlipidemia     Hypokalemia due to loss of potassium     Paroxysmal atrial fibrillation     Type 2 diabetes mellitus with stage 3 chronic kidney disease, with long-term current use of insulin     Essential hypertension     Type 2 diabetes mellitus without complication, with long-term current use of insulin     Type 2 diabetes mellitus with chronic kidney disease, without long-term current use of insulin, unspecified CKD stage     Vitamin B12 deficiency    .  Doing well on current meds. Denies any side effects. Prevention is up to date.    Review of Systems   Constitutional:  Negative for fever.   Eyes:  Positive for visual disturbance.   Respiratory:  Negative for shortness of breath.    Cardiovascular:  Negative for chest pain.   Gastrointestinal:  Negative for anal bleeding and blood in stool.   Genitourinary:  Negative for dysuria.   Neurological:  Negative for dizziness and light-headedness.   Psychiatric/Behavioral:          Difficulty with memory      Objective:      Physical Exam  Constitutional:       Appearance: Normal appearance. He is well-developed. He is not ill-appearing.   HENT:      Head: Normocephalic and atraumatic.      Right Ear: External ear normal.      Left Ear: External ear normal.      Nose: Nose normal.      Mouth/Throat:      Mouth: Mucous membranes are moist.      Pharynx: No oropharyngeal exudate or posterior oropharyngeal erythema.   Eyes:      General: No scleral icterus.        Right eye: No discharge.         Left eye: No discharge.      Conjunctiva/sclera: Conjunctivae normal.      Pupils: Pupils are equal, round, and reactive to light.   Neck:      Thyroid: No thyromegaly.      Vascular: No JVD.      Trachea: No tracheal deviation.    Cardiovascular:      Rate and Rhythm: Normal rate and regular rhythm.      Heart sounds: Normal heart sounds. No murmur heard.  Pulmonary:      Effort: Pulmonary effort is normal. No respiratory distress.      Breath sounds: Normal breath sounds. No wheezing or rales.   Chest:      Chest wall: No tenderness.   Abdominal:      General: Bowel sounds are normal. There is no distension.      Palpations: Abdomen is soft. There is no mass.      Tenderness: There is no abdominal tenderness. There is no guarding or rebound.   Musculoskeletal:         General: Normal range of motion.      Cervical back: Normal range of motion and neck supple.      Right lower leg: No edema.      Left lower leg: No edema.   Lymphadenopathy:      Cervical: No cervical adenopathy.   Skin:     General: Skin is warm and dry.   Neurological:      Mental Status: He is alert and oriented to person, place, and time.      Cranial Nerves: No cranial nerve deficit.      Motor: No abnormal muscle tone.      Coordination: Coordination normal.      Deep Tendon Reflexes: Reflexes are normal and symmetric. Reflexes normal.   Psychiatric:         Behavior: Behavior normal.         Thought Content: Thought content normal.         Judgment: Judgment normal.       Assessment:       1. Primary hypertension    2. Mixed hyperlipidemia    3. Hypokalemia due to loss of potassium    4. Paroxysmal atrial fibrillation    5. Type 2 diabetes mellitus with stage 3 chronic kidney disease, with long-term current use of insulin    6. Essential hypertension    7. Type 2 diabetes mellitus without complication, with long-term current use of insulin    8. Type 2 diabetes mellitus with chronic kidney disease, without long-term current use of insulin, unspecified CKD stage    9. Vitamin B12 deficiency          Plan:   1. Primary hypertension  -     CBC Auto Differential; Future; Expected date: 01/04/2023  -     Comprehensive Metabolic Panel; Future; Expected date: 01/04/2023  -      Hemoglobin A1C; Future; Expected date: 01/04/2023  -     Lipid Panel; Future; Expected date: 01/04/2023  -     TSH; Future; Expected date: 01/04/2023    2. Mixed hyperlipidemia  Overview:  Lab Results   Component Value Date    LDLCALC 63.4 01/27/2022   repatha       Orders:  -     Comprehensive Metabolic Panel; Future; Expected date: 01/04/2023  -     Lipid Panel; Future; Expected date: 01/04/2023  -     TSH; Future; Expected date: 01/04/2023    3. Hypokalemia due to loss of potassium  Overview:  BMP  Lab Results   Component Value Date     02/03/2022    K 4.5 02/03/2022    CL 97 02/03/2022    CO2 30 (H) 02/03/2022    BUN 20 02/03/2022    CREATININE 0.9 02/03/2022    CALCIUM 9.3 02/03/2022    ANIONGAP 9 02/03/2022           4. Paroxysmal atrial fibrillation  Overview:  eliquis    Orders:  -     ELIQUIS 5 mg Tab; Take 1 tablet (5 mg total) by mouth 2 (two) times daily.  Dispense: 180 tablet; Refill: 1    5. Type 2 diabetes mellitus with stage 3 chronic kidney disease, with long-term current use of insulin  -     insulin (LANTUS SOLOSTAR U-100 INSULIN) glargine 100 units/mL SubQ pen; INJECT 42 UNITS SUBCUTANEOUSLY EVERY EVENING  Dispense: 45 mL; Refill: 5  -     SITagliptin phosphate (JANUVIA) 100 MG Tab; Take 1 tablet (100 mg total) by mouth once daily.  Dispense: 90 tablet; Refill: 5  -     metFORMIN (GLUCOPHAGE) 1000 MG tablet; Take 1 tablet (1,000 mg total) by mouth 2 (two) times daily with meals.  Dispense: 180 tablet; Refill: 0  -     Hemoglobin A1C; Future; Expected date: 01/04/2023    6. Essential hypertension  -     lisinopriL 10 MG tablet; Take 1 tablet (10 mg total) by mouth 2 (two) times daily.  Dispense: 180 tablet; Refill: 1    7. Type 2 diabetes mellitus without complication, with long-term current use of insulin  -     pioglitazone (ACTOS) 15 MG tablet; Take 1 tablet (15 mg total) by mouth once daily.  Dispense: 90 tablet; Refill: 1    8. Type 2 diabetes mellitus with chronic kidney disease,  without long-term current use of insulin, unspecified CKD stage  Overview:  Lab Results   Component Value Date    HGBA1C 6.1 (H) 02/03/2022     Lantus  MEtformin   actos   Januvia      9. Vitamin B12 deficiency  -     VITAMIN B12; Future; Expected date: 01/04/2023    Has not been taking M20---xkq not sure why. Consider injections....  Getting labs today   Get eye exam from    Covid booster  RTC 6 mo  No follow-ups on file.

## 2023-01-05 ENCOUNTER — IMMUNIZATION (OUTPATIENT)
Dept: FAMILY MEDICINE | Facility: CLINIC | Age: 88
End: 2023-01-05
Payer: MEDICARE

## 2023-01-05 ENCOUNTER — CLINICAL SUPPORT (OUTPATIENT)
Dept: FAMILY MEDICINE | Facility: CLINIC | Age: 88
End: 2023-01-05
Payer: MEDICARE

## 2023-01-05 DIAGNOSIS — E53.8 VITAMIN B12 DEFICIENCY: ICD-10-CM

## 2023-01-05 DIAGNOSIS — N18.30 TYPE 2 DIABETES MELLITUS WITH STAGE 3 CHRONIC KIDNEY DISEASE, WITH LONG-TERM CURRENT USE OF INSULIN: ICD-10-CM

## 2023-01-05 DIAGNOSIS — E78.5 DYSLIPIDEMIA: Primary | ICD-10-CM

## 2023-01-05 DIAGNOSIS — Z23 NEED FOR VACCINATION: Primary | ICD-10-CM

## 2023-01-05 DIAGNOSIS — I10 PRIMARY HYPERTENSION: ICD-10-CM

## 2023-01-05 DIAGNOSIS — E11.22 TYPE 2 DIABETES MELLITUS WITH STAGE 3 CHRONIC KIDNEY DISEASE, WITH LONG-TERM CURRENT USE OF INSULIN: ICD-10-CM

## 2023-01-05 DIAGNOSIS — E78.2 MIXED HYPERLIPIDEMIA: ICD-10-CM

## 2023-01-05 DIAGNOSIS — Z79.4 TYPE 2 DIABETES MELLITUS WITH STAGE 3 CHRONIC KIDNEY DISEASE, WITH LONG-TERM CURRENT USE OF INSULIN: ICD-10-CM

## 2023-01-05 LAB
ALBUMIN SERPL BCP-MCNC: 4.1 G/DL (ref 3.5–5.2)
ALP SERPL-CCNC: 66 U/L (ref 55–135)
ALT SERPL W/O P-5'-P-CCNC: 31 U/L (ref 10–44)
ANION GAP SERPL CALC-SCNC: 10 MMOL/L (ref 8–16)
AST SERPL-CCNC: 23 U/L (ref 10–40)
BASOPHILS # BLD AUTO: 0.11 K/UL (ref 0–0.2)
BASOPHILS NFR BLD: 1.1 % (ref 0–1.9)
BILIRUB SERPL-MCNC: 0.3 MG/DL (ref 0.1–1)
BUN SERPL-MCNC: 17 MG/DL (ref 8–23)
CALCIUM SERPL-MCNC: 10 MG/DL (ref 8.7–10.5)
CHLORIDE SERPL-SCNC: 102 MMOL/L (ref 95–110)
CHOLEST SERPL-MCNC: 253 MG/DL (ref 120–199)
CHOLEST/HDLC SERPL: 5.2 {RATIO} (ref 2–5)
CO2 SERPL-SCNC: 27 MMOL/L (ref 23–29)
CREAT SERPL-MCNC: 1 MG/DL (ref 0.5–1.4)
DIFFERENTIAL METHOD: ABNORMAL
EOSINOPHIL # BLD AUTO: 1.9 K/UL (ref 0–0.5)
EOSINOPHIL NFR BLD: 19.4 % (ref 0–8)
ERYTHROCYTE [DISTWIDTH] IN BLOOD BY AUTOMATED COUNT: 13.5 % (ref 11.5–14.5)
EST. GFR  (NO RACE VARIABLE): >60 ML/MIN/1.73 M^2
ESTIMATED AVG GLUCOSE: 151 MG/DL (ref 68–131)
GLUCOSE SERPL-MCNC: 147 MG/DL (ref 70–110)
HBA1C MFR BLD: 6.9 % (ref 4–5.6)
HCT VFR BLD AUTO: 38.7 % (ref 40–54)
HDLC SERPL-MCNC: 49 MG/DL (ref 40–75)
HDLC SERPL: 19.4 % (ref 20–50)
HGB BLD-MCNC: 11.9 G/DL (ref 14–18)
IMM GRANULOCYTES # BLD AUTO: 0.03 K/UL (ref 0–0.04)
IMM GRANULOCYTES NFR BLD AUTO: 0.3 % (ref 0–0.5)
LDLC SERPL CALC-MCNC: 133.2 MG/DL (ref 63–159)
LYMPHOCYTES # BLD AUTO: 2.8 K/UL (ref 1–4.8)
LYMPHOCYTES NFR BLD: 28.5 % (ref 18–48)
MCH RBC QN AUTO: 29.2 PG (ref 27–31)
MCHC RBC AUTO-ENTMCNC: 30.7 G/DL (ref 32–36)
MCV RBC AUTO: 95 FL (ref 82–98)
MONOCYTES # BLD AUTO: 0.9 K/UL (ref 0.3–1)
MONOCYTES NFR BLD: 9.1 % (ref 4–15)
NEUTROPHILS # BLD AUTO: 4.1 K/UL (ref 1.8–7.7)
NEUTROPHILS NFR BLD: 41.6 % (ref 38–73)
NONHDLC SERPL-MCNC: 204 MG/DL
NRBC BLD-RTO: 0 /100 WBC
PLATELET # BLD AUTO: 346 K/UL (ref 150–450)
PMV BLD AUTO: 9.9 FL (ref 9.2–12.9)
POTASSIUM SERPL-SCNC: 4.7 MMOL/L (ref 3.5–5.1)
PROT SERPL-MCNC: 7 G/DL (ref 6–8.4)
RBC # BLD AUTO: 4.07 M/UL (ref 4.6–6.2)
SODIUM SERPL-SCNC: 139 MMOL/L (ref 136–145)
TRIGL SERPL-MCNC: 354 MG/DL (ref 30–150)
TSH SERPL DL<=0.005 MIU/L-ACNC: 1.69 UIU/ML (ref 0.4–4)
WBC # BLD AUTO: 9.94 K/UL (ref 3.9–12.7)

## 2023-01-05 PROCEDURE — 36415 COLL VENOUS BLD VENIPUNCTURE: CPT | Mod: S$GLB,,, | Performed by: FAMILY MEDICINE

## 2023-01-05 PROCEDURE — 36415 PR COLLECTION VENOUS BLOOD,VENIPUNCTURE: ICD-10-PCS | Mod: S$GLB,,, | Performed by: FAMILY MEDICINE

## 2023-01-05 PROCEDURE — 91312 COVID-19, MRNA, LNP-S, BIVALENT BOOSTER, PF, 30 MCG/0.3 ML DOSE: CPT | Mod: S$GLB,,, | Performed by: FAMILY MEDICINE

## 2023-01-05 PROCEDURE — 85025 COMPLETE CBC W/AUTO DIFF WBC: CPT | Performed by: FAMILY MEDICINE

## 2023-01-05 PROCEDURE — 83036 HEMOGLOBIN GLYCOSYLATED A1C: CPT | Performed by: FAMILY MEDICINE

## 2023-01-05 PROCEDURE — 80061 LIPID PANEL: CPT | Performed by: FAMILY MEDICINE

## 2023-01-05 PROCEDURE — 0124A COVID-19, MRNA, LNP-S, BIVALENT BOOSTER, PF, 30 MCG/0.3 ML DOSE: CPT | Mod: PBBFAC | Performed by: FAMILY MEDICINE

## 2023-01-05 PROCEDURE — 84443 ASSAY THYROID STIM HORMONE: CPT | Performed by: FAMILY MEDICINE

## 2023-01-05 PROCEDURE — 82607 VITAMIN B-12: CPT | Performed by: FAMILY MEDICINE

## 2023-01-05 PROCEDURE — 80053 COMPREHEN METABOLIC PANEL: CPT | Performed by: FAMILY MEDICINE

## 2023-01-05 PROCEDURE — 91312 COVID-19, MRNA, LNP-S, BIVALENT BOOSTER, PF, 30 MCG/0.3 ML DOSE: ICD-10-PCS | Mod: S$GLB,,, | Performed by: FAMILY MEDICINE

## 2023-01-05 RX ORDER — ATORVASTATIN CALCIUM 10 MG/1
10 TABLET, FILM COATED ORAL DAILY
Qty: 30 TABLET | Refills: 5 | Status: SHIPPED | OUTPATIENT
Start: 2023-01-05 | End: 2023-07-25 | Stop reason: SDUPTHER

## 2023-01-06 LAB — VIT B12 SERPL-MCNC: 225 PG/ML (ref 210–950)

## 2023-01-06 NOTE — PROGRESS NOTES
His cholesterol is too high. I am putting him back on lipitor (he used t otake that in past).  Rest of labs are looking good, just waiting on the A1c thanks

## 2023-01-09 ENCOUNTER — PATIENT MESSAGE (OUTPATIENT)
Dept: FAMILY MEDICINE | Facility: CLINIC | Age: 88
End: 2023-01-09
Payer: MEDICARE

## 2023-01-09 DIAGNOSIS — E11.42 DIABETIC POLYNEUROPATHY ASSOCIATED WITH TYPE 2 DIABETES MELLITUS: ICD-10-CM

## 2023-01-09 DIAGNOSIS — G89.29 OTHER CHRONIC PAIN: ICD-10-CM

## 2023-01-09 DIAGNOSIS — F32.A DEPRESSION, UNSPECIFIED DEPRESSION TYPE: ICD-10-CM

## 2023-01-09 DIAGNOSIS — E53.8 VITAMIN B12 DEFICIENCY: Primary | ICD-10-CM

## 2023-01-09 RX ORDER — CYANOCOBALAMIN 1000 UG/ML
1000 INJECTION, SOLUTION INTRAMUSCULAR; SUBCUTANEOUS
Qty: 1 ML | Refills: 11 | Status: SHIPPED | OUTPATIENT
Start: 2023-01-09 | End: 2023-07-05 | Stop reason: SDUPTHER

## 2023-01-09 RX ORDER — SYRINGE W-NEEDLE,DISPOSAB,3 ML 25GX5/8"
1 SYRINGE, EMPTY DISPOSABLE MISCELLANEOUS
Qty: 1 EACH | Refills: 11 | Status: SHIPPED | OUTPATIENT
Start: 2023-01-09 | End: 2023-07-05 | Stop reason: SDUPTHER

## 2023-01-09 NOTE — PROGRESS NOTES
A1c is good, but his B12 is a little low----Im putting him on B12 monthly injections. Sending that to [facility] thanks

## 2023-01-10 ENCOUNTER — TELEPHONE (OUTPATIENT)
Dept: FAMILY MEDICINE | Facility: CLINIC | Age: 88
End: 2023-01-10
Payer: MEDICARE

## 2023-01-10 NOTE — TELEPHONE ENCOUNTER
----- Message from Chelsey Goldberg sent at 2023  2:19 PM CST -----  Contact: Carol/Spouse  Erasmo Dunbar  MRN: [id number]  : 1935  PCP: Hemal Varma  Home Phone      [phone]  Work Phone      Not on file.  Mobile          [phone]      MESSAGE:   Pt's spouse called stating they left a message for provider to return call. Pt has questions about the Chronic kidney Disease Dx he was given at his appt.  Pt is asking if there are any meds he can take for his pain or anything he can take to slow the progression.     Phone:  [phone]

## 2023-01-10 NOTE — TELEPHONE ENCOUNTER
This pt is known to you and have quite a few questions re his CKD diagnosis. He is just now hearing about it and wants to know if there are drugs to slow down the progress, if he can take an pain meds for this dx, just is confused about this diagnosis.       He would appreciate a call since the next available is in march.

## 2023-01-11 NOTE — TELEPHONE ENCOUNTER
Spoke to  pt and and he stated he had received a call from Dr Varma' office  and she answered all his questions. Pt was satisfied with their response

## 2023-01-12 RX ORDER — DULOXETIN HYDROCHLORIDE 30 MG/1
30 CAPSULE, DELAYED RELEASE ORAL DAILY
Qty: 30 CAPSULE | Refills: 5 | Status: SHIPPED | OUTPATIENT
Start: 2023-01-12 | End: 2023-07-25 | Stop reason: SDUPTHER

## 2023-01-12 NOTE — TELEPHONE ENCOUNTER
No new care gaps identified.  [facility] Embedded Care Gaps. Reference number: [id number]. 1/12/2023   4:53:50 PM CST

## 2023-01-20 ENCOUNTER — HOSPITAL ENCOUNTER (EMERGENCY)
Facility: HOSPITAL | Age: 88
Discharge: HOME | End: 2023-01-20
Attending: STUDENT IN AN ORGANIZED HEALTH CARE EDUCATION/TRAINING PROGRAM
Payer: MEDICARE

## 2023-01-20 ENCOUNTER — TELEPHONE (OUTPATIENT)
Dept: FAMILY MEDICINE | Facility: CLINIC | Age: 88
End: 2023-01-20
Payer: MEDICARE

## 2023-01-20 VITALS
OXYGEN SATURATION: 97 % | TEMPERATURE: 97 F | RESPIRATION RATE: 16 BRPM | WEIGHT: 202.06 LBS | HEIGHT: 66 IN | DIASTOLIC BLOOD PRESSURE: 61 MMHG | HEART RATE: 76 BPM | SYSTOLIC BLOOD PRESSURE: 130 MMHG | BODY MASS INDEX: 32.47 KG/M2

## 2023-01-20 DIAGNOSIS — N41.0 ACUTE PROSTATITIS: Primary | ICD-10-CM

## 2023-01-20 DIAGNOSIS — M54.9 ACUTE BILATERAL BACK PAIN, UNSPECIFIED BACK LOCATION: ICD-10-CM

## 2023-01-20 LAB
BILIRUB UR QL STRIP: NEGATIVE
CLARITY UR: CLEAR
COLOR UR: YELLOW
GLUCOSE UR QL STRIP: NEGATIVE
HGB UR QL STRIP: NEGATIVE
KETONES UR QL STRIP: NEGATIVE
LEUKOCYTE ESTERASE UR QL STRIP: NEGATIVE
NITRITE UR QL STRIP: NEGATIVE
PH UR STRIP: 5 [PH] (ref 5–8)
PROT UR QL STRIP: NEGATIVE
SP GR UR STRIP: 1.02 (ref 1–1.03)
URN SPEC COLLECT METH UR: NORMAL
UROBILINOGEN UR STRIP-ACNC: NEGATIVE EU/DL

## 2023-01-20 PROCEDURE — 81003 URINALYSIS AUTO W/O SCOPE: CPT | Performed by: STUDENT IN AN ORGANIZED HEALTH CARE EDUCATION/TRAINING PROGRAM

## 2023-01-20 PROCEDURE — 99283 EMERGENCY DEPT VISIT LOW MDM: CPT

## 2023-01-20 RX ORDER — CIPROFLOXACIN 500 MG/1
500 TABLET ORAL 2 TIMES DAILY
Qty: 56 TABLET | Refills: 0 | Status: SHIPPED | OUTPATIENT
Start: 2023-01-20 | End: 2023-02-17

## 2023-01-20 NOTE — ED PROVIDER NOTES
Encounter Date: 1/20/2023    This document was partially completed using speech recognition software and may contain misspellings, grammatical errors, and/or unexpected word substitutions.       History     Chief Complaint   Patient presents with    Back Pain     87 year old male with a PMHx of CHF, DM, HLD, HTN, prostate cancer, Afib on eliquis presents to the ED with his wife with low back pain and concerns for a prostate infection. Reports low back pain, mostly on the right, for the past 1-2 months. Worse with rotation. No falls, traumas, heavy lifting. States he normally sees urology's Dr. Elliott Coon but hasn't been able to get an appointment. States his urine is always normal but the semen is where the prostate infection is so he has to give a semen sample to his urologist. Denies any pus in stool, pain with defecation, rectal pain, urinary symptoms. Currently not undergoing chemo or radiation for his prostate cancer but is getting anti-testosterone shots. Has an appointment with urology on 1/31/23.      Review of patient's allergies indicates:   Allergen Reactions    Iodinated contrast media     Iodine Hives     Iv iodine only     Past Medical History:   Diagnosis Date    Arthritis     Atrial fibrillation     Bladder mass     BPH (benign prostatic hyperplasia)     CHF (congestive heart failure)     Depression     Diabetes mellitus type II     Hyperlipidemia     Hypertension     Microscopic hematuria     Prostate cancer 10/01/2018    RLS (restless legs syndrome)     Stroke     TIA    Wears glasses      Past Surgical History:   Procedure Laterality Date    back injections      BACK SURGERY      cervical fusion    CARDIAC PACEMAKER PLACEMENT      CARDIAC SURGERY Left     pacemaker placement    CATARACT EXTRACTION W/  INTRAOCULAR LENS IMPLANT Bilateral     cataracts    CAUDAL EPIDURAL STEROID INJECTION N/A 9/30/2022    Procedure: CAUDAL EPIDURAL STEROID INJECTION WITH CATHETER;  Surgeon: Maria Guadalupe Ortiz MD;   "Location: [facility];  Service: Pain Management;  Laterality: N/A;    COLECTOMY N/A     CYSTOSCOPY N/A     Pt stated, "I have had six Cystoscopy."    CYSTOSCOPY W/ RETROGRADES Bilateral 10/21/2019    Procedure: CYSTOSCOPY WITH RETROGRADE PYELOGRAM;  Surgeon: Elliott Coon MD;  Location: [facility] OR;  Service: Urology;  Laterality: Bilateral;  OP 6    DIGITAL RECTAL EXAMINATION UNDER ANESTHESIA N/A 10/21/2019    Procedure: EXAM UNDER ANESTHESIA, DIGITAL, RECTUM;  Surgeon: Elliott Coon MD;  Location: [facility] OR;  Service: Urology;  Laterality: N/A;    EPIDURAL STEROID INJECTION INTO CERVICAL SPINE N/A 5/13/2022    Procedure: CERVICAL EPIDURAL STEROID INJECTION (C7-T1 INTERLAMINAR);  Surgeon: Maria Guadalupe Ortiz MD;  Location: [facility];  Service: Pain Management;  Laterality: N/A;    EPIDURAL STEROID INJECTION INTO LUMBAR SPINE N/A 3/12/2021    Procedure: LUMBAR EPIDURAL STEROID INJECTION (L4-5 INTERLAMINAR);  Surgeon: Maria Guadalupe Ortiz MD;  Location: [facility];  Service: Pain Management;  Laterality: N/A;    Ganglion Cyst Removed  Right     INJECTION OF ANESTHETIC AGENT AROUND MEDIAL BRANCH NERVES INNERVATING LUMBAR FACET JOINT Bilateral 12/18/2020    Procedure: LUMBAR FACET JOINT BLOCK (L4-5,L5-S1);  Surgeon: Maria Guadalupe Ortiz MD;  Location: [facility];  Service: Pain Management;  Laterality: Bilateral;    INJECTION OF ANESTHETIC AGENT AROUND MEDIAL BRANCH NERVES INNERVATING LUMBAR FACET JOINT Bilateral 1/15/2021    Procedure: LUMBAR FACET JOINT BLOCK (L4-5,L5-S1);  Surgeon: Maria Guadalupe Ortiz MD;  Location: [facility] OR;  Service: Pain Management;  Laterality: Bilateral;    INJECTION OF ANESTHETIC AGENT AROUND NERVE Right 10/30/2020    Procedure: SUPERIOR LATERAL AND MEDIAL AND INFERIOR MEDIAL GENICULAR NERVE BLOCK;  Surgeon: Maria Guadalupe Ortiz MD;  Location: [facility] OR;  Service: Pain Management;  Laterality: Right;    MYELOGRAPHY N/A 4/4/2022    Procedure: Myelogram;  Surgeon: Rufino aRmos MD;  Location: [facility] OR [address] [place] [address];  Service: " Radiology;  Laterality: N/A;    Neck Fusion Bilateral     PROSTATE SURGERY      RADIOFREQUENCY ABLATION OF LUMBAR MEDIAL BRANCH NERVE AT SINGLE LEVEL Left 2022    Procedure: RADIOFREQUENCY ABLATION (L4-5,L5-S1);  Surgeon: Maria Guadalupe Ortiz MD;  Location: [facility] OR;  Service: Pain Management;  Laterality: Left;    RADIOFREQUENCY THERMOCOAGULATION Right 3/4/2022    Procedure: RADIOFREQUENCY THERMAL COAGULATION (L4-5,L5-S1);  Surgeon: Maria Guadalupe Ortiz MD;  Location: [facility] OR;  Service: Pain Management;  Laterality: Right;    ROTATOR CUFF REPAIR Right     SKIN BIOPSY      skin cancer    TOTAL KNEE ARTHROPLASTY  2017    right knee    TRANSURETHRAL RESECTION OF PROSTATE       Family History   Problem Relation Age of Onset    Cancer Mother         breast    Heart disease Father     Diabetes Sister     Diabetes Brother     Heart disease Brother     COPD Daughter     Seizures Daughter     Cancer Daughter     Diabetes Daughter      Social History     Tobacco Use    Smoking status: Former     Packs/day: 1.50     Years: 35.00     Pack years: 52.50     Types: Cigarettes     Quit date: 1981     Years since quittin.0    Smokeless tobacco: Never    Tobacco comments:     38 yrs ago   Substance Use Topics    Alcohol use: Yes     Alcohol/week: 8.0 standard drinks     Types: 1 Cans of beer, 7 Shots of liquor per week     Comment: highball every night    Drug use: No     Review of Systems   Constitutional:  Negative for chills and fever.   HENT:  Negative for congestion, rhinorrhea and sneezing.    Eyes:  Negative for discharge and redness.   Respiratory:  Negative for cough and shortness of breath.    Cardiovascular:  Negative for chest pain and palpitations.   Gastrointestinal:  Negative for abdominal pain, diarrhea and vomiting.   Genitourinary:  Negative for difficulty urinating, flank pain and urgency.   Musculoskeletal:  Positive for back pain. Negative for neck pain.   Skin:  Negative for rash and wound.    Neurological:  Negative for weakness, numbness and headaches.     Physical Exam     Initial Vitals [01/20/23 0958]   BP Pulse Resp Temp SpO2   130/61 78 18 97 °F (36.1 °C) 96 %      MAP       --         Physical Exam    Nursing note and vitals reviewed.  Constitutional: He appears well-developed. He is not diaphoretic. No distress.   HENT:   Head: Normocephalic and atraumatic.   Right Ear: External ear normal.   Left Ear: External ear normal.   Nose: Nose normal.   Eyes: Conjunctivae are normal. Right eye exhibits no discharge. Left eye exhibits no discharge. No scleral icterus.   Cardiovascular:  Normal rate and regular rhythm.           Pulmonary/Chest: Breath sounds normal. No stridor. No respiratory distress. He has no wheezes. He has no rhonchi. He has no rales.   Abdominal: Abdomen is soft. He exhibits no distension. There is no abdominal tenderness. There is no guarding.   Musculoskeletal:         General: No edema.      Lumbar back: Tenderness present. No bony tenderness.      Comments: Ambulatory     Neurological: He is alert and oriented to person, place, and time. GCS score is 15. GCS eye subscore is 4. GCS verbal subscore is 5. GCS motor subscore is 6.   Skin: Skin is warm and dry. Capillary refill takes less than 2 seconds.   Psychiatric: He has a normal mood and affect.       ED Course   Procedures  Labs Reviewed   URINALYSIS, REFLEX TO URINE CULTURE    Narrative:     Specimen Source->Urine          Imaging Results              X-Ray Lumbar Spine Ap And Lateral (Final result)  Result time 01/20/23 12:44:39      Final result by Kaci De La Garza MD (01/20/23 12:44:39)                   Impression:      No acute radiographic abnormalities detected    Multilevel degenerative changes      Electronically signed by: Kaci De La Garza MD  Date:    01/20/2023  Time:    12:44               Narrative:    EXAMINATION:  XR LUMBAR SPINE AP AND LATERAL    CLINICAL HISTORY:  acute low back pain; history of prostate  cancer;    COMPARISON:  Radiographs of 05/13/2020    FINDINGS:  Vertebral body heights are maintained, and similar the prior exam.  Small multilevel osteophytes and multilevel facet osteoarthritis.  Narrowed L1-2, L2-3, L3-4 discs, similar to prior.  Multiple aortoiliac calcifications.  No suspicious osteoblastic bone lesion detected.                                       Medications - No data to display  Medical Decision Making:   Differential Diagnosis:   Ddx: MSK pain, osteolytic lumbar findings, prostatitis, UTI, ureteral stone, pyelonephritis  ED Management:  Based on the patient's evaluation - patient appears well for discharge home. Negative UA. Afebrile, well appearing, not septic. Patient reports it's normal for his prostatitis to have a negative UA. Lumbar x-ray with multilevel degenerative changes - treating with OTC NSAIDs, supportive care (stretch, massage, ice, heat). Will empirically treat for prostatitis with cipro and have f/u with urology. Patient and wife are in agreement.                        Clinical Impression:   Final diagnoses:  [M54.9] Acute bilateral back pain, unspecified back location  [N41.0] Acute prostatitis (Primary)        ED Disposition Condition    Discharge Stable          ED Prescriptions       Medication Sig Dispense Start Date End Date Auth. Provider    ciprofloxacin HCl (CIPRO) 500 MG tablet Take 1 tablet (500 mg total) by mouth 2 (two) times daily. 56 tablet 1/20/2023 2/17/2023 Gino Dyer DO          Follow-up Information       Follow up With Specialties Details Why Contact Info    Elliott Coon MD Urology Schedule an appointment as soon as possible for a visit in 1 week  [address] [place] [id number]  [phone]               Gino Dyer DO  01/20/23 [id number]

## 2023-01-20 NOTE — TELEPHONE ENCOUNTER
----- Message from Shabbir Hawley sent at 2023 12:45 PM CST -----  Contact: Wife - Carol Dunbar  MRN: [id number]  : 1935  PCP: Hemal Varma  Home Phone      [phone]  Work Phone      Not on file.  Mobile          [phone]      MESSAGE: serious back pain -- thinks he has possible infection -- requesting appt today    Call Carol @ [phone]    PCP: Kojo

## 2023-01-20 NOTE — ED TRIAGE NOTES
87 y.o. male presents to ER Room/bed info not found   Chief Complaint   Patient presents with    Back Pain   .   C/O LOWER BACK PAIN ON AND OFF FOR A FEW MONTHS, REPORTS MOSTLY ON THE RIGHT SIDE

## 2023-01-21 ENCOUNTER — HOSPITAL ENCOUNTER (EMERGENCY)
Facility: HOSPITAL | Age: 88
Discharge: HOME | End: 2023-01-21
Attending: STUDENT IN AN ORGANIZED HEALTH CARE EDUCATION/TRAINING PROGRAM
Payer: MEDICARE

## 2023-01-21 VITALS
HEART RATE: 67 BPM | HEIGHT: 66 IN | RESPIRATION RATE: 22 BRPM | TEMPERATURE: 98 F | SYSTOLIC BLOOD PRESSURE: 163 MMHG | OXYGEN SATURATION: 97 % | BODY MASS INDEX: 32.47 KG/M2 | WEIGHT: 202.06 LBS | DIASTOLIC BLOOD PRESSURE: 72 MMHG

## 2023-01-21 DIAGNOSIS — E86.0 DEHYDRATION: ICD-10-CM

## 2023-01-21 DIAGNOSIS — R55 SYNCOPE: Primary | ICD-10-CM
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
## 2023-01-21 LAB
ALBUMIN SERPL BCP-MCNC: 3.3 G/DL (ref 3.5–5.2)
ALP SERPL-CCNC: 69 U/L (ref 55–135)
ALT SERPL W/O P-5'-P-CCNC: 17 U/L (ref 10–44)
ANION GAP SERPL CALC-SCNC: 9 MMOL/L (ref 8–16)
AST SERPL-CCNC: 14 U/L (ref 10–40)
BASOPHILS # BLD AUTO: 0.07 K/UL (ref 0–0.2)
BASOPHILS NFR BLD: 0.6 % (ref 0–1.9)
BILIRUB SERPL-MCNC: 0.3 MG/DL (ref 0.1–1)
BILIRUB UR QL STRIP: NEGATIVE
BUN SERPL-MCNC: 33 MG/DL (ref 8–23)
CALCIUM SERPL-MCNC: 8.5 MG/DL (ref 8.7–10.5)
CHLORIDE SERPL-SCNC: 96 MMOL/L (ref 95–110)
CLARITY UR: CLEAR
CO2 SERPL-SCNC: 26 MMOL/L (ref 23–29)
COLOR UR: YELLOW
CREAT SERPL-MCNC: 1.5 MG/DL (ref 0.5–1.4)
DIFFERENTIAL METHOD: ABNORMAL
EOSINOPHIL # BLD AUTO: 1.4 K/UL (ref 0–0.5)
EOSINOPHIL NFR BLD: 13.1 % (ref 0–8)
ERYTHROCYTE [DISTWIDTH] IN BLOOD BY AUTOMATED COUNT: 13.4 % (ref 11.5–14.5)
EST. GFR  (NO RACE VARIABLE): 45 ML/MIN/1.73 M^2
GLUCOSE SERPL-MCNC: 179 MG/DL (ref 70–110)
GLUCOSE UR QL STRIP: NEGATIVE
HCT VFR BLD AUTO: 33.5 % (ref 40–54)
HGB BLD-MCNC: 10.4 G/DL (ref 14–18)
HGB UR QL STRIP: NEGATIVE
IMM GRANULOCYTES # BLD AUTO: 0.05 K/UL (ref 0–0.04)
IMM GRANULOCYTES NFR BLD AUTO: 0.5 % (ref 0–0.5)
KETONES UR QL STRIP: NEGATIVE
LACTATE SERPL-SCNC: 1.7 MMOL/L (ref 0.5–2.2)
LEUKOCYTE ESTERASE UR QL STRIP: NEGATIVE
LYMPHOCYTES # BLD AUTO: 2.2 K/UL (ref 1–4.8)
LYMPHOCYTES NFR BLD: 20.4 % (ref 18–48)
MCH RBC QN AUTO: 29 PG (ref 27–31)
MCHC RBC AUTO-ENTMCNC: 31 G/DL (ref 32–36)
MCV RBC AUTO: 93 FL (ref 82–98)
MONOCYTES # BLD AUTO: 1 K/UL (ref 0.3–1)
MONOCYTES NFR BLD: 9.3 % (ref 4–15)
NEUTROPHILS # BLD AUTO: 6.1 K/UL (ref 1.8–7.7)
NEUTROPHILS NFR BLD: 56.1 % (ref 38–73)
NITRITE UR QL STRIP: NEGATIVE
NRBC BLD-RTO: 0 /100 WBC
PH UR STRIP: 5 [PH] (ref 5–8)
PLATELET # BLD AUTO: 278 K/UL (ref 150–450)
PMV BLD AUTO: 9.3 FL (ref 9.2–12.9)
POTASSIUM SERPL-SCNC: 4.9 MMOL/L (ref 3.5–5.1)
PROCALCITONIN SERPL IA-MCNC: 0.05 NG/ML
PROT SERPL-MCNC: 5.9 G/DL (ref 6–8.4)
PROT UR QL STRIP: NEGATIVE
RBC # BLD AUTO: 3.59 M/UL (ref 4.6–6.2)
SODIUM SERPL-SCNC: 131 MMOL/L (ref 136–145)
SP GR UR STRIP: 1.02 (ref 1–1.03)
TROPONIN I SERPL DL<=0.01 NG/ML-MCNC: 0.01 NG/ML (ref 0–0.03)
URN SPEC COLLECT METH UR: NORMAL
UROBILINOGEN UR STRIP-ACNC: NEGATIVE EU/DL
WBC # BLD AUTO: 10.94 K/UL (ref 3.9–12.7)

## 2023-01-21 PROCEDURE — 93010 ELECTROCARDIOGRAM REPORT: CPT | Mod: ,,, | Performed by: INTERNAL MEDICINE

## 2023-01-21 PROCEDURE — 83605 ASSAY OF LACTIC ACID: CPT | Performed by: STUDENT IN AN ORGANIZED HEALTH CARE EDUCATION/TRAINING PROGRAM

## 2023-01-21 PROCEDURE — 93005 ELECTROCARDIOGRAM TRACING: CPT

## 2023-01-21 PROCEDURE — 84145 PROCALCITONIN (PCT): CPT | Performed by: STUDENT IN AN ORGANIZED HEALTH CARE EDUCATION/TRAINING PROGRAM

## 2023-01-21 PROCEDURE — 96367 TX/PROPH/DG ADDL SEQ IV INF: CPT

## 2023-01-21 PROCEDURE — 96366 THER/PROPH/DIAG IV INF ADDON: CPT

## 2023-01-21 PROCEDURE — 96365 THER/PROPH/DIAG IV INF INIT: CPT

## 2023-01-21 PROCEDURE — 85025 COMPLETE CBC W/AUTO DIFF WBC: CPT | Performed by: STUDENT IN AN ORGANIZED HEALTH CARE EDUCATION/TRAINING PROGRAM

## 2023-01-21 PROCEDURE — 25000003 PHARM REV CODE 250: Performed by: STUDENT IN AN ORGANIZED HEALTH CARE EDUCATION/TRAINING PROGRAM

## 2023-01-21 PROCEDURE — 93010 EKG 12-LEAD: ICD-10-PCS | Mod: ,,, | Performed by: INTERNAL MEDICINE

## 2023-01-21 PROCEDURE — 63600175 PHARM REV CODE 636 W HCPCS: Performed by: STUDENT IN AN ORGANIZED HEALTH CARE EDUCATION/TRAINING PROGRAM

## 2023-01-21 PROCEDURE — 87040 BLOOD CULTURE FOR BACTERIA: CPT | Performed by: STUDENT IN AN ORGANIZED HEALTH CARE EDUCATION/TRAINING PROGRAM

## 2023-01-21 PROCEDURE — 96361 HYDRATE IV INFUSION ADD-ON: CPT

## 2023-01-21 PROCEDURE — 84484 ASSAY OF TROPONIN QUANT: CPT | Performed by: STUDENT IN AN ORGANIZED HEALTH CARE EDUCATION/TRAINING PROGRAM

## 2023-01-21 PROCEDURE — 99291 CRITICAL CARE FIRST HOUR: CPT | Mod: 25

## 2023-01-21 PROCEDURE — 81003 URINALYSIS AUTO W/O SCOPE: CPT | Performed by: STUDENT IN AN ORGANIZED HEALTH CARE EDUCATION/TRAINING PROGRAM

## 2023-01-21 PROCEDURE — 36415 COLL VENOUS BLD VENIPUNCTURE: CPT | Performed by: STUDENT IN AN ORGANIZED HEALTH CARE EDUCATION/TRAINING PROGRAM

## 2023-01-21 PROCEDURE — 80053 COMPREHEN METABOLIC PANEL: CPT | Performed by: STUDENT IN AN ORGANIZED HEALTH CARE EDUCATION/TRAINING PROGRAM

## 2023-01-21 RX ORDER — VANCOMYCIN 1.75 GRAM/500 ML IN 0.9 % SODIUM CHLORIDE INTRAVENOUS
20
Status: COMPLETED | OUTPATIENT
Start: 2023-01-21 | End: 2023-01-21

## 2023-01-21 RX ADMIN — Medication 1750 MG: at 12:01

## 2023-01-21 RX ADMIN — SODIUM CHLORIDE 2000 ML: 9 INJECTION, SOLUTION INTRAVENOUS at 10:01

## 2023-01-21 RX ADMIN — PIPERACILLIN AND TAZOBACTAM 4.5 G: 4; .5 INJECTION, POWDER, LYOPHILIZED, FOR SOLUTION INTRAVENOUS; PARENTERAL at 11:01

## 2023-01-21 NOTE — ED PROVIDER NOTES
Encounter Date: 1/21/2023  This document was partially completed using speech recognition software and may contain misspellings, grammatical errors, and/or unexpected word substitutions.       History     Chief Complaint   Patient presents with    Fall     Pt here by EMS, pt states he had a syncope episode at home and fell to the floor hitting the back of his head, no LOC. Positive on blood thinners. Pt received 100cc of NACL in route.     87 year old male with a PMHx of Afib on eliquis, CHF, DM, HLD, HTN presents to the ED after a syncopal episode and fall. Patient doesn't remember what happened. EMS reports patient had a syncopal episode at home, falling to the floor and hitting his head. No LOC. He was seen by me yesterday for possible prostatitis, discharged on cipro. Took 2 doses yesterday, 1 today. He's tolerated cipro before. Denies neck pain, back pain, numbness, weakness, chest pain, shortness of breath, abdominal pain, nausea, vomiting, hip pain. He has only taken his cipro today and no other medications yet including his eliquis. Ate yesterday. Drinks mostly soda, little water.      Review of patient's allergies indicates:   Allergen Reactions    Iodinated contrast media     Iodine Hives     Iv iodine only     Past Medical History:   Diagnosis Date    Arthritis     Atrial fibrillation     Bladder mass     BPH (benign prostatic hyperplasia)     CHF (congestive heart failure)     Depression     Diabetes mellitus type II     Hyperlipidemia     Hypertension     Microscopic hematuria     Prostate cancer 10/01/2018    RLS (restless legs syndrome)     Stroke     TIA    Wears glasses      Past Surgical History:   Procedure Laterality Date    back injections      BACK SURGERY      cervical fusion    CARDIAC PACEMAKER PLACEMENT      CARDIAC SURGERY Left     pacemaker placement    CATARACT EXTRACTION W/  INTRAOCULAR LENS IMPLANT Bilateral     cataracts    CAUDAL EPIDURAL STEROID INJECTION N/A 9/30/2022    Procedure:  "CAUDAL EPIDURAL STEROID INJECTION WITH CATHETER;  Surgeon: Maria Guadalupe Ortiz MD;  Location: [facility] OR;  Service: Pain Management;  Laterality: N/A;    COLECTOMY N/A     CYSTOSCOPY N/A     Pt stated, "I have had six Cystoscopy."    CYSTOSCOPY W/ RETROGRADES Bilateral 10/21/2019    Procedure: CYSTOSCOPY WITH RETROGRADE PYELOGRAM;  Surgeon: Elliott Coon MD;  Location: [facility] OR;  Service: Urology;  Laterality: Bilateral;  OP 6    DIGITAL RECTAL EXAMINATION UNDER ANESTHESIA N/A 10/21/2019    Procedure: EXAM UNDER ANESTHESIA, DIGITAL, RECTUM;  Surgeon: Elliott Coon MD;  Location: [facility] OR;  Service: Urology;  Laterality: N/A;    EPIDURAL STEROID INJECTION INTO CERVICAL SPINE N/A 5/13/2022    Procedure: CERVICAL EPIDURAL STEROID INJECTION (C7-T1 INTERLAMINAR);  Surgeon: Maria Guadalupe Ortiz MD;  Location: [facility];  Service: Pain Management;  Laterality: N/A;    EPIDURAL STEROID INJECTION INTO LUMBAR SPINE N/A 3/12/2021    Procedure: LUMBAR EPIDURAL STEROID INJECTION (L4-5 INTERLAMINAR);  Surgeon: Maria Guadalupe Ortiz MD;  Location: [facility];  Service: Pain Management;  Laterality: N/A;    Ganglion Cyst Removed  Right     INJECTION OF ANESTHETIC AGENT AROUND MEDIAL BRANCH NERVES INNERVATING LUMBAR FACET JOINT Bilateral 12/18/2020    Procedure: LUMBAR FACET JOINT BLOCK (L4-5,L5-S1);  Surgeon: Maria Guadalupe Ortiz MD;  Location: [facility];  Service: Pain Management;  Laterality: Bilateral;    INJECTION OF ANESTHETIC AGENT AROUND MEDIAL BRANCH NERVES INNERVATING LUMBAR FACET JOINT Bilateral 1/15/2021    Procedure: LUMBAR FACET JOINT BLOCK (L4-5,L5-S1);  Surgeon: Maria Guadalupe Ortiz MD;  Location: [facility] OR;  Service: Pain Management;  Laterality: Bilateral;    INJECTION OF ANESTHETIC AGENT AROUND NERVE Right 10/30/2020    Procedure: SUPERIOR LATERAL AND MEDIAL AND INFERIOR MEDIAL GENICULAR NERVE BLOCK;  Surgeon: Maria Guadalupe Ortiz MD;  Location: [facility] OR;  Service: Pain Management;  Laterality: Right;    MYELOGRAPHY N/A 4/4/2022    Procedure: " Myelogram;  Surgeon: Rufino Ramos MD;  Location: [facility] OR 2ND FLR;  Service: Radiology;  Laterality: N/A;    Neck Fusion Bilateral     PROSTATE SURGERY      RADIOFREQUENCY ABLATION OF LUMBAR MEDIAL BRANCH NERVE AT SINGLE LEVEL Left 2022    Procedure: RADIOFREQUENCY ABLATION (L4-5,L5-S1);  Surgeon: Maria Guadalupe Ortiz MD;  Location: [facility] OR;  Service: Pain Management;  Laterality: Left;    RADIOFREQUENCY THERMOCOAGULATION Right 3/4/2022    Procedure: RADIOFREQUENCY THERMAL COAGULATION (L4-5,L5-S1);  Surgeon: Maria Guadalupe Ortiz MD;  Location: [facility] OR;  Service: Pain Management;  Laterality: Right;    ROTATOR CUFF REPAIR Right     SKIN BIOPSY      skin cancer    TOTAL KNEE ARTHROPLASTY  2017    right knee    TRANSURETHRAL RESECTION OF PROSTATE       Family History   Problem Relation Age of Onset    Cancer Mother         breast    Heart disease Father     Diabetes Sister     Diabetes Brother     Heart disease Brother     COPD Daughter     Seizures Daughter     Cancer Daughter     Diabetes Daughter      Social History     Tobacco Use    Smoking status: Former     Packs/day: 1.50     Years: 35.00     Pack years: 52.50     Types: Cigarettes     Quit date: 1981     Years since quittin.1    Smokeless tobacco: Never    Tobacco comments:     38 yrs ago   Substance Use Topics    Alcohol use: Yes     Alcohol/week: 8.0 standard drinks     Types: 1 Cans of beer, 7 Shots of liquor per week     Comment: highball every night    Drug use: No     Review of Systems   Constitutional:  Negative for chills and fever.   HENT:  Negative for congestion, rhinorrhea and sneezing.    Eyes:  Negative for discharge and redness.   Respiratory:  Negative for cough and shortness of breath.    Cardiovascular:  Negative for chest pain and palpitations.   Gastrointestinal:  Negative for abdominal pain, diarrhea and vomiting.   Genitourinary:  Negative for difficulty urinating, flank pain and urgency.   Musculoskeletal:  Negative for back  pain and neck pain.   Skin:  Negative for rash and wound.   Neurological:  Positive for syncope. Negative for weakness, numbness and headaches.     Physical Exam     Initial Vitals   BP Pulse Resp Temp SpO2   01/21/23 1014 01/21/23 1014 01/21/23 1014 01/21/23 1031 01/21/23 1014   (!) 82/44 70 17 97.6 °F (36.4 °C) (!) 94 %      MAP       --                Physical Exam    Nursing note and vitals reviewed.  Constitutional: He appears well-developed. He is not diaphoretic. No distress.   HENT:   Head: Normocephalic and atraumatic.   Right Ear: External ear normal.   Left Ear: External ear normal.   Nose: Nose normal.   Eyes: Conjunctivae are normal. Right eye exhibits no discharge. Left eye exhibits no discharge. No scleral icterus.   Cardiovascular:  Normal rate and regular rhythm.           Pulmonary/Chest: Breath sounds normal. No stridor. No respiratory distress. He has no wheezes. He has no rhonchi. He has no rales.   Abdominal: Abdomen is soft. He exhibits no distension. There is no abdominal tenderness. There is no guarding.   Musculoskeletal:         General: No edema.      Cervical back: No bony tenderness.      Thoracic back: No bony tenderness.      Lumbar back: No bony tenderness.      Right hip: No tenderness or bony tenderness. Normal range of motion.      Left hip: No tenderness or bony tenderness. Normal range of motion.     Neurological: He is alert and oriented to person, place, and time. GCS score is 15. GCS eye subscore is 4. GCS verbal subscore is 5. GCS motor subscore is 6.   Skin: Skin is warm and dry. Capillary refill takes less than 2 seconds.   Psychiatric: He has a normal mood and affect.       ED Course   Critical Care    Date/Time: 1/21/2023 3:03 PM  Performed by: Gino Dyer DO  Authorized by: Gino Dyer DO   Direct patient critical care time: 21 minutes  Additional history critical care time: 3 minutes  Ordering / reviewing critical care time: 4 minutes  Documentation critical care  time: 3 minutes  Consult with family critical care time: 3 minutes  Total critical care time (exclusive of procedural time) : 34 minutes  Critical care time was exclusive of separately billable procedures and treating other patients and teaching time.  Critical care was necessary to treat or prevent imminent or life-threatening deterioration of the following conditions: dehydration.  Critical care was time spent personally by me on the following activities: development of treatment plan with patient or surrogate, evaluation of patient's response to treatment, examination of patient, obtaining history from patient or surrogate, ordering and performing treatments and interventions, ordering and review of laboratory studies, ordering and review of radiographic studies, re-evaluation of patient's condition and review of old charts.      Labs Reviewed   CBC W/ AUTO DIFFERENTIAL - Abnormal; Notable for the following components:       Result Value    RBC 3.59 (*)     Hemoglobin 10.4 (*)     Hematocrit 33.5 (*)     MCHC 31.0 (*)     Immature Grans (Abs) 0.05 (*)     Eos # 1.4 (*)     Eosinophil % 13.1 (*)     All other components within normal limits   COMPREHENSIVE METABOLIC PANEL - Abnormal; Notable for the following components:    Sodium 131 (*)     Glucose 179 (*)     BUN 33 (*)     Creatinine 1.5 (*)     Calcium 8.5 (*)     Total Protein 5.9 (*)     Albumin 3.3 (*)     eGFR 45 (*)     All other components within normal limits   CULTURE, BLOOD    Narrative:     Aerobic and anaerobic   CULTURE, BLOOD    Narrative:     Aerobic and anaerobic   LACTIC ACID, PLASMA   URINALYSIS, REFLEX TO URINE CULTURE    Narrative:     Specimen Source->Urine   TROPONIN I   PROCALCITONIN     EKG Readings: (Independently Interpreted)   Previous EKG: Compared with most recent EKG Previous EKG Date: 10/31/2019.   AV dual-paced rhythm at 60 bpm with prolonged AV conduction. Abnormal ECG    ECG Results              EKG 12-lead (Final result)   Result time 01/23/23 10:20:47      Final result by Interface, Lab In [facility] (01/23/23 10:20:47)                   Narrative:    Test Reason : R55    Vent. Rate : 060 BPM     Atrial Rate : 060 BPM     P-R Int : 262 ms          QRS Dur : 150 ms      QT Int : 476 ms       P-R-T Axes : 000 -13 083 degrees     QTc Int : 476 ms    AV dual-paced rhythm with prolonged AV conduction  Abnormal ECG  When compared with ECG of 31-OCT-2019 09:30,  Vent. rate has decreased BY  18 BPM  Confirmed by Bi DIAZ, Polo MCDONALD ([id number]) on 1/23/2023 10:20:35 AM    Referred By: [email or web address]   SELF           Confirmed By:Polo Pat MD                                     EKG 12-LEAD (Final result)  Result time 01/27/23 09:38:26      Final result by Unknown User (01/27/23 09:38:26)                                      Imaging Results              CT Chest Abdomen Pelvis Without Contrast (XPD) (Final result)  Result time 01/21/23 11:41:50      Final result by Rosio Mejias MD (01/21/23 11:41:50)                   Impression:      This patient with a history of trauma there is no evidence acute injury of the chest, abdomen or pelvis.      Electronically signed by: Rosio Mejias MD  Date:    01/21/2023  Time:    11:41               Narrative:    EXAMINATION:  CT CHEST ABDOMEN PELVIS WITHOUT CONTRAST(XPD)    CLINICAL HISTORY:  Polytrauma, blunt;    TECHNIQUE:  Low dose axial images, sagittal and coronal reformations were obtained from the thoracic inlet to the pubic symphysis    COMPARISON:  None    FINDINGS:  Chest: There is no evidence pneumothorax, pleural effusion nor focal consolidation.  There is bibasilar dependent atelectasis.    There is no evidence axillary nor mediastinal lymphadenopathy.  Mediastinal fat planes are maintained.  There is cardiomegaly without evidence pericardial effusion.  There is circumferential calcific plaque involving the aorta.  There is a cardiac pacemaker.    Abdomen/pelvis: The liver has evidence of old  granulomatous disease.  The spleen, adrenal glands, kidneys and pancreas are unremarkable.  The gallbladder is in place.    Stool is seen throughout the colon.  There is sigmoid diverticulosis without evidence diverticulitis.  The appendix is within normal limits.    There are bilateral fat containing inguinal hernias.  There is atherosclerotic disease of the aorta and iliac vasculature.  There is no evidence intra-abdominal free fluid nor free air.    The osseous structures are unremarkable.                                       CT Head Without Contrast (Final result)  Result time 01/21/23 11:43:22      Final result by Rosio Mejias MD (01/21/23 11:43:22)                   Impression:      There is no evidence acute intracranial process.      Electronically signed by: Rosio Mejias MD  Date:    01/21/2023  Time:    11:43               Narrative:    EXAMINATION:  CT HEAD WITHOUT CONTRAST    CLINICAL HISTORY:  Head trauma, minor (Age >= 65y);    TECHNIQUE:  Low dose axial images were obtained through the head.  Coronal and sagittal reformations were also performed. Contrast was not administered.    COMPARISON:  None.    FINDINGS:  There is cerebral atrophy.  There is proportional ventricular dilatation.  There is no evidence midline shift.  There is no evidence intracranial mass lesion.  There is no evidence intracranial hemorrhage.  The calvarium is intact.                                       CT Cervical Spine Without Contrast (Final result)  Result time 01/21/23 11:48:41      Final result by Rosio Mejias MD (01/21/23 11:48:41)                   Impression:      There is no evidence acute cervical spine fracture.  There are degenerative change is seen throughout.      Electronically signed by: Rosio Mejias MD  Date:    01/21/2023  Time:    11:48               Narrative:    EXAMINATION:  CT CERVICAL SPINE WITHOUT CONTRAST    CLINICAL HISTORY:  Neck trauma (Age >= 65y);    TECHNIQUE:  Low dose axial  images, sagittal and coronal reformations were performed though the cervical spine.  Contrast was not administered.    COMPARISON:  None    FINDINGS:  There is no evidence cervical spine fracture.  There is facet arthropathy seen throughout the cervical spine.  Contrast remains in the thecal sac from prior myelogram.  There is no prevertebral soft tissue swelling.  There is auto fusion of C6 and C7.  There is degenerative change throughout.                                       Medications   sodium chloride 0.9% bolus 1,914 mL 1,914 mL (0 mLs Intravenous Stopped 1/21/23 1133)   vancomycin 1750 mg in 0.9% sodium chloride 500 mL IVPB (0 mg Intravenous Stopped 1/21/23 1440)   piperacillin-tazobactam (ZOSYN) 4.5 g in dextrose 5 % in water (D5W) 5 % 100 mL IVPB (MB+) (0 g Intravenous Stopped 1/21/23 1206)     Medical Decision Making:   Differential Diagnosis:   Ddx: head bleed, skull fx, cspine fx, sepsis, orthostatics, dehydration, cardiac dysrhythmia, hip fx  ED Management:  Based on the patient's evaluation - patient appears well. Doubt sepsis - afebrile, no lactic acidosis, no leukocytosis. CT head, cspine, chest/abd/pelvis negative. I suspect hypotension may be due to dehydration with LANA and patient drinks mostly soda and not water. Given nearly 2L of IV fluids and he's feeling much better. Now hypertensive instead. Standing BP taken with patient being hypertensive and not having dizziness or lightheadedness. Discussed with patient admission vs discharge - patient feels fine, he WANTS to go home. Wife lives with him and son lives in the house behind theirs. Will d/c home with PCP f/u and return precautions given. Told to increase water intake. Patient and family are in agreement.                        Clinical Impression:   Final diagnoses:  [R55] Syncope (Primary)  [E86.0] Dehydration  [W19.XXXA] Fall, initial encounter        ED Disposition Condition    Discharge Stable          ED Prescriptions    None        Follow-up Information       Follow up With Specialties Details Why Contact Info    Hemal Varma MD Family Medicine Schedule an appointment as soon as possible for a visit in 3 days  [address]  [facility] [id number]  [phone]               Gino Dyer,   02/15/23 0350

## 2023-01-24 ENCOUNTER — TELEPHONE (OUTPATIENT)
Dept: FAMILY MEDICINE | Facility: CLINIC | Age: 88
End: 2023-01-24
Payer: MEDICARE

## 2023-01-24 ENCOUNTER — OFFICE VISIT (OUTPATIENT)
Dept: PAIN MEDICINE | Facility: CLINIC | Age: 88
End: 2023-01-24
Payer: MEDICARE

## 2023-01-24 VITALS
BODY MASS INDEX: 32.76 KG/M2 | HEART RATE: 83 BPM | DIASTOLIC BLOOD PRESSURE: 62 MMHG | HEIGHT: 66 IN | OXYGEN SATURATION: 95 % | RESPIRATION RATE: 18 BRPM | WEIGHT: 203.81 LBS | SYSTOLIC BLOOD PRESSURE: 158 MMHG

## 2023-01-24 DIAGNOSIS — M48.062 SPINAL STENOSIS OF LUMBAR REGION WITH NEUROGENIC CLAUDICATION: ICD-10-CM

## 2023-01-24 DIAGNOSIS — Z96.651 CHRONIC KNEE PAIN AFTER TOTAL REPLACEMENT OF RIGHT KNEE JOINT: ICD-10-CM

## 2023-01-24 DIAGNOSIS — G95.9 CERVICAL MYELOPATHY WITH CERVICAL RADICULOPATHY: ICD-10-CM

## 2023-01-24 DIAGNOSIS — M47.816 LUMBAR SPONDYLOSIS: ICD-10-CM

## 2023-01-24 DIAGNOSIS — M54.12 CERVICAL MYELOPATHY WITH CERVICAL RADICULOPATHY: ICD-10-CM

## 2023-01-24 DIAGNOSIS — R26.9 GAIT ABNORMALITY: ICD-10-CM

## 2023-01-24 DIAGNOSIS — G89.29 CHRONIC KNEE PAIN AFTER TOTAL REPLACEMENT OF RIGHT KNEE JOINT: ICD-10-CM

## 2023-01-24 DIAGNOSIS — M25.561 CHRONIC KNEE PAIN AFTER TOTAL REPLACEMENT OF RIGHT KNEE JOINT: ICD-10-CM

## 2023-01-24 DIAGNOSIS — R53.81 PHYSICAL DECONDITIONING: ICD-10-CM

## 2023-01-24 DIAGNOSIS — M48.062 SPINAL STENOSIS OF LUMBAR REGION WITH NEUROGENIC CLAUDICATION: Primary | ICD-10-CM

## 2023-01-24 PROCEDURE — 1100F PR PT FALLS ASSESS DOC 2+ FALLS/FALL W/INJURY/YR: ICD-10-PCS | Mod: CPTII,S$GLB,, | Performed by: NURSE PRACTITIONER

## 2023-01-24 PROCEDURE — 1159F MED LIST DOCD IN RCRD: CPT | Mod: CPTII,S$GLB,, | Performed by: NURSE PRACTITIONER

## 2023-01-24 PROCEDURE — 1125F PR PAIN SEVERITY QUANTIFIED, PAIN PRESENT: ICD-10-PCS | Mod: CPTII,S$GLB,, | Performed by: NURSE PRACTITIONER

## 2023-01-24 PROCEDURE — 1125F AMNT PAIN NOTED PAIN PRSNT: CPT | Mod: CPTII,S$GLB,, | Performed by: NURSE PRACTITIONER

## 2023-01-24 PROCEDURE — 3288F PR FALLS RISK ASSESSMENT DOCUMENTED: ICD-10-PCS | Mod: CPTII,S$GLB,, | Performed by: NURSE PRACTITIONER

## 2023-01-24 PROCEDURE — 99214 OFFICE O/P EST MOD 30 MIN: CPT | Mod: S$GLB,,, | Performed by: NURSE PRACTITIONER

## 2023-01-24 PROCEDURE — 3288F FALL RISK ASSESSMENT DOCD: CPT | Mod: CPTII,S$GLB,, | Performed by: NURSE PRACTITIONER

## 2023-01-24 PROCEDURE — 99999 PR PBB SHADOW E&M-EST. PATIENT-LVL III: ICD-10-PCS | Mod: PBBFAC,,, | Performed by: NURSE PRACTITIONER

## 2023-01-24 PROCEDURE — 1159F PR MEDICATION LIST DOCUMENTED IN MEDICAL RECORD: ICD-10-PCS | Mod: CPTII,S$GLB,, | Performed by: NURSE PRACTITIONER

## 2023-01-24 PROCEDURE — 99999 PR PBB SHADOW E&M-EST. PATIENT-LVL III: CPT | Mod: PBBFAC,,, | Performed by: NURSE PRACTITIONER

## 2023-01-24 PROCEDURE — 1100F PTFALLS ASSESS-DOCD GE2>/YR: CPT | Mod: CPTII,S$GLB,, | Performed by: NURSE PRACTITIONER

## 2023-01-24 PROCEDURE — 99214 PR OFFICE/OUTPT VISIT, EST, LEVL IV, 30-39 MIN: ICD-10-PCS | Mod: S$GLB,,, | Performed by: NURSE PRACTITIONER

## 2023-01-24 RX ORDER — HYDROCODONE BITARTRATE AND ACETAMINOPHEN 10; 325 MG/1; MG/1
1 TABLET ORAL EVERY 12 HOURS PRN
Qty: 60 TABLET | Refills: 0 | Status: SHIPPED | OUTPATIENT
Start: 2023-01-24 | End: 2023-03-03 | Stop reason: SDUPTHER

## 2023-01-24 NOTE — PROGRESS NOTES
Pain Medicine      Chief Complaint:   Chief Complaint   Patient presents with    Low-back Pain       History of Present Illness: Erasmo Dunbar is a 87 y.o. male referred by Dr. Hemal Varma MD for chronic LBP.      Onset: years of back pain, but worsened in the past few months  Location: right sided lower back  Radiation: across lower back on the left side, and sometimes into the posterior thighs, but not below the knees.   Timing: Intermittent, only when he stands and walks and completely improves with sitting down  Quality: Throbbing, Deep and Sharp  Exacerbating Factors: standing and walking  Alleviating Factors: sitting, heat, ice, medications and rest  Associated Symptoms: He has numbness in his feet and legs from neuropathy. denies night fever/night sweats, urinary incontinence, bowel incontinence, significant weight loss and significant motor weakness     Severity: Currently: 8/10   Typical Range: 5-10/10     Exacerbation: 10/10     Hydrocodone 7.5/325 mg brings pain from a 10/10 to a 9/10 for maybe 3-4 hours. He denies any side effects.      He also notes bilateral knee pain. He has a history of right knee replacement in 2017. Knee pain worse with standing and walking. Braces help. Medications help, but not completely. He does feel swelling in the left knee at times. Knees will go out on him at times.     Shoulder Pain 10/25/21:  He also notes right shoulder pain that began 4-5 weeks ago.  He went saw Rheumatology and had a right shoulder injection which helped, but then he underwent a caudal epidural steroid injection with Dr. Mathew and afterwards his shoulder pain was worse again.  He states he was completely unconscious for the injection and things that potentially reaggravated the shoulder.  He localizes the shoulder pain to right subacromial/deltoid area and this radiates into the right biceps.  Pain seems to be worse with any overhead activity. He does have a history of right shoulder surgyer.        Interval History (08/23/2022):  Erasmo Dunbar returns today for follow up.  At the last clinic visit, performed cluneal nerve block    Performed right cluneal nerve block provided 0% relief.     Currently, the low back pain is okay. He is not having much pain today. He did have a bad weekend though. Pain still worse first in the morning and when he walks. He is limited in how far he can walk.  He has a walker, but using his cane today.    He takes his Norco  either as a whole tablet or half a tablet, but he does not take this every day.  He does much better on the days that he does take it.  He denies any side effects.    Current Pain Scales:  Current: 5/10              Typical Range: 5-10/10    Interval History (1/24/2023):  Mr Dunbar presents for follow up of lower back pain. He did have a syncopal fall that was evaluated by ER. He did have xray excluding compression Fx. He has had worsening pains prior to fall but additionally had several procedures without relief in past which is why Norco was started. He is not interested in surgical intervention.  He currently takes Norco 10/325mg BID #60 and requesting increase frequency consideration. Discussed he does not even take BID consistently so increasing frequency would not benefit. There is no concerning symptoms voiced for cauda equina.     Previous Interventions:  - 5/13/22: C7-T1 IL JACK w/ 100% relief for 2 days  - 3/12/21: L4-5 IL JACK w/ 0% relief  - 1/15/21: Bilateral L4-5, L5-S1 MBBs w/ 50% relief   - 12/18/20: Bilateral L4-5, L5-S1 MBBs w/ % relief for 2 hours  - 11/20/20: Bilateral GTB injection  - 10/30/20: Right genicular nerve block w/ 95% relief  - 10/12/20: Bilateral Cluneal nerve block with good relief for a few days.  - Dr. Varma, Dr. Jain both provided injections in the past, with limited benefit.     Previous Therapies:  PT/OT: yes   Relevant Surgery: yes    - Right knee replacement in 2017   - Cervical fusion 30  years ago  Previous Medications:   - NSAIDS: Tylenol. Avoiding other nsaids due to blood thinners.  - Muscle Relaxants:    - TCAs:   - SNRIs:   - Topicals: Many different topicals.   - Anticonvulsants:  Gabapentin was on this for a long time.   - Opioids: Hydrocodone    Current Pain Medications:  Norco  mg BID prn  Lyrica 100 mg BID  traZODone 50 MG tablet    Blood Thinners: Eliquis    Fu:ll Medication List:    Current Outpatient Medications:     atorvastatin (LIPITOR) 10 MG tablet, Take 1 tablet (10 mg total) by mouth once daily., Disp: 30 tablet, Rfl: 5    blood sugar diagnostic (BLOOD GLUCOSE TEST) Strp, Use one Accu-Chek Felicia Plus Test Strip per glucometer to test blood glucose three times a day. Dx: E11.22, Disp: 600 strip, Rfl: 3    blood-glucose meter kit, Accu-Chek Felicia Plus Meter to test blood glucose three times a day. Dx: E11.22, Disp: 1 each, Rfl: 0    ciprofloxacin HCl (CIPRO) 500 MG tablet, Take 1 tablet (500 mg total) by mouth 2 (two) times daily., Disp: 56 tablet, Rfl: 0    cyanocobalamin 1,000 mcg/mL injection, Inject 1 mL (1,000 mcg total) into the skin every 30 days., Disp: 1 mL, Rfl: 11    DULoxetine (CYMBALTA) 30 MG capsule, Take 1 capsule (30 mg total) by mouth once daily., Disp: 30 capsule, Rfl: 5    ELIQUIS 5 mg Tab, Take 1 tablet (5 mg total) by mouth 2 (two) times daily., Disp: 180 tablet, Rfl: 1    HYDROcodone-acetaminophen (NORCO)  mg per tablet, Take 1 tablet by mouth every 12 (twelve) hours as needed for Pain., Disp: 60 tablet, Rfl: 0    insulin (LANTUS SOLOSTAR U-100 INSULIN) glargine 100 units/mL SubQ pen, INJECT 42 UNITS SUBCUTANEOUSLY EVERY EVENING, Disp: 45 mL, Rfl: 5    lancets Misc, Use one Accu-Chek Softclix Lancet per lancing device to test blood glucose three times a day. Dx: E11.22, Disp: 600 each, Rfl: 3    lisinopriL 10 MG tablet, Take 1 tablet (10 mg total) by mouth 2 (two) times daily., Disp: 180 tablet, Rfl: 1    meloxicam (MOBIC) 7.5 MG tablet, , Disp: ,  "Rfl:     memantine (NAMENDA) 5 MG Tab, Take one nightly for 3 weeks, then one BID (Patient taking differently: Take 5 mg by mouth once daily. Take one nightly for 3 weeks, then one BID), Disp: 180 tablet, Rfl: 3    metFORMIN (GLUCOPHAGE) 1000 MG tablet, Take 1 tablet (1,000 mg total) by mouth 2 (two) times daily with meals., Disp: 180 tablet, Rfl: 0    metoprolol tartrate (LOPRESSOR) 25 MG tablet, Take 25 mg by mouth 2 (two) times daily., Disp: , Rfl:     MITIGARE 0.6 mg Cap, Take 1 capsule by mouth once daily., Disp: , Rfl:     pen needle, diabetic 31 gauge x 3/16" Ndle, 1 Device by Misc.(Non-Drug; Combo Route) route 3 (three) times daily., Disp: 100 each, Rfl: 11    pioglitazone (ACTOS) 15 MG tablet, Take 1 tablet (15 mg total) by mouth once daily., Disp: 90 tablet, Rfl: 1    pregabalin (LYRICA) 100 MG capsule, Take 1 capsule (100 mg total) by mouth 2 (two) times daily., Disp: 180 capsule, Rfl: 1    SITagliptin phosphate (JANUVIA) 100 MG Tab, Take 1 tablet (100 mg total) by mouth once daily., Disp: 90 tablet, Rfl: 5    syringe with needle (SYRINGE 3CC/25GX1") 3 mL 25 gauge x 1" Syrg, 1 Device by Misc.(Non-Drug; Combo Route) route every 30 days., Disp: 1 each, Rfl: 11    tiZANidine (ZANAFLEX) 4 MG tablet, Take 1 tablet (4 mg total) by mouth daily as needed., Disp: 30 tablet, Rfl: 5    traZODone (DESYREL) 50 MG tablet, TAKE 1 TABLET EVERY EVENING., Disp: 90 tablet, Rfl: 1    turmeric 400 mg Cap, Take 1,200 mg by mouth., Disp: , Rfl:     UNABLE TO FIND, 1,500 mg. medication name: CBD Oil, Disp: , Rfl:     vit C/E/zinc ox/ryan/lut/zeax (ICAPS AREDS2 ORAL), Take 1 capsule by mouth. 4 a day, Disp: , Rfl:     bumetanide (BUMEX) 1 MG tablet, Take 1 mg by mouth once daily., Disp: , Rfl:     REPATHA SURECLICK 140 mg/mL PnIj, Every other week, Disp: , Rfl:   No current facility-administered medications for this visit.    Facility-Administered Medications Ordered in Other Visits:     0.9%  NaCl infusion, , Intravenous, " Continuous, Maria Guadalupe Ortiz MD     Review of Systems:  Review of Systems   Constitutional:  Negative for fever and weight loss.   HENT:  Negative for ear pain and tinnitus.    Eyes:  Negative for pain and redness.   Respiratory:  Negative for cough and shortness of breath.    Cardiovascular:  Negative for chest pain and palpitations.   Gastrointestinal:  Positive for diarrhea. Negative for constipation and heartburn.   Genitourinary: Negative.         Denies urinary incontinence. Denies urine retention.    Musculoskeletal:  Positive for back pain. Negative for neck pain.   Skin:  Negative for itching and rash.   Neurological:  Positive for tingling. Negative for focal weakness and seizures.   Endo/Heme/Allergies:  Negative for environmental allergies. Bruises/bleeds easily.   Psychiatric/Behavioral:  Negative for depression. The patient has insomnia. The patient is not nervous/anxious.      Allergies:  Iodinated contrast media and Iodine     Medical History:   has a past medical history of Arthritis, Atrial fibrillation, Bladder mass, BPH (benign prostatic hyperplasia), CHF (congestive heart failure), Depression, Diabetes mellitus type II, Hyperlipidemia, Hypertension, Microscopic hematuria, Prostate cancer (10/01/2018), RLS (restless legs syndrome), Stroke, and Wears glasses.    Surgical History:   has a past surgical history that includes Transurethral resection of prostate; Prostate surgery; Rotator cuff repair (Right); Ganglion Cyst Removed  (Right); Neck Fusion (Bilateral); Colectomy (N/A); Cystoscopy (N/A); Back surgery; Cardiac surgery (Left); Skin biopsy; Total knee arthroplasty (03/30/2017); Cardiac pacemaker placement; back injections; Cystoscopy w/ retrogrades (Bilateral, 10/21/2019); Digital rectal examination under anesthesia (N/A, 10/21/2019); Cataract extraction w/  intraocular lens implant (Bilateral); Injection of anesthetic agent around nerve (Right, 10/30/2020); Injection of anesthetic agent  "around medial branch nerves innervating lumbar facet joint (Bilateral, 12/18/2020); Injection of anesthetic agent around medial branch nerves innervating lumbar facet joint (Bilateral, 1/15/2021); Epidural steroid injection into lumbar spine (N/A, 3/12/2021); Radiofrequency ablation of lumbar medial branch nerve at single level (Left, 1/21/2022); Radiofrequency thermocoagulation (Right, 3/4/2022); Myelography (N/A, 4/4/2022); Epidural steroid injection into cervical spine (N/A, 5/13/2022); and Caudal epidural steroid injection (N/A, 9/30/2022).    Family History:  family history includes COPD in his daughter; Cancer in his daughter and mother; Diabetes in his brother, daughter, and sister; Heart disease in his brother and father; Seizures in his daughter.    Social History:   reports that he quit smoking about 42 years ago. His smoking use included cigarettes. He has a 52.50 pack-year smoking history. He has never used smokeless tobacco. He reports current alcohol use of about 8.0 standard drinks per week. He reports that he does not use drugs.    Physical Exam:  BP (!) 158/62   Pulse 83   Resp 18   Ht 5' 6" (1.676 m)   Wt 92.4 kg (203 lb 13 oz)   SpO2 95%   BMI 32.90 kg/m²   GEN:  Well developed, well nourished.  No acute distress.   HEENT:  No trauma.  Mucous membranes moist.  Nares patent bilaterally.  PSYCH: Normal affect. Thought content appropriate.  CHEST:  Breathing symmetric.  No audible wheezing.  ABD: Soft, non-distended.  SKIN:  Warm, pink, dry.  No rash on exposed areas.    EXT:  No cyanosis, clubbing, or edema.  No color change or changes in nail or hair growth.  NEURO/MUSCULOSKELETAL:  Fully alert, oriented, and appropriate. Speech normal renee. No cranial nerve deficits.   Gait: Antalgic, using cane for ambulation.      Imaging:  - Xray Shoulder 10/25/2021:  The bones are osteopenic.  There is mild widening of the acromioclavicular joint which can be seen the setting of type 1 AC joint " separation.  Mild degenerative changes of the acromioclavicular joint are seen.  The humeral head is high-riding suggesting underlying rotator cuff pathology.  Small calcification measuring 5 mm adjacent to the greater tuberosity suggesting calcific tendinitis.  No fracture or dislocation is seen.  No evidence of lytic or blastic lesions. Leads from a pacer device are partially imaged.    - XRAY Bilateral Hips 02/02/2021:  The bones are osteopenic.  There are mild degenerative changes of the bilateral sacroiliac joints, hip joints and pubic symphysis.  No fracture or dislocation.  Vascular calcifications.     - XRAY Bilateral Knee 01/29/2021:   Postoperative changes of a right total knee arthroplasty are seen in satisfactory alignment without hardware complication.  The left knee demonstrates medial, lateral and patellofemoral compartment degenerative change with joint space narrowing, subchondral sclerosis and marginal osteophyte formation, most probably involving the medial compartment of the knee.  There is also apparent chondrocalcinosis of the lateral compartment of the left knee.  No joint effusion.    - X-ray Lumbar Spine 5/13/20:  Moderate facet arthropathy throughout the lower lumbar spine greatest at L5/S1.  Diffuse osteopenia.  Mild spondylosis L3/4. Overall alignment is well maintained.  No evidence of spondylolysis or spondylolisthesis. Disk and vertebral body heights are normal.  Severe atherosclerotic disease.  Mild constipation.    - CT Lumbar spine 3/19/19:  The incidentally visualized soft tissue structures of the abdomen show calcifications of the aorta and its major branch vessels.  Vertebral body alignment and heights are maintained.  There is disc space narrowing at the L3-4 level.  No fracture or subluxation is identified.  At T12-L1, no disc herniation, central canal stenosis or neural foraminal narrowing.  At L1-2, no disc herniation, central canal stenosis or neural foraminal narrowing.  At  L2-3, there is a broad-based posterior disc bulge contributing to mild central canal stenosis with mild bilateral neural foraminal narrowing.  At L3-4, there is a broad-based posterior disc bulge with ligamentum flavum hypertrophy contributing to mild central canal stenosis with moderate right and moderate severe left-sided neural foraminal narrowing.  At L4-5, there is a broad-based posterior disc bulge with facet arthropathy contributing to mild central canal stenosis with moderate bilateral neural foraminal narrowing.  At L5-S1, there is a broad-based posterior disc bulge and facet arthropathy contributing to mild central canal stenosis with moderate left and moderate severe right-sided neural foraminal narrowing.  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  Please see above report for level by level description.      Labs:  BMP  Lab Results   Component Value Date     (L) 01/21/2023    K 4.9 01/21/2023    CL 96 01/21/2023    CO2 26 01/21/2023    BUN 33 (H) 01/21/2023    CREATININE 1.5 (H) 01/21/2023    CALCIUM 8.5 (L) 01/21/2023    ANIONGAP 9 01/21/2023    ESTGFRAFRICA >60 02/03/2022    EGFRNONAA >60 02/03/2022     Lab Results   Component Value Date    ALT 17 01/21/2023    AST 14 01/21/2023    ALKPHOS 69 01/21/2023    BILITOT 0.3 01/21/2023     Lab Results   Component Value Date     01/21/2023       Assessment:  Erasmo Dunbar is a 87 y.o. male with the following diagnoses based on history, exam, and imaging:    Problem List Items Addressed This Visit          Neuro    Lumbar spondylosis    Spinal stenosis of lumbar region with neurogenic claudication - Primary     Other Visit Diagnoses       Physical deconditioning        Gait abnormality                  This is a pleasant 87 y.o. gentleman presenting with:     - Chronic bilateral low back pain: His pain appears multifactorial with facetogenic pain and  LSS w/ neurogenic claudication. He had % and then 50% improvement in pain after MBBs, and left L3-5 MB RFA w/ 60% relief. He had no relief from right L3-5 MB RFA. He does have multilevel canal and foraminal stenosis.    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   - Chronic bilateral shoulder pain: AC joint separation prior. Calcific tendonitis and prior RTC tears. Complicated by his cervical pathology as well with severe stenosis bilaterally at C5-6.    - Right shoulder pain exacerbated by acute fall on 6/2. No significant bruising or erythema.   - Chronic neck pain: more shoulder pain than neck pain.  - Bilateral trochanteric bursitis  - Chronic right knee pain after total knee replacement  - Chronic left knee pain:  Pseudogout on imaging, has been referred to Rheumatology, had CSI with good relief, and also colchicine which is helping.   - Chronic opioid use: I do think this is appropriate considering his age and limited medication options due to comorbidities.   - Peripheral neuropathy 2/2 DM2.   - Comorbidities: Chronic anticoagulation on eliquis for paroxysmal A-fib, pacemaker    Treatment Plan:     - Prior records reviewed  - Prior imaging (including updated Xray of L spine reviewed)  - Will refill Norco 10/325mg BID #60  - [facility] reviewed nad last filled 11/16/2022, discussed taking more consistently prior to considering medication increase  - Dr Duarte will be provider of medication mgt due to departure or Dr Ortiz  - Continue Lyrica 100mg BID  - Continue Zanaflex PRN  - Cont HEP   - RTC with Dr Evans to establish care, obtain updated PMA and discuss med mgt     CAROLYN Yun  01/24/2023    Disclaimer: This note was partly generated using dictation software which may occasionally result in transcription errors.      I spent a total of 30 minutes on the day of the visit.  This includes face to face time and non-face to face time preparing to see the patient by reviewing  previous labs/imaging, obtaining and/or reviewing separately obtained history, documenting clinical information in the electronic or other health record, independently interpreting results and communicating results to the patient/family/caregiver.

## 2023-01-24 NOTE — TELEPHONE ENCOUNTER
----- Message from Shabbir Hawley sent at 2023  4:32 PM CST -----  Contact: Wife - Carol Dunbar  MRN: [id number]  : 1935  PCP: Hemal Varma  Home Phone      [phone]  Work Phone      Not on file.  Mobile          [phone]      MESSAGE: seen in ER Sat - prostate infection - back pain -- told to see PCP inn 3 days -- requesting appt with Dr Varma    Call Carol @ [phone]    PCP: Kojo

## 2023-01-26 LAB
BACTERIA BLD CULT: NORMAL
BACTERIA BLD CULT: NORMAL

## 2023-03-03 DIAGNOSIS — Z96.651 CHRONIC KNEE PAIN AFTER TOTAL REPLACEMENT OF RIGHT KNEE JOINT: ICD-10-CM

## 2023-03-03 DIAGNOSIS — G95.9 CERVICAL MYELOPATHY WITH CERVICAL RADICULOPATHY: ICD-10-CM

## 2023-03-03 DIAGNOSIS — M48.062 SPINAL STENOSIS OF LUMBAR REGION WITH NEUROGENIC CLAUDICATION: ICD-10-CM

## 2023-03-03 DIAGNOSIS — G89.29 CHRONIC KNEE PAIN AFTER TOTAL REPLACEMENT OF RIGHT KNEE JOINT: ICD-10-CM

## 2023-03-03 DIAGNOSIS — M47.816 LUMBAR SPONDYLOSIS: ICD-10-CM

## 2023-03-03 DIAGNOSIS — M25.561 CHRONIC KNEE PAIN AFTER TOTAL REPLACEMENT OF RIGHT KNEE JOINT: ICD-10-CM

## 2023-03-03 DIAGNOSIS — M54.12 CERVICAL MYELOPATHY WITH CERVICAL RADICULOPATHY: ICD-10-CM

## 2023-03-03 RX ORDER — HYDROCODONE BITARTRATE AND ACETAMINOPHEN 10; 325 MG/1; MG/1
1 TABLET ORAL EVERY 12 HOURS PRN
Qty: 60 TABLET | Refills: 0 | Status: SHIPPED | OUTPATIENT
Start: 2023-03-03 | End: 2023-04-03 | Stop reason: SDUPTHER

## 2023-03-03 NOTE — TELEPHONE ENCOUNTER
----- Message from Angeline Izquierdo sent at 3/3/2023  3:17 PM CST -----  Contact: jody/spouse  Erasmo Dunbar  MRN: [id number]  : 1935  PCP: Hemal Varma  Home Phone      [phone]  Work Phone      Not on file.  Mobile          [phone]      MESSAGE: refill on [place]  [facility]        Phone [phone]

## 2023-04-04 ENCOUNTER — PATIENT MESSAGE (OUTPATIENT)
Dept: PAIN MEDICINE | Facility: CLINIC | Age: 88
End: 2023-04-04
Payer: MEDICARE

## 2023-04-24 ENCOUNTER — PATIENT MESSAGE (OUTPATIENT)
Dept: FAMILY MEDICINE | Facility: CLINIC | Age: 88
End: 2023-04-24
Payer: MEDICARE

## 2023-04-25 ENCOUNTER — PATIENT MESSAGE (OUTPATIENT)
Dept: FAMILY MEDICINE | Facility: CLINIC | Age: 88
End: 2023-04-25
Payer: MEDICARE

## 2023-04-26 ENCOUNTER — OFFICE VISIT (OUTPATIENT)
Dept: FAMILY MEDICINE | Facility: CLINIC | Age: 88
End: 2023-04-26
Payer: MEDICARE

## 2023-04-26 VITALS
HEIGHT: 66 IN | OXYGEN SATURATION: 97 % | DIASTOLIC BLOOD PRESSURE: 62 MMHG | BODY MASS INDEX: 31.96 KG/M2 | HEART RATE: 79 BPM | SYSTOLIC BLOOD PRESSURE: 138 MMHG | RESPIRATION RATE: 16 BRPM | WEIGHT: 198.88 LBS

## 2023-04-26 DIAGNOSIS — L98.9 SKIN LESION: Primary | ICD-10-CM

## 2023-04-26 DIAGNOSIS — M47.816 LUMBAR SPONDYLOSIS: ICD-10-CM

## 2023-04-26 DIAGNOSIS — M48.062 SPINAL STENOSIS OF LUMBAR REGION WITH NEUROGENIC CLAUDICATION: ICD-10-CM

## 2023-04-26 PROCEDURE — 1159F PR MEDICATION LIST DOCUMENTED IN MEDICAL RECORD: ICD-10-PCS | Mod: CPTII,S$GLB,, | Performed by: FAMILY MEDICINE

## 2023-04-26 PROCEDURE — 1125F PR PAIN SEVERITY QUANTIFIED, PAIN PRESENT: ICD-10-PCS | Mod: CPTII,S$GLB,, | Performed by: FAMILY MEDICINE

## 2023-04-26 PROCEDURE — 1159F MED LIST DOCD IN RCRD: CPT | Mod: CPTII,S$GLB,, | Performed by: FAMILY MEDICINE

## 2023-04-26 PROCEDURE — 1125F AMNT PAIN NOTED PAIN PRSNT: CPT | Mod: CPTII,S$GLB,, | Performed by: FAMILY MEDICINE

## 2023-04-26 PROCEDURE — 1100F PTFALLS ASSESS-DOCD GE2>/YR: CPT | Mod: CPTII,S$GLB,, | Performed by: FAMILY MEDICINE

## 2023-04-26 PROCEDURE — 99999 PR PBB SHADOW E&M-EST. PATIENT-LVL V: ICD-10-PCS | Mod: PBBFAC,,, | Performed by: FAMILY MEDICINE

## 2023-04-26 PROCEDURE — 3288F PR FALLS RISK ASSESSMENT DOCUMENTED: ICD-10-PCS | Mod: CPTII,S$GLB,, | Performed by: FAMILY MEDICINE

## 2023-04-26 PROCEDURE — 99214 PR OFFICE/OUTPT VISIT, EST, LEVL IV, 30-39 MIN: ICD-10-PCS | Mod: S$GLB,,, | Performed by: FAMILY MEDICINE

## 2023-04-26 PROCEDURE — 1100F PR PT FALLS ASSESS DOC 2+ FALLS/FALL W/INJURY/YR: ICD-10-PCS | Mod: CPTII,S$GLB,, | Performed by: FAMILY MEDICINE

## 2023-04-26 PROCEDURE — 99214 OFFICE O/P EST MOD 30 MIN: CPT | Mod: S$GLB,,, | Performed by: FAMILY MEDICINE

## 2023-04-26 PROCEDURE — 3288F FALL RISK ASSESSMENT DOCD: CPT | Mod: CPTII,S$GLB,, | Performed by: FAMILY MEDICINE

## 2023-04-26 PROCEDURE — 99999 PR PBB SHADOW E&M-EST. PATIENT-LVL V: CPT | Mod: PBBFAC,,, | Performed by: FAMILY MEDICINE

## 2023-04-26 NOTE — PROGRESS NOTES
Subjective:       Patient ID: Erasmo Dunbar is a 87 y.o. male.    Chief Complaint: Back Pain (Pt here for severe back pain. )    Pt is a 87 y.o. male who presents for evaluation and management of   Encounter Diagnoses   Name Primary?    Spinal stenosis of lumbar region with neurogenic claudication     Lumbar spondylosis     Skin lesion Yes   .  Doing well on current meds. Denies any side effects. Prevention is up to date.    Review of Systems   Musculoskeletal:  Positive for back pain.   Neurological:  Positive for weakness.     Objective:      Physical Exam  Constitutional:       Appearance: Normal appearance. He is well-developed. He is not ill-appearing.   HENT:      Head: Normocephalic and atraumatic.      Right Ear: External ear normal.      Left Ear: External ear normal.      Nose: Nose normal.      Mouth/Throat:      Mouth: Mucous membranes are moist.      Pharynx: No oropharyngeal exudate or posterior oropharyngeal erythema.   Eyes:      General: No scleral icterus.        Right eye: No discharge.         Left eye: No discharge.      Conjunctiva/sclera: Conjunctivae normal.      Pupils: Pupils are equal, round, and reactive to light.   Neck:      Thyroid: No thyromegaly.      Vascular: No JVD.      Trachea: No tracheal deviation.   Cardiovascular:      Rate and Rhythm: Normal rate and regular rhythm.      Heart sounds: Normal heart sounds. No murmur heard.  Pulmonary:      Effort: Pulmonary effort is normal. No respiratory distress.      Breath sounds: Normal breath sounds. No wheezing or rales.   Chest:      Chest wall: No tenderness.   Abdominal:      General: Bowel sounds are normal. There is no distension.      Palpations: Abdomen is soft. There is no mass.      Tenderness: There is no abdominal tenderness. There is no guarding or rebound.   Musculoskeletal:         General: Tenderness present. Normal range of motion.      Cervical back: Normal range of motion and neck supple.      Right lower leg: No  edema.      Left lower leg: No edema.   Lymphadenopathy:      Cervical: No cervical adenopathy.   Skin:     General: Skin is warm and dry.      Comments: Right forearm with nodular lesion that is suspicious for SCC   Neurological:      Mental Status: He is alert and oriented to person, place, and time.      Cranial Nerves: No cranial nerve deficit.      Motor: No abnormal muscle tone.      Coordination: Coordination normal.      Deep Tendon Reflexes: Reflexes are normal and symmetric. Reflexes normal.   Psychiatric:         Behavior: Behavior normal.         Thought Content: Thought content normal.         Judgment: Judgment normal.       Assessment:       1. Skin lesion    2. Spinal stenosis of lumbar region with neurogenic claudication    3. Lumbar spondylosis        Plan:   1. Skin lesion  -     Ambulatory referral/consult to Dermatology; Future; Expected date: 05/03/2023    2. Spinal stenosis of lumbar region with neurogenic claudication  Overview:  Has had several JACK's with variable to no relief.       Orders:  -     Ambulatory referral/consult to Physical/Occupational Therapy; Future; Expected date: 05/03/2023    3. Lumbar spondylosis  Overview:  Several injections from PM with variable to no relief       Orders:  -     Ambulatory referral/consult to Physical/Occupational Therapy; Future; Expected date: 05/03/2023    He wants me to refer him to a surgeon, but I had to explain that this is DJD that is not correctable with surgery.  Will refer to PT---he needs to continue the exercises at home. He did not do this after his last PT stent.   Lose weight        No follow-ups on file.

## 2023-05-08 ENCOUNTER — PATIENT MESSAGE (OUTPATIENT)
Dept: FAMILY MEDICINE | Facility: CLINIC | Age: 88
End: 2023-05-08
Payer: MEDICARE

## 2023-05-08 ENCOUNTER — PATIENT MESSAGE (OUTPATIENT)
Dept: PAIN MEDICINE | Facility: CLINIC | Age: 88
End: 2023-05-08
Payer: MEDICARE

## 2023-05-08 DIAGNOSIS — M47.816 LUMBAR SPONDYLOSIS: ICD-10-CM

## 2023-05-08 DIAGNOSIS — Z96.651 CHRONIC KNEE PAIN AFTER TOTAL REPLACEMENT OF RIGHT KNEE JOINT: ICD-10-CM

## 2023-05-08 DIAGNOSIS — E11.42 DIABETIC POLYNEUROPATHY ASSOCIATED WITH TYPE 2 DIABETES MELLITUS: ICD-10-CM

## 2023-05-08 DIAGNOSIS — G95.9 CERVICAL MYELOPATHY WITH CERVICAL RADICULOPATHY: ICD-10-CM

## 2023-05-08 DIAGNOSIS — M25.561 CHRONIC KNEE PAIN AFTER TOTAL REPLACEMENT OF RIGHT KNEE JOINT: ICD-10-CM

## 2023-05-08 DIAGNOSIS — G89.29 CHRONIC KNEE PAIN AFTER TOTAL REPLACEMENT OF RIGHT KNEE JOINT: ICD-10-CM

## 2023-05-08 DIAGNOSIS — M48.061 SPINAL STENOSIS OF LUMBAR REGION WITHOUT NEUROGENIC CLAUDICATION: ICD-10-CM

## 2023-05-08 DIAGNOSIS — M48.062 SPINAL STENOSIS OF LUMBAR REGION WITH NEUROGENIC CLAUDICATION: ICD-10-CM

## 2023-05-08 DIAGNOSIS — M54.12 CERVICAL MYELOPATHY WITH CERVICAL RADICULOPATHY: ICD-10-CM

## 2023-05-08 RX ORDER — HYDROCODONE BITARTRATE AND ACETAMINOPHEN 10; 325 MG/1; MG/1
1 TABLET ORAL EVERY 12 HOURS PRN
Qty: 60 TABLET | Refills: 0 | Status: SHIPPED | OUTPATIENT
Start: 2023-05-08 | End: 2023-06-20 | Stop reason: SDUPTHER

## 2023-05-08 RX ORDER — PREGABALIN 100 MG/1
100 CAPSULE ORAL 2 TIMES DAILY
Qty: 180 CAPSULE | Refills: 1 | Status: CANCELLED | OUTPATIENT
Start: 2023-05-08 | End: 2023-11-06

## 2023-05-09 ENCOUNTER — PATIENT MESSAGE (OUTPATIENT)
Dept: FAMILY MEDICINE | Facility: CLINIC | Age: 88
End: 2023-05-09
Payer: MEDICARE

## 2023-05-09 DIAGNOSIS — M25.561 CHRONIC KNEE PAIN AFTER TOTAL REPLACEMENT OF RIGHT KNEE JOINT: ICD-10-CM

## 2023-05-09 DIAGNOSIS — G89.29 CHRONIC KNEE PAIN AFTER TOTAL REPLACEMENT OF RIGHT KNEE JOINT: ICD-10-CM

## 2023-05-09 DIAGNOSIS — M48.061 SPINAL STENOSIS OF LUMBAR REGION WITHOUT NEUROGENIC CLAUDICATION: ICD-10-CM

## 2023-05-09 DIAGNOSIS — E11.42 DIABETIC POLYNEUROPATHY ASSOCIATED WITH TYPE 2 DIABETES MELLITUS: ICD-10-CM

## 2023-05-09 DIAGNOSIS — M47.816 LUMBAR SPONDYLOSIS: ICD-10-CM

## 2023-05-09 DIAGNOSIS — Z96.651 CHRONIC KNEE PAIN AFTER TOTAL REPLACEMENT OF RIGHT KNEE JOINT: ICD-10-CM

## 2023-05-09 RX ORDER — PREGABALIN 100 MG/1
100 CAPSULE ORAL 2 TIMES DAILY
Qty: 180 CAPSULE | Refills: 1 | Status: SHIPPED | OUTPATIENT
Start: 2023-05-09 | End: 2023-12-19 | Stop reason: SDUPTHER

## 2023-05-09 NOTE — TELEPHONE ENCOUNTER
LOV: 04/26/23    Med refill request: pregabalin (LYRICA) 100 MG capsule    Med pended. Please advise. Thanks

## 2023-05-09 NOTE — TELEPHONE ENCOUNTER
Care Due:                  Date            Visit Type   Department     Provider  --------------------------------------------------------------------------------                                EP -                              [facility]  Last Visit: 04-      CARE ([facility])   JACOB Varma                              EP -                              PRIMARY      [facility]  Next Visit: 07-      CARE ([facility])   JACOB Varma                                                            Last  Test          Frequency    Reason                     Performed    Due Date  --------------------------------------------------------------------------------    HBA1C.......  6 months...  SITagliptin, insulin,      01- 07-                             metFORMIN, pioglitazone..    [facility] Embedded Care Due Messages. Reference number: [id number].   5/09/2023 9:56:52 AM CDT

## 2023-05-25 ENCOUNTER — PATIENT MESSAGE (OUTPATIENT)
Dept: FAMILY MEDICINE | Facility: CLINIC | Age: 88
End: 2023-05-25
Payer: MEDICARE

## 2023-06-14 ENCOUNTER — PATIENT MESSAGE (OUTPATIENT)
Dept: PAIN MEDICINE | Facility: CLINIC | Age: 88
End: 2023-06-14
Payer: MEDICARE

## 2023-06-15 ENCOUNTER — PATIENT MESSAGE (OUTPATIENT)
Dept: PAIN MEDICINE | Facility: CLINIC | Age: 88
End: 2023-06-15
Payer: MEDICARE

## 2023-06-20 DIAGNOSIS — G95.9 CERVICAL MYELOPATHY WITH CERVICAL RADICULOPATHY: ICD-10-CM

## 2023-06-20 DIAGNOSIS — M47.816 LUMBAR SPONDYLOSIS: ICD-10-CM

## 2023-06-20 DIAGNOSIS — M25.561 CHRONIC KNEE PAIN AFTER TOTAL REPLACEMENT OF RIGHT KNEE JOINT: ICD-10-CM

## 2023-06-20 DIAGNOSIS — M48.062 SPINAL STENOSIS OF LUMBAR REGION WITH NEUROGENIC CLAUDICATION: ICD-10-CM

## 2023-06-20 DIAGNOSIS — Z96.651 CHRONIC KNEE PAIN AFTER TOTAL REPLACEMENT OF RIGHT KNEE JOINT: ICD-10-CM

## 2023-06-20 DIAGNOSIS — G89.29 CHRONIC KNEE PAIN AFTER TOTAL REPLACEMENT OF RIGHT KNEE JOINT: ICD-10-CM

## 2023-06-20 DIAGNOSIS — M54.12 CERVICAL MYELOPATHY WITH CERVICAL RADICULOPATHY: ICD-10-CM

## 2023-06-20 RX ORDER — HYDROCODONE BITARTRATE AND ACETAMINOPHEN 10; 325 MG/1; MG/1
1 TABLET ORAL EVERY 12 HOURS PRN
Qty: 60 TABLET | Refills: 0 | Status: SHIPPED | OUTPATIENT
Start: 2023-06-20 | End: 2023-08-15 | Stop reason: SDUPTHER

## 2023-06-20 NOTE — TELEPHONE ENCOUNTER
----- Message from Berenice Snell sent at 6/20/2023 11:26 AM CDT -----  .Type:  RX Refill Request    Who Called: Wife  Refill or New Rx:refill  RX Name and Strength:HYDROcodone-acetaminophen (NORCO)  mg per tablet  Preferred Pharmacy with phone number:[id number] - [place], [place] - [id number]  Local or Mail Order:local  Ordering Provider:Mick  Would the patient rather a call back or a response via [facility]? call  Best Call Back Number:[phone]  Additional Information:    Caller stated the pt needed a 2 month supply to last him till his next appt.  Caller stated the pt has been in pain.

## 2023-06-20 NOTE — TELEPHONE ENCOUNTER
----- Message from Berenice Snell sent at 6/20/2023 11:26 AM CDT -----  .Type:  RX Refill Request    Who Called: Wife  Refill or New Rx:refill  RX Name and Strength:HYDROcodone-acetaminophen (NORCO)  mg per tablet  Preferred Pharmacy with phone number:[id number] - [place], [place] - [id number] [address]  Local or Mail Order:local  Ordering Provider:Mick  Would the patient rather a call back or a response via [facility]? call  Best Call Back Number:[phone]  Additional Information:    Caller stated the pt needed a 2 month supply to last him till his next appt.  Caller stated the pt has been in pain.

## 2023-07-05 ENCOUNTER — CLINICAL SUPPORT (OUTPATIENT)
Dept: FAMILY MEDICINE | Facility: CLINIC | Age: 88
End: 2023-07-05
Payer: MEDICARE

## 2023-07-05 ENCOUNTER — OFFICE VISIT (OUTPATIENT)
Dept: FAMILY MEDICINE | Facility: CLINIC | Age: 88
End: 2023-07-05
Payer: MEDICARE

## 2023-07-05 VITALS
BODY MASS INDEX: 31.71 KG/M2 | RESPIRATION RATE: 16 BRPM | HEIGHT: 66 IN | SYSTOLIC BLOOD PRESSURE: 130 MMHG | HEART RATE: 76 BPM | DIASTOLIC BLOOD PRESSURE: 60 MMHG | OXYGEN SATURATION: 96 % | WEIGHT: 197.31 LBS

## 2023-07-05 DIAGNOSIS — E78.2 MIXED HYPERLIPIDEMIA: ICD-10-CM

## 2023-07-05 DIAGNOSIS — I50.33 ACUTE ON CHRONIC DIASTOLIC CHF (CONGESTIVE HEART FAILURE): ICD-10-CM

## 2023-07-05 DIAGNOSIS — C61 PROSTATE CANCER: ICD-10-CM

## 2023-07-05 DIAGNOSIS — I10 PRIMARY HYPERTENSION: ICD-10-CM

## 2023-07-05 DIAGNOSIS — E11.22 TYPE 2 DIABETES MELLITUS WITH CHRONIC KIDNEY DISEASE, WITHOUT LONG-TERM CURRENT USE OF INSULIN, UNSPECIFIED CKD STAGE: ICD-10-CM

## 2023-07-05 DIAGNOSIS — E53.8 B12 DEFICIENCY: ICD-10-CM

## 2023-07-05 DIAGNOSIS — I48.0 PAROXYSMAL ATRIAL FIBRILLATION: Primary | ICD-10-CM

## 2023-07-05 DIAGNOSIS — G95.9 CERVICAL MYELOPATHY WITH CERVICAL RADICULOPATHY: ICD-10-CM

## 2023-07-05 DIAGNOSIS — F03.90 DEMENTIA, UNSPECIFIED DEMENTIA SEVERITY, UNSPECIFIED DEMENTIA TYPE, UNSPECIFIED WHETHER BEHAVIORAL, PSYCHOTIC, OR MOOD DISTURBANCE OR ANXIETY: ICD-10-CM

## 2023-07-05 DIAGNOSIS — E53.8 VITAMIN B12 DEFICIENCY: ICD-10-CM

## 2023-07-05 DIAGNOSIS — M54.12 CERVICAL MYELOPATHY WITH CERVICAL RADICULOPATHY: ICD-10-CM

## 2023-07-05 PROBLEM — I77.9 UNILATERAL CAROTID ARTERY DISEASE: Status: ACTIVE | Noted: 2023-07-05

## 2023-07-05 PROBLEM — I77.9 UNILATERAL CAROTID ARTERY DISEASE: Status: RESOLVED | Noted: 2023-07-05 | Resolved: 2023-07-05

## 2023-07-05 LAB
ALBUMIN SERPL BCP-MCNC: 4 G/DL (ref 3.5–5.2)
ALP SERPL-CCNC: 83 U/L (ref 55–135)
ALT SERPL W/O P-5'-P-CCNC: 38 U/L (ref 10–44)
ANION GAP SERPL CALC-SCNC: 11 MMOL/L (ref 8–16)
AST SERPL-CCNC: 30 U/L (ref 10–40)
BILIRUB SERPL-MCNC: 0.3 MG/DL (ref 0.1–1)
BUN SERPL-MCNC: 19 MG/DL (ref 8–23)
CALCIUM SERPL-MCNC: 9.8 MG/DL (ref 8.7–10.5)
CHLORIDE SERPL-SCNC: 97 MMOL/L (ref 95–110)
CO2 SERPL-SCNC: 26 MMOL/L (ref 23–29)
CREAT SERPL-MCNC: 1.4 MG/DL (ref 0.5–1.4)
EST. GFR  (NO RACE VARIABLE): 49 ML/MIN/1.73 M^2
ESTIMATED AVG GLUCOSE: 220 MG/DL (ref 68–131)
GLUCOSE SERPL-MCNC: 184 MG/DL (ref 70–110)
HBA1C MFR BLD: 9.3 % (ref 4–5.6)
POTASSIUM SERPL-SCNC: 4.9 MMOL/L (ref 3.5–5.1)
PROT SERPL-MCNC: 7 G/DL (ref 6–8.4)
SODIUM SERPL-SCNC: 134 MMOL/L (ref 136–145)
VIT B12 SERPL-MCNC: 264 PG/ML (ref 210–950)

## 2023-07-05 PROCEDURE — 1159F PR MEDICATION LIST DOCUMENTED IN MEDICAL RECORD: ICD-10-PCS | Mod: CPTII,S$GLB,, | Performed by: FAMILY MEDICINE

## 2023-07-05 PROCEDURE — 3288F FALL RISK ASSESSMENT DOCD: CPT | Mod: CPTII,S$GLB,, | Performed by: FAMILY MEDICINE

## 2023-07-05 PROCEDURE — 80053 COMPREHEN METABOLIC PANEL: CPT | Performed by: FAMILY MEDICINE

## 2023-07-05 PROCEDURE — 99999 PR PBB SHADOW E&M-EST. PATIENT-LVL IV: CPT | Mod: PBBFAC,,, | Performed by: FAMILY MEDICINE

## 2023-07-05 PROCEDURE — 1159F MED LIST DOCD IN RCRD: CPT | Mod: CPTII,S$GLB,, | Performed by: FAMILY MEDICINE

## 2023-07-05 PROCEDURE — 36415 PR COLLECTION VENOUS BLOOD,VENIPUNCTURE: ICD-10-PCS | Mod: S$GLB,,, | Performed by: FAMILY MEDICINE

## 2023-07-05 PROCEDURE — 82607 VITAMIN B-12: CPT | Performed by: FAMILY MEDICINE

## 2023-07-05 PROCEDURE — 1100F PTFALLS ASSESS-DOCD GE2>/YR: CPT | Mod: CPTII,S$GLB,, | Performed by: FAMILY MEDICINE

## 2023-07-05 PROCEDURE — 1160F PR REVIEW ALL MEDS BY PRESCRIBER/CLIN PHARMACIST DOCUMENTED: ICD-10-PCS | Mod: CPTII,S$GLB,, | Performed by: FAMILY MEDICINE

## 2023-07-05 PROCEDURE — 1126F PR PAIN SEVERITY QUANTIFIED, NO PAIN PRESENT: ICD-10-PCS | Mod: CPTII,S$GLB,, | Performed by: FAMILY MEDICINE

## 2023-07-05 PROCEDURE — 1160F RVW MEDS BY RX/DR IN RCRD: CPT | Mod: CPTII,S$GLB,, | Performed by: FAMILY MEDICINE

## 2023-07-05 PROCEDURE — 3288F PR FALLS RISK ASSESSMENT DOCUMENTED: ICD-10-PCS | Mod: CPTII,S$GLB,, | Performed by: FAMILY MEDICINE

## 2023-07-05 PROCEDURE — 99214 PR OFFICE/OUTPT VISIT, EST, LEVL IV, 30-39 MIN: ICD-10-PCS | Mod: S$GLB,,, | Performed by: FAMILY MEDICINE

## 2023-07-05 PROCEDURE — 1126F AMNT PAIN NOTED NONE PRSNT: CPT | Mod: CPTII,S$GLB,, | Performed by: FAMILY MEDICINE

## 2023-07-05 PROCEDURE — 99999 PR PBB SHADOW E&M-EST. PATIENT-LVL IV: ICD-10-PCS | Mod: PBBFAC,,, | Performed by: FAMILY MEDICINE

## 2023-07-05 PROCEDURE — 99214 OFFICE O/P EST MOD 30 MIN: CPT | Mod: S$GLB,,, | Performed by: FAMILY MEDICINE

## 2023-07-05 PROCEDURE — 36415 COLL VENOUS BLD VENIPUNCTURE: CPT | Mod: S$GLB,,, | Performed by: FAMILY MEDICINE

## 2023-07-05 PROCEDURE — 1100F PR PT FALLS ASSESS DOC 2+ FALLS/FALL W/INJURY/YR: ICD-10-PCS | Mod: CPTII,S$GLB,, | Performed by: FAMILY MEDICINE

## 2023-07-05 PROCEDURE — 83036 HEMOGLOBIN GLYCOSYLATED A1C: CPT | Performed by: FAMILY MEDICINE

## 2023-07-05 RX ORDER — FLUOROURACIL 50 MG/G
CREAM TOPICAL
COMMUNITY
Start: 2023-05-04 | End: 2023-09-17

## 2023-07-05 RX ORDER — CYANOCOBALAMIN 1000 UG/ML
1000 INJECTION, SOLUTION INTRAMUSCULAR; SUBCUTANEOUS
Qty: 1 ML | Refills: 11 | Status: SHIPPED | OUTPATIENT
Start: 2023-07-05 | End: 2024-02-19 | Stop reason: SDUPTHER

## 2023-07-05 RX ORDER — SYRINGE W-NEEDLE,DISPOSAB,3 ML 25GX5/8"
1 SYRINGE, EMPTY DISPOSABLE MISCELLANEOUS
Qty: 1 EACH | Refills: 11 | Status: SHIPPED | OUTPATIENT
Start: 2023-07-05

## 2023-07-05 RX ORDER — TRIAMCINOLONE ACETONIDE 1 MG/G
CREAM TOPICAL 2 TIMES DAILY PRN
COMMUNITY
Start: 2023-06-12 | End: 2023-09-17

## 2023-07-05 NOTE — PROGRESS NOTES
Subjective:       Patient ID: Erasmo Dunbar is a 87 y.o. male.    Chief Complaint: Follow-up (6 MONTH FOLLOW UP)    Pt is a 87 y.o. male who presents for evaluation and management of   Encounter Diagnoses   Name Primary?    Paroxysmal atrial fibrillation Yes    Type 2 diabetes mellitus with chronic kidney disease, without long-term current use of insulin, unspecified CKD stage     Acute on chronic diastolic CHF (congestive heart failure)     Mixed hyperlipidemia     Primary hypertension     Prostate cancer     Cervical myelopathy with cervical radiculopathy     Dementia, unspecified dementia severity, unspecified dementia type, unspecified whether behavioral, psychotic, or mood disturbance or anxiety     B12 deficiency     Vitamin B12 deficiency    .  Doing well on current meds. Denies any side effects. Prevention is up to date.    Review of Systems   Constitutional:  Negative for fever.   Respiratory:  Negative for shortness of breath.    Cardiovascular:  Negative for chest pain.   Gastrointestinal:  Negative for anal bleeding and blood in stool.   Genitourinary:  Negative for dysuria.   Musculoskeletal:  Positive for back pain.   Neurological:  Positive for weakness. Negative for dizziness and light-headedness.     Objective:      Physical Exam  Constitutional:       Appearance: Normal appearance. He is well-developed. He is not ill-appearing.   HENT:      Head: Normocephalic and atraumatic.      Right Ear: External ear normal.      Left Ear: External ear normal.      Nose: Nose normal.      Mouth/Throat:      Mouth: Mucous membranes are moist.      Pharynx: No oropharyngeal exudate or posterior oropharyngeal erythema.   Eyes:      General: No scleral icterus.        Right eye: No discharge.         Left eye: No discharge.      Conjunctiva/sclera: Conjunctivae normal.      Pupils: Pupils are equal, round, and reactive to light.   Neck:      Thyroid: No thyromegaly.      Vascular: No JVD.      Trachea: No  tracheal deviation.   Cardiovascular:      Rate and Rhythm: Normal rate and regular rhythm.      Heart sounds: Normal heart sounds. No murmur heard.  Pulmonary:      Effort: Pulmonary effort is normal. No respiratory distress.      Breath sounds: Normal breath sounds. No wheezing or rales.   Chest:      Chest wall: No tenderness.   Abdominal:      General: Bowel sounds are normal. There is no distension.      Palpations: Abdomen is soft. There is no mass.      Tenderness: There is no abdominal tenderness. There is no guarding or rebound.   Musculoskeletal:         General: Normal range of motion.      Cervical back: Normal range of motion and neck supple.      Right lower leg: No edema.      Left lower leg: No edema.   Lymphadenopathy:      Cervical: No cervical adenopathy.   Skin:     General: Skin is warm and dry.   Neurological:      Mental Status: He is alert and oriented to person, place, and time.      Cranial Nerves: No cranial nerve deficit.      Motor: No abnormal muscle tone.      Coordination: Coordination normal.      Deep Tendon Reflexes: Reflexes are normal and symmetric. Reflexes normal.   Psychiatric:         Behavior: Behavior normal.         Thought Content: Thought content normal.         Judgment: Judgment normal.       Assessment:       1. Paroxysmal atrial fibrillation    2. Type 2 diabetes mellitus with chronic kidney disease, without long-term current use of insulin, unspecified CKD stage    3. Acute on chronic diastolic CHF (congestive heart failure)    4. Mixed hyperlipidemia    5. Primary hypertension    6. Prostate cancer    7. Cervical myelopathy with cervical radiculopathy    8. Dementia, unspecified dementia severity, unspecified dementia type, unspecified whether behavioral, psychotic, or mood disturbance or anxiety    9. B12 deficiency    10. Vitamin B12 deficiency        Plan:   1. Paroxysmal atrial fibrillation  Overview:  eliquis  Metoprolol   Dr. Greene CIS         2. Type 2  "diabetes mellitus with chronic kidney disease, without long-term current use of insulin, unspecified CKD stage  Overview:  Lab Results   Component Value Date    HGBA1C 6.9 (H) 01/05/2023     Lantus  MEtformin   actos   Januvia    Orders:  -     Hemoglobin A1C; Future; Expected date: 07/05/2023    3. Acute on chronic diastolic CHF (congestive heart failure)  Overview:  LISINOPRIL 10   SEES ALEJANDRO CIS         4. Mixed hyperlipidemia  Overview:  Lab Results   Component Value Date    LDLCALC 133.2 01/05/2023   repatha       Orders:  -     Comprehensive Metabolic Panel; Future; Expected date: 07/05/2023    5. Primary hypertension  Overview:  Lisinopril 10       Orders:  -     Comprehensive Metabolic Panel; Future; Expected date: 07/05/2023    6. Prostate cancer  Overview:  Prostate excised  Seeing doctor yoshi regularly   PSA is zero 1/2023      7. Cervical myelopathy with cervical radiculopathy  Overview:  Currently pain controlled  Cymbalta   norco prn       8. Dementia, unspecified dementia severity, unspecified dementia type, unspecified whether behavioral, psychotic, or mood disturbance or anxiety  Overview:  Namenda 5 BID         9. B12 deficiency  -     Vitamin B12; Future; Expected date: 07/05/2023    10. Vitamin B12 deficiency  -     syringe with needle (SYRINGE 3CC/25GX1") 3 mL 25 gauge x 1" Syrg; 1 Device by Misc.(Non-Drug; Combo Route) route every 30 days.  Dispense: 1 each; Refill: 11  -     cyanocobalamin 1,000 mcg/mL injection; Inject 1 mL (1,000 mcg total) into the skin every 30 days.  Dispense: 1 mL; Refill: 11      No follow-ups on file.      "

## 2023-07-07 NOTE — PROGRESS NOTES
His A1c is too high, which is unusual for him. I need him to take his blood sugar twice a day---fasting in am and a random one at night.  rtc 2 weeks with accurate BS log and all of his DM meds. I want to make sure we are on the same page. We need to work on getting dm under control. Thanks

## 2023-07-08 NOTE — TELEPHONE ENCOUNTER
Patients spouse requesting appointment for next week to discuss stomach issues. Offered appointment for this week, patients spouse declined. Scheduled appointment for 3/04/19 with Dr. Varma as requested.   No

## 2023-07-25 ENCOUNTER — OFFICE VISIT (OUTPATIENT)
Dept: FAMILY MEDICINE | Facility: CLINIC | Age: 88
End: 2023-07-25
Payer: MEDICARE

## 2023-07-25 VITALS
DIASTOLIC BLOOD PRESSURE: 64 MMHG | RESPIRATION RATE: 15 BRPM | HEART RATE: 88 BPM | BODY MASS INDEX: 31.99 KG/M2 | WEIGHT: 199.06 LBS | SYSTOLIC BLOOD PRESSURE: 136 MMHG | HEIGHT: 66 IN | OXYGEN SATURATION: 96 %

## 2023-07-25 DIAGNOSIS — I48.0 PAROXYSMAL ATRIAL FIBRILLATION: ICD-10-CM

## 2023-07-25 DIAGNOSIS — R41.3 MEMORY LOSS: ICD-10-CM

## 2023-07-25 DIAGNOSIS — E11.42 DIABETIC POLYNEUROPATHY ASSOCIATED WITH TYPE 2 DIABETES MELLITUS: ICD-10-CM

## 2023-07-25 DIAGNOSIS — E11.22 TYPE 2 DIABETES MELLITUS WITH STAGE 3 CHRONIC KIDNEY DISEASE, WITH LONG-TERM CURRENT USE OF INSULIN: ICD-10-CM

## 2023-07-25 DIAGNOSIS — Z79.4 TYPE 2 DIABETES MELLITUS WITH STAGE 3 CHRONIC KIDNEY DISEASE, WITH LONG-TERM CURRENT USE OF INSULIN: ICD-10-CM

## 2023-07-25 DIAGNOSIS — N18.30 TYPE 2 DIABETES MELLITUS WITH STAGE 3 CHRONIC KIDNEY DISEASE, WITH LONG-TERM CURRENT USE OF INSULIN: ICD-10-CM

## 2023-07-25 DIAGNOSIS — I10 ESSENTIAL HYPERTENSION: ICD-10-CM

## 2023-07-25 DIAGNOSIS — E78.5 DYSLIPIDEMIA: ICD-10-CM

## 2023-07-25 DIAGNOSIS — F32.A DEPRESSION, UNSPECIFIED DEPRESSION TYPE: ICD-10-CM

## 2023-07-25 DIAGNOSIS — E11.22 TYPE 2 DIABETES MELLITUS WITH CHRONIC KIDNEY DISEASE, WITHOUT LONG-TERM CURRENT USE OF INSULIN, UNSPECIFIED CKD STAGE: ICD-10-CM

## 2023-07-25 DIAGNOSIS — G89.29 OTHER CHRONIC PAIN: ICD-10-CM

## 2023-07-25 LAB — GLUCOSE SERPL-MCNC: 135 MG/DL (ref 70–110)

## 2023-07-25 PROCEDURE — 99214 OFFICE O/P EST MOD 30 MIN: CPT | Mod: S$GLB,,, | Performed by: FAMILY MEDICINE

## 2023-07-25 PROCEDURE — 99214 PR OFFICE/OUTPT VISIT, EST, LEVL IV, 30-39 MIN: ICD-10-PCS | Mod: S$GLB,,, | Performed by: FAMILY MEDICINE

## 2023-07-25 PROCEDURE — 1160F PR REVIEW ALL MEDS BY PRESCRIBER/CLIN PHARMACIST DOCUMENTED: ICD-10-PCS | Mod: CPTII,S$GLB,, | Performed by: FAMILY MEDICINE

## 2023-07-25 PROCEDURE — 1101F PR PT FALLS ASSESS DOC 0-1 FALLS W/OUT INJ PAST YR: ICD-10-PCS | Mod: CPTII,S$GLB,, | Performed by: FAMILY MEDICINE

## 2023-07-25 PROCEDURE — 99999 PR PBB SHADOW E&M-EST. PATIENT-LVL IV: ICD-10-PCS | Mod: PBBFAC,,, | Performed by: FAMILY MEDICINE

## 2023-07-25 PROCEDURE — 1159F MED LIST DOCD IN RCRD: CPT | Mod: CPTII,S$GLB,, | Performed by: FAMILY MEDICINE

## 2023-07-25 PROCEDURE — 3288F PR FALLS RISK ASSESSMENT DOCUMENTED: ICD-10-PCS | Mod: CPTII,S$GLB,, | Performed by: FAMILY MEDICINE

## 2023-07-25 PROCEDURE — 1159F PR MEDICATION LIST DOCUMENTED IN MEDICAL RECORD: ICD-10-PCS | Mod: CPTII,S$GLB,, | Performed by: FAMILY MEDICINE

## 2023-07-25 PROCEDURE — 82962 POCT GLUCOSE, HAND-HELD DEVICE: ICD-10-PCS | Mod: S$GLB,,, | Performed by: FAMILY MEDICINE

## 2023-07-25 PROCEDURE — 1126F AMNT PAIN NOTED NONE PRSNT: CPT | Mod: CPTII,S$GLB,, | Performed by: FAMILY MEDICINE

## 2023-07-25 PROCEDURE — 3288F FALL RISK ASSESSMENT DOCD: CPT | Mod: CPTII,S$GLB,, | Performed by: FAMILY MEDICINE

## 2023-07-25 PROCEDURE — 1101F PT FALLS ASSESS-DOCD LE1/YR: CPT | Mod: CPTII,S$GLB,, | Performed by: FAMILY MEDICINE

## 2023-07-25 PROCEDURE — 1160F RVW MEDS BY RX/DR IN RCRD: CPT | Mod: CPTII,S$GLB,, | Performed by: FAMILY MEDICINE

## 2023-07-25 PROCEDURE — 82962 GLUCOSE BLOOD TEST: CPT | Mod: S$GLB,,, | Performed by: FAMILY MEDICINE

## 2023-07-25 PROCEDURE — 99999 PR PBB SHADOW E&M-EST. PATIENT-LVL IV: CPT | Mod: PBBFAC,,, | Performed by: FAMILY MEDICINE

## 2023-07-25 PROCEDURE — 1126F PR PAIN SEVERITY QUANTIFIED, NO PAIN PRESENT: ICD-10-PCS | Mod: CPTII,S$GLB,, | Performed by: FAMILY MEDICINE

## 2023-07-25 RX ORDER — DULOXETIN HYDROCHLORIDE 30 MG/1
30 CAPSULE, DELAYED RELEASE ORAL DAILY
Qty: 30 CAPSULE | Refills: 5 | Status: SHIPPED | OUTPATIENT
Start: 2023-07-25 | End: 2024-02-19 | Stop reason: SDUPTHER

## 2023-07-25 RX ORDER — ATORVASTATIN CALCIUM 10 MG/1
10 TABLET, FILM COATED ORAL DAILY
Qty: 30 TABLET | Refills: 5 | Status: SHIPPED | OUTPATIENT
Start: 2023-07-25 | End: 2024-02-19 | Stop reason: SDUPTHER

## 2023-07-25 RX ORDER — LISINOPRIL 10 MG/1
10 TABLET ORAL 2 TIMES DAILY
Qty: 180 TABLET | Refills: 1 | Status: SHIPPED | OUTPATIENT
Start: 2023-07-25 | End: 2023-10-31

## 2023-07-25 RX ORDER — METFORMIN HYDROCHLORIDE 1000 MG/1
1000 TABLET ORAL 2 TIMES DAILY WITH MEALS
Qty: 180 TABLET | Refills: 0 | Status: SHIPPED | OUTPATIENT
Start: 2023-07-25 | End: 2023-10-10

## 2023-07-25 RX ORDER — MELOXICAM 7.5 MG/1
TABLET ORAL
Status: CANCELLED | OUTPATIENT
Start: 2023-07-25

## 2023-07-25 RX ORDER — MEMANTINE HYDROCHLORIDE 5 MG/1
5 TABLET ORAL 2 TIMES DAILY
Qty: 180 TABLET | Refills: 3 | Status: SHIPPED | OUTPATIENT
Start: 2023-07-25 | End: 2024-02-19 | Stop reason: SDUPTHER

## 2023-07-25 RX ORDER — BUMETANIDE 1 MG/1
1 TABLET ORAL DAILY
Status: CANCELLED | OUTPATIENT
Start: 2023-07-25

## 2023-07-25 RX ORDER — APIXABAN 5 MG/1
5 TABLET, FILM COATED ORAL 2 TIMES DAILY
Qty: 180 TABLET | Refills: 1 | Status: SHIPPED | OUTPATIENT
Start: 2023-07-25 | End: 2024-02-19 | Stop reason: SDUPTHER

## 2023-07-25 NOTE — PROGRESS NOTES
Subjective:       Patient ID: Erasmo Dunbar is a 87 y.o. male.    Chief Complaint: Follow-up (No current complaints)    Pt is a 87 y.o. male who presents for evaluation and management of   Encounter Diagnoses   Name Primary?    Dyslipidemia     Other chronic pain     Diabetic polyneuropathy associated with type 2 diabetes mellitus     Depression, unspecified depression type     Paroxysmal atrial fibrillation     Type 2 diabetes mellitus with stage 3 chronic kidney disease, with long-term current use of insulin     Memory loss     Essential hypertension     Type 2 diabetes mellitus with chronic kidney disease, without long-term current use of insulin, unspecified CKD stage    .  Doing well on current meds. Denies any side effects. Prevention is up to date.  Review of Systems   Respiratory:  Negative for shortness of breath.    Cardiovascular:  Negative for chest pain.   Endocrine:        No hypoglycemia but he is not really compliant with a glucose log      Objective:      Physical Exam  Constitutional:       Appearance: Normal appearance. He is well-developed. He is not ill-appearing.   HENT:      Head: Normocephalic and atraumatic.      Right Ear: External ear normal.      Left Ear: External ear normal.      Nose: Nose normal.      Mouth/Throat:      Mouth: Mucous membranes are moist.      Pharynx: No oropharyngeal exudate or posterior oropharyngeal erythema.   Eyes:      General: No scleral icterus.        Right eye: No discharge.         Left eye: No discharge.      Conjunctiva/sclera: Conjunctivae normal.      Pupils: Pupils are equal, round, and reactive to light.   Neck:      Thyroid: No thyromegaly.      Vascular: No JVD.      Trachea: No tracheal deviation.   Cardiovascular:      Rate and Rhythm: Normal rate and regular rhythm.      Heart sounds: Normal heart sounds. No murmur heard.  Pulmonary:      Effort: Pulmonary effort is normal. No respiratory distress.      Breath sounds: Normal breath sounds. No  wheezing or rales.   Chest:      Chest wall: No tenderness.   Abdominal:      General: Bowel sounds are normal. There is no distension.      Palpations: Abdomen is soft. There is no mass.      Tenderness: There is no abdominal tenderness. There is no guarding or rebound.   Musculoskeletal:         General: Normal range of motion.      Cervical back: Normal range of motion and neck supple.      Right lower leg: No edema.      Left lower leg: No edema.   Lymphadenopathy:      Cervical: No cervical adenopathy.   Skin:     General: Skin is warm and dry.   Neurological:      Mental Status: He is alert and oriented to person, place, and time.      Cranial Nerves: No cranial nerve deficit.      Motor: No abnormal muscle tone.      Coordination: Coordination normal.      Deep Tendon Reflexes: Reflexes are normal and symmetric. Reflexes normal.   Psychiatric:         Behavior: Behavior normal.         Thought Content: Thought content normal.         Judgment: Judgment normal.       Assessment:       1. Dyslipidemia    2. Other chronic pain    3. Diabetic polyneuropathy associated with type 2 diabetes mellitus    4. Depression, unspecified depression type    5. Paroxysmal atrial fibrillation    6. Type 2 diabetes mellitus with stage 3 chronic kidney disease, with long-term current use of insulin    7. Memory loss    8. Essential hypertension    9. Type 2 diabetes mellitus with chronic kidney disease, without long-term current use of insulin, unspecified CKD stage        Plan:   1. Dyslipidemia  -     atorvastatin (LIPITOR) 10 MG tablet; Take 1 tablet (10 mg total) by mouth once daily.  Dispense: 30 tablet; Refill: 5    2. Other chronic pain  -     DULoxetine (CYMBALTA) 30 MG capsule; Take 1 capsule (30 mg total) by mouth once daily.  Dispense: 30 capsule; Refill: 5    3. Diabetic polyneuropathy associated with type 2 diabetes mellitus  -     DULoxetine (CYMBALTA) 30 MG capsule; Take 1 capsule (30 mg total) by mouth once  daily.  Dispense: 30 capsule; Refill: 5    4. Depression, unspecified depression type  -     DULoxetine (CYMBALTA) 30 MG capsule; Take 1 capsule (30 mg total) by mouth once daily.  Dispense: 30 capsule; Refill: 5    5. Paroxysmal atrial fibrillation  Overview:  eliquis  Metoprolol   Dr. Greene CIS       Orders:  -     ELIQUIS 5 mg Tab; Take 1 tablet (5 mg total) by mouth 2 (two) times daily.  Dispense: 180 tablet; Refill: 1    6. Type 2 diabetes mellitus with stage 3 chronic kidney disease, with long-term current use of insulin  -     metFORMIN (GLUCOPHAGE) 1000 MG tablet; Take 1 tablet (1,000 mg total) by mouth 2 (two) times daily with meals.  Dispense: 180 tablet; Refill: 0  -     POCT Glucose, Hand-Held Device  -     Comprehensive Metabolic Panel; Future; Expected date: 10/23/2023    7. Memory loss  -     memantine (NAMENDA) 5 MG Tab; Take 1 tablet (5 mg total) by mouth 2 (two) times daily.  Dispense: 180 tablet; Refill: 3    8. Essential hypertension  -     lisinopriL 10 MG tablet; Take 1 tablet (10 mg total) by mouth 2 (two) times daily.  Dispense: 180 tablet; Refill: 1  -     Comprehensive Metabolic Panel; Future; Expected date: 10/23/2023    9. Type 2 diabetes mellitus with chronic kidney disease, without long-term current use of insulin, unspecified CKD stage  Overview:  Lab Results   Component Value Date    HGBA1C 9.3 (H) 07/05/2023     Lantus---42-43 units/ qhs   MEtformin   actos   Januvia    7/25/23---He recetnly got back on his meds after missing them for about 4 months..... reflected in his latest A1c       Orders:  -     Comprehensive Metabolic Panel; Future; Expected date: 10/23/2023  -     Hemoglobin A1C; Future; Expected date: 10/23/2023    Recently resumed his DM meds-----RTC 3 months with labs       No follow-ups on file.

## 2023-08-15 ENCOUNTER — OFFICE VISIT (OUTPATIENT)
Dept: PAIN MEDICINE | Facility: CLINIC | Age: 88
End: 2023-08-15
Payer: MEDICARE

## 2023-08-15 VITALS
WEIGHT: 202 LBS | HEIGHT: 66 IN | BODY MASS INDEX: 32.47 KG/M2 | SYSTOLIC BLOOD PRESSURE: 134 MMHG | DIASTOLIC BLOOD PRESSURE: 58 MMHG

## 2023-08-15 DIAGNOSIS — G95.9 CERVICAL MYELOPATHY WITH CERVICAL RADICULOPATHY: ICD-10-CM

## 2023-08-15 DIAGNOSIS — M25.561 CHRONIC KNEE PAIN AFTER TOTAL REPLACEMENT OF RIGHT KNEE JOINT: ICD-10-CM

## 2023-08-15 DIAGNOSIS — Z79.891 CHRONIC PRESCRIPTION OPIATE USE: Primary | ICD-10-CM

## 2023-08-15 DIAGNOSIS — M48.062 SPINAL STENOSIS OF LUMBAR REGION WITH NEUROGENIC CLAUDICATION: ICD-10-CM

## 2023-08-15 DIAGNOSIS — G89.4 CHRONIC PAIN SYNDROME: ICD-10-CM

## 2023-08-15 DIAGNOSIS — G89.29 CHRONIC KNEE PAIN AFTER TOTAL REPLACEMENT OF RIGHT KNEE JOINT: ICD-10-CM

## 2023-08-15 DIAGNOSIS — M54.12 CERVICAL MYELOPATHY WITH CERVICAL RADICULOPATHY: ICD-10-CM

## 2023-08-15 DIAGNOSIS — Z96.651 CHRONIC KNEE PAIN AFTER TOTAL REPLACEMENT OF RIGHT KNEE JOINT: ICD-10-CM

## 2023-08-15 DIAGNOSIS — R26.9 GAIT ABNORMALITY: ICD-10-CM

## 2023-08-15 DIAGNOSIS — M47.816 LUMBAR SPONDYLOSIS: ICD-10-CM

## 2023-08-15 PROCEDURE — 99214 PR OFFICE/OUTPT VISIT, EST, LEVL IV, 30-39 MIN: ICD-10-PCS | Mod: S$GLB,,, | Performed by: NURSE PRACTITIONER

## 2023-08-15 PROCEDURE — 99999 PR PBB SHADOW E&M-EST. PATIENT-LVL IV: ICD-10-PCS | Mod: PBBFAC,,, | Performed by: NURSE PRACTITIONER

## 2023-08-15 PROCEDURE — 99214 OFFICE O/P EST MOD 30 MIN: CPT | Mod: S$GLB,,, | Performed by: NURSE PRACTITIONER

## 2023-08-15 PROCEDURE — 1159F PR MEDICATION LIST DOCUMENTED IN MEDICAL RECORD: ICD-10-PCS | Mod: CPTII,S$GLB,, | Performed by: NURSE PRACTITIONER

## 2023-08-15 PROCEDURE — 99999 PR PBB SHADOW E&M-EST. PATIENT-LVL IV: CPT | Mod: PBBFAC,,, | Performed by: NURSE PRACTITIONER

## 2023-08-15 PROCEDURE — 1159F MED LIST DOCD IN RCRD: CPT | Mod: CPTII,S$GLB,, | Performed by: NURSE PRACTITIONER

## 2023-08-15 RX ORDER — HYDROCODONE BITARTRATE AND ACETAMINOPHEN 10; 325 MG/1; MG/1
1 TABLET ORAL EVERY 12 HOURS PRN
Qty: 60 TABLET | Refills: 0 | Status: SHIPPED | OUTPATIENT
Start: 2023-08-15 | End: 2023-10-16 | Stop reason: SDUPTHER

## 2023-08-15 NOTE — PROGRESS NOTES
Pain Medicine      Chief Complaint:   No chief complaint on file.      History of Present Illness: Erasmo Dunbar is a 88 y.o. male referred by Dr. Hemal Varma MD for chronic LBP.      Onset: years of back pain, but worsened in the past few months  Location: right sided lower back  Radiation: across lower back on the left side, and sometimes into the posterior thighs, but not below the knees.   Timing: Intermittent, only when he stands and walks and completely improves with sitting down  Quality: Throbbing, Deep and Sharp  Exacerbating Factors: standing and walking  Alleviating Factors: sitting, heat, ice, medications and rest  Associated Symptoms: He has numbness in his feet and legs from neuropathy. denies night fever/night sweats, urinary incontinence, bowel incontinence, significant weight loss and significant motor weakness     Severity: Currently: 8/10   Typical Range: 5-10/10     Exacerbation: 10/10     Hydrocodone 7.5/325 mg brings pain from a 10/10 to a 9/10 for maybe 3-4 hours. He denies any side effects.      He also notes bilateral knee pain. He has a history of right knee replacement in 2017. Knee pain worse with standing and walking. Braces help. Medications help, but not completely. He does feel swelling in the left knee at times. Knees will go out on him at times.     Shoulder Pain 10/25/21:  He also notes right shoulder pain that began 4-5 weeks ago.  He went saw Rheumatology and had a right shoulder injection which helped, but then he underwent a caudal epidural steroid injection with Dr. Mathew and afterwards his shoulder pain was worse again.  He states he was completely unconscious for the injection and things that potentially reaggravated the shoulder.  He localizes the shoulder pain to right subacromial/deltoid area and this radiates into the right biceps.  Pain seems to be worse with any overhead activity. He does have a history of right shoulder surgyer.       Interval History  (08/23/2022):  Erasmo Dunbar returns today for follow up.  At the last clinic visit, performed cluneal nerve block    Performed right cluneal nerve block provided 0% relief.     Currently, the low back pain is okay. He is not having much pain today. He did have a bad weekend though. Pain still worse first in the morning and when he walks. He is limited in how far he can walk.  He has a walker, but using his cane today.    He takes his Norco  either as a whole tablet or half a tablet, but he does not take this every day.  He does much better on the days that he does take it.  He denies any side effects.    Current Pain Scales:  Current: 5/10              Typical Range: 5-10/10    Interval History (1/24/2023):  Mr Dunbar presents for follow up of lower back pain. He did have a syncopal fall that was evaluated by ER. He did have xray excluding compression Fx. He has had worsening pains prior to fall but additionally had several procedures without relief in past which is why Norco was started. He is not interested in surgical intervention.  He currently takes Norco 10/325mg BID #60 and requesting increase frequency consideration. Discussed he does not even take BID consistently so increasing frequency would not benefit. There is no concerning symptoms voiced for cauda equina.     Interval History (8/15/2023):  Mr Dunbar presents for delayed FU. Over interval he has continued to take Norco 10/325mg BID, Lyrica, and has not been taking zanaflex too frequently. He has been out of medication due to delayed FU. His medications help him function and denies SE of medications.      Previous Interventions:  - 5/13/22: C7-T1 IL JACK w/ 100% relief for 2 days  - 3/12/21: L4-5 IL JACK w/ 0% relief  - 1/15/21: Bilateral L4-5, L5-S1 MBBs w/ 50% relief   - 12/18/20: Bilateral L4-5, L5-S1 MBBs w/ % relief for 2 hours  - 11/20/20: Bilateral GTB injection  - 10/30/20: Right genicular nerve block w/ 95% relief  -  10/12/20: Bilateral Cluneal nerve block with good relief for a few days.  - Dr. Varma, Dr. Jain both provided injections in the past, with limited benefit.     Previous Therapies:  PT/OT: yes   Relevant Surgery: yes    - Right knee replacement in 2017   - Cervical fusion 30 years ago  Previous Medications:   - NSAIDS: Tylenol. Avoiding other nsaids due to blood thinners.  - Muscle Relaxants:    - TCAs:   - SNRIs:   - Topicals: Many different topicals.   - Anticonvulsants:  Gabapentin was on this for a long time.   - Opioids: Hydrocodone    Current Pain Medications:  Norco  mg BID prn  Lyrica 100 mg BID  traZODone 50 MG tablet    Blood Thinners: Eliquis    Fu:ll Medication List:    Current Outpatient Medications:     atorvastatin (LIPITOR) 10 MG tablet, Take 1 tablet (10 mg total) by mouth once daily., Disp: 30 tablet, Rfl: 5    blood sugar diagnostic (BLOOD GLUCOSE TEST) Strp, Use one Accu-Chek Felicia Plus Test Strip per glucometer to test blood glucose three times a day. Dx: E11.22, Disp: 600 strip, Rfl: 3    blood-glucose meter kit, Accu-Chek Felicia Plus Meter to test blood glucose three times a day. Dx: E11.22, Disp: 1 each, Rfl: 0    cyanocobalamin 1,000 mcg/mL injection, Inject 1 mL (1,000 mcg total) into the skin every 30 days., Disp: 1 mL, Rfl: 11    DULoxetine (CYMBALTA) 30 MG capsule, Take 1 capsule (30 mg total) by mouth once daily., Disp: 30 capsule, Rfl: 5    ELIQUIS 5 mg Tab, Take 1 tablet (5 mg total) by mouth 2 (two) times daily., Disp: 180 tablet, Rfl: 1    fluorouraciL (EFUDEX) 5 % cream, APPLY CREAM TOPICALLY TO AFFECTED AREAS OF ARMS TWICE DAILY AS TOLERATED FOR 2 WEEK. AVOID SUN EXPOSURE TO TREATMENT AREA, Disp: , Rfl:     insulin (LANTUS SOLOSTAR U-100 INSULIN) glargine 100 units/mL SubQ pen, INJECT 42 UNITS SUBCUTANEOUSLY EVERY EVENING, Disp: 45 mL, Rfl: 5    lancets Misc, Use one Accu-Chek Softclix Lancet per lancing device to test blood glucose three times a day. Dx: E11.22, Disp:  "600 each, Rfl: 3    lisinopriL 10 MG tablet, Take 1 tablet (10 mg total) by mouth 2 (two) times daily., Disp: 180 tablet, Rfl: 1    memantine (NAMENDA) 5 MG Tab, Take 1 tablet (5 mg total) by mouth 2 (two) times daily., Disp: 180 tablet, Rfl: 3    metFORMIN (GLUCOPHAGE) 1000 MG tablet, Take 1 tablet (1,000 mg total) by mouth 2 (two) times daily with meals., Disp: 180 tablet, Rfl: 0    metoprolol tartrate (LOPRESSOR) 25 MG tablet, Take 25 mg by mouth 2 (two) times daily., Disp: , Rfl:     MITIGARE 0.6 mg Cap, Take 1 capsule by mouth once daily., Disp: , Rfl:     pen needle, diabetic 31 gauge x 3/16" Ndle, 1 Device by Misc.(Non-Drug; Combo Route) route 3 (three) times daily., Disp: 100 each, Rfl: 11    pioglitazone (ACTOS) 15 MG tablet, Take 1 tablet (15 mg total) by mouth once daily., Disp: 90 tablet, Rfl: 1    pregabalin (LYRICA) 100 MG capsule, Take 1 capsule (100 mg total) by mouth 2 (two) times daily., Disp: 180 capsule, Rfl: 1    SITagliptin phosphate (JANUVIA) 100 MG Tab, Take 1 tablet (100 mg total) by mouth once daily., Disp: 90 tablet, Rfl: 5    syringe with needle (SYRINGE 3CC/25GX1") 3 mL 25 gauge x 1" Syrg, 1 Device by Misc.(Non-Drug; Combo Route) route every 30 days., Disp: 1 each, Rfl: 11    tiZANidine (ZANAFLEX) 4 MG tablet, Take 1 tablet (4 mg total) by mouth daily as needed., Disp: 30 tablet, Rfl: 5    traZODone (DESYREL) 50 MG tablet, TAKE 1 TABLET EVERY EVENING., Disp: 90 tablet, Rfl: 1    triamcinolone acetonide 0.1% (KENALOG) 0.1 % cream, Apply topically 2 (two) times daily as needed., Disp: , Rfl:     turmeric 400 mg Cap, Take 1,200 mg by mouth., Disp: , Rfl:     UNABLE TO FIND, 1,500 mg. medication name: CBD Oil, Disp: , Rfl:     vit C/E/zinc ox/ryan/lut/zeax (ICAPS AREDS2 ORAL), Take 1 capsule by mouth. 4 a day, Disp: , Rfl:     HYDROcodone-acetaminophen (NORCO)  mg per tablet, Take 1 tablet by mouth every 12 (twelve) hours as needed for Pain., Disp: 60 tablet, Rfl: 0    REPATHA " SURECLICK 140 mg/mL PnIj, Every other week, Disp: , Rfl:   No current facility-administered medications for this visit.    Facility-Administered Medications Ordered in Other Visits:     0.9%  NaCl infusion, , Intravenous, Continuous, Maria Guadalupe Ortiz MD     Review of Systems:  Review of Systems   Constitutional:  Negative for fever and weight loss.   HENT:  Negative for ear pain and tinnitus.    Eyes:  Negative for pain and redness.   Respiratory:  Negative for cough and shortness of breath.    Cardiovascular:  Negative for chest pain and palpitations.   Gastrointestinal:  Positive for diarrhea. Negative for constipation and heartburn.   Genitourinary: Negative.         Denies urinary incontinence. Denies urine retention.    Musculoskeletal:  Positive for back pain. Negative for neck pain.   Skin:  Negative for itching and rash.   Neurological:  Positive for tingling. Negative for focal weakness and seizures.   Endo/Heme/Allergies:  Negative for environmental allergies. Bruises/bleeds easily.   Psychiatric/Behavioral:  Negative for depression. The patient has insomnia. The patient is not nervous/anxious.        Allergies:  Iodinated contrast media and Iodine     Medical History:   has a past medical history of Arthritis, Atrial fibrillation, Bladder mass, BPH (benign prostatic hyperplasia), CHF (congestive heart failure), Depression, Diabetes mellitus type II, Hyperlipidemia, Hypertension, Microscopic hematuria, Prostate cancer (10/01/2018), RLS (restless legs syndrome), Stroke, and Wears glasses.    Surgical History:   has a past surgical history that includes Transurethral resection of prostate; Prostate surgery; Rotator cuff repair (Right); Ganglion Cyst Removed  (Right); Neck Fusion (Bilateral); Colectomy (N/A); Cystoscopy (N/A); Back surgery; Cardiac surgery (Left); Skin biopsy; Total knee arthroplasty (03/30/2017); Cardiac pacemaker placement; back injections; Cystoscopy w/ retrogrades (Bilateral, 10/21/2019);  "Digital rectal examination under anesthesia (N/A, 10/21/2019); Cataract extraction w/  intraocular lens implant (Bilateral); Injection of anesthetic agent around nerve (Right, 10/30/2020); Injection of anesthetic agent around medial branch nerves innervating lumbar facet joint (Bilateral, 12/18/2020); Injection of anesthetic agent around medial branch nerves innervating lumbar facet joint (Bilateral, 1/15/2021); Epidural steroid injection into lumbar spine (N/A, 3/12/2021); Radiofrequency ablation of lumbar medial branch nerve at single level (Left, 1/21/2022); Radiofrequency thermocoagulation (Right, 3/4/2022); Myelography (N/A, 4/4/2022); Epidural steroid injection into cervical spine (N/A, 5/13/2022); and Caudal epidural steroid injection (N/A, 9/30/2022).    Family History:  family history includes COPD in his daughter; Cancer in his daughter and mother; Diabetes in his brother, daughter, and sister; Heart disease in his brother and father; Seizures in his daughter.    Social History:   reports that he quit smoking about 42 years ago. His smoking use included cigarettes. He started smoking about 77 years ago. He has a 52.5 pack-year smoking history. He has never used smokeless tobacco. He reports current alcohol use of about 8.0 standard drinks of alcohol per week. He reports that he does not use drugs.    Physical Exam:  BP (!) 134/58 (BP Location: Right arm, Patient Position: Sitting)   Ht 5' 6" (1.676 m)   Wt 91.6 kg (202 lb)   BMI 32.60 kg/m²   GEN:  Well developed, well nourished.  No acute distress.   HEENT:  No trauma.  Mucous membranes moist.  Nares patent bilaterally.  PSYCH: Normal affect. Thought content appropriate.  CHEST:  Breathing symmetric.  No audible wheezing.  ABD: Soft, non-distended.  SKIN:  Warm, pink, dry.  No rash on exposed areas.    EXT:  No cyanosis, clubbing, or edema.  No color change or changes in nail or hair growth.  NEURO/MUSCULOSKELETAL:  Fully alert, oriented, and " appropriate. Speech normal renee. No cranial nerve deficits.   Gait: Antalgic, using cane for ambulation.      Imaging:  - Xray Shoulder 10/25/2021:  The bones are osteopenic.  There is mild widening of the acromioclavicular joint which can be seen the setting of type 1 AC joint separation.  Mild degenerative changes of the acromioclavicular joint are seen.  The humeral head is high-riding suggesting underlying rotator cuff pathology.  Small calcification measuring 5 mm adjacent to the greater tuberosity suggesting calcific tendinitis.  No fracture or dislocation is seen.  No evidence of lytic or blastic lesions. Leads from a pacer device are partially imaged.    - XRAY Bilateral Hips 02/02/2021:  The bones are osteopenic.  There are mild degenerative changes of the bilateral sacroiliac joints, hip joints and pubic symphysis.  No fracture or dislocation.  Vascular calcifications.     - XRAY Bilateral Knee 01/29/2021:   Postoperative changes of a right total knee arthroplasty are seen in satisfactory alignment without hardware complication.  The left knee demonstrates medial, lateral and patellofemoral compartment degenerative change with joint space narrowing, subchondral sclerosis and marginal osteophyte formation, most probably involving the medial compartment of the knee.  There is also apparent chondrocalcinosis of the lateral compartment of the left knee.  No joint effusion.    - X-ray Lumbar Spine 5/13/20:  Moderate facet arthropathy throughout the lower lumbar spine greatest at L5/S1.  Diffuse osteopenia.  Mild spondylosis L3/4. Overall alignment is well maintained.  No evidence of spondylolysis or spondylolisthesis. Disk and vertebral body heights are normal.  Severe atherosclerotic disease.  Mild constipation.    - CT Lumbar spine 3/19/19:  The incidentally visualized soft tissue structures of the abdomen show calcifications of the aorta and its major branch vessels.  Vertebral body alignment and heights  are maintained.  There is disc space narrowing at the L3-4 level.  No fracture or subluxation is identified.  At T12-L1, no disc herniation, central canal stenosis or neural foraminal narrowing.  At L1-2, no disc herniation, central canal stenosis or neural foraminal narrowing.  At L2-3, there is a broad-based posterior disc bulge contributing to mild central canal stenosis with mild bilateral neural foraminal narrowing.  At L3-4, there is a broad-based posterior disc bulge with ligamentum flavum hypertrophy contributing to mild central canal stenosis with moderate right and moderate severe left-sided neural foraminal narrowing.  At L4-5, there is a broad-based posterior disc bulge with facet arthropathy contributing to mild central canal stenosis with moderate bilateral neural foraminal narrowing.  At L5-S1, there is a broad-based posterior disc bulge and facet arthropathy contributing to mild central canal stenosis with moderate left and moderate severe right-sided neural foraminal narrowing.  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  Please see above report for level by level description.      Labs:  BMP  Lab Results   Component Value Date     (L) 07/05/2023    K 4.9 07/05/2023    CL 97 07/05/2023    CO2 26 07/05/2023    BUN 19 07/05/2023    CREATININE 1.4 07/05/2023    CALCIUM 9.8 07/05/2023    ANIONGAP 11 07/05/2023    ESTGFRAFRICA >60 02/03/2022    EGFRNONAA >60 02/03/2022     Lab Results   Component Value Date    ALT 38 07/05/2023    AST 30 07/05/2023    ALKPHOS 83 07/05/2023    BILITOT 0.3 07/05/2023     Lab Results   Component Value Date     01/21/2023       Assessment:  Erasmo Dunbar is a 88 y.o. male with the following diagnoses based on history, exam, and imaging:    Problem List Items Addressed This Visit          Neuro    Chronic pain     Other Visit Diagnoses       Chronic prescription  opiate use    -  Primary    Gait abnormality                    This is a pleasant 88 y.o. gentleman presenting with:     - Chronic bilateral low back pain: His pain appears multifactorial with facetogenic pain and LSS w/ neurogenic claudication. He had % and then 50% improvement in pain after MBBs, and left L3-5 MB RFA w/ 60% relief. He had no relief from right L3-5 MB RFA. He does have multilevel canal and foraminal stenosis.    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   - Chronic bilateral shoulder pain: AC joint separation prior. Calcific tendonitis and prior RTC tears. Complicated by his cervical pathology as well with severe stenosis bilaterally at C5-6.    - Right shoulder pain exacerbated by acute fall on 6/2. No significant bruising or erythema.   - Chronic neck pain: more shoulder pain than neck pain.  - Bilateral trochanteric bursitis  - Chronic right knee pain after total knee replacement  - Chronic left knee pain:  Pseudogout on imaging, has been referred to Rheumatology, had CSI with good relief, and also colchicine which is helping.   - Chronic opioid use: I do think this is appropriate considering his age and limited medication options due to comorbidities.   - Peripheral neuropathy 2/2 DM2.   - Comorbidities: Chronic anticoagulation on eliquis for paroxysmal A-fib, pacemaker    Treatment Plan:     - Prior records reviewed  - Prior imaging (including updated Xray of L spine reviewed)  - Will refill Norco 10/325mg BID #60  -  without issues  - Manny Vieira will be provider of med mgt at this time  - Continue Lyrica 100mg BID  - Continue Zanaflex PRN  - Cont HEP   - RTC 2 months with Dr Vieira to establish care.     CAROLYN Yun  08/15/2023    Disclaimer: This note was partly generated using dictation software which may occasionally result in transcription errors.      I spent a total of 30 minutes on the day of the visit.  This includes face to face time and non-face  to face time preparing to see the patient by reviewing previous labs/imaging, obtaining and/or reviewing separately obtained history, documenting clinical information in the electronic or other health record, independently interpreting results and communicating results to the patient/family/caregiver.

## 2023-09-17 ENCOUNTER — HOSPITAL ENCOUNTER (EMERGENCY)
Facility: HOSPITAL | Age: 88
Discharge: HOME | End: 2023-09-17
Attending: STUDENT IN AN ORGANIZED HEALTH CARE EDUCATION/TRAINING PROGRAM | Admitting: FAMILY MEDICINE
Payer: MEDICARE

## 2023-09-17 ENCOUNTER — OFFICE VISIT (OUTPATIENT)
Dept: URGENT CARE | Facility: CLINIC | Age: 88
End: 2023-09-17
Payer: MEDICARE

## 2023-09-17 VITALS
RESPIRATION RATE: 17 BRPM | TEMPERATURE: 97 F | HEART RATE: 60 BPM | HEIGHT: 66 IN | DIASTOLIC BLOOD PRESSURE: 78 MMHG | SYSTOLIC BLOOD PRESSURE: 189 MMHG | BODY MASS INDEX: 32.47 KG/M2 | WEIGHT: 202 LBS | OXYGEN SATURATION: 97 %

## 2023-09-17 VITALS
HEART RATE: 60 BPM | HEIGHT: 66 IN | OXYGEN SATURATION: 94 % | WEIGHT: 201.94 LBS | DIASTOLIC BLOOD PRESSURE: 74 MMHG | SYSTOLIC BLOOD PRESSURE: 159 MMHG | TEMPERATURE: 98 F | RESPIRATION RATE: 18 BRPM | BODY MASS INDEX: 32.45 KG/M2

## 2023-09-17 DIAGNOSIS — I10 HYPERTENSION, UNSPECIFIED TYPE: ICD-10-CM

## 2023-09-17 DIAGNOSIS — E78.5 HYPERLIPIDEMIA, UNSPECIFIED HYPERLIPIDEMIA TYPE: ICD-10-CM

## 2023-09-17 DIAGNOSIS — E11.40 TYPE 2 DIABETES MELLITUS WITH DIABETIC NEUROPATHY, WITHOUT LONG-TERM CURRENT USE OF INSULIN: ICD-10-CM

## 2023-09-17 DIAGNOSIS — R82.4 KETONURIA: ICD-10-CM

## 2023-09-17 DIAGNOSIS — R73.9 HYPERGLYCEMIA: ICD-10-CM

## 2023-09-17 DIAGNOSIS — R11.2 INTRACTABLE NAUSEA AND VOMITING: Primary | ICD-10-CM

## 2023-09-17 DIAGNOSIS — E86.0 DEHYDRATION: ICD-10-CM

## 2023-09-17 DIAGNOSIS — I50.32 CHRONIC DIASTOLIC (CONGESTIVE) HEART FAILURE: ICD-10-CM

## 2023-09-17 DIAGNOSIS — Z85.46 HISTORY OF PROSTATE CANCER: ICD-10-CM

## 2023-09-17 DIAGNOSIS — R11.10 VOMITING: ICD-10-CM

## 2023-09-17 DIAGNOSIS — I48.91 ATRIAL FIBRILLATION, UNSPECIFIED TYPE: ICD-10-CM

## 2023-09-17 DIAGNOSIS — Z86.73 HISTORY OF TIA (TRANSIENT ISCHEMIC ATTACK): ICD-10-CM

## 2023-09-17 DIAGNOSIS — G89.29 OTHER CHRONIC PAIN: ICD-10-CM

## 2023-09-17 LAB
ALBUMIN SERPL BCP-MCNC: 4.1 G/DL (ref 3.5–5.2)
ALP SERPL-CCNC: 80 U/L (ref 55–135)
ALT SERPL W/O P-5'-P-CCNC: 45 U/L (ref 10–44)
ANION GAP SERPL CALC-SCNC: 12 MMOL/L (ref 8–16)
AST SERPL-CCNC: 25 U/L (ref 10–40)
BACTERIA #/AREA URNS HPF: ABNORMAL /HPF
BASOPHILS # BLD AUTO: 0.07 K/UL (ref 0–0.2)
BASOPHILS NFR BLD: 0.5 % (ref 0–1.9)
BILIRUB SERPL-MCNC: 0.7 MG/DL (ref 0.1–1)
BILIRUB UR QL STRIP: NEGATIVE
BILIRUB UR QL STRIP: NEGATIVE
BNP SERPL-MCNC: 307 PG/ML (ref 0–99)
BUN SERPL-MCNC: 17 MG/DL (ref 8–23)
CALCIUM SERPL-MCNC: 9.8 MG/DL (ref 8.7–10.5)
CHLORIDE SERPL-SCNC: 92 MMOL/L (ref 95–110)
CLARITY UR: CLEAR
CO2 SERPL-SCNC: 27 MMOL/L (ref 23–29)
COLOR UR: YELLOW
CREAT SERPL-MCNC: 1.1 MG/DL (ref 0.5–1.4)
DIFFERENTIAL METHOD: ABNORMAL
EOSINOPHIL # BLD AUTO: 0.1 K/UL (ref 0–0.5)
EOSINOPHIL NFR BLD: 0.4 % (ref 0–8)
ERYTHROCYTE [DISTWIDTH] IN BLOOD BY AUTOMATED COUNT: 13.2 % (ref 11.5–14.5)
EST. GFR  (NO RACE VARIABLE): >60 ML/MIN/1.73 M^2
GLUCOSE SERPL-MCNC: 329 MG/DL (ref 70–110)
GLUCOSE SERPL-MCNC: 334 MG/DL (ref 70–110)
GLUCOSE UR QL STRIP: ABNORMAL
GLUCOSE UR QL STRIP: POSITIVE
HCT VFR BLD AUTO: 37.2 % (ref 40–54)
HGB BLD-MCNC: 12.1 G/DL (ref 14–18)
HGB UR QL STRIP: NEGATIVE
HYALINE CASTS #/AREA URNS LPF: 1 /LPF
IMM GRANULOCYTES # BLD AUTO: 0.06 K/UL (ref 0–0.04)
IMM GRANULOCYTES NFR BLD AUTO: 0.4 % (ref 0–0.5)
KETONES UR QL STRIP: ABNORMAL
KETONES UR QL STRIP: POSITIVE
LACTATE SERPL-SCNC: 2.4 MMOL/L (ref 0.5–2.2)
LACTATE SERPL-SCNC: 2.7 MMOL/L (ref 0.5–2.2)
LEUKOCYTE ESTERASE UR QL STRIP: NEGATIVE
LEUKOCYTE ESTERASE UR QL STRIP: NEGATIVE
LIPASE SERPL-CCNC: 14 U/L (ref 4–60)
LYMPHOCYTES # BLD AUTO: 2.2 K/UL (ref 1–4.8)
LYMPHOCYTES NFR BLD: 16.1 % (ref 18–48)
MCH RBC QN AUTO: 29 PG (ref 27–31)
MCHC RBC AUTO-ENTMCNC: 32.5 G/DL (ref 32–36)
MCV RBC AUTO: 89 FL (ref 82–98)
MICROSCOPIC COMMENT: ABNORMAL
MONOCYTES # BLD AUTO: 1.4 K/UL (ref 0.3–1)
MONOCYTES NFR BLD: 10.2 % (ref 4–15)
NEUTROPHILS # BLD AUTO: 10 K/UL (ref 1.8–7.7)
NEUTROPHILS NFR BLD: 72.4 % (ref 38–73)
NITRITE UR QL STRIP: NEGATIVE
NRBC BLD-RTO: 0 /100 WBC
PH UR STRIP: 6 [PH] (ref 5–8)
PH, POC UA: 5.5
PLATELET # BLD AUTO: 410 K/UL (ref 150–450)
PMV BLD AUTO: 9.8 FL (ref 9.2–12.9)
POC BLOOD, URINE: POSITIVE
POC NITRATES, URINE: NEGATIVE
POTASSIUM SERPL-SCNC: 3.7 MMOL/L (ref 3.5–5.1)
PROT SERPL-MCNC: 7.4 G/DL (ref 6–8.4)
PROT UR QL STRIP: ABNORMAL
PROT UR QL STRIP: POSITIVE
RBC # BLD AUTO: 4.17 M/UL (ref 4.6–6.2)
RBC #/AREA URNS HPF: 1 /HPF (ref 0–4)
SODIUM SERPL-SCNC: 131 MMOL/L (ref 136–145)
SP GR UR STRIP: 1.02 (ref 1–1.03)
SP GR UR STRIP: >=1.03 (ref 1–1.03)
SQUAMOUS #/AREA URNS HPF: 1 /HPF
TROPONIN I SERPL DL<=0.01 NG/ML-MCNC: 0.01 NG/ML (ref 0–0.03)
URN SPEC COLLECT METH UR: ABNORMAL
UROBILINOGEN UR STRIP-ACNC: NEGATIVE EU/DL
UROBILINOGEN UR STRIP-ACNC: NORMAL (ref 0.3–2.2)
WBC # BLD AUTO: 13.74 K/UL (ref 3.9–12.7)
WBC #/AREA URNS HPF: 6 /HPF (ref 0–5)

## 2023-09-17 PROCEDURE — 85025 COMPLETE CBC W/AUTO DIFF WBC: CPT | Performed by: STUDENT IN AN ORGANIZED HEALTH CARE EDUCATION/TRAINING PROGRAM

## 2023-09-17 PROCEDURE — 81003 POCT URINALYSIS, DIPSTICK, AUTOMATED, W/O SCOPE: ICD-10-PCS | Mod: QW,S$GLB,, | Performed by: PHYSICIAN ASSISTANT

## 2023-09-17 PROCEDURE — 82962 GLUCOSE BLOOD TEST: CPT | Mod: S$GLB,,, | Performed by: PHYSICIAN ASSISTANT

## 2023-09-17 PROCEDURE — 36415 COLL VENOUS BLD VENIPUNCTURE: CPT | Performed by: STUDENT IN AN ORGANIZED HEALTH CARE EDUCATION/TRAINING PROGRAM

## 2023-09-17 PROCEDURE — 83605 ASSAY OF LACTIC ACID: CPT | Mod: 91 | Performed by: STUDENT IN AN ORGANIZED HEALTH CARE EDUCATION/TRAINING PROGRAM

## 2023-09-17 PROCEDURE — 36415 COLL VENOUS BLD VENIPUNCTURE: CPT | Performed by: SURGERY

## 2023-09-17 PROCEDURE — 96372 PR INJECTION,THERAP/PROPH/DIAG2ST, IM OR SUBCUT: ICD-10-PCS | Mod: S$GLB,,, | Performed by: PHYSICIAN ASSISTANT

## 2023-09-17 PROCEDURE — 96372 THER/PROPH/DIAG INJ SC/IM: CPT | Mod: S$GLB,,, | Performed by: PHYSICIAN ASSISTANT

## 2023-09-17 PROCEDURE — 96376 TX/PRO/DX INJ SAME DRUG ADON: CPT

## 2023-09-17 PROCEDURE — 63600175 PHARM REV CODE 636 W HCPCS: Performed by: STUDENT IN AN ORGANIZED HEALTH CARE EDUCATION/TRAINING PROGRAM

## 2023-09-17 PROCEDURE — 93010 ELECTROCARDIOGRAM REPORT: CPT | Mod: ,,, | Performed by: INTERNAL MEDICINE

## 2023-09-17 PROCEDURE — 99285 EMERGENCY DEPT VISIT HI MDM: CPT | Mod: 25

## 2023-09-17 PROCEDURE — 82962 POCT GLUCOSE, HAND-HELD DEVICE: ICD-10-PCS | Mod: S$GLB,,, | Performed by: PHYSICIAN ASSISTANT

## 2023-09-17 PROCEDURE — 93010 EKG 12-LEAD: ICD-10-PCS | Mod: ,,, | Performed by: INTERNAL MEDICINE

## 2023-09-17 PROCEDURE — 93005 ELECTROCARDIOGRAM TRACING: CPT

## 2023-09-17 PROCEDURE — 25000003 PHARM REV CODE 250: Performed by: STUDENT IN AN ORGANIZED HEALTH CARE EDUCATION/TRAINING PROGRAM

## 2023-09-17 PROCEDURE — 84484 ASSAY OF TROPONIN QUANT: CPT | Performed by: SURGERY

## 2023-09-17 PROCEDURE — 99214 PR OFFICE/OUTPT VISIT, EST, LEVL IV, 30-39 MIN: ICD-10-PCS | Mod: 25,S$GLB,, | Performed by: PHYSICIAN ASSISTANT

## 2023-09-17 PROCEDURE — 81000 URINALYSIS NONAUTO W/SCOPE: CPT | Performed by: STUDENT IN AN ORGANIZED HEALTH CARE EDUCATION/TRAINING PROGRAM

## 2023-09-17 PROCEDURE — 96374 THER/PROPH/DIAG INJ IV PUSH: CPT

## 2023-09-17 PROCEDURE — 99214 OFFICE O/P EST MOD 30 MIN: CPT | Mod: 25,S$GLB,, | Performed by: PHYSICIAN ASSISTANT

## 2023-09-17 PROCEDURE — 80053 COMPREHEN METABOLIC PANEL: CPT | Performed by: STUDENT IN AN ORGANIZED HEALTH CARE EDUCATION/TRAINING PROGRAM

## 2023-09-17 PROCEDURE — 63600175 PHARM REV CODE 636 W HCPCS: Performed by: SURGERY

## 2023-09-17 PROCEDURE — 99900035 HC TECH TIME PER 15 MIN (STAT)

## 2023-09-17 PROCEDURE — 96361 HYDRATE IV INFUSION ADD-ON: CPT

## 2023-09-17 PROCEDURE — 83880 ASSAY OF NATRIURETIC PEPTIDE: CPT | Performed by: STUDENT IN AN ORGANIZED HEALTH CARE EDUCATION/TRAINING PROGRAM

## 2023-09-17 PROCEDURE — 83690 ASSAY OF LIPASE: CPT | Performed by: STUDENT IN AN ORGANIZED HEALTH CARE EDUCATION/TRAINING PROGRAM

## 2023-09-17 PROCEDURE — 81003 URINALYSIS AUTO W/O SCOPE: CPT | Mod: QW,S$GLB,, | Performed by: PHYSICIAN ASSISTANT

## 2023-09-17 RX ORDER — ACETAMINOPHEN 500 MG
500 TABLET ORAL EVERY 6 HOURS PRN
COMMUNITY

## 2023-09-17 RX ORDER — ONDANSETRON 2 MG/ML
4 INJECTION INTRAMUSCULAR; INTRAVENOUS
Status: COMPLETED | OUTPATIENT
Start: 2023-09-17 | End: 2023-09-17

## 2023-09-17 RX ORDER — CLONIDINE HYDROCHLORIDE 0.1 MG/1
0.1 TABLET ORAL
Status: COMPLETED | OUTPATIENT
Start: 2023-09-17 | End: 2023-09-17

## 2023-09-17 RX ORDER — PANTOPRAZOLE SODIUM 40 MG/1
40 TABLET, DELAYED RELEASE ORAL DAILY
Qty: 30 TABLET | Refills: 0 | Status: SHIPPED | OUTPATIENT
Start: 2023-09-17 | End: 2023-09-17 | Stop reason: SDUPTHER

## 2023-09-17 RX ORDER — ONDANSETRON 4 MG/1
4 TABLET, ORALLY DISINTEGRATING ORAL EVERY 8 HOURS PRN
Qty: 20 TABLET | Refills: 0 | Status: SHIPPED | OUTPATIENT
Start: 2023-09-17 | End: 2023-09-17 | Stop reason: SDUPTHER

## 2023-09-17 RX ORDER — ONDANSETRON 2 MG/ML
8 INJECTION INTRAMUSCULAR; INTRAVENOUS
Status: COMPLETED | OUTPATIENT
Start: 2023-09-17 | End: 2023-09-17

## 2023-09-17 RX ORDER — ONDANSETRON 4 MG/1
4 TABLET, ORALLY DISINTEGRATING ORAL EVERY 8 HOURS PRN
Qty: 20 TABLET | Refills: 0 | Status: SHIPPED | OUTPATIENT
Start: 2023-09-17 | End: 2023-09-29

## 2023-09-17 RX ORDER — PANTOPRAZOLE SODIUM 40 MG/1
40 TABLET, DELAYED RELEASE ORAL DAILY
Qty: 30 TABLET | Refills: 0 | Status: SHIPPED | OUTPATIENT
Start: 2023-09-17 | End: 2023-10-12 | Stop reason: SDUPTHER

## 2023-09-17 RX ADMIN — ONDANSETRON 4 MG: 2 INJECTION INTRAMUSCULAR; INTRAVENOUS at 07:09

## 2023-09-17 RX ADMIN — SODIUM CHLORIDE 500 ML: 9 INJECTION, SOLUTION INTRAVENOUS at 05:09

## 2023-09-17 RX ADMIN — ONDANSETRON 8 MG: 2 INJECTION INTRAMUSCULAR; INTRAVENOUS at 11:09

## 2023-09-17 RX ADMIN — CLONIDINE HYDROCHLORIDE 0.1 MG: 0.1 TABLET ORAL at 02:09

## 2023-09-17 RX ADMIN — ONDANSETRON 4 MG: 2 INJECTION INTRAMUSCULAR; INTRAVENOUS at 03:09

## 2023-09-17 RX ADMIN — SODIUM CHLORIDE 500 ML: 9 INJECTION, SOLUTION INTRAVENOUS at 02:09

## 2023-09-17 NOTE — PROGRESS NOTES
"Subjective:      Patient ID: Erasmo Dunbar is a 88 y.o. male.    Vitals:  height is 5' 6" (1.676 m) and weight is 91.6 kg (202 lb). His oral temperature is 97 °F (36.1 °C). His blood pressure is 189/78 (abnormal) and his pulse is 60. His respiration is 17 and oxygen saturation is 97%.     Chief Complaint: Emesis    Pt have been vomiting/ dry heaving for the past two weeks. Also the pt haven't been having an appetite and he isn't eating much.     Patient provider note starts here:  Patient presents with his wife with complaints of nausea, vomiting and decreased appetite for the past 2 weeks.  Reports that sometimes he only dry heaves and other times he actually has emesis.  Denies any significant abdominal pain.  No diarrhea/constipation.  Reports that he is having normal bowel movements.  Denies any urinary symptoms however, wife does report urinary frequency reported by the son who took him to a dermatology appointment recently.  Denies any fevers.  Reports intermittent cough but nothing persistent.  He denies chest pain, shortness of breath, abdominal pain.  Only previous abdominal surgery was a partial colon resection secondary to diverticulitis.  Wife has been giving the patient Phenergan however, he is not able to tolerate it well and vomit shortly after.  Patient did suffer with persistent nausea and vomiting back in 2020 and the primary care had stopped his Trulicity which was likely the cause because his symptoms resolved after discontinuation of this medication.  Denies any recent changes in medications or new medications.     Emesis   This is a new problem. Episode onset: 2 weeks. The problem occurs more than 10 times per day. The problem has been waxing and waning. There has been no fever. Associated symptoms include coughing (intermittent). Pertinent negatives include no abdominal pain, arthralgias, chest pain, chills, decreased urine volume, diarrhea, dizziness, fever, headaches, myalgias, sweats, " URI or weight loss. He has tried anti-emetic for the symptoms. The treatment provided no relief.       Constitution: Positive for appetite change and fatigue. Negative for chills and fever.   HENT:  Negative for congestion and sore throat.    Neck: Negative for neck pain and neck stiffness.   Cardiovascular:  Negative for chest pain.   Respiratory:  Positive for cough (intermittent). Negative for chest tightness, sputum production, shortness of breath and wheezing.    Gastrointestinal:  Positive for history of abdominal surgery (partial colon resection 2/2 diverticulitis), nausea and vomiting. Negative for abdominal pain, constipation, diarrhea, bright red blood in stool and dark colored stools.   Genitourinary:  Positive for frequency. Negative for dysuria, urgency, urine decreased, hematuria and pelvic pain.   Musculoskeletal:  Negative for pain, joint pain and muscle ache.   Skin:  Negative for rash and wound.   Allergic/Immunologic: Negative for itching.   Neurological:  Negative for dizziness, headaches, numbness and tingling.      Objective:     Physical Exam   Constitutional: He is oriented to person, place, and time. He appears well-developed. He appears toxic. He appears ill. No distress.      Comments:Appears ill, dehydrated and deconditioned.  Sitting in clinic wheelchair holding emesis bag.     HENT:   Head: Normocephalic and atraumatic.   Ears:   Right Ear: External ear normal.   Left Ear: External ear normal.   Nose: Nose normal. No congestion.   Mouth/Throat: Mucous membranes are normal. No posterior oropharyngeal erythema.      Comments: Mucus membranes are dry    Eyes: Conjunctivae and lids are normal.   Neck: Trachea normal. Neck supple.   Cardiovascular: Normal rate.   Pulmonary/Chest: Effort normal and breath sounds normal. No respiratory distress. He has no wheezes.   Abdominal: Normal appearance and bowel sounds are normal. Soft. There is abdominal tenderness (TTP in the LUQ without associated  rebound or guarding. Abdomen is mildly distended). There is no rebound, no guarding, no left CVA tenderness and no right CVA tenderness.   Musculoskeletal: Normal range of motion.         General: Normal range of motion.      Right lower leg: Edema present.      Left lower leg: Edema present.   Neurological: He is alert and oriented to person, place, and time. He has normal strength.   Skin: Skin is warm, dry, intact, not diaphoretic and not pale.   Psychiatric: His speech is normal and behavior is normal. Judgment and thought content normal.   Nursing note and vitals reviewed.      Assessment:     1. Intractable nausea and vomiting    2. Type 2 diabetes mellitus with diabetic neuropathy, without long-term current use of insulin    3. Other chronic pain    4. History of TIA (transient ischemic attack)    5. History of prostate cancer    6. Atrial fibrillation, unspecified type    7. Hyperlipidemia, unspecified hyperlipidemia type    8. Hypertension, unspecified type    9. Chronic diastolic (congestive) heart failure    10. Ketonuria    11. Dehydration    12. Hyperglycemia      Results for orders placed or performed in visit on 09/17/23   POCT Glucose, Hand-Held Device   Result Value Ref Range    POC Glucose 329 (A) 70 - 110 MG/DL   POCT Urinalysis, Dipstick, Automated, W/O Scope   Result Value Ref Range    POC Blood, Urine Positive (A) Negative    POC Bilirubin, Urine Negative Negative    POC Urobilinogen, Urine normal 0.3 - 2.2    POC Ketones, Urine Positive (A) Negative    POC Protein, Urine Positive (A) Negative    POC Nitrates, Urine Negative Negative    POC Glucose, Urine Positive (A) Negative    pH, UA 5.5     POC Specific Gravity, Urine 1.020 1.003 - 1.029    POC Leukocytes, Urine Negative Negative       Plan:       Intractable nausea and vomiting  -     POCT Glucose, Hand-Held Device  -     ondansetron injection 8 mg  -     POCT Urinalysis, Dipstick, Automated, W/O Scope  -     Refer to Emergency  Dept.    Type 2 diabetes mellitus with diabetic neuropathy, without long-term current use of insulin  -     POCT Glucose, Hand-Held Device    Other chronic pain    History of TIA (transient ischemic attack)    History of prostate cancer    Atrial fibrillation, unspecified type    Hyperlipidemia, unspecified hyperlipidemia type    Hypertension, unspecified type    Chronic diastolic (congestive) heart failure    Ketonuria  -     Refer to Emergency Dept.    Dehydration  -     Refer to Emergency Dept.    Hyperglycemia          Medical Decision Making:   History:   Old Medical Records: I decided to obtain old medical records.  Clinical Tests:   Lab Tests: Ordered and Reviewed  Urgent Care Management:  A. Problem List:   -Acute: Intractable nausea and vomiting, hyperglycemia, ketonuria, dehydration   -Chronic:  Diabetes mellitus, chronic pain, hyperlipidemia, hypertension, chronic diastolic congestive heart failure, paroxysmal AFib, history of prostate cancer, bladder mass  B. Differential diagnosis: aortic dissection, mesenteric ischemia, perforated bowel, SBO, ileus, appendicitis, cholecystitis, diverticulitis, nephrolithiasis, pancreatitis, gastroenteritis, colitis, biliary colic, GERD, PUD, constipation, UTI  C. Diagnostic Testing Ordered: UA, glucose  D. Diagnostic Testing Considered: urine culture, CXR, CMP (rule out a gap)  E. Independent Historians: Wife  F. Urgent Care Midlevel Independent Results Interpretation: UA without leukocytes. Blood, protein and ketones present  G. Radiology:  H. Review of Previous Medical Records: Recent A1C was >9. History of intractable N/V documented in 2020 and felt to be related to Trulicity which was discontinued.   I. Home Medications Reviewed  J. Social Determinants of Health considered  K. Medical Decision Making and Disposition:  Patient presents here with his wife with complaints of intractable nausea and vomiting for the past 2 weeks.  On exam, he is afebrile but appears  ill, dehydrated and deconditioned.  Toxic appearing.  Lungs are clear to auscultation bilaterally.  Mild tenderness with palpation in the left upper quadrant without associated rebound or guarding.  He was administered Zofran IM in the clinic.  Urinalysis does show some ketones.  Glucose is greater than 300 in the clinic.  Unable to obtain CMP to rule out DKA.  Discussed with Dr. Escalante and shared decision making was made with patient and his wife. Discussed that we could attempt IVF in clinic at a slow rate 2/2 CHF or go to ED for more inclusive workup with labs, IVF and possible imaging if needed. They elected to proceed to the ED and have his son bring him there. I called report to Anastasiya in the ED who is expecting patient. He was helped into his son's car in no acute distress.

## 2023-09-17 NOTE — ED TRIAGE NOTES
88 y.o. male presents to ER Room/bed info not found   Chief Complaint   Patient presents with    Emesis   .   C/o vomiting on and off for two weeks, sent from Urgent Care for further eval    
Viry Jacobs  (RN)  2019 11:01:45

## 2023-09-17 NOTE — PHARMACY MED REC
"Admission Medication History     The home medication history was taken by Juhi Coker CPhT.    Medication history obtained from, Patient's Wife Verified    You may go to "Admission" then "Reconcile Home Medications" tabs to review and/or act upon these items.     The home medication list has been updated by the Pharmacy department.   Please read ALL comments highlighted in yellow.   Please address this information as you see fit.    Feel free to contact us if you have any questions or require assistance.      The medications listed below were removed from the home medication list.  Please reorder if appropriate:  Patient reports no longer taking the following medication(s):  Repatha Sureclick 140 mg/ml injection  Fluorouracil 5% cream  Triamcinolone 0.1% cream      Juhi Coker CPhT.  Ext [phone]               .          "

## 2023-09-17 NOTE — ED PROVIDER NOTES
"Encounter Date: 9/17/2023       History     Chief Complaint   Patient presents with    Emesis     88-year-old male with history of CHF, AFib, diabetes, hypertension, presenting with two weeks of nausea and vomiting, reports of right-sided abdominal pain.  No dysuria.  No hematuria.  Denies fever.  No chest pain, shortness breath, lightheadedness.      Review of patient's allergies indicates:   Allergen Reactions    Iodinated contrast media     Iodine Hives     Iv iodine only     Past Medical History:   Diagnosis Date    Arthritis     Atrial fibrillation     Bladder mass     BPH (benign prostatic hyperplasia)     CHF (congestive heart failure)     Depression     Diabetes mellitus type II     Hyperlipidemia     Hypertension     Microscopic hematuria     Prostate cancer 10/01/2018    RLS (restless legs syndrome)     Stroke     TIA    Wears glasses      Past Surgical History:   Procedure Laterality Date    back injections      BACK SURGERY      cervical fusion    CARDIAC PACEMAKER PLACEMENT      CARDIAC SURGERY Left     pacemaker placement    CATARACT EXTRACTION W/  INTRAOCULAR LENS IMPLANT Bilateral     cataracts    CAUDAL EPIDURAL STEROID INJECTION N/A 9/30/2022    Procedure: CAUDAL EPIDURAL STEROID INJECTION WITH CATHETER;  Surgeon: Maria Guadalupe Ortiz MD;  Location: [facility] OR;  Service: Pain Management;  Laterality: N/A;    COLECTOMY N/A     CYSTOSCOPY N/A     Pt stated, "I have had six Cystoscopy."    CYSTOSCOPY W/ RETROGRADES Bilateral 10/21/2019    Procedure: CYSTOSCOPY WITH RETROGRADE PYELOGRAM;  Surgeon: Elliott Coon MD;  Location: [facility] OR;  Service: Urology;  Laterality: Bilateral;  OP 6    DIGITAL RECTAL EXAMINATION UNDER ANESTHESIA N/A 10/21/2019    Procedure: EXAM UNDER ANESTHESIA, DIGITAL, RECTUM;  Surgeon: Elliott Coon MD;  Location: [facility] OR;  Service: Urology;  Laterality: N/A;    EPIDURAL STEROID INJECTION INTO CERVICAL SPINE N/A 5/13/2022    Procedure: CERVICAL EPIDURAL STEROID INJECTION (C7-T1 " INTERLAMINAR);  Surgeon: Maria Guadalupe Ortiz MD;  Location: [facility] OR;  Service: Pain Management;  Laterality: N/A;    EPIDURAL STEROID INJECTION INTO LUMBAR SPINE N/A 3/12/2021    Procedure: LUMBAR EPIDURAL STEROID INJECTION (L4-5 INTERLAMINAR);  Surgeon: Maria Guadalupe Ortiz MD;  Location: [facility] OR;  Service: Pain Management;  Laterality: N/A;    Ganglion Cyst Removed  Right     INJECTION OF ANESTHETIC AGENT AROUND MEDIAL BRANCH NERVES INNERVATING LUMBAR FACET JOINT Bilateral 12/18/2020    Procedure: LUMBAR FACET JOINT BLOCK (L4-5,L5-S1);  Surgeon: Maria Guadalupe Ortiz MD;  Location: [facility] OR;  Service: Pain Management;  Laterality: Bilateral;    INJECTION OF ANESTHETIC AGENT AROUND MEDIAL BRANCH NERVES INNERVATING LUMBAR FACET JOINT Bilateral 1/15/2021    Procedure: LUMBAR FACET JOINT BLOCK (L4-5,L5-S1);  Surgeon: Maria Guadalupe Ortiz MD;  Location: [facility] OR;  Service: Pain Management;  Laterality: Bilateral;    INJECTION OF ANESTHETIC AGENT AROUND NERVE Right 10/30/2020    Procedure: SUPERIOR LATERAL AND MEDIAL AND INFERIOR MEDIAL GENICULAR NERVE BLOCK;  Surgeon: Maria Guadalupe Ortiz MD;  Location: [facility] OR;  Service: Pain Management;  Laterality: Right;    MYELOGRAPHY N/A 4/4/2022    Procedure: Myelogram;  Surgeon: Rufino Ramos MD;  Location: [address];  Service: Radiology;  Laterality: N/A;    Neck Fusion Bilateral     PROSTATE SURGERY      RADIOFREQUENCY ABLATION OF LUMBAR MEDIAL BRANCH NERVE AT SINGLE LEVEL Left 1/21/2022    Procedure: RADIOFREQUENCY ABLATION (L4-5,L5-S1);  Surgeon: Maria Guadalupe Ortiz MD;  Location: [facility] OR;  Service: Pain Management;  Laterality: Left;    RADIOFREQUENCY THERMOCOAGULATION Right 3/4/2022    Procedure: RADIOFREQUENCY THERMAL COAGULATION (L4-5,L5-S1);  Surgeon: Maria Guadalupe Ortiz MD;  Location: [facility] OR;  Service: Pain Management;  Laterality: Right;    ROTATOR CUFF REPAIR Right     SKIN BIOPSY      skin cancer    TOTAL KNEE ARTHROPLASTY  03/30/2017    right knee    TRANSURETHRAL RESECTION OF PROSTATE        Family History   Problem Relation Age of Onset    Cancer Mother         breast    Heart disease Father     Diabetes Sister     Diabetes Brother     Heart disease Brother     COPD Daughter     Seizures Daughter     Cancer Daughter     Diabetes Daughter      Social History     Tobacco Use    Smoking status: Former     Current packs/day: 0.00     Average packs/day: 1.5 packs/day for 35.0 years (52.5 ttl pk-yrs)     Types: Cigarettes     Start date: 1946     Quit date: 1981     Years since quittin.7    Smokeless tobacco: Never    Tobacco comments:     38 yrs ago   Substance Use Topics    Alcohol use: Yes     Alcohol/week: 8.0 standard drinks of alcohol     Types: 1 Cans of beer, 7 Shots of liquor per week     Comment: highball every night    Drug use: No     Review of Systems   Constitutional:  Negative for fever.   HENT:  Negative for sore throat.    Respiratory:  Negative for shortness of breath.    Cardiovascular:  Negative for chest pain.   Gastrointestinal:  Positive for abdominal pain, nausea and vomiting. Negative for constipation and diarrhea.   Genitourinary:  Negative for dysuria.   Musculoskeletal:  Negative for back pain.   Skin:  Negative for rash.   Neurological:  Negative for weakness.   Hematological:  Does not bruise/bleed easily.       Physical Exam     Initial Vitals [23 1311]   BP Pulse Resp Temp SpO2   (!) 196/81 61 18 -- 98 %      MAP       --         Physical Exam    Nursing note and vitals reviewed.  Constitutional: He appears well-developed.   Eyes: EOM are normal.   Neck:   Normal range of motion.  Cardiovascular:            No murmur heard.  Pulmonary/Chest: No respiratory distress.   Clear lungs bilaterally.  No respiratory distress.  No wheezing or rales.  Good air movement.     Abdominal: He exhibits no distension.   Right-sided abdominal tenderness to palpation.  No rebound or guarding.  No other tenderness to palpation.  No CVA tenderness bilaterally.    Musculoskeletal:         General: Normal range of motion.      Cervical back: Normal range of motion.     Neurological: He is alert.   Skin: Skin is warm.   Psychiatric: He has a normal mood and affect.         ED Course   Procedures  Labs Reviewed   CBC W/ AUTO DIFFERENTIAL   COMPREHENSIVE METABOLIC PANEL   B-TYPE NATRIURETIC PEPTIDE   URINALYSIS, REFLEX TO URINE CULTURE   LIPASE   LACTIC ACID, PLASMA     EKG Readings: (Independently Interpreted)   Initial Reading: No STEMI. Rhythm: Paced Rhythm. Heart Rate: 64. Ectopy: No Ectopy. Conduction: Normal.       Imaging Results    None          Medications - No data to display  Medical Decision Making  DDX: R/o acute cholecystitis, biliary colic, pancreatitis. Less likely cholangitis, choledocholithiasis but will screen on LFTs.  Also considering appendicitis, diverticulitis, SBO given history.  DX: BMP, CBC, LFT, lipase. UA.  CT abdomen pelvis (will be without contrast due to severe contrast allergy).  TX: Analgesia, antiemetic PRN. Treatment/consult as indicated by studies.  DISPO: Pending studies. If studies WNL, symptoms controlled, likely discharge to f/u with PMD within 2 days with precautions for return and recommendations for supportive care.        Amount and/or Complexity of Data Reviewed  Labs: ordered.  Radiology: ordered.                               Clinical Impression:   Final diagnoses:  [R11.10] Vomiting               Victoriano Mckeon MD  09/17/23 1321       Victoriano Mcekon MD  09/17/23 1320

## 2023-09-18 NOTE — PROVIDER PROGRESS NOTES - EMERGENCY DEPT.
Emergency Room Physician: 6:00 p.m. Shift Change      Summary/HPI: 88-year-old male with nausea vomiting for last 2 weeks  Patient with no abdominal pain, no diarrhea, no fever, only emesis occasion  Wife characterizes the nausea vomiting as random, episodic, not continous    Pertinent HX: No abdominal pain associated with his nausea vomiting    Re-exam: Patient has a completely soft & benign abdomen today in the ER    Pertinent Results: White count 16396 with a mildly elevated lactic acid today  CT scan of the abdomen showed no acute findings, urinalysis is (-) on evaluation  I reviewed his EKG today which showed no acute findings or ST changes today  I ordered a troponin which was (-), patient denied any chest pain, only emesis  I then ordered a gallbladder ultrasound which showed no acute findings this p.m.  The patient is currently not nauseated & has never had any abdominal pain history    Update/Disposition:  Patient feels much better, will prescribe Zofran & Protonix  I instructed the family to follow-up with gastroenterologist at [place] Dr. Esteban  The patient will also follow-up with his PCP Dr. Hemal Varma this next week  I counseled the patient extensively on bland diet & voiced understanding today  I have also counseled the patient to come back with any concerns on discharge  The patient has never had abdominal pain, counseled return with any pain on DC       Lewis Amos M.D. 7:40 PM 9/17/2023

## 2023-09-19 ENCOUNTER — OFFICE VISIT (OUTPATIENT)
Dept: FAMILY MEDICINE | Facility: CLINIC | Age: 88
End: 2023-09-19
Payer: MEDICARE

## 2023-09-19 VITALS
OXYGEN SATURATION: 94 % | WEIGHT: 198.75 LBS | DIASTOLIC BLOOD PRESSURE: 72 MMHG | SYSTOLIC BLOOD PRESSURE: 140 MMHG | BODY MASS INDEX: 31.94 KG/M2 | HEART RATE: 70 BPM | RESPIRATION RATE: 15 BRPM | HEIGHT: 66 IN

## 2023-09-19 DIAGNOSIS — R11.14 BILIOUS VOMITING WITH NAUSEA: Primary | ICD-10-CM

## 2023-09-19 PROCEDURE — 99214 OFFICE O/P EST MOD 30 MIN: CPT | Mod: S$GLB,,, | Performed by: FAMILY MEDICINE

## 2023-09-19 PROCEDURE — 99999 PR PBB SHADOW E&M-EST. PATIENT-LVL V: ICD-10-PCS | Mod: PBBFAC,,, | Performed by: FAMILY MEDICINE

## 2023-09-19 PROCEDURE — 1100F PTFALLS ASSESS-DOCD GE2>/YR: CPT | Mod: CPTII,S$GLB,, | Performed by: FAMILY MEDICINE

## 2023-09-19 PROCEDURE — 3288F FALL RISK ASSESSMENT DOCD: CPT | Mod: CPTII,S$GLB,, | Performed by: FAMILY MEDICINE

## 2023-09-19 PROCEDURE — 99999 PR PBB SHADOW E&M-EST. PATIENT-LVL V: CPT | Mod: PBBFAC,,, | Performed by: FAMILY MEDICINE

## 2023-09-19 PROCEDURE — 1100F PR PT FALLS ASSESS DOC 2+ FALLS/FALL W/INJURY/YR: ICD-10-PCS | Mod: CPTII,S$GLB,, | Performed by: FAMILY MEDICINE

## 2023-09-19 PROCEDURE — 99214 PR OFFICE/OUTPT VISIT, EST, LEVL IV, 30-39 MIN: ICD-10-PCS | Mod: S$GLB,,, | Performed by: FAMILY MEDICINE

## 2023-09-19 PROCEDURE — 1126F PR PAIN SEVERITY QUANTIFIED, NO PAIN PRESENT: ICD-10-PCS | Mod: CPTII,S$GLB,, | Performed by: FAMILY MEDICINE

## 2023-09-19 PROCEDURE — 1159F MED LIST DOCD IN RCRD: CPT | Mod: CPTII,S$GLB,, | Performed by: FAMILY MEDICINE

## 2023-09-19 PROCEDURE — 1159F PR MEDICATION LIST DOCUMENTED IN MEDICAL RECORD: ICD-10-PCS | Mod: CPTII,S$GLB,, | Performed by: FAMILY MEDICINE

## 2023-09-19 PROCEDURE — 3288F PR FALLS RISK ASSESSMENT DOCUMENTED: ICD-10-PCS | Mod: CPTII,S$GLB,, | Performed by: FAMILY MEDICINE

## 2023-09-19 PROCEDURE — 1126F AMNT PAIN NOTED NONE PRSNT: CPT | Mod: CPTII,S$GLB,, | Performed by: FAMILY MEDICINE

## 2023-09-19 NOTE — PROGRESS NOTES
Subjective:       Patient ID: Erasmo Dunbar is a 88 y.o. male.    Chief Complaint: ER follow up (Pt states he has been having nausea and dry heaving )    Pt is a 88 y.o. male who presents for evaluation and management of   Encounter Diagnosis   Name Primary?    Bilious vomiting with nausea Yes   .no pain  No fever   Went to ED 9/17 and full work up including imaging of GB (GB and CT done) which was all negative   Sent home on a PPI and zofran  Still nauseated but no vomiting     Doing well on current meds. Denies any side effects. Prevention is up to date.    Review of Systems   Constitutional:  Negative for fever.   Gastrointestinal:  Positive for nausea and vomiting. Negative for abdominal pain and blood in stool.       Objective:      Physical Exam  Constitutional:       Appearance: Normal appearance. He is well-developed. He is not ill-appearing.   HENT:      Head: Normocephalic and atraumatic.      Right Ear: External ear normal.      Left Ear: External ear normal.      Nose: Nose normal.      Mouth/Throat:      Mouth: Mucous membranes are moist.      Pharynx: No oropharyngeal exudate or posterior oropharyngeal erythema.   Eyes:      General: No scleral icterus.        Right eye: No discharge.         Left eye: No discharge.      Conjunctiva/sclera: Conjunctivae normal.      Pupils: Pupils are equal, round, and reactive to light.   Neck:      Thyroid: No thyromegaly.      Vascular: No JVD.      Trachea: No tracheal deviation.   Cardiovascular:      Rate and Rhythm: Normal rate and regular rhythm.      Heart sounds: Normal heart sounds. No murmur heard.  Pulmonary:      Effort: Pulmonary effort is normal. No respiratory distress.      Breath sounds: Normal breath sounds. No wheezing or rales.   Chest:      Chest wall: No tenderness.   Abdominal:      General: Bowel sounds are normal. There is no distension.      Palpations: Abdomen is soft. There is no mass.      Tenderness: There is no abdominal tenderness.  There is no guarding or rebound.   Musculoskeletal:         General: Normal range of motion.      Cervical back: Normal range of motion and neck supple.      Right lower leg: No edema.      Left lower leg: No edema.      Comments: Ambulates with cane      Lymphadenopathy:      Cervical: No cervical adenopathy.   Skin:     General: Skin is warm and dry.   Neurological:      Mental Status: He is alert and oriented to person, place, and time.      Cranial Nerves: No cranial nerve deficit.      Motor: No abnormal muscle tone.      Coordination: Coordination normal.      Deep Tendon Reflexes: Reflexes are normal and symmetric. Reflexes normal.   Psychiatric:         Behavior: Behavior normal.         Thought Content: Thought content normal.         Judgment: Judgment normal.         Assessment:       1. Bilious vomiting with nausea        Plan:   1. Bilious vomiting with nausea  -     Ambulatory referral/consult to Gastroenterology; Future; Expected date: 09/26/2023    ED visit and all labs/imaging reviewed   GB has been ruled out   DDx is gastritis, PUD, gastroparesis  MEd list reviewed. No offending agents and no new medications to account for nausea   Continue PPI and zofran   Has appt with GI     No follow-ups on file.

## 2023-09-20 ENCOUNTER — TELEPHONE (OUTPATIENT)
Dept: GASTROENTEROLOGY | Facility: CLINIC | Age: 88
End: 2023-09-20
Payer: MEDICARE

## 2023-09-20 NOTE — TELEPHONE ENCOUNTER
Pt has appt already scheduled for 10/17/2023 with Dr Titus. Sent pt a patient portal message.           ----- Message from Joan Brown sent at 9/20/2023 12:15 PM CDT -----  Type:  Sooner Appointment Request    Caller is requesting a sooner appointment.  Caller declined first available appointment listed below.  Caller will not accept being placed on the waitlist and is requesting a message be sent to doctor.  Name of Caller:pt  When is the first available appointment?10/17/23  Symptoms:nausea and vomiting  Would the patient rather a call back or a response via [facility]? call  Best Call Back Number:[phone]  Additional Information:

## 2023-09-29 ENCOUNTER — TELEPHONE (OUTPATIENT)
Dept: FAMILY MEDICINE | Facility: CLINIC | Age: 88
End: 2023-09-29
Payer: MEDICARE

## 2023-09-29 ENCOUNTER — OFFICE VISIT (OUTPATIENT)
Dept: FAMILY MEDICINE | Facility: CLINIC | Age: 88
End: 2023-09-29
Payer: MEDICARE

## 2023-09-29 VITALS
DIASTOLIC BLOOD PRESSURE: 62 MMHG | OXYGEN SATURATION: 97 % | HEART RATE: 78 BPM | SYSTOLIC BLOOD PRESSURE: 170 MMHG | WEIGHT: 192.88 LBS | HEIGHT: 66 IN | RESPIRATION RATE: 16 BRPM | BODY MASS INDEX: 31 KG/M2

## 2023-09-29 DIAGNOSIS — I10 PRIMARY HYPERTENSION: ICD-10-CM

## 2023-09-29 DIAGNOSIS — R11.14 BILIOUS VOMITING WITH NAUSEA: Primary | ICD-10-CM

## 2023-09-29 PROCEDURE — 3288F PR FALLS RISK ASSESSMENT DOCUMENTED: ICD-10-PCS | Mod: CPTII,S$GLB,, | Performed by: FAMILY MEDICINE

## 2023-09-29 PROCEDURE — 3288F FALL RISK ASSESSMENT DOCD: CPT | Mod: CPTII,S$GLB,, | Performed by: FAMILY MEDICINE

## 2023-09-29 PROCEDURE — 1126F AMNT PAIN NOTED NONE PRSNT: CPT | Mod: CPTII,S$GLB,, | Performed by: FAMILY MEDICINE

## 2023-09-29 PROCEDURE — 1159F MED LIST DOCD IN RCRD: CPT | Mod: CPTII,S$GLB,, | Performed by: FAMILY MEDICINE

## 2023-09-29 PROCEDURE — 99999 PR PBB SHADOW E&M-EST. PATIENT-LVL IV: ICD-10-PCS | Mod: PBBFAC,,, | Performed by: FAMILY MEDICINE

## 2023-09-29 PROCEDURE — 99213 PR OFFICE/OUTPT VISIT, EST, LEVL III, 20-29 MIN: ICD-10-PCS | Mod: S$GLB,,, | Performed by: FAMILY MEDICINE

## 2023-09-29 PROCEDURE — 1126F PR PAIN SEVERITY QUANTIFIED, NO PAIN PRESENT: ICD-10-PCS | Mod: CPTII,S$GLB,, | Performed by: FAMILY MEDICINE

## 2023-09-29 PROCEDURE — 99213 OFFICE O/P EST LOW 20 MIN: CPT | Mod: S$GLB,,, | Performed by: FAMILY MEDICINE

## 2023-09-29 PROCEDURE — 99999 PR PBB SHADOW E&M-EST. PATIENT-LVL IV: CPT | Mod: PBBFAC,,, | Performed by: FAMILY MEDICINE

## 2023-09-29 PROCEDURE — 1159F PR MEDICATION LIST DOCUMENTED IN MEDICAL RECORD: ICD-10-PCS | Mod: CPTII,S$GLB,, | Performed by: FAMILY MEDICINE

## 2023-09-29 PROCEDURE — 1101F PT FALLS ASSESS-DOCD LE1/YR: CPT | Mod: CPTII,S$GLB,, | Performed by: FAMILY MEDICINE

## 2023-09-29 PROCEDURE — 1101F PR PT FALLS ASSESS DOC 0-1 FALLS W/OUT INJ PAST YR: ICD-10-PCS | Mod: CPTII,S$GLB,, | Performed by: FAMILY MEDICINE

## 2023-09-29 RX ORDER — CLONIDINE HYDROCHLORIDE 0.1 MG/1
0.1 TABLET ORAL
Status: DISCONTINUED | OUTPATIENT
Start: 2023-09-29 | End: 2023-09-29

## 2023-09-29 RX ORDER — CLONIDINE 0.2 MG/24H
1 PATCH, EXTENDED RELEASE TRANSDERMAL
Qty: 4 PATCH | Refills: 11 | Status: SHIPPED | OUTPATIENT
Start: 2023-09-29 | End: 2024-09-28

## 2023-09-29 RX ORDER — CLONIDINE HYDROCHLORIDE 0.1 MG/1
0.1 TABLET ORAL
Status: COMPLETED | OUTPATIENT
Start: 2023-09-29 | End: 2023-09-29

## 2023-09-29 RX ORDER — PROMETHAZINE HYDROCHLORIDE 12.5 MG/1
12.5 TABLET ORAL EVERY 6 HOURS PRN
Qty: 30 TABLET | Refills: 2 | Status: SHIPPED | OUTPATIENT
Start: 2023-09-29

## 2023-09-29 RX ORDER — ONDANSETRON 4 MG/1
8 TABLET, ORALLY DISINTEGRATING ORAL EVERY 8 HOURS PRN
Qty: 30 TABLET | Refills: 2 | Status: SHIPPED | OUTPATIENT
Start: 2023-09-29

## 2023-09-29 RX ADMIN — CLONIDINE HYDROCHLORIDE 0.1 MG: 0.1 TABLET ORAL at 04:09

## 2023-09-29 NOTE — TELEPHONE ENCOUNTER
----- Message from Beatriz Woodward sent at 2023  8:47 AM CDT -----  Contact: Carol/Wife  Erasmo Dunbar  MRN: [id number]  : 1935  PCP: Hemal Varma  Home Phone      [phone]  Work Phone      Not on file.  Mobile          [phone]      MESSAGE:   Patient wife said he is vomiting again non stop, she is worried he will become dehydrated again.    [phone]

## 2023-09-29 NOTE — PROGRESS NOTES
Subjective:       Patient ID: Erasmo Dunbar is a 88 y.o. male.    Chief Complaint: Emesis (Pt states he has been dry heaving /He states he he has been really tired and restless)    Pt is a 88 y.o. male who presents for evaluation and management of   Encounter Diagnosis   Name Primary?    Bilious vomiting with nausea Yes   .no pain  No fever   Went to ED 9/17 and full work up including imaging of GB (GB and CT done) which was all negative   Sent home on a PPI and zofran  Still nauseated but no vomiting     Doing well on current meds. Denies any side effects. Prevention is up to date.    Review of Systems   Constitutional:  Negative for fever.   Gastrointestinal:  Positive for nausea and vomiting. Negative for abdominal pain and blood in stool.       Objective:      Physical Exam  Constitutional:       Appearance: Normal appearance. He is well-developed. He is not ill-appearing.   HENT:      Head: Normocephalic and atraumatic.      Right Ear: External ear normal.      Left Ear: External ear normal.      Nose: Nose normal.      Mouth/Throat:      Mouth: Mucous membranes are moist.      Pharynx: No oropharyngeal exudate or posterior oropharyngeal erythema.   Eyes:      General: No scleral icterus.        Right eye: No discharge.         Left eye: No discharge.      Conjunctiva/sclera: Conjunctivae normal.      Pupils: Pupils are equal, round, and reactive to light.   Neck:      Thyroid: No thyromegaly.      Vascular: No JVD.      Trachea: No tracheal deviation.   Cardiovascular:      Rate and Rhythm: Normal rate and regular rhythm.      Heart sounds: Normal heart sounds. No murmur heard.  Pulmonary:      Effort: Pulmonary effort is normal. No respiratory distress.      Breath sounds: Normal breath sounds. No wheezing or rales.   Chest:      Chest wall: No tenderness.   Abdominal:      General: Bowel sounds are normal. There is no distension.      Palpations: Abdomen is soft. There is no mass.      Tenderness: There  is no abdominal tenderness. There is no guarding or rebound.   Musculoskeletal:         General: Normal range of motion.      Cervical back: Normal range of motion and neck supple.      Right lower leg: No edema.      Left lower leg: No edema.      Comments: Ambulates with cane      Lymphadenopathy:      Cervical: No cervical adenopathy.   Skin:     General: Skin is warm and dry.   Neurological:      Mental Status: He is alert and oriented to person, place, and time.      Cranial Nerves: No cranial nerve deficit.      Motor: No abnormal muscle tone.      Coordination: Coordination normal.      Deep Tendon Reflexes: Reflexes are normal and symmetric. Reflexes normal.   Psychiatric:         Behavior: Behavior normal.         Thought Content: Thought content normal.         Judgment: Judgment normal.         Assessment:       1. Bilious vomiting with nausea        Plan:   1. Bilious vomiting with nausea  -     ondansetron (ZOFRAN-ODT) 4 MG TbDL; Take 2 tablets (8 mg total) by mouth every 8 (eight) hours as needed (nausea).  Dispense: 30 tablet; Refill: 2  -     promethazine (PHENERGAN) 12.5 MG Tab; Take 1 tablet (12.5 mg total) by mouth every 6 (six) hours as needed (nausea).  Dispense: 30 tablet; Refill: 2    ED visit and all labs/imaging reviewed   GB has been ruled out   DDx is gastritis, PUD, gastroparesis  MEd list reviewed. No offending agents and no new medications to account for nausea   Continue PPI and zofran. Will add phenergan   Has appt with GI     No follow-ups on file.

## 2023-10-09 ENCOUNTER — HOSPITAL ENCOUNTER (EMERGENCY)
Facility: HOSPITAL | Age: 88
Discharge: HOME | End: 2023-10-09
Attending: STUDENT IN AN ORGANIZED HEALTH CARE EDUCATION/TRAINING PROGRAM
Payer: MEDICARE

## 2023-10-09 VITALS
HEIGHT: 66 IN | SYSTOLIC BLOOD PRESSURE: 161 MMHG | OXYGEN SATURATION: 96 % | HEART RATE: 70 BPM | WEIGHT: 204.81 LBS | TEMPERATURE: 99 F | BODY MASS INDEX: 32.92 KG/M2 | RESPIRATION RATE: 18 BRPM | DIASTOLIC BLOOD PRESSURE: 70 MMHG

## 2023-10-09 DIAGNOSIS — Z79.4 TYPE 2 DIABETES MELLITUS WITH STAGE 3 CHRONIC KIDNEY DISEASE, WITH LONG-TERM CURRENT USE OF INSULIN: ICD-10-CM

## 2023-10-09 DIAGNOSIS — R10.13 EPIGASTRIC PAIN: Primary | ICD-10-CM

## 2023-10-09 DIAGNOSIS — N18.30 TYPE 2 DIABETES MELLITUS WITH STAGE 3 CHRONIC KIDNEY DISEASE, WITH LONG-TERM CURRENT USE OF INSULIN: ICD-10-CM

## 2023-10-09 DIAGNOSIS — E11.22 TYPE 2 DIABETES MELLITUS WITH STAGE 3 CHRONIC KIDNEY DISEASE, WITH LONG-TERM CURRENT USE OF INSULIN: ICD-10-CM

## 2023-10-09 LAB
ALBUMIN SERPL BCP-MCNC: 3.7 G/DL (ref 3.5–5.2)
ALP SERPL-CCNC: 71 U/L (ref 55–135)
ALT SERPL W/O P-5'-P-CCNC: 49 U/L (ref 10–44)
ANION GAP SERPL CALC-SCNC: 14 MMOL/L (ref 8–16)
AST SERPL-CCNC: 33 U/L (ref 10–40)
BACTERIA #/AREA URNS HPF: ABNORMAL /HPF
BASOPHILS # BLD AUTO: 0.08 K/UL (ref 0–0.2)
BASOPHILS NFR BLD: 0.8 % (ref 0–1.9)
BILIRUB SERPL-MCNC: 0.9 MG/DL (ref 0.1–1)
BILIRUB UR QL STRIP: ABNORMAL
BUN SERPL-MCNC: 16 MG/DL (ref 8–23)
CALCIUM SERPL-MCNC: 9.5 MG/DL (ref 8.7–10.5)
CHLORIDE SERPL-SCNC: 97 MMOL/L (ref 95–110)
CLARITY UR: CLEAR
CO2 SERPL-SCNC: 23 MMOL/L (ref 23–29)
COLOR UR: YELLOW
CREAT SERPL-MCNC: 1 MG/DL (ref 0.5–1.4)
DIFFERENTIAL METHOD: ABNORMAL
EOSINOPHIL # BLD AUTO: 0.2 K/UL (ref 0–0.5)
EOSINOPHIL NFR BLD: 2 % (ref 0–8)
ERYTHROCYTE [DISTWIDTH] IN BLOOD BY AUTOMATED COUNT: 12.8 % (ref 11.5–14.5)
EST. GFR  (NO RACE VARIABLE): >60 ML/MIN/1.73 M^2
GLUCOSE SERPL-MCNC: 263 MG/DL (ref 70–110)
GLUCOSE UR QL STRIP: ABNORMAL
HCT VFR BLD AUTO: 39.5 % (ref 40–54)
HGB BLD-MCNC: 12.9 G/DL (ref 14–18)
HGB UR QL STRIP: NEGATIVE
HYALINE CASTS #/AREA URNS LPF: 3 /LPF
IMM GRANULOCYTES # BLD AUTO: 0.05 K/UL (ref 0–0.04)
IMM GRANULOCYTES NFR BLD AUTO: 0.5 % (ref 0–0.5)
KETONES UR QL STRIP: ABNORMAL
LACTATE SERPL-SCNC: 1.9 MMOL/L (ref 0.5–2.2)
LACTATE SERPL-SCNC: 2.3 MMOL/L (ref 0.5–2.2)
LEUKOCYTE ESTERASE UR QL STRIP: NEGATIVE
LIPASE SERPL-CCNC: 16 U/L (ref 4–60)
LYMPHOCYTES # BLD AUTO: 2.1 K/UL (ref 1–4.8)
LYMPHOCYTES NFR BLD: 21 % (ref 18–48)
MCH RBC QN AUTO: 29 PG (ref 27–31)
MCHC RBC AUTO-ENTMCNC: 32.7 G/DL (ref 32–36)
MCV RBC AUTO: 89 FL (ref 82–98)
MICROSCOPIC COMMENT: ABNORMAL
MONOCYTES # BLD AUTO: 1 K/UL (ref 0.3–1)
MONOCYTES NFR BLD: 9.9 % (ref 4–15)
NEUTROPHILS # BLD AUTO: 6.7 K/UL (ref 1.8–7.7)
NEUTROPHILS NFR BLD: 65.8 % (ref 38–73)
NITRITE UR QL STRIP: NEGATIVE
NRBC BLD-RTO: 0 /100 WBC
PH UR STRIP: 7 [PH] (ref 5–8)
PLATELET # BLD AUTO: 345 K/UL (ref 150–450)
PMV BLD AUTO: 9.4 FL (ref 9.2–12.9)
POTASSIUM SERPL-SCNC: 3.7 MMOL/L (ref 3.5–5.1)
PROT SERPL-MCNC: 6.5 G/DL (ref 6–8.4)
PROT UR QL STRIP: ABNORMAL
RBC # BLD AUTO: 4.45 M/UL (ref 4.6–6.2)
RBC #/AREA URNS HPF: 0 /HPF (ref 0–4)
SODIUM SERPL-SCNC: 134 MMOL/L (ref 136–145)
SP GR UR STRIP: 1.02 (ref 1–1.03)
SQUAMOUS #/AREA URNS HPF: 4 /HPF
URN SPEC COLLECT METH UR: ABNORMAL
UROBILINOGEN UR STRIP-ACNC: NEGATIVE EU/DL
WBC # BLD AUTO: 10.19 K/UL (ref 3.9–12.7)
WBC #/AREA URNS HPF: 2 /HPF (ref 0–5)

## 2023-10-09 PROCEDURE — 36415 COLL VENOUS BLD VENIPUNCTURE: CPT | Performed by: STUDENT IN AN ORGANIZED HEALTH CARE EDUCATION/TRAINING PROGRAM

## 2023-10-09 PROCEDURE — 83605 ASSAY OF LACTIC ACID: CPT | Mod: 91 | Performed by: STUDENT IN AN ORGANIZED HEALTH CARE EDUCATION/TRAINING PROGRAM

## 2023-10-09 PROCEDURE — 81000 URINALYSIS NONAUTO W/SCOPE: CPT | Performed by: STUDENT IN AN ORGANIZED HEALTH CARE EDUCATION/TRAINING PROGRAM

## 2023-10-09 PROCEDURE — 96361 HYDRATE IV INFUSION ADD-ON: CPT

## 2023-10-09 PROCEDURE — 99285 EMERGENCY DEPT VISIT HI MDM: CPT | Mod: 25

## 2023-10-09 PROCEDURE — 96360 HYDRATION IV INFUSION INIT: CPT

## 2023-10-09 PROCEDURE — 83690 ASSAY OF LIPASE: CPT | Performed by: STUDENT IN AN ORGANIZED HEALTH CARE EDUCATION/TRAINING PROGRAM

## 2023-10-09 PROCEDURE — 93010 EKG 12-LEAD: ICD-10-PCS | Mod: ,,, | Performed by: INTERNAL MEDICINE

## 2023-10-09 PROCEDURE — 99900035 HC TECH TIME PER 15 MIN (STAT)

## 2023-10-09 PROCEDURE — 25000003 PHARM REV CODE 250: Performed by: STUDENT IN AN ORGANIZED HEALTH CARE EDUCATION/TRAINING PROGRAM

## 2023-10-09 PROCEDURE — 93010 ELECTROCARDIOGRAM REPORT: CPT | Mod: ,,, | Performed by: INTERNAL MEDICINE

## 2023-10-09 PROCEDURE — 93005 ELECTROCARDIOGRAM TRACING: CPT

## 2023-10-09 PROCEDURE — 85025 COMPLETE CBC W/AUTO DIFF WBC: CPT | Performed by: STUDENT IN AN ORGANIZED HEALTH CARE EDUCATION/TRAINING PROGRAM

## 2023-10-09 PROCEDURE — 80053 COMPREHEN METABOLIC PANEL: CPT | Performed by: STUDENT IN AN ORGANIZED HEALTH CARE EDUCATION/TRAINING PROGRAM

## 2023-10-09 RX ADMIN — SODIUM CHLORIDE 500 ML: 9 INJECTION, SOLUTION INTRAVENOUS at 02:10

## 2023-10-09 RX ADMIN — SODIUM CHLORIDE 500 ML: 9 INJECTION, SOLUTION INTRAVENOUS at 03:10

## 2023-10-09 NOTE — ED PROVIDER NOTES
"Encounter Date: 10/9/2023       History     Chief Complaint   Patient presents with    Vomiting     BIBA from home with complaints of N/V x 2 months with worsening weakness.      88-year-old male with history of Alzheimer's, hypertension, diabetes, CHF, AFib, presenting with one month of upper abdominal pain.  Patient has reported multiple episodes of nausea and vomiting.  No diarrhea.  No fever.  Patient was seen by me three weeks ago and had a negative CT scan and negative workup.  Seen a few weeks eight for continuing symptoms by another physician and had a normal gallbladder ultrasound.  Patient has been seen by his PCP since then, has a GI appointment scheduled for eight days from now.  Denies any new symptoms or new pain.  Patient denies any trouble breathing, however family friend who is with him states that she thinks he is having trouble breathing.      Review of patient's allergies indicates:   Allergen Reactions    Iodinated contrast media     Iodine Hives     Iv iodine only     Past Medical History:   Diagnosis Date    Arthritis     Atrial fibrillation     Bladder mass     BPH (benign prostatic hyperplasia)     CHF (congestive heart failure)     Depression     Diabetes mellitus type II     Hyperlipidemia     Hypertension     Microscopic hematuria     Prostate cancer 10/01/2018    RLS (restless legs syndrome)     Stroke     TIA    Wears glasses      Past Surgical History:   Procedure Laterality Date    back injections      BACK SURGERY      cervical fusion    CARDIAC PACEMAKER PLACEMENT      CARDIAC SURGERY Left     pacemaker placement    CATARACT EXTRACTION W/  INTRAOCULAR LENS IMPLANT Bilateral     cataracts    CAUDAL EPIDURAL STEROID INJECTION N/A 9/30/2022    Procedure: CAUDAL EPIDURAL STEROID INJECTION WITH CATHETER;  Surgeon: Maria Guadalupe Ortiz MD;  Location: [facility] OR;  Service: Pain Management;  Laterality: N/A;    COLECTOMY N/A     CYSTOSCOPY N/A     Pt stated, "I have had six Cystoscopy."    " CYSTOSCOPY W/ RETROGRADES Bilateral 10/21/2019    Procedure: CYSTOSCOPY WITH RETROGRADE PYELOGRAM;  Surgeon: Elliott Coon MD;  Location: [facility] OR;  Service: Urology;  Laterality: Bilateral;  OP 6    DIGITAL RECTAL EXAMINATION UNDER ANESTHESIA N/A 10/21/2019    Procedure: EXAM UNDER ANESTHESIA, DIGITAL, RECTUM;  Surgeon: Elliott Coon MD;  Location: [facility] OR;  Service: Urology;  Laterality: N/A;    EPIDURAL STEROID INJECTION INTO CERVICAL SPINE N/A 5/13/2022    Procedure: CERVICAL EPIDURAL STEROID INJECTION (C7-T1 INTERLAMINAR);  Surgeon: Maria Guadalupe Ortiz MD;  Location: [facility];  Service: Pain Management;  Laterality: N/A;    EPIDURAL STEROID INJECTION INTO LUMBAR SPINE N/A 3/12/2021    Procedure: LUMBAR EPIDURAL STEROID INJECTION (L4-5 INTERLAMINAR);  Surgeon: Maria Guadalupe Ortiz MD;  Location: [facility];  Service: Pain Management;  Laterality: N/A;    Ganglion Cyst Removed  Right     INJECTION OF ANESTHETIC AGENT AROUND MEDIAL BRANCH NERVES INNERVATING LUMBAR FACET JOINT Bilateral 12/18/2020    Procedure: LUMBAR FACET JOINT BLOCK (L4-5,L5-S1);  Surgeon: Maria Guadalupe Ortiz MD;  Location: [facility];  Service: Pain Management;  Laterality: Bilateral;    INJECTION OF ANESTHETIC AGENT AROUND MEDIAL BRANCH NERVES INNERVATING LUMBAR FACET JOINT Bilateral 1/15/2021    Procedure: LUMBAR FACET JOINT BLOCK (L4-5,L5-S1);  Surgeon: Maria Guadalupe Ortiz MD;  Location: [facility];  Service: Pain Management;  Laterality: Bilateral;    INJECTION OF ANESTHETIC AGENT AROUND NERVE Right 10/30/2020    Procedure: SUPERIOR LATERAL AND MEDIAL AND INFERIOR MEDIAL GENICULAR NERVE BLOCK;  Surgeon: Maria Guadalupe Ortiz MD;  Location: [facility];  Service: Pain Management;  Laterality: Right;    MYELOGRAPHY N/A 4/4/2022    Procedure: Myelogram;  Surgeon: Rufino Ramos MD;  Location: [facility] OR [address] [place] [address];  Service: Radiology;  Laterality: N/A;    Neck Fusion Bilateral     PROSTATE SURGERY      RADIOFREQUENCY ABLATION OF LUMBAR MEDIAL BRANCH NERVE AT SINGLE LEVEL Left  2022    Procedure: RADIOFREQUENCY ABLATION (L4-5,L5-S1);  Surgeon: Maria Guadalupe Ortiz MD;  Location: [facility] OR;  Service: Pain Management;  Laterality: Left;    RADIOFREQUENCY THERMOCOAGULATION Right 3/4/2022    Procedure: RADIOFREQUENCY THERMAL COAGULATION (L4-5,L5-S1);  Surgeon: Maria Guadalupe Ortiz MD;  Location: [facility] OR;  Service: Pain Management;  Laterality: Right;    ROTATOR CUFF REPAIR Right     SKIN BIOPSY      skin cancer    TOTAL KNEE ARTHROPLASTY  2017    right knee    TRANSURETHRAL RESECTION OF PROSTATE       Family History   Problem Relation Age of Onset    Cancer Mother         breast    Heart disease Father     Diabetes Sister     Diabetes Brother     Heart disease Brother     COPD Daughter     Seizures Daughter     Cancer Daughter     Diabetes Daughter      Social History     Tobacco Use    Smoking status: Former     Current packs/day: 0.00     Average packs/day: 1.5 packs/day for 35.0 years (52.5 ttl pk-yrs)     Types: Cigarettes     Start date: 1946     Quit date: 1981     Years since quittin.7    Smokeless tobacco: Never    Tobacco comments:     38 yrs ago   Substance Use Topics    Alcohol use: Yes     Alcohol/week: 8.0 standard drinks of alcohol     Types: 1 Cans of beer, 7 Shots of liquor per week     Comment: highball every night    Drug use: No     Review of Systems   Constitutional:  Negative for fever.   HENT:  Negative for sore throat.    Respiratory:  Negative for shortness of breath.    Cardiovascular:  Negative for chest pain.   Gastrointestinal:  Positive for abdominal pain, nausea and vomiting. Negative for diarrhea.   Genitourinary:  Negative for dysuria.   Musculoskeletal:  Negative for back pain.   Skin:  Negative for rash.   Neurological:  Negative for weakness.   Hematological:  Does not bruise/bleed easily.       Physical Exam     Initial Vitals [10/09/23 1254]   BP Pulse Resp Temp SpO2   132/88 65 (!) 28 97.9 °F (36.6 °C) 99 %      MAP       --         Physical  Exam    Nursing note and vitals reviewed.  Constitutional: He appears well-developed.   Eyes: EOM are normal.   Neck:   Normal range of motion.  Cardiovascular:            No murmur heard.  Pulmonary/Chest: No respiratory distress. He has no wheezes. He has no rales.   Abdominal: He exhibits no distension.   No TTP diffusely. No guarding, rebound, or masses. No CVAT bilaterally.     Musculoskeletal:         General: Normal range of motion.      Cervical back: Normal range of motion.     Neurological: He is alert.   Skin: Skin is warm.   Psychiatric: He has a normal mood and affect.         ED Course   Procedures  Labs Reviewed   CBC W/ AUTO DIFFERENTIAL - Abnormal; Notable for the following components:       Result Value    RBC 4.45 (*)     Hemoglobin 12.9 (*)     Hematocrit 39.5 (*)     Immature Grans (Abs) 0.05 (*)     All other components within normal limits   COMPREHENSIVE METABOLIC PANEL - Abnormal; Notable for the following components:    Sodium 134 (*)     Glucose 263 (*)     ALT 49 (*)     All other components within normal limits   LACTIC ACID, PLASMA - Abnormal; Notable for the following components:    Lactate (Lactic Acid) 2.3 (*)     All other components within normal limits   LIPASE   LACTIC ACID, PLASMA   URINALYSIS, REFLEX TO URINE CULTURE     EKG Readings: (Independently Interpreted)   Initial Reading: No STEMI. Rhythm: Paced Rhythm. Heart Rate: 65. Ectopy: No Ectopy. Conduction: Normal.       Imaging Results              X-Ray Chest AP Portable (Final result)  Result time 10/09/23 13:41:50      Final result by Clive Bledsoe MD (10/09/23 13:41:50)                   Impression:      No acute finding detected.      Electronically signed by: Clive Bledsoe MD  Date:    10/09/2023  Time:    13:41               Narrative:    EXAMINATION:  XR CHEST AP PORTABLE    CLINICAL HISTORY:  epigastric pain;    TECHNIQUE:  Frontal view obtained of the chest    COMPARISON:  09/17/2023    FINDINGS:  Left subclavian  approach cardiac pacing device in place appearing unchanged.  Stable cardiomediastinal contours.  Mild atherosclerotic calcification overlies the aortic arch.  No detected consolidation or acute osseous change.                                       Medications   sodium chloride 0.9% bolus 500 mL 500 mL (0 mLs Intravenous Stopped 10/9/23 1542)   sodium chloride 0.9% bolus 500 mL 500 mL (0 mLs Intravenous Stopped 10/9/23 1613)     Medical Decision Making  DDX: Unlikely acute abdominal pathology such as appendicitis/cholecystitis given benign abdomen, negative Hemphill's sign at this time. R/o pancreatitis, UTI. Possible GERD/gastritis vs. AGE given history, benign abdomen.  DX: BMP, CBC, LFT, lipase, UA/Udip. Serial abdominal exams. Consider CT A/P if change in abdominal exam to assess for early appendicitis. Consider ultrasound if change in abdominal exam to assess for cholecystitis.  TX: Analgesia PRN. Antiemetic PRN. IVF. Treatment/consult as indicated by studies.  Dispo: Pending studies. If studies WNL, symptoms controlled, tolerating PO, discharge to follow up with primary doctor within 2 days with recommendations for supportive care at home and strict precautions for return.        Amount and/or Complexity of Data Reviewed  Labs: ordered. Decision-making details documented in ED Course.  Radiology: ordered.               ED Course as of 10/09/23 1652   Mon Oct 09, 2023   1349 WBC: 10.19 [NB]   1349 Hemoglobin(!): 12.9 [NB]   1558 Patient taking food without issue. [NB]      ED Course User Index  [NB] Victoriano Mckeon MD                    Clinical Impression:   Final diagnoses:  [R10.13] Epigastric pain (Primary)               Victoriano Mckeon MD  10/09/23 1304       Victoriano Mckeon MD  10/09/23 1318       Victoriano Mckeon MD  10/09/23 1652

## 2023-10-09 NOTE — TELEPHONE ENCOUNTER
No care due was identified.  [facility] Embedded Care Due Messages. Reference number: [id number].   10/09/2023 3:02:11 PM CDT

## 2023-10-10 RX ORDER — METFORMIN HYDROCHLORIDE 1000 MG/1
1000 TABLET ORAL 2 TIMES DAILY WITH MEALS
Qty: 180 TABLET | Refills: 10 | Status: SHIPPED | OUTPATIENT
Start: 2023-10-10 | End: 2023-10-12 | Stop reason: SDUPTHER

## 2023-10-12 ENCOUNTER — OFFICE VISIT (OUTPATIENT)
Dept: FAMILY MEDICINE | Facility: CLINIC | Age: 88
End: 2023-10-12
Payer: MEDICARE

## 2023-10-12 VITALS
HEART RATE: 60 BPM | WEIGHT: 204 LBS | BODY MASS INDEX: 32.78 KG/M2 | RESPIRATION RATE: 15 BRPM | HEIGHT: 66 IN | SYSTOLIC BLOOD PRESSURE: 146 MMHG | DIASTOLIC BLOOD PRESSURE: 62 MMHG | OXYGEN SATURATION: 96 %

## 2023-10-12 DIAGNOSIS — Z79.4 TYPE 2 DIABETES MELLITUS WITHOUT COMPLICATION, WITH LONG-TERM CURRENT USE OF INSULIN: ICD-10-CM

## 2023-10-12 DIAGNOSIS — I50.33 ACUTE ON CHRONIC DIASTOLIC CHF (CONGESTIVE HEART FAILURE): ICD-10-CM

## 2023-10-12 DIAGNOSIS — E11.22 TYPE 2 DIABETES MELLITUS WITH STAGE 3 CHRONIC KIDNEY DISEASE, WITH LONG-TERM CURRENT USE OF INSULIN: ICD-10-CM

## 2023-10-12 DIAGNOSIS — I48.0 PAROXYSMAL ATRIAL FIBRILLATION: ICD-10-CM

## 2023-10-12 DIAGNOSIS — Z91.148 MEDICATION NONCOMPLIANCE DUE TO COGNITIVE IMPAIRMENT: Primary | ICD-10-CM

## 2023-10-12 DIAGNOSIS — E11.22 TYPE 2 DIABETES MELLITUS WITH CHRONIC KIDNEY DISEASE, WITHOUT LONG-TERM CURRENT USE OF INSULIN, UNSPECIFIED CKD STAGE: ICD-10-CM

## 2023-10-12 DIAGNOSIS — F03.90 DEMENTIA, UNSPECIFIED DEMENTIA SEVERITY, UNSPECIFIED DEMENTIA TYPE, UNSPECIFIED WHETHER BEHAVIORAL, PSYCHOTIC, OR MOOD DISTURBANCE OR ANXIETY: ICD-10-CM

## 2023-10-12 DIAGNOSIS — N18.30 TYPE 2 DIABETES MELLITUS WITH STAGE 3 CHRONIC KIDNEY DISEASE, WITH LONG-TERM CURRENT USE OF INSULIN: ICD-10-CM

## 2023-10-12 DIAGNOSIS — R11.0 NAUSEA: ICD-10-CM

## 2023-10-12 DIAGNOSIS — E78.2 MIXED HYPERLIPIDEMIA: ICD-10-CM

## 2023-10-12 DIAGNOSIS — I10 PRIMARY HYPERTENSION: ICD-10-CM

## 2023-10-12 DIAGNOSIS — R41.9 MEDICATION NONCOMPLIANCE DUE TO COGNITIVE IMPAIRMENT: Primary | ICD-10-CM

## 2023-10-12 DIAGNOSIS — E11.9 TYPE 2 DIABETES MELLITUS WITHOUT COMPLICATION, WITH LONG-TERM CURRENT USE OF INSULIN: ICD-10-CM

## 2023-10-12 DIAGNOSIS — Z79.4 TYPE 2 DIABETES MELLITUS WITH STAGE 3 CHRONIC KIDNEY DISEASE, WITH LONG-TERM CURRENT USE OF INSULIN: ICD-10-CM

## 2023-10-12 PROCEDURE — 1101F PR PT FALLS ASSESS DOC 0-1 FALLS W/OUT INJ PAST YR: ICD-10-PCS | Mod: CPTII,S$GLB,, | Performed by: FAMILY MEDICINE

## 2023-10-12 PROCEDURE — 1126F PR PAIN SEVERITY QUANTIFIED, NO PAIN PRESENT: ICD-10-PCS | Mod: CPTII,S$GLB,, | Performed by: FAMILY MEDICINE

## 2023-10-12 PROCEDURE — 99999 PR PBB SHADOW E&M-EST. PATIENT-LVL V: ICD-10-PCS | Mod: PBBFAC,,, | Performed by: FAMILY MEDICINE

## 2023-10-12 PROCEDURE — 3288F FALL RISK ASSESSMENT DOCD: CPT | Mod: CPTII,S$GLB,, | Performed by: FAMILY MEDICINE

## 2023-10-12 PROCEDURE — 99214 PR OFFICE/OUTPT VISIT, EST, LEVL IV, 30-39 MIN: ICD-10-PCS | Mod: S$GLB,,, | Performed by: FAMILY MEDICINE

## 2023-10-12 PROCEDURE — 99214 OFFICE O/P EST MOD 30 MIN: CPT | Mod: S$GLB,,, | Performed by: FAMILY MEDICINE

## 2023-10-12 PROCEDURE — 3288F PR FALLS RISK ASSESSMENT DOCUMENTED: ICD-10-PCS | Mod: CPTII,S$GLB,, | Performed by: FAMILY MEDICINE

## 2023-10-12 PROCEDURE — 1159F MED LIST DOCD IN RCRD: CPT | Mod: CPTII,S$GLB,, | Performed by: FAMILY MEDICINE

## 2023-10-12 PROCEDURE — 1126F AMNT PAIN NOTED NONE PRSNT: CPT | Mod: CPTII,S$GLB,, | Performed by: FAMILY MEDICINE

## 2023-10-12 PROCEDURE — 1101F PT FALLS ASSESS-DOCD LE1/YR: CPT | Mod: CPTII,S$GLB,, | Performed by: FAMILY MEDICINE

## 2023-10-12 PROCEDURE — 1159F PR MEDICATION LIST DOCUMENTED IN MEDICAL RECORD: ICD-10-PCS | Mod: CPTII,S$GLB,, | Performed by: FAMILY MEDICINE

## 2023-10-12 PROCEDURE — 99999 PR PBB SHADOW E&M-EST. PATIENT-LVL V: CPT | Mod: PBBFAC,,, | Performed by: FAMILY MEDICINE

## 2023-10-12 RX ORDER — INSULIN GLARGINE 100 [IU]/ML
40 INJECTION, SOLUTION SUBCUTANEOUS NIGHTLY
Qty: 12 ML | Refills: 5 | Status: SHIPPED | OUTPATIENT
Start: 2023-10-12 | End: 2023-12-12

## 2023-10-12 RX ORDER — METFORMIN HYDROCHLORIDE 1000 MG/1
1000 TABLET ORAL 2 TIMES DAILY WITH MEALS
Qty: 180 TABLET | Refills: 10 | Status: SHIPPED | OUTPATIENT
Start: 2023-10-12

## 2023-10-12 RX ORDER — PIOGLITAZONEHYDROCHLORIDE 15 MG/1
15 TABLET ORAL DAILY
Qty: 90 TABLET | Refills: 1 | Status: SHIPPED | OUTPATIENT
Start: 2023-10-12 | End: 2024-02-19 | Stop reason: SDUPTHER

## 2023-10-12 RX ORDER — PANTOPRAZOLE SODIUM 40 MG/1
40 TABLET, DELAYED RELEASE ORAL DAILY
Qty: 30 TABLET | Refills: 5 | Status: SHIPPED | OUTPATIENT
Start: 2023-10-12 | End: 2024-02-19 | Stop reason: SDUPTHER

## 2023-10-12 NOTE — PROGRESS NOTES
Subjective:       Patient ID: Erasmo Dunbar is a 88 y.o. male.    Chief Complaint: ER f/u (Pt states he was seen in ER for nausea /Pt son states he has been falling a lot )    Pt is a 88 y.o. male who presents for evaluation and management of   Encounter Diagnoses   Name Primary?    Medication noncompliance due to cognitive impairment Yes    Type 2 diabetes mellitus with chronic kidney disease, without long-term current use of insulin, unspecified CKD stage     Primary hypertension     Mixed hyperlipidemia     Acute on chronic diastolic CHF (congestive heart failure)     Paroxysmal atrial fibrillation     Dementia, unspecified dementia severity, unspecified dementia type, unspecified whether behavioral, psychotic, or mood disturbance or anxiety     Nausea     Type 2 diabetes mellitus without complication, with long-term current use of insulin     Type 2 diabetes mellitus with stage 3 chronic kidney disease, with long-term current use of insulin    .  Doing well on current meds. Denies any side effects. Prevention is up to date.  Review of Systems   Constitutional:  Positive for fatigue.   Respiratory:  Negative for shortness of breath.    Cardiovascular:  Negative for chest pain.   Gastrointestinal:  Negative for nausea.        Nausea improved today    Psychiatric/Behavioral:  Positive for confusion.        Objective:      Physical Exam  Constitutional:       Appearance: He is well-developed. He is not ill-appearing.      Comments: Disheveled elderly male in wheelchair      HENT:      Head: Normocephalic and atraumatic.      Right Ear: External ear normal.      Left Ear: External ear normal.      Nose: Nose normal.      Mouth/Throat:      Mouth: Mucous membranes are moist.      Pharynx: No oropharyngeal exudate or posterior oropharyngeal erythema.   Eyes:      General: No scleral icterus.        Right eye: No discharge.         Left eye: No discharge.      Conjunctiva/sclera: Conjunctivae normal.      Pupils:  Pupils are equal, round, and reactive to light.   Neck:      Thyroid: No thyromegaly.      Vascular: No JVD.      Trachea: No tracheal deviation.   Cardiovascular:      Rate and Rhythm: Normal rate and regular rhythm.      Heart sounds: Normal heart sounds. No murmur heard.  Pulmonary:      Effort: Pulmonary effort is normal. No respiratory distress.      Breath sounds: Normal breath sounds. No wheezing or rales.   Chest:      Chest wall: No tenderness.   Abdominal:      General: Bowel sounds are normal. There is no distension.      Palpations: Abdomen is soft. There is no mass.      Tenderness: There is no abdominal tenderness. There is no guarding or rebound.   Musculoskeletal:         General: Normal range of motion.      Cervical back: Normal range of motion and neck supple.      Right lower leg: No edema.      Left lower leg: No edema.   Lymphadenopathy:      Cervical: No cervical adenopathy.   Skin:     General: Skin is warm and dry.   Neurological:      Mental Status: He is alert and oriented to person, place, and time.      Cranial Nerves: No cranial nerve deficit.      Motor: No abnormal muscle tone.      Coordination: Coordination normal.      Deep Tendon Reflexes: Reflexes are normal and symmetric. Reflexes normal.   Psychiatric:         Behavior: Behavior normal.         Thought Content: Thought content normal.         Judgment: Judgment normal.         Assessment:       1. Medication noncompliance due to cognitive impairment    2. Type 2 diabetes mellitus with chronic kidney disease, without long-term current use of insulin, unspecified CKD stage    3. Primary hypertension    4. Mixed hyperlipidemia    5. Acute on chronic diastolic CHF (congestive heart failure)    6. Paroxysmal atrial fibrillation    7. Dementia, unspecified dementia severity, unspecified dementia type, unspecified whether behavioral, psychotic, or mood disturbance or anxiety    8. Nausea    9. Type 2 diabetes mellitus without  complication, with long-term current use of insulin    10. Type 2 diabetes mellitus with stage 3 chronic kidney disease, with long-term current use of insulin        Plan:   1. Medication noncompliance due to cognitive impairment  -     Ambulatory referral/consult to Home Health; Future; Expected date: 10/13/2023    2. Type 2 diabetes mellitus with chronic kidney disease, without long-term current use of insulin, unspecified CKD stage  Overview:  Lab Results   Component Value Date    HGBA1C 9.3 (H) 07/05/2023     Lantus---42-43 units/ qhs   MEtformin   actos   Januvia    7/25/23---He recetnly got back on his meds after missing them for about 4 months..... reflected in his latest A1c         3. Primary hypertension  Overview:  Lisinopril 10         4. Mixed hyperlipidemia  Overview:  Lab Results   Component Value Date    LDLCALC 133.2 01/05/2023   repatha         5. Acute on chronic diastolic CHF (congestive heart failure)  Overview:  Metoprolol   LISINOPRIL 10   SEES ALEJANDRO CIS         6. Paroxysmal atrial fibrillation  Overview:  eliquis  Metoprolol   Dr. Greene CIS         7. Dementia, unspecified dementia severity, unspecified dementia type, unspecified whether behavioral, psychotic, or mood disturbance or anxiety  Overview:  Namenda 5 BID         8. Nausea  Overview:  Unsure of etiology   He is on PPI  Zofran prn   Phenergan prn zofrna not effective   GI appt pending for 10/12/23       Orders:  -     pantoprazole (PROTONIX) 40 MG tablet; Take 1 tablet (40 mg total) by mouth once daily.  Dispense: 30 tablet; Refill: 5    9. Type 2 diabetes mellitus without complication, with long-term current use of insulin  -     pioglitazone (ACTOS) 15 MG tablet; Take 1 tablet (15 mg total) by mouth once daily.  Dispense: 90 tablet; Refill: 1    10. Type 2 diabetes mellitus with stage 3 chronic kidney disease, with long-term current use of insulin  -     metFORMIN (GLUCOPHAGE) 1000 MG tablet; Take 1 tablet (1,000 mg total) by  mouth 2 (two) times daily with meals.  Dispense: 180 tablet; Refill: 10  -     insulin (LANTUS SOLOSTAR U-100 INSULIN) glargine 100 units/mL SubQ pen; Inject 40 Units into the skin every evening. Per sliding scale  Dispense: 12 mL; Refill: 5      No follow-ups on file.

## 2023-10-16 ENCOUNTER — OFFICE VISIT (OUTPATIENT)
Dept: PAIN MEDICINE | Facility: CLINIC | Age: 88
End: 2023-10-16
Payer: MEDICARE

## 2023-10-16 VITALS
OXYGEN SATURATION: 96 % | SYSTOLIC BLOOD PRESSURE: 178 MMHG | HEIGHT: 66 IN | WEIGHT: 194.38 LBS | BODY MASS INDEX: 31.24 KG/M2 | HEART RATE: 83 BPM | DIASTOLIC BLOOD PRESSURE: 76 MMHG | RESPIRATION RATE: 18 BRPM

## 2023-10-16 DIAGNOSIS — F11.90 CHRONIC, CONTINUOUS USE OF OPIOIDS: Primary | ICD-10-CM

## 2023-10-16 DIAGNOSIS — M47.816 LUMBAR SPONDYLOSIS: ICD-10-CM

## 2023-10-16 PROCEDURE — 1160F RVW MEDS BY RX/DR IN RCRD: CPT | Mod: CPTII,S$GLB,, | Performed by: ANESTHESIOLOGY

## 2023-10-16 PROCEDURE — 1160F PR REVIEW ALL MEDS BY PRESCRIBER/CLIN PHARMACIST DOCUMENTED: ICD-10-PCS | Mod: CPTII,S$GLB,, | Performed by: ANESTHESIOLOGY

## 2023-10-16 PROCEDURE — 1159F PR MEDICATION LIST DOCUMENTED IN MEDICAL RECORD: ICD-10-PCS | Mod: CPTII,S$GLB,, | Performed by: ANESTHESIOLOGY

## 2023-10-16 PROCEDURE — 1159F MED LIST DOCD IN RCRD: CPT | Mod: CPTII,S$GLB,, | Performed by: ANESTHESIOLOGY

## 2023-10-16 PROCEDURE — 1125F PR PAIN SEVERITY QUANTIFIED, PAIN PRESENT: ICD-10-PCS | Mod: CPTII,S$GLB,, | Performed by: ANESTHESIOLOGY

## 2023-10-16 PROCEDURE — 1100F PTFALLS ASSESS-DOCD GE2>/YR: CPT | Mod: CPTII,S$GLB,, | Performed by: ANESTHESIOLOGY

## 2023-10-16 PROCEDURE — 99214 OFFICE O/P EST MOD 30 MIN: CPT | Mod: S$GLB,,, | Performed by: ANESTHESIOLOGY

## 2023-10-16 PROCEDURE — 1100F PR PT FALLS ASSESS DOC 2+ FALLS/FALL W/INJURY/YR: ICD-10-PCS | Mod: CPTII,S$GLB,, | Performed by: ANESTHESIOLOGY

## 2023-10-16 PROCEDURE — 1125F AMNT PAIN NOTED PAIN PRSNT: CPT | Mod: CPTII,S$GLB,, | Performed by: ANESTHESIOLOGY

## 2023-10-16 PROCEDURE — 99214 PR OFFICE/OUTPT VISIT, EST, LEVL IV, 30-39 MIN: ICD-10-PCS | Mod: S$GLB,,, | Performed by: ANESTHESIOLOGY

## 2023-10-16 PROCEDURE — 3288F FALL RISK ASSESSMENT DOCD: CPT | Mod: CPTII,S$GLB,, | Performed by: ANESTHESIOLOGY

## 2023-10-16 PROCEDURE — 99999 PR PBB SHADOW E&M-EST. PATIENT-LVL III: ICD-10-PCS | Mod: PBBFAC,,, | Performed by: ANESTHESIOLOGY

## 2023-10-16 PROCEDURE — 99999 PR PBB SHADOW E&M-EST. PATIENT-LVL III: CPT | Mod: PBBFAC,,, | Performed by: ANESTHESIOLOGY

## 2023-10-16 PROCEDURE — 3288F PR FALLS RISK ASSESSMENT DOCUMENTED: ICD-10-PCS | Mod: CPTII,S$GLB,, | Performed by: ANESTHESIOLOGY

## 2023-10-16 RX ORDER — HYDROCODONE BITARTRATE AND ACETAMINOPHEN 10; 325 MG/1; MG/1
1 TABLET ORAL EVERY 12 HOURS PRN
Qty: 60 TABLET | Refills: 0 | Status: SHIPPED | OUTPATIENT
Start: 2023-10-16 | End: 2024-02-19

## 2023-10-16 NOTE — PROGRESS NOTES
[facility] EST Patient Evaluation    Erasmo Dunbar  : 1935  Date: 10/16/2023     CHIEF COMPLAINT:  Low-back Pain    Primary Care Physician: Hemal Varma MD    HPI:  This is a 88 y.o. male with a chief complaint of Low-back Pain  . The patient has Past medical history/Past surgical history of diabetic, TIA, chronic syndrome, cervical fusion, chronic continuous use of opioid, congestive heart failure, proximal atrial fibrillation, chronic kidney disease, dementia/cognitive impairment on Namenda    Interval History 10/16/2023:  Erasmo Dunbar is here for follow up visit.  Patient was last seen on August 15, 2023, last opioid prescription was ordered in 2023 for hydrocodone/acetaminophen 10/325 mg b.i.d., total of 60 tablets has been using it for 2 months.  Patient had multiple ED visits/primary care for ongoing nauseous with frequent fall.  He does not remember all the medication that he has been using it, he recalls he used hydrocodone when it hurts so bad, he ran out of the medicine, he came with his son as his wife had knee surgery, no reported side effects from opioid including sedation, constipation.  He is still under the workup investigation for the nausea that has been experiencing.    Diabetic: Yes    Anticogualtion drugs: Eliquis    Current Description of Pain Symptoms:  Onset: years of back pain, but worsened in the past few months  Location:  lower back  Radiation: across lower back on the left side, and sometimes into the posterior thighs, but not below the knees.   Timing: Intermittent, only when he stands and walks and completely improves with sitting down  Quality: Throbbing, Deep and Sharp  Exacerbating Factors: standing and walking  Alleviating Factors: sitting, heat, ice, medications and rest  Associated Symptoms: He has numbness in his feet and legs from neuropathy. denies night fever/night sweats, urinary incontinence, bowel incontinence, significant  weight loss and significant motor weakness    Pain score:   Current: Pain is 7    Current pain medications:    acetaminophen (TYLENOL) 500 MG tablet,     DULoxetine (CYMBALTA) 30 MG capsule,     HYDROcodone-acetaminophen (NORCO)  mg per tablet, BID PRN    memantine (NAMENDA) 5 MG Tab, daily    pregabalin (LYRICA) 100 MG capsule, BID   Tizanidine 4 mg as needed    traZODone (DESYREL) 50 MG tablet    Current Narcotics/Opioid /benzo Medications:  Opioids- Hydrocodone and Acetaminophen (Norco/Vicodin)  Benzodiazepines: No    UDS:  NA    PDMP:  Reviewed and consistent with medication use as prescribed.     Pain Treatment History:  Physical Therapy/HEP/Physician Lead exercise program:    Is Patient participating in home exercise program (HEP): Yes    Non-interventional Pain Therapy:  []Chiropractor   []Acupuncture/Dry needle  []TENS unit  []Heat/ICE  []Back Brace    Medications previously tried:  - NSAIDS: Tylenol. Avoiding other nsaids due to blood thinners.  - Muscle Relaxants:    - TCAs:   - SNRIs:  Cymbalta  - Topicals: Many different topicals.   - Anticonvulsants:  Gabapentin was on this for a long time.   - Opioids: Hydrocodone    Interventional Pain Procedures:  - 5/13/22: C7-T1 IL JACK w/ 100% relief for 2 days  - 3/12/21: L4-5 IL JACK w/ 0% relief  - 1/15/21: Bilateral L4-5, L5-S1 MBBs w/ 50% relief   - 12/18/20: Bilateral L4-5, L5-S1 MBBs w/ % relief for 2 hours  - 11/20/20: Bilateral GTB injection  - 10/30/20: Right genicular nerve block w/ 95% relief  - 10/12/20: Bilateral Cluneal nerve block with good relief for a few days.  - Dr. Varma, Dr. Jain both provided injections in the past, with limited benefit.     Previous spine/joint surgery:  Yes   - Right knee replacement in 2017    - Cervical fusion 30 years ago  Surgical History:   has a past surgical history that includes Transurethral resection of prostate; Prostate surgery; Rotator cuff repair (Right); Ganglion Cyst Removed  (Right); Neck  Fusion (Bilateral); Colectomy (N/A); Cystoscopy (N/A); Back surgery; Cardiac surgery (Left); Skin biopsy; Total knee arthroplasty (03/30/2017); Cardiac pacemaker placement; back injections; Cystoscopy w/ retrogrades (Bilateral, 10/21/2019); Digital rectal examination under anesthesia (N/A, 10/21/2019); Cataract extraction w/  intraocular lens implant (Bilateral); Injection of anesthetic agent around nerve (Right, 10/30/2020); Injection of anesthetic agent around medial branch nerves innervating lumbar facet joint (Bilateral, 12/18/2020); Injection of anesthetic agent around medial branch nerves innervating lumbar facet joint (Bilateral, 1/15/2021); Epidural steroid injection into lumbar spine (N/A, 3/12/2021); Radiofrequency ablation of lumbar medial branch nerve at single level (Left, 1/21/2022); Radiofrequency thermocoagulation (Right, 3/4/2022); Myelography (N/A, 4/4/2022); Epidural steroid injection into cervical spine (N/A, 5/13/2022); and Caudal epidural steroid injection (N/A, 9/30/2022).  Medical History:   has a past medical history of Arthritis, Atrial fibrillation, Bladder mass, BPH (benign prostatic hyperplasia), CHF (congestive heart failure), Depression, Diabetes mellitus type II, Hyperlipidemia, Hypertension, Microscopic hematuria, Prostate cancer (10/01/2018), RLS (restless legs syndrome), Stroke, and Wears glasses.  Family History:  family history includes COPD in his daughter; Cancer in his daughter and mother; Diabetes in his brother, daughter, and sister; Heart disease in his brother and father; Seizures in his daughter.  Allergies:  Iodinated contrast media and Iodine     Medications:  Current Outpatient Medications   Medication Sig    acetaminophen (TYLENOL) 500 MG tablet Take 500 mg by mouth every 6 (six) hours as needed for Pain.    atorvastatin (LIPITOR) 10 MG tablet Take 1 tablet (10 mg total) by mouth once daily. (Patient taking differently: Take 10 mg by mouth every evening.)    blood  "sugar diagnostic (BLOOD GLUCOSE TEST) Strp Use one Accu-Chek Felicia Plus Test Strip per glucometer to test blood glucose three times a day. Dx: E11.22    blood-glucose meter kit Accu-Chek Felicia Plus Meter to test blood glucose three times a day. Dx: E11.22    cloNIDine 0.2 mg/24 hr td ptwk (CATAPRES) 0.2 mg/24 hr Place 1 patch onto the skin every 7 days.    cyanocobalamin 1,000 mcg/mL injection Inject 1 mL (1,000 mcg total) into the skin every 30 days.    DULoxetine (CYMBALTA) 30 MG capsule Take 1 capsule (30 mg total) by mouth once daily.    ELIQUIS 5 mg Tab Take 1 tablet (5 mg total) by mouth 2 (two) times daily.    insulin (LANTUS SOLOSTAR U-100 INSULIN) glargine 100 units/mL SubQ pen Inject 40 Units into the skin every evening. Per sliding scale    lancets Misc Use one Accu-Chek Softclix Lancet per lancing device to test blood glucose three times a day. Dx: E11.22    lisinopriL 10 MG tablet Take 1 tablet (10 mg total) by mouth 2 (two) times daily.    memantine (NAMENDA) 5 MG Tab Take 1 tablet (5 mg total) by mouth 2 (two) times daily. (Patient taking differently: Take 5 mg by mouth once daily.)    metFORMIN (GLUCOPHAGE) 1000 MG tablet Take 1 tablet (1,000 mg total) by mouth 2 (two) times daily with meals.    metoprolol tartrate (LOPRESSOR) 25 MG tablet Take 25 mg by mouth 2 (two) times daily.    MITIGARE 0.6 mg Cap Take 1 capsule by mouth once daily.    ondansetron (ZOFRAN-ODT) 4 MG TbDL Take 2 tablets (8 mg total) by mouth every 8 (eight) hours as needed (nausea).    pantoprazole (PROTONIX) 40 MG tablet Take 1 tablet (40 mg total) by mouth once daily.    pen needle, diabetic 31 gauge x 3/16" Ndle 1 Device by Misc.(Non-Drug; Combo Route) route 3 (three) times daily.    pioglitazone (ACTOS) 15 MG tablet Take 1 tablet (15 mg total) by mouth once daily.    pregabalin (LYRICA) 100 MG capsule Take 1 capsule (100 mg total) by mouth 2 (two) times daily.    promethazine (PHENERGAN) 12.5 MG Tab Take 1 tablet (12.5 mg " "total) by mouth every 6 (six) hours as needed (nausea).    SITagliptin phosphate (JANUVIA) 100 MG Tab Take 1 tablet (100 mg total) by mouth once daily.    syringe with needle (SYRINGE 3CC/25GX1") 3 mL 25 gauge x 1" Syrg 1 Device by Misc.(Non-Drug; Combo Route) route every 30 days.    tiZANidine (ZANAFLEX) 4 MG tablet Take 1 tablet (4 mg total) by mouth daily as needed.    traZODone (DESYREL) 50 MG tablet TAKE 1 TABLET EVERY EVENING. (Patient taking differently: Take 50 mg by mouth every evening.)    turmeric 400 mg Cap Take 400 mg by mouth once daily.    UNABLE TO FIND 1,500 mg. medication name: CBD Oil    vit C/E/zinc ox/ryan/lut/zeax (ICAPS AREDS2 ORAL) Take 1 capsule by mouth once daily.    HYDROcodone-acetaminophen (NORCO)  mg per tablet Take 1 tablet by mouth every 12 (twelve) hours as needed for Pain.     No current facility-administered medications for this visit.     Facility-Administered Medications Ordered in Other Visits   Medication    0.9%  NaCl infusion      Social History/SUBSTANCE ABUSE HISTORY:  Personal history of substance abuse: No   reports that he quit smoking about 42 years ago. His smoking use included cigarettes. He started smoking about 77 years ago. He has a 52.5 pack-year smoking history. He has never used smokeless tobacco. He reports current alcohol use of about 8.0 standard drinks of alcohol per week. He reports that he does not use drugs.  LABS:  CBC  Lab Results   Component Value Date    WBC 10.19 10/09/2023    HGB 12.9 (L) 10/09/2023    HCT 39.5 (L) 10/09/2023     Coagulation Profile   Lab Results   Component Value Date     10/09/2023       Lab Results   Component Value Date    INR 1.2 10/12/2018     CMP:  BMP  Lab Results   Component Value Date     (L) 10/09/2023    K 3.7 10/09/2023    CL 97 10/09/2023    CO2 23 10/09/2023    BUN 16 10/09/2023    CREATININE 1.0 10/09/2023    CALCIUM 9.5 10/09/2023    ANIONGAP 14 10/09/2023    EGFRNORACEVR >60 10/09/2023     Lab " "Results   Component Value Date    ALT 49 (H) 10/09/2023    AST 33 10/09/2023    ALKPHOS 71 10/09/2023    BILITOT 0.9 10/09/2023     HGBA1C:  Lab Results   Component Value Date    HGBA1C 9.3 (H) 07/05/2023     ROS:    Review of Systems   Review of Systems   Constitutional:  Negative for fever and weight loss.   HENT:  Negative for ear pain and tinnitus.    Eyes:  Negative for pain and redness.   Respiratory:  Negative for cough and shortness of breath.    Cardiovascular:  Negative for chest pain and palpitations.   Gastrointestinal:  Positive for nausea. Negative for constipation and heartburn.   Genitourinary: Negative.         Denies urinary incontinence. Denies urine retention.    Musculoskeletal:  Positive for back pain. Negative for neck pain.   Skin:  Negative for itching and rash.   Neurological:  Positive for tingling. Negative for focal weakness and seizures.   Endo/Heme/Allergies:  Negative for environmental allergies. Bruises/bleeds easily.   Psychiatric/Behavioral:  Negative for depression. The patient has insomnia. The patient is not nervous/anxious.      PHYSICAL EXAM:  VITALS: BP (!) 178/76   Pulse 83   Resp 18   Ht 5' 6" (1.676 m)   Wt 88.2 kg (194 lb 6.4 oz)   SpO2 96%   BMI 31.38 kg/m²   Body mass index is 31.38 kg/m².  GENERAL: Well appearing, in no acute distress, alert and oriented x3, answers questions appropriately.   PSYCH: Flat affect.  SKIN: Skin color, texture, turgor normal, no rashes or lesions.  HEAD/FACE:  Normocephalic, atraumatic. Cranial nerves grossly intact.  CV: Regular rate  PULM: No evidence of respiratory difficulty, symmetric chest rise.  GI:  Soft and non-Distended.    BACK/SIJ/HIP  Lumbar Spine Exam:       Inspection: No erythema, bruising.       Palpation: (+) TTP of lumbar paraspinals bilaterally      ROM:  Limited in flexion, extension, lateral bending.       (+) Facet loading bilaterally      (-) Straight Leg Raise bilaterally      (-) KRYSTAL bilaterally, Tenderness " over the PSIS, Yeoman test  Hip Exam:      Inspection: No gross deformity or apparent leg length discrepancy      Palpation:  No TTP to bilateral greater trochanteric bursas.       ROM:  No limitation or pain in internal rotation, external rotation b/l  Neurologic Exam:     Alert. Speech is fluent and appropriate.     Strength: 4/5 in right hip flexion and knee extension, otherwise 5/5 throughout bilateral lower extremities     Sensation:  Grossly intact to light touch in bilateral lower extremities     Tone: No abnormality appreciated in bilateral lower extremities     No Clonus  GAIT:  Antalgic gait, unsteady gait    DIAGNOSTIC STUDIES AND MEDICAL RECORDS REVIEW:        I have personally reviewed and interpreted relevant radiology reports and reviewed relevant records from other services in the EMR.      CT CERVICAL SPINE WITHOUT CONTRAST 1/2023     CLINICAL HISTORY:  Neck trauma (Age >= 65y);     TECHNIQUE:  Low dose axial images, sagittal and coronal reformations were performed though the cervical spine.  Contrast was not administered.     COMPARISON:  None     FINDINGS:  There is no evidence cervical spine fracture.  There is facet arthropathy seen throughout the cervical spine.  Contrast remains in the thecal sac from prior myelogram.  There is no prevertebral soft tissue swelling.  There is auto fusion of C6 and C7.  There is degenerative change throughout.     Impression:     There is no evidence acute cervical spine fracture.  There are degenerative change is seen throughout.    XR LUMBAR SPINE AP AND LATERAL 1/2023     CLINICAL HISTORY:  acute low back pain; history of prostate cancer;     COMPARISON:  Radiographs of 05/13/2020     FINDINGS:  Vertebral body heights are maintained, and similar the prior exam.  Small multilevel osteophytes and multilevel facet osteoarthritis.  Narrowed L1-2, L2-3, L3-4 discs, similar to prior.  Multiple aortoiliac calcifications.  No suspicious osteoblastic bone lesion  detected.     Impression:     No acute radiographic abnormalities detected     Multilevel degenerative changes     ASSESSMENT:  Erasmo Dunbar is a 88 y.o. male with the following diagnoses based on history, exam, and imaging:  Problem List Items Addressed This Visit          Neuro    Lumbar spondylosis     Other Visit Diagnoses       Chronic, continuous use of opioids    -  Primary    Relevant Medications    HYDROcodone-acetaminophen (NORCO)  mg per tablet    Other Relevant Orders    Pain Clinic Drug Screen           This is a pleasant 88 y.o. male patient with PMH diabetic, TIA, chronic syndrome, cervical fusion, chronic continuous use of opioid, congestive heart failure, proximal atrial fibrillation, chronic kidney disease, dementia/cognitive impairment on Namenda, presenting with low back pain that has not responded to interventional pain procedure, he is in chronic pain medication including hydrocodone 10/325 mg b.i.d. p.r.n. in addition to Cymbalta, Lyrica and tizanidine.   He has dementia, and cognitive impairment on Namenda, he does not recall all the medication when I ask him that he has been taking like Lyrica or Cymbalta.  He was educated about the difference between the daily pain medication versus as needed pain medication including Norco.  I will continue prescribing Norco 10/325 mg b.i.d. p.r.n. for 3 months, next follow-up in 3 months we will be in February 2024, then we will decrease his dose to 7.5/325 mg b.i.d. as needed      We discussed the underlying diagnoses and multiple treatment options including non-opioid medications, interventional procedures, physical therapy, home exercise, core muscle enhancement, and weight loss.  The risks and benefits of each treatment option were discussed and all questions were answered.      Treatment Plan:    Diagnostics/Referrals:  UDS, opioid agreement     Medications:  Continue as prescribed from PCP  NSAIDs: None  Topical Agent: No  TCA/SSRI/SNRI:  SNRI: Duloxetine (Cymbalta)   Anti-convulsants: Lyrica   Muscle Relaxants: Tizanidine (Zanaflex)  Opioids: None    Interventional Therapy: NA    Patient Education: Counseled patient regarding the importance of understanding the difference between p.r.n. versus daily pain medication, I have stressed the importance of physical activity and a home exercise plan to help with pain and improve health    Follow-up: RTC 3 months     May consider:  Weaning Norco to 7.5/325 mg b.i.d.      Yun Vieira MD  Anesthesiologist  Interventional Pain Medicine  10/16/2023    Disclaimer:  This note was prepared using voice recognition system and is likely to have sound alike errors that may have been overlooked even after proof reading.  Please call me with any questions.

## 2023-10-17 ENCOUNTER — OFFICE VISIT (OUTPATIENT)
Dept: GASTROENTEROLOGY | Facility: CLINIC | Age: 88
End: 2023-10-17
Payer: MEDICARE

## 2023-10-17 VITALS
DIASTOLIC BLOOD PRESSURE: 88 MMHG | OXYGEN SATURATION: 97 % | HEIGHT: 66 IN | BODY MASS INDEX: 31.11 KG/M2 | HEART RATE: 88 BPM | WEIGHT: 193.56 LBS | SYSTOLIC BLOOD PRESSURE: 208 MMHG

## 2023-10-17 DIAGNOSIS — R11.2 NAUSEA AND VOMITING, UNSPECIFIED VOMITING TYPE: ICD-10-CM

## 2023-10-17 PROCEDURE — 1100F PR PT FALLS ASSESS DOC 2+ FALLS/FALL W/INJURY/YR: ICD-10-PCS | Mod: CPTII,S$GLB,, | Performed by: INTERNAL MEDICINE

## 2023-10-17 PROCEDURE — 3288F FALL RISK ASSESSMENT DOCD: CPT | Mod: CPTII,S$GLB,, | Performed by: INTERNAL MEDICINE

## 2023-10-17 PROCEDURE — 1159F PR MEDICATION LIST DOCUMENTED IN MEDICAL RECORD: ICD-10-PCS | Mod: CPTII,S$GLB,, | Performed by: INTERNAL MEDICINE

## 2023-10-17 PROCEDURE — 1159F MED LIST DOCD IN RCRD: CPT | Mod: CPTII,S$GLB,, | Performed by: INTERNAL MEDICINE

## 2023-10-17 PROCEDURE — 99999 PR PBB SHADOW E&M-EST. PATIENT-LVL IV: ICD-10-PCS | Mod: PBBFAC,,, | Performed by: INTERNAL MEDICINE

## 2023-10-17 PROCEDURE — 1160F RVW MEDS BY RX/DR IN RCRD: CPT | Mod: CPTII,S$GLB,, | Performed by: INTERNAL MEDICINE

## 2023-10-17 PROCEDURE — 99999 PR PBB SHADOW E&M-EST. PATIENT-LVL IV: CPT | Mod: PBBFAC,,, | Performed by: INTERNAL MEDICINE

## 2023-10-17 PROCEDURE — 3288F PR FALLS RISK ASSESSMENT DOCUMENTED: ICD-10-PCS | Mod: CPTII,S$GLB,, | Performed by: INTERNAL MEDICINE

## 2023-10-17 PROCEDURE — 1126F PR PAIN SEVERITY QUANTIFIED, NO PAIN PRESENT: ICD-10-PCS | Mod: CPTII,S$GLB,, | Performed by: INTERNAL MEDICINE

## 2023-10-17 PROCEDURE — 99204 PR OFFICE/OUTPT VISIT, NEW, LEVL IV, 45-59 MIN: ICD-10-PCS | Mod: S$GLB,,, | Performed by: INTERNAL MEDICINE

## 2023-10-17 PROCEDURE — 1126F AMNT PAIN NOTED NONE PRSNT: CPT | Mod: CPTII,S$GLB,, | Performed by: INTERNAL MEDICINE

## 2023-10-17 PROCEDURE — 1100F PTFALLS ASSESS-DOCD GE2>/YR: CPT | Mod: CPTII,S$GLB,, | Performed by: INTERNAL MEDICINE

## 2023-10-17 PROCEDURE — 1160F PR REVIEW ALL MEDS BY PRESCRIBER/CLIN PHARMACIST DOCUMENTED: ICD-10-PCS | Mod: CPTII,S$GLB,, | Performed by: INTERNAL MEDICINE

## 2023-10-17 PROCEDURE — 99204 OFFICE O/P NEW MOD 45 MIN: CPT | Mod: S$GLB,,, | Performed by: INTERNAL MEDICINE

## 2023-10-17 RX ORDER — ONDANSETRON 4 MG/1
4 TABLET, ORALLY DISINTEGRATING ORAL EVERY 6 HOURS PRN
Qty: 60 TABLET | Refills: 5 | Status: SHIPPED | OUTPATIENT
Start: 2023-10-17

## 2023-10-17 RX ORDER — PROMETHAZINE HYDROCHLORIDE 6.25 MG/5ML
12.5 SYRUP ORAL EVERY 8 HOURS PRN
Qty: 500 ML | Refills: 5 | Status: SHIPPED | OUTPATIENT
Start: 2023-10-17

## 2023-10-23 ENCOUNTER — HOSPITAL ENCOUNTER (OUTPATIENT)
Dept: RADIOLOGY | Facility: HOSPITAL | Age: 88
Discharge: HOME | End: 2023-10-23
Attending: INTERNAL MEDICINE
Payer: MEDICARE

## 2023-10-23 DIAGNOSIS — R11.2 NAUSEA AND VOMITING, UNSPECIFIED VOMITING TYPE: ICD-10-CM

## 2023-10-23 PROCEDURE — 74240 X-RAY XM UPR GI TRC 1CNTRST: CPT | Mod: TC

## 2023-10-23 PROCEDURE — 25500020 PHARM REV CODE 255: Performed by: INTERNAL MEDICINE

## 2023-10-23 PROCEDURE — A9698 NON-RAD CONTRAST MATERIALNOC: HCPCS | Performed by: INTERNAL MEDICINE

## 2023-10-23 PROCEDURE — 74240 X-RAY XM UPR GI TRC 1CNTRST: CPT | Mod: 26,,, | Performed by: RADIOLOGY

## 2023-10-23 PROCEDURE — 74240 FL UPPER GI: ICD-10-PCS | Mod: 26,,, | Performed by: RADIOLOGY

## 2023-10-23 RX ADMIN — BARIUM SULFATE 355 ML: 0.6 SUSPENSION ORAL at 10:10

## 2023-10-24 ENCOUNTER — CLINICAL SUPPORT (OUTPATIENT)
Dept: FAMILY MEDICINE | Facility: CLINIC | Age: 88
End: 2023-10-24
Payer: MEDICARE

## 2023-10-24 DIAGNOSIS — I10 ESSENTIAL HYPERTENSION: ICD-10-CM

## 2023-10-24 DIAGNOSIS — Z79.4 TYPE 2 DIABETES MELLITUS WITH STAGE 3 CHRONIC KIDNEY DISEASE, WITH LONG-TERM CURRENT USE OF INSULIN: ICD-10-CM

## 2023-10-24 DIAGNOSIS — E11.22 TYPE 2 DIABETES MELLITUS WITH STAGE 3 CHRONIC KIDNEY DISEASE, WITH LONG-TERM CURRENT USE OF INSULIN: ICD-10-CM

## 2023-10-24 DIAGNOSIS — N18.30 TYPE 2 DIABETES MELLITUS WITH STAGE 3 CHRONIC KIDNEY DISEASE, WITH LONG-TERM CURRENT USE OF INSULIN: ICD-10-CM

## 2023-10-24 DIAGNOSIS — E11.22 TYPE 2 DIABETES MELLITUS WITH CHRONIC KIDNEY DISEASE, WITHOUT LONG-TERM CURRENT USE OF INSULIN, UNSPECIFIED CKD STAGE: ICD-10-CM

## 2023-10-24 LAB
ALBUMIN SERPL BCP-MCNC: 3.7 G/DL (ref 3.5–5.2)
ALP SERPL-CCNC: 67 U/L (ref 55–135)
ALT SERPL W/O P-5'-P-CCNC: 36 U/L (ref 10–44)
ANION GAP SERPL CALC-SCNC: 10 MMOL/L (ref 8–16)
AST SERPL-CCNC: 25 U/L (ref 10–40)
BILIRUB SERPL-MCNC: 0.7 MG/DL (ref 0.1–1)
BUN SERPL-MCNC: 11 MG/DL (ref 8–23)
CALCIUM SERPL-MCNC: 9.6 MG/DL (ref 8.7–10.5)
CHLORIDE SERPL-SCNC: 98 MMOL/L (ref 95–110)
CO2 SERPL-SCNC: 29 MMOL/L (ref 23–29)
CREAT SERPL-MCNC: 1 MG/DL (ref 0.5–1.4)
EST. GFR  (NO RACE VARIABLE): >60 ML/MIN/1.73 M^2
GLUCOSE SERPL-MCNC: 231 MG/DL (ref 70–110)
POTASSIUM SERPL-SCNC: 4.3 MMOL/L (ref 3.5–5.1)
PROT SERPL-MCNC: 6.6 G/DL (ref 6–8.4)
SODIUM SERPL-SCNC: 137 MMOL/L (ref 136–145)

## 2023-10-24 PROCEDURE — 80053 COMPREHEN METABOLIC PANEL: CPT | Performed by: FAMILY MEDICINE

## 2023-10-24 PROCEDURE — 83036 HEMOGLOBIN GLYCOSYLATED A1C: CPT | Performed by: FAMILY MEDICINE

## 2023-10-25 LAB
ESTIMATED AVG GLUCOSE: 226 MG/DL (ref 68–131)
HBA1C MFR BLD: 9.5 % (ref 4–5.6)

## 2023-10-25 PROCEDURE — G0180 PR HOME HEALTH MD CERTIFICATION: ICD-10-PCS | Mod: ,,, | Performed by: FAMILY MEDICINE

## 2023-10-25 PROCEDURE — G0180 MD CERTIFICATION HHA PATIENT: HCPCS | Mod: ,,, | Performed by: FAMILY MEDICINE

## 2023-10-28 NOTE — PROGRESS NOTES
"Subjective     Patient ID: Erasmo Dunbar is a 88 y.o. male.    Chief Complaint: Nausea and Emesis    87 yo M referred for n/v.  He and his SO report sudden onset of nausea with "dry heaves."  He takes chronic opiates for back pain, taking 1-2 Norco 10/325 per day.  They deny constipation stating that he has 1 good BM per day.  He does not feel hungry and previously ate 2-3 meals per day.  Initially it was thought that this was from the Trulicity so it was stopped and he was ok for a while, but then symptoms returned.  Neither PPI nor phenergan have helped.  ED gave Zofran which helped initially, but again sx returned.  They both say that all agents only make him feel better for a few days, then all sx return.    Review of Systems   Constitutional:  Negative for chills and fever.   Eyes:  Negative for photophobia and visual disturbance.   Respiratory:  Negative for shortness of breath and wheezing.    Cardiovascular:  Negative for chest pain, palpitations and leg swelling.   Gastrointestinal:  Positive for nausea and vomiting. Negative for abdominal pain, change in bowel habit and constipation.   Genitourinary:  Negative for dysuria and hematuria.   Musculoskeletal:  Negative for joint swelling and myalgias.   Integumentary:  Negative for color change and rash.   Neurological:  Negative for dizziness and speech difficulty.   Psychiatric/Behavioral:  Negative for confusion and hallucinations.       Objective   BP (!) 208/88 (BP Location: Left arm, Patient Position: Sitting, BP Method: Medium (Manual))   Pulse 88   Ht 5' 6" (1.676 m)   Wt 87.8 kg (193 lb 9 oz)   SpO2 97%   BMI 31.24 kg/m²     Physical Exam  Constitutional:       Appearance: Normal appearance. He is well-developed. He is not ill-appearing.   HENT:      Head: Normocephalic and atraumatic.   Eyes:      Extraocular Movements: Extraocular movements intact.      Pupils: Pupils are equal, round, and reactive to light.   Pulmonary:      Effort: " "Pulmonary effort is normal. No respiratory distress.   Abdominal:      General: There is no distension.      Palpations: Abdomen is soft.      Tenderness: There is no abdominal tenderness.   Musculoskeletal:         General: No deformity. Normal range of motion.      Cervical back: Normal range of motion and neck supple.   Skin:     General: Skin is warm and dry.   Neurological:      General: No focal deficit present.      Mental Status: He is alert and oriented to person, place, and time.   Psychiatric:         Mood and Affect: Mood normal.         Behavior: Behavior normal.       Lab Results   Component Value Date    WBC 10.19 10/09/2023    HGB 12.9 (L) 10/09/2023    HCT 39.5 (L) 10/09/2023    MCV 89 10/09/2023     10/09/2023     CXR was independently visualized and reviewed by me and showed enlarged cardiac silhouette.  CT without significant stool burden       Assessment and Plan     1. Nausea and vomiting, unspecified vomiting type  -     FL Upper GI; Future; Expected date: 10/17/2023  -     ondansetron (ZOFRAN-ODT) 4 MG TbDL; Take 1 tablet (4 mg total) by mouth every 6 (six) hours as needed.  Dispense: 60 tablet; Refill: 5  -     promethazine (PHENERGAN) 6.25 mg/5 mL syrup; Take 10 mLs (12.5 mg total) by mouth every 8 (eight) hours as needed for Nausea.  Dispense: 500 mL; Refill: 5    It is highly likely that the nonspecific n/v is from his opiates.  He is 87 yo and although his tolerance of opiates will increase like everyone else, older pt's ability to take these and not have side effects decreases as they get in the much older age range.  This is a significant issue b/c decreasing the pain meds will make the nausea better, but his back pain will of course increase.  The above are "band-aids" only and will only help slightly unfortunately.  I have advised they talk to their pain management doctor to see if there are alternatives.      "

## 2023-10-30 ENCOUNTER — TELEPHONE (OUTPATIENT)
Dept: GASTROENTEROLOGY | Facility: CLINIC | Age: 88
End: 2023-10-30
Payer: MEDICARE

## 2023-10-30 NOTE — TELEPHONE ENCOUNTER
LM for pt to call office back at [phone] to discuss UGI results.        ----- Message from Soledad Titus MD sent at 10/28/2023  3:02 PM CDT -----  UGI study is normal.  I really believe his nausea is from the opiates and thus he needs to talk to his pain management doctor about alternatives.  I gave Zofran and Phenergan at our office visit, but these will only help a little.  Please call with results.

## 2023-10-31 ENCOUNTER — TELEPHONE (OUTPATIENT)
Dept: GASTROENTEROLOGY | Facility: CLINIC | Age: 88
End: 2023-10-31
Payer: MEDICARE

## 2023-10-31 ENCOUNTER — OFFICE VISIT (OUTPATIENT)
Dept: FAMILY MEDICINE | Facility: CLINIC | Age: 88
End: 2023-10-31
Payer: MEDICARE

## 2023-10-31 VITALS
BODY MASS INDEX: 30.76 KG/M2 | WEIGHT: 191.38 LBS | SYSTOLIC BLOOD PRESSURE: 152 MMHG | HEIGHT: 66 IN | OXYGEN SATURATION: 94 % | RESPIRATION RATE: 16 BRPM | HEART RATE: 67 BPM | DIASTOLIC BLOOD PRESSURE: 74 MMHG

## 2023-10-31 DIAGNOSIS — R11.0 NAUSEA: ICD-10-CM

## 2023-10-31 DIAGNOSIS — I10 HYPERTENSION, UNSPECIFIED TYPE: Primary | ICD-10-CM

## 2023-10-31 DIAGNOSIS — E11.22 TYPE 2 DIABETES MELLITUS WITH CHRONIC KIDNEY DISEASE, WITHOUT LONG-TERM CURRENT USE OF INSULIN, UNSPECIFIED CKD STAGE: ICD-10-CM

## 2023-10-31 PROCEDURE — 99999 PR PBB SHADOW E&M-EST. PATIENT-LVL III: ICD-10-PCS | Mod: PBBFAC,,, | Performed by: FAMILY MEDICINE

## 2023-10-31 PROCEDURE — 1101F PR PT FALLS ASSESS DOC 0-1 FALLS W/OUT INJ PAST YR: ICD-10-PCS | Mod: CPTII,S$GLB,, | Performed by: FAMILY MEDICINE

## 2023-10-31 PROCEDURE — 1159F MED LIST DOCD IN RCRD: CPT | Mod: CPTII,S$GLB,, | Performed by: FAMILY MEDICINE

## 2023-10-31 PROCEDURE — 3288F FALL RISK ASSESSMENT DOCD: CPT | Mod: CPTII,S$GLB,, | Performed by: FAMILY MEDICINE

## 2023-10-31 PROCEDURE — 3288F PR FALLS RISK ASSESSMENT DOCUMENTED: ICD-10-PCS | Mod: CPTII,S$GLB,, | Performed by: FAMILY MEDICINE

## 2023-10-31 PROCEDURE — 1101F PT FALLS ASSESS-DOCD LE1/YR: CPT | Mod: CPTII,S$GLB,, | Performed by: FAMILY MEDICINE

## 2023-10-31 PROCEDURE — 99214 OFFICE O/P EST MOD 30 MIN: CPT | Mod: S$GLB,,, | Performed by: FAMILY MEDICINE

## 2023-10-31 PROCEDURE — 1160F PR REVIEW ALL MEDS BY PRESCRIBER/CLIN PHARMACIST DOCUMENTED: ICD-10-PCS | Mod: CPTII,S$GLB,, | Performed by: FAMILY MEDICINE

## 2023-10-31 PROCEDURE — 1126F PR PAIN SEVERITY QUANTIFIED, NO PAIN PRESENT: ICD-10-PCS | Mod: CPTII,S$GLB,, | Performed by: FAMILY MEDICINE

## 2023-10-31 PROCEDURE — 1126F AMNT PAIN NOTED NONE PRSNT: CPT | Mod: CPTII,S$GLB,, | Performed by: FAMILY MEDICINE

## 2023-10-31 PROCEDURE — 1159F PR MEDICATION LIST DOCUMENTED IN MEDICAL RECORD: ICD-10-PCS | Mod: CPTII,S$GLB,, | Performed by: FAMILY MEDICINE

## 2023-10-31 PROCEDURE — 99214 PR OFFICE/OUTPT VISIT, EST, LEVL IV, 30-39 MIN: ICD-10-PCS | Mod: S$GLB,,, | Performed by: FAMILY MEDICINE

## 2023-10-31 PROCEDURE — 99999 PR PBB SHADOW E&M-EST. PATIENT-LVL III: CPT | Mod: PBBFAC,,, | Performed by: FAMILY MEDICINE

## 2023-10-31 PROCEDURE — 1160F RVW MEDS BY RX/DR IN RCRD: CPT | Mod: CPTII,S$GLB,, | Performed by: FAMILY MEDICINE

## 2023-10-31 RX ORDER — OLMESARTAN MEDOXOMIL 40 MG/1
40 TABLET ORAL DAILY
Qty: 30 TABLET | Refills: 5 | Status: SHIPPED | OUTPATIENT
Start: 2023-10-31 | End: 2024-02-19 | Stop reason: SDUPTHER

## 2023-10-31 NOTE — PROGRESS NOTES
Subjective:       Patient ID: Erasmo Dunbar is a 88 y.o. male.    Chief Complaint: Follow-up (No current complaints)    Pt is a 88 y.o. male who presents for evaluation and management of   Encounter Diagnoses   Name Primary?    Type 2 diabetes mellitus with chronic kidney disease, without long-term current use of insulin, unspecified CKD stage     Nausea     Hypertension, unspecified type Yes   .  Doing well on current meds. Denies any side effects. Prevention is up to date.  Review of Systems   Constitutional:  Negative for fever.   Respiratory:  Negative for shortness of breath.    Cardiovascular:  Negative for chest pain.   Gastrointestinal:  Negative for anal bleeding and blood in stool.   Genitourinary:  Negative for dysuria.   Neurological:  Negative for dizziness and light-headedness.       Objective:      Physical Exam  Constitutional:       Appearance: Normal appearance. He is well-developed. He is not ill-appearing.   HENT:      Head: Normocephalic and atraumatic.      Right Ear: External ear normal.      Left Ear: External ear normal.      Nose: Nose normal.      Mouth/Throat:      Mouth: Mucous membranes are moist.      Pharynx: No oropharyngeal exudate or posterior oropharyngeal erythema.   Eyes:      General: No scleral icterus.        Right eye: No discharge.         Left eye: No discharge.      Conjunctiva/sclera: Conjunctivae normal.      Pupils: Pupils are equal, round, and reactive to light.   Neck:      Thyroid: No thyromegaly.      Vascular: No JVD.      Trachea: No tracheal deviation.   Cardiovascular:      Rate and Rhythm: Normal rate and regular rhythm.      Heart sounds: Normal heart sounds. No murmur heard.  Pulmonary:      Effort: Pulmonary effort is normal. No respiratory distress.      Breath sounds: Normal breath sounds. No wheezing or rales.   Chest:      Chest wall: No tenderness.   Abdominal:      General: Bowel sounds are normal. There is no distension.      Palpations: Abdomen  is soft. There is no mass.      Tenderness: There is no abdominal tenderness. There is no guarding or rebound.   Musculoskeletal:         General: Normal range of motion.      Cervical back: Normal range of motion and neck supple.      Right lower leg: No edema.      Left lower leg: No edema.   Lymphadenopathy:      Cervical: No cervical adenopathy.   Skin:     General: Skin is warm and dry.   Neurological:      Mental Status: He is alert and oriented to person, place, and time.      Cranial Nerves: No cranial nerve deficit.      Motor: No abnormal muscle tone.      Coordination: Coordination normal.      Deep Tendon Reflexes: Reflexes are normal and symmetric. Reflexes normal.   Psychiatric:         Behavior: Behavior normal.         Thought Content: Thought content normal.         Judgment: Judgment normal.         Assessment:       1. Hypertension, unspecified type    2. Type 2 diabetes mellitus with chronic kidney disease, without long-term current use of insulin, unspecified CKD stage    3. Nausea        Plan:   1. Hypertension, unspecified type  Overview:  Lisinopril 10       Orders:  -     olmesartan (BENICAR) 40 MG tablet; Take 1 tablet (40 mg total) by mouth once daily.  Dispense: 30 tablet; Refill: 5    2. Type 2 diabetes mellitus with chronic kidney disease, without long-term current use of insulin, unspecified CKD stage  Overview:  Lab Results   Component Value Date    HGBA1C 9.5 (H) 10/24/2023     Lantus---42-43 units/ qhs   MEtformin   actos   Januvia    10/31/23----his BS is great last 3 days, since he has been set up with  and logging his sugars....       3. Nausea  Overview:  Unsure of etiology, but has improved as of 10/31/23  He is on PPI  Zofran prn   Phenergan prn   GI has seen him. Upper GI is WNL 10/23/23. Dr. Titus opinion is that his opioids are causing his nausea.         Stop lisinopril and start olmesartan 40 for his BP. Consider adding CCB if not at goal   BP log with  ---send me  log in 3-4 weeks   BMP in 2 weeks       No follow-ups on file.

## 2023-10-31 NOTE — TELEPHONE ENCOUNTER
LM for pt to call office back regarding test results.        ----- Message from Candy Austin sent at 10/31/2023  9:05 AM CDT -----  Contact: wife  Type:  Patient Returning Call    Who Called:pt wife   Who Left Message for Patient:Georgiana   Does the patient know what this is regarding?:results   Would the patient rather a call back or a response via [facility]? Call   Best Call Back Number: [phone]  Additional Information:

## 2023-10-31 NOTE — TELEPHONE ENCOUNTER
Lm for pt to call office back regarding test results.        ----- Message from Soledad Titus MD sent at 10/28/2023  3:02 PM CDT -----  UGI study is normal.  I really believe his nausea is from the opiates and thus he needs to talk to his pain management doctor about alternatives.  I gave Zofran and Phenergan at our office visit, but these will only help a little.  Please call with results.

## 2023-11-01 ENCOUNTER — TELEPHONE (OUTPATIENT)
Dept: GASTROENTEROLOGY | Facility: CLINIC | Age: 88
End: 2023-11-01
Payer: MEDICARE

## 2023-11-01 NOTE — TELEPHONE ENCOUNTER
LM for pt to call office back regarding UGI results. This is the 3rd attempt.         ----- Message from Soledad Titus MD sent at 10/28/2023  3:02 PM CDT -----  UGI study is normal.  I really believe his nausea is from the opiates and thus he needs to talk to his pain management doctor about alternatives.  I gave Zofran and Phenergan at our office visit, but these will only help a little.  Please call with results.

## 2023-11-02 ENCOUNTER — TELEPHONE (OUTPATIENT)
Dept: GASTROENTEROLOGY | Facility: CLINIC | Age: 88
End: 2023-11-02
Payer: MEDICARE

## 2023-11-02 NOTE — TELEPHONE ENCOUNTER
Please see today's previous telephone encounter.      ----- Message from Mercedes Herrera sent at 11/2/2023  8:40 AM CDT -----  Type:  Patient Returning Call    Who Called: Pt Wife   Who Left Message for Patient: Georgiana  Does the patient know what this is regarding?: UGI study results  Would the patient rather a call back or a response via [facility]?  call  Best Call Back Number: [phone]  Additional Information:

## 2023-11-02 NOTE — TELEPHONE ENCOUNTER
Called and spoke with pt's wife regarding UGI results. Informed pt's wife of results and informed pt's wife for pt to talk with his pain management doctor about alternative medications that will help not cause nausea. Informed pt the phenergan and Zofran will only help a little so just talk with pain management doctor for alternatives. Pt's wife verbalized understanding.

## 2023-11-02 NOTE — TELEPHONE ENCOUNTER
----- Message from Soledad Titus MD sent at 10/28/2023  3:02 PM CDT -----  UGI study is normal.  I really believe his nausea is from the opiates and thus he needs to talk to his pain management doctor about alternatives.  I gave Zofran and Phenergan at our office visit, but these will only help a little.  Please call with results.

## 2023-11-20 ENCOUNTER — DOCUMENT SCAN (OUTPATIENT)
Dept: HOME HEALTH SERVICES | Facility: HOSPITAL | Age: 88
End: 2023-11-20
Payer: MEDICARE

## 2023-11-22 ENCOUNTER — EXTERNAL HOME HEALTH (OUTPATIENT)
Dept: HOME HEALTH SERVICES | Facility: HOSPITAL | Age: 88
End: 2023-11-22
Payer: MEDICARE

## 2023-11-22 ENCOUNTER — DOCUMENT SCAN (OUTPATIENT)
Dept: HOME HEALTH SERVICES | Facility: HOSPITAL | Age: 88
End: 2023-11-22
Payer: MEDICARE

## 2023-12-12 DIAGNOSIS — Z79.4 TYPE 2 DIABETES MELLITUS WITH STAGE 3 CHRONIC KIDNEY DISEASE, WITH LONG-TERM CURRENT USE OF INSULIN: ICD-10-CM

## 2023-12-12 DIAGNOSIS — E11.22 TYPE 2 DIABETES MELLITUS WITH STAGE 3 CHRONIC KIDNEY DISEASE, WITH LONG-TERM CURRENT USE OF INSULIN: ICD-10-CM

## 2023-12-12 DIAGNOSIS — N18.30 TYPE 2 DIABETES MELLITUS WITH STAGE 3 CHRONIC KIDNEY DISEASE, WITH LONG-TERM CURRENT USE OF INSULIN: ICD-10-CM

## 2023-12-12 RX ORDER — INSULIN GLARGINE 100 [IU]/ML
INJECTION, SOLUTION SUBCUTANEOUS
Qty: 45 ML | Refills: 3 | Status: SHIPPED | OUTPATIENT
Start: 2023-12-12 | End: 2023-12-21

## 2023-12-12 NOTE — TELEPHONE ENCOUNTER
Care Due:                  Date            Visit Type   Department     Provider  --------------------------------------------------------------------------------                                EP -                              [facility] FAM[facility]  Last Visit: 10-      CARE ([facility])   JACOB Varma                              EP -                              [facility]  Next Visit: 01-      CARE ([facility])   JACOB Varma                                                            Last  Test          Frequency    Reason                     Performed    Due Date  --------------------------------------------------------------------------------    Lipid Panel.  12 months..  atorvastatin.............  01- 12-    [facility] Embedded Care Due Messages. Reference number: [id number].   12/12/2023 12:14:34 PM CST

## 2023-12-12 NOTE — TELEPHONE ENCOUNTER
Refill Routing Note   Medication(s) are not appropriate for processing by [facility] for the following reason(s):        New or recently adjusted medication  Outside of protocol    [facility] action(s):  Route     Requires labs : Yes      Medication Therapy Plan: Sliding scale outside protocol for [facility]      Appointments  past 12m or future 3m with PCP    Date Provider   Last Visit   10/31/2023 Hemal Varma MD   Next Visit   1/31/2024 Hemal Varma MD   ED visits in past 90 days: 2        Note composed:5:25 PM 12/12/2023

## 2023-12-14 DIAGNOSIS — I10 HYPERTENSION, UNSPECIFIED TYPE: ICD-10-CM

## 2023-12-14 DIAGNOSIS — M47.816 LUMBAR SPONDYLOSIS: ICD-10-CM

## 2023-12-14 DIAGNOSIS — M25.561 CHRONIC KNEE PAIN AFTER TOTAL REPLACEMENT OF RIGHT KNEE JOINT: ICD-10-CM

## 2023-12-14 DIAGNOSIS — Z96.651 CHRONIC KNEE PAIN AFTER TOTAL REPLACEMENT OF RIGHT KNEE JOINT: ICD-10-CM

## 2023-12-14 DIAGNOSIS — G89.29 CHRONIC KNEE PAIN AFTER TOTAL REPLACEMENT OF RIGHT KNEE JOINT: ICD-10-CM

## 2023-12-14 DIAGNOSIS — M48.061 SPINAL STENOSIS OF LUMBAR REGION WITHOUT NEUROGENIC CLAUDICATION: ICD-10-CM

## 2023-12-14 DIAGNOSIS — E11.42 DIABETIC POLYNEUROPATHY ASSOCIATED WITH TYPE 2 DIABETES MELLITUS: ICD-10-CM

## 2023-12-14 RX ORDER — ISOPROPYL ALCOHOL 70 ML/100ML
SWAB TOPICAL
Refills: 0 | OUTPATIENT
Start: 2023-12-14

## 2023-12-14 RX ORDER — INSULIN PUMP SYRINGE, 3 ML
EACH MISCELLANEOUS
Refills: 0 | OUTPATIENT
Start: 2023-12-14

## 2023-12-14 RX ORDER — OLMESARTAN MEDOXOMIL 40 MG/1
TABLET ORAL
Refills: 0 | OUTPATIENT
Start: 2023-12-14

## 2023-12-14 RX ORDER — PREGABALIN 100 MG/1
CAPSULE ORAL
Refills: 0 | OUTPATIENT
Start: 2023-12-14

## 2023-12-14 NOTE — TELEPHONE ENCOUNTER
Refill Decision Note   Erasmo Dunbar  is requesting a refill authorization.  Brief Assessment and Rationale for Refill:  Quick Discontinue     Medication Therapy Plan:    Pharmacy is requesting new scripts for the following medications without required information, (sig/ frequency/qty/etc)      Medication Reconciliation Completed: No     Comments: Pharmacies have been requesting medications for patients without required information, (sig, frequency, qty, etc.). In addition, requests are sent for medication(s) pt. are currently not taking, and medications patients have never taken.    We have spoken to the pharmacies about these request types and advised their teams previously that we are unable to assess these New Script requests and require all details for these requests. This is a known issue and has been reported.     Note composed:11:30 AM 12/14/2023

## 2023-12-14 NOTE — TELEPHONE ENCOUNTER
No care due was identified.  [facility] Embedded Care Due Messages. Reference number: [id number].   12/14/2023 11:26:43 AM CST

## 2023-12-19 DIAGNOSIS — M25.561 CHRONIC KNEE PAIN AFTER TOTAL REPLACEMENT OF RIGHT KNEE JOINT: ICD-10-CM

## 2023-12-19 DIAGNOSIS — M48.061 SPINAL STENOSIS OF LUMBAR REGION WITHOUT NEUROGENIC CLAUDICATION: ICD-10-CM

## 2023-12-19 DIAGNOSIS — M47.816 LUMBAR SPONDYLOSIS: ICD-10-CM

## 2023-12-19 DIAGNOSIS — G89.29 CHRONIC KNEE PAIN AFTER TOTAL REPLACEMENT OF RIGHT KNEE JOINT: ICD-10-CM

## 2023-12-19 DIAGNOSIS — Z96.651 CHRONIC KNEE PAIN AFTER TOTAL REPLACEMENT OF RIGHT KNEE JOINT: ICD-10-CM

## 2023-12-19 DIAGNOSIS — E11.42 DIABETIC POLYNEUROPATHY ASSOCIATED WITH TYPE 2 DIABETES MELLITUS: ICD-10-CM

## 2023-12-19 NOTE — TELEPHONE ENCOUNTER
----- Message from Jacob Singh sent at 2023  2:07 PM CST -----  Contact: pt  Erasmo Dunbar  MRN: [id number]  : 1935  PCP: Hemal Varma  Home Phone      [phone]  Work Phone      Not on file.  Mobile          [phone]      MESSAGE:     Pt is requesting refill of medication pregabalin (LYRICA) 100 MG capsule. Pt would like medication to be sent to [facility] - [place], [place] - [address]        Please advise  [phone]

## 2023-12-19 NOTE — TELEPHONE ENCOUNTER
No care due was identified.  Health system Embedded Care Due Messages. Reference number: [id number].   12/19/2023 2:14:20 PM CST

## 2023-12-20 DIAGNOSIS — I10 HYPERTENSION, UNSPECIFIED TYPE: ICD-10-CM

## 2023-12-20 DIAGNOSIS — E11.42 DIABETIC POLYNEUROPATHY ASSOCIATED WITH TYPE 2 DIABETES MELLITUS: ICD-10-CM

## 2023-12-20 DIAGNOSIS — M48.061 SPINAL STENOSIS OF LUMBAR REGION WITHOUT NEUROGENIC CLAUDICATION: ICD-10-CM

## 2023-12-20 DIAGNOSIS — Z96.651 CHRONIC KNEE PAIN AFTER TOTAL REPLACEMENT OF RIGHT KNEE JOINT: ICD-10-CM

## 2023-12-20 DIAGNOSIS — Z79.4 TYPE 2 DIABETES MELLITUS WITH STAGE 3 CHRONIC KIDNEY DISEASE, WITH LONG-TERM CURRENT USE OF INSULIN: ICD-10-CM

## 2023-12-20 DIAGNOSIS — G89.29 CHRONIC KNEE PAIN AFTER TOTAL REPLACEMENT OF RIGHT KNEE JOINT: ICD-10-CM

## 2023-12-20 DIAGNOSIS — N18.30 TYPE 2 DIABETES MELLITUS WITH STAGE 3 CHRONIC KIDNEY DISEASE, WITH LONG-TERM CURRENT USE OF INSULIN: ICD-10-CM

## 2023-12-20 DIAGNOSIS — E11.22 TYPE 2 DIABETES MELLITUS WITH STAGE 3 CHRONIC KIDNEY DISEASE, WITH LONG-TERM CURRENT USE OF INSULIN: ICD-10-CM

## 2023-12-20 DIAGNOSIS — M25.561 CHRONIC KNEE PAIN AFTER TOTAL REPLACEMENT OF RIGHT KNEE JOINT: ICD-10-CM

## 2023-12-20 DIAGNOSIS — M47.816 LUMBAR SPONDYLOSIS: ICD-10-CM

## 2023-12-20 RX ORDER — OLMESARTAN MEDOXOMIL 40 MG/1
TABLET ORAL
Refills: 0 | OUTPATIENT
Start: 2023-12-20

## 2023-12-20 RX ORDER — PREGABALIN 100 MG/1
CAPSULE ORAL
Refills: 0 | OUTPATIENT
Start: 2023-12-20

## 2023-12-20 RX ORDER — ISOPROPYL ALCOHOL 70 ML/100ML
SWAB TOPICAL
Refills: 0 | OUTPATIENT
Start: 2023-12-20

## 2023-12-20 RX ORDER — DEXTROSE 4 G
TABLET,CHEWABLE ORAL
Refills: 0 | OUTPATIENT
Start: 2023-12-20

## 2023-12-20 NOTE — TELEPHONE ENCOUNTER
Refill Decision Note   Erasmo Dunbar  is requesting a refill authorization.  Brief Assessment and Rationale for Refill:  Quick Discontinue     Medication Therapy Plan:    Pharmacy is requesting new scripts for the following medications without required information, (sig/ frequency/qty/etc)      Medication Reconciliation Completed: No     Comments: Pharmacies have been requesting medications for patients without required information, (sig, frequency, qty, etc.). In addition, requests are sent for medication(s) pt. are currently not taking, and medications patients have never taken.    We have spoken to the pharmacies about these request types and advised their teams previously that we are unable to assess these New Script requests and require all details for these requests. This is a known issue and has been reported.     Note composed:4:29 PM 12/20/2023

## 2023-12-20 NOTE — TELEPHONE ENCOUNTER
No care due was identified.  [facility] Embedded Care Due Messages. Reference number: [id number].   12/20/2023 1:00:58 PM CST

## 2023-12-21 RX ORDER — INSULIN GLARGINE 100 [IU]/ML
INJECTION, SOLUTION SUBCUTANEOUS
Qty: 45 ML | Refills: 3 | Status: SHIPPED | OUTPATIENT
Start: 2023-12-21

## 2023-12-21 NOTE — TELEPHONE ENCOUNTER
No care due was identified.  [facility] Embedded Care Due Messages. Reference number: [id number].   12/20/2023 7:36:59 PM CST

## 2023-12-21 NOTE — TELEPHONE ENCOUNTER
Refill Routing Note   Medication(s) are not appropriate for processing by [facility] for the following reason(s):        Outside of protocol    [facility] action(s):  Route        Medication Therapy Plan: Sliding scale outside of protocol per [facility]      Appointments  past 12m or future 3m with PCP    Date Provider   Last Visit   10/31/2023 Hemal Varma MD   Next Visit   1/31/2024 Hemal Varma MD   ED visits in past 90 days: 1        Note composed:11:32 AM 12/21/2023

## 2023-12-25 RX ORDER — PREGABALIN 100 MG/1
100 CAPSULE ORAL 2 TIMES DAILY
Qty: 180 CAPSULE | Refills: 1 | Status: SHIPPED | OUTPATIENT
Start: 2023-12-25 | End: 2024-02-19 | Stop reason: SDUPTHER

## 2024-02-19 ENCOUNTER — LAB VISIT (OUTPATIENT)
Dept: FAMILY MEDICINE | Facility: CLINIC | Age: 89
End: 2024-02-19
Payer: MEDICARE

## 2024-02-19 ENCOUNTER — OFFICE VISIT (OUTPATIENT)
Dept: FAMILY MEDICINE | Facility: CLINIC | Age: 89
End: 2024-02-19
Payer: MEDICARE

## 2024-02-19 VITALS
RESPIRATION RATE: 16 BRPM | HEART RATE: 67 BPM | WEIGHT: 182.19 LBS | HEIGHT: 66 IN | DIASTOLIC BLOOD PRESSURE: 58 MMHG | BODY MASS INDEX: 29.28 KG/M2 | OXYGEN SATURATION: 100 % | SYSTOLIC BLOOD PRESSURE: 124 MMHG

## 2024-02-19 DIAGNOSIS — F03.90 DEMENTIA, UNSPECIFIED DEMENTIA SEVERITY, UNSPECIFIED DEMENTIA TYPE, UNSPECIFIED WHETHER BEHAVIORAL, PSYCHOTIC, OR MOOD DISTURBANCE OR ANXIETY: ICD-10-CM

## 2024-02-19 DIAGNOSIS — E53.8 VITAMIN B12 DEFICIENCY: ICD-10-CM

## 2024-02-19 DIAGNOSIS — Z79.4 TYPE 2 DIABETES MELLITUS WITHOUT COMPLICATION, WITH LONG-TERM CURRENT USE OF INSULIN: ICD-10-CM

## 2024-02-19 DIAGNOSIS — G95.9 CERVICAL MYELOPATHY WITH CERVICAL RADICULOPATHY: ICD-10-CM

## 2024-02-19 DIAGNOSIS — R41.3 MEMORY LOSS: ICD-10-CM

## 2024-02-19 DIAGNOSIS — I50.33 ACUTE ON CHRONIC DIASTOLIC CHF (CONGESTIVE HEART FAILURE): ICD-10-CM

## 2024-02-19 DIAGNOSIS — E11.42 DIABETIC POLYNEUROPATHY ASSOCIATED WITH TYPE 2 DIABETES MELLITUS: ICD-10-CM

## 2024-02-19 DIAGNOSIS — C61 PROSTATE CANCER: ICD-10-CM

## 2024-02-19 DIAGNOSIS — I10 HYPERTENSION, UNSPECIFIED TYPE: ICD-10-CM

## 2024-02-19 DIAGNOSIS — R11.0 NAUSEA: ICD-10-CM

## 2024-02-19 DIAGNOSIS — Z96.651 CHRONIC KNEE PAIN AFTER TOTAL REPLACEMENT OF RIGHT KNEE JOINT: ICD-10-CM

## 2024-02-19 DIAGNOSIS — M25.561 CHRONIC KNEE PAIN AFTER TOTAL REPLACEMENT OF RIGHT KNEE JOINT: ICD-10-CM

## 2024-02-19 DIAGNOSIS — M48.061 SPINAL STENOSIS OF LUMBAR REGION WITHOUT NEUROGENIC CLAUDICATION: ICD-10-CM

## 2024-02-19 DIAGNOSIS — G47.00 INSOMNIA, UNSPECIFIED TYPE: ICD-10-CM

## 2024-02-19 DIAGNOSIS — G89.29 OTHER CHRONIC PAIN: ICD-10-CM

## 2024-02-19 DIAGNOSIS — M47.816 LUMBAR SPONDYLOSIS: ICD-10-CM

## 2024-02-19 DIAGNOSIS — E78.5 DYSLIPIDEMIA: ICD-10-CM

## 2024-02-19 DIAGNOSIS — E11.9 TYPE 2 DIABETES MELLITUS WITHOUT COMPLICATION, WITH LONG-TERM CURRENT USE OF INSULIN: ICD-10-CM

## 2024-02-19 DIAGNOSIS — I48.0 PAROXYSMAL ATRIAL FIBRILLATION: ICD-10-CM

## 2024-02-19 DIAGNOSIS — G89.29 CHRONIC KNEE PAIN AFTER TOTAL REPLACEMENT OF RIGHT KNEE JOINT: ICD-10-CM

## 2024-02-19 DIAGNOSIS — F32.A DEPRESSION, UNSPECIFIED DEPRESSION TYPE: ICD-10-CM

## 2024-02-19 DIAGNOSIS — M54.12 CERVICAL MYELOPATHY WITH CERVICAL RADICULOPATHY: ICD-10-CM

## 2024-02-19 LAB
ALBUMIN SERPL BCP-MCNC: 3.8 G/DL (ref 3.5–5.2)
ALP SERPL-CCNC: 69 U/L (ref 55–135)
ALT SERPL W/O P-5'-P-CCNC: 22 U/L (ref 10–44)
ANION GAP SERPL CALC-SCNC: 11 MMOL/L (ref 8–16)
AST SERPL-CCNC: 18 U/L (ref 10–40)
BASOPHILS # BLD AUTO: 0.11 K/UL (ref 0–0.2)
BASOPHILS NFR BLD: 0.9 % (ref 0–1.9)
BILIRUB SERPL-MCNC: 0.5 MG/DL (ref 0.1–1)
BUN SERPL-MCNC: 15 MG/DL (ref 8–23)
CALCIUM SERPL-MCNC: 9.6 MG/DL (ref 8.7–10.5)
CHLORIDE SERPL-SCNC: 101 MMOL/L (ref 95–110)
CHOLEST SERPL-MCNC: 204 MG/DL (ref 120–199)
CHOLEST/HDLC SERPL: 4.1 {RATIO} (ref 2–5)
CO2 SERPL-SCNC: 25 MMOL/L (ref 23–29)
CREAT SERPL-MCNC: 1.1 MG/DL (ref 0.5–1.4)
DIFFERENTIAL METHOD BLD: ABNORMAL
EOSINOPHIL # BLD AUTO: 1.8 K/UL (ref 0–0.5)
EOSINOPHIL NFR BLD: 14.2 % (ref 0–8)
ERYTHROCYTE [DISTWIDTH] IN BLOOD BY AUTOMATED COUNT: 13.8 % (ref 11.5–14.5)
EST. GFR  (NO RACE VARIABLE): >60 ML/MIN/1.73 M^2
GLUCOSE SERPL-MCNC: 243 MG/DL (ref 70–110)
HCT VFR BLD AUTO: 42.1 % (ref 40–54)
HDLC SERPL-MCNC: 50 MG/DL (ref 40–75)
HDLC SERPL: 24.5 % (ref 20–50)
HGB BLD-MCNC: 12.9 G/DL (ref 14–18)
IMM GRANULOCYTES # BLD AUTO: 0.06 K/UL (ref 0–0.04)
IMM GRANULOCYTES NFR BLD AUTO: 0.5 % (ref 0–0.5)
LDLC SERPL CALC-MCNC: 117.8 MG/DL (ref 63–159)
LYMPHOCYTES # BLD AUTO: 3 K/UL (ref 1–4.8)
LYMPHOCYTES NFR BLD: 23.9 % (ref 18–48)
MCH RBC QN AUTO: 28.3 PG (ref 27–31)
MCHC RBC AUTO-ENTMCNC: 30.6 G/DL (ref 32–36)
MCV RBC AUTO: 92 FL (ref 82–98)
MONOCYTES # BLD AUTO: 1 K/UL (ref 0.3–1)
MONOCYTES NFR BLD: 8.2 % (ref 4–15)
NEUTROPHILS # BLD AUTO: 6.5 K/UL (ref 1.8–7.7)
NEUTROPHILS NFR BLD: 52.3 % (ref 38–73)
NONHDLC SERPL-MCNC: 154 MG/DL
NRBC BLD-RTO: 0 /100 WBC
PLATELET # BLD AUTO: 413 K/UL (ref 150–450)
PMV BLD AUTO: 10.2 FL (ref 9.2–12.9)
POTASSIUM SERPL-SCNC: 4.8 MMOL/L (ref 3.5–5.1)
PROT SERPL-MCNC: 6.8 G/DL (ref 6–8.4)
RBC # BLD AUTO: 4.56 M/UL (ref 4.6–6.2)
SODIUM SERPL-SCNC: 137 MMOL/L (ref 136–145)
TRIGL SERPL-MCNC: 181 MG/DL (ref 30–150)
TSH SERPL DL<=0.005 MIU/L-ACNC: 1.22 UIU/ML (ref 0.4–4)
WBC # BLD AUTO: 12.39 K/UL (ref 3.9–12.7)

## 2024-02-19 PROCEDURE — 99999 PR PBB SHADOW E&M-EST. PATIENT-LVL IV: CPT | Mod: PBBFAC,,, | Performed by: FAMILY MEDICINE

## 2024-02-19 PROCEDURE — 85025 COMPLETE CBC W/AUTO DIFF WBC: CPT | Performed by: FAMILY MEDICINE

## 2024-02-19 PROCEDURE — 3288F FALL RISK ASSESSMENT DOCD: CPT | Mod: CPTII,S$GLB,, | Performed by: FAMILY MEDICINE

## 2024-02-19 PROCEDURE — 1126F AMNT PAIN NOTED NONE PRSNT: CPT | Mod: CPTII,S$GLB,, | Performed by: FAMILY MEDICINE

## 2024-02-19 PROCEDURE — 80061 LIPID PANEL: CPT | Performed by: FAMILY MEDICINE

## 2024-02-19 PROCEDURE — 1101F PT FALLS ASSESS-DOCD LE1/YR: CPT | Mod: CPTII,S$GLB,, | Performed by: FAMILY MEDICINE

## 2024-02-19 PROCEDURE — 84443 ASSAY THYROID STIM HORMONE: CPT | Performed by: FAMILY MEDICINE

## 2024-02-19 PROCEDURE — 99214 OFFICE O/P EST MOD 30 MIN: CPT | Mod: S$GLB,,, | Performed by: FAMILY MEDICINE

## 2024-02-19 PROCEDURE — 36415 COLL VENOUS BLD VENIPUNCTURE: CPT | Mod: S$GLB,,, | Performed by: FAMILY MEDICINE

## 2024-02-19 PROCEDURE — 1159F MED LIST DOCD IN RCRD: CPT | Mod: CPTII,S$GLB,, | Performed by: FAMILY MEDICINE

## 2024-02-19 PROCEDURE — 80053 COMPREHEN METABOLIC PANEL: CPT | Performed by: FAMILY MEDICINE

## 2024-02-19 PROCEDURE — 83036 HEMOGLOBIN GLYCOSYLATED A1C: CPT | Performed by: FAMILY MEDICINE

## 2024-02-19 RX ORDER — DULOXETIN HYDROCHLORIDE 30 MG/1
30 CAPSULE, DELAYED RELEASE ORAL DAILY
Qty: 30 CAPSULE | Refills: 5 | Status: SHIPPED | OUTPATIENT
Start: 2024-02-19 | End: 2024-03-20

## 2024-02-19 RX ORDER — APIXABAN 5 MG/1
5 TABLET, FILM COATED ORAL 2 TIMES DAILY
Qty: 180 TABLET | Refills: 1 | Status: SHIPPED | OUTPATIENT
Start: 2024-02-19

## 2024-02-19 RX ORDER — TRAMADOL HYDROCHLORIDE 50 MG/1
50 TABLET ORAL EVERY 12 HOURS PRN
Qty: 30 EACH | Refills: 0 | Status: SHIPPED | OUTPATIENT
Start: 2024-02-19

## 2024-02-19 RX ORDER — PREGABALIN 100 MG/1
100 CAPSULE ORAL 2 TIMES DAILY
Qty: 180 CAPSULE | Refills: 1 | Status: SHIPPED | OUTPATIENT
Start: 2024-02-19 | End: 2024-08-19

## 2024-02-19 RX ORDER — OLMESARTAN MEDOXOMIL 40 MG/1
40 TABLET ORAL DAILY
Qty: 30 TABLET | Refills: 5 | Status: SHIPPED | OUTPATIENT
Start: 2024-02-19 | End: 2025-02-18

## 2024-02-19 RX ORDER — MEMANTINE HYDROCHLORIDE 5 MG/1
5 TABLET ORAL DAILY
Qty: 30 TABLET | Refills: 5 | Status: SHIPPED | OUTPATIENT
Start: 2024-02-19

## 2024-02-19 RX ORDER — TRAZODONE HYDROCHLORIDE 50 MG/1
50 TABLET ORAL NIGHTLY
Qty: 30 TABLET | Refills: 5 | Status: SHIPPED | OUTPATIENT
Start: 2024-02-19

## 2024-02-19 RX ORDER — ATORVASTATIN CALCIUM 10 MG/1
10 TABLET, FILM COATED ORAL NIGHTLY
Qty: 30 TABLET | Refills: 5 | Status: SHIPPED | OUTPATIENT
Start: 2024-02-19 | End: 2024-03-15

## 2024-02-19 RX ORDER — PANTOPRAZOLE SODIUM 40 MG/1
40 TABLET, DELAYED RELEASE ORAL DAILY
Qty: 30 TABLET | Refills: 5 | Status: SHIPPED | OUTPATIENT
Start: 2024-02-19 | End: 2024-08-17

## 2024-02-19 RX ORDER — PIOGLITAZONEHYDROCHLORIDE 15 MG/1
15 TABLET ORAL DAILY
Qty: 90 TABLET | Refills: 1 | Status: SHIPPED | OUTPATIENT
Start: 2024-02-19

## 2024-02-19 RX ORDER — CYANOCOBALAMIN 1000 UG/ML
1000 INJECTION, SOLUTION INTRAMUSCULAR; SUBCUTANEOUS
Qty: 1 ML | Refills: 11 | Status: SHIPPED | OUTPATIENT
Start: 2024-02-19

## 2024-02-19 NOTE — PROGRESS NOTES
Subjective:       Patient ID: Erasmo Dunbar is a 88 y.o. male.    Chief Complaint: Follow-up (Pt here for 3 mth f/u. )    Pt is a 88 y.o. male who presents for evaluation and management of   Encounter Diagnoses   Name Primary?    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  Doing well on current meds. Denies any side effects. Prevention is up to date.  Review of Systems   Constitutional:  Negative for fever.   Respiratory:  Negative for shortness of breath.    Cardiovascular:  Negative for chest pain.   Gastrointestinal:  Negative for anal bleeding and blood in stool.   Genitourinary:  Negative for dysuria.   Musculoskeletal:  Positive for back pain, gait problem and neck pain.        Ambulates with walker      Neurological:  Negative for dizziness and light-headedness.       Objective:      Physical Exam  Constitutional:       Appearance: Normal appearance. He is well-developed. He is not ill-appearing.   HENT:      Head: Normocephalic and atraumatic.      Right Ear: External ear normal.      Left Ear: External ear normal.      Nose: Nose normal.      Mouth/Throat:      Mouth: Mucous membranes are moist.      Pharynx: No oropharyngeal  exudate or posterior oropharyngeal erythema.   Eyes:      General: No scleral icterus.        Right eye: No discharge.         Left eye: No discharge.      Conjunctiva/sclera: Conjunctivae normal.      Pupils: Pupils are equal, round, and reactive to light.   Neck:      Thyroid: No thyromegaly.      Vascular: No JVD.      Trachea: No tracheal deviation.   Cardiovascular:      Rate and Rhythm: Normal rate and regular rhythm.      Heart sounds: Normal heart sounds. No murmur heard.  Pulmonary:      Effort: Pulmonary effort is normal. No respiratory distress.      Breath sounds: Normal breath sounds. No wheezing or rales.   Chest:      Chest wall: No tenderness.   Abdominal:      General: Bowel sounds are normal. There is no distension.      Palpations: Abdomen is soft. There is no mass.      Tenderness: There is no abdominal tenderness. There is no guarding or rebound.   Musculoskeletal:         General: Normal range of motion.      Cervical back: Normal range of motion and neck supple.      Right lower leg: No edema.      Left lower leg: No edema.   Lymphadenopathy:      Cervical: No cervical adenopathy.   Skin:     General: Skin is warm and dry.   Neurological:      Mental Status: He is alert and oriented to person, place, and time.      Cranial Nerves: No cranial nerve deficit.      Motor: No abnormal muscle tone.      Coordination: Coordination normal.      Deep Tendon Reflexes: Reflexes are normal and symmetric. Reflexes normal.   Psychiatric:         Behavior: Behavior normal.         Thought Content: Thought content normal.         Judgment: Judgment normal.         Assessment:       1. Dyslipidemia    2. Vitamin B12 deficiency    3. Other chronic pain    4. Diabetic polyneuropathy associated with type 2 diabetes mellitus    5. Depression, unspecified depression type    6. Paroxysmal atrial fibrillation    7. Memory loss    8. Hypertension, unspecified type    9. Nausea    10. Type 2 diabetes mellitus without  complication, with long-term current use of insulin    11. Lumbar spondylosis    12. 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 Spinal stenosis of lumbar region without neurogenic claudication    14. Insomnia, unspecified type    15. Prostate cancer    16. Dementia, unspecified dementia severity, unspecified dementia type, unspecified whether behavioral, psychotic, or mood disturbance or anxiety    17. Cervical myelopathy with cervical radiculopathy    18. Acute on chronic diastolic CHF (congestive heart failure)        Plan:   1. Dyslipidemia  -     atorvastatin (LIPITOR) 10 MG tablet; Take 1 tablet (10 mg total) by mouth every evening.  Dispense: 30 tablet; Refill: 5  -     Comprehensive Metabolic Panel; Future; Expected date: 02/19/2024  -     Lipid Panel; Future; Expected date: 02/19/2024  -     TSH; Future; Expected date: 02/19/2024    2. Vitamin B12 deficiency  -     cyanocobalamin 1,000 mcg/mL injection; Inject 1 mL (1,000 mcg total) into the skin every 30 days.  Dispense: 1 mL; Refill: 11    3. Other chronic pain  -     DULoxetine (CYMBALTA) 30 MG capsule; Take 1 capsule (30 mg total) by mouth once daily.  Dispense: 30 capsule; Refill: 5    4. Diabetic polyneuropathy associated with type 2 diabetes mellitus  -     DULoxetine (CYMBALTA) 30 MG capsule; Take 1 capsule (30 mg total) by mouth once daily.  Dispense: 30 capsule; Refill: 5  -     pregabalin (LYRICA) 100 MG capsule; Take 1 capsule (100 mg total) by mouth 2 (two) times daily.  Dispense: 180 capsule; Refill: 1    5. Depression, unspecified depression type  -     DULoxetine (CYMBALTA) 30 MG capsule; Take 1 capsule (30 mg total) by mouth once daily.  Dispense: 30 capsule; Refill: 5    6. Paroxysmal atrial fibrillation  Overview:  eliquinga  Metoprolol   Dr. Greene CIS       Orders:  -     ELIQUIS 5 mg Tab; Take 1 tablet (5 mg total) by mouth 2 (two) times daily.  Dispense: 180 tablet; Refill: 1    7. Memory loss  -     memantine  (NAMENDA) 5 MG Tab; Take 1 tablet (5 mg total) by mouth once daily.  Dispense: 30 tablet; Refill: 5    8. Hypertension, unspecified type  Overview:  Lisinopril 10   Metoprolol         Orders:  -     olmesartan (BENICAR) 40 MG tablet; Take 1 tablet (40 mg total) by mouth once daily.  Dispense: 30 tablet; Refill: 5  -     CBC Auto Differential; Future; Expected date: 02/19/2024  -     Comprehensive Metabolic Panel; Future; Expected date: 02/19/2024  -     Lipid Panel; Future; Expected date: 02/19/2024  -     TSH; Future; Expected date: 02/19/2024    9. Nausea  Overview:  Unsure of etiology, but has improved as of 10/31/23  He is on PPI  Zofran prn   Phenergan prn   GI has seen him. Upper GI is WNL 10/23/23. Dr. Titus opinion is that his opioids are causing his nausea.       Orders:  -     pantoprazole (PROTONIX) 40 MG tablet; Take 1 tablet (40 mg total) by mouth once daily.  Dispense: 30 tablet; Refill: 5    10. Type 2 diabetes mellitus without complication, with long-term current use of insulin  -     pioglitazone (ACTOS) 15 MG tablet; Take 1 tablet (15 mg total) by mouth once daily.  Dispense: 90 tablet; Refill: 1  -     Comprehensive Metabolic Panel; Future; Expected date: 02/19/2024  -     Hemoglobin A1C; Future; Expected date: 02/19/2024  -     Lipid Panel; Future; Expected date: 02/19/2024  -     TSH; Future; Expected date: 02/19/2024    11. Lumbar spondylosis  Overview:  Several injections from PM with variable to no relief   Tramadol         Orders:  -     pregabalin (LYRICA) 100 MG capsule; Take 1 capsule (100 mg total) by mouth 2 (two) times daily.  Dispense: 180 capsule; Refill: 1    12. 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  Dispense: 180 capsule; Refill: 1    13. Spinal stenosis of lumbar region without neurogenic claudication  -     pregabalin (LYRICA) 100 MG capsule; Take 1  capsule (100 mg total) by mouth 2 (two) times daily.  Dispense: 180 capsule; Refill: 1  -     traMADoL (ULTRAM) 50 mg tablet; Take 1 tablet (50 mg total) by mouth every 12 (twelve) hours as needed for Pain.  Dispense: 30 each; Refill: 0    14. Insomnia, unspecified type  -     traZODone (DESYREL) 50 MG tablet; Take 1 tablet (50 mg total) by mouth every evening.  Dispense: 30 tablet; Refill: 5    15. Prostate cancer  Overview:  Prostate excised  Seeing doctor yoshi regularly   PSA is zero 1/2023      16. Dementia, unspecified dementia severity, unspecified dementia type, unspecified whether behavioral, psychotic, or mood disturbance or anxiety  Overview:  Namenda 5 daily  Sees Dr. Nugent regularly           17. Cervical myelopathy with cervical radiculopathy  Overview:  Currently pain controlled  Cymbalta   Tramadol. Try to avoid anything stronger due to dementia     Orders:  -     traMADoL (ULTRAM) 50 mg tablet; Take 1 tablet (50 mg total) by mouth every 12 (twelve) hours as needed for Pain.  Dispense: 30 each; Refill: 0    18. Acute on chronic diastolic CHF (congestive heart failure)  Overview:  Metoprolol   LISINOPRIL 10   SEES ALEJANDRO CIS           No follow-ups on file.

## 2024-02-20 LAB
ESTIMATED AVG GLUCOSE: 194 MG/DL (ref 68–131)
HBA1C MFR BLD: 8.4 % (ref 4–5.6)

## 2024-02-20 NOTE — PROGRESS NOTES
Labs look good. His A1c is better but just a hair elevated. Have him drop off a blood sugar log for 2 weeks so I can see if there is anything we can do to make that A1c better. Thanks

## 2024-02-25 NOTE — PLAN OF CARE
Livier Gupta was seen and treated in our emergency department on 2/25/2024.                Diagnosis:     Livier  may return to work on return date, is off the rest of the shift today.    She may return on this date: 02/27/2024         If you have any questions or concerns, please don't hesitate to call.      Jenna Gomez MD    ______________________________           _______________          _______________  Hospital Representative                              Date                                Time Problem: Patient Care Overview  Goal: Plan of Care Review  Outcome: Ongoing (interventions implemented as appropriate)  AAOx4, VSS, No falls noted, fall precautions remain. Skin intact, Pain assessed, no pain noted. Call light within reach, bed wheels locked, bed in lowest position, side rails ^x2, safety maintained. NADN, Will continue monitor.

## 2024-03-07 RX ORDER — INSULIN PUMP SYRINGE, 3 ML
EACH MISCELLANEOUS
Qty: 1 EACH | Refills: 0 | Status: SHIPPED | OUTPATIENT
Start: 2024-03-07

## 2024-03-07 RX ORDER — LANCETS
EACH MISCELLANEOUS
Qty: 600 EACH | Refills: 3 | Status: SHIPPED | OUTPATIENT
Start: 2024-03-07

## 2024-03-07 RX ORDER — ISOPROPYL ALCOHOL 70 ML/100ML
1 SWAB TOPICAL DAILY
Qty: 100 EACH | Refills: 0 | Status: SHIPPED | OUTPATIENT
Start: 2024-03-07

## 2024-03-07 NOTE — TELEPHONE ENCOUNTER
----- Message from Shabbir Hawley sent at 3/7/2024 11:02 AM CST -----  Contact: Fax - [facility]  Erasmo Dunbar  MRN: [id number]  : 1935  PCP: Hemal Varma  Home Phone      [phone]  Work Phone      Not on file.  Mobile          [phone]      MESSAGE:   Pt requesting refill or new Rx.   Is this a refill or new RX:  New Rx's  RX name and strength: True Metrix Air Glucose Meter                                        [employer] True Metrix test strips                                        TruePlus 33 G lancets                                        BD single use swabs  Last office visit: 24  Is this a 30-day or 90-day RX:  ???  Pharmacy name and location:  [facility]  Comments:      Phone:  Fax    PCP:Ko[facility]

## 2024-03-07 NOTE — TELEPHONE ENCOUNTER
----- Message from Baylee Dunbar sent at 3/7/2024 10:12 AM CST -----  Contact: Carol/ Wife  Erasmo Dunbar  MRN: [id number]  : 1935  PCP: Hemal Varma  Home Phone      [phone]  Work Phone      Not on file.  Mobile          [phone]      MESSAGE:     Pt wife Carol states insurance told her glucose meter has been denied by us and would like to know why and if not can they get a new meter due to pts being broken.         Please advise   [phone]

## 2024-03-07 NOTE — TELEPHONE ENCOUNTER
No care due was identified.  [facility] Embedded Care Due Messages. Reference number: [id number].   3/07/2024 11:35:40 AM CST

## 2024-03-07 NOTE — TELEPHONE ENCOUNTER
Pt requesting medication refill. LOV: 02/19/24  Pharm: [facility]    Requested Prescriptions     Pending Prescriptions Disp Refills    blood-glucose meter kit 1 each 0     Sig: True Metrix Air Glucose Meter to test blood glucose three times a day. Dx: E11.22    blood sugar diagnostic (BLOOD GLUCOSE TEST) Strp 600 strip 3     Sig: Use one strip per glucometer to test blood glucose three times a day. Dx: E11.22    lancets Misc 600 each 3     Sig: Use one Lancet per lancing device to test blood glucose three times a day. Dx: E11.22    alcohol swabs (BD ALCOHOL SWABS) PadM 100 each 0     Sig: Apply 1 each topically once daily.

## 2024-03-15 DIAGNOSIS — E78.5 DYSLIPIDEMIA: ICD-10-CM

## 2024-03-15 DIAGNOSIS — E11.22 TYPE 2 DIABETES MELLITUS WITH STAGE 3 CHRONIC KIDNEY DISEASE, WITH LONG-TERM CURRENT USE OF INSULIN: ICD-10-CM

## 2024-03-15 DIAGNOSIS — N18.30 TYPE 2 DIABETES MELLITUS WITH STAGE 3 CHRONIC KIDNEY DISEASE, WITH LONG-TERM CURRENT USE OF INSULIN: ICD-10-CM

## 2024-03-15 DIAGNOSIS — Z79.4 TYPE 2 DIABETES MELLITUS WITH STAGE 3 CHRONIC KIDNEY DISEASE, WITH LONG-TERM CURRENT USE OF INSULIN: ICD-10-CM

## 2024-03-15 RX ORDER — SITAGLIPTIN 100 MG/1
100 TABLET, FILM COATED ORAL
Qty: 90 TABLET | Refills: 1 | Status: SHIPPED | OUTPATIENT
Start: 2024-03-15

## 2024-03-15 RX ORDER — ATORVASTATIN CALCIUM 10 MG/1
10 TABLET, FILM COATED ORAL
Qty: 90 TABLET | Refills: 3 | Status: SHIPPED | OUTPATIENT
Start: 2024-03-15

## 2024-03-15 NOTE — TELEPHONE ENCOUNTER
No care due was identified.  [facility] Embedded Care Due Messages. Reference number: [id number].   3/15/2024 12:51:41 PM CDT

## 2024-03-15 NOTE — TELEPHONE ENCOUNTER
Refill Decision Note   Erasmo Dunbar  is requesting a refill authorization.  Brief Assessment and Rationale for Refill:  Approve     Medication Therapy Plan:       Medication Reconciliation Completed: No   Comments:     No Care Gaps recommended.     Note composed:2:17 PM 03/15/2024

## 2024-04-15 ENCOUNTER — PATIENT MESSAGE (OUTPATIENT)
Dept: GASTROENTEROLOGY | Facility: CLINIC | Age: 89
End: 2024-04-15
Payer: MEDICARE

## 2024-06-27 ENCOUNTER — OFFICE VISIT (OUTPATIENT)
Dept: FAMILY MEDICINE | Facility: CLINIC | Age: 89
End: 2024-06-27
Payer: MEDICARE

## 2024-06-27 VITALS
BODY MASS INDEX: 31.69 KG/M2 | SYSTOLIC BLOOD PRESSURE: 110 MMHG | OXYGEN SATURATION: 95 % | HEIGHT: 66 IN | WEIGHT: 197.19 LBS | HEART RATE: 63 BPM | RESPIRATION RATE: 16 BRPM | DIASTOLIC BLOOD PRESSURE: 60 MMHG

## 2024-06-27 DIAGNOSIS — E11.22 TYPE 2 DIABETES MELLITUS WITH STAGE 3 CHRONIC KIDNEY DISEASE, WITH LONG-TERM CURRENT USE OF INSULIN: ICD-10-CM

## 2024-06-27 DIAGNOSIS — M48.061 SPINAL STENOSIS OF LUMBAR REGION WITHOUT NEUROGENIC CLAUDICATION: ICD-10-CM

## 2024-06-27 DIAGNOSIS — N18.30 TYPE 2 DIABETES MELLITUS WITH STAGE 3 CHRONIC KIDNEY DISEASE, WITH LONG-TERM CURRENT USE OF INSULIN: ICD-10-CM

## 2024-06-27 DIAGNOSIS — M25.561 CHRONIC KNEE PAIN AFTER TOTAL REPLACEMENT OF RIGHT KNEE JOINT: ICD-10-CM

## 2024-06-27 DIAGNOSIS — G95.9 CERVICAL MYELOPATHY WITH CERVICAL RADICULOPATHY: ICD-10-CM

## 2024-06-27 DIAGNOSIS — G89.29 CHRONIC KNEE PAIN AFTER TOTAL REPLACEMENT OF RIGHT KNEE JOINT: ICD-10-CM

## 2024-06-27 DIAGNOSIS — Z79.4 TYPE 2 DIABETES MELLITUS WITH STAGE 3 CHRONIC KIDNEY DISEASE, WITH LONG-TERM CURRENT USE OF INSULIN: ICD-10-CM

## 2024-06-27 DIAGNOSIS — E11.9 TYPE 2 DIABETES MELLITUS WITHOUT COMPLICATION, WITH LONG-TERM CURRENT USE OF INSULIN: ICD-10-CM

## 2024-06-27 DIAGNOSIS — Z79.4 TYPE 2 DIABETES MELLITUS WITHOUT COMPLICATION, WITH LONG-TERM CURRENT USE OF INSULIN: ICD-10-CM

## 2024-06-27 DIAGNOSIS — M47.816 LUMBAR SPONDYLOSIS: ICD-10-CM

## 2024-06-27 DIAGNOSIS — I48.0 PAROXYSMAL ATRIAL FIBRILLATION: ICD-10-CM

## 2024-06-27 DIAGNOSIS — I10 HYPERTENSION, UNSPECIFIED TYPE: ICD-10-CM

## 2024-06-27 DIAGNOSIS — Z96.651 CHRONIC KNEE PAIN AFTER TOTAL REPLACEMENT OF RIGHT KNEE JOINT: ICD-10-CM

## 2024-06-27 DIAGNOSIS — I50.33 ACUTE ON CHRONIC DIASTOLIC CHF (CONGESTIVE HEART FAILURE): Primary | ICD-10-CM

## 2024-06-27 DIAGNOSIS — G47.00 INSOMNIA, UNSPECIFIED TYPE: ICD-10-CM

## 2024-06-27 DIAGNOSIS — E11.42 DIABETIC POLYNEUROPATHY ASSOCIATED WITH TYPE 2 DIABETES MELLITUS: ICD-10-CM

## 2024-06-27 DIAGNOSIS — I10 PRIMARY HYPERTENSION: ICD-10-CM

## 2024-06-27 DIAGNOSIS — E53.8 VITAMIN B12 DEFICIENCY: ICD-10-CM

## 2024-06-27 DIAGNOSIS — M54.12 CERVICAL MYELOPATHY WITH CERVICAL RADICULOPATHY: ICD-10-CM

## 2024-06-27 DIAGNOSIS — E78.5 DYSLIPIDEMIA: ICD-10-CM

## 2024-06-27 PROCEDURE — 99999 PR PBB SHADOW E&M-EST. PATIENT-LVL IV: CPT | Mod: PBBFAC,,, | Performed by: FAMILY MEDICINE

## 2024-06-27 RX ORDER — SYRINGE W-NEEDLE,DISPOSAB,3 ML 25GX5/8"
1 SYRINGE, EMPTY DISPOSABLE MISCELLANEOUS
Qty: 1 EACH | Refills: 11 | Status: SHIPPED | OUTPATIENT
Start: 2024-06-27

## 2024-06-27 RX ORDER — FUROSEMIDE 20 MG/1
20 TABLET ORAL DAILY PRN
Qty: 30 TABLET | Refills: 5 | Status: SHIPPED | OUTPATIENT
Start: 2024-06-27

## 2024-06-27 RX ORDER — FLUOROURACIL 50 MG/G
CREAM TOPICAL
COMMUNITY
Start: 2024-05-08

## 2024-06-27 RX ORDER — FUROSEMIDE 20 MG/1
TABLET ORAL
COMMUNITY
Start: 2024-05-07 | End: 2024-06-27 | Stop reason: SDUPTHER

## 2024-06-27 RX ORDER — ATORVASTATIN CALCIUM 10 MG/1
10 TABLET, FILM COATED ORAL NIGHTLY
Qty: 90 TABLET | Refills: 3 | Status: SHIPPED | OUTPATIENT
Start: 2024-06-27

## 2024-06-27 RX ORDER — CLONIDINE 0.2 MG/24H
1 PATCH, EXTENDED RELEASE TRANSDERMAL
Qty: 4 PATCH | Refills: 11 | Status: SHIPPED | OUTPATIENT
Start: 2024-06-27 | End: 2025-06-27

## 2024-06-27 RX ORDER — OLMESARTAN MEDOXOMIL 40 MG/1
40 TABLET ORAL DAILY
Qty: 30 TABLET | Refills: 5 | Status: SHIPPED | OUTPATIENT
Start: 2024-06-27 | End: 2025-06-27

## 2024-06-27 RX ORDER — METFORMIN HYDROCHLORIDE 1000 MG/1
1000 TABLET ORAL 2 TIMES DAILY WITH MEALS
Qty: 180 TABLET | Refills: 10 | Status: SHIPPED | OUTPATIENT
Start: 2024-06-27

## 2024-06-27 RX ORDER — EVOLOCUMAB 140 MG/ML
INJECTION, SOLUTION SUBCUTANEOUS
COMMUNITY
Start: 2024-03-27

## 2024-06-27 RX ORDER — TIRZEPATIDE 2.5 MG/.5ML
INJECTION, SOLUTION SUBCUTANEOUS
COMMUNITY
Start: 2024-03-27 | End: 2024-06-27 | Stop reason: SDUPTHER

## 2024-06-27 RX ORDER — TRAZODONE HYDROCHLORIDE 50 MG/1
50 TABLET ORAL NIGHTLY
Qty: 30 TABLET | Refills: 5 | Status: SHIPPED | OUTPATIENT
Start: 2024-06-27

## 2024-06-27 RX ORDER — PREGABALIN 100 MG/1
100 CAPSULE ORAL 2 TIMES DAILY
Qty: 180 CAPSULE | Refills: 1 | Status: SHIPPED | OUTPATIENT
Start: 2024-06-27 | End: 2024-12-26

## 2024-06-27 RX ORDER — TIZANIDINE 4 MG/1
4 TABLET ORAL DAILY PRN
Qty: 30 TABLET | Refills: 5 | Status: SHIPPED | OUTPATIENT
Start: 2024-06-27

## 2024-06-27 RX ORDER — APIXABAN 5 MG/1
5 TABLET, FILM COATED ORAL 2 TIMES DAILY
Qty: 180 TABLET | Refills: 1 | Status: SHIPPED | OUTPATIENT
Start: 2024-06-27

## 2024-06-27 RX ORDER — PIOGLITAZONEHYDROCHLORIDE 15 MG/1
15 TABLET ORAL DAILY
Qty: 90 TABLET | Refills: 1 | Status: SHIPPED | OUTPATIENT
Start: 2024-06-27

## 2024-06-27 RX ORDER — TIRZEPATIDE 2.5 MG/.5ML
2.5 INJECTION, SOLUTION SUBCUTANEOUS WEEKLY
Qty: 2 ML | Refills: 5 | Status: SHIPPED | OUTPATIENT
Start: 2024-06-27 | End: 2024-07-27

## 2024-06-27 NOTE — PROGRESS NOTES
Subjective:       Patient ID: Erasmo Dunbar is a 88 y.o. male.    Chief Complaint: Back Pain (PT is here he was having trouble walking and breathing last week.)    Pt is a 88 y.o. male who presents for evaluation and management of   Encounter Diagnoses   Name Primary?    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    Had increased swelling and SOB about 7 days ago  He started his lasix and took it 3 days in a row.   Feels better now  No SOB or edema     Doing well on current meds. Denies any side effects. Prevention is up to date.  Review of Systems   Constitutional:  Negative for activity change and unexpected weight change.   HENT:  Positive for hearing loss. Negative for rhinorrhea and trouble swallowing.    Eyes:  Positive for visual disturbance. Negative for discharge.   Respiratory:  Negative for chest tightness and wheezing.    Cardiovascular:  Positive for leg swelling. Negative for chest pain and palpitations.   Gastrointestinal:  Negative for blood in stool, constipation, diarrhea and vomiting.   Endocrine: Negative for polydipsia and polyuria.   Genitourinary:  Negative for difficulty urinating, hematuria and urgency.   Musculoskeletal:  Negative for arthralgias, joint swelling and neck pain.   Neurological:  Positive for weakness. Negative for  headaches.   Psychiatric/Behavioral:  Positive for confusion and dysphoric mood.        Objective:      Physical Exam  Constitutional:       Appearance: Normal appearance. He is well-developed. He is not ill-appearing.   HENT:      Head: Normocephalic and atraumatic.      Right Ear: External ear normal.      Left Ear: External ear normal.      Nose: Nose normal.      Mouth/Throat:      Mouth: Mucous membranes are moist.      Pharynx: No oropharyngeal exudate or posterior oropharyngeal erythema.   Eyes:      General: No scleral icterus.        Right eye: No discharge.         Left eye: No discharge.      Conjunctiva/sclera: Conjunctivae normal.      Pupils: Pupils are equal, round, and reactive to light.   Neck:      Thyroid: No thyromegaly.      Vascular: No JVD.      Trachea: No tracheal deviation.   Cardiovascular:      Rate and Rhythm: Normal rate and regular rhythm.      Heart sounds: Normal heart sounds. No murmur heard.  Pulmonary:      Effort: Pulmonary effort is normal. No respiratory distress.      Breath sounds: Normal breath sounds. No wheezing or rales.   Chest:      Chest wall: No tenderness.   Abdominal:      General: Bowel sounds are normal. There is no distension.      Palpations: Abdomen is soft. There is no mass.      Tenderness: There is no abdominal tenderness. There is no guarding or rebound.   Musculoskeletal:         General: Normal range of motion.      Cervical back: Normal range of motion and neck supple.      Right lower leg: No edema.      Left lower leg: No edema.      Comments: Trace edema BLE    Lymphadenopathy:      Cervical: No cervical adenopathy.   Skin:     General: Skin is warm and dry.   Neurological:      Mental Status: He is alert and oriented to person, place, and time.      Cranial Nerves: No cranial nerve deficit.      Motor: No abnormal muscle tone.      Coordination: Coordination normal.      Deep Tendon Reflexes: Reflexes are normal and symmetric. Reflexes normal.    Psychiatric:         Behavior: Behavior normal.         Thought Content: Thought content normal.         Judgment: Judgment normal.         Assessment:       1. Acute on chronic diastolic CHF (congestive heart failure)    2. Dyslipidemia    3. Primary hypertension    4. Paroxysmal atrial fibrillation    5. Type 2 diabetes mellitus with stage 3 chronic kidney disease, with long-term current use of insulin    6. Hypertension, unspecified type    7. Type 2 diabetes mellitus without complication, with long-term current use of insulin    8. Diabetic polyneuropathy associated with type 2 diabetes mellitus    9. Lumbar spondylosis    10. 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 Spinal stenosis of lumbar region without neurogenic claudication    12. Vitamin B12 deficiency    13. Cervical myelopathy with cervical radiculopathy    14. Insomnia, unspecified type        Plan:   1. Acute on chronic diastolic CHF (congestive heart failure)  Overview:  Metoprolol   LISINOPRIL 10   Lasix 20 daily prn     SEES ALEJANDRO CIS         2. Dyslipidemia  -     atorvastatin (LIPITOR) 10 MG tablet; Take 1 tablet (10 mg total) by mouth every evening.  Dispense: 90 tablet; Refill: 3    3. Primary hypertension  Overview:  Lisinopril 10   Metoprolol         Orders:  -     cloNIDine 0.2 mg/24 hr td ptwk (CATAPRES) 0.2 mg/24 hr; Place 1 patch onto the skin every 7 days.  Dispense: 4 patch; Refill: 11    4. Paroxysmal atrial fibrillation  Overview:  eliquis  Metoprolol   Dr. Alejandro SANTANA       Orders:  -     ELIQUIS 5 mg Tab; Take 1 tablet (5 mg total) by mouth 2 (two) times daily.  Dispense: 180 tablet; Refill: 1    5. Type 2 diabetes mellitus with stage 3 chronic kidney disease, with long-term current use of insulin  -     metFORMIN (GLUCOPHAGE) 1000 MG tablet; Take 1 tablet (1,000 mg total) by mouth 2 (two) times daily with meals.  Dispense: 180 tablet; Refill: 10    6. Hypertension, unspecified  "type  Overview:  Lisinopril 10   Metoprolol         Orders:  -     olmesartan (BENICAR) 40 MG tablet; Take 1 tablet (40 mg total) by mouth once daily.  Dispense: 30 tablet; Refill: 5    7. Type 2 diabetes mellitus without complication, with long-term current use of insulin  -     pioglitazone (ACTOS) 15 MG tablet; Take 1 tablet (15 mg total) by mouth once daily.  Dispense: 90 tablet; Refill: 1    8. Diabetic polyneuropathy associated with type 2 diabetes mellitus  -     pregabalin (LYRICA) 100 MG capsule; Take 1 capsule (100 mg total) by mouth 2 (two) times daily.  Dispense: 180 capsule; Refill: 1    9. Lumbar spondylosis  Overview:  Several injections from PM with variable to no relief   Tramadol         Orders:  -     pregabalin (LYRICA) 100 MG capsule; Take 1 capsule (100 mg total) by mouth 2 (two) times daily.  Dispense: 180 capsule; Refill: 1    10. 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  Dispense: 180 capsule; Refill: 1    11. Spinal stenosis of lumbar region without neurogenic claudication  -     pregabalin (LYRICA) 100 MG capsule; Take 1 capsule (100 mg total) by mouth 2 (two) times daily.  Dispense: 180 capsule; Refill: 1    12. Vitamin B12 deficiency  -     syringe with needle (SYRINGE 3CC/25GX1") 3 mL 25 gauge x 1" Syrg; 1 Device by Misc.(Non-Drug; Combo Route) route every 30 days.  Dispense: 1 each; Refill: 11    13. Cervical myelopathy with cervical radiculopathy  Overview:  Currently pain controlled  Cymbalta   Tramadol. Try to avoid anything stronger due to dementia       14. Insomnia, unspecified type  -     traZODone (DESYREL) 50 MG tablet; Take 1 tablet (50 mg total) by mouth every evening.  Dispense: 30 tablet; Refill: 5    Other orders  -     furosemide (LASIX) 20 MG tablet; Take 1 tablet (20 mg total) by mouth daily as needed (swelling).  Dispense: 30 tablet; Refill: 5  - "     MOUNJARO 2.5 mg/0.5 mL PnIj; Inject 2.5 mg into the skin once a week.  Dispense: 2 mL; Refill: 5  -     tiZANidine (ZANAFLEX) 4 MG tablet; Take 1 tablet (4 mg total) by mouth daily as needed.  Dispense: 30 tablet; Refill: 5      No follow-ups on file.

## 2024-08-01 ENCOUNTER — OFFICE VISIT (OUTPATIENT)
Dept: URGENT CARE | Facility: CLINIC | Age: 89
End: 2024-08-01
Payer: MEDICARE

## 2024-08-01 VITALS
DIASTOLIC BLOOD PRESSURE: 86 MMHG | OXYGEN SATURATION: 98 % | HEIGHT: 66 IN | RESPIRATION RATE: 18 BRPM | WEIGHT: 197 LBS | SYSTOLIC BLOOD PRESSURE: 138 MMHG | HEART RATE: 60 BPM | BODY MASS INDEX: 31.66 KG/M2 | TEMPERATURE: 99 F
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
## 2024-08-01 DIAGNOSIS — S20.211A CONTUSION OF RIB ON RIGHT SIDE, INITIAL ENCOUNTER: ICD-10-CM

## 2024-08-01 PROCEDURE — 71101 X-RAY EXAM UNILAT RIBS/CHEST: CPT | Mod: RT,S$GLB,, | Performed by: RADIOLOGY

## 2024-08-01 NOTE — PATIENT INSTRUCTIONS
AGAIN AS WE DISCUSSED, THIS COULD POSSIBLY RETROPERITONEAL BLEEDING WITH WHETHER INJURY IS THAT ALONG WITH HIM ON ANTICOAGULATION.,     I RECOMMEND HE BE EVALUATED BY EMERGENCY DEPARTMENT FOR POSSIBLE CT SCAN

## 2024-08-01 NOTE — PROGRESS NOTES
"Subjective:      Patient ID: Erasmo Dunbar is a 89 y.o. male.    Vitals:  height is 5' 6" (1.676 m) and weight is 89.4 kg (197 lb). His oral temperature is 98.7 °F (37.1 °C). His blood pressure is 138/86 and his pulse is 60. His respiration is 18 and oxygen saturation is 98%.     Chief Complaint: Back Pain    PT son  states PT fell Monday  MORNING..   Patient has a history of falls due to balance issues or tripping.  This time patient states he got up out of bed and walked 2 or 3 steps then he was uncertain what happened. .  He did realize that he had a pain in his right flank /back and that was only complaint of the time,. He states the pain is no worse today than was several days ago following the fall.  No headache nausea vomiting diarrhea.  No hematuria.    Back Pain  This is a new problem. The current episode started in the past 7 days. The problem has been gradually worsening since onset. The quality of the pain is described as aching. The pain is at a severity of 8/10. The pain is moderate. The pain is Worse during the day. The symptoms are aggravated by standing and bending. Stiffness is present In the morning. Pertinent negatives include no abdominal pain, chest pain, fever, numbness or pelvic pain. He has tried nothing for the symptoms. The treatment provided no relief.       Constitution: Negative for fever.   Cardiovascular:  Negative for chest pain.   Gastrointestinal:  Negative for abdominal pain.   Genitourinary:  Negative for pelvic pain.   Musculoskeletal:  Positive for pain and back pain.   Skin:  Negative for erythema.   Neurological:  Negative for numbness.      Objective:     Physical Exam   Constitutional: He is oriented to person, place, and time. He appears well-developed. He is cooperative.  Non-toxic appearance. He does not appear ill. No distress.   HENT:   Head: Normocephalic and atraumatic.   Ears:   Right Ear: Hearing, tympanic membrane, external ear and ear canal normal.   Left Ear: " Hearing, tympanic membrane, external ear and ear canal normal.   Nose: Nose normal. No mucosal edema, rhinorrhea, nasal deformity or congestion. No epistaxis. Right sinus exhibits no maxillary sinus tenderness and no frontal sinus tenderness. Left sinus exhibits no maxillary sinus tenderness and no frontal sinus tenderness.   Mouth/Throat: Uvula is midline, oropharynx is clear and moist and mucous membranes are normal. No trismus in the jaw. Normal dentition. No uvula swelling. No oropharyngeal exudate, posterior oropharyngeal edema or posterior oropharyngeal erythema.   Eyes: Conjunctivae, EOM and lids are normal. Pupils are equal, round, and reactive to light. Right eye exhibits no discharge. Left eye exhibits no discharge. No scleral icterus.   Neck: Trachea normal and phonation normal. Neck supple. No JVD present. No tracheal deviation present. No thyromegaly present. No edema present. No erythema present. No neck rigidity present.   Cardiovascular: Normal rate and normal pulses.   Pulmonary/Chest: Effort normal and breath sounds normal. No stridor. No respiratory distress. He has no decreased breath sounds. He has no wheezes. He has no rhonchi. He has no rales. He exhibits no tenderness.   Abdominal: Normal appearance. He exhibits no distension. Soft. There is no abdominal tenderness. There is right CVA tenderness. There is no rebound, no guarding and no left CVA tenderness.   Musculoskeletal: Normal range of motion.         General: Tenderness present. No deformity. Normal range of motion.      Cervical back: He exhibits no tenderness.        Back:       Comments:   There has a very small abrasion/ bruise srini were he fell and hit his right flank on some object that he is uncertain up. .  The areas mildly much he feeling extends out about 5 cm.  There was no true bruising ecchymosis.  There is tenderness isolated to that area with a contusion occurred.  There is no obvious ecchymosis or bruising involving the  lower back that would suggest that he had retroperitoneal bleeding   Neurological: no focal deficit. He is alert and oriented to person, place, and time. He displays no weakness. No sensory deficit. He exhibits normal muscle tone. Coordination normal.   Skin: Skin is warm, dry, intact, not diaphoretic, not pale and no rash. Capillary refill takes less than 2 seconds. No erythema jaundice  Psychiatric: His speech is normal and behavior is normal. Judgment and thought content normal.   Nursing note and vitals reviewed.    Results for orders placed or performed in visit on 07/18/24   Hemoglobin A1C   Result Value Ref Range    Hemoglobin A1C 7.4 (H) 4.0 - 5.6 %    Estimated Avg Glucose 166 (H) 68 - 131 mg/dL   COMPREHENSIVE METABOLIC PANEL   Result Value Ref Range    Sodium 135 (L) 136 - 145 mmol/L    Potassium 5.3 (H) 3.5 - 5.1 mmol/L    Chloride 100 95 - 110 mmol/L    CO2 25 23 - 29 mmol/L    Glucose 154 (H) 70 - 110 mg/dL    BUN 25 (H) 8 - 23 mg/dL    Creatinine 1.4 0.5 - 1.4 mg/dL    Calcium 10.1 8.7 - 10.5 mg/dL    Total Protein 7.1 6.0 - 8.4 g/dL    Albumin 3.9 3.5 - 5.2 g/dL    Total Bilirubin 0.4 0.1 - 1.0 mg/dL    Alkaline Phosphatase 73 55 - 135 U/L    AST 15 10 - 40 U/L    ALT 15 10 - 44 U/L    eGFR 48.3 (A) >60 mL/min/1.73 m^2    Anion Gap 10 8 - 16 mmol/L   Lipid Panel   Result Value Ref Range    Cholesterol 110 (L) 120 - 199 mg/dL    Triglycerides 129 30 - 150 mg/dL    HDL 50 40 - 75 mg/dL    LDL Cholesterol 34.2 (L) 63.0 - 159.0 mg/dL    HDL/Cholesterol Ratio 45.5 20.0 - 50.0 %    Total Cholesterol/HDL Ratio 2.2 2.0 - 5.0    Non-HDL Cholesterol 60 mg/dL   T4, FREE   Result Value Ref Range    Free T4 1.10 0.71 - 1.51 ng/dL   CBC Without Differential   Result Value Ref Range    WBC 12.75 (H) 3.90 - 12.70 K/uL    RBC 4.24 (L) 4.60 - 6.20 M/uL    Hemoglobin 12.4 (L) 14.0 - 18.0 g/dL    Hematocrit 38.7 (L) 40.0 - 54.0 %    MCV 91 82 - 98 fL    MCH 29.2 27.0 - 31.0 pg    MCHC 32.0 32.0 - 36.0 g/dL    RDW 13.2  11.5 - 14.5 %    Platelets 350 150 - 450 K/uL    MPV 9.8 9.2 - 12.9 fL   TSH   Result Value Ref Range    TSH 1.126 0.400 - 4.000 uIU/mL   VITAMIN B12   Result Value Ref Range    Vitamin B-12 460 210 - 950 pg/mL   FOLATE   Result Value Ref Range    Folate 8.5 4.0 - 24.0 ng/mL   Vitamin D   Result Value Ref Range    Vit D, 25-Hydroxy 19 (L) 30 - 96 ng/mL    XR RIB RIGHT W/ PA CHEST    Result Date: 8/1/2024  EXAMINATION: XR RIBS MIN 3 VIEWS W/ PA CHEST RIGHT CLINICAL HISTORY: Unspecified fall, initial encounter COMPARISON: 10/09/2023 FINDINGS: PA chest, four views right ribs. Left chest wall pacer noted.  The cardiomediastinal silhouette is not enlarged noting calcification of the aorta.  There is elevation of the right hemidiaphragm..  There is no pleural effusion.  The trachea is midline.  The lungs are symmetrically expanded bilaterally with mildly coarse interstitial attenuation.  No large focal consolidation seen.  There is no pneumothorax.  There are degenerative changes of the spine.  There are degenerative changes of the bilateral glenohumeral joints.  There is remote appearing fracture involving the anterior aspect of right rib 5.  No acute displaced right rib fracture.     1. No acute cardiopulmonary process. 2. No acute displaced right rib fracture. Electronically signed by: Manjit Edwards MD Date:    08/01/2024 Time:    12:38    XR Cervical Spine 2 or 3 Views    Result Date: 8/1/2024  EXAMINATION: XR CERVICAL SPINE 2 OR 3 VIEWS CLINICAL HISTORY: Unspecified fall, initial encounter TECHNIQUE: AP, lateral and open mouth views of the cervical spine were performed. COMPARISON: CT January 2023 FINDINGS: Pacemaker is incompletely imaged in the chest.  There is vascular calcification along the wall of the aorta. There is osseous demineralization.  C6-7 demonstrate fusion.  Remaining levels demonstrate disc space narrowing and osteophytic spurring.  No prevertebral soft tissue swelling.  There is satisfactory  alignment.  Views of the odontoid are unremarkable.     Osteoarthritic degenerative change Electronically signed by: Suzette Mason MD Date:    08/01/2024 Time:    12:04    XR LUMBAR SPINE 2 OR 3 VIEWS    Result Date: 8/1/2024  EXAMINATION: XR LUMBAR SPINE 2 OR 3 VIEWS CLINICAL HISTORY: Unspecified fall, initial encounter TECHNIQUE: Three views obtained COMPARISON: January 2023 FINDINGS: There is osseous demineralization.  The vertebral bodies are normally aligned.  There is multilevel disc narrowing and a mild to moderate degree of osteophytic spurring along vertebral endplates.  No acute compression fracture is noted.  Vascular calcification present along the soft tissues.     Osteoarthritic degenerative changes.  No significant interval change compared to January 2023 with no acute compression fracture Electronically signed by: Suzette Mason MD Date:    08/01/2024 Time:    12:02        I DISCUSSED WITH THE PATIENT AND HIS SON AT BEDSIDE THAT THERE IS A POSSIBILITY THIS BEING A RETROPERITONEAL HEMORRHAGE/RETROPERITONEAL INTERNAL ABDOMINAL BLEEDING WITH HIS AGE HE AND ALONG WITH HIM BEING ON BLOOD THINNERS/ELIQUIS   I SUGGESTED THAT THEY CONSIDER GOING TO THE EMERGENCY DEPARTMENT FOR CT SCAN AND THE RESPONSE WAS THAT THEY WILL DISCUSS IT AND MAKE A DECISION.  BOTH PATIENT WAS SON AT BEDSIDE WERE ALERT AND COHERENT UNDERSTANDING THE CONVERSATION.  THEY EXPRESSED UNDERSTANDING AND I ANSWERED ALL THEIR QUESTIONS.  1. Fall, initial encounter    2. Contusion of rib on right side, initial encounter        Plan:       Fall, initial encounter  -     XR LUMBAR SPINE 2 OR 3 VIEWS; Future; Expected date: 08/01/2024  -     XR Cervical Spine 2 or 3 Views; Future; Expected date: 08/01/2024  -     XR RIB RIGHT W/ PA CHEST; Future; Expected date: 08/01/2024    Contusion of rib on right side, initial encounter  -     XR RIB RIGHT W/ PA CHEST; Future; Expected date: 08/01/2024      Follow up if symptoms worsen or fail to improve,  for F/U with PCP or ED.   Patient Instructions    AGAIN AS WE DISCUSSED, THIS COULD POSSIBLY RETROPERITONEAL BLEEDING WITH WHETHER INJURY IS THAT ALONG WITH HIM ON ANTICOAGULATION.,     I RECOMMEND HE BE EVALUATED BY EMERGENCY DEPARTMENT FOR POSSIBLE CT SCAN

## 2024-08-19 ENCOUNTER — CLINICAL SUPPORT (OUTPATIENT)
Dept: FAMILY MEDICINE | Facility: CLINIC | Age: 89
End: 2024-08-19
Payer: MEDICARE

## 2024-08-19 ENCOUNTER — OFFICE VISIT (OUTPATIENT)
Dept: FAMILY MEDICINE | Facility: CLINIC | Age: 89
End: 2024-08-19
Payer: MEDICARE

## 2024-08-19 VITALS
WEIGHT: 190.69 LBS | HEART RATE: 73 BPM | HEIGHT: 66 IN | RESPIRATION RATE: 18 BRPM | DIASTOLIC BLOOD PRESSURE: 58 MMHG | OXYGEN SATURATION: 95 % | SYSTOLIC BLOOD PRESSURE: 111 MMHG | BODY MASS INDEX: 30.65 KG/M2

## 2024-08-19 DIAGNOSIS — I50.33 ACUTE ON CHRONIC DIASTOLIC CHF (CONGESTIVE HEART FAILURE): Primary | ICD-10-CM

## 2024-08-19 DIAGNOSIS — M54.12 CERVICAL MYELOPATHY WITH CERVICAL RADICULOPATHY: ICD-10-CM

## 2024-08-19 DIAGNOSIS — E78.5 DYSLIPIDEMIA: ICD-10-CM

## 2024-08-19 DIAGNOSIS — F32.A DEPRESSION, UNSPECIFIED DEPRESSION TYPE: ICD-10-CM

## 2024-08-19 DIAGNOSIS — I48.0 PAROXYSMAL ATRIAL FIBRILLATION: ICD-10-CM

## 2024-08-19 DIAGNOSIS — G47.00 INSOMNIA, UNSPECIFIED TYPE: ICD-10-CM

## 2024-08-19 DIAGNOSIS — E11.22 TYPE 2 DIABETES MELLITUS WITH STAGE 3A CHRONIC KIDNEY DISEASE, WITH LONG-TERM CURRENT USE OF INSULIN: Primary | ICD-10-CM

## 2024-08-19 DIAGNOSIS — E11.42 DIABETIC POLYNEUROPATHY ASSOCIATED WITH TYPE 2 DIABETES MELLITUS: ICD-10-CM

## 2024-08-19 DIAGNOSIS — M47.816 LUMBAR SPONDYLOSIS: ICD-10-CM

## 2024-08-19 DIAGNOSIS — N18.31 TYPE 2 DIABETES MELLITUS WITH STAGE 3A CHRONIC KIDNEY DISEASE, WITH LONG-TERM CURRENT USE OF INSULIN: ICD-10-CM

## 2024-08-19 DIAGNOSIS — M25.561 CHRONIC KNEE PAIN AFTER TOTAL REPLACEMENT OF RIGHT KNEE JOINT: ICD-10-CM

## 2024-08-19 DIAGNOSIS — I10 PRIMARY HYPERTENSION: ICD-10-CM

## 2024-08-19 DIAGNOSIS — N18.31 TYPE 2 DIABETES MELLITUS WITH STAGE 3A CHRONIC KIDNEY DISEASE, WITH LONG-TERM CURRENT USE OF INSULIN: Primary | ICD-10-CM

## 2024-08-19 DIAGNOSIS — E11.22 TYPE 2 DIABETES MELLITUS WITH CHRONIC KIDNEY DISEASE, WITHOUT LONG-TERM CURRENT USE OF INSULIN, UNSPECIFIED CKD STAGE: ICD-10-CM

## 2024-08-19 DIAGNOSIS — C61 PROSTATE CANCER: ICD-10-CM

## 2024-08-19 DIAGNOSIS — G89.29 CHRONIC KNEE PAIN AFTER TOTAL REPLACEMENT OF RIGHT KNEE JOINT: ICD-10-CM

## 2024-08-19 DIAGNOSIS — G95.9 CERVICAL MYELOPATHY WITH CERVICAL RADICULOPATHY: ICD-10-CM

## 2024-08-19 DIAGNOSIS — Z96.651 CHRONIC KNEE PAIN AFTER TOTAL REPLACEMENT OF RIGHT KNEE JOINT: ICD-10-CM

## 2024-08-19 DIAGNOSIS — E53.8 VITAMIN B12 DEFICIENCY: ICD-10-CM

## 2024-08-19 DIAGNOSIS — M48.062 SPINAL STENOSIS OF LUMBAR REGION WITH NEUROGENIC CLAUDICATION: ICD-10-CM

## 2024-08-19 DIAGNOSIS — G89.29 OTHER CHRONIC PAIN: ICD-10-CM

## 2024-08-19 DIAGNOSIS — Z79.4 TYPE 2 DIABETES MELLITUS WITH STAGE 3A CHRONIC KIDNEY DISEASE, WITH LONG-TERM CURRENT USE OF INSULIN: Primary | ICD-10-CM

## 2024-08-19 DIAGNOSIS — Z79.4 TYPE 2 DIABETES MELLITUS WITH STAGE 3A CHRONIC KIDNEY DISEASE, WITH LONG-TERM CURRENT USE OF INSULIN: ICD-10-CM

## 2024-08-19 DIAGNOSIS — E11.22 TYPE 2 DIABETES MELLITUS WITH STAGE 3A CHRONIC KIDNEY DISEASE, WITH LONG-TERM CURRENT USE OF INSULIN: ICD-10-CM

## 2024-08-19 LAB
ALBUMIN/CREAT UR: 8.9 UG/MG (ref 0–30)
CREAT UR-MCNC: 236.9 MG/DL (ref 23–375)
MICROALBUMIN UR DL<=1MG/L-MCNC: 21 UG/ML

## 2024-08-19 PROCEDURE — 99214 OFFICE O/P EST MOD 30 MIN: CPT | Mod: S$GLB,,, | Performed by: FAMILY MEDICINE

## 2024-08-19 PROCEDURE — 1159F MED LIST DOCD IN RCRD: CPT | Mod: CPTII,S$GLB,, | Performed by: FAMILY MEDICINE

## 2024-08-19 PROCEDURE — G2211 COMPLEX E/M VISIT ADD ON: HCPCS | Mod: S$GLB,,, | Performed by: FAMILY MEDICINE

## 2024-08-19 PROCEDURE — 1125F AMNT PAIN NOTED PAIN PRSNT: CPT | Mod: CPTII,S$GLB,, | Performed by: FAMILY MEDICINE

## 2024-08-19 PROCEDURE — 3288F FALL RISK ASSESSMENT DOCD: CPT | Mod: CPTII,S$GLB,, | Performed by: FAMILY MEDICINE

## 2024-08-19 PROCEDURE — 82043 UR ALBUMIN QUANTITATIVE: CPT | Performed by: FAMILY MEDICINE

## 2024-08-19 PROCEDURE — 82570 ASSAY OF URINE CREATININE: CPT | Performed by: FAMILY MEDICINE

## 2024-08-19 PROCEDURE — 1101F PT FALLS ASSESS-DOCD LE1/YR: CPT | Mod: CPTII,S$GLB,, | Performed by: FAMILY MEDICINE

## 2024-08-19 PROCEDURE — 99999 PR PBB SHADOW E&M-EST. PATIENT-LVL V: CPT | Mod: PBBFAC,,, | Performed by: FAMILY MEDICINE

## 2024-08-19 RX ORDER — NEEDLES, SAFETY 18GX1 1/2"
NEEDLE, DISPOSABLE MISCELLANEOUS
COMMUNITY
Start: 2024-06-28

## 2024-08-19 RX ORDER — DULOXETIN HYDROCHLORIDE 60 MG/1
60 CAPSULE, DELAYED RELEASE ORAL DAILY
Qty: 90 CAPSULE | Refills: 1 | Status: SHIPPED | OUTPATIENT
Start: 2024-08-19 | End: 2025-02-15

## 2024-08-19 RX ORDER — HYDROCODONE BITARTRATE AND ACETAMINOPHEN 5; 325 MG/1; MG/1
1 TABLET ORAL 2 TIMES DAILY PRN
COMMUNITY
Start: 2024-08-14

## 2024-08-19 NOTE — PROGRESS NOTES
Subjective:       Patient ID: Erasmo Dunbar is a 89 y.o. male.    Chief Complaint: Follow-up (Pt here for 6 mth f/u. )    Pt is a 89 y.o. male who presents for evaluation and management of   Encounter Diagnoses   Name Primary?    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 Pt states chronic back pain takes pain medication only when needed.     Pt is following up with urologist for prostate cancer.     Doing well on current meds. Denies any side effects. Prevention is up to date.      Review of Systems   Constitutional:  Negative for appetite change and chills.   HENT:  Negative for ear pain.    Eyes:  Positive for visual disturbance (Macular degeneration following retina specialist).   Respiratory:  Positive for shortness of breath (on exertion; following cardiology). Negative for chest tightness.    Gastrointestinal:  Negative for abdominal pain.   Genitourinary:  Negative for dysuria.   Musculoskeletal:  Positive for back pain.   Neurological:  Negative for facial asymmetry.   Psychiatric/Behavioral:  Negative for agitation and behavioral problems.        Objective:      Physical Exam  Constitutional:       Appearance: Normal  appearance.   HENT:      Head: Normocephalic and atraumatic.      Nose: Nose normal.   Eyes:      Extraocular Movements: Extraocular movements intact.      Pupils: Pupils are equal, round, and reactive to light.   Cardiovascular:      Rate and Rhythm: Normal rate and regular rhythm.   Pulmonary:      Effort: Pulmonary effort is normal.      Breath sounds: Normal breath sounds.   Abdominal:      Palpations: Abdomen is soft.   Musculoskeletal:      Cervical back: Normal range of motion.   Neurological:      Mental Status: He is alert and oriented to person, place, and time.   Psychiatric:         Mood and Affect: Mood normal.         Behavior: Behavior normal.         Assessment:       1. Acute on chronic diastolic CHF (congestive heart failure)    2. Dyslipidemia    3. Primary hypertension    4. Paroxysmal atrial fibrillation    5. Type 2 diabetes mellitus with stage 3a chronic kidney disease, with long-term current use of insulin    6. Diabetic polyneuropathy associated with type 2 diabetes mellitus    7. Lumbar spondylosis    8. 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 Vitamin B12 deficiency    10. Cervical myelopathy with cervical radiculopathy    11. Insomnia, unspecified type    12. Spinal stenosis of lumbar region with neurogenic claudication    13. Prostate cancer    14. Type 2 diabetes mellitus with chronic kidney disease, without long-term current use of insulin, unspecified CKD stage    15. Other chronic pain    16. Depression, unspecified depression type        Plan:   1. Acute on chronic diastolic CHF (congestive heart failure)  Overview:  Metoprolol   LISINOPRIL 10   Lasix 20 daily prn     SEES ALEJANDRO CIS         2. Dyslipidemia    3. Primary hypertension  Overview:  Lisinopril 10   Metoprolol           4. Paroxysmal atrial fibrillation  Overview:  eliquis  Metoprolol   Dr. Alejandro SANTANA         5. Type 2 diabetes mellitus with stage 3a chronic kidney disease, with long-term current  use of insulin  -     Microalbumin/creatinine urine ratio    6. Diabetic polyneuropathy associated with type 2 diabetes mellitus  Overview:  Duloxetine       Orders:  -     DULoxetine (CYMBALTA) 60 MG capsule; Take 1 capsule (60 mg total) by mouth once daily.  Dispense: 90 capsule; Refill: 1    7. Lumbar spondylosis  Overview:  Several injections from PM with variable to no relief   Tramadol   Pain currently is controlled             8. 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 Vitamin B12 deficiency    10. Cervical myelopathy with cervical radiculopathy  Overview:  Currently pain controlled  Cymbalta   Tramadol. Try to avoid anything stronger due to dementia       11. Insomnia, unspecified type    12. Spinal stenosis of lumbar region with neurogenic claudication  Overview:  Has had several JACK's with variable to no relief.         13. Prostate cancer  Overview:  Prostate excised  Seeing doctor yoshi regularly   PSA is zero 1/2023      14. Type 2 diabetes mellitus with chronic kidney disease, without long-term current use of insulin, unspecified CKD stage  Overview:  Lab Results   Component Value Date    HGBA1C 7.4 (H) 07/18/2024     Lantus---42-43 units/ qhs   MEtformin   actos   Januvia    10/31/23----his BS is great last 3 days, since he has been set up with HH and logging his sugars....       15. Other chronic pain  -     DULoxetine (CYMBALTA) 60 MG capsule; Take 1 capsule (60 mg total) by mouth once daily.  Dispense: 90 capsule; Refill: 1    16. Depression, unspecified depression type  -     DULoxetine (CYMBALTA) 60 MG capsule; Take 1 capsule (60 mg total) by mouth once daily.  Dispense: 90 capsule; Refill: 1    RTC 6 months     No follow-ups on file.

## 2024-09-26 DIAGNOSIS — R41.3 MEMORY LOSS: ICD-10-CM

## 2024-09-26 NOTE — TELEPHONE ENCOUNTER
LOV 08/19/2024    patient requesting refill for memantine (NAMENDA) 5 MG Tab         RX pending   pharmacy confirmed  please advise

## 2024-09-27 RX ORDER — MEMANTINE HYDROCHLORIDE 5 MG/1
5 TABLET ORAL
Qty: 90 TABLET | Refills: 3 | Status: SHIPPED | OUTPATIENT
Start: 2024-09-27

## 2024-11-13 DIAGNOSIS — Z79.4 TYPE 2 DIABETES MELLITUS WITHOUT COMPLICATION, WITH LONG-TERM CURRENT USE OF INSULIN: ICD-10-CM

## 2024-11-13 DIAGNOSIS — E11.9 TYPE 2 DIABETES MELLITUS WITHOUT COMPLICATION, WITH LONG-TERM CURRENT USE OF INSULIN: ICD-10-CM

## 2024-11-13 DIAGNOSIS — G47.00 INSOMNIA, UNSPECIFIED TYPE: ICD-10-CM

## 2024-11-13 RX ORDER — TRAZODONE HYDROCHLORIDE 50 MG/1
50 TABLET ORAL NIGHTLY
Qty: 90 TABLET | Refills: 3 | Status: SHIPPED | OUTPATIENT
Start: 2024-11-13

## 2024-11-13 RX ORDER — PIOGLITAZONEHYDROCHLORIDE 15 MG/1
15 TABLET ORAL
Qty: 90 TABLET | Refills: 3 | Status: SHIPPED | OUTPATIENT
Start: 2024-11-13

## 2024-11-13 NOTE — TELEPHONE ENCOUNTER
Refill Routing Note   Medication(s) are not appropriate for processing by [facility] for the following reason(s):        Outside of protocol    [facility] action(s):  Route  Approve   Requires labs : Yes - A1c            Appointments  past 12m or future 3m with PCP    Date Provider   Last Visit   8/19/2024 Hemal Varma MD   Next Visit   2/18/2025 Hemal Varma MD   ED visits in past 90 days: 0        Note composed:10:05 AM 11/13/2024

## 2024-11-13 NOTE — TELEPHONE ENCOUNTER
Care Due:                  Date            Visit Type   Department     Provider  --------------------------------------------------------------------------------                                EP [facility]                              [facility]  Last Visit: 08-      CARE ([facility])   JACOB Varma                              EP -                              PRIMARY      [facility]  Next Visit: 02-      CARE ([facility])   JACOB Varma                                                            Last  Test          Frequency    Reason                     Performed    Due Date  --------------------------------------------------------------------------------    HBA1C.......  6 months...  SITagliptin, metFORMIN,    07- 01-                             pioglitazone.............    [employer] Embedded Care Due Messages. Reference number: [id number].   11/13/2024 2:33:44 AM CST

## 2024-11-16 ENCOUNTER — HOSPITAL ENCOUNTER (EMERGENCY)
Facility: HOSPITAL | Age: 89
Discharge: HOME | End: 2024-11-16
Attending: EMERGENCY MEDICINE
Payer: MEDICARE

## 2024-11-16 VITALS
TEMPERATURE: 98 F | BODY MASS INDEX: 30.78 KG/M2 | DIASTOLIC BLOOD PRESSURE: 69 MMHG | WEIGHT: 190.69 LBS | HEART RATE: 62 BPM | RESPIRATION RATE: 21 BRPM | SYSTOLIC BLOOD PRESSURE: 146 MMHG | OXYGEN SATURATION: 94 %

## 2024-11-16 DIAGNOSIS — R73.9 HYPERGLYCEMIA: Primary | ICD-10-CM

## 2024-11-16 DIAGNOSIS — R53.1 WEAKNESS: ICD-10-CM

## 2024-11-16 LAB
ALBUMIN SERPL BCP-MCNC: 3.8 G/DL (ref 3.5–5.2)
ALP SERPL-CCNC: 84 U/L (ref 40–150)
ALT SERPL W/O P-5'-P-CCNC: 20 U/L (ref 10–44)
ANION GAP SERPL CALC-SCNC: 12 MMOL/L (ref 8–16)
AST SERPL-CCNC: 15 U/L (ref 10–40)
BACTERIA #/AREA URNS HPF: ABNORMAL /HPF
BASOPHILS # BLD AUTO: 0.12 K/UL (ref 0–0.2)
BASOPHILS NFR BLD: 1 % (ref 0–1.9)
BILIRUB SERPL-MCNC: 0.6 MG/DL (ref 0.1–1)
BILIRUB UR QL STRIP: NEGATIVE
BUN SERPL-MCNC: 18 MG/DL (ref 8–23)
CALCIUM SERPL-MCNC: 9.5 MG/DL (ref 8.7–10.5)
CHLORIDE SERPL-SCNC: 94 MMOL/L (ref 95–110)
CLARITY UR: CLEAR
CO2 SERPL-SCNC: 24 MMOL/L (ref 23–29)
COLOR UR: YELLOW
CREAT SERPL-MCNC: 1.5 MG/DL (ref 0.5–1.4)
DIFFERENTIAL METHOD BLD: ABNORMAL
EOSINOPHIL # BLD AUTO: 1.7 K/UL (ref 0–0.5)
EOSINOPHIL NFR BLD: 13.4 % (ref 0–8)
ERYTHROCYTE [DISTWIDTH] IN BLOOD BY AUTOMATED COUNT: 12.9 % (ref 11.5–14.5)
EST. GFR  (NO RACE VARIABLE): 44 ML/MIN/1.73 M^2
GLUCOSE SERPL-MCNC: 360 MG/DL (ref 70–110)
GLUCOSE UR QL STRIP: ABNORMAL
HCT VFR BLD AUTO: 37.4 % (ref 40–54)
HGB BLD-MCNC: 12.6 G/DL (ref 14–18)
HGB UR QL STRIP: NEGATIVE
HYALINE CASTS #/AREA URNS LPF: 3 /LPF
IMM GRANULOCYTES # BLD AUTO: 0.07 K/UL (ref 0–0.04)
IMM GRANULOCYTES NFR BLD AUTO: 0.6 % (ref 0–0.5)
INFLUENZA A, MOLECULAR: NEGATIVE
INFLUENZA B, MOLECULAR: NEGATIVE
KETONES UR QL STRIP: NEGATIVE
LEUKOCYTE ESTERASE UR QL STRIP: NEGATIVE
LYMPHOCYTES # BLD AUTO: 2.6 K/UL (ref 1–4.8)
LYMPHOCYTES NFR BLD: 21.3 % (ref 18–48)
MCH RBC QN AUTO: 29.9 PG (ref 27–31)
MCHC RBC AUTO-ENTMCNC: 33.7 G/DL (ref 32–36)
MCV RBC AUTO: 89 FL (ref 82–98)
MICROSCOPIC COMMENT: ABNORMAL
MONOCYTES # BLD AUTO: 1.1 K/UL (ref 0.3–1)
MONOCYTES NFR BLD: 9 % (ref 4–15)
NEUTROPHILS # BLD AUTO: 6.8 K/UL (ref 1.8–7.7)
NEUTROPHILS NFR BLD: 54.7 % (ref 38–73)
NITRITE UR QL STRIP: NEGATIVE
NRBC BLD-RTO: 0 /100 WBC
PH UR STRIP: 5 [PH] (ref 5–8)
PLATELET # BLD AUTO: 344 K/UL (ref 150–450)
PMV BLD AUTO: 9.4 FL (ref 9.2–12.9)
POCT GLUCOSE: 254 MG/DL (ref 70–110)
POCT GLUCOSE: 362 MG/DL (ref 70–110)
POCT GLUCOSE: 362 MG/DL (ref 70–110)
POTASSIUM SERPL-SCNC: 5 MMOL/L (ref 3.5–5.1)
PROT SERPL-MCNC: 6.8 G/DL (ref 6–8.4)
PROT UR QL STRIP: NEGATIVE
RBC # BLD AUTO: 4.21 M/UL (ref 4.6–6.2)
SARS-COV-2 RDRP RESP QL NAA+PROBE: NEGATIVE
SODIUM SERPL-SCNC: 130 MMOL/L (ref 136–145)
SP GR UR STRIP: 1.01 (ref 1–1.03)
SPECIMEN SOURCE: NORMAL
TROPONIN I SERPL DL<=0.01 NG/ML-MCNC: 0.01 NG/ML (ref 0–0.03)
URN SPEC COLLECT METH UR: ABNORMAL
UROBILINOGEN UR STRIP-ACNC: NEGATIVE EU/DL
WBC # BLD AUTO: 12.35 K/UL (ref 3.9–12.7)
WBC #/AREA URNS HPF: 1 /HPF (ref 0–5)
YEAST URNS QL MICRO: ABNORMAL

## 2024-11-16 PROCEDURE — 84484 ASSAY OF TROPONIN QUANT: CPT | Performed by: EMERGENCY MEDICINE

## 2024-11-16 PROCEDURE — 87502 INFLUENZA DNA AMP PROBE: CPT | Performed by: EMERGENCY MEDICINE

## 2024-11-16 PROCEDURE — 96374 THER/PROPH/DIAG INJ IV PUSH: CPT

## 2024-11-16 PROCEDURE — 96361 HYDRATE IV INFUSION ADD-ON: CPT

## 2024-11-16 PROCEDURE — 25000003 PHARM REV CODE 250: Performed by: EMERGENCY MEDICINE

## 2024-11-16 PROCEDURE — 87635 SARS-COV-2 COVID-19 AMP PRB: CPT | Performed by: EMERGENCY MEDICINE

## 2024-11-16 PROCEDURE — 82962 GLUCOSE BLOOD TEST: CPT

## 2024-11-16 PROCEDURE — 85025 COMPLETE CBC W/AUTO DIFF WBC: CPT | Performed by: EMERGENCY MEDICINE

## 2024-11-16 PROCEDURE — 93010 ELECTROCARDIOGRAM REPORT: CPT | Mod: ,,, | Performed by: INTERNAL MEDICINE

## 2024-11-16 PROCEDURE — 81000 URINALYSIS NONAUTO W/SCOPE: CPT | Performed by: EMERGENCY MEDICINE

## 2024-11-16 PROCEDURE — 80053 COMPREHEN METABOLIC PANEL: CPT | Performed by: EMERGENCY MEDICINE

## 2024-11-16 PROCEDURE — 99285 EMERGENCY DEPT VISIT HI MDM: CPT | Mod: 25

## 2024-11-16 PROCEDURE — 93005 ELECTROCARDIOGRAM TRACING: CPT

## 2024-11-16 PROCEDURE — 36415 COLL VENOUS BLD VENIPUNCTURE: CPT | Performed by: EMERGENCY MEDICINE

## 2024-11-16 PROCEDURE — 63600175 PHARM REV CODE 636 W HCPCS: Performed by: EMERGENCY MEDICINE

## 2024-11-16 RX ADMIN — HUMAN INSULIN 5 UNITS: 100 INJECTION, SOLUTION SUBCUTANEOUS at 08:11

## 2024-11-16 RX ADMIN — SODIUM CHLORIDE 500 ML: 9 INJECTION, SOLUTION INTRAVENOUS at 08:11

## 2024-11-16 RX ADMIN — SODIUM CHLORIDE 1000 ML: 9 INJECTION, SOLUTION INTRAVENOUS at 09:11

## 2024-11-16 NOTE — ED PROVIDER NOTES
"Encounter Date: 11/16/2024       History     Chief Complaint   Patient presents with    Weakness     Pt reports generalized weakness and feeling tired this morning while walking in home this am.     HPI    Patient is a 89y WM hx Alzheimer's, hypertension, diabetes, CHF, AFib BIB EMS with weakness.  States this morning he woke up and had to have assistance to get to the couch.  He feels like his mouth is dry.  Denies chest pain, shortness of breath or syncope.  Feels unwell.     Review of patient's allergies indicates:   Allergen Reactions    Iodinated contrast media     Iodine Hives     Iv iodine only     Past Medical History:   Diagnosis Date    Arthritis     Atrial fibrillation     Bladder mass     BPH (benign prostatic hyperplasia)     CHF (congestive heart failure)     Depression     Diabetes mellitus type II     Hyperlipidemia     Hypertension     Microscopic hematuria     Prostate cancer 10/01/2018    RLS (restless legs syndrome)     Stroke     TIA    Wears glasses      Past Surgical History:   Procedure Laterality Date    back injections      BACK SURGERY      cervical fusion    CARDIAC PACEMAKER PLACEMENT      CARDIAC SURGERY Left     pacemaker placement    CATARACT EXTRACTION W/  INTRAOCULAR LENS IMPLANT Bilateral     cataracts    CAUDAL EPIDURAL STEROID INJECTION N/A 9/30/2022    Procedure: CAUDAL EPIDURAL STEROID INJECTION WITH CATHETER;  Surgeon: Maria Guadalupe Ortiz MD;  Location: [facility];  Service: Pain Management;  Laterality: N/A;    COLECTOMY N/A     CYSTOSCOPY N/A     Pt stated, "I have had six Cystoscopy."    CYSTOSCOPY W/ RETROGRADES Bilateral 10/21/2019    Procedure: CYSTOSCOPY WITH RETROGRADE PYELOGRAM;  Surgeon: Elliott Coon MD;  Location: [facility] OR;  Service: Urology;  Laterality: Bilateral;  OP 6    DIGITAL RECTAL EXAMINATION UNDER ANESTHESIA N/A 10/21/2019    Procedure: EXAM UNDER ANESTHESIA, DIGITAL, RECTUM;  Surgeon: Elliott Coon MD;  Location: [facility] OR;  Service: Urology;  Laterality: " N/A;    EPIDURAL STEROID INJECTION INTO CERVICAL SPINE N/A 5/13/2022    Procedure: CERVICAL EPIDURAL STEROID INJECTION (C7-T1 INTERLAMINAR);  Surgeon: Maria Guadalupe Ortiz MD;  Location: [facility] OR;  Service: Pain Management;  Laterality: N/A;    EPIDURAL STEROID INJECTION INTO LUMBAR SPINE N/A 3/12/2021    Procedure: LUMBAR EPIDURAL STEROID INJECTION (L4-5 INTERLAMINAR);  Surgeon: Maria Guadalupe Ortiz MD;  Location: [facility] OR;  Service: Pain Management;  Laterality: N/A;    Ganglion Cyst Removed  Right     INJECTION OF ANESTHETIC AGENT AROUND MEDIAL BRANCH NERVES INNERVATING LUMBAR FACET JOINT Bilateral 12/18/2020    Procedure: LUMBAR FACET JOINT BLOCK (L4-5,L5-S1);  Surgeon: Maria Guadalupe Ortiz MD;  Location: [facility] OR;  Service: Pain Management;  Laterality: Bilateral;    INJECTION OF ANESTHETIC AGENT AROUND MEDIAL BRANCH NERVES INNERVATING LUMBAR FACET JOINT Bilateral 1/15/2021    Procedure: LUMBAR FACET JOINT BLOCK (L4-5,L5-S1);  Surgeon: Maria Guadalupe Ortiz MD;  Location: [facility] OR;  Service: Pain Management;  Laterality: Bilateral;    INJECTION OF ANESTHETIC AGENT AROUND NERVE Right 10/30/2020    Procedure: SUPERIOR LATERAL AND MEDIAL AND INFERIOR MEDIAL GENICULAR NERVE BLOCK;  Surgeon: Maria Guadalupe Ortiz MD;  Location: [facility] OR;  Service: Pain Management;  Laterality: Right;    MYELOGRAPHY N/A 4/4/2022    Procedure: Myelogram;  Surgeon: Rufino Ramos MD;  Location: [address];  Service: Radiology;  Laterality: N/A;    Neck Fusion Bilateral     PROSTATE SURGERY      RADIOFREQUENCY ABLATION OF LUMBAR MEDIAL BRANCH NERVE AT SINGLE LEVEL Left 1/21/2022    Procedure: RADIOFREQUENCY ABLATION (L4-5,L5-S1);  Surgeon: Maria Guadalupe Ortiz MD;  Location: [facility] OR;  Service: Pain Management;  Laterality: Left;    RADIOFREQUENCY THERMOCOAGULATION Right 3/4/2022    Procedure: RADIOFREQUENCY THERMAL COAGULATION (L4-5,L5-S1);  Surgeon: Maria Guadalupe Ortiz MD;  Location: [facility] OR;  Service: Pain Management;  Laterality: Right;    ROTATOR CUFF REPAIR Right      SKIN BIOPSY      skin cancer    TOTAL KNEE ARTHROPLASTY  2017    right knee    TRANSURETHRAL RESECTION OF PROSTATE       Family History   Problem Relation Name Age of Onset    Cancer Mother          breast    Heart disease Father      Diabetes Sister      Diabetes Brother      Heart disease Brother      COPD Daughter      Seizures Daughter      Cancer Daughter      Diabetes Daughter       Social History     Tobacco Use    Smoking status: Former     Current packs/day: 0.00     Average packs/day: 1.5 packs/day for 35.0 years (52.5 ttl pk-yrs)     Types: Cigarettes     Start date: 1946     Quit date: 1981     Years since quittin.9     Passive exposure: Past    Smokeless tobacco: Never    Tobacco comments:     38 yrs ago   Substance Use Topics    Alcohol use: Yes     Alcohol/week: 8.0 standard drinks of alcohol     Types: 1 Cans of beer, 7 Shots of liquor per week     Comment: highball every night    Drug use: Never     Review of Systems   Constitutional:  Positive for fatigue. Negative for chills and fever.   Respiratory:  Negative for cough.    Neurological:  Positive for weakness.   All other systems reviewed and are negative.    Social Drivers of Health     Tobacco Use: Medium Risk (2024)    Patient History     Smoking Tobacco Use: Former     Smokeless Tobacco Use: Never     Passive Exposure: Past   Alcohol Use: Not At Risk (2024)    AUDIT-C     Frequency of Alcohol Consumption: 4 or more times a week     Average Number of Drinks: 1 or 2     Frequency of Binge Drinking: Never   Financial Resource Strain: Low Risk  (2024)    Overall Financial Resource Strain (CARDIA)     Difficulty of Paying Living Expenses: Not very hard   Food Insecurity: No Food Insecurity (2024)    Hunger Vital Sign     Worried About Running Out of Food in the Last Year: Never true     Ran Out of Food in the Last Year: Never true   Transportation Needs: No Transportation Needs (10/23/2023)    PRAPARE -  Transportation     Lack of Transportation (Medical): No     Lack of Transportation (Non-Medical): No   Physical Activity: Unknown (6/26/2024)    Exercise Vital Sign     Days of Exercise per Week: 0 days     Minutes of Exercise per Session: Not on file   Recent Concern: Physical Activity - Inactive (6/26/2024)    Exercise Vital Sign     Days of Exercise per Week: 0 days     Minutes of Exercise per Session: 0 min   Stress: No Stress Concern Present (6/26/2024)    [employer] [place] [employer] - Occupational Stress Questionnaire     Feeling of Stress : Not at all   Housing Stability: Low Risk  (10/23/2023)    Housing Stability Vital Sign     Unable to Pay for Housing in the Last Year: No     Number of Places Lived in the Last Year: 1     Unstable Housing in the Last Year: No   Depression: Low Risk  (2/19/2024)    Depression     Last PHQ-4: Flowsheet Data: 0   Utilities: Not At Risk (6/26/2024)    [facility] Utilities     Threatened with loss of utilities: No   Health Literacy: Inadequate Health Literacy (6/26/2024)     Health Literacy     Frequency of need for help with medical instructions: Often   Social Isolation: Not on file       Physical Exam     Initial Vitals [11/16/24 0735]   BP Pulse Resp Temp SpO2   (!) 140/63 74 17 98 °F (36.7 °C) 95 %      MAP       --         Physical Exam    Nursing note and vitals reviewed.  Constitutional: He appears well-developed and well-nourished.   HENT:   Head: Normocephalic and atraumatic. Mouth/Throat: Oropharynx is clear and moist.   Eyes: EOM are normal. Pupils are equal, round, and reactive to light.   Neck: No JVD present.   Normal range of motion.  Cardiovascular:  Normal rate and intact distal pulses.           Pulmonary/Chest: Breath sounds normal. He has no rales.   Abdominal: Abdomen is soft.   Musculoskeletal:         General: Normal range of motion.      Cervical back: Normal range of motion.     Neurological: He is alert and oriented to person, place, and  time. GCS score is 15. GCS eye subscore is 4. GCS verbal subscore is 5. GCS motor subscore is 6.   Skin: Skin is warm. Capillary refill takes less than 2 seconds.         ED Course   Procedures  Labs Reviewed   CBC W/ AUTO DIFFERENTIAL - Abnormal       Result Value    WBC 12.35      RBC 4.21 (*)     Hemoglobin 12.6 (*)     Hematocrit 37.4 (*)     MCV 89      MCH 29.9      MCHC 33.7      RDW 12.9      Platelets 344      MPV 9.4      Immature Granulocytes 0.6 (*)     Gran # (ANC) 6.8      Immature Grans (Abs) 0.07 (*)     Lymph # 2.6      Mono # 1.1 (*)     Eos # 1.7 (*)     Baso # 0.12      nRBC 0      Gran % 54.7      Lymph % 21.3      Mono % 9.0      Eosinophil % 13.4 (*)     Basophil % 1.0      Differential Method Automated     COMPREHENSIVE METABOLIC PANEL - Abnormal    Sodium 130 (*)     Potassium 5.0      Chloride 94 (*)     CO2 24      Glucose 360 (*)     BUN 18      Creatinine 1.5 (*)     Calcium 9.5      Total Protein 6.8      Albumin 3.8      Total Bilirubin 0.6      Alkaline Phosphatase 84      AST 15      ALT 20      eGFR 44 (*)     Anion Gap 12     URINALYSIS, REFLEX TO URINE CULTURE - Abnormal    Specimen UA Urine, Catheterized      Color, UA Yellow      Appearance, UA Clear      pH, UA 5.0      Specific Gravity, UA 1.010      Protein, UA Negative      Glucose, UA 3+ (*)     Ketones, UA Negative      Bilirubin (UA) Negative      Occult Blood UA Negative      Nitrite, UA Negative      Urobilinogen, UA Negative      Leukocytes, UA Negative      Narrative:     Specimen Source->Urine   URINALYSIS MICROSCOPIC - Abnormal    WBC, UA 1      Bacteria None      Yeast, UA None      Hyaline Casts, UA 3 (*)     Microscopic Comment SEE COMMENT      Narrative:     Specimen Source->Urine   INFLUENZA A & B BY MOLECULAR    Influenza A, Molecular Negative      Influenza B, Molecular Negative      Flu A & B Source NP     SARS-COV-2 RNA AMPLIFICATION, QUAL    SARS-CoV-2 RNA, Amplification, Qual Negative     TROPONIN I     Troponin I 0.008     POCT GLUCOSE MONITORING CONTINUOUS   POCT GLUCOSE MONITORING CONTINUOUS     EKG Readings: (Independently Interpreted)   Initial Reading: No STEMI. Rhythm: Paced Rhythm. Heart Rate: 63. Ectopy: No Ectopy. Conduction: Normal. Axis: Normal.       Imaging Results              CT Head Without Contrast (Final result)  Result time 11/16/24 08:29:24      Final result by Temo Russo MD (11/16/24 08:29:24)                   Impression:      No acute intracranial process.  No significant change.      Electronically signed by: Temo Russo MD  Date:    11/16/2024  Time:    08:29               Narrative:    EXAMINATION:  CT HEAD WITHOUT CONTRAST    CLINICAL HISTORY:  weakness;    TECHNIQUE:  Low dose axial images were obtained through the head.  Coronal and sagittal reformations were also performed. Contrast was not administered.    COMPARISON:  01/21/2023    FINDINGS:  There is a diffuse generalized cerebral volume loss with compensatory ventricular enlargement.  No hemorrhage or extra-axial collection.  No mass or midline shift.  There are mild chronic white matter changes.                                       X-Ray Chest AP Portable (Final result)  Result time 11/16/24 07:50:20      Final result by Temo Russo MD (11/16/24 07:50:20)                   Impression:      New left basilar opacification for which pneumonia and small pleural effusion are possible.      Electronically signed by: Temo Russo MD  Date:    11/16/2024  Time:    07:50               Narrative:    EXAMINATION:  XR CHEST AP PORTABLE    CLINICAL HISTORY:  Weakness    TECHNIQUE:  Single frontal view of the chest was performed.    COMPARISON:  08/01/2024    FINDINGS:  A left-sided dual lead cardiac pacer is present.  Heart size normal.  There is new opacification of the left costophrenic angle for which acute airspace disease in the setting of pneumonia and small pleural effusion is possible.                                        Medications   sodium chloride 0.9% bolus 500 mL 500 mL (0 mLs Intravenous Stopped 11/16/24 0937)   insulin regular injection 5 Units 0.05 mL (5 Units Intravenous Given 11/16/24 0858)   sodium chloride 0.9% bolus 1,000 mL 1,000 mL (1,000 mLs Intravenous New Bag 11/16/24 0900)     Medical Decision Making  This is emergent evaluation of a 89-year-old white male with past medical history of hypertension diabetes presenting with weakness and hyperglycemia.  Physical exam he is nontoxic afebrile no focal neurologic deficits.  Labs remarkable for glucose of 360 and no anion gap.  BUN creatinine 18 and 1.5.  Troponin negative.  EKG without change compared to prior.  Patient has been given a total of 2 L IV fluids and 5 units of insulin.  He voices symptomatic improvement.  Patient is discharged to self-care with instructions to stay hydrated and check his blood sugars regularly    Amount and/or Complexity of Data Reviewed  Labs: ordered.  Radiology: ordered.    Risk  OTC drugs.                                      Clinical Impression:  Final diagnoses:  [R53.1] Weakness  [R73.9] Hyperglycemia (Primary)          ED Disposition Condition    Discharge Stable          ED Prescriptions    None       Follow-up Information       Follow up With Specialties Details Why Contact Info    Hemal Varma MD Family Medicine Schedule an appointment as soon as possible for a visit in 1 day  [address]  [facility] [address]  [phone]               Claudia Lockett MD  11/16/24 6211

## 2024-11-18 LAB
OHS QRS DURATION: 170 MS
OHS QTC CALCULATION: 497 MS

## 2024-11-26 ENCOUNTER — OFFICE VISIT (OUTPATIENT)
Dept: FAMILY MEDICINE | Facility: CLINIC | Age: 89
End: 2024-11-26
Payer: MEDICARE

## 2024-11-26 VITALS — HEART RATE: 74 BPM | OXYGEN SATURATION: 92 % | SYSTOLIC BLOOD PRESSURE: 130 MMHG | DIASTOLIC BLOOD PRESSURE: 74 MMHG

## 2024-11-26 DIAGNOSIS — E78.2 MIXED HYPERLIPIDEMIA: Primary | ICD-10-CM

## 2024-11-26 DIAGNOSIS — E11.22 TYPE 2 DIABETES MELLITUS WITH STAGE 3 CHRONIC KIDNEY DISEASE, WITH LONG-TERM CURRENT USE OF INSULIN: ICD-10-CM

## 2024-11-26 DIAGNOSIS — N18.30 TYPE 2 DIABETES MELLITUS WITH STAGE 3 CHRONIC KIDNEY DISEASE, WITH LONG-TERM CURRENT USE OF INSULIN: ICD-10-CM

## 2024-11-26 DIAGNOSIS — F03.90 DEMENTIA, UNSPECIFIED DEMENTIA SEVERITY, UNSPECIFIED DEMENTIA TYPE, UNSPECIFIED WHETHER BEHAVIORAL, PSYCHOTIC, OR MOOD DISTURBANCE OR ANXIETY: ICD-10-CM

## 2024-11-26 DIAGNOSIS — Z79.4 TYPE 2 DIABETES MELLITUS WITH STAGE 3 CHRONIC KIDNEY DISEASE, WITH LONG-TERM CURRENT USE OF INSULIN: ICD-10-CM

## 2024-11-26 PROCEDURE — 99213 OFFICE O/P EST LOW 20 MIN: CPT | Mod: S$GLB,,, | Performed by: FAMILY MEDICINE

## 2024-11-26 PROCEDURE — 3288F FALL RISK ASSESSMENT DOCD: CPT | Mod: CPTII,S$GLB,, | Performed by: FAMILY MEDICINE

## 2024-11-26 PROCEDURE — 1101F PT FALLS ASSESS-DOCD LE1/YR: CPT | Mod: CPTII,S$GLB,, | Performed by: FAMILY MEDICINE

## 2024-11-26 PROCEDURE — 99999 PR PBB SHADOW E&M-EST. PATIENT-LVL IV: CPT | Mod: PBBFAC,,, | Performed by: FAMILY MEDICINE

## 2024-11-26 PROCEDURE — 1159F MED LIST DOCD IN RCRD: CPT | Mod: CPTII,S$GLB,, | Performed by: FAMILY MEDICINE

## 2024-11-26 PROCEDURE — 1160F RVW MEDS BY RX/DR IN RCRD: CPT | Mod: CPTII,S$GLB,, | Performed by: FAMILY MEDICINE

## 2024-11-26 PROCEDURE — 1126F AMNT PAIN NOTED NONE PRSNT: CPT | Mod: CPTII,S$GLB,, | Performed by: FAMILY MEDICINE

## 2024-11-26 RX ORDER — METOPROLOL TARTRATE 25 MG/1
25 TABLET, FILM COATED ORAL 2 TIMES DAILY
COMMUNITY
Start: 2024-11-07

## 2024-11-26 RX ORDER — EVOLOCUMAB 140 MG/ML
140 INJECTION, SOLUTION SUBCUTANEOUS
Qty: 6 EACH | Refills: 1 | Status: SHIPPED | OUTPATIENT
Start: 2024-11-26

## 2024-11-26 NOTE — PROGRESS NOTES
Subjective:       Patient ID: Erasmo Dunbar is a 89 y.o. male.    Chief Complaint: Follow-up (Emergency room follow up )    88 y/o W M for F U D M with high blood sugars    Follow-up  Pertinent negatives include no abdominal pain, arthralgias, chest pain, congestion, coughing, joint swelling, myalgias, nausea, neck pain, rash, sore throat or vomiting.   Review of Systems   Constitutional:  Negative for appetite change.   HENT:  Negative for congestion, ear pain, sneezing and sore throat.    Eyes:  Negative for redness and visual disturbance.   Respiratory:  Negative for cough, chest tightness and stridor.    Cardiovascular:  Negative for chest pain.   Gastrointestinal:  Negative for abdominal pain, blood in stool, diarrhea, nausea and vomiting.   Endocrine:        High blood sugars > 350    Genitourinary:  Negative for difficulty urinating, dysuria and hematuria.   Musculoskeletal:  Negative for arthralgias, back pain, joint swelling, myalgias and neck pain.   Skin:  Negative for rash.   Neurological:  Positive for light-headedness. Negative for dizziness.   Psychiatric/Behavioral:  Positive for decreased concentration. Negative for agitation. The patient is not nervous/anxious.      Objective:      Physical Exam  Constitutional:       Appearance: He is well-developed.   HENT:      Head: Normocephalic.   Eyes:      Pupils: Pupils are equal, round, and reactive to light.   Neck:      Thyroid: No thyromegaly.   Cardiovascular:      Rate and Rhythm: Normal rate and regular rhythm.      Heart sounds: No murmur heard.     No friction rub.   Pulmonary:      Effort: Pulmonary effort is normal. No respiratory distress.      Breath sounds: No wheezing.   Abdominal:      Tenderness: There is no abdominal tenderness. There is no guarding or rebound.   Musculoskeletal:         General: No tenderness. Normal range of motion.      Cervical back: Normal range of motion and neck supple.   Lymphadenopathy:      Cervical: No  cervical adenopathy.   Skin:     General: Skin is warm and dry.   Neurological:      Mental Status: He is alert and oriented to person, place, and time.      Cranial Nerves: No cranial nerve deficit.      Deep Tendon Reflexes: Reflexes are normal and symmetric.   Psychiatric:         Thought Content: Thought content normal.         Judgment: Judgment normal.       Assessment:       Encounter Diagnosis   Name Primary?    Type 2 diabetes mellitus with stage 3 chronic kidney disease, with long-term current use of insulin          Plan:   1. Type 2 diabetes mellitus with stage 3 chronic kidney disease, with long-term current use of insulin  -     REPATHA SURECLICK 140 mg/mL PnIj; Inject 1 mL (140 mg total) into the skin every 14 (fourteen) days.  Dispense: 6 each; Refill: 1

## 2024-12-09 DIAGNOSIS — I10 HYPERTENSION, UNSPECIFIED TYPE: ICD-10-CM

## 2024-12-09 RX ORDER — OLMESARTAN MEDOXOMIL 40 MG/1
40 TABLET ORAL
Qty: 90 TABLET | Refills: 3 | Status: SHIPPED | OUTPATIENT
Start: 2024-12-09

## 2024-12-09 NOTE — TELEPHONE ENCOUNTER
LOV 08/19/2024    patient requesting refill for olmesartan (BENICAR) 40 MG tablet         RX pending   pharmacy confirmed  please advise

## 2024-12-09 NOTE — TELEPHONE ENCOUNTER
No care due was identified.  Health [facility] Embedded Care Due Messages. Reference number: [id number].   12/09/2024 1:37:35 AM CST

## 2025-02-18 ENCOUNTER — OFFICE VISIT (OUTPATIENT)
Dept: FAMILY MEDICINE | Facility: CLINIC | Age: OVER 89
End: 2025-02-18
Payer: MEDICARE

## 2025-02-18 ENCOUNTER — CLINICAL SUPPORT (OUTPATIENT)
Dept: FAMILY MEDICINE | Facility: CLINIC | Age: OVER 89
End: 2025-02-18
Payer: MEDICARE

## 2025-02-18 VITALS
WEIGHT: 194.88 LBS | HEART RATE: 65 BPM | HEIGHT: 66 IN | DIASTOLIC BLOOD PRESSURE: 52 MMHG | OXYGEN SATURATION: 95 % | BODY MASS INDEX: 31.32 KG/M2 | SYSTOLIC BLOOD PRESSURE: 136 MMHG | RESPIRATION RATE: 17 BRPM

## 2025-02-18 DIAGNOSIS — Z79.4 TYPE 2 DIABETES MELLITUS WITH STAGE 3 CHRONIC KIDNEY DISEASE, WITH LONG-TERM CURRENT USE OF INSULIN: ICD-10-CM

## 2025-02-18 DIAGNOSIS — Z96.651 CHRONIC KNEE PAIN AFTER TOTAL REPLACEMENT OF RIGHT KNEE JOINT: ICD-10-CM

## 2025-02-18 DIAGNOSIS — N18.30 TYPE 2 DIABETES MELLITUS WITH STAGE 3 CHRONIC KIDNEY DISEASE, WITH LONG-TERM CURRENT USE OF INSULIN: ICD-10-CM

## 2025-02-18 DIAGNOSIS — I50.32 CHRONIC DIASTOLIC (CONGESTIVE) HEART FAILURE: ICD-10-CM

## 2025-02-18 DIAGNOSIS — M25.561 CHRONIC KNEE PAIN AFTER TOTAL REPLACEMENT OF RIGHT KNEE JOINT: ICD-10-CM

## 2025-02-18 DIAGNOSIS — Z79.4 TYPE 2 DIABETES MELLITUS WITH STAGE 3A CHRONIC KIDNEY DISEASE, WITH LONG-TERM CURRENT USE OF INSULIN: ICD-10-CM

## 2025-02-18 DIAGNOSIS — E78.5 DYSLIPIDEMIA: ICD-10-CM

## 2025-02-18 DIAGNOSIS — F32.A DEPRESSION, UNSPECIFIED DEPRESSION TYPE: ICD-10-CM

## 2025-02-18 DIAGNOSIS — G95.9 CERVICAL MYELOPATHY WITH CERVICAL RADICULOPATHY: ICD-10-CM

## 2025-02-18 DIAGNOSIS — N18.32 STAGE 3B CHRONIC KIDNEY DISEASE: Primary | ICD-10-CM

## 2025-02-18 DIAGNOSIS — N18.31 TYPE 2 DIABETES MELLITUS WITH STAGE 3A CHRONIC KIDNEY DISEASE, WITH LONG-TERM CURRENT USE OF INSULIN: ICD-10-CM

## 2025-02-18 DIAGNOSIS — E78.2 MIXED HYPERLIPIDEMIA: ICD-10-CM

## 2025-02-18 DIAGNOSIS — C61 PROSTATE CANCER: ICD-10-CM

## 2025-02-18 DIAGNOSIS — I48.0 PAROXYSMAL ATRIAL FIBRILLATION: ICD-10-CM

## 2025-02-18 DIAGNOSIS — I10 PRIMARY HYPERTENSION: ICD-10-CM

## 2025-02-18 DIAGNOSIS — E11.22 TYPE 2 DIABETES MELLITUS WITH STAGE 3 CHRONIC KIDNEY DISEASE, WITH LONG-TERM CURRENT USE OF INSULIN: ICD-10-CM

## 2025-02-18 DIAGNOSIS — N18.32 STAGE 3B CHRONIC KIDNEY DISEASE: ICD-10-CM

## 2025-02-18 DIAGNOSIS — E11.42 DIABETIC POLYNEUROPATHY ASSOCIATED WITH TYPE 2 DIABETES MELLITUS: ICD-10-CM

## 2025-02-18 DIAGNOSIS — E11.22 TYPE 2 DIABETES MELLITUS WITH STAGE 3A CHRONIC KIDNEY DISEASE, WITH LONG-TERM CURRENT USE OF INSULIN: ICD-10-CM

## 2025-02-18 DIAGNOSIS — M54.12 CERVICAL MYELOPATHY WITH CERVICAL RADICULOPATHY: ICD-10-CM

## 2025-02-18 DIAGNOSIS — M47.816 LUMBAR SPONDYLOSIS: ICD-10-CM

## 2025-02-18 DIAGNOSIS — F03.90 DEMENTIA, UNSPECIFIED DEMENTIA SEVERITY, UNSPECIFIED DEMENTIA TYPE, UNSPECIFIED WHETHER BEHAVIORAL, PSYCHOTIC, OR MOOD DISTURBANCE OR ANXIETY: ICD-10-CM

## 2025-02-18 DIAGNOSIS — R11.0 NAUSEA: ICD-10-CM

## 2025-02-18 DIAGNOSIS — G89.29 OTHER CHRONIC PAIN: ICD-10-CM

## 2025-02-18 DIAGNOSIS — G89.29 CHRONIC KNEE PAIN AFTER TOTAL REPLACEMENT OF RIGHT KNEE JOINT: ICD-10-CM

## 2025-02-18 DIAGNOSIS — R11.14 BILIOUS VOMITING WITH NAUSEA: ICD-10-CM

## 2025-02-18 DIAGNOSIS — M48.061 SPINAL STENOSIS OF LUMBAR REGION WITHOUT NEUROGENIC CLAUDICATION: ICD-10-CM

## 2025-02-18 LAB
ALBUMIN SERPL BCP-MCNC: 3.7 G/DL (ref 3.5–5.2)
ALP SERPL-CCNC: 111 U/L (ref 40–150)
ALT SERPL W/O P-5'-P-CCNC: 18 U/L (ref 10–44)
ANION GAP SERPL CALC-SCNC: 11 MMOL/L (ref 8–16)
AST SERPL-CCNC: 14 U/L (ref 10–40)
BASOPHILS # BLD AUTO: 0.12 K/UL (ref 0–0.2)
BASOPHILS NFR BLD: 0.8 % (ref 0–1.9)
BILIRUB SERPL-MCNC: 0.4 MG/DL (ref 0.1–1)
BUN SERPL-MCNC: 20 MG/DL (ref 8–23)
CALCIUM SERPL-MCNC: 9 MG/DL (ref 8.7–10.5)
CHLORIDE SERPL-SCNC: 99 MMOL/L (ref 95–110)
CHOLEST SERPL-MCNC: 115 MG/DL (ref 120–199)
CHOLEST/HDLC SERPL: 2.1 {RATIO} (ref 2–5)
CO2 SERPL-SCNC: 25 MMOL/L (ref 23–29)
CREAT SERPL-MCNC: 1.2 MG/DL (ref 0.5–1.4)
DIFFERENTIAL METHOD BLD: ABNORMAL
EOSINOPHIL # BLD AUTO: 2.5 K/UL (ref 0–0.5)
EOSINOPHIL NFR BLD: 16.1 % (ref 0–8)
ERYTHROCYTE [DISTWIDTH] IN BLOOD BY AUTOMATED COUNT: 12.6 % (ref 11.5–14.5)
EST. GFR  (NO RACE VARIABLE): 58 ML/MIN/1.73 M^2
GLUCOSE SERPL-MCNC: 429 MG/DL (ref 70–110)
HCT VFR BLD AUTO: 37 % (ref 40–54)
HDLC SERPL-MCNC: 54 MG/DL (ref 40–75)
HDLC SERPL: 47 % (ref 20–50)
HGB BLD-MCNC: 11.9 G/DL (ref 14–18)
IMM GRANULOCYTES # BLD AUTO: 0.06 K/UL (ref 0–0.04)
IMM GRANULOCYTES NFR BLD AUTO: 0.4 % (ref 0–0.5)
LDLC SERPL CALC-MCNC: 29.6 MG/DL (ref 63–159)
LYMPHOCYTES # BLD AUTO: 3.3 K/UL (ref 1–4.8)
LYMPHOCYTES NFR BLD: 21.2 % (ref 18–48)
MCH RBC QN AUTO: 29.7 PG (ref 27–31)
MCHC RBC AUTO-ENTMCNC: 32.2 G/DL (ref 32–36)
MCV RBC AUTO: 92 FL (ref 82–98)
MONOCYTES # BLD AUTO: 1.1 K/UL (ref 0.3–1)
MONOCYTES NFR BLD: 7.2 % (ref 4–15)
NEUTROPHILS # BLD AUTO: 8.5 K/UL (ref 1.8–7.7)
NEUTROPHILS NFR BLD: 54.3 % (ref 38–73)
NONHDLC SERPL-MCNC: 61 MG/DL
NRBC BLD-RTO: 0 /100 WBC
PLATELET # BLD AUTO: 376 K/UL (ref 150–450)
PLATELET BLD QL SMEAR: ABNORMAL
PMV BLD AUTO: 10.1 FL (ref 9.2–12.9)
POTASSIUM SERPL-SCNC: 4.4 MMOL/L (ref 3.5–5.1)
PROT SERPL-MCNC: 7 G/DL (ref 6–8.4)
RBC # BLD AUTO: 4.01 M/UL (ref 4.6–6.2)
SODIUM SERPL-SCNC: 135 MMOL/L (ref 136–145)
TRIGL SERPL-MCNC: 157 MG/DL (ref 30–150)
TSH SERPL DL<=0.005 MIU/L-ACNC: 0.69 UIU/ML (ref 0.4–4)
WBC # BLD AUTO: 15.6 K/UL (ref 3.9–12.7)

## 2025-02-18 PROCEDURE — 80053 COMPREHEN METABOLIC PANEL: CPT | Performed by: FAMILY MEDICINE

## 2025-02-18 PROCEDURE — 83036 HEMOGLOBIN GLYCOSYLATED A1C: CPT | Performed by: FAMILY MEDICINE

## 2025-02-18 PROCEDURE — 84443 ASSAY THYROID STIM HORMONE: CPT | Performed by: FAMILY MEDICINE

## 2025-02-18 PROCEDURE — 80061 LIPID PANEL: CPT | Performed by: FAMILY MEDICINE

## 2025-02-18 PROCEDURE — 85025 COMPLETE CBC W/AUTO DIFF WBC: CPT | Performed by: FAMILY MEDICINE

## 2025-02-18 RX ORDER — FUROSEMIDE 20 MG/1
20 TABLET ORAL DAILY PRN
Qty: 30 TABLET | Refills: 5 | Status: SHIPPED | OUTPATIENT
Start: 2025-02-18

## 2025-02-18 RX ORDER — INSULIN GLARGINE 100 [IU]/ML
INJECTION, SOLUTION SUBCUTANEOUS
Qty: 45 ML | Refills: 3 | Status: CANCELLED | OUTPATIENT
Start: 2025-02-18

## 2025-02-18 RX ORDER — DULOXETIN HYDROCHLORIDE 60 MG/1
60 CAPSULE, DELAYED RELEASE ORAL DAILY
Qty: 90 CAPSULE | Refills: 1 | Status: SHIPPED | OUTPATIENT
Start: 2025-02-18 | End: 2025-08-17

## 2025-02-18 RX ORDER — PANTOPRAZOLE SODIUM 40 MG/1
40 TABLET, DELAYED RELEASE ORAL DAILY
Qty: 30 TABLET | Refills: 5 | Status: SHIPPED | OUTPATIENT
Start: 2025-02-18 | End: 2025-08-17

## 2025-02-18 RX ORDER — APIXABAN 5 MG/1
5 TABLET, FILM COATED ORAL 2 TIMES DAILY
Qty: 180 TABLET | Refills: 1 | Status: SHIPPED | OUTPATIENT
Start: 2025-02-18

## 2025-02-18 RX ORDER — EVOLOCUMAB 140 MG/ML
140 INJECTION, SOLUTION SUBCUTANEOUS
Qty: 6 EACH | Refills: 1 | Status: SHIPPED | OUTPATIENT
Start: 2025-02-18

## 2025-02-18 RX ORDER — PROMETHAZINE HYDROCHLORIDE 12.5 MG/1
12.5 TABLET ORAL EVERY 6 HOURS PRN
Qty: 30 TABLET | Refills: 2 | Status: SHIPPED | OUTPATIENT
Start: 2025-02-18

## 2025-02-18 RX ORDER — TIZANIDINE 4 MG/1
4 TABLET ORAL DAILY PRN
Qty: 30 TABLET | Refills: 5 | Status: SHIPPED | OUTPATIENT
Start: 2025-02-18

## 2025-02-18 RX ORDER — PREGABALIN 100 MG/1
100 CAPSULE ORAL 2 TIMES DAILY
Qty: 180 CAPSULE | Refills: 1 | Status: SHIPPED | OUTPATIENT
Start: 2025-02-18 | End: 2025-08-19

## 2025-02-18 RX ORDER — ATORVASTATIN CALCIUM 10 MG/1
10 TABLET, FILM COATED ORAL NIGHTLY
Qty: 90 TABLET | Refills: 3 | Status: SHIPPED | OUTPATIENT
Start: 2025-02-18

## 2025-02-18 RX ORDER — TRAMADOL HYDROCHLORIDE 50 MG/1
50 TABLET ORAL EVERY 12 HOURS PRN
Qty: 30 EACH | Refills: 0 | Status: CANCELLED | OUTPATIENT
Start: 2025-02-18

## 2025-02-18 NOTE — PROGRESS NOTES
Subjective:       Patient ID: Erasmo Dunbar is a 89 y.o. male.    Chief Complaint: Follow-up (Pt here for 6 mth f/u. )    Pt is a 89 y.o. male who presents for evaluation and management of   Encounter Diagnoses   Name Primary?    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  Doing well on current meds. Denies any side effects. Prevention is up to date.    Review of Systems   Constitutional:  Negative for activity change, fever and unexpected weight change.   HENT:  Positive for hearing loss. Negative for rhinorrhea and trouble swallowing.    Eyes:  Positive for visual disturbance. Negative for discharge.   Respiratory:  Negative for chest tightness, shortness of breath and wheezing.    Cardiovascular:  Positive for leg swelling. Negative for chest pain and palpitations.   Gastrointestinal:  Negative for anal bleeding, blood in stool, constipation, diarrhea and vomiting.   Endocrine: Negative for polydipsia and polyuria.   Genitourinary:  Negative for difficulty urinating, dysuria, hematuria and urgency.    Musculoskeletal:  Negative for arthralgias, joint swelling and neck pain.   Neurological:  Positive for weakness. Negative for dizziness, light-headedness and headaches.   Psychiatric/Behavioral:  Negative for confusion and dysphoric mood.        Objective:      Physical Exam  Constitutional:       Appearance: Normal appearance. He is well-developed. He is not ill-appearing.   HENT:      Head: Normocephalic and atraumatic.      Right Ear: External ear normal.      Left Ear: External ear normal.      Nose: Nose normal.      Mouth/Throat:      Mouth: Mucous membranes are moist.      Pharynx: No oropharyngeal exudate or posterior oropharyngeal erythema.   Eyes:      General: No scleral icterus.        Right eye: No discharge.         Left eye: No discharge.      Conjunctiva/sclera: Conjunctivae normal.      Pupils: Pupils are equal, round, and reactive to light.   Neck:      Thyroid: No thyromegaly.      Vascular: No JVD.      Trachea: No tracheal deviation.   Cardiovascular:      Rate and Rhythm: Normal rate and regular rhythm.      Heart sounds: Normal heart sounds. No murmur heard.  Pulmonary:      Effort: Pulmonary effort is normal. No respiratory distress.      Breath sounds: Normal breath sounds. No wheezing or rales.   Chest:      Chest wall: No tenderness.   Abdominal:      General: Bowel sounds are normal. There is no distension.      Palpations: Abdomen is soft. There is no mass.      Tenderness: There is no abdominal tenderness. There is no guarding or rebound.   Musculoskeletal:         General: Normal range of motion.      Cervical back: Normal range of motion and neck supple.      Right lower leg: No edema.      Left lower leg: No edema.      Comments: Trace edema BLE    Lymphadenopathy:      Cervical: No cervical adenopathy.   Skin:     General: Skin is warm and dry.   Neurological:      Mental Status: He is alert and oriented to person, place, and time.      Cranial Nerves: No cranial nerve deficit.       Motor: No abnormal muscle tone.      Coordination: Coordination normal.      Deep Tendon Reflexes: Reflexes are normal and symmetric. Reflexes normal.   Psychiatric:         Behavior: Behavior normal.         Thought Content: Thought content normal.         Judgment: Judgment normal.         Assessment:       1. Stage 3b chronic kidney disease    2. Dyslipidemia    3. Diabetic polyneuropathy associated with type 2 diabetes mellitus    4. Other chronic pain    5. Depression, unspecified depression type    6. Paroxysmal atrial fibrillation    7. Type 2 diabetes mellitus with stage 3 chronic kidney disease, with long-term current use of insulin    8. Nausea    9. Lumbar spondylosis    10. 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 Spinal stenosis of lumbar region without neurogenic claudication    12. Bilious vomiting with nausea    13. Cervical myelopathy with cervical radiculopathy    14. Prostate cancer    15. Dementia, unspecified dementia severity, unspecified dementia type, unspecified whether behavioral, psychotic, or mood disturbance or anxiety    16. Chronic diastolic (congestive) heart failure    17. Type 2 diabetes mellitus with stage 3a chronic kidney disease, with long-term current use of insulin    18. Mixed hyperlipidemia    19. Primary hypertension        Plan:   1. Stage 3b chronic kidney disease    2. Dyslipidemia  -     atorvastatin (LIPITOR) 10 MG tablet; Take 1 tablet (10 mg total) by mouth every evening.  Dispense: 90 tablet; Refill: 3    3. Diabetic polyneuropathy associated with type 2 diabetes mellitus  Overview:  Duloxetine       Orders:  -     DULoxetine (CYMBALTA) 60 MG capsule; Take 1 capsule (60 mg total) by mouth once daily.  Dispense: 90 capsule; Refill: 1  -     pregabalin (LYRICA) 100 MG capsule; Take 1 capsule (100 mg total) by mouth 2 (two) times daily.  Dispense: 180 capsule; Refill: 1    4. Other chronic pain  -     DULoxetine (CYMBALTA) 60 MG  capsule; Take 1 capsule (60 mg total) by mouth once daily.  Dispense: 90 capsule; Refill: 1    5. Depression, unspecified depression type  -     DULoxetine (CYMBALTA) 60 MG capsule; Take 1 capsule (60 mg total) by mouth once daily.  Dispense: 90 capsule; Refill: 1    6. Paroxysmal atrial fibrillation  Overview:  eliquis  Metoprolol   Dr. Ramona SANTANA       Orders:  -     ELIQUIS 5 mg Tab; Take 1 tablet (5 mg total) by mouth 2 (two) times daily.  Dispense: 180 tablet; Refill: 1    7. Type 2 diabetes mellitus with stage 3 chronic kidney disease, with long-term current use of insulin  -     REPATHA SURECLICK 140 mg/mL PnIj; Inject 1 mL (140 mg total) into the skin every 14 (fourteen) days.  Dispense: 6 each; Refill: 1  -     Comprehensive Metabolic Panel; Future; Expected date: 02/18/2025  -     Hemoglobin A1C; Future; Expected date: 02/18/2025  -     Lipid Panel; Future; Expected date: 02/18/2025  -     TSH; Future; Expected date: 02/18/2025    8. Nausea  Overview:  Unsure of etiology, but has improved as of 10/31/23  He is on PPI  Zofran prn   Phenergan prn   GI has seen him. Upper GI is WNL 10/23/23. Dr. Titus opinion is that his opioids are causing his nausea.       Orders:  -     pantoprazole (PROTONIX) 40 MG tablet; Take 1 tablet (40 mg total) by mouth once daily.  Dispense: 30 tablet; Refill: 5    9. Lumbar spondylosis  Overview:  Several injections from PM with variable to no relief   Tramadol   Pain currently is controlled           Orders:  -     pregabalin (LYRICA) 100 MG capsule; Take 1 capsule (100 mg total) by mouth 2 (two) times daily.  Dispense: 180 capsule; Refill: 1    10. 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  Dispense: 180 capsule; Refill: 1    11. Spinal stenosis of lumbar region without neurogenic claudication  -     pregabalin (LYRICA) 100 MG capsule; Take 1 capsule (100 mg  total) by mouth 2 (two) times daily.  Dispense: 180 capsule; Refill: 1    12. Bilious vomiting with nausea  -     promethazine (PHENERGAN) 12.5 MG Tab; Take 1 tablet (12.5 mg total) by mouth every 6 (six) hours as needed (nausea).  Dispense: 30 tablet; Refill: 2    13. Cervical myelopathy with cervical radiculopathy  Overview:  Currently pain controlled  Cymbalta   Rare norco use.   Try to avoid anything stronger due to dementia       14. Prostate cancer  Overview:  Prostate excised  Seeing doctor yoshi regularly   PSA is zero 1/2025      15. Dementia, unspecified dementia severity, unspecified dementia type, unspecified whether behavioral, psychotic, or mood disturbance or anxiety  Overview:  Namenda 5 daily  Sees Dr. Nugent regularly           16. Chronic diastolic (congestive) heart failure  Overview:  Metoprolol   LISINOPRIL 10   Lasix 20 daily prn     SEES ALEJANDRO CIS         17. Type 2 diabetes mellitus with stage 3a chronic kidney disease, with long-term current use of insulin  Overview:  Lab Results   Component Value Date    HGBA1C 7.4 (H) 07/18/2024     Lantus---42-43 units/ qhs   MEtformin   actos   Januvia    10/31/23----his BS is great last 3 days, since he has been set up with  and logging his sugars....       18. Mixed hyperlipidemia  Overview:  Lab Results   Component Value Date    LDLCALC 34.2 (L) 07/18/2024   repatha         19. Primary hypertension  Overview:  Olmesartan  Metoprolol   Allergic to clonidine patches         Orders:  -     CBC Auto Differential; Future; Expected date: 02/18/2025  -     Comprehensive Metabolic Panel; Future; Expected date: 02/18/2025  -     Lipid Panel; Future; Expected date: 02/18/2025  -     TSH; Future; Expected date: 02/18/2025    Other orders  -     furosemide (LASIX) 20 MG tablet; Take 1 tablet (20 mg total) by mouth daily as needed (swelling).  Dispense: 30 tablet; Refill: 5  -     tiZANidine (ZANAFLEX) 4 MG tablet; Take 1 tablet (4 mg total) by mouth  daily as needed.  Dispense: 30 tablet; Refill: 5      No follow-ups on file.

## 2025-02-19 LAB
ESTIMATED AVG GLUCOSE: 263 MG/DL (ref 68–131)
HBA1C MFR BLD: 10.8 % (ref 4–5.6)

## 2025-02-20 ENCOUNTER — RESULTS FOLLOW-UP (OUTPATIENT)
Dept: FAMILY MEDICINE | Facility: CLINIC | Age: OVER 89
End: 2025-02-20
Payer: MEDICARE

## 2025-02-20 DIAGNOSIS — E11.22 TYPE 2 DIABETES MELLITUS WITH STAGE 3 CHRONIC KIDNEY DISEASE, WITH LONG-TERM CURRENT USE OF INSULIN: ICD-10-CM

## 2025-02-20 DIAGNOSIS — Z79.4 TYPE 2 DIABETES MELLITUS WITH STAGE 3 CHRONIC KIDNEY DISEASE, WITH LONG-TERM CURRENT USE OF INSULIN: ICD-10-CM

## 2025-02-20 DIAGNOSIS — N18.30 TYPE 2 DIABETES MELLITUS WITH STAGE 3 CHRONIC KIDNEY DISEASE, WITH LONG-TERM CURRENT USE OF INSULIN: ICD-10-CM

## 2025-02-20 NOTE — PROGRESS NOTES
Has he been taking his lantus? Hasn't run out of it recently maybe? His A1c is high.   How much lantus is he taking if he is taking it?

## 2025-02-23 RX ORDER — INSULIN GLARGINE 100 [IU]/ML
15 INJECTION, SOLUTION SUBCUTANEOUS NIGHTLY
Qty: 45 ML | Refills: 3 | Status: SHIPPED | OUTPATIENT
Start: 2025-02-23

## 2025-02-23 NOTE — TELEPHONE ENCOUNTER
----- Message from Med Assistant Simona sent at 2/21/2025  9:54 AM CST -----  I spoke with pt wife Ms Medina, she said he is not taking his Lantus, pt said he does not need it.  She asked if you can call and speak to pt yourself he might take it. Please advise.   ----- Message -----  From: Hemal Varma MD  Sent: 2/20/2025   5:14 PM CST  To: [facility] Staff    Has he been taking his lantus? Hasn't run out of it recently maybe? His A1c is high.   How much lantus is he taking if he is taking it?   ----- Message -----  From: Darinel, [facility]  Sent: 2/18/2025   1:06 PM CST  To: Hemal Varma MD

## 2025-04-14 ENCOUNTER — PATIENT MESSAGE (OUTPATIENT)
Dept: HEMATOLOGY/ONCOLOGY | Facility: CLINIC | Age: OVER 89
End: 2025-04-14
Payer: MEDICARE

## 2025-07-24 ENCOUNTER — OCHSNER VIRTUAL EMERGENCY DEPARTMENT (OUTPATIENT)
Facility: CLINIC | Age: OVER 89
End: 2025-07-24
Payer: MEDICARE

## 2025-07-24 ENCOUNTER — OFFICE VISIT (OUTPATIENT)
Dept: URGENT CARE | Facility: CLINIC | Age: OVER 89
End: 2025-07-24
Payer: MEDICARE

## 2025-07-24 ENCOUNTER — PATIENT OUTREACH (OUTPATIENT)
Facility: OTHER | Age: OVER 89
End: 2025-07-24
Payer: MEDICARE

## 2025-07-24 ENCOUNTER — HOSPITAL ENCOUNTER (INPATIENT)
Facility: HOSPITAL | Age: OVER 89
LOS: 1 days | Discharge: HOME | DRG: 291 | End: 2025-07-26
Attending: EMERGENCY MEDICINE | Admitting: INTERNAL MEDICINE
Payer: MEDICARE

## 2025-07-24 VITALS
SYSTOLIC BLOOD PRESSURE: 146 MMHG | TEMPERATURE: 99 F | HEART RATE: 63 BPM | RESPIRATION RATE: 15 BRPM | DIASTOLIC BLOOD PRESSURE: 62 MMHG | OXYGEN SATURATION: 94 % | WEIGHT: 196 LBS | BODY MASS INDEX: 31.5 KG/M2 | HEIGHT: 66 IN

## 2025-07-24 DIAGNOSIS — R05.9 COUGH, UNSPECIFIED TYPE: ICD-10-CM

## 2025-07-24 DIAGNOSIS — I50.9 CONGESTIVE HEART FAILURE, UNSPECIFIED HF CHRONICITY, UNSPECIFIED HEART FAILURE TYPE: Primary | ICD-10-CM

## 2025-07-24 DIAGNOSIS — R53.83 OTHER FATIGUE: ICD-10-CM

## 2025-07-24 DIAGNOSIS — I50.9 CHF (CONGESTIVE HEART FAILURE): ICD-10-CM

## 2025-07-24 DIAGNOSIS — R06.02 SOB (SHORTNESS OF BREATH): ICD-10-CM

## 2025-07-24 DIAGNOSIS — I25.10 CARDIOVASCULAR DISEASE: ICD-10-CM

## 2025-07-24 DIAGNOSIS — R41.0 CONFUSION: Primary | ICD-10-CM

## 2025-07-24 DIAGNOSIS — I50.9 CONGESTIVE HEART FAILURE: ICD-10-CM

## 2025-07-24 DIAGNOSIS — R06.02 SHORTNESS OF BREATH: ICD-10-CM

## 2025-07-24 LAB
ABSOLUTE EOSINOPHIL (OHS): 1.05 K/UL
ABSOLUTE MONOCYTE (OHS): 1.31 K/UL (ref 0.3–1)
ABSOLUTE NEUTROPHIL COUNT (OHS): 10.95 K/UL (ref 1.8–7.7)
ALBUMIN SERPL BCP-MCNC: 3.8 G/DL (ref 3.5–5.2)
ALP SERPL-CCNC: 89 UNIT/L (ref 40–150)
ALT SERPL W/O P-5'-P-CCNC: 27 UNIT/L (ref 10–44)
AMPHET UR QL SCN: NEGATIVE
ANION GAP (OHS): 10 MMOL/L (ref 8–16)
AST SERPL-CCNC: 49 UNIT/L (ref 11–45)
BACTERIA #/AREA URNS HPF: ABNORMAL /HPF
BARBITURATE SCN PRESENT UR: NEGATIVE
BASOPHILS # BLD AUTO: 0.07 K/UL
BASOPHILS NFR BLD AUTO: 0.5 %
BENZODIAZ UR QL SCN: NEGATIVE
BILIRUB SERPL-MCNC: 0.6 MG/DL (ref 0.1–1)
BILIRUB UR QL STRIP.AUTO: NEGATIVE
BILIRUBIN, UA POC OHS: NEGATIVE
BLOOD, UA POC OHS: ABNORMAL
BUN SERPL-MCNC: 14 MG/DL (ref 8–23)
CALCIUM SERPL-MCNC: 8.9 MG/DL (ref 8.7–10.5)
CANNABINOIDS UR QL SCN: NEGATIVE
CHLORIDE SERPL-SCNC: 94 MMOL/L (ref 95–110)
CK SERPL-CCNC: 139 U/L (ref 20–200)
CLARITY UR: CLEAR
CLARITY, UA POC OHS: ABNORMAL
CO2 SERPL-SCNC: 26 MMOL/L (ref 23–29)
COCAINE UR QL SCN: NEGATIVE
COLOR UR AUTO: YELLOW
COLOR, UA POC OHS: YELLOW
CREAT SERPL-MCNC: 1.1 MG/DL (ref 0.5–1.4)
CREAT UR-MCNC: 83 MG/DL (ref 23–375)
CTP QC/QA: YES
CTP QC/QA: YES
D DIMER PPP IA.FEU-MCNC: 0.45 MG/L FEU
ERYTHROCYTE [DISTWIDTH] IN BLOOD BY AUTOMATED COUNT: 13.1 % (ref 11.5–14.5)
ETHANOL SERPL-MCNC: <10 MG/DL
GFR SERPLBLD CREATININE-BSD FMLA CKD-EPI: >60 ML/MIN/1.73/M2
GLUCOSE SERPL-MCNC: 220 MG/DL (ref 70–110)
GLUCOSE SERPL-MCNC: 246 MG/DL (ref 70–110)
GLUCOSE UR QL STRIP: ABNORMAL
GLUCOSE, UA POC OHS: 250
HCT VFR BLD AUTO: 35.1 % (ref 40–54)
HGB BLD-MCNC: 11.8 GM/DL (ref 14–18)
HGB UR QL STRIP: ABNORMAL
HOLD SPECIMEN: NORMAL
HYALINE CASTS #/AREA URNS LPF: 2 /LPF (ref 0–1)
IMM GRANULOCYTES # BLD AUTO: 0.06 K/UL (ref 0–0.04)
IMM GRANULOCYTES NFR BLD AUTO: 0.4 % (ref 0–0.5)
KETONES UR QL STRIP: NEGATIVE
KETONES, UA POC OHS: NEGATIVE
LEUKOCYTE ESTERASE UR QL STRIP: NEGATIVE
LEUKOCYTES, UA POC OHS: NEGATIVE
LYMPHOCYTES # BLD AUTO: 2 K/UL (ref 1–4.8)
MCH RBC QN AUTO: 29.7 PG (ref 27–31)
MCHC RBC AUTO-ENTMCNC: 33.6 G/DL (ref 32–36)
MCV RBC AUTO: 88 FL (ref 82–98)
METHADONE UR QL SCN: NEGATIVE
MICROSCOPIC COMMENT: ABNORMAL
NITRITE UR QL STRIP: NEGATIVE
NITRITE, UA POC OHS: NEGATIVE
NT-PROBNP SERPL-MCNC: 2807 PG/ML
NUCLEATED RBC (/100WBC) (OHS): 0 /100 WBC
OPIATES UR QL SCN: ABNORMAL
PCP UR QL: NEGATIVE
PH UR STRIP: 5 [PH]
PH, UA POC OHS: 5.5
PLATELET # BLD AUTO: 318 K/UL (ref 150–450)
PMV BLD AUTO: 9.6 FL (ref 9.2–12.9)
POC MOLECULAR INFLUENZA A AGN: NEGATIVE
POC MOLECULAR INFLUENZA B AGN: NEGATIVE
POCT GLUCOSE: 249 MG/DL (ref 70–110)
POTASSIUM SERPL-SCNC: 4.8 MMOL/L (ref 3.5–5.1)
PROT SERPL-MCNC: 7 GM/DL (ref 6–8.4)
PROT UR QL STRIP: ABNORMAL
PROTEIN, UA POC OHS: 100
RBC # BLD AUTO: 3.97 M/UL (ref 4.6–6.2)
RBC #/AREA URNS HPF: 0 /HPF (ref 0–4)
RELATIVE EOSINOPHIL (OHS): 6.8 %
RELATIVE LYMPHOCYTE (OHS): 13 % (ref 18–48)
RELATIVE MONOCYTE (OHS): 8.5 % (ref 4–15)
RELATIVE NEUTROPHIL (OHS): 70.8 % (ref 38–73)
SARS-COV+SARS-COV-2 AG RESP QL IA.RAPID: NEGATIVE
SODIUM SERPL-SCNC: 130 MMOL/L (ref 136–145)
SP GR UR STRIP: 1.02
SPECIFIC GRAVITY, UA POC OHS: >=1.03
TROPONIN I SERPL HS-MCNC: 14 NG/L
TROPONIN I SERPL HS-MCNC: 14 NG/L
UROBILINOGEN UR STRIP-ACNC: NEGATIVE EU/DL
UROBILINOGEN, UA POC OHS: 0.2
WBC # BLD AUTO: 15.44 K/UL (ref 3.9–12.7)
WBC #/AREA URNS HPF: 2 /HPF (ref 0–5)

## 2025-07-24 PROCEDURE — 94760 N-INVAS EAR/PLS OXIMETRY 1: CPT

## 2025-07-24 PROCEDURE — 71046 X-RAY EXAM CHEST 2 VIEWS: CPT | Mod: S$GLB,,, | Performed by: RADIOLOGY

## 2025-07-24 PROCEDURE — 80053 COMPREHEN METABOLIC PANEL: CPT | Performed by: EMERGENCY MEDICINE

## 2025-07-24 PROCEDURE — 99900031 HC PATIENT EDUCATION (STAT)

## 2025-07-24 PROCEDURE — 81003 URINALYSIS AUTO W/O SCOPE: CPT | Performed by: EMERGENCY MEDICINE

## 2025-07-24 PROCEDURE — 93010 ELECTROCARDIOGRAM REPORT: CPT | Mod: ,,, | Performed by: INTERNAL MEDICINE

## 2025-07-24 PROCEDURE — 25000242 PHARM REV CODE 250 ALT 637 W/ HCPCS: Performed by: SURGERY

## 2025-07-24 PROCEDURE — 63600175 PHARM REV CODE 636 W HCPCS: Performed by: SURGERY

## 2025-07-24 PROCEDURE — 99900035 HC TECH TIME PER 15 MIN (STAT)

## 2025-07-24 PROCEDURE — 84484 ASSAY OF TROPONIN QUANT: CPT | Performed by: EMERGENCY MEDICINE

## 2025-07-24 PROCEDURE — 82550 ASSAY OF CK (CPK): CPT | Performed by: EMERGENCY MEDICINE

## 2025-07-24 PROCEDURE — 94640 AIRWAY INHALATION TREATMENT: CPT | Mod: XB

## 2025-07-24 PROCEDURE — 85025 COMPLETE CBC W/AUTO DIFF WBC: CPT | Performed by: EMERGENCY MEDICINE

## 2025-07-24 PROCEDURE — 82962 GLUCOSE BLOOD TEST: CPT

## 2025-07-24 PROCEDURE — 85379 FIBRIN DEGRADATION QUANT: CPT | Performed by: SURGERY

## 2025-07-24 PROCEDURE — G0378 HOSPITAL OBSERVATION PER HR: HCPCS

## 2025-07-24 PROCEDURE — 94640 AIRWAY INHALATION TREATMENT: CPT

## 2025-07-24 PROCEDURE — 94799 UNLISTED PULMONARY SVC/PX: CPT

## 2025-07-24 PROCEDURE — 96375 TX/PRO/DX INJ NEW DRUG ADDON: CPT

## 2025-07-24 PROCEDURE — 25000003 PHARM REV CODE 250: Performed by: SURGERY

## 2025-07-24 PROCEDURE — 84484 ASSAY OF TROPONIN QUANT: CPT | Performed by: SURGERY

## 2025-07-24 PROCEDURE — 83036 HEMOGLOBIN GLYCOSYLATED A1C: CPT | Performed by: SURGERY

## 2025-07-24 PROCEDURE — 80307 DRUG TEST PRSMV CHEM ANLYZR: CPT | Performed by: EMERGENCY MEDICINE

## 2025-07-24 PROCEDURE — 96372 THER/PROPH/DIAG INJ SC/IM: CPT | Performed by: SURGERY

## 2025-07-24 PROCEDURE — 83880 ASSAY OF NATRIURETIC PEPTIDE: CPT | Performed by: EMERGENCY MEDICINE

## 2025-07-24 PROCEDURE — 82077 ASSAY SPEC XCP UR&BREATH IA: CPT | Performed by: EMERGENCY MEDICINE

## 2025-07-24 PROCEDURE — 94761 N-INVAS EAR/PLS OXIMETRY MLT: CPT

## 2025-07-24 PROCEDURE — 93005 ELECTROCARDIOGRAM TRACING: CPT

## 2025-07-24 PROCEDURE — 36415 COLL VENOUS BLD VENIPUNCTURE: CPT | Performed by: SURGERY

## 2025-07-24 PROCEDURE — 99285 EMERGENCY DEPT VISIT HI MDM: CPT | Mod: 25

## 2025-07-24 RX ORDER — ONDANSETRON HYDROCHLORIDE 2 MG/ML
4 INJECTION, SOLUTION INTRAVENOUS EVERY 8 HOURS PRN
Status: DISCONTINUED | OUTPATIENT
Start: 2025-07-24 | End: 2025-07-26 | Stop reason: HOSPADM

## 2025-07-24 RX ORDER — MEMANTINE HYDROCHLORIDE 5 MG/1
5 TABLET ORAL 2 TIMES DAILY
Status: DISCONTINUED | OUTPATIENT
Start: 2025-07-24 | End: 2025-07-26 | Stop reason: HOSPADM

## 2025-07-24 RX ORDER — LEVALBUTEROL 1.25 MG/.5ML
1.25 SOLUTION, CONCENTRATE RESPIRATORY (INHALATION) EVERY 8 HOURS
Status: DISCONTINUED | OUTPATIENT
Start: 2025-07-25 | End: 2025-07-26 | Stop reason: HOSPADM

## 2025-07-24 RX ORDER — TALC
6 POWDER (GRAM) TOPICAL NIGHTLY PRN
Status: DISCONTINUED | OUTPATIENT
Start: 2025-07-24 | End: 2025-07-26 | Stop reason: HOSPADM

## 2025-07-24 RX ORDER — INSULIN ASPART 100 [IU]/ML
0-5 INJECTION, SOLUTION INTRAVENOUS; SUBCUTANEOUS
Status: DISCONTINUED | OUTPATIENT
Start: 2025-07-24 | End: 2025-07-26 | Stop reason: HOSPADM

## 2025-07-24 RX ORDER — ACETAMINOPHEN 325 MG/1
650 TABLET ORAL EVERY 8 HOURS PRN
Status: DISCONTINUED | OUTPATIENT
Start: 2025-07-24 | End: 2025-07-26 | Stop reason: HOSPADM

## 2025-07-24 RX ORDER — IBUPROFEN 200 MG
24 TABLET ORAL
Status: DISCONTINUED | OUTPATIENT
Start: 2025-07-24 | End: 2025-07-26 | Stop reason: HOSPADM

## 2025-07-24 RX ORDER — FUROSEMIDE 10 MG/ML
40 INJECTION INTRAMUSCULAR; INTRAVENOUS
Status: COMPLETED | OUTPATIENT
Start: 2025-07-24 | End: 2025-07-24

## 2025-07-24 RX ORDER — PANTOPRAZOLE SODIUM 40 MG/1
40 TABLET, DELAYED RELEASE ORAL DAILY
Status: DISCONTINUED | OUTPATIENT
Start: 2025-07-25 | End: 2025-07-26 | Stop reason: HOSPADM

## 2025-07-24 RX ORDER — IBUPROFEN 200 MG
16 TABLET ORAL
Status: DISCONTINUED | OUTPATIENT
Start: 2025-07-24 | End: 2025-07-26 | Stop reason: HOSPADM

## 2025-07-24 RX ORDER — INSULIN GLARGINE 100 [IU]/ML
9 INJECTION, SOLUTION SUBCUTANEOUS NIGHTLY
Status: DISCONTINUED | OUTPATIENT
Start: 2025-07-24 | End: 2025-07-26 | Stop reason: HOSPADM

## 2025-07-24 RX ORDER — GLUCAGON 1 MG
1 KIT INJECTION
Status: DISCONTINUED | OUTPATIENT
Start: 2025-07-24 | End: 2025-07-26 | Stop reason: HOSPADM

## 2025-07-24 RX ORDER — SODIUM CHLORIDE 0.9 % (FLUSH) 0.9 %
10 SYRINGE (ML) INJECTION
Status: DISCONTINUED | OUTPATIENT
Start: 2025-07-24 | End: 2025-07-26 | Stop reason: HOSPADM

## 2025-07-24 RX ORDER — INSULIN ASPART 100 [IU]/ML
6 INJECTION, SOLUTION INTRAVENOUS; SUBCUTANEOUS
Status: DISCONTINUED | OUTPATIENT
Start: 2025-07-25 | End: 2025-07-26 | Stop reason: HOSPADM

## 2025-07-24 RX ORDER — LEVALBUTEROL 1.25 MG/.5ML
1.25 SOLUTION, CONCENTRATE RESPIRATORY (INHALATION)
Status: COMPLETED | OUTPATIENT
Start: 2025-07-24 | End: 2025-07-24

## 2025-07-24 RX ORDER — FUROSEMIDE 10 MG/ML
40 INJECTION INTRAMUSCULAR; INTRAVENOUS EVERY 12 HOURS
Status: DISCONTINUED | OUTPATIENT
Start: 2025-07-25 | End: 2025-07-26

## 2025-07-24 RX ADMIN — APIXABAN 5 MG: 5 TABLET, FILM COATED ORAL at 10:07

## 2025-07-24 RX ADMIN — INSULIN GLARGINE 9 UNITS: 100 INJECTION, SOLUTION SUBCUTANEOUS at 10:07

## 2025-07-24 RX ADMIN — LEVALBUTEROL 1.25 MG: 1.25 SOLUTION, CONCENTRATE RESPIRATORY (INHALATION) at 06:07

## 2025-07-24 RX ADMIN — MEMANTINE HYDROCHLORIDE 5 MG: 5 TABLET ORAL at 10:07

## 2025-07-24 RX ADMIN — FUROSEMIDE 40 MG: 10 INJECTION, SOLUTION INTRAVENOUS at 06:07

## 2025-07-24 RX ADMIN — LEVALBUTEROL 1.25 MG: 1.25 SOLUTION, CONCENTRATE RESPIRATORY (INHALATION) at 11:07

## 2025-07-24 NOTE — ED PROVIDER NOTES
"[facility]                                                  Chief Complaint  89 y.o. male with Altered Mental Status (Patient to ER with his wife who states patient has been confused and weak since yesterday morning 7am, states he is a diabetic and has a touch of dementia but it has been worse since yesterday )    History of Present Illness  Erasmo Dunbar presents to the emergency room sent over from urgent care for altered mental status, confusion, weakness, bladder incontinence, cough and congestion since yesterday.  On arrival patient has no appreciable neurologic deficits.  He has a GCS of 15.  He refused viral swabs in triage.  Wife states that he is not acting himself.    Past Medical History:   Diagnosis Date    Arthritis     Atrial fibrillation     Bladder mass     BPH (benign prostatic hyperplasia)     CHF (congestive heart failure)     Depression     Diabetes mellitus type II     Hyperlipidemia     Hypertension     Microscopic hematuria     Prostate cancer 10/01/2018    RLS (restless legs syndrome)     Stroke     TIA    Wears glasses      Past Surgical History:   Procedure Laterality Date    back injections      BACK SURGERY      cervical fusion    CARDIAC PACEMAKER PLACEMENT      CARDIAC SURGERY Left     pacemaker placement    CATARACT EXTRACTION W/  INTRAOCULAR LENS IMPLANT Bilateral     cataracts    CAUDAL EPIDURAL STEROID INJECTION N/A 9/30/2022    Procedure: CAUDAL EPIDURAL STEROID INJECTION WITH CATHETER;  Surgeon: Maria Guadalupe Ortiz MD;  Location: [facility];  Service: Pain Management;  Laterality: N/A;    COLECTOMY N/A     CYSTOSCOPY N/A     Pt stated, "I have had six Cystoscopy."    CYSTOSCOPY W/ RETROGRADES Bilateral 10/21/2019    Procedure: CYSTOSCOPY WITH RETROGRADE PYELOGRAM;  Surgeon: Elliott Coon MD;  Location: [facility];  Service: Urology;  Laterality: Bilateral;  OP 6    DIGITAL RECTAL EXAMINATION UNDER ANESTHESIA N/A 10/21/2019    Procedure: EXAM UNDER ANESTHESIA, " DIGITAL, RECTUM;  Surgeon: Elliott Coon MD;  Location: [facility];  Service: Urology;  Laterality: N/A;    EPIDURAL STEROID INJECTION INTO CERVICAL SPINE N/A 5/13/2022    Procedure: CERVICAL EPIDURAL STEROID INJECTION (C7-T1 INTERLAMINAR);  Surgeon: Maria Guadalupe Ortiz MD;  Location: [facility];  Service: Pain Management;  Laterality: N/A;    EPIDURAL STEROID INJECTION INTO LUMBAR SPINE N/A 3/12/2021    Procedure: LUMBAR EPIDURAL STEROID INJECTION (L4-5 INTERLAMINAR);  Surgeon: Maria Guadalupe Ortiz MD;  Location: [facility];  Service: Pain Management;  Laterality: N/A;    Ganglion Cyst Removed  Right     INJECTION OF ANESTHETIC AGENT AROUND MEDIAL BRANCH NERVES INNERVATING LUMBAR FACET JOINT Bilateral 12/18/2020    Procedure: LUMBAR FACET JOINT BLOCK (L4-5,L5-S1);  Surgeon: Maria Guadalupe Ortiz MD;  Location: [facility];  Service: Pain Management;  Laterality: Bilateral;    INJECTION OF ANESTHETIC AGENT AROUND MEDIAL BRANCH NERVES INNERVATING LUMBAR FACET JOINT Bilateral 1/15/2021    Procedure: LUMBAR FACET JOINT BLOCK (L4-5,L5-S1);  Surgeon: Maria Guadalupe Ortiz MD;  Location: [facility];  Service: Pain Management;  Laterality: Bilateral;    INJECTION OF ANESTHETIC AGENT AROUND NERVE Right 10/30/2020    Procedure: SUPERIOR LATERAL AND MEDIAL AND INFERIOR MEDIAL GENICULAR NERVE BLOCK;  Surgeon: Maria Guadalupe Ortiz MD;  Location: [facility];  Service: Pain Management;  Laterality: Right;    MYELOGRAPHY N/A 4/4/2022    Procedure: Myelogram;  Surgeon: Rufino Ramos MD;  Location: [address];  Service: Radiology;  Laterality: N/A;    Neck Fusion Bilateral     PROSTATE SURGERY      RADIOFREQUENCY ABLATION OF LUMBAR MEDIAL BRANCH NERVE AT SINGLE LEVEL Left 1/21/2022    Procedure: RADIOFREQUENCY ABLATION (L4-5,L5-S1);  Surgeon: Maria Guadalupe Ortiz MD;  Location: [facility];  Service: Pain Management;  Laterality: Left;    RADIOFREQUENCY THERMOCOAGULATION Right 3/4/2022    Procedure: RADIOFREQUENCY THERMAL COAGULATION (L4-5,L5-S1);  Surgeon: Maria Guadalupe Ortiz,  MD;  Location: [facility];  Service: Pain Management;  Laterality: Right;    ROTATOR CUFF REPAIR Right     SKIN BIOPSY      skin cancer    TOTAL KNEE ARTHROPLASTY  03/30/2017    right knee    TRANSURETHRAL RESECTION OF PROSTATE        Review of patient's allergies indicates:   Allergen Reactions    Iodinated contrast media     Iodine Hives     Iv iodine only        Review of Systems and Physical Exam     Review of Systems  -- Constitution - no fever, no weight loss, no loss of consciousness reports altered mental status/confusion  -- Eyes - no changes in vision, no redness, no swelling  -- Ear, Nose - no  earache, denies congestion  -- Mouth,Throat - no sore throat, no toothache, normal voice, normal swallowing  -- Respiratory - reports cough and congestion, no shortness of breath, no wheezing  -- Cardiovascular - denies chest pain, no palpitations,   -- Gastrointestinal - denies abdominal pain, denies nausea, vomiting, and diarrhea  -- Genitourinary - no dysuria, no denies flank pain, no hematuria or frequency reports incontinence  -- Musculoskeletal - denies back pain, negative for myalgias and arthralgias   -- Neurological - no headache, no neurologic changes, no loss of bladder or bowel function no seizure like activity, no changes in hearing or vision  -- Skin - denies skin changes, no rash, no hives, no suspected skin infection    Vital Signs   weight is 76.7 kg (169 lb). His temperature is 98.3 °F (36.8 °C). His blood pressure is 163/70 (abnormal) and his pulse is 67. His respiration is 18 and oxygen saturation is 95%.      Physical Exam  -- Nursing note and vitals reviewed  -- Constitutional:  Awake alert and oriented, GCS 15, no acute distress.  Appears well.  -- Head: Atraumatic. Normocephalic. No obvious abnormality  -- Eyes: Pupils are equal and reactive to light. Extraocular movements intact. No nystagmus.  No periorbital swelling. Normal conjunctiva.  -- Nose: Nose grossly normal in appearance, nares  grossly normal. No rhinorrhea.  -- Throat: Mucous membranes moist, pharynx normal, normal tonsils.  Airway patent.  -- Ears: External ears and TM normal bilaterally. Normal hearing.   -- Neck: Normal range of motion. Neck supple. No meningismus. No adenopathy  -- Cardiac: Normal rate, regular rhythm and normal heart sounds. No carotid bruit. No lower extremity edema.  -- Pulmonary: Normal respiratory effort, breath sounds equal bilaterally. Adequate flow.  No wheezing.  No crackles.  -- Abdominal: Soft, no tenderness, no guarding, no rebound. Normal bowel sounds.   -- Musculoskeletal: Normal range of motion, all 4 extremities 5/5 strength.  Neurovascularly intact. Atraumatic. No deformities.  -- Neurological:  Cranial nerves 2-12 grossly intact. No focal deficits.   -- Vascular: Posterior tibial, dorsalis pedis and radial pulses 2+ bilaterally    -- Lymphatics: No cervical or peripheral lymphadenopathy.   -- Skin: Warm and dry. No evidence of rash or cellulitis  -- Psychiatric: Normal mood and affect. Bedside behavior is appropriate.  Patient is cooperative.  Denies suicidal homicidal ideation.    Emergency Room Course     Treatment Course, Evaluation, and Medical Decision Making            Care transitioned to Dr Amos at 1800 for results and dispo       Kerrie Contreras MD  07/24/25 [id number]

## 2025-07-24 NOTE — ED NOTES
Patient wife refuses the COVID and FLU test states they just did that at urgent care   Also reports he has been having body aches and coughing that started yesterday as well

## 2025-07-24 NOTE — PLAN OF CARE-OVED
[facility] Emergency Department Plan of Care Note  Referral Source: Urgent Care                               Chief Complaint   Patient presents with    Altered Mental Status     MDM: Patient being evaluated in urgent care. 89 yo male who presents with cough, congestion, shortness of breath, confusion, difficulty with word finding, and bladder incontinence since yesterday.  Chest x-ray was done, reportedly no pneumonia but given the above symptoms recommend evaluation in urgent care for further workup.  Patient prefers [facility].  Provider notified.    Recommendation: Emergency Department            Emergency Department: [facility]               Encounter Diagnosis   Name Primary?    Confusion Yes

## 2025-07-24 NOTE — PROGRESS NOTES
"Subjective:      Patient ID: Erasmo Dunbar is a 89 y.o. male.    Vitals:  height is 5' 6" (1.676 m) and weight is 88.9 kg (196 lb). His oral temperature is 98.7 °F (37.1 °C). His blood pressure is 146/62 (abnormal) and his pulse is 63. His respiration is 15 and oxygen saturation is 94% (abnormal).     Chief Complaint: Cough    Pt's family states pt had an onset of a cough 3 days ago.  C/o congestion and confusion (AAOx1)  Pt's wife administered mucinex and deanna seltzer-- no relief  PMH of TIA and dementia    Cough  This is a new problem. The current episode started in the past 7 days. The problem has been gradually worsening. Associated symptoms include nasal congestion and shortness of breath. Pertinent negatives include no chest pain, chills, ear congestion, ear pain, fever, headaches, heartburn, hemoptysis, myalgias, postnasal drip, rash, rhinorrhea, sore throat, sweats, weight loss or wheezing. Associated symptoms comments: Congestion, confusion. Nothing aggravates the symptoms. Treatments tried: deanna seltzer, mucinex.     Constitution: Positive for fatigue. Negative for chills and fever.   HENT:  Positive for congestion. Negative for ear pain, postnasal drip and sore throat.    Cardiovascular:  Negative for chest pain.   Respiratory:  Positive for cough and shortness of breath. Negative for chest tightness, sputum production, bloody sputum, COPD, wheezing and asthma.    Gastrointestinal:  Negative for vomiting, diarrhea and heartburn.   Genitourinary:  Positive for bladder incontinence. Negative for dysuria, frequency, urgency, flank pain and hematuria.   Musculoskeletal:  Positive for back pain. Negative for muscle ache.   Skin:  Negative for rash.   Allergic/Immunologic: Negative for asthma.   Neurological:  Positive for speech difficulty and altered mental status. Negative for headaches.   Psychiatric/Behavioral:  Positive for altered mental status.       Objective:     Physical Exam   Constitutional: He " appears well-developed. He is cooperative.  Non-toxic appearance. He does not appear ill. No distress.   HENT:   Head: Normocephalic and atraumatic.   Ears:   Right Ear: Hearing, tympanic membrane, external ear and ear canal normal.   Left Ear: Hearing, tympanic membrane, external ear and ear canal normal.   Nose: Nose normal. No mucosal edema, rhinorrhea or nasal deformity. No epistaxis. Right sinus exhibits no maxillary sinus tenderness and no frontal sinus tenderness. Left sinus exhibits no maxillary sinus tenderness and no frontal sinus tenderness.   Mouth/Throat: Uvula is midline, oropharynx is clear and moist and mucous membranes are normal. No trismus in the jaw. Normal dentition. No uvula swelling. No oropharyngeal exudate, posterior oropharyngeal edema or posterior oropharyngeal erythema.   Eyes: Conjunctivae and lids are normal. No scleral icterus.   Neck: Trachea normal and phonation normal. Neck supple. No edema present. No erythema present. No neck rigidity present.   Cardiovascular: Normal rate, regular rhythm, normal heart sounds and normal pulses.   Pulmonary/Chest: Effort normal and breath sounds normal. No respiratory distress. He has no decreased breath sounds. He has no rhonchi.   Abdominal: Normal appearance.   Musculoskeletal: Normal range of motion.         General: No deformity. Normal range of motion.   Neurological: He is alert and stuporous. He is disoriented. He exhibits normal muscle tone. Coordination normal.      Comments: Oriented to wife and states he is at the doctor.  Did have some reported word finding issue in triage which wife says is not his normal.     Skin: Skin is warm, dry, intact, not diaphoretic and not pale.   Psychiatric: His speech is normal and behavior is normal. Judgment and thought content normal.   Nursing note and vitals reviewed.    Results for orders placed or performed in visit on 07/24/25   POCT Glucose, Hand-Held Device    Collection Time: 07/24/25 10:52 AM    Result Value Ref Range    POC Glucose 246 (A) 70 - 110 MG/DL   POCT Influenza A/B MOLECULAR    Collection Time: 07/24/25 10:54 AM   Result Value Ref Range    POC Molecular Influenza A Ag Negative Negative    POC Molecular Influenza B Ag Negative Negative     Acceptable Yes    SARS Coronavirus 2 Antigen, POCT Manual Read    Collection Time: 07/24/25 10:56 AM   Result Value Ref Range    SARS Coronavirus 2 Antigen Negative Negative, Presumptive Negative     Acceptable Yes    POCT Urinalysis(Instrument)    Collection Time: 07/24/25 11:16 AM   Result Value Ref Range    Color, POC UA Yellow Yellow, Straw, Colorless    Clarity, POC UA Slight Cloudy (A) Clear    Glucose, POC  (A) Negative    Bilirubin, POC UA Negative Negative    Ketones, POC UA Negative Negative    Spec Grav POC UA >=1.030 1.005 - 1.030    Blood, POC UA Trace-intact (A) Negative    pH, POC UA 5.5 5.0 - 8.0    Protein, POC  (A) Negative    Urobilinogen, POC UA 0.2 <=1.0    Nitrite, POC UA Negative Negative    WBC, POC UA Negative Negative     X-Ray Chest PA And Lateral  Result Date: 7/24/2025  EXAMINATION: XR CHEST PA AND LATERAL CLINICAL HISTORY: Cough, unspecified TECHNIQUE: PA and lateral views of the chest were performed. COMPARISON: 11/16/2024 FINDINGS: Pacemaker remains on the left with electrodes to right atrium and ventricle.  The heart size is upper normal.  Mediastinum shows aortic atherosclerosis.  The lungs are expanded and clear without acute consolidation or pleural fluid.  Mild pleural thickening is noted along the left chest wall similar to old exams.  Skeletal structures are intact.     No acute cardiopulmonary disease. Electronically signed by: Markie Nayak MD Date:    07/24/2025 Time:    11:18        Assessment:     1. Confusion    2. Cough, unspecified type    3. Other fatigue    4. Shortness of breath        Plan:   89 yo male who presents w/ wife and son from home (main historians of  today's visit) with cough, congestion, shortness of breath, confusion, difficulty with word finding, and bladder incontinence since yesterday. Son had a URI prior to onset but got better with Vitamin C. Significant pmhx of Diabetes, TIA, Dementia. I checked a blood sugar 246, UA shows blood, glucose, protein. Chest xray with no focal consolidation. Flu and covid 19 negative in clinic. Vital signs show a pulse ox of 95% on room air initial and now 94%. I'm on the fence of whether this milena should go to the ER. He presents in a wheelchair from home and does not normally use a wheelchair and has had new onset of confusion and loss of bladder control with no clear underlying cause of illness in Urgent Care setting. I'm thinking he would benefit from a sepsis work up and possibly a head CT w/ hx of pacemaker.  Will consult [facility] and possibly [facility].    Contacted [facility] and consulted with Dr. Reddy who agreed to MDM and consulting [facility].  I spoke with Dr. Gilbert Liu II with [facility] who agrees to MDM and ER.  Patient and family offered EMS however they would like to bring him to [facility] by private vehicle.      Confusion  -     POCT Glucose, Hand-Held Device  -     POCT Urinalysis(Instrument)  -     Refer to Emergency Dept.    Cough, unspecified type  -     SARS Coronavirus 2 Antigen, POCT Manual Read  -     POCT Influenza A/B MOLECULAR  -     X-Ray Chest PA And Lateral; Future; Expected date: 07/24/2025  -     Refer to Emergency Dept.    Other fatigue  -     POCT Glucose, Hand-Held Device  -     Refer to Emergency Dept.    Shortness of breath  -     Refer to Emergency Dept.             Additional MDM:     Heart Failure Score:   COPD = No

## 2025-07-24 NOTE — PROGRESS NOTES
"Patient seen per Georgiana Casarez, NP reports "cough, congestion, shortness of breath, confusion, difficulty with word finding, and bladder incontinence since yesterday. " Consulted Dr. Liu, Advised ED, Alf follow up 7/25/25  "

## 2025-07-24 NOTE — ED NOTES
Patient got out the bed while both side rails was up without using the call bell  while pt spouse left the room to use the restroom . MD notified

## 2025-07-25 PROBLEM — I50.9 CONGESTIVE HEART FAILURE: Status: ACTIVE | Noted: 2025-07-25

## 2025-07-25 LAB
ABSOLUTE EOSINOPHIL (OHS): 1.22 K/UL
ABSOLUTE MONOCYTE (OHS): 1.26 K/UL (ref 0.3–1)
ABSOLUTE NEUTROPHIL COUNT (OHS): 5.02 K/UL (ref 1.8–7.7)
ALBUMIN SERPL BCP-MCNC: 3.4 G/DL (ref 3.5–5.2)
ALP SERPL-CCNC: 82 UNIT/L (ref 40–150)
ALT SERPL W/O P-5'-P-CCNC: 22 UNIT/L (ref 10–44)
ANION GAP (OHS): 13 MMOL/L (ref 8–16)
AORTIC SIZE INDEX (SOV): 1.9 CM/M2
AORTIC SIZE INDEX: 2.1 CM/M2
ASCENDING AORTA: 4 CM
AST SERPL-CCNC: 21 UNIT/L (ref 11–45)
AV INDEX (PROSTH): 0.83
AV MEAN GRADIENT: 5 MMHG
AV PEAK GRADIENT: 10 MMHG
AV REGURGITATION PRESSURE HALF TIME: 704 MS
AV VALVE AREA BY VELOCITY RATIO: 2 CM²
AV VALVE AREA: 2.6 CM²
AV VELOCITY RATIO: 0.63
BASOPHILS # BLD AUTO: 0.08 K/UL
BASOPHILS NFR BLD AUTO: 0.8 %
BILIRUB SERPL-MCNC: 0.8 MG/DL (ref 0.1–1)
BSA FOR ECHO PROCEDURE: 2.01 M2
BUN SERPL-MCNC: 14 MG/DL (ref 8–23)
CALCIUM SERPL-MCNC: 9 MG/DL (ref 8.7–10.5)
CHLORIDE SERPL-SCNC: 93 MMOL/L (ref 95–110)
CO2 SERPL-SCNC: 29 MMOL/L (ref 23–29)
CREAT SERPL-MCNC: 1 MG/DL (ref 0.5–1.4)
CV ECHO LV RWT: 0.38 CM
DOP CALC AO PEAK VEL: 1.6 M/S
DOP CALC AO VTI: 24.9 CM
DOP CALC LVOT AREA: 3.1 CM2
DOP CALC LVOT DIAMETER: 2 CM
DOP CALC LVOT PEAK VEL: 1 M/S
DOP CALC LVOT STROKE VOLUME: 64.7 CM3
DOP CALCLVOT PEAK VEL VTI: 20.6 CM
E WAVE DECELERATION TIME: 213 MSEC
E/E' RATIO: 8 M/S
EAG (OHS): 269 MG/DL (ref 68–131)
ECHO LV POSTERIOR WALL: 0.8 CM (ref 0.6–1.1)
ERYTHROCYTE [DISTWIDTH] IN BLOOD BY AUTOMATED COUNT: 13.1 % (ref 11.5–14.5)
FRACTIONAL SHORTENING: 31 % (ref 28–44)
GFR SERPLBLD CREATININE-BSD FMLA CKD-EPI: >60 ML/MIN/1.73/M2
GLUCOSE SERPL-MCNC: 208 MG/DL (ref 70–110)
HBA1C MFR BLD: 11 % (ref 4–5.6)
HCT VFR BLD AUTO: 34.4 % (ref 40–54)
HGB BLD-MCNC: 11.5 GM/DL (ref 14–18)
IMM GRANULOCYTES # BLD AUTO: 0.03 K/UL (ref 0–0.04)
IMM GRANULOCYTES NFR BLD AUTO: 0.3 % (ref 0–0.5)
INTERVENTRICULAR SEPTUM: 1.1 CM (ref 0.6–1.1)
IVC DIAMETER: 1.21 CM
IVRT: 108 MSEC
LA MAJOR: 6 CM
LA MINOR: 6 CM
LA WIDTH: 4.3 CM
LEFT ATRIUM AREA SYSTOLIC (APICAL 2 CHAMBER): 23.65 CM2
LEFT ATRIUM AREA SYSTOLIC (APICAL 4 CHAMBER): 19.27 CM2
LEFT ATRIUM SIZE: 3.4 CM
LEFT ATRIUM VOLUME INDEX MOD: 32 ML/M2
LEFT ATRIUM VOLUME INDEX: 38 ML/M2
LEFT ATRIUM VOLUME MOD: 63 ML
LEFT ATRIUM VOLUME: 75 CM3
LEFT INTERNAL DIMENSION IN SYSTOLE: 2.9 CM (ref 2.1–4)
LEFT VENTRICLE DIASTOLIC VOLUME INDEX: 40.51 ML/M2
LEFT VENTRICLE DIASTOLIC VOLUME: 79 ML
LEFT VENTRICLE END SYSTOLIC VOLUME APICAL 2 CHAMBER: 74.51 ML
LEFT VENTRICLE END SYSTOLIC VOLUME APICAL 4 CHAMBER: 50.7 ML
LEFT VENTRICLE MASS INDEX: 65.5 G/M2
LEFT VENTRICLE SYSTOLIC VOLUME INDEX: 16.4 ML/M2
LEFT VENTRICLE SYSTOLIC VOLUME: 32 ML
LEFT VENTRICULAR INTERNAL DIMENSION IN DIASTOLE: 4.2 CM (ref 3.5–6)
LEFT VENTRICULAR MASS: 127.8 G
LV LATERAL E/E' RATIO: 6.2 M/S
LV SEPTAL E/E' RATIO: 10.1 M/S
LVED V (TEICH): 79 ML
LVES V (TEICH): 31.72 ML
LVOT MG: 1.87 MMHG
LVOT MV: 0.66 CM/S
LYMPHOCYTES # BLD AUTO: 2.22 K/UL (ref 1–4.8)
Lab: 2.3 CM/M
Lab: 2.4 CM/M
MAGNESIUM SERPL-MCNC: 1.2 MG/DL (ref 1.6–2.6)
MCH RBC QN AUTO: 29 PG (ref 27–31)
MCHC RBC AUTO-ENTMCNC: 33.4 G/DL (ref 32–36)
MCV RBC AUTO: 87 FL (ref 82–98)
MV PEAK E VEL: 0.81 M/S
NUCLEATED RBC (/100WBC) (OHS): 0 /100 WBC
OHS CV CPX PATIENT HEIGHT IN: 65
OHS CV RV/LV RATIO: 1.38 CM
OHS QRS DURATION: 180 MS
OHS QRS DURATION: 66 MS
OHS QTC CALCULATION: 410 MS
OHS QTC CALCULATION: 499 MS
PISA AR MAX VEL: 3.65 M/S
PISA MRMAX VEL: 4.09 M/S
PISA TR MAX VEL: 2.8 M/S
PLATELET # BLD AUTO: 299 K/UL (ref 150–450)
PMV BLD AUTO: 9.4 FL (ref 9.2–12.9)
POCT GLUCOSE: 158 MG/DL (ref 70–110)
POCT GLUCOSE: 198 MG/DL (ref 70–110)
POCT GLUCOSE: 199 MG/DL (ref 70–110)
POCT GLUCOSE: 217 MG/DL (ref 70–110)
POTASSIUM SERPL-SCNC: 3.9 MMOL/L (ref 3.5–5.1)
PROT SERPL-MCNC: 6.7 GM/DL (ref 6–8.4)
PULM VEIN S/D RATIO: 1.19
PV MV: 0.86 M/S
PV PEAK D VEL: 0.21 M/S
PV PEAK GRADIENT: 8 MMHG
PV PEAK S VEL: 0.25 M/S
PV PEAK VELOCITY: 1.41 M/S
RA MAJOR: 6.49 CM
RA PRESSURE ESTIMATED: 3 MMHG
RA VOL SYS: 30.73 ML
RA WIDTH: 4.4 CM
RBC # BLD AUTO: 3.96 M/UL (ref 4.6–6.2)
RELATIVE EOSINOPHIL (OHS): 12.4 %
RELATIVE LYMPHOCYTE (OHS): 22.6 % (ref 18–48)
RELATIVE MONOCYTE (OHS): 12.8 % (ref 4–15)
RELATIVE NEUTROPHIL (OHS): 51.1 % (ref 38–73)
RIGHT ATRIAL AREA: 15 CM2
RIGHT ATRIUM END SYSTOLIC VOLUME APICAL 4 CHAMBER INDEX BSA: 14.78 ML/M2
RIGHT ATRIUM VOLUME AREA LENGTH APICAL 4 CHAMBER: 28.83 ML
RIGHT VENTRICLE DIASTOLIC BASEL DIMENSION: 5.8 CM
RIGHT VENTRICLE DIASTOLIC LENGTH: 6.4 CM
RIGHT VENTRICLE DIASTOLIC MID DIMENSION: 2.6 CM
RIGHT VENTRICULAR END-DIASTOLIC DIMENSION: 3 CM
RIGHT VENTRICULAR LENGTH IN DIASTOLE (APICAL 4-CHAMBER VIEW): 6.4 CM
RV MID DIAMA: 2.62 CM
RV TB RVSP: 6 MMHG
RV TISSUE DOPPLER FREE WALL SYSTOLIC VELOCITY 1 (APICAL 4 CHAMBER VIEW): 12.99 CM/S
SINUS: 3.8 CM
SODIUM SERPL-SCNC: 135 MMOL/L (ref 136–145)
STJ: 3.3 CM
TASV: 12 CM/S
TDI LATERAL: 0.13 M/S
TDI SEPTAL: 0.08 M/S
TDI: 0.11 M/S
TR MAX PG: 32 MMHG
TRICUSPID ANNULAR PLANE SYSTOLIC EXCURSION: 1.6 CM
TV REST PULMONARY ARTERY PRESSURE: 34 MMHG
WBC # BLD AUTO: 9.83 K/UL (ref 3.9–12.7)
Z-SCORE OF LEFT VENTRICULAR DIMENSION IN END DIASTOLE: -2.8
Z-SCORE OF LEFT VENTRICULAR DIMENSION IN END SYSTOLE: -1.3

## 2025-07-25 PROCEDURE — 99222 1ST HOSP IP/OBS MODERATE 55: CPT | Mod: ,,, | Performed by: INTERNAL MEDICINE

## 2025-07-25 PROCEDURE — 94640 AIRWAY INHALATION TREATMENT: CPT

## 2025-07-25 PROCEDURE — 63600175 PHARM REV CODE 636 W HCPCS: Performed by: SURGERY

## 2025-07-25 PROCEDURE — 21400001 HC TELEMETRY ROOM

## 2025-07-25 PROCEDURE — 83735 ASSAY OF MAGNESIUM: CPT | Performed by: PHYSICIAN ASSISTANT

## 2025-07-25 PROCEDURE — 84295 ASSAY OF SERUM SODIUM: CPT | Performed by: PHYSICIAN ASSISTANT

## 2025-07-25 PROCEDURE — 94761 N-INVAS EAR/PLS OXIMETRY MLT: CPT

## 2025-07-25 PROCEDURE — 94640 AIRWAY INHALATION TREATMENT: CPT | Mod: XB

## 2025-07-25 PROCEDURE — 85025 COMPLETE CBC W/AUTO DIFF WBC: CPT | Performed by: PHYSICIAN ASSISTANT

## 2025-07-25 PROCEDURE — 99223 1ST HOSP IP/OBS HIGH 75: CPT | Mod: ,,, | Performed by: INTERNAL MEDICINE

## 2025-07-25 PROCEDURE — 99900035 HC TECH TIME PER 15 MIN (STAT)

## 2025-07-25 PROCEDURE — 25000242 PHARM REV CODE 250 ALT 637 W/ HCPCS: Performed by: SURGERY

## 2025-07-25 PROCEDURE — 25000003 PHARM REV CODE 250: Performed by: SURGERY

## 2025-07-25 PROCEDURE — 93010 ELECTROCARDIOGRAM REPORT: CPT | Mod: ,,, | Performed by: STUDENT IN AN ORGANIZED HEALTH CARE EDUCATION/TRAINING PROGRAM

## 2025-07-25 PROCEDURE — 36415 COLL VENOUS BLD VENIPUNCTURE: CPT | Performed by: PHYSICIAN ASSISTANT

## 2025-07-25 PROCEDURE — 25000003 PHARM REV CODE 250: Performed by: INTERNAL MEDICINE

## 2025-07-25 PROCEDURE — 96372 THER/PROPH/DIAG INJ SC/IM: CPT | Performed by: SURGERY

## 2025-07-25 PROCEDURE — 93005 ELECTROCARDIOGRAM TRACING: CPT

## 2025-07-25 PROCEDURE — 96365 THER/PROPH/DIAG IV INF INIT: CPT

## 2025-07-25 PROCEDURE — 96376 TX/PRO/DX INJ SAME DRUG ADON: CPT

## 2025-07-25 PROCEDURE — 63600175 PHARM REV CODE 636 W HCPCS: Performed by: PHYSICIAN ASSISTANT

## 2025-07-25 PROCEDURE — 36415 COLL VENOUS BLD VENIPUNCTURE: CPT | Performed by: INTERNAL MEDICINE

## 2025-07-25 PROCEDURE — 87040 BLOOD CULTURE FOR BACTERIA: CPT | Performed by: INTERNAL MEDICINE

## 2025-07-25 RX ORDER — MAGNESIUM SULFATE HEPTAHYDRATE 40 MG/ML
2 INJECTION, SOLUTION INTRAVENOUS ONCE
Status: DISCONTINUED | OUTPATIENT
Start: 2025-07-25 | End: 2025-07-25

## 2025-07-25 RX ORDER — LEVOFLOXACIN 500 MG/1
500 TABLET, FILM COATED ORAL DAILY
Status: DISCONTINUED | OUTPATIENT
Start: 2025-07-25 | End: 2025-07-26 | Stop reason: HOSPADM

## 2025-07-25 RX ORDER — ATORVASTATIN CALCIUM 10 MG/1
10 TABLET, FILM COATED ORAL NIGHTLY
Status: DISCONTINUED | OUTPATIENT
Start: 2025-07-25 | End: 2025-07-26 | Stop reason: HOSPADM

## 2025-07-25 RX ORDER — MAGNESIUM SULFATE HEPTAHYDRATE 40 MG/ML
2 INJECTION, SOLUTION INTRAVENOUS ONCE
Status: COMPLETED | OUTPATIENT
Start: 2025-07-25 | End: 2025-07-25

## 2025-07-25 RX ADMIN — ATORVASTATIN CALCIUM 10 MG: 10 TABLET, FILM COATED ORAL at 08:07

## 2025-07-25 RX ADMIN — APIXABAN 5 MG: 5 TABLET, FILM COATED ORAL at 09:07

## 2025-07-25 RX ADMIN — PANTOPRAZOLE SODIUM 40 MG: 40 TABLET, DELAYED RELEASE ORAL at 09:07

## 2025-07-25 RX ADMIN — LEVALBUTEROL 1.25 MG: 1.25 SOLUTION, CONCENTRATE RESPIRATORY (INHALATION) at 03:07

## 2025-07-25 RX ADMIN — MEMANTINE HYDROCHLORIDE 5 MG: 5 TABLET ORAL at 08:07

## 2025-07-25 RX ADMIN — INSULIN ASPART 2 UNITS: 100 INJECTION, SOLUTION INTRAVENOUS; SUBCUTANEOUS at 05:07

## 2025-07-25 RX ADMIN — INSULIN ASPART 6 UNITS: 100 INJECTION, SOLUTION INTRAVENOUS; SUBCUTANEOUS at 12:07

## 2025-07-25 RX ADMIN — LEVALBUTEROL 1.25 MG: 1.25 SOLUTION, CONCENTRATE RESPIRATORY (INHALATION) at 07:07

## 2025-07-25 RX ADMIN — INSULIN GLARGINE 9 UNITS: 100 INJECTION, SOLUTION SUBCUTANEOUS at 08:07

## 2025-07-25 RX ADMIN — FUROSEMIDE 40 MG: 10 INJECTION, SOLUTION INTRAMUSCULAR; INTRAVENOUS at 08:07

## 2025-07-25 RX ADMIN — LEVOFLOXACIN 500 MG: 500 TABLET, FILM COATED ORAL at 02:07

## 2025-07-25 RX ADMIN — MAGNESIUM SULFATE HEPTAHYDRATE 2 G: 40 INJECTION, SOLUTION INTRAVENOUS at 12:07

## 2025-07-25 RX ADMIN — APIXABAN 5 MG: 5 TABLET, FILM COATED ORAL at 08:07

## 2025-07-25 RX ADMIN — INSULIN ASPART 6 UNITS: 100 INJECTION, SOLUTION INTRAVENOUS; SUBCUTANEOUS at 05:07

## 2025-07-25 RX ADMIN — LEVALBUTEROL 1.25 MG: 1.25 SOLUTION, CONCENTRATE RESPIRATORY (INHALATION) at 11:07

## 2025-07-25 RX ADMIN — INSULIN ASPART 6 UNITS: 100 INJECTION, SOLUTION INTRAVENOUS; SUBCUTANEOUS at 09:07

## 2025-07-25 RX ADMIN — MEMANTINE HYDROCHLORIDE 5 MG: 5 TABLET ORAL at 09:07

## 2025-07-25 NOTE — HPI
Erasmo Dunbar is a 89 y.o. male   with a PMHx of Afib on eliquis, CHF, DM, HLD, HTN presents to the ED for shortness for breath cough and fever.  Wife who was sitting beside him reports that he has been running her and 1 fever while he came to the hospital.  Workup in the ER showed that he had bilateral pleural effusions and his BNP was very high.  He was admitted to the floor for congestive heart failure.  Wife does report that she has been having cough sputum and high fever which resolved later.  It looks like he has dementia and has forgetfulness.  He is not taking any dementia medicines at this moment.

## 2025-07-25 NOTE — CONSULTS
"[facility] - Med Surg (3rd Fl)  Cardiology  Consult Note    Patient Name: Erasmo Dunbar  MRN: [id number]  Admission Date: 7/24/2025  Hospital Length of Stay: 0 days  Code Status: Full Code   Consulting Provider: Jose Cheatham MD  Primary Care Physician: Hemal Varma MD  Principal Problem:Congestive heart failure      Inpatient consult to [facility]  Consult performed by: Ankit Thornton NP  Consult ordered by: Lewis Amos MD        Subjective:     Chief Complaint:  SOB    HPI:  89-year-old male with past medical history of CAD, diastolic heart failure, paroxysmal atrial fibrillation, prostate cancer, 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  Patient states that symptoms started yesterday afternoon.  Labs reveal elevated WBC, elevated BNP, uncontrolled diabetes with A1c of 11, chest x-ray with mild pulmonary edema and bilateral pleural effusions.  Cis asked to evaluate.    Past Medical History:   Diagnosis Date    Arthritis     Atrial fibrillation     Bladder mass     BPH (benign prostatic hyperplasia)     CHF (congestive heart failure)     Depression     Diabetes mellitus type II     Hyperlipidemia     Hypertension     Microscopic hematuria     Prostate cancer 10/01/2018    RLS (restless legs syndrome)     Stroke     TIA    Wears glasses        Past Surgical History:   Procedure Laterality Date    back injections      BACK SURGERY      cervical fusion    CARDIAC PACEMAKER PLACEMENT      CARDIAC SURGERY Left     pacemaker placement    CATARACT EXTRACTION W/  INTRAOCULAR LENS IMPLANT Bilateral     cataracts    CAUDAL EPIDURAL STEROID INJECTION N/A 9/30/2022    Procedure: CAUDAL EPIDURAL STEROID INJECTION WITH CATHETER;  Surgeon: Maria Guadalupe Ortiz MD;  Location: [facility] OR;  Service: Pain Management;  Laterality: N/A;    COLECTOMY N/A     CYSTOSCOPY N/A     Pt stated, "I have had six Cystoscopy." "    CYSTOSCOPY W/ RETROGRADES Bilateral 10/21/2019    Procedure: CYSTOSCOPY WITH RETROGRADE PYELOGRAM;  Surgeon: Elliott Coon MD;  Location: [facility] OR;  Service: Urology;  Laterality: Bilateral;  OP 6    DIGITAL RECTAL EXAMINATION UNDER ANESTHESIA N/A 10/21/2019    Procedure: EXAM UNDER ANESTHESIA, DIGITAL, RECTUM;  Surgeon: Elliott Coon MD;  Location: [facility] OR;  Service: Urology;  Laterality: N/A;    EPIDURAL STEROID INJECTION INTO CERVICAL SPINE N/A 5/13/2022    Procedure: CERVICAL EPIDURAL STEROID INJECTION (C7-T1 INTERLAMINAR);  Surgeon: Maria Guadalupe Ortiz MD;  Location: [facility];  Service: Pain Management;  Laterality: N/A;    EPIDURAL STEROID INJECTION INTO LUMBAR SPINE N/A 3/12/2021    Procedure: LUMBAR EPIDURAL STEROID INJECTION (L4-5 INTERLAMINAR);  Surgeon: Maria Guadalupe Ortiz MD;  Location: [facility];  Service: Pain Management;  Laterality: N/A;    Ganglion Cyst Removed  Right     INJECTION OF ANESTHETIC AGENT AROUND MEDIAL BRANCH NERVES INNERVATING LUMBAR FACET JOINT Bilateral 12/18/2020    Procedure: LUMBAR FACET JOINT BLOCK (L4-5,L5-S1);  Surgeon: Maria Guadalupe Ortiz MD;  Location: [facility];  Service: Pain Management;  Laterality: Bilateral;    INJECTION OF ANESTHETIC AGENT AROUND MEDIAL BRANCH NERVES INNERVATING LUMBAR FACET JOINT Bilateral 1/15/2021    Procedure: LUMBAR FACET JOINT BLOCK (L4-5,L5-S1);  Surgeon: Maria Guadalupe Ortiz MD;  Location: [facility];  Service: Pain Management;  Laterality: Bilateral;    INJECTION OF ANESTHETIC AGENT AROUND NERVE Right 10/30/2020    Procedure: SUPERIOR LATERAL AND MEDIAL AND INFERIOR MEDIAL GENICULAR NERVE BLOCK;  Surgeon: Maria Guadalupe Ortiz MD;  Location: [facility];  Service: Pain Management;  Laterality: Right;    MYELOGRAPHY N/A 4/4/2022    Procedure: Myelogram;  Surgeon: Rufino Ramos MD;  Location: [facility] OR [address] [place] [address];  Service: Radiology;  Laterality: N/A;    Neck Fusion Bilateral     PROSTATE SURGERY      RADIOFREQUENCY ABLATION OF LUMBAR MEDIAL BRANCH NERVE AT SINGLE LEVEL  "Left 1/21/2022    Procedure: RADIOFREQUENCY ABLATION (L4-5,L5-S1);  Surgeon: Maria Guadalupe Ortiz MD;  Location: [facility] OR;  Service: Pain Management;  Laterality: Left;    RADIOFREQUENCY THERMOCOAGULATION Right 3/4/2022    Procedure: RADIOFREQUENCY THERMAL COAGULATION (L4-5,L5-S1);  Surgeon: Maria Guadalupe Ortiz MD;  Location: [facility] OR;  Service: Pain Management;  Laterality: Right;    ROTATOR CUFF REPAIR Right     SKIN BIOPSY      skin cancer    TOTAL KNEE ARTHROPLASTY  03/30/2017    right knee    TRANSURETHRAL RESECTION OF PROSTATE         Review of patient's allergies indicates:   Allergen Reactions    Iodinated contrast media     Iodine Hives     Iv iodine only       Current Facility-Administered Medications on File Prior to Encounter   Medication    0.9%  NaCl infusion     Current Outpatient Medications on File Prior to Encounter   Medication Sig    acetaminophen (TYLENOL) 500 MG tablet Take 500 mg by mouth every 6 (six) hours as needed for Pain.    alcohol swabs (BD ALCOHOL SWABS) PadM Apply 1 each topically once daily. (Patient not taking: Reported on 7/24/2025)    atorvastatin (LIPITOR) 10 MG tablet Take 1 tablet (10 mg total) by mouth every evening.    blood sugar diagnostic (BLOOD GLUCOSE TEST) Strp Use one strip per glucometer to test blood glucose three times a day. Dx: E11.22    blood-glucose meter kit True Metrix Air Glucose Meter to test blood glucose three times a day. Dx: E11.22    cloNIDine 0.2 mg/24 hr td ptwk (CATAPRES) 0.2 mg/24 hr Place 1 patch onto the skin every 7 days.    cyanocobalamin 1,000 mcg/mL injection Inject 1 mL (1,000 mcg total) into the skin every 30 days. (Patient not taking: Reported on 7/24/2025)    DULoxetine (CYMBALTA) 60 MG capsule Take 1 capsule (60 mg total) by mouth once daily.    ECLIPSE SYRINGE 3 mL 25 gauge x 1" Syrg  (Patient not taking: Reported on 7/24/2025)    ELIQUIS 5 mg Tab Take 1 tablet (5 mg total) by mouth 2 (two) times daily.    furosemide (LASIX) 20 MG tablet Take 1 " "tablet (20 mg total) by mouth daily as needed (swelling).    HYDROcodone-acetaminophen (NORCO) 5-325 mg per tablet Take 1 tablet by mouth 2 (two) times daily as needed.    insulin glargine U-100, Lantus, (LANTUS SOLOSTAR U-100 INSULIN) 100 unit/mL (3 mL) InPn pen Inject 15 Units into the skin every evening.    lancets Misc Use one Lancet per lancing device to test blood glucose three times a day. Dx: E11.22    memantine (NAMENDA) 5 MG Tab TAKE 1 TABLET ONE TIME DAILY    metFORMIN (GLUCOPHAGE) 1000 MG tablet Take 1 tablet (1,000 mg total) by mouth 2 (two) times daily with meals.    MITIGARE 0.6 mg Cap Take 1 capsule by mouth once daily. (Patient not taking: Reported on 7/24/2025)    olmesartan (BENICAR) 40 MG tablet TAKE 1 TABLET ONE TIME DAILY    pantoprazole (PROTONIX) 40 MG tablet Take 1 tablet (40 mg total) by mouth once daily.    pen needle, diabetic 31 gauge x 3/16" Ndle 1 Device by Misc.(Non-Drug; Combo Route) route 3 (three) times daily.    pioglitazone (ACTOS) 15 MG tablet TAKE 1 TABLET ONE TIME DAILY    pregabalin (LYRICA) 100 MG capsule Take 1 capsule (100 mg total) by mouth 2 (two) times daily.    promethazine (PHENERGAN) 12.5 MG Tab Take 1 tablet (12.5 mg total) by mouth every 6 (six) hours as needed (nausea). (Patient not taking: Reported on 7/24/2025)    promethazine (PHENERGAN) 6.25 mg/5 mL syrup Take 10 mLs (12.5 mg total) by mouth every 8 (eight) hours as needed for Nausea. (Patient not taking: Reported on 7/24/2025)    REPATHA SURECLICK 140 mg/mL PnIj Inject 1 mL (140 mg total) into the skin every 14 (fourteen) days.    syringe with needle (SYRINGE 3CC/25GX1") 3 mL 25 gauge x 1" Syrg 1 Device by Misc.(Non-Drug; Combo Route) route every 30 days.    tiZANidine (ZANAFLEX) 4 MG tablet Take 1 tablet (4 mg total) by mouth daily as needed. (Patient not taking: Reported on 7/24/2025)    traZODone (DESYREL) 50 MG tablet TAKE 1 TABLET EVERY EVENING    UNABLE TO FIND 1,500 mg. medication name: CBD Oil " (Patient not taking: Reported on 2025)    vit C/E/zinc ox/ryan/lut/zeax (ICAPS AREDS2 ORAL) Take 1 capsule by mouth once daily. (Patient not taking: Reported on 2025)     Family History       Problem Relation (Age of Onset)    COPD Daughter    Cancer Mother, Daughter    Diabetes Sister, Brother, Daughter    Heart disease Father, Brother    Seizures Daughter          Tobacco Use    Smoking status: Former     Current packs/day: 0.00     Average packs/day: 1.5 packs/day for 35.0 years (52.5 ttl pk-yrs)     Types: Cigarettes     Start date: 1946     Quit date: 1981     Years since quittin.5     Passive exposure: Past    Smokeless tobacco: Never    Tobacco comments:     38 yrs ago   Substance and Sexual Activity    Alcohol use: Yes     Alcohol/week: 8.0 standard drinks of alcohol     Types: 1 Cans of beer, 7 Shots of liquor per week     Comment: highball every night    Drug use: Never    Sexual activity: Not Currently     Partners: Female     Review of Systems   Reason unable to perform ROS: confusion.     Objective:     Vital Signs (Most Recent):  Temp: 97.9 °F (36.6 °C) (25 1207)  Pulse: 62 (25 1207)  Resp: 16 (25 1207)  BP: (!) 145/67 (25 1207)  SpO2: 97 % (25 1207) Vital Signs (24h Range):  Temp:  [97.7 °F (36.5 °C)-98.9 °F (37.2 °C)] 97.9 °F (36.6 °C)  Pulse:  [59-89] 62  Resp:  [11-25] 16  SpO2:  [92 %-99 %] 97 %  BP: (133-189)/(60-77) 145/67     Weight: 88 kg (194 lb)  Body mass index is 32.28 kg/m².    SpO2: 97 %         Intake/Output Summary (Last 24 hours) at 2025 1330  Last data filed at 2025 1101  Gross per 24 hour   Intake 240 ml   Output 1275 ml   Net -1035 ml       Lines/Drains/Airways       Peripheral Intravenous Line  Duration             Peripheral IV Single Lumen 25 1320 18 G Anterior;Distal;Left Antecubital 1 day                    Physical Exam  Constitutional:       General: He is awake.   HENT:      Mouth/Throat:      Mouth:  Mucous membranes are moist.   Cardiovascular:      Rate and Rhythm: Rhythm irregular.      Pulses: Normal pulses.      Heart sounds: Normal heart sounds.   Pulmonary:      Effort: Pulmonary effort is normal.      Breath sounds: Examination of the right-lower field reveals decreased breath sounds. Examination of the left-lower field reveals decreased breath sounds. Decreased breath sounds present.   Musculoskeletal:      Cervical back: Normal range of motion.   Skin:     General: Skin is warm and dry.      Capillary Refill: Capillary refill takes less than 2 seconds.   Neurological:      Mental Status: He is confused.         Significant Labs: BMP:   Recent Labs   Lab 07/24/25  1325 07/25/25  0821   * 208*   * 135*   K 4.8 3.9   CL 94* 93*   CO2 26 29   BUN 14 14   CREATININE 1.1 1.0   CALCIUM 8.9 9.0   MG  --  1.2*   , CMP   Recent Labs   Lab 07/24/25  1325 07/25/25  0821   * 135*   K 4.8 3.9   CL 94* 93*   CO2 26 29   * 208*   BUN 14 14   CREATININE 1.1 1.0   CALCIUM 8.9 9.0   PROT 7.0 6.7   ALBUMIN 3.8 3.4*   BILITOT 0.6 0.8   ALKPHOS 89 82   AST 49* 21   ALT 27 22   ANIONGAP 10 13   , CBC   Recent Labs   Lab 07/24/25  1325 07/25/25  0821   WBC 15.44* 9.83   HGB 11.8* 11.5*   HCT 35.1* 34.4*    299   , and Troponin   Recent Labs   Lab 07/24/25  1325 07/24/25 2012   TROPONINIHS 14 14         Assessment and Plan:     Active Diagnoses:    Diagnosis Date Noted POA    PRINCIPAL PROBLEM:  Congestive heart failure [I50.9] 07/25/2025 Yes    Dementia, unspecified dementia severity, unspecified dementia type, unspecified whether behavioral, psychotic, or mood disturbance or anxiety [F03.90] 07/05/2023 Yes    Paroxysmal atrial fibrillation [I48.0] 10/13/2018 Yes    Type 2 diabetes mellitus with stage 3a chronic kidney disease, with long-term current use of insulin [E11.22, N18.31, Z79.4]  Not Applicable    Hyperlipidemia [E78.5]  Yes    Hypertension [I10]  Yes      Problems Resolved During  this Admission:       VTE Risk Mitigation (From admission, onward)           Ordered     apixaban tablet 5 mg  2 times daily         07/24/25 2158     IP VTE HIGH RISK PATIENT  Once         07/24/25 2158     Place sequential compression device  Until discontinued         07/24/25 2158                    Echocardiogram July 2024:   Ejection fraction 55-60%   1+ tricuspid regurgitation   Pulmonary artery pressure of 36 mm Hg.      Echo here pending read.    Diagnosis:   Acute on chronic diastolic heart failure  Chronic atrial fibrillation   Coronary artery disease   Diabetes mellitus   Ppm  Dyslipidemia    Plan:  Patient presents with symptoms of shortness of breath and chest x-ray consistent with pulmonary edema and pleural effusions.    Agree with admission for diuresis.    Continue Lasix 40 mg b.i.d..  continue strick I/O  Transition to oral within the next 24-48 hours based on response to diuresis.    Echocardiogram pending read, we will follow.    Noted uncontrolled diabetes and elevated WBCs, defer to primary.    Continue anticoagulation for stroke prophylaxis given underlying atrial fibrillation.    Resume beta-blocker therapy for rate control.  Continue apixiban for AF hx    Jose Cheatham MD scribed for Dr Cheatham  Cardiology   [facility] - Med Surg (3rd Fl)

## 2025-07-25 NOTE — SUBJECTIVE & OBJECTIVE
"Past Medical History:   Diagnosis Date    Arthritis     Atrial fibrillation     Bladder mass     BPH (benign prostatic hyperplasia)     CHF (congestive heart failure)     Depression     Diabetes mellitus type II     Hyperlipidemia     Hypertension     Microscopic hematuria     Prostate cancer 10/01/2018    RLS (restless legs syndrome)     Stroke     TIA    Wears glasses        Past Surgical History:   Procedure Laterality Date    back injections      BACK SURGERY      cervical fusion    CARDIAC PACEMAKER PLACEMENT      CARDIAC SURGERY Left     pacemaker placement    CATARACT EXTRACTION W/  INTRAOCULAR LENS IMPLANT Bilateral     cataracts    CAUDAL EPIDURAL STEROID INJECTION N/A 9/30/2022    Procedure: CAUDAL EPIDURAL STEROID INJECTION WITH CATHETER;  Surgeon: Maria Guadalupe Ortiz MD;  Location: [facility];  Service: Pain Management;  Laterality: N/A;    COLECTOMY N/A     CYSTOSCOPY N/A     Pt stated, "I have had six Cystoscopy."    CYSTOSCOPY W/ RETROGRADES Bilateral 10/21/2019    Procedure: CYSTOSCOPY WITH RETROGRADE PYELOGRAM;  Surgeon: Elliott Coon MD;  Location: [facility] OR;  Service: Urology;  Laterality: Bilateral;  OP 6    DIGITAL RECTAL EXAMINATION UNDER ANESTHESIA N/A 10/21/2019    Procedure: EXAM UNDER ANESTHESIA, DIGITAL, RECTUM;  Surgeon: Elliott Coon MD;  Location: [facility] OR;  Service: Urology;  Laterality: N/A;    EPIDURAL STEROID INJECTION INTO CERVICAL SPINE N/A 5/13/2022    Procedure: CERVICAL EPIDURAL STEROID INJECTION (C7-T1 INTERLAMINAR);  Surgeon: Maria Guadalupe Ortiz MD;  Location: [facility];  Service: Pain Management;  Laterality: N/A;    EPIDURAL STEROID INJECTION INTO LUMBAR SPINE N/A 3/12/2021    Procedure: LUMBAR EPIDURAL STEROID INJECTION (L4-5 INTERLAMINAR);  Surgeon: Maria Guadalupe Ortiz MD;  Location: [facility];  Service: Pain Management;  Laterality: N/A;    Ganglion Cyst Removed  Right     INJECTION OF ANESTHETIC AGENT AROUND MEDIAL BRANCH NERVES INNERVATING LUMBAR FACET JOINT Bilateral 12/18/2020    " Procedure: LUMBAR FACET JOINT BLOCK (L4-5,L5-S1);  Surgeon: Maria Guadalupe Ortiz MD;  Location: [facility] OR;  Service: Pain Management;  Laterality: Bilateral;    INJECTION OF ANESTHETIC AGENT AROUND MEDIAL BRANCH NERVES INNERVATING LUMBAR FACET JOINT Bilateral 1/15/2021    Procedure: LUMBAR FACET JOINT BLOCK (L4-5,L5-S1);  Surgeon: Maria Guadalupe Ortiz MD;  Location: [facility] OR;  Service: Pain Management;  Laterality: Bilateral;    INJECTION OF ANESTHETIC AGENT AROUND NERVE Right 10/30/2020    Procedure: SUPERIOR LATERAL AND MEDIAL AND INFERIOR MEDIAL GENICULAR NERVE BLOCK;  Surgeon: Maria Guadalupe Ortiz MD;  Location: [facility] OR;  Service: Pain Management;  Laterality: Right;    MYELOGRAPHY N/A 4/4/2022    Procedure: Myelogram;  Surgeon: Rufino Ramos MD;  Location: [facility] OR [address] [place] [address];  Service: Radiology;  Laterality: N/A;    Neck Fusion Bilateral     PROSTATE SURGERY      RADIOFREQUENCY ABLATION OF LUMBAR MEDIAL BRANCH NERVE AT SINGLE LEVEL Left 1/21/2022    Procedure: RADIOFREQUENCY ABLATION (L4-5,L5-S1);  Surgeon: Maria Guadalupe Ortiz MD;  Location: [facility] OR;  Service: Pain Management;  Laterality: Left;    RADIOFREQUENCY THERMOCOAGULATION Right 3/4/2022    Procedure: RADIOFREQUENCY THERMAL COAGULATION (L4-5,L5-S1);  Surgeon: Maria Guadalupe Ortiz MD;  Location: [facility] OR;  Service: Pain Management;  Laterality: Right;    ROTATOR CUFF REPAIR Right     SKIN BIOPSY      skin cancer    TOTAL KNEE ARTHROPLASTY  03/30/2017    right knee    TRANSURETHRAL RESECTION OF PROSTATE         Review of patient's allergies indicates:   Allergen Reactions    Iodinated contrast media     Iodine Hives     Iv iodine only       Current Facility-Administered Medications on File Prior to Encounter   Medication    0.9%  NaCl infusion     Current Outpatient Medications on File Prior to Encounter   Medication Sig    acetaminophen (TYLENOL) 500 MG tablet Take 500 mg by mouth every 6 (six) hours as needed for Pain.    alcohol swabs (BD ALCOHOL SWABS) PadM Apply 1 each  "topically once daily. (Patient not taking: Reported on 7/24/2025)    atorvastatin (LIPITOR) 10 MG tablet Take 1 tablet (10 mg total) by mouth every evening.    blood sugar diagnostic (BLOOD GLUCOSE TEST) Strp Use one strip per glucometer to test blood glucose three times a day. Dx: E11.22    blood-glucose meter kit True Metrix Air Glucose Meter to test blood glucose three times a day. Dx: E11.22    cloNIDine 0.2 mg/24 hr td ptwk (CATAPRES) 0.2 mg/24 hr Place 1 patch onto the skin every 7 days.    cyanocobalamin 1,000 mcg/mL injection Inject 1 mL (1,000 mcg total) into the skin every 30 days. (Patient not taking: Reported on 7/24/2025)    DULoxetine (CYMBALTA) 60 MG capsule Take 1 capsule (60 mg total) by mouth once daily.    ECLIPSE SYRINGE 3 mL 25 gauge x 1" Syrg  (Patient not taking: Reported on 7/24/2025)    ELIQUIS 5 mg Tab Take 1 tablet (5 mg total) by mouth 2 (two) times daily.    furosemide (LASIX) 20 MG tablet Take 1 tablet (20 mg total) by mouth daily as needed (swelling).    HYDROcodone-acetaminophen (NORCO) 5-325 mg per tablet Take 1 tablet by mouth 2 (two) times daily as needed.    insulin glargine U-100, Lantus, (LANTUS SOLOSTAR U-100 INSULIN) 100 unit/mL (3 mL) InPn pen Inject 15 Units into the skin every evening.    lancets Misc Use one Lancet per lancing device to test blood glucose three times a day. Dx: E11.22    memantine (NAMENDA) 5 MG Tab TAKE 1 TABLET ONE TIME DAILY    metFORMIN (GLUCOPHAGE) 1000 MG tablet Take 1 tablet (1,000 mg total) by mouth 2 (two) times daily with meals.    MITIGARE 0.6 mg Cap Take 1 capsule by mouth once daily. (Patient not taking: Reported on 7/24/2025)    olmesartan (BENICAR) 40 MG tablet TAKE 1 TABLET ONE TIME DAILY    pantoprazole (PROTONIX) 40 MG tablet Take 1 tablet (40 mg total) by mouth once daily.    pen needle, diabetic 31 gauge x 3/16" Ndle 1 Device by Misc.(Non-Drug; Combo Route) route 3 (three) times daily.    pioglitazone (ACTOS) 15 MG tablet TAKE 1 TABLET " "ONE TIME DAILY    pregabalin (LYRICA) 100 MG capsule Take 1 capsule (100 mg total) by mouth 2 (two) times daily.    promethazine (PHENERGAN) 12.5 MG Tab Take 1 tablet (12.5 mg total) by mouth every 6 (six) hours as needed (nausea). (Patient not taking: Reported on 2025)    promethazine (PHENERGAN) 6.25 mg/5 mL syrup Take 10 mLs (12.5 mg total) by mouth every 8 (eight) hours as needed for Nausea. (Patient not taking: Reported on 2025)    REPATHA SURECLICK 140 mg/mL PnIj Inject 1 mL (140 mg total) into the skin every 14 (fourteen) days.    syringe with needle (SYRINGE 3CC/25GX1") 3 mL 25 gauge x 1" Syrg 1 Device by Misc.(Non-Drug; Combo Route) route every 30 days.    tiZANidine (ZANAFLEX) 4 MG tablet Take 1 tablet (4 mg total) by mouth daily as needed. (Patient not taking: Reported on 2025)    traZODone (DESYREL) 50 MG tablet TAKE 1 TABLET EVERY EVENING    UNABLE TO FIND 1,500 mg. medication name: CBD Oil (Patient not taking: Reported on 2025)    vit C/E/zinc ox/ryan/lut/zeax (ICAPS AREDS2 ORAL) Take 1 capsule by mouth once daily. (Patient not taking: Reported on 2025)     Family History       Problem Relation (Age of Onset)    COPD Daughter    Cancer Mother, Daughter    Diabetes Sister, Brother, Daughter    Heart disease Father, Brother    Seizures Daughter          Tobacco Use    Smoking status: Former     Current packs/day: 0.00     Average packs/day: 1.5 packs/day for 35.0 years (52.5 ttl pk-yrs)     Types: Cigarettes     Start date: 1946     Quit date: 1981     Years since quittin.5     Passive exposure: Past    Smokeless tobacco: Never    Tobacco comments:     38 yrs ago   Substance and Sexual Activity    Alcohol use: Yes     Alcohol/week: 8.0 standard drinks of alcohol     Types: 1 Cans of beer, 7 Shots of liquor per week     Comment: highball every night    Drug use: Never    Sexual activity: Not Currently     Partners: Female     Review of Systems   Constitutional:  " Positive for fatigue and fever. Negative for activity change and chills.        Low grade fever   HENT:  Negative for congestion, postnasal drip, rhinorrhea and sore throat.    Eyes:  Negative for pain, discharge and visual disturbance.   Respiratory:  Positive for cough and shortness of breath.    Cardiovascular:  Negative for chest pain, palpitations and leg swelling.   Gastrointestinal:  Negative for abdominal distention, abdominal pain, constipation, diarrhea, nausea and vomiting.   Musculoskeletal:  Negative for back pain and joint swelling.   Skin:  Negative for rash and wound.   Neurological:  Negative for dizziness, syncope, weakness, light-headedness and headaches.   Psychiatric/Behavioral:  Negative for confusion.      Objective:     Vital Signs (Most Recent):  Temp: 97.9 °F (36.6 °C) (07/25/25 1207)  Pulse: 62 (07/25/25 1207)  Resp: 16 (07/25/25 1207)  BP: (!) 145/67 (07/25/25 1207)  SpO2: 97 % (07/25/25 1207) Vital Signs (24h Range):  Temp:  [97.7 °F (36.5 °C)-98.9 °F (37.2 °C)] 97.9 °F (36.6 °C)  Pulse:  [59-89] 62  Resp:  [11-25] 16  SpO2:  [92 %-99 %] 97 %  BP: (133-189)/(60-77) 145/67     Weight: 88 kg (194 lb)  Body mass index is 32.28 kg/m².     Physical Exam  Vitals reviewed.   Constitutional:       Appearance: He is well-developed.   HENT:      Head: Normocephalic and atraumatic.      Right Ear: External ear normal.      Left Ear: External ear normal.      Nose: Nose normal.   Eyes:      Pupils: Pupils are equal, round, and reactive to light.   Cardiovascular:      Rate and Rhythm: Normal rate and regular rhythm.      Heart sounds: Normal heart sounds.   Pulmonary:      Effort: Pulmonary effort is normal.      Breath sounds: Examination of the right-lower field reveals rales. Examination of the left-lower field reveals rales. Rales present.   Abdominal:      General: Bowel sounds are normal.      Palpations: Abdomen is soft.   Musculoskeletal:         General: Normal range of motion.       "Cervical back: Normal range of motion and neck supple.   Skin:     General: Skin is warm and dry.   Neurological:      Mental Status: He is alert and oriented to person, place, and time.      Deep Tendon Reflexes: Reflexes are normal and symmetric.              CRANIAL NERVES     CN III, IV, VI   Pupils are equal, round, and reactive to light.       Significant Labs: All pertinent labs within the past 24 hours have been reviewed.  A1C:   Recent Labs   Lab 02/18/25  1130 07/24/25  1325   HGBA1C 10.8* 11.0*     ABGs: No results for input(s): "PH", "PCO2", "HCO3", "POCSATURATED", "BE", "TOTALHB", "COHB", "METHB", "O2HB", "POCFIO2", "PO2" in the last 48 hours.  CBC:   Recent Labs   Lab 07/24/25  1325 07/25/25  0821   WBC 15.44* 9.83   HGB 11.8* 11.5*   HCT 35.1* 34.4*    299     CMP:   Recent Labs   Lab 07/24/25  1325 07/25/25  0821   * 135*   K 4.8 3.9   CL 94* 93*   CO2 26 29   * 208*   BUN 14 14   CREATININE 1.1 1.0   CALCIUM 8.9 9.0   PROT 7.0 6.7   ALBUMIN 3.8 3.4*   BILITOT 0.6 0.8   ALKPHOS 89 82   AST 49* 21   ALT 27 22   ANIONGAP 10 13     Cardiac Markers:   Recent Labs   Lab 07/24/25  1325   BNP 2,807*     Lactic Acid: No results for input(s): "LACTATE" in the last 48 hours.  Troponin:   Recent Labs   Lab 07/24/25  1325 07/24/25  2012   TROPONINIHS 14 14     TSH:   Recent Labs   Lab 02/18/25  1130   TSH 0.685     Urine Culture: No results for input(s): "LABURIN" in the last 48 hours.  Urine Studies:   Recent Labs   Lab 07/24/25  1116 07/24/25  1420   COLORU Yellow Yellow   APPEARANCEUA  --  Clear   PHUR 5.5 5.0   SPECGRAV >=1.030 1.020   PROTEINUA  --  2+*   GLUCUA  --  Trace*   KETONESU Negative  --    BILIRUBINUA  --  Negative   OCCULTUA  --  Trace*   NITRITE Negative Negative   UROBILINOGEN 0.2 Negative   LEUKOCYTESUR  --  Negative   RBCUA  --  0   WBCUA  --  2   BACTERIA  --  None   HYALINECASTS  --  2*   BNP  Recent Labs   Lab 07/24/25  1325   BNP 2,807*     EKG:  Atrial fibrillation "   Ventricular pacemaker, demand, functioning normally   Abnormal ECG   When compared with ECG of 16-Nov-2024 08:00,   Vent. rate has decreased by   2 bpm     Significant Imaging: I have reviewed all pertinent imaging results/findings within the past 24 hours.  CXR: I have reviewed all pertinent results/findings within the past 24 hours and my personal findings are:       CHF

## 2025-07-25 NOTE — ASSESSMENT & PLAN NOTE
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 On presentation their CHF was  Most recent BNP and echo results are listed below.  Recent Labs     07/24/25  1325   BNP 2,807*     Latest ECHO  No results found for this or any previous visit.    Current Heart Failure Medications  furosemide injection 40 mg, Every 12 hours, Intravenous    Plan  - Monitor strict I&Os and daily weights.    - Place on telemetry  Is ordered.  It was done so far I do not have the report back in the computer.  Continue IV Lasix 40 twice a day.  Cardiology consult.     TELE/STEPDOWN

## 2025-07-25 NOTE — ASSESSMENT & PLAN NOTE
Creatine stable for now. BMP reviewed- noted Estimated Creatinine Clearance: 51.1 mL/min (based on SCr of 1 mg/dL). according to latest data. Based on current GFR, CKD stage is stage 3 - GFR 30-59.  Monitor UOP and serial BMP and adjust therapy as needed. Renally dose meds. Avoid nephrotoxic medications and procedures.

## 2025-07-25 NOTE — ASSESSMENT & PLAN NOTE
Patient's blood pressure range in the last 24 hours was: BP  Min: 133/60  Max: 189/77.The patient's inpatient anti-hypertensive regimen is listed below:  Current Antihypertensives  furosemide injection 40 mg, Every 12 hours, Intravenous    Plan  - BP is controlled, no changes needed to their regimen

## 2025-07-25 NOTE — ASSESSMENT & PLAN NOTE
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 Dementia is moderate. The patient does not have signs of behavioral disturbance.. Continue non-pharmacologic interventions to prevent delirium (No VS between 11PM-5AM, activity during day, opening blinds, providing glasses/hearing aids, and up in chair during daytime). Will avoid narcotics and benzos unless absolutely necessary.

## 2025-07-25 NOTE — NURSING
Pt up from ED via stretcher. Report received from SARITA Lopes. VSS on RA, NADN, Pt denies any pain or SOB. Oriented to self only. Calm and cooperative. Fall monitoring in place. Plan of care reviewed. Call bell within reach.

## 2025-07-25 NOTE — PLAN OF CARE
07/25/25 1252   Rounds   Attendance Provider;Nurse    Discharge Plan A Home with family   Why the patient remains in the hospital Requires continued medical care   Transition of Care Barriers Other (see comments)  (case management assessing PACN's)     Care team reviewed plan of care and discharge plan.  CM will continue to follow till discharge.

## 2025-07-25 NOTE — ED PROVIDER NOTES
ER Physician Progress/Interval Note     Erasmo Dunbar is a 89 y.o. male that presented with SOB this afternoon  Patient is has a cough & shortness of breath per the son at bedside now  Patient is not a good historian with a significant dementia issue on triage  Patient went to urgent care, confused, coughing, SOB reported on triage  Patient was sent to the ER for an evaluation, dry hacking cough on arrival  Initially seen in the emergency room by Dr. Kerrie Contreras, daytime MD    EKG  Ventricular rate of 61 beats per minute  Atrial-sensed ventricular-paced rhythm with prolonged AV conduction  Abnormal ECG  When compared with ECG of 16-Nov-2024 08:00  Vent. rate has decreased by 2 bpm     Patient has a chest x-ray showing pleural effusion & mild interstitial edema  CT head showed no acute findings on review this evening    I questioned the patient, demented but appropriate to person & place  Wife at bedside, states he has been coughing, SOB with any activity  BNP is 2800, on PRN Lasix at home on my chart review tonight  IV Lasix & breathing treatment given in the emergency room tonight  Feeling better, will admit, observe, diuresis & breathing treatments  Will also consult [facility] cardiology for further evaluation while inpatient    Critical Care ED Physician Time (minutes):  -- Performed by: Lewis Amos M.D.  -- Date/Time: 9:40 PM 7/24/2025   -- Direct Patient Care (Face Time): 15  -- Additional History from Records or Taking Additional History: 15  -- Ordering, Reviewing, and Interpreting Diagnostic Studies: 15  -- Total Time in Documentation: 15  -- Consultation with Other Physicians: 15  -- Consultation with Family Related to Condition: 15  -- Total Critical Care Time: 90  -- Critical care was necessary to treat the following conditions:   -- Critical care was time spent personally by me on the following activities:   -- discussions with consultants regarding treatment plan today  -- development of treatment plan  with patient & their family  -- examination of patient, ordering and performing treatments   -- review of radiographic studies, re-evaluation of pt's condition  -- review of labs and evaluation of response to treatment           Lewis Amos M.D. 9:36 PM 7/24/2025         Lewis Amos MD  07/24/25 3600

## 2025-07-25 NOTE — PLAN OF CARE
[facility] - Med Surg (3rd Fl)  Initial Discharge Assessment       Primary Care Provider: Hemal Varma MD    Admission Diagnosis: SOB (shortness of breath) [R06.02]  Congestive heart failure, unspecified HF chronicity, unspecified heart failure type [I50.9]    Admission Date: 7/24/2025  Expected Discharge Date: 7/28/2025    Transition of Care Barriers: (P) None    Payor: [facility] MANAGED MCARE / Plan: [employer] [facility] MEDICARE ADVANTAGE / Product Type: Medicare Advantage /     Extended Emergency Contact Information  Primary Emergency Contact: Carol Dunbar  Address: [address] [place] [address] [place]  Mobile Phone: [phone]  Relation: Spouse  Secondary Emergency Contact: Pual Dunbar  Address: [address] [place] [address] [facility] [employer] [place]  Mobile Phone: [phone]  Relation: Son    Discharge Plan A: (P) Home Health  Discharge Plan B: (P) Home with family      [id number] - [place], [place] - [address]  [address]  [place] [address]  Phone: [phone] Fax: [phone]      Initial Assessment (most recent)       Adult Discharge Assessment - 07/25/25 1514          Discharge Assessment    Assessment Type Discharge Planning Assessment (P)      Confirmed/corrected address, phone number and insurance Yes (P)      Confirmed Demographics Correct on Facesheet (P)      Source of Information patient;family (P)      Communicated JOE with patient/caregiver Date not available/Unable to determine (P)      People in Home spouse (P)      Name(s) of People in Home Carol, spouse (P)      Facility Arrived From: Home (P)      Do you expect to return to your current living situation? Yes (P)      Do you have help at home or someone to help you manage your care at home? Yes (P)      Who are your caregiver(s) and their phone number(s)? Spouse (P)      Prior to hospitilization cognitive status: Alert/Oriented (P)      Current cognitive status:  Alert/Oriented (P)      Walking or Climbing Stairs Difficulty yes (P)      Walking or Climbing Stairs ambulation difficulty, requires equipment (P)      Mobility Management cane/walker (P)      Dressing/Bathing Difficulty no (P)      Home Accessibility not wheelchair accessible;stairs to enter home;stairs within home (P)      Stairs, Within Home, Primary 4 stairs in home toward back door and 2 more exiting back door (P)      Number of Stairs, Within Home, Primary six (P)      Stair Railings, Within Home, Primary none (P)      Number of Stairs, Main Entrance five (P)    also has ramp to front entrence    Home Layout Able to live on 1st floor (P)      Equipment Currently Used at Home walker, rolling;cane, straight (P)      Readmission within 30 days? No (P)      Patient currently being followed by outpatient case management? No (P)      Do you currently have service(s) that help you manage your care at home? No (P)      Do you take prescription medications? Yes (P)      Do you have prescription coverage? Yes (P)      Do you have any problems affording any of your prescribed medications? No (P)      Is the patient taking medications as prescribed? yes (P)      Who is going to help you get home at discharge? Family (P)      How do you get to doctors appointments? family or friend will provide (P)      Are you on dialysis? No (P)      Do you take coumadin? No (P)      Discharge Plan A Home Health (P)      Discharge Plan B Home with family (P)      DME Needed Upon Discharge  none (P)      Discharge Plan discussed with: Patient (P)      Transition of Care Barriers None (P)                           Discharge assessment completed with patient and spouse at bedside. Patient lives at home with spouse. Patient utilizes a walker or cane at times. Reports having some falls at home. Patient is open to the idea if needing therapy at discharge to going to a facility. Patient and spouse also open to home health in the home. CM to  remain available to assist with discharge needs.

## 2025-07-25 NOTE — ASSESSMENT & PLAN NOTE
Patient has paroxysmal (<7 days) atrial fibrillation. Patient is currently in atrial fibrillation. [id number] Score: 4. The patients heart rate in the last 24 hours is as follows:  Pulse  Min: 59  Max: 89     Antiarrhythmics       Anticoagulants  apixaban tablet 5 mg, 2 times daily, Oral    Plan  - Replete lytes with a goal of K>4, Mg >2  - Patient is anticoagulated, see medications listed above.  - Patient's afib is currently controlled  -

## 2025-07-25 NOTE — H&P
[facility] ([address] [place] [address] Medicine  History & Physical    Patient Name: Erasmo Dunbar  MRN: [id number]  Patient Class: IP- Inpatient  Admission Date: 7/24/2025  Attending Physician: Devon Peres MD   Primary Care Provider: Hemal Varma MD         Patient information was obtained from patient, spouse/SO, past medical records, and ER records.     Subjective:     Principal Problem:Congestive heart failure    Chief Complaint:   Chief Complaint   Patient presents with    Altered Mental Status     Patient to ER with his wife who states patient has been confused and weak since yesterday morning 7am, states he is a diabetic and has a touch of dementia but it has been worse since yesterday         HPI: Erasmo Dunbar is a 89 y.o. male   with a PMHx of Afib on eliquis, CHF, DM, HLD, HTN presents to the ED for shortness for breath cough and fever.  Wife who was sitting beside him reports that he has been running her and 1 fever while he came to the hospital.  Workup in the ER showed that he had bilateral pleural effusions and his BNP was very high.  He was admitted to the floor for congestive heart failure.  Wife does report that she has been having cough sputum and high fever which resolved later.  It looks like he has dementia and has forgetfulness.  He is not running any dementia medicines at this moment.    Past Medical History:   Diagnosis Date    Arthritis     Atrial fibrillation     Bladder mass     BPH (benign prostatic hyperplasia)     CHF (congestive heart failure)     Depression     Diabetes mellitus type II     Hyperlipidemia     Hypertension     Microscopic hematuria     Prostate cancer 10/01/2018    RLS (restless legs syndrome)     Stroke     TIA    Wears glasses        Past Surgical History:   Procedure Laterality Date    back injections      BACK SURGERY      cervical fusion    CARDIAC PACEMAKER PLACEMENT      CARDIAC SURGERY Left     pacemaker placement    CATARACT EXTRACTION W/   "INTRAOCULAR LENS IMPLANT Bilateral     cataracts    CAUDAL EPIDURAL STEROID INJECTION N/A 9/30/2022    Procedure: CAUDAL EPIDURAL STEROID INJECTION WITH CATHETER;  Surgeon: Maria Guadalupe Ortiz MD;  Location: Replaced by [facility] OR;  Service: Pain Management;  Laterality: N/A;    COLECTOMY N/A     CYSTOSCOPY N/A     Pt stated, "I have had six Cystoscopy."    CYSTOSCOPY W/ RETROGRADES Bilateral 10/21/2019    Procedure: CYSTOSCOPY WITH RETROGRADE PYELOGRAM;  Surgeon: Elliott Coon MD;  [facility];  Service: Urology;  Laterality: Bilateral;  OP 6    DIGITAL RECTAL EXAMINATION UNDER ANESTHESIA N/A 10/21/2019    Procedure: EXAM UNDER ANESTHESIA, DIGITAL, RECTUM;  Surgeon: Elliott Coon MD;  [facility];  Service: Urology;  Laterality: N/A;    EPIDURAL STEROID INJECTION INTO CERVICAL SPINE N/A 5/13/2022    Procedure: CERVICAL EPIDURAL STEROID INJECTION (C7-T1 INTERLAMINAR);  Surgeon: Maria Guadalupe Ortiz MD;  Location: Replaced by [facility] OR;  Service: Pain Management;  Laterality: N/A;    EPIDURAL STEROID INJECTION INTO LUMBAR SPINE N/A 3/12/2021    Procedure: LUMBAR EPIDURAL STEROID INJECTION (L4-5 INTERLAMINAR);  Surgeon: Maria Guadalupe Ortiz MD;  Location: Replaced by [facility] OR;  Service: Pain Management;  Laterality: N/A;    Ganglion Cyst Removed  Right     INJECTION OF ANESTHETIC AGENT AROUND MEDIAL BRANCH NERVES INNERVATING LUMBAR FACET JOINT Bilateral 12/18/2020    Procedure: LUMBAR FACET JOINT BLOCK (L4-5,L5-S1);  Surgeon: Maria Guadalupe Ortiz MD;  Location: Replaced by [facility] OR;  Service: Pain Management;  Laterality: Bilateral;    INJECTION OF ANESTHETIC AGENT AROUND MEDIAL BRANCH NERVES INNERVATING LUMBAR FACET JOINT Bilateral 1/15/2021    Procedure: LUMBAR FACET JOINT BLOCK (L4-5,L5-S1);  Surgeon: Maria Guadalupe Ortiz MD;  Location: Replaced by [facility] OR;  Service: Pain Management;  Laterality: Bilateral;    INJECTION OF ANESTHETIC AGENT AROUND NERVE Right 10/30/2020    Procedure: SUPERIOR LATERAL AND MEDIAL AND INFERIOR MEDIAL GENICULAR NERVE BLOCK;  Surgeon: Maria Guadalupe Ortiz MD;  " Location: [facility] OR;  Service: Pain Management;  Laterality: Right;    MYELOGRAPHY N/A 4/4/2022    Procedure: Myelogram;  Surgeon: Rufino Ramos MD;  Location: [facility] OR 2ND FLR;  Service: Radiology;  Laterality: N/A;    Neck Fusion Bilateral     PROSTATE SURGERY      RADIOFREQUENCY ABLATION OF LUMBAR MEDIAL BRANCH NERVE AT SINGLE LEVEL Left 1/21/2022    Procedure: RADIOFREQUENCY ABLATION (L4-5,L5-S1);  Surgeon: Maria Guadalupe Ortiz MD;  Location: [facility] OR;  Service: Pain Management;  Laterality: Left;    RADIOFREQUENCY THERMOCOAGULATION Right 3/4/2022    Procedure: RADIOFREQUENCY THERMAL COAGULATION (L4-5,L5-S1);  Surgeon: Maria Guadalupe Ortiz MD;  Location: [facility] OR;  Service: Pain Management;  Laterality: Right;    ROTATOR CUFF REPAIR Right     SKIN BIOPSY      skin cancer    TOTAL KNEE ARTHROPLASTY  03/30/2017    right knee    TRANSURETHRAL RESECTION OF PROSTATE         Review of patient's allergies indicates:   Allergen Reactions    Iodinated contrast media     Iodine Hives     Iv iodine only       Current Facility-Administered Medications on File Prior to Encounter   Medication    0.9%  NaCl infusion     Current Outpatient Medications on File Prior to Encounter   Medication Sig    acetaminophen (TYLENOL) 500 MG tablet Take 500 mg by mouth every 6 (six) hours as needed for Pain.    alcohol swabs (BD ALCOHOL SWABS) PadM Apply 1 each topically once daily. (Patient not taking: Reported on 7/24/2025)    atorvastatin (LIPITOR) 10 MG tablet Take 1 tablet (10 mg total) by mouth every evening.    blood sugar diagnostic (BLOOD GLUCOSE TEST) Strp Use one strip per glucometer to test blood glucose three times a day. Dx: E11.22    blood-glucose meter kit True Metrix Air Glucose Meter to test blood glucose three times a day. Dx: E11.22    cloNIDine 0.2 mg/24 hr td ptwk (CATAPRES) 0.2 mg/24 hr Place 1 patch onto the skin every 7 days.    cyanocobalamin 1,000 mcg/mL injection Inject 1 mL (1,000 mcg total) into the skin every 30 days.  "(Patient not taking: Reported on 7/24/2025)    DULoxetine (CYMBALTA) 60 MG capsule Take 1 capsule (60 mg total) by mouth once daily.    ECLIPSE SYRINGE 3 mL 25 gauge x 1" Syrg  (Patient not taking: Reported on 7/24/2025)    ELIQUIS 5 mg Tab Take 1 tablet (5 mg total) by mouth 2 (two) times daily.    furosemide (LASIX) 20 MG tablet Take 1 tablet (20 mg total) by mouth daily as needed (swelling).    HYDROcodone-acetaminophen (NORCO) 5-325 mg per tablet Take 1 tablet by mouth 2 (two) times daily as needed.    insulin glargine U-100, Lantus, (LANTUS SOLOSTAR U-100 INSULIN) 100 unit/mL (3 mL) InPn pen Inject 15 Units into the skin every evening.    lancets Misc Use one Lancet per lancing device to test blood glucose three times a day. Dx: E11.22    memantine (NAMENDA) 5 MG Tab TAKE 1 TABLET ONE TIME DAILY    metFORMIN (GLUCOPHAGE) 1000 MG tablet Take 1 tablet (1,000 mg total) by mouth 2 (two) times daily with meals.    MITIGARE 0.6 mg Cap Take 1 capsule by mouth once daily. (Patient not taking: Reported on 7/24/2025)    olmesartan (BENICAR) 40 MG tablet TAKE 1 TABLET ONE TIME DAILY    pantoprazole (PROTONIX) 40 MG tablet Take 1 tablet (40 mg total) by mouth once daily.    pen needle, diabetic 31 gauge x 3/16" Ndle 1 Device by Misc.(Non-Drug; Combo Route) route 3 (three) times daily.    pioglitazone (ACTOS) 15 MG tablet TAKE 1 TABLET ONE TIME DAILY    pregabalin (LYRICA) 100 MG capsule Take 1 capsule (100 mg total) by mouth 2 (two) times daily.    promethazine (PHENERGAN) 12.5 MG Tab Take 1 tablet (12.5 mg total) by mouth every 6 (six) hours as needed (nausea). (Patient not taking: Reported on 7/24/2025)    promethazine (PHENERGAN) 6.25 mg/5 mL syrup Take 10 mLs (12.5 mg total) by mouth every 8 (eight) hours as needed for Nausea. (Patient not taking: Reported on 7/24/2025)    REPATHA SURECLICK 140 mg/mL PnIj Inject 1 mL (140 mg total) into the skin every 14 (fourteen) days.    syringe with needle (SYRINGE 3CC/25GX1") 3 mL " "25 gauge x 1" Syrg 1 Device by Misc.(Non-Drug; Combo Route) route every 30 days.    tiZANidine (ZANAFLEX) 4 MG tablet Take 1 tablet (4 mg total) by mouth daily as needed. (Patient not taking: Reported on 2025)    traZODone (DESYREL) 50 MG tablet TAKE 1 TABLET EVERY EVENING    UNABLE TO FIND 1,500 mg. medication name: CBD Oil (Patient not taking: Reported on 2025)    vit C/E/zinc ox/ryan/lut/zeax (ICAPS AREDS2 ORAL) Take 1 capsule by mouth once daily. (Patient not taking: Reported on 2025)     Family History       Problem Relation (Age of Onset)    COPD Daughter    Cancer Mother, Daughter    Diabetes Sister, Brother, Daughter    Heart disease Father, Brother    Seizures Daughter          Tobacco Use    Smoking status: Former     Current packs/day: 0.00     Average packs/day: 1.5 packs/day for 35.0 years (52.5 ttl pk-yrs)     Types: Cigarettes     Start date: 1946     Quit date: 1981     Years since quittin.5     Passive exposure: Past    Smokeless tobacco: Never    Tobacco comments:     38 yrs ago   Substance and Sexual Activity    Alcohol use: Yes     Alcohol/week: 8.0 standard drinks of alcohol     Types: 1 Cans of beer, 7 Shots of liquor per week     Comment: highball every night    Drug use: Never    Sexual activity: Not Currently     Partners: Female     Review of Systems   Constitutional:  Positive for fatigue and fever. Negative for activity change and chills.        Low grade fever   HENT:  Negative for congestion, postnasal drip, rhinorrhea and sore throat.    Eyes:  Negative for pain, discharge and visual disturbance.   Respiratory:  Positive for cough and shortness of breath.    Cardiovascular:  Negative for chest pain, palpitations and leg swelling.   Gastrointestinal:  Negative for abdominal distention, abdominal pain, constipation, diarrhea, nausea and vomiting.   Musculoskeletal:  Negative for back pain and joint swelling.   Skin:  Negative for rash and wound. " "  Neurological:  Negative for dizziness, syncope, weakness, light-headedness and headaches.   Psychiatric/Behavioral:  Negative for confusion.      Objective:     Vital Signs (Most Recent):  Temp: 97.9 °F (36.6 °C) (07/25/25 1207)  Pulse: 62 (07/25/25 1207)  Resp: 16 (07/25/25 1207)  BP: (!) 145/67 (07/25/25 1207)  SpO2: 97 % (07/25/25 1207) Vital Signs (24h Range):  Temp:  [97.7 °F (36.5 °C)-98.9 °F (37.2 °C)] 97.9 °F (36.6 °C)  Pulse:  [59-89] 62  Resp:  [11-25] 16  SpO2:  [92 %-99 %] 97 %  BP: (133-189)/(60-77) 145/67     Weight: 88 kg (194 lb)  Body mass index is 32.28 kg/m².     Physical Exam  Vitals reviewed.   Constitutional:       Appearance: He is well-developed.   HENT:      Head: Normocephalic and atraumatic.      Right Ear: External ear normal.      Left Ear: External ear normal.      Nose: Nose normal.   Eyes:      Pupils: Pupils are equal, round, and reactive to light.   Cardiovascular:      Rate and Rhythm: Normal rate and regular rhythm.      Heart sounds: Normal heart sounds.   Pulmonary:      Effort: Pulmonary effort is normal.      Breath sounds: Examination of the right-lower field reveals rales. Examination of the left-lower field reveals rales. Rales present.   Abdominal:      General: Bowel sounds are normal.      Palpations: Abdomen is soft.   Musculoskeletal:         General: Normal range of motion.      Cervical back: Normal range of motion and neck supple.   Skin:     General: Skin is warm and dry.   Neurological:      Mental Status: He is alert and oriented to person, place, and time.      Deep Tendon Reflexes: Reflexes are normal and symmetric.              CRANIAL NERVES     CN III, IV, VI   Pupils are equal, round, and reactive to light.       Significant Labs: All pertinent labs within the past 24 hours have been reviewed.  A1C:   Recent Labs   Lab 02/18/25  1130 07/24/25  1325   HGBA1C 10.8* 11.0*     ABGs: No results for input(s): "PH", "PCO2", "HCO3", "POCSATURATED", "BE", " ""TOTALHB", "COHB", "METHB", "O2HB", "POCFIO2", "PO2" in the last 48 hours.  CBC:   Recent Labs   Lab 07/24/25  1325 07/25/25  0821   WBC 15.44* 9.83   HGB 11.8* 11.5*   HCT 35.1* 34.4*    299     CMP:   Recent Labs   Lab 07/24/25  1325 07/25/25  0821   * 135*   K 4.8 3.9   CL 94* 93*   CO2 26 29   * 208*   BUN 14 14   CREATININE 1.1 1.0   CALCIUM 8.9 9.0   PROT 7.0 6.7   ALBUMIN 3.8 3.4*   BILITOT 0.6 0.8   ALKPHOS 89 82   AST 49* 21   ALT 27 22   ANIONGAP 10 13     Cardiac Markers:   Recent Labs   Lab 07/24/25  1325   BNP 2,807*     Lactic Acid: No results for input(s): "LACTATE" in the last 48 hours.  Troponin:   Recent Labs   Lab 07/24/25  1325 07/24/25  2012   TROPONINIHS 14 14     TSH:   Recent Labs   Lab 02/18/25  1130   TSH 0.685     Urine Culture: No results for input(s): "LABURIN" in the last 48 hours.  Urine Studies:   Recent Labs   Lab 07/24/25  1116 07/24/25  1420   COLORU Yellow Yellow   APPEARANCEUA  --  Clear   PHUR 5.5 5.0   SPECGRAV >=1.030 1.020   PROTEINUA  --  2+*   GLUCUA  --  Trace*   KETONESU Negative  --    BILIRUBINUA  --  Negative   OCCULTUA  --  Trace*   NITRITE Negative Negative   UROBILINOGEN 0.2 Negative   LEUKOCYTESUR  --  Negative   RBCUA  --  0   WBCUA  --  2   BACTERIA  --  None   HYALINECASTS  --  2*   BNP  Recent Labs   Lab 07/24/25  1325   BNP 2,807*     EKG:  Atrial fibrillation   Ventricular pacemaker, demand, functioning normally   Abnormal ECG   When compared with ECG of 16-Nov-2024 08:00,   Vent. rate has decreased by   2 bpm     Significant Imaging: I have reviewed all pertinent imaging results/findings within the past 24 hours.  CXR: I have reviewed all pertinent results/findings within the past 24 hours and my personal findings are:       CHF  Assessment/Plan:     Assessment & Plan  Congestive heart failure  Patient has Combined Systolic and Diastolic heart failure that is Acute. On presentation their CHF was  Most recent BNP and echo results are " listed below.  Recent Labs     07/24/25  1325   BNP 2,807*     Latest ECHO  No results found for this or any previous visit.    Current Heart Failure Medications  furosemide injection 40 mg, Every 12 hours, Intravenous    Plan  - Monitor strict I&Os and daily weights.    - Place on telemetry  Is ordered.  It was done so far I do not have the report back in the computer.  Continue IV Lasix 40 twice a day.  Cardiology consult.    Hyperlipidemia  Continue statins    Hypertension  Patient's blood pressure range in the last 24 hours was: BP  Min: 133/60  Max: 189/77.The patient's inpatient anti-hypertensive regimen is listed below:  Current Antihypertensives  furosemide injection 40 mg, Every 12 hours, Intravenous    Plan  - BP is controlled, no changes needed to their regimen    Type 2 diabetes mellitus with stage 3a chronic kidney disease, with long-term current use of insulin  Creatine stable for now. BMP reviewed- noted Estimated Creatinine Clearance: 51.1 mL/min (based on SCr of 1 mg/dL). according to latest data. Based on current GFR, CKD stage is stage 3 - GFR 30-59.  Monitor UOP and serial BMP and adjust therapy as needed. Renally dose meds. Avoid nephrotoxic medications and procedures.  Paroxysmal atrial fibrillation  Patient has paroxysmal (<7 days) atrial fibrillation. Patient is currently in atrial fibrillation. [id number] Score: 4. The patients heart rate in the last 24 hours is as follows:  Pulse  Min: 59  Max: 89     Antiarrhythmics       Anticoagulants  apixaban tablet 5 mg, 2 times daily, Oral    Plan  - Replete lytes with a goal of K>4, Mg >2  - Patient is anticoagulated, see medications listed above.  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 Dementia is moderate. The patient does not have  signs of behavioral disturbance.. Continue non-pharmacologic interventions to prevent delirium (No VS between 11PM-5AM, activity during day, opening blinds, providing glasses/hearing aids, and up in chair during daytime). Will avoid narcotics and benzos unless absolutely necessary.     VTE Risk Mitigation (From admission, onward)           Ordered     apixaban tablet 5 mg  2 times daily         07/24/25 2158     IP VTE HIGH RISK PATIENT  Once         07/24/25 2158     Place sequential compression device  Until discontinued         07/24/25 2158                    SDOH Screening:  The patient was unable to be screened for utility difficulties, food insecurity, transport difficulties, housing insecurity, and interpersonal safety, so no concerns could be identified this admission.                              Devon Peres MD  Department of Hospital Medicine  [place] [facility] Surg (3rd Fl)

## 2025-07-26 VITALS
RESPIRATION RATE: 16 BRPM | HEIGHT: 65 IN | DIASTOLIC BLOOD PRESSURE: 58 MMHG | OXYGEN SATURATION: 95 % | WEIGHT: 185.88 LBS | HEART RATE: 62 BPM | TEMPERATURE: 98 F | SYSTOLIC BLOOD PRESSURE: 122 MMHG | BODY MASS INDEX: 30.97 KG/M2

## 2025-07-26 PROBLEM — J20.8 ACUTE BRONCHITIS DUE TO OTHER SPECIFIED ORGANISMS: Status: ACTIVE | Noted: 2025-07-26

## 2025-07-26 LAB
ABSOLUTE EOSINOPHIL (OHS): 0.66 K/UL
ABSOLUTE MONOCYTE (OHS): 1.13 K/UL (ref 0.3–1)
ABSOLUTE NEUTROPHIL COUNT (OHS): 5.77 K/UL (ref 1.8–7.7)
ALBUMIN SERPL BCP-MCNC: 3.6 G/DL (ref 3.5–5.2)
ALP SERPL-CCNC: 87 UNIT/L (ref 40–150)
ALT SERPL W/O P-5'-P-CCNC: 21 UNIT/L (ref 10–44)
ANION GAP (OHS): 14 MMOL/L (ref 8–16)
AST SERPL-CCNC: 26 UNIT/L (ref 11–45)
BASOPHILS # BLD AUTO: 0.07 K/UL
BASOPHILS NFR BLD AUTO: 0.7 %
BILIRUB SERPL-MCNC: 0.7 MG/DL (ref 0.1–1)
BUN SERPL-MCNC: 22 MG/DL (ref 8–23)
CALCIUM SERPL-MCNC: 9.3 MG/DL (ref 8.7–10.5)
CHLORIDE SERPL-SCNC: 92 MMOL/L (ref 95–110)
CO2 SERPL-SCNC: 26 MMOL/L (ref 23–29)
CREAT SERPL-MCNC: 1.4 MG/DL (ref 0.5–1.4)
ERYTHROCYTE [DISTWIDTH] IN BLOOD BY AUTOMATED COUNT: 12.9 % (ref 11.5–14.5)
GFR SERPLBLD CREATININE-BSD FMLA CKD-EPI: 48 ML/MIN/1.73/M2
GLUCOSE SERPL-MCNC: 260 MG/DL (ref 70–110)
HCT VFR BLD AUTO: 37.1 % (ref 40–54)
HGB BLD-MCNC: 12.5 GM/DL (ref 14–18)
IMM GRANULOCYTES # BLD AUTO: 0.04 K/UL (ref 0–0.04)
IMM GRANULOCYTES NFR BLD AUTO: 0.4 % (ref 0–0.5)
LYMPHOCYTES # BLD AUTO: 2.63 K/UL (ref 1–4.8)
MCH RBC QN AUTO: 28.9 PG (ref 27–31)
MCHC RBC AUTO-ENTMCNC: 33.7 G/DL (ref 32–36)
MCV RBC AUTO: 86 FL (ref 82–98)
NUCLEATED RBC (/100WBC) (OHS): 0 /100 WBC
PLATELET # BLD AUTO: 349 K/UL (ref 150–450)
PMV BLD AUTO: 9.8 FL (ref 9.2–12.9)
POCT GLUCOSE: 269 MG/DL (ref 70–110)
POCT GLUCOSE: 309 MG/DL (ref 70–110)
POTASSIUM SERPL-SCNC: 4 MMOL/L (ref 3.5–5.1)
PROT SERPL-MCNC: 7 GM/DL (ref 6–8.4)
RBC # BLD AUTO: 4.32 M/UL (ref 4.6–6.2)
RELATIVE EOSINOPHIL (OHS): 6.4 %
RELATIVE LYMPHOCYTE (OHS): 25.5 % (ref 18–48)
RELATIVE MONOCYTE (OHS): 11 % (ref 4–15)
RELATIVE NEUTROPHIL (OHS): 56 % (ref 38–73)
SODIUM SERPL-SCNC: 132 MMOL/L (ref 136–145)
WBC # BLD AUTO: 10.3 K/UL (ref 3.9–12.7)

## 2025-07-26 PROCEDURE — 25000003 PHARM REV CODE 250: Performed by: INTERNAL MEDICINE

## 2025-07-26 PROCEDURE — 94760 N-INVAS EAR/PLS OXIMETRY 1: CPT

## 2025-07-26 PROCEDURE — 25000242 PHARM REV CODE 250 ALT 637 W/ HCPCS: Performed by: SURGERY

## 2025-07-26 PROCEDURE — 36415 COLL VENOUS BLD VENIPUNCTURE: CPT | Performed by: INTERNAL MEDICINE

## 2025-07-26 PROCEDURE — 80053 COMPREHEN METABOLIC PANEL: CPT | Performed by: INTERNAL MEDICINE

## 2025-07-26 PROCEDURE — 85025 COMPLETE CBC W/AUTO DIFF WBC: CPT | Performed by: INTERNAL MEDICINE

## 2025-07-26 PROCEDURE — 25000003 PHARM REV CODE 250: Performed by: SURGERY

## 2025-07-26 PROCEDURE — 99239 HOSP IP/OBS DSCHRG MGMT >30: CPT | Mod: ,,, | Performed by: INTERNAL MEDICINE

## 2025-07-26 PROCEDURE — 99900035 HC TECH TIME PER 15 MIN (STAT)

## 2025-07-26 PROCEDURE — 94640 AIRWAY INHALATION TREATMENT: CPT

## 2025-07-26 RX ORDER — FUROSEMIDE 20 MG/1
20 TABLET ORAL DAILY
Qty: 30 TABLET | Refills: 0 | Status: SHIPPED | OUTPATIENT
Start: 2025-07-26

## 2025-07-26 RX ORDER — FUROSEMIDE 20 MG/1
20 TABLET ORAL DAILY
Status: DISCONTINUED | OUTPATIENT
Start: 2025-07-26 | End: 2025-07-26 | Stop reason: HOSPADM

## 2025-07-26 RX ORDER — FUROSEMIDE 20 MG/1
20 TABLET ORAL DAILY
Qty: 30 TABLET | Refills: 0 | Status: SHIPPED | OUTPATIENT
Start: 2025-07-26 | End: 2025-08-05 | Stop reason: SDUPTHER

## 2025-07-26 RX ORDER — LEVOFLOXACIN 500 MG/1
500 TABLET, FILM COATED ORAL DAILY
Qty: 5 TABLET | Refills: 0 | Status: SHIPPED | OUTPATIENT
Start: 2025-07-26 | End: 2025-07-31

## 2025-07-26 RX ADMIN — INSULIN ASPART 4 UNITS: 100 INJECTION, SOLUTION INTRAVENOUS; SUBCUTANEOUS at 11:07

## 2025-07-26 RX ADMIN — FUROSEMIDE 20 MG: 20 TABLET ORAL at 09:07

## 2025-07-26 RX ADMIN — APIXABAN 5 MG: 5 TABLET, FILM COATED ORAL at 09:07

## 2025-07-26 RX ADMIN — INSULIN ASPART 6 UNITS: 100 INJECTION, SOLUTION INTRAVENOUS; SUBCUTANEOUS at 09:07

## 2025-07-26 RX ADMIN — INSULIN ASPART 6 UNITS: 100 INJECTION, SOLUTION INTRAVENOUS; SUBCUTANEOUS at 11:07

## 2025-07-26 RX ADMIN — LEVALBUTEROL 1.25 MG: 1.25 SOLUTION, CONCENTRATE RESPIRATORY (INHALATION) at 07:07

## 2025-07-26 RX ADMIN — LEVOFLOXACIN 500 MG: 500 TABLET, FILM COATED ORAL at 09:07

## 2025-07-26 RX ADMIN — INSULIN ASPART 3 UNITS: 100 INJECTION, SOLUTION INTRAVENOUS; SUBCUTANEOUS at 09:07

## 2025-07-26 RX ADMIN — MEMANTINE HYDROCHLORIDE 5 MG: 5 TABLET ORAL at 09:07

## 2025-07-26 RX ADMIN — PANTOPRAZOLE SODIUM 40 MG: 40 TABLET, DELAYED RELEASE ORAL at 09:07

## 2025-07-26 NOTE — ASSESSMENT & PLAN NOTE
Patient has paroxysmal (<7 days) atrial fibrillation. Patient is currently in atrial fibrillation. [id number] Score: 4. The patients heart rate in the last 24 hours is as follows:  Pulse  Min: 55  Max: 73     Antiarrhythmics       Anticoagulants  apixaban tablet 5 mg, 2 times daily, Oral    Plan  - Replete lytes with a goal of K>4, Mg >2  - Patient is anticoagulated, see medications listed above.  - Patient's afib is currently controlled  -

## 2025-07-26 NOTE — ASSESSMENT & PLAN NOTE
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 On presentation their CHF was  Most recent BNP and echo results are listed below.  Recent Labs     07/24/25  1325   BNP 2,807*     Latest ECHO  No results found for this or any previous visit.    Current Heart Failure Medications  furosemide tablet 20 mg, Daily, Oral    Plan  - Monitor strict I&Os and daily weights.    - Place on telemetry  Is ordered.  It was done so far I do not have the report back in the computer.  Continue IV Lasix 40 twice a day.  Cardiology consult.      07/26/2025  IV Lasix twice a day has helped.  His breathing is better.  We will discharge him home on Lasix 20 mg once a day.

## 2025-07-26 NOTE — ASSESSMENT & PLAN NOTE
Patient's blood pressure range in the last 24 hours was: BP  Min: 122/58  Max: 157/71.The patient's inpatient anti-hypertensive regimen is listed below:  Current Antihypertensives  furosemide tablet 20 mg, Daily, Oral  furosemide (LASIX) tablet, Daily, Oral  furosemide (LASIX) tablet, Daily, Oral    Plan  - BP is controlled, no changes needed to their regimen

## 2025-07-26 NOTE — ASSESSMENT & PLAN NOTE
Patient's blood pressure range in the last 24 hours was: BP  Min: 122/58  Max: 157/71.The patient's inpatient anti-hypertensive regimen is listed below:  Current Antihypertensives  furosemide tablet 20 mg, Daily, Oral    Plan  - BP is controlled, no changes needed to their regimen

## 2025-07-26 NOTE — SUBJECTIVE & OBJECTIVE
Review of Systems   Constitutional:  Positive for fatigue. Negative for activity change and chills.   HENT:  Negative for congestion, postnasal drip, rhinorrhea and sore throat.    Eyes:  Negative for pain, discharge and visual disturbance.   Respiratory:  Positive for cough.    Cardiovascular:  Negative for chest pain, palpitations and leg swelling.   Gastrointestinal:  Negative for abdominal distention, abdominal pain, constipation, diarrhea, nausea and vomiting.   Musculoskeletal:  Negative for back pain and joint swelling.   Skin:  Negative for rash and wound.   Neurological:  Negative for dizziness, syncope, weakness, light-headedness and headaches.   Psychiatric/Behavioral:  Negative for confusion.      Objective:     Vital Signs (Most Recent):  Temp: 97.6 °F (36.4 °C) (07/26/25 0713)  Pulse: 62 (07/26/25 1000)  Resp: 16 (07/26/25 0731)  BP: (!) 122/58 (07/26/25 0713)  SpO2: 95 % (07/26/25 0731) Vital Signs (24h Range):  Temp:  [97.6 °F (36.4 °C)-98.6 °F (37 °C)] 97.6 °F (36.4 °C)  Pulse:  [55-73] 62  Resp:  [16-20] 16  SpO2:  [95 %-98 %] 95 %  BP: (122-157)/(58-71) 122/58     Weight: 84.3 kg (185 lb 13.6 oz)  Body mass index is 30.93 kg/m².    Intake/Output Summary (Last 24 hours) at 7/26/2025 1051  Last data filed at 7/26/2025 0839  Gross per 24 hour   Intake 523.12 ml   Output 1325 ml   Net -801.88 ml         Physical Exam  Vitals reviewed.   Constitutional:       Appearance: He is well-developed.   HENT:      Head: Normocephalic and atraumatic.      Right Ear: External ear normal.      Left Ear: External ear normal.      Nose: Nose normal.   Eyes:      Pupils: Pupils are equal, round, and reactive to light.   Cardiovascular:      Rate and Rhythm: Normal rate and regular rhythm.      Heart sounds: Normal heart sounds.   Pulmonary:      Effort: Pulmonary effort is normal.      Breath sounds: Examination of the right-lower field reveals rales. Examination of the left-lower field reveals rales. Rales  present.   Abdominal:      General: Bowel sounds are normal.      Palpations: Abdomen is soft.   Musculoskeletal:         General: Normal range of motion.      Cervical back: Normal range of motion and neck supple.   Skin:     General: Skin is warm and dry.   Neurological:      Mental Status: He is alert and oriented to person, place, and time.      Deep Tendon Reflexes: Reflexes are normal and symmetric.               Significant Labs: All pertinent labs within the past 24 hours have been reviewed.  CBC:   Recent Labs   Lab 07/24/25  1325 07/25/25  0821 07/26/25  0512   WBC 15.44* 9.83 10.30   HGB 11.8* 11.5* 12.5*   HCT 35.1* 34.4* 37.1*    299 349     CMP:   Recent Labs   Lab 07/24/25  1325 07/25/25  0821 07/26/25  0512   * 135* 132*   K 4.8 3.9 4.0   CL 94* 93* 92*   CO2 26 29 26   * 208* 260*   BUN 14 14 22   CREATININE 1.1 1.0 1.4   CALCIUM 8.9 9.0 9.3   PROT 7.0 6.7 7.0   ALBUMIN 3.8 3.4* 3.6   BILITOT 0.6 0.8 0.7   ALKPHOS 89 82 87   AST 49* 21 26   ALT 27 22 21   ANIONGAP 10 13 14       Significant Imaging: I have reviewed all pertinent imaging results/findings within the past 24 hours.  CXR: I have reviewed all pertinent results/findings within the past 24 hours and my personal findings are:  The lungs are under expanded with crowding of the pulmonary vascularity.  There is mild edema.  Small to moderate bilateral pleural effusions.  Heart is enlarged.  Left-sided pacer device is in place.  Age-appropriate degenerative changes affect the skeleton.

## 2025-07-26 NOTE — ASSESSMENT & PLAN NOTE
Patient has paroxysmal (<7 days) atrial fibrillation. Patient is currently in atrial fibrillation. [id number] Score: 4. The patients heart rate in the last 24 hours is as follows:  Pulse  Min: 55  Max: 73     Antiarrhythmics       Anticoagulants  apixaban tablet 5 mg, 2 times daily, Oral    Plan  - Replete lytes with a goal of K>4, Mg >2  - Patient is anticoagulated, see medications listed above.  - Patient's afib is currently controlled  -

## 2025-07-26 NOTE — PLAN OF CARE
Problem: Hospitalized Older Adult  Goal: Optimal Cognitive Function  Outcome: Progressing     Problem: Diabetes Comorbidity  Goal: Blood Glucose Level Within Targeted Range  Outcome: Progressing     Problem: Fall Injury Risk  Goal: Absence of Fall and Fall-Related Injury  Outcome: Progressing     Problem: Skin Injury Risk Increased  Goal: Skin Health and Integrity  Outcome: Progressing     Problem: Adult Inpatient Plan of Care  Goal: Readiness for Transition of Care  Outcome: Met

## 2025-07-26 NOTE — HOSPITAL COURSE
Date 07/26/2025.    Still has some cough.  Denies any shortness a breath.  Not requiring any oxygen.  He does have some forgetfulness and some behavior issues with dementia.  He diuresed well with IV Lasix.  We will discharge him home on p.o. Lasix 20 mg once a day and for his cough bronchitis we will send him home on Levaquin p.o. for 5 days

## 2025-07-26 NOTE — DISCHARGE SUMMARY
[place] - [facility] Surg ([facility])  [facility] Medicine  Discharge Summary      Patient Name: Erasmo Dunbar  MRN: [id number]  [facility]: [id number]  Patient Class: IP- Inpatient  Admission Date: 7/24/2025  Hospital Length of Stay: 1 days  Discharge Date and Time: 07/26/2025 11:00 AM  Attending Physician: Devon Peres MD   Discharging Provider: Devon Peres MD  Primary Care Provider: Hemal Varma MD    Primary Care Team: Networked reference to record PCT     HPI:   Erasmo Dunbar is a 89 y.o. male   with a PMHx of Afib on eliquis, CHF, DM, HLD, HTN presents to the ED for shortness for breath cough and fever.  Wife who was sitting beside him reports that he has been running her and 1 fever while he came to the hospital.  Workup in the ER showed that he had bilateral pleural effusions and his BNP was very high.  He was admitted to the floor for congestive heart failure.  Wife does report that she has been having cough sputum and high fever which resolved later.  It looks like he has dementia and has forgetfulness.  He is not taking any dementia medicines at this moment.    * No surgery found *      Hospital Course:   Date 07/26/2025.    Still has some cough.  Denies any shortness a breath.  Not requiring any oxygen.  He does have some forgetfulness and some behavior issues with dementia.  He diuresed well with IV Lasix.  We will discharge him home on p.o. Lasix 20 mg once a day and for his cough bronchitis we will send him home on Levaquin p.o. 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 On presentation their CHF was  Most recent BNP and echo results  are listed below.  Recent Labs     07/24/25  1325   BNP 2,807*     Latest ECHO  No results found for this or any previous visit.    Current Heart Failure Medications  furosemide tablet 20 mg, Daily, Oral  furosemide (LASIX) tablet, Daily, Oral  furosemide (LASIX) tablet, Daily, Oral    Plan  - Monitor strict I&Os and daily weights.    - Place on telemetry  Is ordered.  It was done so far I do not have the report back in the computer.  Continue IV Lasix 40 twice a day.  Cardiology consult.      07/26/2025  IV Lasix twice a day has helped.  His breathing is better.  We will discharge him home on Lasix 20 mg once a day.  Hyperlipidemia  Continue statin.    Hypertension  Patient's blood pressure range in the last 24 hours was: BP  Min: 122/58  Max: 157/71.The patient's inpatient anti-hypertensive regimen is listed below:  Current Antihypertensives  furosemide tablet 20 mg, Daily, Oral  furosemide (LASIX) tablet, Daily, Oral  furosemide (LASIX) tablet, Daily, Oral    Plan  - BP is controlled, no changes needed to their regimen    Type 2 diabetes mellitus with stage 3a chronic kidney disease, with long-term current use of insulin  Creatine stable for now. BMP reviewed- noted Estimated Creatinine Clearance: 35.7 mL/min (based on SCr of 1.4 mg/dL). according to latest data. Based on current GFR, CKD stage is stage 3 - GFR 30-59.  Monitor UOP and serial BMP and adjust therapy as needed. Renally dose meds. Avoid nephrotoxic medications and procedures.  Paroxysmal atrial fibrillation  Patient has paroxysmal (<7 days) atrial fibrillation. Patient is currently in atrial fibrillation. [id number] Score: 4. The patients heart rate in the last 24 hours is as follows:  Pulse  Min: 55  Max: 73     Antiarrhythmics       Anticoagulants  apixaban tablet 5 mg, 2 times daily, Oral    Plan  - Replete lytes with a goal of K>4, Mg >2  - Patient is anticoagulated, see medications listed above.  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 Dementia is moderate. The patient does not have signs of behavioral disturbance.. Continue non-pharmacologic interventions to prevent delirium (No VS between 11PM-5AM, activity during day, opening blinds, providing glasses/hearing aids, and up in chair during daytime). Will avoid narcotics and benzos unless absolutely necessary.     Acute bronchitis due to other specified organisms    At home he had fever cough and sputum production.  His white count was high.  I placed him on Levaquin  We will send him home on Levaquin for 5 days.  Final Active Diagnoses:    Diagnosis Date Noted POA    PRINCIPAL PROBLEM:  Congestive heart failure [I50.9] 07/25/2025 Yes    Acute bronchitis due to other specified organisms [J20.8] 07/26/2025 Yes    Dementia, unspecified dementia severity, unspecified dementia type, unspecified whether behavioral, psychotic, or mood disturbance or anxiety [F03.90] 07/05/2023 Yes    Paroxysmal atrial fibrillation [I48.0] 10/13/2018 Yes    Type 2 diabetes mellitus with stage 3a chronic kidney disease, with long-term current use of insulin [E11.22, N18.31, Z79.4]  Not Applicable    Hyperlipidemia [E78.5]  Yes    Hypertension [I10]  Yes      Problems Resolved During this Admission:       Discharged Condition: good    Disposition:  home        See PCP in 1 week.  See Cardiology [facility] in 2 weeks    Follow Up:    Patient Instructions:   No discharge procedures on file.    Significant Diagnostic Studies: Labs: BMP:   Recent Labs   Lab 07/24/25  1325 07/25/25  0821 07/26/25  0512   * 208* 260*   * 135* 132*   K 4.8 3.9 4.0   CL 94* 93* 92*   CO2 26 29 26   BUN 14 14 22   CREATININE 1.1 1.0 1.4   CALCIUM 8.9 9.0 9.3   MG  --  1.2*  --     and CBC   Recent Labs   Lab 07/24/25  1325  07/25/25  0821 07/26/25  0512   WBC 15.44* 9.83 10.30   HGB 11.8* 11.5* 12.5*   HCT 35.1* 34.4* 37.1*    299 349       Pending Diagnostic Studies:       None           Medications:  Reconciled Home Medications:      Medication List        START taking these medications      levoFLOXacin 500 MG tablet  Commonly known as: LEVAQUIN  Take 1 tablet (500 mg total) by mouth once daily. for 5 days            CHANGE how you take these medications      * furosemide 20 MG tablet  Commonly known as: LASIX  Take 1 tablet (20 mg total) by mouth once daily.  What changed:   when to take this  reasons to take this     * furosemide 20 MG tablet  Commonly known as: LASIX  Take 1 tablet (20 mg total) by mouth once daily.  What changed: You were already taking a medication with the same name, and this prescription was added. Make sure you understand how and when to take each.           * This list has 2 medication(s) that are the same as other medications prescribed for you. Read the directions carefully, and ask your doctor or other care provider to review them with you.                CONTINUE taking these medications      acetaminophen 500 MG tablet  Commonly known as: TYLENOL  Take 500 mg by mouth every 6 (six) hours as needed for Pain.     atorvastatin 10 MG tablet  Commonly known as: LIPITOR  Take 1 tablet (10 mg total) by mouth every evening.     blood sugar diagnostic Strp  Commonly known as: BLOOD GLUCOSE TEST  Use one strip per glucometer to test blood glucose three times a day. Dx: E11.22     blood-glucose meter kit  True Metrix Air Glucose Meter to test blood glucose three times a day. Dx: E11.22     cloNIDine 0.2 mg/24 hr td ptwk 0.2 mg/24 hr  Commonly known as: CATAPRES  Place 1 patch onto the skin every 7 days.     cyanocobalamin 1,000 mcg/mL injection  Inject 1 mL (1,000 mcg total) into the skin every 30 days.     DULoxetine 60 MG capsule  Commonly known as: CYMBALTA  Take 1 capsule (60 mg total) by mouth once  "daily.     ELIQUIS 5 mg Tab  Generic drug: apixaban  Take 1 tablet (5 mg total) by mouth 2 (two) times daily.     HYDROcodone-acetaminophen 5-325 mg per tablet  Commonly known as: NORCO  Take 1 tablet by mouth 2 (two) times daily as needed.     lancets Misc  Use one Lancet per lancing device to test blood glucose three times a day. Dx: E11.22     LANTUS SOLOSTAR U-100 INSULIN 100 unit/mL (3 mL) Inpn pen  Generic drug: insulin glargine U-100 (Lantus)  Inject 15 Units into the skin every evening.     memantine 5 MG Tab  Commonly known as: NAMENDA  TAKE 1 TABLET ONE TIME DAILY     metFORMIN 1000 MG tablet  Commonly known as: GLUCOPHAGE  Take 1 tablet (1,000 mg total) by mouth 2 (two) times daily with meals.     olmesartan 40 MG tablet  Commonly known as: BENICAR  TAKE 1 TABLET ONE TIME DAILY     pantoprazole 40 MG tablet  Commonly known as: PROTONIX  Take 1 tablet (40 mg total) by mouth once daily.     pen needle, diabetic 31 gauge x 3/16" Ndle  1 Device by Misc.(Non-Drug; Combo Route) route 3 (three) times daily.     pregabalin 100 MG capsule  Commonly known as: LYRICA  Take 1 capsule (100 mg total) by mouth 2 (two) times daily.     REPATHA SURECLICK 140 mg/mL Pnij  Generic drug: evolocumab  Inject 1 mL (140 mg total) into the skin every 14 (fourteen) days.     SYRINGE 3CC/25GX1" 3 mL 25 gauge x 1" Syrg  Generic drug: syringe with needle  1 Device by Misc.(Non-Drug; Combo Route) route every 30 days.     traZODone 50 MG tablet  Commonly known as: DESYREL  TAKE 1 TABLET EVERY EVENING            STOP taking these medications      alcohol swabs Padm  Commonly known as: ALCOHOL PREP     ECLIPSE SYRINGE 3 mL 25 gauge x 1" Syrg  Generic drug: syringe with needle, safety     ICAPS AREDS2 ORAL     MITIGARE 0.6 mg Cap  Generic drug: colchicine     pioglitazone 15 MG tablet  Commonly known as: ACTOS     promethazine 12.5 MG Tab  Commonly known as: PHENERGAN     promethazine 6.25 mg/5 mL syrup  Commonly known as: PHENERGAN   "   tiZANidine 4 MG tablet  Commonly known as: ZANAFLEX     UNABLE TO FIND              Indwelling Lines/Drains at time of discharge:   Lines/Drains/Airways       None                       Time spent on the discharge of patient: 35 minutes         Devon Peres MD  Department of Hospital Medicine  [place] - [facility] Surg (3rd Fl)

## 2025-07-26 NOTE — ASSESSMENT & PLAN NOTE
At home he had fever cough and sputum production.  His white count was high.  I placed him on Levaquin  We will send him home on Levaquin for 5 days.

## 2025-07-26 NOTE — ASSESSMENT & PLAN NOTE
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 On presentation their CHF was  Most recent BNP and echo results are listed below.  Recent Labs     07/24/25  1325   BNP 2,807*     Latest ECHO  No results found for this or any previous visit.    Current Heart Failure Medications  furosemide tablet 20 mg, Daily, Oral  furosemide (LASIX) tablet, Daily, Oral  furosemide (LASIX) tablet, Daily, Oral    Plan  - Monitor strict I&Os and daily weights.    - Place on telemetry  Is ordered.  It was done so far I do not have the report back in the computer.  Continue IV Lasix 40 twice a day.  Cardiology consult.      07/26/2025  IV Lasix twice a day has helped.  His breathing is better.  We will discharge him home on Lasix 20 mg once a day.

## 2025-07-26 NOTE — PLAN OF CARE
Problem: Hospitalized Older Adult  Goal: Optimal Cognitive Function  Outcome: Progressing     Problem: Hospitalized Older Adult  Goal: Effective Urinary Elimination  Outcome: Progressing     Problem: Adult Inpatient Plan of Care  Goal: Plan of Care Review  Outcome: Progressing     Problem: Adult Inpatient Plan of Care  Goal: Absence of Hospital-Acquired Illness or Injury  Outcome: Progressing

## 2025-07-26 NOTE — PROGRESS NOTES
[place] - [facility] Surg ([address] [place] [address] Medicine  Progress Note    Patient Name: Erasmo Dunbar  MRN: [id number]  Patient Class: IP- Inpatient   Admission Date: 7/24/2025  Length of Stay: 1 days  Attending Physician: Devon Peres MD  Primary Care Provider: Hemal Varma MD        Subjective     Principal Problem:Congestive heart failure        HPI:  Erasmo Dunbar is a 89 y.o. male   with a PMHx of Afib on eliquis, CHF, DM, HLD, HTN presents to the ED for shortness for breath cough and fever.  Wife who was sitting beside him reports that he has been running her and 1 fever while he came to the hospital.  Workup in the ER showed that he had bilateral pleural effusions and his BNP was very high.  He was admitted to the floor for congestive heart failure.  Wife does report that she has been having cough sputum and high fever which resolved later.  It looks like he has dementia and has forgetfulness.  He is not taking any dementia medicines at this moment.    Overview/Hospital Course:  Date 07/26/2025.    Still has some cough.  Denies any shortness a breath.  Not requiring any oxygen.  He does have some forgetfulness and some behavior issues with dementia.  He diuresed well with IV Lasix.  We will discharge him home on p.o. Lasix 20 mg once a day and for his cough bronchitis we will send him home on Levaquin p.o. for 5 days          Review of Systems   Constitutional:  Positive for fatigue. Negative for activity change and chills.   HENT:  Negative for congestion, postnasal drip, rhinorrhea and sore throat.    Eyes:  Negative for pain, discharge and visual disturbance.   Respiratory:  Positive for cough.    Cardiovascular:  Negative for chest pain, palpitations and leg swelling.   Gastrointestinal:  Negative for abdominal distention, abdominal pain, constipation, diarrhea, nausea and vomiting.   Musculoskeletal:  Negative for back pain and joint swelling.   Skin:  Negative for rash and wound.    Neurological:  Negative for dizziness, syncope, weakness, light-headedness and headaches.   Psychiatric/Behavioral:  Negative for confusion.      Objective:     Vital Signs (Most Recent):  Temp: 97.6 °F (36.4 °C) (07/26/25 0713)  Pulse: 62 (07/26/25 1000)  Resp: 16 (07/26/25 0731)  BP: (!) 122/58 (07/26/25 0713)  SpO2: 95 % (07/26/25 0731) Vital Signs (24h Range):  Temp:  [97.6 °F (36.4 °C)-98.6 °F (37 °C)] 97.6 °F (36.4 °C)  Pulse:  [55-73] 62  Resp:  [16-20] 16  SpO2:  [95 %-98 %] 95 %  BP: (122-157)/(58-71) 122/58     Weight: 84.3 kg (185 lb 13.6 oz)  Body mass index is 30.93 kg/m².    Intake/Output Summary (Last 24 hours) at 7/26/2025 1051  Last data filed at 7/26/2025 0839  Gross per 24 hour   Intake 523.12 ml   Output 1325 ml   Net -801.88 ml         Physical Exam  Vitals reviewed.   Constitutional:       Appearance: He is well-developed.   HENT:      Head: Normocephalic and atraumatic.      Right Ear: External ear normal.      Left Ear: External ear normal.      Nose: Nose normal.   Eyes:      Pupils: Pupils are equal, round, and reactive to light.   Cardiovascular:      Rate and Rhythm: Normal rate and regular rhythm.      Heart sounds: Normal heart sounds.   Pulmonary:      Effort: Pulmonary effort is normal.      Breath sounds: Examination of the right-lower field reveals rales. Examination of the left-lower field reveals rales. Rales present.   Abdominal:      General: Bowel sounds are normal.      Palpations: Abdomen is soft.   Musculoskeletal:         General: Normal range of motion.      Cervical back: Normal range of motion and neck supple.   Skin:     General: Skin is warm and dry.   Neurological:      Mental Status: He is alert and oriented to person, place, and time.      Deep Tendon Reflexes: Reflexes are normal and symmetric.               Significant Labs: All pertinent labs within the past 24 hours have been reviewed.  CBC:   Recent Labs   Lab 07/24/25  1325 07/25/25  0821 07/26/25  0512    WBC 15.44* 9.83 10.30   HGB 11.8* 11.5* 12.5*   HCT 35.1* 34.4* 37.1*    299 349     CMP:   Recent Labs   Lab 07/24/25  1325 07/25/25  0821 07/26/25  0512   * 135* 132*   K 4.8 3.9 4.0   CL 94* 93* 92*   CO2 26 29 26   * 208* 260*   BUN 14 14 22   CREATININE 1.1 1.0 1.4   CALCIUM 8.9 9.0 9.3   PROT 7.0 6.7 7.0   ALBUMIN 3.8 3.4* 3.6   BILITOT 0.6 0.8 0.7   ALKPHOS 89 82 87   AST 49* 21 26   ALT 27 22 21   ANIONGAP 10 13 14       Significant Imaging: I have reviewed all pertinent imaging results/findings within the past 24 hours.  CXR: I have reviewed all pertinent results/findings within the past 24 hours and my personal findings are:  The lungs are under expanded with crowding of the pulmonary vascularity.  There is mild edema.  Small to moderate bilateral pleural effusions.  Heart is enlarged.  Left-sided pacer device is in place.  Age-appropriate degenerative changes affect the skeleton.      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 On presentation their CHF was  Most recent BNP and echo results are listed below.  Recent Labs     07/24/25  1325   BNP 2,807*     Latest ECHO  No results found for this or any previous visit.    Current Heart Failure Medications  furosemide tablet 20 mg, Daily, Oral    Plan  - Monitor strict I&Os and daily weights.    - Place on telemetry  Is ordered.  It was done so far I do not have the report back in the computer.  Continue IV Lasix 40 twice a day.  Cardiology consult.      07/26/2025  IV Lasix twice a day has helped.  His breathing is better.  We will discharge him home on Lasix 20 mg once a day.  Hyperlipidemia  Continue statin.    Hypertension  Patient's blood pressure range in the last 24 hours was: BP  Min: 122/58  Max: 157/71.The patient's inpatient anti-hypertensive regimen is listed below:  Current Antihypertensives  furosemide tablet 20 mg, Daily, Oral    Plan  - BP is  controlled, no changes needed to their regimen    Type 2 diabetes mellitus with stage 3a chronic kidney disease, with long-term current use of insulin  Creatine stable for now. BMP reviewed- noted Estimated Creatinine Clearance: 35.7 mL/min (based on SCr of 1.4 mg/dL). according to latest data. Based on current GFR, CKD stage is stage 3 - GFR 30-59.  Monitor UOP and serial BMP and adjust therapy as needed. Renally dose meds. Avoid nephrotoxic medications and procedures.  Paroxysmal atrial fibrillation  Patient has paroxysmal (<7 days) atrial fibrillation. Patient is currently in atrial fibrillation. [id number] Score: 4. The patients heart rate in the last 24 hours is as follows:  Pulse  Min: 55  Max: 73     Antiarrhythmics       Anticoagulants  apixaban tablet 5 mg, 2 times daily, Oral    Plan  - Replete lytes with a goal of K>4, Mg >2  - Patient is anticoagulated, see medications listed above.  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 Dementia is moderate. The patient does not have signs of behavioral disturbance.. Continue non-pharmacologic interventions to prevent delirium (No VS between 11PM-5AM, activity during day, opening blinds, providing glasses/hearing aids, and up in chair during daytime). Will avoid narcotics and benzos unless absolutely necessary.     Acute bronchitis due to other specified organisms    At home he had fever cough and sputum production.  His white count was high.  I placed him on Levaquin  We will send him home on Levaquin for 5 days.  VTE Risk Mitigation (From admission, onward)           Ordered     apixaban tablet 5 mg  2 times daily         07/24/25 2158     IP VTE HIGH RISK PATIENT  Once         07/24/25 2158     Place sequential compression device  Until discontinued          07/24/25 2158                    Discharge Planning   JOE: 7/28/2025     Code Status: Full Code   Medical Readiness for Discharge Date:   Discharge Plan A: Home Health                        Devon Peres MD  Department of Hospital Medicine   [place] - Med Surg (3rd Fl)

## 2025-07-30 NOTE — PLAN OF CARE
[place] - Med Surg (3rd Fl)  Discharge Final Note    Primary Care Provider: Hemal Varma MD    Expected Discharge Date: 7/26/2025    Final Discharge Note (most recent)       Final Note - 07/30/25 1249          Final Note    Assessment Type Final Discharge Note (P)      Anticipated Discharge Disposition Home or Self Care (P)      Hospital Resources/Appts/Education Provided Provided education on problems/symptoms using teachback;Provided patient/caregiver with written discharge plan information (P)         Post-Acute Status    Discharge Delays None known at this time (P)                      Important Message from Medicare

## 2025-07-31 LAB — BACTERIA BLD CULT: NORMAL

## 2025-08-05 ENCOUNTER — OFFICE VISIT (OUTPATIENT)
Dept: FAMILY MEDICINE | Facility: CLINIC | Age: OVER 89
End: 2025-08-05
Payer: MEDICARE

## 2025-08-05 VITALS
DIASTOLIC BLOOD PRESSURE: 55 MMHG | SYSTOLIC BLOOD PRESSURE: 132 MMHG | HEART RATE: 78 BPM | HEIGHT: 65 IN | RESPIRATION RATE: 16 BRPM | BODY MASS INDEX: 32.65 KG/M2 | WEIGHT: 196 LBS

## 2025-08-05 DIAGNOSIS — I50.9 CONGESTIVE HEART FAILURE, UNSPECIFIED HF CHRONICITY, UNSPECIFIED HEART FAILURE TYPE: ICD-10-CM

## 2025-08-05 DIAGNOSIS — I10 HYPERTENSION, UNSPECIFIED TYPE: ICD-10-CM

## 2025-08-05 DIAGNOSIS — J40 BRONCHITIS: ICD-10-CM

## 2025-08-05 DIAGNOSIS — Z09 HOSPITAL DISCHARGE FOLLOW-UP: Primary | ICD-10-CM

## 2025-08-05 PROCEDURE — 99999 PR PBB SHADOW E&M-EST. PATIENT-LVL IV: CPT | Mod: PBBFAC,,, | Performed by: FAMILY MEDICINE

## 2025-08-05 RX ORDER — FUROSEMIDE 20 MG/1
20 TABLET ORAL DAILY
Qty: 30 TABLET | Refills: 5 | Status: SHIPPED | OUTPATIENT
Start: 2025-08-05

## 2025-08-05 RX ORDER — MUPIROCIN 20 MG/G
OINTMENT TOPICAL
COMMUNITY
Start: 2025-04-07

## 2025-08-05 RX ORDER — FLUOROURACIL 50 MG/G
CREAM TOPICAL 2 TIMES DAILY
COMMUNITY
Start: 2025-07-14

## 2025-08-05 RX ORDER — CEPHALEXIN 500 MG/1
500 TABLET, FILM COATED ORAL 3 TIMES DAILY
COMMUNITY
Start: 2025-04-26

## 2025-08-05 NOTE — PROGRESS NOTES
Subjective:       Patient ID: Erasmo Dunbar is a 89 y.o. male.    Chief Complaint: Follow-up (Pt here for hospital f/u. )    Pt is a 89 y.o. male who presents for evaluation and management of   Encounter Diagnoses   Name Primary?    Hospital discharge follow-up Yes    Hypertension, unspecified type     Congestive heart failure, unspecified HF chronicity, unspecified heart failure type     Bronchitis    .admitted for fluid overload and elevated BNP  Started on lasix 40IV BID and transitioned to po lasix. Sent home on lasix 20mg daily   No c/o today   Doing well on current meds. Denies any side effects. Prevention is up to date.  Review of Systems   Constitutional:  Negative for fever.   Respiratory:  Positive for shortness of breath. Negative for cough.    Cardiovascular:  Positive for leg swelling.        VIDAL with half a block          Objective:      Physical Exam  Constitutional:       Appearance: Normal appearance. He is well-developed. He is not ill-appearing.   HENT:      Head: Normocephalic and atraumatic.      Right Ear: External ear normal.      Left Ear: External ear normal.      Nose: Nose normal.      Mouth/Throat:      Mouth: Mucous membranes are moist.      Pharynx: No oropharyngeal exudate or posterior oropharyngeal erythema.   Eyes:      General: No scleral icterus.        Right eye: No discharge.         Left eye: No discharge.      Conjunctiva/sclera: Conjunctivae normal.      Pupils: Pupils are equal, round, and reactive to light.   Neck:      Thyroid: No thyromegaly.      Vascular: No JVD.      Trachea: No tracheal deviation.   Cardiovascular:      Rate and Rhythm: Normal rate and regular rhythm.      Heart sounds: Normal heart sounds. No murmur heard.  Pulmonary:      Effort: Pulmonary effort is normal. No respiratory distress.      Breath sounds: Normal breath sounds. No wheezing or rales.   Chest:      Chest wall: No tenderness.   Abdominal:      General: Bowel sounds are normal. There is no  distension.      Palpations: Abdomen is soft. There is no mass.      Tenderness: There is no abdominal tenderness. There is no guarding or rebound.   Musculoskeletal:         General: Normal range of motion.      Cervical back: Normal range of motion and neck supple.      Right lower leg: No edema.      Left lower leg: No edema.   Lymphadenopathy:      Cervical: No cervical adenopathy.   Skin:     General: Skin is warm and dry.   Neurological:      Mental Status: He is alert and oriented to person, place, and time.      Cranial Nerves: No cranial nerve deficit.      Motor: No abnormal muscle tone.      Coordination: Coordination normal.      Deep Tendon Reflexes: Reflexes are normal and symmetric. Reflexes normal.   Psychiatric:         Behavior: Behavior normal.         Thought Content: Thought content normal.         Judgment: Judgment normal.         Assessment:       1. Hospital discharge follow-up    2. Hypertension, unspecified type    3. Congestive heart failure, unspecified HF chronicity, unspecified heart failure type    4. Bronchitis        Plan:   1. Hospital discharge follow-up    2. Hypertension, unspecified type  Overview:  Olmesartan  Metoprolol   Allergic to clonidine patches           3. Congestive heart failure, unspecified HF chronicity, unspecified heart failure type  -     furosemide (LASIX) 20 MG tablet; Take 1 tablet (20 mg total) by mouth once daily.  Dispense: 30 tablet; Refill: 5    4. Bronchitis    Improved. Continue ARB and Lasix   Cardiology stopped his BB, unclear why. May be in the [facility] records     No follow-ups on file.

## 2025-08-07 ENCOUNTER — TELEPHONE (OUTPATIENT)
Dept: FAMILY MEDICINE | Facility: CLINIC | Age: OVER 89
End: 2025-08-07
Payer: MEDICARE

## 2025-08-07 ENCOUNTER — PATIENT MESSAGE (OUTPATIENT)
Dept: FAMILY MEDICINE | Facility: CLINIC | Age: OVER 89
End: 2025-08-07
Payer: MEDICARE

## 2025-08-07 NOTE — TELEPHONE ENCOUNTER
----- Message from Shabbir sent at 2025 12:40 PM CDT -----  Contact: Wife - Carol Dunbar  MRN: [id number]  : 1935  PCP: Hemal Varma  Home Phone      Not on file.  Work Phone      Not on file.  Mobile          [phone]      MESSAGE: wife states Dr Varma ws to send refills on all of his meds to [facility] -- they state they have not received any thing -- she says its approximately 12 meds     Call Carol @ [phone]    PCP: Kojo

## 2025-08-15 ENCOUNTER — PATIENT MESSAGE (OUTPATIENT)
Dept: FAMILY MEDICINE | Facility: CLINIC | Age: OVER 89
End: 2025-08-15
Payer: MEDICARE

## 2025-08-15 DIAGNOSIS — E53.8 VITAMIN B12 DEFICIENCY: ICD-10-CM

## 2025-08-15 DIAGNOSIS — R11.0 NAUSEA: ICD-10-CM

## 2025-08-15 DIAGNOSIS — E11.22 TYPE 2 DIABETES MELLITUS WITH STAGE 3 CHRONIC KIDNEY DISEASE, WITH LONG-TERM CURRENT USE OF INSULIN: ICD-10-CM

## 2025-08-15 DIAGNOSIS — N18.30 TYPE 2 DIABETES MELLITUS WITH STAGE 3 CHRONIC KIDNEY DISEASE, WITH LONG-TERM CURRENT USE OF INSULIN: ICD-10-CM

## 2025-08-15 DIAGNOSIS — M47.816 LUMBAR SPONDYLOSIS: ICD-10-CM

## 2025-08-15 DIAGNOSIS — Z96.651 CHRONIC KNEE PAIN AFTER TOTAL REPLACEMENT OF RIGHT KNEE JOINT: ICD-10-CM

## 2025-08-15 DIAGNOSIS — E11.42 DIABETIC POLYNEUROPATHY ASSOCIATED WITH TYPE 2 DIABETES MELLITUS: ICD-10-CM

## 2025-08-15 DIAGNOSIS — M48.061 SPINAL STENOSIS OF LUMBAR REGION WITHOUT NEUROGENIC CLAUDICATION: ICD-10-CM

## 2025-08-15 DIAGNOSIS — I10 HYPERTENSION, UNSPECIFIED TYPE: ICD-10-CM

## 2025-08-15 DIAGNOSIS — F32.A DEPRESSION, UNSPECIFIED DEPRESSION TYPE: ICD-10-CM

## 2025-08-15 DIAGNOSIS — I48.0 PAROXYSMAL ATRIAL FIBRILLATION: ICD-10-CM

## 2025-08-15 DIAGNOSIS — G89.29 CHRONIC KNEE PAIN AFTER TOTAL REPLACEMENT OF RIGHT KNEE JOINT: ICD-10-CM

## 2025-08-15 DIAGNOSIS — I10 PRIMARY HYPERTENSION: ICD-10-CM

## 2025-08-15 DIAGNOSIS — G89.29 OTHER CHRONIC PAIN: ICD-10-CM

## 2025-08-15 DIAGNOSIS — M25.561 CHRONIC KNEE PAIN AFTER TOTAL REPLACEMENT OF RIGHT KNEE JOINT: ICD-10-CM

## 2025-08-15 DIAGNOSIS — R41.3 MEMORY LOSS: ICD-10-CM

## 2025-08-15 DIAGNOSIS — G47.00 INSOMNIA, UNSPECIFIED TYPE: ICD-10-CM

## 2025-08-15 DIAGNOSIS — Z79.4 TYPE 2 DIABETES MELLITUS WITH STAGE 3 CHRONIC KIDNEY DISEASE, WITH LONG-TERM CURRENT USE OF INSULIN: ICD-10-CM

## 2025-08-17 RX ORDER — EVOLOCUMAB 140 MG/ML
140 INJECTION, SOLUTION SUBCUTANEOUS
Qty: 6 EACH | Refills: 1 | Status: SHIPPED | OUTPATIENT
Start: 2025-08-17

## 2025-08-17 RX ORDER — CLONIDINE 0.2 MG/24H
1 PATCH, EXTENDED RELEASE TRANSDERMAL
Qty: 4 PATCH | Refills: 11 | Status: SHIPPED | OUTPATIENT
Start: 2025-08-17 | End: 2026-08-17

## 2025-08-17 RX ORDER — TRAZODONE HYDROCHLORIDE 50 MG/1
50 TABLET ORAL NIGHTLY
Qty: 90 TABLET | Refills: 3 | Status: SHIPPED | OUTPATIENT
Start: 2025-08-17

## 2025-08-17 RX ORDER — DULOXETIN HYDROCHLORIDE 60 MG/1
60 CAPSULE, DELAYED RELEASE ORAL DAILY
Qty: 90 CAPSULE | Refills: 1 | Status: SHIPPED | OUTPATIENT
Start: 2025-08-17 | End: 2026-02-13

## 2025-08-17 RX ORDER — INSULIN GLARGINE 100 [IU]/ML
15 INJECTION, SOLUTION SUBCUTANEOUS NIGHTLY
Qty: 45 ML | Refills: 3 | Status: SHIPPED | OUTPATIENT
Start: 2025-08-17

## 2025-08-17 RX ORDER — APIXABAN 5 MG/1
5 TABLET, FILM COATED ORAL 2 TIMES DAILY
Qty: 180 TABLET | Refills: 1 | Status: SHIPPED | OUTPATIENT
Start: 2025-08-17

## 2025-08-17 RX ORDER — SYRINGE W-NEEDLE,DISPOSAB,3 ML 25GX5/8"
1 SYRINGE, EMPTY DISPOSABLE MISCELLANEOUS
Qty: 1 EACH | Refills: 11 | Status: SHIPPED | OUTPATIENT
Start: 2025-08-17

## 2025-08-17 RX ORDER — MEMANTINE HYDROCHLORIDE 5 MG/1
5 TABLET ORAL DAILY
Qty: 90 TABLET | Refills: 3 | Status: SHIPPED | OUTPATIENT
Start: 2025-08-17

## 2025-08-17 RX ORDER — METFORMIN HYDROCHLORIDE 1000 MG/1
1000 TABLET ORAL 2 TIMES DAILY WITH MEALS
Qty: 180 TABLET | Refills: 10 | Status: SHIPPED | OUTPATIENT
Start: 2025-08-17

## 2025-08-17 RX ORDER — PREGABALIN 100 MG/1
100 CAPSULE ORAL 2 TIMES DAILY
Qty: 180 CAPSULE | Refills: 1 | Status: SHIPPED | OUTPATIENT
Start: 2025-08-17 | End: 2026-02-15

## 2025-08-17 RX ORDER — OLMESARTAN MEDOXOMIL 40 MG/1
40 TABLET ORAL DAILY
Qty: 90 TABLET | Refills: 3 | Status: SHIPPED | OUTPATIENT
Start: 2025-08-17

## 2025-08-17 RX ORDER — PANTOPRAZOLE SODIUM 40 MG/1
40 TABLET, DELAYED RELEASE ORAL DAILY
Qty: 30 TABLET | Refills: 5 | Status: SHIPPED | OUTPATIENT
Start: 2025-08-17 | End: 2026-02-13

## 2025-08-17 RX ORDER — CYANOCOBALAMIN 1000 UG/ML
1000 INJECTION, SOLUTION INTRAMUSCULAR; SUBCUTANEOUS
Qty: 1 ML | Refills: 11 | Status: SHIPPED | OUTPATIENT
Start: 2025-08-17

## 2025-08-27 ENCOUNTER — OFFICE VISIT (OUTPATIENT)
Dept: FAMILY MEDICINE | Facility: CLINIC | Age: OVER 89
End: 2025-08-27
Payer: MEDICARE

## 2025-08-27 VITALS
TEMPERATURE: 98 F | WEIGHT: 192 LBS | OXYGEN SATURATION: 100 % | BODY MASS INDEX: 31.99 KG/M2 | DIASTOLIC BLOOD PRESSURE: 58 MMHG | HEART RATE: 79 BPM | HEIGHT: 65 IN | RESPIRATION RATE: 16 BRPM | SYSTOLIC BLOOD PRESSURE: 140 MMHG

## 2025-08-27 DIAGNOSIS — I10 PRIMARY HYPERTENSION: ICD-10-CM

## 2025-08-27 DIAGNOSIS — E11.22 TYPE 2 DIABETES MELLITUS WITH STAGE 3A CHRONIC KIDNEY DISEASE, WITH LONG-TERM CURRENT USE OF INSULIN: ICD-10-CM

## 2025-08-27 DIAGNOSIS — Z79.4 TYPE 2 DIABETES MELLITUS WITH STAGE 3B CHRONIC KIDNEY DISEASE, WITH LONG-TERM CURRENT USE OF INSULIN: ICD-10-CM

## 2025-08-27 DIAGNOSIS — F03.90 DEMENTIA, UNSPECIFIED DEMENTIA SEVERITY, UNSPECIFIED DEMENTIA TYPE, UNSPECIFIED WHETHER BEHAVIORAL, PSYCHOTIC, OR MOOD DISTURBANCE OR ANXIETY: ICD-10-CM

## 2025-08-27 DIAGNOSIS — N18.32 TYPE 2 DIABETES MELLITUS WITH STAGE 3B CHRONIC KIDNEY DISEASE, WITH LONG-TERM CURRENT USE OF INSULIN: ICD-10-CM

## 2025-08-27 DIAGNOSIS — G95.9 CERVICAL MYELOPATHY WITH CERVICAL RADICULOPATHY: Primary | ICD-10-CM

## 2025-08-27 DIAGNOSIS — N18.31 TYPE 2 DIABETES MELLITUS WITH STAGE 3A CHRONIC KIDNEY DISEASE, WITH LONG-TERM CURRENT USE OF INSULIN: ICD-10-CM

## 2025-08-27 DIAGNOSIS — Z79.4 TYPE 2 DIABETES MELLITUS WITH STAGE 3A CHRONIC KIDNEY DISEASE, WITH LONG-TERM CURRENT USE OF INSULIN: ICD-10-CM

## 2025-08-27 DIAGNOSIS — M54.12 CERVICAL MYELOPATHY WITH CERVICAL RADICULOPATHY: Primary | ICD-10-CM

## 2025-08-27 DIAGNOSIS — N18.32 STAGE 3B CHRONIC KIDNEY DISEASE: ICD-10-CM

## 2025-08-27 DIAGNOSIS — M47.816 LUMBAR SPONDYLOSIS: ICD-10-CM

## 2025-08-27 DIAGNOSIS — E11.22 TYPE 2 DIABETES MELLITUS WITH STAGE 3B CHRONIC KIDNEY DISEASE, WITH LONG-TERM CURRENT USE OF INSULIN: ICD-10-CM

## 2025-08-27 DIAGNOSIS — E78.2 MIXED HYPERLIPIDEMIA: ICD-10-CM

## 2025-08-27 DIAGNOSIS — I50.32 CHRONIC DIASTOLIC (CONGESTIVE) HEART FAILURE: ICD-10-CM

## 2025-08-27 DIAGNOSIS — I48.0 PAROXYSMAL ATRIAL FIBRILLATION: ICD-10-CM

## 2025-08-27 PROCEDURE — 1126F AMNT PAIN NOTED NONE PRSNT: CPT | Mod: CPTII,S$GLB,, | Performed by: FAMILY MEDICINE

## 2025-08-27 PROCEDURE — 1159F MED LIST DOCD IN RCRD: CPT | Mod: CPTII,S$GLB,, | Performed by: FAMILY MEDICINE

## 2025-08-27 PROCEDURE — 99999 PR PBB SHADOW E&M-EST. PATIENT-LVL V: CPT | Mod: PBBFAC,,, | Performed by: FAMILY MEDICINE

## 2025-08-27 PROCEDURE — G2211 COMPLEX E/M VISIT ADD ON: HCPCS | Mod: S$GLB,,, | Performed by: FAMILY MEDICINE

## 2025-08-27 PROCEDURE — 99214 OFFICE O/P EST MOD 30 MIN: CPT | Mod: S$GLB,,, | Performed by: FAMILY MEDICINE

## (undated) DEVICE — NDL TUOHY EPIDURAL 20GX3.5IN

## (undated) DEVICE — TRAY NERVE BLOCK

## (undated) DEVICE — SKIN MARKER DEVON 160

## (undated) DEVICE — BREVI-KATH 19G 12IN

## (undated) DEVICE — GLOVE BIOGEL SKINSENSE PI 7.5

## (undated) DEVICE — SYR 10CC LUER LOCK

## (undated) DEVICE — NEEDLE SPINAL CLR HUB 22GX3

## (undated) DEVICE — NDL TUOHY EPIDRL PNK 18GX3.5IN

## (undated) DEVICE — PAD GROUNDING DISPER ELECTRODE

## (undated) DEVICE — NDL RK EPIDURAL 16GATWX3 1/2IN

## (undated) DEVICE — CANNULA RADIOPAQUE 20G CURVED